# Patient Record
Sex: MALE | Race: BLACK OR AFRICAN AMERICAN | NOT HISPANIC OR LATINO | Employment: OTHER | URBAN - METROPOLITAN AREA
[De-identification: names, ages, dates, MRNs, and addresses within clinical notes are randomized per-mention and may not be internally consistent; named-entity substitution may affect disease eponyms.]

---

## 2017-03-07 ENCOUNTER — OFFICE VISIT (OUTPATIENT)
Dept: FAMILY MEDICINE | Facility: CLINIC | Age: 62
End: 2017-03-07
Payer: COMMERCIAL

## 2017-03-07 VITALS
BODY MASS INDEX: 32.23 KG/M2 | HEART RATE: 100 BPM | TEMPERATURE: 99 F | WEIGHT: 243.19 LBS | DIASTOLIC BLOOD PRESSURE: 80 MMHG | OXYGEN SATURATION: 96 % | SYSTOLIC BLOOD PRESSURE: 150 MMHG | HEIGHT: 73 IN

## 2017-03-07 DIAGNOSIS — J06.9 VIRAL URI WITH COUGH: ICD-10-CM

## 2017-03-07 DIAGNOSIS — B96.89 BACTERIAL CONJUNCTIVITIS OF BOTH EYES: Primary | ICD-10-CM

## 2017-03-07 DIAGNOSIS — H10.9 BACTERIAL CONJUNCTIVITIS OF BOTH EYES: Primary | ICD-10-CM

## 2017-03-07 PROCEDURE — 3077F SYST BP >= 140 MM HG: CPT | Mod: S$GLB,,, | Performed by: NURSE PRACTITIONER

## 2017-03-07 PROCEDURE — 3079F DIAST BP 80-89 MM HG: CPT | Mod: S$GLB,,, | Performed by: NURSE PRACTITIONER

## 2017-03-07 PROCEDURE — 99214 OFFICE O/P EST MOD 30 MIN: CPT | Mod: S$GLB,,, | Performed by: NURSE PRACTITIONER

## 2017-03-07 PROCEDURE — 99999 PR PBB SHADOW E&M-EST. PATIENT-LVL IV: CPT | Mod: PBBFAC,,, | Performed by: NURSE PRACTITIONER

## 2017-03-07 PROCEDURE — 1160F RVW MEDS BY RX/DR IN RCRD: CPT | Mod: S$GLB,,, | Performed by: NURSE PRACTITIONER

## 2017-03-07 RX ORDER — LEVOCETIRIZINE DIHYDROCHLORIDE 5 MG/1
5 TABLET, FILM COATED ORAL NIGHTLY
Qty: 30 TABLET | Refills: 0 | Status: SHIPPED | OUTPATIENT
Start: 2017-03-07 | End: 2019-08-06

## 2017-03-07 RX ORDER — FLUTICASONE PROPIONATE 50 MCG
1 SPRAY, SUSPENSION (ML) NASAL DAILY
Qty: 16 G | Refills: 0 | Status: SHIPPED | OUTPATIENT
Start: 2017-03-07 | End: 2017-09-21 | Stop reason: ALTCHOICE

## 2017-03-07 RX ORDER — PROMETHAZINE HYDROCHLORIDE AND DEXTROMETHORPHAN HYDROBROMIDE 6.25; 15 MG/5ML; MG/5ML
5 SYRUP ORAL
Qty: 120 ML | Refills: 0 | Status: SHIPPED | OUTPATIENT
Start: 2017-03-07 | End: 2017-03-17

## 2017-03-07 RX ORDER — OFLOXACIN 3 MG/ML
1 SOLUTION/ DROPS OPHTHALMIC 4 TIMES DAILY
Qty: 10 ML | Refills: 0 | Status: SHIPPED | OUTPATIENT
Start: 2017-03-07 | End: 2017-03-17

## 2017-03-07 NOTE — MR AVS SNAPSHOT
[facility]  [address]  [place] [address]  Phone: [phone]  Fax: [phone]                  Elbert Still   3/7/2017 2:20 PM   Office Visit    Description:  Male : 1955   Provider:  DOMO Dalton   Department:  [facility]           Reason for Visit     Cough     URI           Diagnoses this Visit        Comments    Bacterial conjunctivitis of both eyes    -  Primary     Acute non-recurrent maxillary sinusitis                To Do List           Goals (5 Years of Data)     fluids       Follow-Up and Disposition     Return if symptoms worsen or fail to improve.       These Medications        Disp Refills Start End    ofloxacin (OCUFLOX) 0.3 % ophthalmic solution 10 mL 0 3/7/2017 3/17/2017    Place 1 drop into both eyes 4 (four) times daily. - Both Eyes    Pharmacy: [facility] [address] [place] [address] [place] [facility] [phone]       fluticasone (FLONASE) 50 mcg/actuation nasal spray 16 g 0 3/7/2017     1 spray by Each Nare route once daily. - Each Nare    Pharmacy: [facility] [address] [place] [address] [address] [facility]: [phone]       levocetirizine (XYZAL) 5 MG tablet 30 tablet 0 3/7/2017 3/7/2018    Take 1 tablet (5 mg total) by mouth every evening. - Oral    Pharmacy: [facility] [address] [place] [address] [address] [facility] #: [phone]       promethazine-dextromethorphan (PROMETHAZINE-DM) 6.25-15 mg/5 mL Syrp 120 mL 0 3/7/2017 3/17/2017    Take 5 mLs by mouth every 4 to 6 hours as needed. - Oral    Pharmacy: [facility] [address] [place] [address] [address] [facility] #: [phone]         [facility]     [facility] -  Assistance  Registered nurses in the [facility] provide clinical advisement, health education, appointment booking, and other advisory  services.  Call for this free service at [phone].             Medications           Message regarding Medications     Verify the changes and/or additions to your medication regime listed below are the same as discussed with your clinician today.  If any of these changes or additions are incorrect, please notify your healthcare provider.        START taking these NEW medications        Refills    ofloxacin (OCUFLOX) 0.3 % ophthalmic solution 0    Sig: Place 1 drop into both eyes 4 (four) times daily.    Class: Normal    Route: Both Eyes    fluticasone (FLONASE) 50 mcg/actuation nasal spray 0    Si spray by Each Nare route once daily.    Class: Normal    Route: Each Nare    levocetirizine (XYZAL) 5 MG tablet 0    Sig: Take 1 tablet (5 mg total) by mouth every evening.    Class: Normal    Route: Oral    promethazine-dextromethorphan (PROMETHAZINE-DM) 6.25-15 mg/5 mL Syrp 0    Sig: Take 5 mLs by mouth every 4 to 6 hours as needed.    Class: Normal    Route: Oral           Verify that the below list of medications is an accurate representation of the medications you are currently taking.  If none reported, the list may be blank. If incorrect, please contact your healthcare provider. Carry this list with you in case of emergency.           Current Medications     aspirin (ECOTRIN) 81 MG EC tablet Take 81 mg by mouth once daily.      benazepril (LOTENSIN) 20 MG tablet Take 1 tablet (20 mg total) by mouth once daily.    BLOOD-GLUCOSE CONTROL, NORMAL (ONE TOUCH ULTRA CONTROL MISC) by Misc.(Non-Drug; Combo Route) route.      clotrimazole (LOTRIMIN) 1 % Soln Apply topically once daily. Apply to filed toenails daily    colchicine 0.6 mg tablet Take 1 tablet (0.6 mg total) by mouth once daily.    cyclobenzaprine (FLEXERIL) 10 MG tablet Take 1 tablet (10 mg total) by mouth 3 (three) times daily as needed.    fluconazole (DIFLUCAN) 150 MG Tab TAKE 1 TABLET BY MOUTH ONCE    fluticasone (FLONASE) 50 mcg/actuation nasal  "spray 2 sprays by Nasal route once daily.      fluticasone (FLONASE) 50 mcg/actuation nasal spray 1 spray by Each Nare route once daily.    glipiZIDE (GLUCOTROL) 5 MG TR24 Take 1 tablet (5 mg total) by mouth daily with breakfast.    glipiZIDE (GLUCOTROL) 5 MG TR24 TAKE 1 TABLET BY MOUTH TWICE DAILY    LANCETS MISC by Misc.(Non-Drug; Combo Route) route.      levocetirizine (XYZAL) 5 MG tablet Take 1 tablet (5 mg total) by mouth every evening.    metformin (GLUCOPHAGE) 500 MG tablet TAKE 1 TABLET(500 MG) BY MOUTH TWICE DAILY WITH MEALS    multivitamin (MULTIVITAMIN) per tablet Take 1 tablet by mouth once daily.      naproxen (NAPROSYN) 500 MG tablet Take 1 tablet (500 mg total) by mouth 2 (two) times daily with meals.    ofloxacin (OCUFLOX) 0.3 % ophthalmic solution Place 1 drop into both eyes 4 (four) times daily.    promethazine-dextromethorphan (PROMETHAZINE-DM) 6.25-15 mg/5 mL Syrp Take 5 mLs by mouth every 4 to 6 hours as needed.    urea 20 % Crea Apply 1 application topically.      vardenafil (LEVITRA) 20 MG tablet Take 1 tablet (20 mg total) by mouth daily as needed for Erectile Dysfunction.           Clinical Reference Information           Your Vitals Were     BP Pulse Temp Height Weight SpO2    150/80 100 99 °F (37.2 °C) (Oral) 6' 1" (1.854 m) 110.3 kg (243 lb 2.7 oz) 96%    BMI                32.08 kg/m2          Blood Pressure          Most Recent Value    BP  (!)  150/80      Allergies as of 3/7/2017     Iodine And Iodide Containing Products      Immunizations Administered on Date of Encounter - 3/7/2017     None      [facility] Sign-Up     Activating your [facility] account is as easy as 1-2-3!     1) Visit [email or web address], select Sign Up Now, enter this activation code and your date of birth, then select Next.  [email or web address]  Expires: 4/21/2017  2:48 PM      2) Create a username and password to use when you visit [facility] in the future and select a security question in case you lose your password and " select Next.    3) Enter your e-mail address and click Sign Up!    Additional Information  If you have questions, please e-mail [email or web address] or call [phone] to talk to our [facility] staff. Remember, [facility] is NOT to be used for urgent needs. For medical emergencies, dial 911.         Instructions      Conjunctivitis, Bacterial    You have an infection in the membranes covering the white part of the eye. This part of the eye is called the conjunctiva. The infection is called conjunctivitis. The most common symptoms of conjunctivitis include a thick, pus-like discharge from the eye, swollen eyelids, redness, eyelids sticking together upon awakening, and a gritty or scratchy feeling in the eye. Your infection was caused by bacteria. It may be treated with medicine. With treatment, the infection takes about 7 to 10 days to resolve.  Home care  · Use prescribed antibiotic eye drops or ointment as directed to treat the infection.  · Apply a warm compress (towel soaked in warm water) to the affected eye 3 to 4 times a day. Do this just before applying medicine to the eye.  · Use a warm, wet cloth to wipe away crusting of the eyelids in the morning. This is caused by mucus drainage during the night. You may also use saline irrigating solution or artificial tears to rinse away mucus in the eye. Do not put a patch over the eye.  · Wash your hands before and after touching the infected eye. This is to prevent spreading the infection to the other eye, and to other people. Do not share your towels or washcloths with others.  · You may use acetaminophen or ibuprofen to control pain, unless another medicine was prescribed. (Note: If you have chronic liver or kidney disease or have ever had a stomach ulcer or gastrointestinal bleeding, talk with your doctor before using these medicines.)  · Do not wear contact lenses until your eyes have healed and all symptoms are gone.  Follow-up care  Follow up with your  healthcare provider, or as advised.  When to seek medical advice  Call your healthcare provider right away if any of these occur:  · Worsening vision  · Increasing pain in the eye  · Increasing swelling or redness of the eyelid  · Redness spreading around the eye  Date Last Reviewed: 6/14/2015 © 2000-2016 [employer]. [address] [place] [address], [place] [address]. All rights reserved. This information is not intended as a substitute for professional medical care. Always follow your healthcare professional's instructions.        Sinusitis (No Antibiotics)    The sinuses are air-filled spaces within the bones of the face. They connect to the inside of the nose. Sinusitis is an inflammation of the tissue lining the sinus cavity. Sinus inflammation can occur during a cold. It can also be due to allergies to pollens and other particles in the air. It can cause symptoms such as sinus congestion, headache, sore throat, facial swelling and fullness. It may also cause a low-grade fever. No infection is present, and no antibiotic treatment is needed.  Home care  · Drink plenty of water, hot tea, and other liquids. This may help thin mucus. It also may promote sinus drainage.  · Heat may help soothe painful areas of the face. Use a towel soaked in hot water. Or,  the shower and direct the hot spray onto your face. Using a vaporizer along with a menthol rub at night may also help.   · An expectorant containing guaifenesin may help thin the mucus and promote drainage from the sinuses.  · Over-the-counter decongestants may be used unless a similar medicine was prescribed. Nasal sprays work the fastest. Use one that contains phenylephrine or oxymetazoline. First blow the nose gently. Then use the spray. Do not use these medicines more often than directed on the label or symptoms may get worse. You may also use tablets containing pseudoephedrine. Avoid products that combine ingredients, because side effects may  be increased. Read labels. You can also ask the pharmacist for help. (NOTE: Persons with high blood pressure should not use decongestants. They can raise blood pressure.)  · Over-the-counter antihistamines may help if allergies contributed to your sinusitis.    · Use acetaminophen or ibuprofen to control pain, unless another pain medicine was prescribed. (If you have chronic liver or kidney disease or ever had a stomach ulcer, talk with your doctor before using these medicines. Aspirin should never be used in anyone under 18 years of age who is ill with a fever. It may cause severe liver damage.)  · Use nasal rinses or irrigation as instructed by your health care provider.  · Don't smoke. This can worsen symptoms.  Follow-up care  Follow up with your healthcare provider or our staff if you are not improving within the next week.  When to seek medical advice  Call your healthcare provider if any of these occur:  · Green or yellow discharge from the nose or into the throat  · Facial pain or headache becoming more severe  · Stiff neck  · Unusual drowsiness or confusion  · Swelling of the forehead or eyelids  · Vision problems, including blurred or double vision  · Fever of 100.4ºF (38ºC) or higher, or as directed by your healthcare provider  · Seizure  · Breathing problems  · Symptoms not resolving within 10 days  Date Last Reviewed: 4/13/2015  © 7581-0772 [employer]. [address] [place] [address]. All rights reserved. This information is not intended as a substitute for professional medical care. Always follow your healthcare professional's instructions.             Language Assistance Services     ATTENTION: Language assistance services are available, free of charge. Please call [phone].      ATENCIÓN: Si habla español, tiene a avalos disposición servicios gratuitos de asistencia lingüística. Llame al [phone].     [facility] Ý: N?u b?n nói Ti?ng Vi?t, có các d?ch v? h? tr? ngôn ng? mi?n  phí dành cho b?n. G?i s? [phone].         [facility] complies with applicable Federal civil rights laws and does not discriminate on the basis of race, color, national origin, age, disability, or sex.

## 2017-03-07 NOTE — PATIENT INSTRUCTIONS
Conjunctivitis, Bacterial    You have an infection in the membranes covering the white part of the eye. This part of the eye is called the conjunctiva. The infection is called conjunctivitis. The most common symptoms of conjunctivitis include a thick, pus-like discharge from the eye, swollen eyelids, redness, eyelids sticking together upon awakening, and a gritty or scratchy feeling in the eye. Your infection was caused by bacteria. It may be treated with medicine. With treatment, the infection takes about 7 to 10 days to resolve.  Home care  · Use prescribed antibiotic eye drops or ointment as directed to treat the infection.  · Apply a warm compress (towel soaked in warm water) to the affected eye 3 to 4 times a day. Do this just before applying medicine to the eye.  · Use a warm, wet cloth to wipe away crusting of the eyelids in the morning. This is caused by mucus drainage during the night. You may also use saline irrigating solution or artificial tears to rinse away mucus in the eye. Do not put a patch over the eye.  · Wash your hands before and after touching the infected eye. This is to prevent spreading the infection to the other eye, and to other people. Do not share your towels or washcloths with others.  · You may use acetaminophen or ibuprofen to control pain, unless another medicine was prescribed. (Note: If you have chronic liver or kidney disease or have ever had a stomach ulcer or gastrointestinal bleeding, talk with your doctor before using these medicines.)  · Do not wear contact lenses until your eyes have healed and all symptoms are gone.  Follow-up care  Follow up with your healthcare provider, or as advised.  When to seek medical advice  Call your healthcare provider right away if any of these occur:  · Worsening vision  · Increasing pain in the eye  · Increasing swelling or redness of the eyelid  · Redness spreading around the eye  Date Last Reviewed: 6/14/2015  © 4998-1778 [employer]. [address] [place] [address], [place] [address]. All rights reserved. This information is not intended as a substitute for professional medical care. Always follow your healthcare professional's instructions.        Sinusitis (No Antibiotics)    The sinuses are air-filled spaces within the bones of the face. They connect to the inside of the nose. Sinusitis is an inflammation of the tissue lining the sinus cavity. Sinus inflammation can occur during a cold. It can also be due to allergies to pollens and other particles in the air. It can cause symptoms such as sinus congestion, headache, sore throat, facial swelling and fullness. It may also cause a low-grade fever. No infection is present, and no antibiotic treatment is needed.  Home care  · Drink plenty of water, hot tea, and other liquids. This may help thin mucus. It also may promote sinus drainage.  · Heat may help soothe painful areas of the face. Use a towel soaked in hot water. Or,  the shower and direct the hot spray onto your face. Using a vaporizer along with a menthol rub at night may also help.   · An expectorant containing guaifenesin may help thin the mucus and promote drainage from the sinuses.  · Over-the-counter decongestants may be used unless a similar medicine was prescribed. Nasal sprays work the fastest. Use one that contains phenylephrine or oxymetazoline. First blow the nose gently. Then use the spray. Do not use these medicines more often than directed on the label or symptoms may get worse. You may also use tablets containing pseudoephedrine. Avoid products that combine ingredients, because side effects may be increased. Read labels. You can also ask the pharmacist for help. (NOTE: Persons with high blood pressure should not use decongestants. They can raise blood pressure.)  · Over-the-counter antihistamines may help if allergies contributed to your sinusitis.    · Use acetaminophen or ibuprofen to control pain, unless  another pain medicine was prescribed. (If you have chronic liver or kidney disease or ever had a stomach ulcer, talk with your doctor before using these medicines. Aspirin should never be used in anyone under 18 years of age who is ill with a fever. It may cause severe liver damage.)  · Use nasal rinses or irrigation as instructed by your health care provider.  · Don't smoke. This can worsen symptoms.  Follow-up care  Follow up with your healthcare provider or our staff if you are not improving within the next week.  When to seek medical advice  Call your healthcare provider if any of these occur:  · Green or yellow discharge from the nose or into the throat  · Facial pain or headache becoming more severe  · Stiff neck  · Unusual drowsiness or confusion  · Swelling of the forehead or eyelids  · Vision problems, including blurred or double vision  · Fever of 100.4ºF (38ºC) or higher, or as directed by your healthcare provider  · Seizure  · Breathing problems  · Symptoms not resolving within 10 days  Date Last Reviewed: 4/13/2015  © 5887-5355 [employer]. [address] [place] [address], [place] [address]. All rights reserved. This information is not intended as a substitute for professional medical care. Always follow your healthcare professional's instructions.

## 2017-03-07 NOTE — PROGRESS NOTES
Subjective:       Patient ID: Elbert Still is a 61 y.o. male.    Chief Complaint: Cough and URI    Cough   This is a new problem. The current episode started in the past 7 days. The problem has been gradually worsening. The problem occurs constantly. The cough is productive of sputum. Associated symptoms include eye redness, headaches, nasal congestion, postnasal drip and a sore throat. Pertinent negatives include no chills, ear congestion, ear pain, fever, myalgias or rhinorrhea. Nothing aggravates the symptoms. He has tried OTC cough suppressant for the symptoms. The treatment provided no relief.   URI    Associated symptoms include coughing, headaches and a sore throat. Pertinent negatives include no ear pain, rhinorrhea or sneezing.       History reviewed. No pertinent past medical history.    Social History     Social History    Marital status:      Spouse name: N/A    Number of children: N/A    Years of education: N/A     Occupational History     [employer]     Social History Main Topics    Smoking status: Never Smoker    Smokeless tobacco: Not on file      Comment: .  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      Alcohol use No    Drug use: No    Sexual activity: Yes     Partners: Female     Other Topics Concern    Not on file     Social History Narrative       Past Surgical History:   Procedure Laterality Date    ADENOIDECTOMY      nasal septum repair      Tonsillectomy      TONSILLECTOMY         Review of Systems   Constitutional: Positive for fatigue. Negative for chills and fever.   HENT: Positive for postnasal drip and sore throat. Negative for ear pain, rhinorrhea, sinus pressure and sneezing.    Eyes: Positive for pain, discharge, redness and itching.   Respiratory: Positive for cough.    Musculoskeletal: Negative for myalgias.   Neurological: Positive for headaches.   All other systems reviewed and are negative.      Objective:   BP (!) 150/80  Pulse 100  Temp  "99 °F (37.2 °C) (Oral)   Ht 6' 1" (1.854 m)  Wt 110.3 kg (243 lb 2.7 oz)  SpO2 96%  BMI 32.08 kg/m2     Physical Exam   Constitutional: He is oriented to person, place, and time. He appears well-developed and well-nourished.   HENT:   Head: Normocephalic and atraumatic.   Right Ear: Hearing, external ear and ear canal normal. Tympanic membrane is erythematous.   Left Ear: Hearing, external ear and ear canal normal. Tympanic membrane is erythematous.   Nose: Rhinorrhea present.   Mouth/Throat: No oropharyngeal exudate, posterior oropharyngeal edema or posterior oropharyngeal erythema.   Eyes: Right eye exhibits no exudate. Left eye exhibits no exudate. Right conjunctiva is injected. Left conjunctiva is injected.   Cardiovascular: Normal rate, regular rhythm and normal heart sounds.    Pulmonary/Chest: Effort normal and breath sounds normal. No respiratory distress. He has no decreased breath sounds. He has no wheezes. He has no rhonchi. He has no rales.   Lymphadenopathy:     He has no cervical adenopathy.   Neurological: He is alert and oriented to person, place, and time.   Skin: Skin is warm, dry and intact.   Psychiatric: He has a normal mood and affect. His speech is normal and behavior is normal.       Assessment:       1. Bacterial conjunctivitis of both eyes    2. Viral URI with cough        Plan:       Elbert was seen today for cough and uri.    Diagnoses and all orders for this visit:    Bacterial conjunctivitis of both eyes        -     ofloxacin (OCUFLOX) 0.3 % ophthalmic solution; Place 1 drop into both eyes 4 (four) times daily.    Viral URI with cough  -     fluticasone (FLONASE) 50 mcg/actuation nasal spray; 1 spray by Each Nare route once daily.  -     levocetirizine (XYZAL) 5 MG tablet; Take 1 tablet (5 mg total) by mouth every evening.  -     promethazine-dextromethorphan (PROMETHAZINE-DM) 6.25-15 mg/5 mL Syrp; Take 5 mLs by mouth every 4 to 6 hours as needed.  -     Discussed diagnosis and " treatment of URI.   -     Increase your water intake to 64-80 oz daily to help thin mucus  -     Nasal Saline spray (Over the counter [place] spray or [place])  2 sprays each nostril 2-3 times a day for nasal congestion  -     Tylenol 500 mg 2 tablets or Ibuprofen 200 mg 2 tablets every 4-6 hours as needed for fever, headaches, sore throat, ear pain, bodyaches, and/or nasal/sinus inflammation  -     Warm salt water gargles with 1/2 cup water and 1 tablespoon salt every 4 hours  -     Warm tea with honey and lemon      Return if symptoms worsen or fail to improve.

## 2017-03-07 NOTE — LETTER
March 7, 2017      Elbert Still   [address] [place] [address]             [facility]  [address] [facility]  [place] [address]  Phone: [phone]  Fax: [phone] Elbert Still    Was treated here on 03/07/2017    May Return to work/school on 03/09/2016    No Restrictions        Nayla Forrester, SANDIP-C

## 2017-03-15 RX ORDER — GLIPIZIDE 5 MG/1
TABLET, FILM COATED, EXTENDED RELEASE ORAL
Qty: 60 TABLET | Refills: 5 | Status: SHIPPED | OUTPATIENT
Start: 2017-03-15 | End: 2017-09-21 | Stop reason: SDUPTHER

## 2017-03-25 DIAGNOSIS — E08.21 DIABETES MELLITUS DUE TO UNDERLYING CONDITION WITH DIABETIC NEPHROPATHY, WITHOUT LONG-TERM CURRENT USE OF INSULIN: ICD-10-CM

## 2017-03-26 RX ORDER — METFORMIN HYDROCHLORIDE 500 MG/1
TABLET ORAL
Qty: 60 TABLET | Refills: 3 | Status: SHIPPED | OUTPATIENT
Start: 2017-03-26 | End: 2017-09-21 | Stop reason: SDUPTHER

## 2017-05-10 DIAGNOSIS — I10 ESSENTIAL HYPERTENSION: ICD-10-CM

## 2017-05-11 RX ORDER — BENAZEPRIL HYDROCHLORIDE 20 MG/1
TABLET ORAL
Qty: 30 TABLET | Refills: 5 | Status: SHIPPED | OUTPATIENT
Start: 2017-05-11 | End: 2017-09-21 | Stop reason: ALTCHOICE

## 2017-07-25 DIAGNOSIS — E08.21 DIABETES MELLITUS DUE TO UNDERLYING CONDITION WITH DIABETIC NEPHROPATHY, WITHOUT LONG-TERM CURRENT USE OF INSULIN: ICD-10-CM

## 2017-07-26 RX ORDER — METFORMIN HYDROCHLORIDE 500 MG/1
TABLET ORAL
Qty: 60 TABLET | Refills: 3 | OUTPATIENT
Start: 2017-07-26

## 2017-08-22 DIAGNOSIS — E08.21 DIABETES MELLITUS DUE TO UNDERLYING CONDITION WITH DIABETIC NEPHROPATHY, WITHOUT LONG-TERM CURRENT USE OF INSULIN: ICD-10-CM

## 2017-08-22 RX ORDER — METFORMIN HYDROCHLORIDE 500 MG/1
TABLET ORAL
Qty: 60 TABLET | Refills: 3 | OUTPATIENT
Start: 2017-08-22

## 2017-09-21 ENCOUNTER — OFFICE VISIT (OUTPATIENT)
Dept: FAMILY MEDICINE | Facility: CLINIC | Age: 62
End: 2017-09-21
Payer: COMMERCIAL

## 2017-09-21 VITALS
DIASTOLIC BLOOD PRESSURE: 100 MMHG | WEIGHT: 245.56 LBS | OXYGEN SATURATION: 96 % | HEART RATE: 80 BPM | TEMPERATURE: 99 F | HEIGHT: 75 IN | SYSTOLIC BLOOD PRESSURE: 150 MMHG | BODY MASS INDEX: 30.53 KG/M2

## 2017-09-21 DIAGNOSIS — Z79.4 DIABETES MELLITUS DUE TO UNDERLYING CONDITION WITH DIABETIC NEPHROPATHY, WITH LONG-TERM CURRENT USE OF INSULIN: ICD-10-CM

## 2017-09-21 DIAGNOSIS — N18.30 CHRONIC KIDNEY DISEASE, STAGE III (MODERATE): ICD-10-CM

## 2017-09-21 DIAGNOSIS — I10 ESSENTIAL HYPERTENSION: Primary | ICD-10-CM

## 2017-09-21 DIAGNOSIS — E08.21 DIABETES MELLITUS DUE TO UNDERLYING CONDITION WITH DIABETIC NEPHROPATHY, WITH LONG-TERM CURRENT USE OF INSULIN: ICD-10-CM

## 2017-09-21 DIAGNOSIS — E08.21 DIABETES MELLITUS DUE TO UNDERLYING CONDITION WITH DIABETIC NEPHROPATHY, WITHOUT LONG-TERM CURRENT USE OF INSULIN: ICD-10-CM

## 2017-09-21 PROCEDURE — 99214 OFFICE O/P EST MOD 30 MIN: CPT | Mod: S$GLB,,, | Performed by: FAMILY MEDICINE

## 2017-09-21 PROCEDURE — 3008F BODY MASS INDEX DOCD: CPT | Mod: S$GLB,,, | Performed by: FAMILY MEDICINE

## 2017-09-21 PROCEDURE — 99999 PR PBB SHADOW E&M-EST. PATIENT-LVL III: CPT | Mod: PBBFAC,,, | Performed by: FAMILY MEDICINE

## 2017-09-21 RX ORDER — METFORMIN HYDROCHLORIDE 500 MG/1
TABLET ORAL
Qty: 90 TABLET | Refills: 1 | Status: SHIPPED | OUTPATIENT
Start: 2017-09-21 | End: 2017-10-12 | Stop reason: SDUPTHER

## 2017-09-21 RX ORDER — BENAZEPRIL HYDROCHLORIDE 20 MG/1
TABLET ORAL
Qty: 30 TABLET | Refills: 5 | Status: CANCELLED | OUTPATIENT
Start: 2017-09-21

## 2017-09-21 RX ORDER — BENAZEPRIL/HYDROCHLOROTHIAZIDE 20 MG-25MG
1 TABLET ORAL DAILY
Qty: 30 TABLET | Refills: 0 | Status: SHIPPED | OUTPATIENT
Start: 2017-09-21 | End: 2017-10-12 | Stop reason: SDUPTHER

## 2017-09-21 RX ORDER — FLUTICASONE PROPIONATE 50 MCG
2 SPRAY, SUSPENSION (ML) NASAL DAILY
Qty: 1 BOTTLE | Refills: 3 | Status: SHIPPED | OUTPATIENT
Start: 2017-09-21 | End: 2019-08-06

## 2017-09-21 RX ORDER — GLIPIZIDE 5 MG/1
TABLET, FILM COATED, EXTENDED RELEASE ORAL
Qty: 60 TABLET | Refills: 5 | Status: CANCELLED | OUTPATIENT
Start: 2017-09-21

## 2017-09-21 RX ORDER — GLIPIZIDE 5 MG/1
5 TABLET, FILM COATED, EXTENDED RELEASE ORAL
Qty: 90 TABLET | Refills: 1 | Status: SHIPPED | OUTPATIENT
Start: 2017-09-21 | End: 2018-02-05

## 2017-09-21 NOTE — PROGRESS NOTES
Office Visit    Patient Name: Elbert Still    : 1955  MRN: [id number]      Assessment/Plan:  Elbert Still is a 61 y.o. male who presents today for :    Essential hypertension  -BP un controlled - continue ACE, add HCTZ  -d/w patient about the benefits of DASH diet, regular exercise, and weight loss.   -advised to keep regular BP logs and bring it back with each f/u visit.  -advised medication compliance, and avoid regular use of NSAIDs as it can increase BP.    -f/u with logs in 2 weeks      Diabetes mellitus due to underlying condition with diabetic nephropathy, without long-term current use of insulin  -last A1c reviewed: 6% from one year ago  -cont current meds  -d/w patient about the need for regular eye care, skin care, daily foot exam, proper nutrition, daily BG checks at home (fasting and 2 hrs pp) and medication compliance in a diabetic.  -Last eye exam: 1 year ago, pt is due for another one, will refer today.  -caution on hypoglycemia and eating small cracker or small cup of juice if pt feels faint/dizzy/weak, and call doctor or go to ER.  -recheck A1c.  -     metformin (GLUCOPHAGE) 500 MG tablet; TAKE 1 TABLET(500 MG) BY MOUTH TWICE DAILY WITH MEALS  Dispense: 60 tablet; Refill: 3  -     glipiZIDE (GLUCOTROL) 5 MG TR24; Take 1 tablet (5 mg total) by mouth daily with breakfast.  Dispense: 60 tablet; Refill: 5    Chronic kidney disease, stage 3  -recheck labs, cont ACE    Other orders  -     glipiZIDE (GLUCOTROL) 5 MG TR24; TAKE 1 TABLET BY MOUTH TWICE DAILY  Dispense: 60 tablet; Refill: 5        Return in about 2 weeks (around 10/5/2017) for HTN med adjustment.     This note was created by combination of typed  and Dragon dictation.  Transcription errors may be present.  If there are any questions, please contact me.      ----------------------------------------------------------------------------------------------------------------------      HPI:  Elbert is a 61 y.o. male who presents  today for:    Diabetes (establish care and med refill); Chronic Kidney Disease (establish care and med refill); and Hypertension (establish care and med refill)      This patient has multiple medical diagnoses as noted below.    This patient is new to me but known to this clinic.  Last OV was over 1 year ago with former Dr. Dawson.    HTN    refills needed today - has been out of meds for the past 3 weeks  pt is compliant with daily HTN medications without any side effects  current medication: Benazepril 20mg  BP log at home? No checks at home  Pt denies vision changes/urinary changes/leg swelling  Pt denies restricting salt-intake, doing regular exercises.  Prior Lipid lab showed elevated Tg, but normal LDL and cholesterol    DM with CKD3    Stable on current medication regimen, no side effects.  current medication: Metformin and glipizide  Daily FBG checks at home: no  Last A1c 9/14/16 at 6.0%  Denies polyuria/polydipsia/foot numbness/tingling/vision changes/hypoglycemia  Denies dietary compliance/wt loss/exercise  -pt takes ACE daily, his last Cr was 2.2 from one year ago      ROS  No F/C/wt changes/fatigue  No vision changes  No dysphagia/sore throat/rhinorrhea  No CP/SOB/palpitations/swelling  No cough/wheezing/SOB  No nausea/vomiting/abd pain  No muscle aches/joint pain  No rashes  No weakness/HA/tingling/numbness  No anxiety/depression    Patient Active Problem List   Diagnosis    Diabetes mellitus due to underlying condition with diabetic nephropathy    Proteinuria    Chronic kidney disease, stage 3    Essential (primary) hypertension       Past Surgical History:   Procedure Laterality Date    ADENOIDECTOMY      nasal septum repair      Tonsillectomy      TONSILLECTOMY         Family History   Problem Relation Age of Onset    No Known Problems Mother     No Known Problems Father     No Known Problems Sister     No Known Problems Brother     No Known Problems Maternal Aunt     No Known Problems  Maternal Uncle     No Known Problems Paternal Aunt     No Known Problems Paternal Uncle     No Known Problems Maternal Grandmother     No Known Problems Maternal Grandfather     No Known Problems Paternal Grandmother     No Known Problems Paternal Grandfather     Cancer Neg Hx      Negative for prostate or colon cancer    Kidney disease Neg Hx     Amblyopia Neg Hx     Blindness Neg Hx     Cataracts Neg Hx     Diabetes Neg Hx     Glaucoma Neg Hx     Hypertension Neg Hx     Macular degeneration Neg Hx     Retinal detachment Neg Hx     Strabismus Neg Hx     Stroke Neg Hx     Thyroid disease Neg Hx        Social History     Social History    Marital status:      Spouse name: N/A    Number of children: N/A    Years of education: N/A     Occupational History     [employer]     Social History Main Topics    Smoking status: Never Smoker    Smokeless tobacco: Never Used      Comment: .  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      Alcohol use No    Drug use: No    Sexual activity: Yes     Partners: Female     Other Topics Concern    Not on file     Social History Narrative    No narrative on file       Current Medications  Medications reviewed and updated.     Allergies   Review of patient's allergies indicates:   Allergen Reactions    Iodine and iodide containing products Hives     Hypotension, Flushing       Labs  Lab Results   Component Value Date    HGBA1C text 09/24/2010     Lab Results   Component Value Date     09/14/2016    K 4.8 09/14/2016     09/14/2016    CO2 23 09/14/2016    BUN 21 (H) 09/14/2016    CREATININE 2.2 (H) 09/14/2016    CALCIUM 9.3 09/14/2016    ANIONGAP 9 09/14/2016    ESTGFRAFRICA 36.8 (A) 09/14/2016    EGFRNONAA 31.8 (A) 09/14/2016     Lab Results   Component Value Date    CHOL 172 09/14/2016    CHOL 159 07/15/2015    CHOL 154 04/19/2013     Lab Results   Component Value Date    HDL 34 (L) 09/14/2016    HDL 34 (L) 07/15/2015     "HDL 35 (L) 04/19/2013     Lab Results   Component Value Date    LDLCALC 87.4 09/14/2016    LDLCALC 88.8 07/15/2015    LDLCALC 74.0 04/19/2013     Lab Results   Component Value Date    TRIG 253 (H) 09/14/2016    TRIG 181 (H) 07/15/2015    TRIG 224 (H) 04/19/2013     Lab Results   Component Value Date    CHOLHDL 19.8 (L) 09/14/2016    CHOLHDL 21.4 07/15/2015    CHOLHDL 22.7 04/19/2013     Last set of blood work has been reviewed as noted above.      Review of Systems  See HPI      Physical Exam  BP (!) 150/100   Pulse 80   Temp 98.9 °F (37.2 °C) (Oral)   Ht 6' 3" (1.905 m)   Wt 111.4 kg (245 lb 9.5 oz)   SpO2 96%   BMI 30.70 kg/m²     GEN: NAD, well developed, pleasant, well nourished  HEENT: NCAT, PERRLA, EOMI, sclera clear, anicteric, O/P clear, MMM with no lesions  NECK: normal, supple with midline trachea, no LAD, no thyromegaly  LUNGS: CTAB, no w/r/r, no increased work of breathing   HEART: RRR, normal S1 and S2, no m/r/g, no edema  ABD: s/nt/nd, NABS  MSK: warm/well perfused, normal ROM in all 4 extremities, no c/c/e.  SKIN: normal turgor, no rashes  PSYCH: AOx3, appropriate mood and affect  Protective Sensation (w/ 10 gram monofilament):  Right: Intact  Left: Intact    Visual Inspection:  Normal -  Bilateral    Pedal Pulses:   Right: Present  Left: Present    Posterior tibialis:   Right:Present  Left: Present        "

## 2017-09-21 NOTE — PROGRESS NOTES
HTN    refills ***not needed today  pt is *** compliant with daily HTN medications without any side effects  current medication: ***  BP log at home? ***  Pt denies vision changes/urinary changes/leg swelling  Pt denies restricting salt-intake, doing regular exercises.    HLD    tolerating statin well with no side effects.  Current dose: ***  No mm aches or pain    DM    ***Stable on current medication regimen, no side effects.  current medication: ***  Daily FBG checks at home: ***  Last A1c 6.0% on 9/14/16  Denies polyuria/polydipsia/foot numbness/tingling/vision changes/hypoglycemia  Denies dietary compliance/wt loss/exercise

## 2017-09-29 ENCOUNTER — TELEPHONE (OUTPATIENT)
Dept: FAMILY MEDICINE | Facility: CLINIC | Age: 62
End: 2017-09-29

## 2017-10-11 ENCOUNTER — LAB VISIT (OUTPATIENT)
Dept: LAB | Facility: HOSPITAL | Age: 62
End: 2017-10-11
Attending: FAMILY MEDICINE
Payer: COMMERCIAL

## 2017-10-11 DIAGNOSIS — E08.21 DIABETES MELLITUS DUE TO UNDERLYING CONDITION WITH DIABETIC NEPHROPATHY, WITHOUT LONG-TERM CURRENT USE OF INSULIN: ICD-10-CM

## 2017-10-11 LAB
CREAT UR-MCNC: 130 MG/DL
MICROALBUMIN UR DL<=1MG/L-MCNC: 3075 UG/ML
MICROALBUMIN/CREATININE RATIO: 2365.4 UG/MG

## 2017-10-11 PROCEDURE — 82570 ASSAY OF URINE CREATININE: CPT

## 2017-10-12 ENCOUNTER — OFFICE VISIT (OUTPATIENT)
Dept: FAMILY MEDICINE | Facility: CLINIC | Age: 62
End: 2017-10-12
Payer: COMMERCIAL

## 2017-10-12 VITALS
WEIGHT: 241.19 LBS | SYSTOLIC BLOOD PRESSURE: 139 MMHG | OXYGEN SATURATION: 97 % | TEMPERATURE: 99 F | BODY MASS INDEX: 29.99 KG/M2 | HEIGHT: 75 IN | DIASTOLIC BLOOD PRESSURE: 86 MMHG | HEART RATE: 83 BPM

## 2017-10-12 DIAGNOSIS — E08.21 DIABETES MELLITUS DUE TO UNDERLYING CONDITION WITH DIABETIC NEPHROPATHY, WITHOUT LONG-TERM CURRENT USE OF INSULIN: ICD-10-CM

## 2017-10-12 DIAGNOSIS — I10 ESSENTIAL (PRIMARY) HYPERTENSION: Primary | ICD-10-CM

## 2017-10-12 DIAGNOSIS — N18.4 CHRONIC KIDNEY DISEASE (CKD), STAGE IV (SEVERE): ICD-10-CM

## 2017-10-12 DIAGNOSIS — E78.5 HYPERLIPIDEMIA, UNSPECIFIED HYPERLIPIDEMIA TYPE: ICD-10-CM

## 2017-10-12 PROCEDURE — 99214 OFFICE O/P EST MOD 30 MIN: CPT | Mod: S$GLB,,, | Performed by: FAMILY MEDICINE

## 2017-10-12 PROCEDURE — 99999 PR PBB SHADOW E&M-EST. PATIENT-LVL III: CPT | Mod: PBBFAC,,, | Performed by: FAMILY MEDICINE

## 2017-10-12 RX ORDER — BENAZEPRIL/HYDROCHLOROTHIAZIDE 20 MG-25MG
1 TABLET ORAL DAILY
Qty: 90 TABLET | Refills: 1 | Status: SHIPPED | OUTPATIENT
Start: 2017-10-12 | End: 2018-04-19

## 2017-10-12 RX ORDER — METFORMIN HYDROCHLORIDE 500 MG/1
TABLET ORAL
Qty: 90 TABLET | Refills: 1 | Status: SHIPPED | OUTPATIENT
Start: 2017-10-12 | End: 2019-02-08

## 2017-10-12 NOTE — PROGRESS NOTES
Office Visit    Patient Name: Elbert Still    : 1955  MRN: [id number]      Assessment/Plan:  Elbert Still is a 61 y.o. male who presents today for :    Essential (primary) hypertension  -     benazepril-hydrochlorthiazide (LOTENSIN HCT) 20-25 mg Tab; Take 1 tablet by mouth once daily.  Dispense: 90 tablet; Refill: 1  -     Ambulatory consult to Nephrology  -previous labs reviewed with patient  -BP controlled - continue current med regimen  -DASH diet, regular exercise, and keep regular BP logs    Diabetes mellitus due to underlying condition with diabetic nephropathy, without long-term current use of insulin  Chronic kidney disease (CKD), stage IV (severe)  -     Ambulatory consult to Nephrology  -     metformin (GLUCOPHAGE) 500 MG tablet; TAKE 1 TABLET(500 MG) BY MOUTH TWICE DAILY WITH MEALS  Dispense: 90 tablet; Refill: 1  -A1c looks good, but CKD worsening. Will continue Glipizide at this time, but stop metformin due to Cr level and have pt re-establish care with Nephrology for further evaluation  -eye exam scheduled for 10/17/17    Hyperlipidemia, unspecified hyperlipidemia type  -cont statin      Return in about 3 months (around 2018).     This note was created by combination of typed  and Dragon dictation.  Transcription errors may be present.  If there are any questions, please contact me.      ----------------------------------------------------------------------------------------------------------------------      HPI:  Elbert is a 61 y.o. male who presents today for:    Follow-up (Hypertension)        This patient has multiple medical diagnoses as noted below.    This patient is known to me and to this clinic. The patient's last visit with me was on 2017.    Patient is here today for HTN f/u and review of lab results.  He was recently started HCTZ in addition to her ACE, combined in Lotensin.  Doing well on medications - but he has not kept daily BP logs at home.  Denies  CP/SOB/leg swelling/urinary changes.    I've reviewed his most recent labs with him.  His A1c has improved from 6.0% to 5.5%  His TG has improved as well      His GFR has worsened to 26.2 down from 36.8 from 2016.  Patient had seen a Nephrologist more than 4 years ago and had his BP medication adjusted at that time. Pt recalled that patient was taking both losartan and Lotensin from that kidney specialist, whom patient has forgotten the name of.    Labs reviewed with patient.      Additional ROS  No F/C/wt changes/fatigue  No CP/SOB/palpitations/swelling  No cough/wheezing/SOB  No nausea/vomiting/abd pain/blood in stool, no diarrhea, no constipation  No muscle aches/joint pain  No weakness/HA/tingling/numbness  No polyuria/polydipsia/fatigue/wt changes/cold or hot intolerance      Patient Active Problem List   Diagnosis    Diabetes mellitus due to underlying condition with diabetic nephropathy    Proteinuria    Chronic kidney disease, stage 3    Essential (primary) hypertension       Past Surgical History:   Procedure Laterality Date    ADENOIDECTOMY      nasal septum repair      Tonsillectomy      TONSILLECTOMY         Family History   Problem Relation Age of Onset    No Known Problems Mother     No Known Problems Father     No Known Problems Sister     No Known Problems Brother     No Known Problems Maternal Aunt     No Known Problems Maternal Uncle     No Known Problems Paternal Aunt     No Known Problems Paternal Uncle     No Known Problems Maternal Grandmother     No Known Problems Maternal Grandfather     No Known Problems Paternal Grandmother     No Known Problems Paternal Grandfather     Cancer Neg Hx      Negative for prostate or colon cancer    Kidney disease Neg Hx     Amblyopia Neg Hx     Blindness Neg Hx     Cataracts Neg Hx     Diabetes Neg Hx     Glaucoma Neg Hx     Hypertension Neg Hx     Macular degeneration Neg Hx     Retinal detachment Neg Hx     Strabismus Neg Hx   "   Stroke Neg Hx     Thyroid disease Neg Hx        Social History     Social History    Marital status:      Spouse name: N/A    Number of children: N/A    Years of education: N/A     Occupational History     [employer]     Social History Main Topics    Smoking status: Never Smoker    Smokeless tobacco: Never Used      Comment: .  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      Alcohol use No    Drug use: No    Sexual activity: Yes     Partners: Female     Other Topics Concern    Not on file     Social History Narrative    No narrative on file       Current Medications  Medications reviewed and updated.     Allergies   Review of patient's allergies indicates:   Allergen Reactions    Iodine and iodide containing products Hives     Hypotension, Flushing             Review of Systems  See HPI      Physical Exam  /86 (BP Location: Right arm, Patient Position: Sitting, BP Method: Large (Manual))   Pulse 83   Temp 98.7 °F (37.1 °C) (Oral)   Ht 6' 3" (1.905 m)   Wt 109.4 kg (241 lb 2.9 oz)   SpO2 97%   BMI 30.15 kg/m²     GEN: NAD, well developed, pleasant, well nourished  HEENT: NCAT, PERRLA, EOMI, sclera clear, anicteric, O/P clear, MMM with no lesions  NECK: normal, supple with midline trachea, no LAD, no thyromegaly  LUNGS: CTAB, no w/r/r, no increased work of breathing   HEART: RRR, normal S1 and S2, no m/r/g, no edema  ABD: s/nt/nd, NABS  MSK: warm/well perfused, normal ROM in all 4 extremities, no c/c/e.  SKIN: normal turgor, no rashes  PSYCH: AOx3, appropriate mood and affect      Labs  Lab Results   Component Value Date    HGBA1C 5.5 10/11/2017     Lab Results   Component Value Date     10/11/2017    K 5.0 10/11/2017     10/11/2017    CO2 23 10/11/2017    BUN 40 (H) 10/11/2017    CREATININE 2.9 (H) 10/11/2017    CALCIUM 9.3 10/11/2017    ANIONGAP 8 10/11/2017    ESTGFRAFRICA 26.2 (A) 10/11/2017    EGFRNONAA 22.6 (A) 10/11/2017     Lab Results   Component " Value Date    CHOL 142 10/11/2017    CHOL 172 09/14/2016    CHOL 159 07/15/2015     Lab Results   Component Value Date    HDL 34 (L) 10/11/2017    HDL 34 (L) 09/14/2016    HDL 34 (L) 07/15/2015     Lab Results   Component Value Date    LDLCALC 67.2 10/11/2017    LDLCALC 87.4 09/14/2016    LDLCALC 88.8 07/15/2015     Lab Results   Component Value Date    TRIG 204 (H) 10/11/2017    TRIG 253 (H) 09/14/2016    TRIG 181 (H) 07/15/2015     Lab Results   Component Value Date    CHOLHDL 23.9 10/11/2017    CHOLHDL 19.8 (L) 09/14/2016    CHOLHDL 21.4 07/15/2015     Last set of blood work has been reviewed as noted above.

## 2017-10-17 ENCOUNTER — OFFICE VISIT (OUTPATIENT)
Dept: OPTOMETRY | Facility: CLINIC | Age: 62
End: 2017-10-17
Payer: COMMERCIAL

## 2017-10-17 DIAGNOSIS — E11.9 TYPE 2 DIABETES MELLITUS WITHOUT RETINOPATHY: Primary | ICD-10-CM

## 2017-10-17 DIAGNOSIS — H25.13 NUCLEAR SCLEROSIS OF BOTH EYES: ICD-10-CM

## 2017-10-17 DIAGNOSIS — H52.7 REFRACTIVE ERROR: ICD-10-CM

## 2017-10-17 PROCEDURE — 92014 COMPRE OPH EXAM EST PT 1/>: CPT | Mod: S$GLB,,, | Performed by: OPTOMETRIST

## 2017-10-17 PROCEDURE — 99999 PR PBB SHADOW E&M-EST. PATIENT-LVL II: CPT | Mod: PBBFAC,,, | Performed by: OPTOMETRIST

## 2017-10-17 NOTE — PROGRESS NOTES
"Subjective:       Patient ID: Elbert Still is a 61 y.o. male      Chief Complaint   Patient presents with    Diabetic Eye Exam     History of Present Illness  Dls: 10/6/16 Dr. Krishna    Pt here for diabetic eye exam.   Pt states no changes in vision . Pt wears otc readers. Pt states no   tearing no itching no burning no pain no ha's floaters ou off/on for yrs.     Eye meds:  otc gtts ou prn     Hemoglobin A1C       Date                     Value               Ref Range           Status                10/11/2017               5.5                 4.0 - 5.6 %         Final              ----------     Assessment/Plan:     1. Type 2 diabetes mellitus without retinopathy  No diabetic retinopathy. Educated patient on importance of good blood sugar control, compliance with meds, and follow up care with PCP. Return in 1 year for dilated eye exam, sooner PRN.    2. Nuclear sclerosis of both eyes  Educated patient on the presence of cataracts and effects on vision, including clouded, blurred or dim vision, increasing difficulty with vision at night, sensitivity to light and glare, need for brighter light for reading and other activities, and seeing "halos" around lights. No surgery at this time. Recheck in one year.    3. Refractive error  Patient declined refraction today.Continue with OTC readers PRN.    Return in about 1 year (around 10/17/2018) for Diabetic Eye Exam.       "

## 2017-10-17 NOTE — LETTER
October 17, 2017      Angel Luis Boo MD  [address]  [place] [address]           [facility]  [address]  [place] [address]  Phone: [phone]  Fax: [phone]          Patient: Elbert Still   MR Number: [id number]   YOB: 1955   Date of Visit: 10/17/2017       Dear Dr. Angel Luis Boo:    Thank you for referring Elbert Still to me for evaluation. Attached you will find relevant portions of my assessment and plan of care.    If you have questions, please do not hesitate to call me. I look forward to following Elbert Still along with you.    Sincerely,    Yulissa Krishna, OD    Enclosure  CC:  No Recipients    If you would like to receive this communication electronically, please contact [email or web address] or [phone] to request more information on [employer] Link access.    For providers and/or their staff who would like to refer a patient to [facility], please contact us through our one-stop-shop provider referral line, [facility], at [phone].    If you feel you have received this communication in error or would no longer like to receive these types of communications, please e-mail [email or web address]

## 2018-02-05 ENCOUNTER — OFFICE VISIT (OUTPATIENT)
Dept: FAMILY MEDICINE | Facility: CLINIC | Age: 63
End: 2018-02-05
Payer: COMMERCIAL

## 2018-02-05 VITALS
BODY MASS INDEX: 29.29 KG/M2 | SYSTOLIC BLOOD PRESSURE: 112 MMHG | OXYGEN SATURATION: 97 % | WEIGHT: 235.56 LBS | DIASTOLIC BLOOD PRESSURE: 86 MMHG | TEMPERATURE: 99 F | HEIGHT: 75 IN | HEART RATE: 86 BPM

## 2018-02-05 DIAGNOSIS — E08.21 DIABETES MELLITUS DUE TO UNDERLYING CONDITION WITH DIABETIC NEPHROPATHY, WITHOUT LONG-TERM CURRENT USE OF INSULIN: Primary | ICD-10-CM

## 2018-02-05 DIAGNOSIS — I10 ESSENTIAL (PRIMARY) HYPERTENSION: ICD-10-CM

## 2018-02-05 DIAGNOSIS — N18.4 CKD STAGE 4 DUE TO TYPE 2 DIABETES MELLITUS: ICD-10-CM

## 2018-02-05 DIAGNOSIS — E11.22 CKD STAGE 4 DUE TO TYPE 2 DIABETES MELLITUS: ICD-10-CM

## 2018-02-05 PROCEDURE — 99999 PR PBB SHADOW E&M-EST. PATIENT-LVL III: CPT | Mod: PBBFAC,,, | Performed by: FAMILY MEDICINE

## 2018-02-05 PROCEDURE — 3008F BODY MASS INDEX DOCD: CPT | Mod: S$GLB,,, | Performed by: FAMILY MEDICINE

## 2018-02-05 PROCEDURE — 99214 OFFICE O/P EST MOD 30 MIN: CPT | Mod: S$GLB,,, | Performed by: FAMILY MEDICINE

## 2018-02-05 RX ORDER — GLIPIZIDE 10 MG/1
10 TABLET ORAL
Qty: 180 TABLET | Refills: 3 | Status: SHIPPED | OUTPATIENT
Start: 2018-02-05 | End: 2019-02-08 | Stop reason: SDUPTHER

## 2018-02-05 NOTE — PROGRESS NOTES
Office Visit    Patient Name: Elbert Still Jr.    : 1955  MRN: [id number]      Assessment/Plan:  Elbert Still Jr. is a 62 y.o. male who presents today for :    Diabetes mellitus due to underlying condition with diabetic nephropathy, without long-term current use of insulin  CKD stage 4 due to type 2 diabetes mellitus  -     glipiZIDE (GLUCOTROL) 10 MG tablet; Take 1 tablet (10 mg total) by mouth 2 (two) times daily before meals.  Dispense: 180 tablet; Refill: 3  -     Ambulatory referral to Endocrinology  -     Ambulatory referral to Nephrology  -     Hemoglobin A1c; Future; Expected date: 2018  -previous A1c reviewed:   Lab Results   Component Value Date    HGBA1C 5.5 10/11/2017     -poorly controlled FBG -  Increase dosage of Glipizide (was taken off Metformin due to elevated Cr), will also have pt see Endocrinology for closer monitoring of BG.  -resent referral to nephrology, pt Dr. Walter Fernández.   -d/w patient about the need for regular eye care, skin care, daily foot exam, proper nutrition, daily BG checks at home (fasting and 2 hrs pp) and medication compliance in a diabetic.    Essential (primary) hypertension    -BP controlled - continue current med regimen  -advised DASH diet, regular exercise      Essential (primary) hypertension  -     benazepril-hydrochlorthiazide (LOTENSIN HCT) 20-25 mg Tab; Take 1 tablet by mouth once daily.  Dispense: 90 tablet; Refill: 1  -     Ambulatory consult to Nephrology  -previous labs reviewed with patient  -BP controlled - continue current med regimen  -DASH diet, regular exercise, and keep regular BP logs       Follow-up in about 3 months (around 2018) for DM Check.     This note was created by combination of typed  and Dragon dictation.  Transcription errors may be present.  If there are any questions, please contact  me.        ----------------------------------------------------------------------------------------------------------------------      HPI:  Elbert is a 62 y.o. male who presents today for:    Diabetes        This patient has multiple medical diagnoses as noted below.    This patient is known to me and to this clinic. The patient's last visit with me was on 10/12/2017.    Patient is here today for f/u of    DM  Doing well on current medication regimen, no side effects.  current medication: Glipizide 5mg BID, he was taken off of metformin due to his elevated Cr (CKD4 status) - he has not yet seen Nephrology - he used to go to Dr. Walter Díaz.  400s per daily FBG checks at home - he states diet has been good and he's trying to avoid sweet foods.  Hemoglobin A1C   Date Value Ref Range Status   10/11/2017 5.5 4.0 - 5.6 % Final     Comment:     According to ADA guidelines, hemoglobin A1c <7.0% represents  optimal control in non-pregnant diabetic patients. Different  metrics may apply to specific patient populations.   Standards of Medical Care in Diabetes-2016.  For the purpose of screening for the presence of diabetes:  <5.7%     Consistent with the absence of diabetes  5.7-6.4%  Consistent with increasing risk for diabetes   (prediabetes)  >or=6.5%  Consistent with diabetes  Currently, no consensus exists for use of hemoglobin A1c  for diagnosis of diabetes for children.  This Hemoglobin A1c assay has significant interference with fetal   hemoglobin   (HbF). The results are invalid for patients with abnormal amounts of   HbF,   including those with known Hereditary Persistence   of Fetal Hemoglobin. Heterozygous hemoglobin variants (HbAS, HbAC,   HbAD, HbAE, HbA2) do not significantly interfere with this assay;   however, presence of multiple variants in a sample may impact the %   interference.     09/24/2010 text % Final     Comment:     Hemoglobin A1C:  Possible contamination of HgbA1C by a Hemoglobin variant.  This  specimen was [facility] to [facility] for analysis by an  alternate method.   01/22/2010 text % Final     Comment:     Hemoglobin A1C:   Possible contamination of HgbA1C by a Hemoglobin variant. This   specimen was [facility] to [facility] for analysis by an   alternate method.        Denies polyuria/polydipsia/foot numbness/tingling/vision changes/hypoglycemia  Denies dietary compliance/wt loss/exercise    HTN  refills not needed today. He is compliant with daily HTN medications without any side effects. He doesn't keep any BP log at home  Pt denies vision changes/urinary changes/leg swelling        Additional ROS    No F/C/wt changes/fatigue  No dysphagia/sore throat  No CP/SOB/palpitations/swelling  No cough/wheezing  No nausea/vomiting/abd pain/no diarrhea, no constipation, blood in stool  No muscle aches/joint pain  No rashes  No weakness/HA/tingling/numbness  No anxiety/depression  No dysuria/hematuria      Patient Care Team:  Angel Luis Boo MD as PCP - General (Family Medicine)  Walter Díaz MD as Consulting Physician (Nephrology)        Patient Active Problem List   Diagnosis    Diabetes mellitus due to underlying condition with diabetic nephropathy    Proteinuria    Chronic kidney disease, stage 3    Essential (primary) hypertension       Current Medications  Medications reviewed/updated.     Current Outpatient Prescriptions on File Prior to Visit   Medication Sig Dispense Refill    aspirin (ECOTRIN) 81 MG EC tablet Take 81 mg by mouth once daily.        benazepril-hydrochlorthiazide (LOTENSIN HCT) 20-25 mg Tab Take 1 tablet by mouth once daily. 90 tablet 1    BLOOD-GLUCOSE CONTROL, NORMAL (ONE TOUCH ULTRA CONTROL MISC) by Misc.(Non-Drug; Combo Route) route.        fluconazole (DIFLUCAN) 150 MG Tab TAKE 1 TABLET BY MOUTH ONCE 1 tablet 0    fluticasone (FLONASE) 50 mcg/actuation nasal spray 2 sprays by Each Nare route once daily. 1 Bottle 3    LANCETS MISC by  Misc.(Non-Drug; Combo Route) route.        levocetirizine (XYZAL) 5 MG tablet Take 1 tablet (5 mg total) by mouth every evening. 30 tablet 0    metformin (GLUCOPHAGE) 500 MG tablet TAKE 1 TABLET(500 MG) BY MOUTH TWICE DAILY WITH MEALS 90 tablet 1    multivitamin (MULTIVITAMIN) per tablet Take 1 tablet by mouth once daily.        urea 20 % Crea Apply 1 application topically.        [DISCONTINUED] glipiZIDE (GLUCOTROL) 5 MG TR24 Take 1 tablet (5 mg total) by mouth daily with breakfast. 90 tablet 1    colchicine 0.6 mg tablet Take 1 tablet (0.6 mg total) by mouth once daily. 30 tablet 3    vardenafil (LEVITRA) 20 MG tablet Take 1 tablet (20 mg total) by mouth daily as needed for Erectile Dysfunction. 6 tablet 11    [DISCONTINUED] clotrimazole (LOTRIMIN) 1 % Soln Apply topically once daily. Apply to filed toenails daily 30 mL 3     No current facility-administered medications on file prior to visit.            Past Surgical History:   Procedure Laterality Date    ADENOIDECTOMY      nasal septum repair      Tonsillectomy      TONSILLECTOMY         Family History   Problem Relation Age of Onset    No Known Problems Mother     No Known Problems Father     No Known Problems Sister     No Known Problems Brother     No Known Problems Maternal Aunt     No Known Problems Maternal Uncle     No Known Problems Paternal Aunt     No Known Problems Paternal Uncle     No Known Problems Maternal Grandmother     No Known Problems Maternal Grandfather     No Known Problems Paternal Grandmother     No Known Problems Paternal Grandfather     Cancer Neg Hx      Negative for prostate or colon cancer    Kidney disease Neg Hx     Amblyopia Neg Hx     Blindness Neg Hx     Cataracts Neg Hx     Diabetes Neg Hx     Glaucoma Neg Hx     Hypertension Neg Hx     Macular degeneration Neg Hx     Retinal detachment Neg Hx     Strabismus Neg Hx     Stroke Neg Hx     Thyroid disease Neg Hx        Social History  "    Social History    Marital status:      Spouse name: N/A    Number of children: N/A    Years of education: N/A     Occupational History     [employer]     Social History Main Topics    Smoking status: Never Smoker    Smokeless tobacco: Never Used      Comment: .  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      Alcohol use No    Drug use: No    Sexual activity: Yes     Partners: Female     Other Topics Concern    Not on file     Social History Narrative    No narrative on file             Allergies   Review of patient's allergies indicates:   Allergen Reactions    Iodine and iodide containing products Hives     Hypotension, Flushing             Review of Systems  See HPI      Physical Exam  /86   Pulse 86   Temp 98.6 °F (37 °C) (Oral)   Ht 6' 3" (1.905 m)   Wt 106.8 kg (235 lb 9 oz)   SpO2 97%   BMI 29.44 kg/m²     GEN: NAD, well developed, pleasant, well nourished  HEENT: NCAT, PERRLA, EOMI, sclera clear, anicteric, O/P clear, MMM with no lesions  NECK: normal, supple with midline trachea, no LAD, no thyromegaly  LUNGS: CTAB, no w/r/r, no increased work of breathing   HEART: RRR, normal S1 and S2, no m/r/g, no edema  ABD: s/nt/nd, NABS  SKIN: normal turgor, no rashes  PSYCH: AOx3, appropriate mood and affect  MSK: warm/well perfused, normal ROM in all 4 extremities, no c/c/e.      Labs  Lab Results   Component Value Date    HGBA1C 5.5 10/11/2017     Lab Results   Component Value Date     10/11/2017    K 5.0 10/11/2017     10/11/2017    CO2 23 10/11/2017    BUN 40 (H) 10/11/2017    CREATININE 2.9 (H) 10/11/2017    CALCIUM 9.3 10/11/2017    ANIONGAP 8 10/11/2017    ESTGFRAFRICA 26.2 (A) 10/11/2017    EGFRNONAA 22.6 (A) 10/11/2017     Lab Results   Component Value Date    CHOL 142 10/11/2017    CHOL 172 09/14/2016    CHOL 159 07/15/2015     Lab Results   Component Value Date    HDL 34 (L) 10/11/2017    HDL 34 (L) 09/14/2016    HDL 34 (L) 07/15/2015     Lab " Results   Component Value Date    LDLCALC 67.2 10/11/2017    LDLCALC 87.4 09/14/2016    LDLCALC 88.8 07/15/2015     Lab Results   Component Value Date    TRIG 204 (H) 10/11/2017    TRIG 253 (H) 09/14/2016    TRIG 181 (H) 07/15/2015     Lab Results   Component Value Date    CHOLHDL 23.9 10/11/2017    CHOLHDL 19.8 (L) 09/14/2016    CHOLHDL 21.4 07/15/2015     Last set of blood work has been reviewed as noted above.      Health Maintenance  Health Maintenance       Date Due Completion Date    TETANUS VACCINE 11/15/1975 ---    Zoster Vaccine 11/15/2017 ---    Colonoscopy 12/07/2017 12/7/2007 (Done)    Override on 12/7/2007: Done    Hemoglobin A1c 04/11/2018 10/11/2017    Foot Exam 09/21/2018 9/21/2017    Override on 7/8/2015: Done    Lipid Panel 10/11/2018 10/11/2017    Eye Exam 10/17/2018 10/17/2017    Override on 10/17/2017: Done    Override on 10/6/2016: Done    Override on 10/6/2016: Done

## 2018-02-05 NOTE — LETTER
February 5, 2018      [facility]  [address] [facility]  [place] [address]  Phone: [phone]  Fax: [phone]       Patient: Elbert Still   YOB: 1955  Date of Visit: 02/05/2018    To Whom It May Concern:    Elizabeth Still  was at [facility] on 02/05/2018. He may return to work/school on 02/06/2018 with no restrictions. If you have any questions or concerns, or if I can be of further assistance, please do not hesitate to contact me.    Sincerely,    Octavia Martinez MA

## 2018-02-15 ENCOUNTER — TELEPHONE (OUTPATIENT)
Dept: FAMILY MEDICINE | Facility: CLINIC | Age: 63
End: 2018-02-15

## 2018-02-15 NOTE — LETTER
February 15, 2018    Elbert THAKKAR Tessy Mitchell.  [address] [address]  [place] [address]             [facility]  [address] [place] [address]  Phone: [phone]  Fax: [phone] Dear Mr. Still:    Sorry we were unable to contact you to schedule your Endocrinology and Nephrology appointment. Please give the referral department a call at [phone].      If you have any questions or concerns, please don't hesitate to call.    Sincerely,        Wilberto Au MA

## 2018-02-15 NOTE — TELEPHONE ENCOUNTER
Left message for pt to call back regarding his Endocrinology and Nephrology referral.Letter mailed out

## 2018-04-05 ENCOUNTER — OFFICE VISIT (OUTPATIENT)
Dept: FAMILY MEDICINE | Facility: CLINIC | Age: 63
End: 2018-04-05
Payer: COMMERCIAL

## 2018-04-05 VITALS
HEIGHT: 75 IN | WEIGHT: 237.13 LBS | HEART RATE: 79 BPM | OXYGEN SATURATION: 96 % | TEMPERATURE: 99 F | SYSTOLIC BLOOD PRESSURE: 132 MMHG | DIASTOLIC BLOOD PRESSURE: 90 MMHG | BODY MASS INDEX: 29.48 KG/M2

## 2018-04-05 DIAGNOSIS — M79.672 LEFT FOOT PAIN: Primary | ICD-10-CM

## 2018-04-05 DIAGNOSIS — Z12.11 COLON CANCER SCREENING: ICD-10-CM

## 2018-04-05 DIAGNOSIS — E78.2 MIXED HYPERLIPIDEMIA: ICD-10-CM

## 2018-04-05 DIAGNOSIS — N18.4 CKD (CHRONIC KIDNEY DISEASE) STAGE 4, GFR 15-29 ML/MIN: ICD-10-CM

## 2018-04-05 DIAGNOSIS — I10 ESSENTIAL (PRIMARY) HYPERTENSION: ICD-10-CM

## 2018-04-05 PROCEDURE — 99999 PR PBB SHADOW E&M-EST. PATIENT-LVL III: CPT | Mod: PBBFAC,,, | Performed by: FAMILY MEDICINE

## 2018-04-05 PROCEDURE — 99214 OFFICE O/P EST MOD 30 MIN: CPT | Mod: S$GLB,,, | Performed by: FAMILY MEDICINE

## 2018-04-05 PROCEDURE — 3080F DIAST BP >= 90 MM HG: CPT | Mod: CPTII,S$GLB,, | Performed by: FAMILY MEDICINE

## 2018-04-05 PROCEDURE — 3075F SYST BP GE 130 - 139MM HG: CPT | Mod: CPTII,S$GLB,, | Performed by: FAMILY MEDICINE

## 2018-04-05 RX ORDER — ATORVASTATIN CALCIUM 20 MG/1
20 TABLET, FILM COATED ORAL DAILY
Qty: 90 TABLET | Refills: 3 | Status: SHIPPED | OUTPATIENT
Start: 2018-04-05 | End: 2019-02-08 | Stop reason: SDUPTHER

## 2018-04-05 NOTE — PROGRESS NOTES
Office Visit    Patient Name: Elbert Still Jr.    : 1955  MRN: [id number]      Assessment/Plan:  Elbert Still Jr. is a 62 y.o. male who presents today for :    Left foot pain  -activity modification d/w patient: avoid prolonged standing  -ROM stretching and strengthening exercises demonstrated in clinic   -RTC in 4-6 weeks if not better, or sooner if pain worsens.  -given CKD4 - avoid all NSAIDs and advised to use OTC Tylenol (325mg tablets) once every 4-6 hours as needed for pain    Essential (primary) hypertension  CKD (chronic kidney disease) stage 4, GFR 15-29 ml/min  Mixed hyperlipidemia  -     atorvastatin (LIPITOR) 20 MG tablet; Take 1 tablet (20 mg total) by mouth once daily.  Dispense: 90 tablet; Refill: 3  - continue current medications   - ?advised DASH diet, portion control, regular cardiovascular exercises  - ?counseled on weight loss  - ?counseled on smoking cessation    -f/u with BP logs in 2 weeks     Colon cancer screening  -     Case request GI: COLONOSCOPY      Follow-up in about 4 weeks (around 5/3/2018) for routine care with PCP.     This note was created by combination of typed  and Dragon dictation.  Transcription errors may be present.  If there are any questions, please contact me.      ----------------------------------------------------------------------------------------------------------------------      HPI:  Elbert is a 62 y.o. male who presents today for:    Foot Pain        This patient has multiple medical diagnoses as noted below.      This patient is known to me and to this clinic. The patient's last visit with me was on 2018.    Patient is here today for   Left foot pain near the big toe for the past week, no injuries. Pain is 3/10 and is tolerable, worse with walking, better with rest. Has been taking Tylenol once daily with some relief. His Bp is also noted to be borderline today - no Bp logs at home. Pt admits to not taking his medications daily. He  has been trying to eat healthier.     Additional ROS    No F/C/wt changes/fatigue  No dysphagia/sore throat  No CP/SOB/palpitations/swelling  No cough/wheezing  No nausea/vomiting/abd pain  No rashes  No weakness/HA/tingling/numbness      Patient Care Team:  Angel Luis Boo MD as PCP - General (Family Medicine)  Walter Díaz MD as Consulting Physician (Nephrology)        Patient Active Problem List   Diagnosis    Diabetes mellitus due to underlying condition with diabetic nephropathy    Proteinuria    Chronic kidney disease, stage 3    Essential (primary) hypertension    Mixed hyperlipidemia       Current Medications  Medications reviewed/updated.     Current Outpatient Prescriptions on File Prior to Visit   Medication Sig Dispense Refill    aspirin (ECOTRIN) 81 MG EC tablet Take 81 mg by mouth once daily.        benazepril-hydrochlorthiazide (LOTENSIN HCT) 20-25 mg Tab Take 1 tablet by mouth once daily. 90 tablet 1    BLOOD-GLUCOSE CONTROL, NORMAL (ONE TOUCH ULTRA CONTROL MISC) by Misc.(Non-Drug; Combo Route) route.        fluconazole (DIFLUCAN) 150 MG Tab TAKE 1 TABLET BY MOUTH ONCE 1 tablet 0    fluticasone (FLONASE) 50 mcg/actuation nasal spray 2 sprays by Each Nare route once daily. 1 Bottle 3    glipiZIDE (GLUCOTROL) 10 MG tablet Take 1 tablet (10 mg total) by mouth 2 (two) times daily before meals. 180 tablet 3    LANCETS MISC by Misc.(Non-Drug; Combo Route) route.        metformin (GLUCOPHAGE) 500 MG tablet TAKE 1 TABLET(500 MG) BY MOUTH TWICE DAILY WITH MEALS 90 tablet 1    multivitamin (MULTIVITAMIN) per tablet Take 1 tablet by mouth once daily.        urea 20 % Crea Apply 1 application topically.        colchicine 0.6 mg tablet Take 1 tablet (0.6 mg total) by mouth once daily. 30 tablet 3    levocetirizine (XYZAL) 5 MG tablet Take 1 tablet (5 mg total) by mouth every evening. 30 tablet 0    vardenafil (LEVITRA) 20 MG tablet Take 1 tablet (20 mg total) by mouth daily as needed for  "Erectile Dysfunction. 6 tablet 11     No current facility-administered medications on file prior to visit.            Past Surgical History:   Procedure Laterality Date    ADENOIDECTOMY      nasal septum repair      Tonsillectomy      TONSILLECTOMY         Family History   Problem Relation Age of Onset    No Known Problems Mother     No Known Problems Father     No Known Problems Sister     No Known Problems Brother     No Known Problems Maternal Aunt     No Known Problems Maternal Uncle     No Known Problems Paternal Aunt     No Known Problems Paternal Uncle     No Known Problems Maternal Grandmother     No Known Problems Maternal Grandfather     No Known Problems Paternal Grandmother     No Known Problems Paternal Grandfather     Cancer Neg Hx      Negative for prostate or colon cancer    Kidney disease Neg Hx     Amblyopia Neg Hx     Blindness Neg Hx     Cataracts Neg Hx     Diabetes Neg Hx     Glaucoma Neg Hx     Hypertension Neg Hx     Macular degeneration Neg Hx     Retinal detachment Neg Hx     Strabismus Neg Hx     Stroke Neg Hx     Thyroid disease Neg Hx        Social History     Social History    Marital status:      Spouse name: N/A    Number of children: N/A    Years of education: N/A     Occupational History     [employer]     Social History Main Topics    Smoking status: Never Smoker    Smokeless tobacco: Never Used      Comment: .  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      Alcohol use No    Drug use: No    Sexual activity: Yes     Partners: Female     Other Topics Concern    Not on file     Social History Narrative    No narrative on file             Allergies   Review of patient's allergies indicates:   Allergen Reactions    Iodine and iodide containing products Hives     Hypotension, Flushing             Review of Systems  See HPI      Physical Exam  BP (!) 132/90   Pulse 79   Temp 98.6 °F (37 °C) (Oral)   Ht 6' 3" (1.905 m)   " Wt 107.6 kg (237 lb 1.7 oz)   SpO2 96%   BMI 29.64 kg/m²     GEN: NAD, well developed, pleasant, well nourished  HEENT: NCAT, PERRLA, EOMI, sclera clear, anicteric, O/P clear, MMM with no lesions  NECK: normal, supple with midline trachea, no LAD, no thyromegaly  LUNGS: CTAB, no w/r/r, no increased work of breathing   HEART: RRR, normal S1 and S2, no m/r/g, no edema  ABD: s/nt/nd, NABS  SKIN: normal turgor, no rashes  PSYCH: AOx3, appropriate mood and affect  MSK: warm/well perfused, normal ROM in all 4 extremities, no c/c/e.  L foot normal on gross exam, mild TTP over the big toe MTP joint - no redness/swelling. No limping - able to bear weight

## 2018-04-17 ENCOUNTER — LAB VISIT (OUTPATIENT)
Dept: LAB | Facility: HOSPITAL | Age: 63
End: 2018-04-17
Attending: FAMILY MEDICINE
Payer: COMMERCIAL

## 2018-04-17 ENCOUNTER — OFFICE VISIT (OUTPATIENT)
Dept: FAMILY MEDICINE | Facility: CLINIC | Age: 63
End: 2018-04-17
Payer: COMMERCIAL

## 2018-04-17 VITALS
DIASTOLIC BLOOD PRESSURE: 76 MMHG | WEIGHT: 240.63 LBS | BODY MASS INDEX: 29.92 KG/M2 | OXYGEN SATURATION: 99 % | HEIGHT: 75 IN | SYSTOLIC BLOOD PRESSURE: 138 MMHG | TEMPERATURE: 98 F | HEART RATE: 90 BPM

## 2018-04-17 DIAGNOSIS — N18.4 CKD (CHRONIC KIDNEY DISEASE) STAGE 4, GFR 15-29 ML/MIN: ICD-10-CM

## 2018-04-17 DIAGNOSIS — E08.21 DIABETES MELLITUS DUE TO UNDERLYING CONDITION WITH DIABETIC NEPHROPATHY, WITHOUT LONG-TERM CURRENT USE OF INSULIN: ICD-10-CM

## 2018-04-17 DIAGNOSIS — M79.671 RIGHT FOOT PAIN: ICD-10-CM

## 2018-04-17 DIAGNOSIS — M79.672 LEFT FOOT PAIN: Primary | ICD-10-CM

## 2018-04-17 LAB
ESTIMATED AVG GLUCOSE: 154 MG/DL
HBA1C MFR BLD HPLC: 7 %

## 2018-04-17 PROCEDURE — 99999 PR PBB SHADOW E&M-EST. PATIENT-LVL III: CPT | Mod: PBBFAC,,, | Performed by: FAMILY MEDICINE

## 2018-04-17 PROCEDURE — 3078F DIAST BP <80 MM HG: CPT | Mod: CPTII,S$GLB,, | Performed by: FAMILY MEDICINE

## 2018-04-17 PROCEDURE — 36415 COLL VENOUS BLD VENIPUNCTURE: CPT | Mod: PO

## 2018-04-17 PROCEDURE — 83036 HEMOGLOBIN GLYCOSYLATED A1C: CPT

## 2018-04-17 PROCEDURE — 99214 OFFICE O/P EST MOD 30 MIN: CPT | Mod: S$GLB,,, | Performed by: FAMILY MEDICINE

## 2018-04-17 PROCEDURE — 3075F SYST BP GE 130 - 139MM HG: CPT | Mod: CPTII,S$GLB,, | Performed by: FAMILY MEDICINE

## 2018-04-17 RX ORDER — DICLOFENAC SODIUM 10 MG/G
GEL TOPICAL
Qty: 100 G | Refills: 1 | Status: SHIPPED | OUTPATIENT
Start: 2018-04-17 | End: 2019-02-08

## 2018-04-17 RX ORDER — COLCHICINE 0.6 MG/1
0.6 TABLET ORAL DAILY
Qty: 90 TABLET | Refills: 3 | Status: SHIPPED | OUTPATIENT
Start: 2018-04-17 | End: 2019-02-08

## 2018-04-17 NOTE — PROGRESS NOTES
Office Visit    Patient Name: Elbert Still Jr.    : 1955  MRN: [id number]      Assessment/Plan:  Elbert Still Jr. is a 62 y.o. male who presents today for :    Left foot pain  -     Ambulatory referral to Podiatry  -     diclofenac sodium (VOLTAREN) 1 % Gel; Lower extremities: Apply the gel (4 g) to the affected area 4 times daily. Do not apply more than 16 g daily to any one affected joint of the lower extremities.  Dispense: 100 g; Refill: 1  -     colchicine 0.6 mg tablet; Take 1 tablet (0.6 mg total) by mouth once daily.  Dispense: 90 tablet; Refill: 3  -add back Colchicine, ?questionable history of gout?  -given CKD4, I again advised pt to stay away from NSAIDs and use Tylenol instead. Will also add Voltaren gel for further pain management. Continue with foot stretches and f/u with Podiatry.      Diabetes mellitus due to underlying condition with diabetic nephropathy, without long-term current use of insulin  CKD (chronic kidney disease) stage 4, GFR 15-29 ml/min  -advised pt to get A1c labs today while in clinic  -continue Glipizide and Metformin  -I requested referral coordinator to help patient with setting appt with Endocrine and Nephrology while he is in clinic today, as pt has not received a call for all of his referrals since the prior visit.        Follow-up for worsening Sx or as needed.     This note was created by combination of typed  and Dragon dictation.  Transcription errors may be present.  If there are any questions, please contact me.        ----------------------------------------------------------------------------------------------------------------------      HPI:  Elbert is a 62 y.o. male who presents today for:    Foot Pain        This patient has multiple medical diagnoses as noted below.    This patient is known to me and to this clinic. The patient's last visit with me was on 2018.    Patient is here today for f/u of Left Foot Pain  - he was seen two weeks  ago for the same issue, the area involved is the top of the Left foot and near the L great toe.  The pain was initially better after the most recent OV, but states that the pain has returned the past week and feels that it is worse. He endorses that sometimes the top part of his foot gets swollen, but it has come down today. He was taking Colchicine 0.6mg daily about 3 or 4 weeks ago - which he states has helped with foot pain before - he has a questionable history of gout and was started on that medication by a Podiatrist many years ago.  He has a h/o CKD4 and therefore was advised to stay away from NSAIDs and states that Tylenol every 4-6 hours helped initially.      Additional ROS    No F/C/wt changes/fatigue  No CP/SOB/palpitations/swelling  No cough/wheezing  No nausea/vomiting/abd pain  No rashes  No weakness/HA/tingling/numbness          Patient Care Team:  Angel Luis Boo MD as PCP - General (Family Medicine)  Walter Díaz MD as Consulting Physician (Nephrology)        Patient Active Problem List   Diagnosis    Diabetes mellitus due to underlying condition with diabetic nephropathy    Proteinuria    Chronic kidney disease, stage 3    Essential (primary) hypertension    Mixed hyperlipidemia       Current Medications  Medications reviewed and updated.       Current Outpatient Prescriptions:     aspirin (ECOTRIN) 81 MG EC tablet, Take 81 mg by mouth once daily.  , Disp: , Rfl:     atorvastatin (LIPITOR) 20 MG tablet, Take 1 tablet (20 mg total) by mouth once daily., Disp: 90 tablet, Rfl: 3    benazepril-hydrochlorthiazide (LOTENSIN HCT) 20-25 mg Tab, Take 1 tablet by mouth once daily., Disp: 90 tablet, Rfl: 1    BLOOD-GLUCOSE CONTROL, NORMAL (ONE TOUCH ULTRA CONTROL MISC), by Misc.(Non-Drug; Combo Route) route.  , Disp: , Rfl:     fluconazole (DIFLUCAN) 150 MG Tab, TAKE 1 TABLET BY MOUTH ONCE, Disp: 1 tablet, Rfl: 0    fluticasone (FLONASE) 50 mcg/actuation nasal spray, 2 sprays by Each Nare  route once daily., Disp: 1 Bottle, Rfl: 3    glipiZIDE (GLUCOTROL) 10 MG tablet, Take 1 tablet (10 mg total) by mouth 2 (two) times daily before meals., Disp: 180 tablet, Rfl: 3    LANCETS MISC, by Misc.(Non-Drug; Combo Route) route.  , Disp: , Rfl:     metformin (GLUCOPHAGE) 500 MG tablet, TAKE 1 TABLET(500 MG) BY MOUTH TWICE DAILY WITH MEALS, Disp: 90 tablet, Rfl: 1    multivitamin (MULTIVITAMIN) per tablet, Take 1 tablet by mouth once daily.  , Disp: , Rfl:     urea 20 % Crea, Apply 1 application topically.  , Disp: , Rfl:     colchicine 0.6 mg tablet, Take 1 tablet (0.6 mg total) by mouth once daily., Disp: 90 tablet, Rfl: 3    diclofenac sodium (VOLTAREN) 1 % Gel, Lower extremities: Apply the gel (4 g) to the affected area 4 times daily. Do not apply more than 16 g daily to any one affected joint of the lower extremities., Disp: 100 g, Rfl: 1    levocetirizine (XYZAL) 5 MG tablet, Take 1 tablet (5 mg total) by mouth every evening., Disp: 30 tablet, Rfl: 0    vardenafil (LEVITRA) 20 MG tablet, Take 1 tablet (20 mg total) by mouth daily as needed for Erectile Dysfunction., Disp: 6 tablet, Rfl: 11    Past Surgical History:   Procedure Laterality Date    ADENOIDECTOMY      nasal septum repair      Tonsillectomy      TONSILLECTOMY         Family History   Problem Relation Age of Onset    No Known Problems Mother     No Known Problems Father     No Known Problems Sister     No Known Problems Brother     No Known Problems Maternal Aunt     No Known Problems Maternal Uncle     No Known Problems Paternal Aunt     No Known Problems Paternal Uncle     No Known Problems Maternal Grandmother     No Known Problems Maternal Grandfather     No Known Problems Paternal Grandmother     No Known Problems Paternal Grandfather     Cancer Neg Hx      Negative for prostate or colon cancer    Kidney disease Neg Hx     Amblyopia Neg Hx     Blindness Neg Hx     Cataracts Neg Hx     Diabetes Neg Hx      "Glaucoma Neg Hx     Hypertension Neg Hx     Macular degeneration Neg Hx     Retinal detachment Neg Hx     Strabismus Neg Hx     Stroke Neg Hx     Thyroid disease Neg Hx        Social History     Social History    Marital status:      Spouse name: N/A    Number of children: N/A    Years of education: N/A     Occupational History     [employer]     Social History Main Topics    Smoking status: Never Smoker    Smokeless tobacco: Never Used      Comment: .  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      Alcohol use No    Drug use: No    Sexual activity: Yes     Partners: Female     Other Topics Concern    Not on file     Social History Narrative    No narrative on file           Allergies   Review of patient's allergies indicates:   Allergen Reactions    Iodine and iodide containing products Hives     Hypotension, Flushing             Review of Systems  See HPI      Physical Exam  /76   Pulse 90   Temp 97.8 °F (36.6 °C) (Oral)   Ht 6' 3" (1.905 m)   Wt 109.1 kg (240 lb 10.1 oz)   SpO2 99%   BMI 30.08 kg/m²     GEN: NAD  HEENT: NCAT, PERRLA, EOMI, sclera clear, anicteric  LUNGS: CTAB, no w/r/r, no increased work of breathing   HEART: RRR, normal S1 and S2, no m/r/g, no edema  ABD: s/nt/nd, NABS   SKIN: normal turgor, no rashes  PSYCH: AOx3, appropriate mood and affect  MSK: warm/well perfused, normal ROM in all 4 extremities, no c/c/e.  L foot grossly normal on exam, mild to moderate TTP over the dorsal aspect of L foot - L big toe appears normal without any redness nor swelling but is mildly TTP No limping - able to bear weight in full during exam.    "

## 2018-04-17 NOTE — LETTER
April 17, 2018      [facility]  [address] [facility]  [place] [address]  Phone: [phone]  Fax: [phone]       Patient: Elbert Still   YOB: 1955  Date of Visit: 04/17/2018    To Whom It May Concern:    Elizabeth Still  was at [facility] on 04/17/2018. He may return to work/school on 04/18/2018 with no restrictions. If you have any questions or concerns, or if I can be of further assistance, please do not hesitate to contact me.    Sincerely,    Octavia Martinez MA

## 2018-04-18 DIAGNOSIS — I10 ESSENTIAL (PRIMARY) HYPERTENSION: ICD-10-CM

## 2018-04-19 DIAGNOSIS — I10 ESSENTIAL (PRIMARY) HYPERTENSION: Primary | ICD-10-CM

## 2018-04-19 RX ORDER — BENAZEPRIL HYDROCHLORIDE 20 MG/1
20 TABLET ORAL DAILY
Qty: 90 TABLET | Refills: 3 | Status: SHIPPED | OUTPATIENT
Start: 2018-04-19 | End: 2019-02-08 | Stop reason: SDUPTHER

## 2018-04-19 RX ORDER — BENAZEPRIL/HYDROCHLOROTHIAZIDE 20 MG-25MG
TABLET ORAL
Qty: 90 TABLET | Refills: 0 | OUTPATIENT
Start: 2018-04-19

## 2018-04-19 NOTE — PROGRESS NOTES
Benazepril-HCTZ 20-25mg contraindicated in CKD4 - will switch patient to Benazepril 20mg daily    Essential (primary) hypertension  -     benazepril (LOTENSIN) 20 MG tablet; Take 1 tablet (20 mg total) by mouth once daily.  Dispense: 90 tablet; Refill: 3

## 2018-04-20 DIAGNOSIS — I10 ESSENTIAL (PRIMARY) HYPERTENSION: ICD-10-CM

## 2018-04-23 RX ORDER — BENAZEPRIL/HYDROCHLOROTHIAZIDE 20 MG-25MG
TABLET ORAL
Qty: 90 TABLET | Refills: 0 | OUTPATIENT
Start: 2018-04-23

## 2018-05-29 ENCOUNTER — TELEPHONE (OUTPATIENT)
Dept: ENDOCRINOLOGY | Facility: CLINIC | Age: 63
End: 2018-05-29

## 2018-10-19 DIAGNOSIS — E11.9 TYPE 2 DIABETES MELLITUS WITHOUT COMPLICATION: ICD-10-CM

## 2018-11-02 DIAGNOSIS — E11.9 TYPE 2 DIABETES MELLITUS WITHOUT COMPLICATION: ICD-10-CM

## 2019-02-08 ENCOUNTER — OFFICE VISIT (OUTPATIENT)
Dept: FAMILY MEDICINE | Facility: CLINIC | Age: 64
End: 2019-02-08
Payer: COMMERCIAL

## 2019-02-08 VITALS
TEMPERATURE: 99 F | HEART RATE: 85 BPM | SYSTOLIC BLOOD PRESSURE: 155 MMHG | HEIGHT: 75 IN | BODY MASS INDEX: 29.79 KG/M2 | WEIGHT: 239.63 LBS | DIASTOLIC BLOOD PRESSURE: 100 MMHG | OXYGEN SATURATION: 97 %

## 2019-02-08 DIAGNOSIS — E08.21 DIABETES MELLITUS DUE TO UNDERLYING CONDITION WITH DIABETIC NEPHROPATHY, WITHOUT LONG-TERM CURRENT USE OF INSULIN: ICD-10-CM

## 2019-02-08 DIAGNOSIS — N18.4 CKD (CHRONIC KIDNEY DISEASE) STAGE 4, GFR 15-29 ML/MIN: ICD-10-CM

## 2019-02-08 DIAGNOSIS — I10 ESSENTIAL (PRIMARY) HYPERTENSION: ICD-10-CM

## 2019-02-08 DIAGNOSIS — N18.4 CKD STAGE 4 DUE TO TYPE 2 DIABETES MELLITUS: ICD-10-CM

## 2019-02-08 DIAGNOSIS — Z12.11 COLON CANCER SCREENING: ICD-10-CM

## 2019-02-08 DIAGNOSIS — E78.2 MIXED HYPERLIPIDEMIA: ICD-10-CM

## 2019-02-08 DIAGNOSIS — E11.22 CKD STAGE 4 DUE TO TYPE 2 DIABETES MELLITUS: ICD-10-CM

## 2019-02-08 DIAGNOSIS — E11.9 TYPE 2 DIABETES MELLITUS WITHOUT COMPLICATION, UNSPECIFIED WHETHER LONG TERM INSULIN USE: ICD-10-CM

## 2019-02-08 DIAGNOSIS — E66.3 OVERWEIGHT (BMI 25.0-29.9): ICD-10-CM

## 2019-02-08 DIAGNOSIS — Z00.00 ANNUAL PHYSICAL EXAM: Primary | ICD-10-CM

## 2019-02-08 PROCEDURE — 3077F PR MOST RECENT SYSTOLIC BLOOD PRESSURE >= 140 MM HG: ICD-10-PCS | Mod: CPTII,S$GLB,, | Performed by: FAMILY MEDICINE

## 2019-02-08 PROCEDURE — 3080F DIAST BP >= 90 MM HG: CPT | Mod: CPTII,S$GLB,, | Performed by: FAMILY MEDICINE

## 2019-02-08 PROCEDURE — 99999 PR PBB SHADOW E&M-EST. PATIENT-LVL III: ICD-10-PCS | Mod: PBBFAC,,, | Performed by: FAMILY MEDICINE

## 2019-02-08 PROCEDURE — 3045F PR MOST RECENT HEMOGLOBIN A1C LEVEL 7.0-9.0%: ICD-10-PCS | Mod: CPTII,S$GLB,, | Performed by: FAMILY MEDICINE

## 2019-02-08 PROCEDURE — 3077F SYST BP >= 140 MM HG: CPT | Mod: CPTII,S$GLB,, | Performed by: FAMILY MEDICINE

## 2019-02-08 PROCEDURE — 99999 PR PBB SHADOW E&M-EST. PATIENT-LVL III: CPT | Mod: PBBFAC,,, | Performed by: FAMILY MEDICINE

## 2019-02-08 PROCEDURE — 99396 PR PREVENTIVE VISIT,EST,40-64: ICD-10-PCS | Mod: S$GLB,,, | Performed by: FAMILY MEDICINE

## 2019-02-08 PROCEDURE — 3080F PR MOST RECENT DIASTOLIC BLOOD PRESSURE >= 90 MM HG: ICD-10-PCS | Mod: CPTII,S$GLB,, | Performed by: FAMILY MEDICINE

## 2019-02-08 PROCEDURE — 99396 PREV VISIT EST AGE 40-64: CPT | Mod: S$GLB,,, | Performed by: FAMILY MEDICINE

## 2019-02-08 PROCEDURE — 3045F PR MOST RECENT HEMOGLOBIN A1C LEVEL 7.0-9.0%: CPT | Mod: CPTII,S$GLB,, | Performed by: FAMILY MEDICINE

## 2019-02-08 RX ORDER — ATORVASTATIN CALCIUM 20 MG/1
20 TABLET, FILM COATED ORAL DAILY
Qty: 90 TABLET | Refills: 3 | Status: SHIPPED | OUTPATIENT
Start: 2019-02-08 | End: 2020-04-07

## 2019-02-08 RX ORDER — BENAZEPRIL HYDROCHLORIDE 40 MG/1
40 TABLET ORAL DAILY
Qty: 90 TABLET | Refills: 3 | Status: SHIPPED | OUTPATIENT
Start: 2019-02-08 | End: 2019-06-24

## 2019-02-08 RX ORDER — BENAZEPRIL HYDROCHLORIDE 20 MG/1
20 TABLET ORAL DAILY
Qty: 90 TABLET | Refills: 3 | Status: SHIPPED | OUTPATIENT
Start: 2019-02-08 | End: 2019-02-08

## 2019-02-08 RX ORDER — GLIPIZIDE 10 MG/1
10 TABLET ORAL
Qty: 180 TABLET | Refills: 3 | Status: SHIPPED | OUTPATIENT
Start: 2019-02-08 | End: 2020-03-11 | Stop reason: SDUPTHER

## 2019-02-08 NOTE — PROGRESS NOTES
Office Visit    Patient Name: Elbert Still Jr.    : 1955  MRN: [id number]      Assessment/Plan:  Elbert Still Jr. is a 63 y.o. male who presents today for :    Annual physical exam  Overweight (BMI 25.0-29.9)  -anticipatory guidance provided with age appropriate preventative services discussed, healthy diet and regular physical exercise also discussed with patient  -d/w pt about the importance of portion control  -Diet and exercise planning discussed.  -Recommend 15-30 minutes of moderate intensity exercise 5 days/week.  -patient declined flu vaccine today  -patient declined Pneumonia vaccine today  -patient declined Tetanus booster today      Diabetes mellitus due to underlying condition with diabetic nephropathy, without long-term current use of insulin  Mixed hyperlipidemia  CKD (chronic kidney disease) stage 4, GFR 15-29 ml/min  CKD stage 4 due to type 2 diabetes mellitus  -previous A1c reviewed:   Lab Results   Component Value Date    HGBA1C 7.0 (H) 2018     -continue current medication regimen  -d/w patient about the need for regular eye care, skin care, daily foot exam, proper nutrition, daily BG checks at home (fasting and 2 hrs pp) and medication compliance in a diabetic.  Target morning BS  and after meal -180  -discussed weight loss and regular physical excercise  -caution on hypoglycemia and eating small cracker or small cup of juice if pt feels faint/dizzy/weak, and call doctor or go to ER.    Essential (primary) hypertension  -     benazepril (LOTENSIN) 40 MG tablet; Take 1 tablet (40 mg total) by mouth once daily.  Dispense: 90 tablet; Refill: 3  - increase dose of Lotensin to 40mg daily for better Bp control. Stressed DASH diet, portion control, regular cardiovascular exercises  - ?counseled on weight loss  -f/u with BP logs in 2 weeks if BP is not consistently <150/90    Colon cancer screening  -     Case request GI: COLONOSCOPY             Follow-up in about 3 months  (around 5/8/2019).     This note was created by combination of typed  and Dragon dictation.  Transcription errors may be present.  If there are any questions, please contact me.        ----------------------------------------------------------------------------------------------------------------------      HPI:  Patient Care Team:  Angel Luis Boo MD as PCP - General (Family Medicine)  Walter Díaz MD as Consulting Physician (Nephrology)    Elbert is a 63 y.o. male with      Patient Active Problem List   Diagnosis    Diabetes mellitus due to underlying condition with diabetic nephropathy    Proteinuria    Essential (primary) hypertension    Mixed hyperlipidemia    CKD (chronic kidney disease) stage 4, GFR 15-29 ml/min     This patient is known to me and to this clinic. The patient's last visit with me was on 4/17/2018.  Patient presents today for:  Annual Exam and Medication Refill        Patient is doing well and has no major complaints.  Patient reports that he does not keep a log of BP/FBG - but he has been compliant with his medications daily without any adverse side effects. He feels that he could eat better, with fruits/vegetable daily and less junk food, sodas, sweets.  Pt does not engage in physical exercises regularly. He is due for colonoscopy, which he previously canceled due to scheduling conflict.        Additional ROS  No F/C/wt changes/fatigue  No dysphagia/sore throat/rhinorrhea  No CP/ALLEN/palpitations/swelling  No cough/wheezing/SOB  No nausea/vomiting/abd pain/no diarrhea, no constipation, no blood in stool  No muscle aches/joint pain   No rashes  No weakness/HA/tingling/numbness  No anxiety/depression  No dysuria/hematuria  No polyuria/polydipsia/fatigue/cold or hot intolerance          Current Medications  Medications reviewed and updated.       Current Outpatient Medications:     atorvastatin (LIPITOR) 20 MG tablet, Take 1 tablet (20 mg total) by mouth once daily.,  Disp: 90 tablet, Rfl: 3    BLOOD-GLUCOSE CONTROL, NORMAL (ONE TOUCH ULTRA CONTROL MISC), by Misc.(Non-Drug; Combo Route) route.  , Disp: , Rfl:     fluticasone (FLONASE) 50 mcg/actuation nasal spray, 2 sprays by Each Nare route once daily., Disp: 1 Bottle, Rfl: 3    LANCETS MISC, by Misc.(Non-Drug; Combo Route) route.  , Disp: , Rfl:     multivitamin (MULTIVITAMIN) per tablet, Take 1 tablet by mouth once daily.  , Disp: , Rfl:     urea 20 % Crea, Apply 1 application topically.  , Disp: , Rfl:     benazepril (LOTENSIN) 40 MG tablet, Take 1 tablet (40 mg total) by mouth once daily., Disp: 90 tablet, Rfl: 3    glipiZIDE (GLUCOTROL) 10 MG tablet, Take 1 tablet (10 mg total) by mouth 2 (two) times daily before meals., Disp: 180 tablet, Rfl: 3    levocetirizine (XYZAL) 5 MG tablet, Take 1 tablet (5 mg total) by mouth every evening., Disp: 30 tablet, Rfl: 0    vardenafil (LEVITRA) 20 MG tablet, Take 1 tablet (20 mg total) by mouth daily as needed for Erectile Dysfunction., Disp: 6 tablet, Rfl: 11    Past Surgical History:   Procedure Laterality Date    ADENOIDECTOMY      nasal septum repair      Tonsillectomy      TONSILLECTOMY         Family History   Problem Relation Age of Onset    No Known Problems Mother     No Known Problems Father     No Known Problems Sister     No Known Problems Brother     No Known Problems Maternal Aunt     No Known Problems Maternal Uncle     No Known Problems Paternal Aunt     No Known Problems Paternal Uncle     No Known Problems Maternal Grandmother     No Known Problems Maternal Grandfather     No Known Problems Paternal Grandmother     No Known Problems Paternal Grandfather     Cancer Neg Hx         Negative for prostate or colon cancer    Kidney disease Neg Hx     Amblyopia Neg Hx     Blindness Neg Hx     Cataracts Neg Hx     Diabetes Neg Hx     Glaucoma Neg Hx     Hypertension Neg Hx     Macular degeneration Neg Hx     Retinal detachment Neg Hx      "Strabismus Neg Hx     Stroke Neg Hx     Thyroid disease Neg Hx        Social History     Socioeconomic History    Marital status:      Spouse name: Not on file    Number of children: Not on file    Years of education: Not on file    Highest education level: Not on file   Social Needs    Financial resource strain: Not on file    Food insecurity - worry: Not on file    Food insecurity - inability: Not on file    Transportation needs - medical: Not on file    Transportation needs - non-medical: Not on file   Occupational History     Employer: [employer]   Tobacco Use    Smoking status: Never Smoker    Smokeless tobacco: Never Used    Tobacco comment: .  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     Substance and Sexual Activity    Alcohol use: No    Drug use: No    Sexual activity: Yes     Partners: Female   Other Topics Concern    Not on file   Social History Narrative    Not on file           Allergies   Review of patient's allergies indicates:   Allergen Reactions    Iodine and iodide containing products Hives     Hypotension, Flushing             Review of Systems  See HPI      Physical Exam  BP (!) 155/100   Pulse 85   Temp 98.5 °F (36.9 °C) (Oral)   Ht 6' 3" (1.905 m)   Wt 108.7 kg (239 lb 10.2 oz)   SpO2 97%   BMI 29.95 kg/m²     GEN: NAD, well developed, pleasant, well nourished  HEENT: NCAT, PERRLA, EOMI, sclera clear, anicteric, bilateral ear exam wnl, O/P clear, MMM with no lesions  NECK: normal, supple with midline trachea, no LAD, no thyromegaly  LUNGS: CTAB, no w/r/r, no increased work of breathing   HEART: RRR, normal S1 and S2, no m/r/g, no edema  ABD: s/nt/nd, NABS  SKIN: normal turgor, no rashes  PSYCH: AOx3, appropriate mood and affect  MSK: warm/well perfused, normal ROM in all extremities, no c/c/e.      Labs  Lab Results   Component Value Date    HGBA1C 7.0 (H) 04/17/2018     Lab Results   Component Value Date     10/11/2017    K 5.0 " 10/11/2017     10/11/2017    CO2 23 10/11/2017    BUN 40 (H) 10/11/2017    CREATININE 2.9 (H) 10/11/2017    CALCIUM 9.3 10/11/2017    ANIONGAP 8 10/11/2017    ESTGFRAFRICA 26.2 (A) 10/11/2017    EGFRNONAA 22.6 (A) 10/11/2017     Lab Results   Component Value Date    CHOL 142 10/11/2017    CHOL 172 09/14/2016    CHOL 159 07/15/2015     Lab Results   Component Value Date    HDL 34 (L) 10/11/2017    HDL 34 (L) 09/14/2016    HDL 34 (L) 07/15/2015     Lab Results   Component Value Date    LDLCALC 67.2 10/11/2017    LDLCALC 87.4 09/14/2016    LDLCALC 88.8 07/15/2015     Lab Results   Component Value Date    TRIG 204 (H) 10/11/2017    TRIG 253 (H) 09/14/2016    TRIG 181 (H) 07/15/2015     Lab Results   Component Value Date    CHOLHDL 23.9 10/11/2017    CHOLHDL 19.8 (L) 09/14/2016    CHOLHDL 21.4 07/15/2015     Last set of blood work has been reviewed as noted above.

## 2019-02-08 NOTE — LETTER
February 8, 2019      [facility]  [address] [facility]  [place] [address]  Phone: [phone]  Fax: [phone]       Patient: Elbert Still   YOB: 1955  Date of Visit: 02/08/2019    To Whom It May Concern:    Elizabeth Still  was at [facility] on 02/08/2019. He may return to work/school on 02/10/2019 with no restrictions. If you have any questions or concerns, or if I can be of further assistance, please do not hesitate to contact me.    Sincerely,    Octavia Martinez MA

## 2019-02-13 ENCOUNTER — TELEPHONE (OUTPATIENT)
Dept: OPTOMETRY | Facility: CLINIC | Age: 64
End: 2019-02-13

## 2019-03-03 DIAGNOSIS — E08.21 DIABETES MELLITUS DUE TO UNDERLYING CONDITION WITH DIABETIC NEPHROPATHY, WITHOUT LONG-TERM CURRENT USE OF INSULIN: ICD-10-CM

## 2019-03-04 RX ORDER — GLIPIZIDE 10 MG/1
TABLET ORAL
Qty: 180 TABLET | Refills: 3 | Status: SHIPPED | OUTPATIENT
Start: 2019-03-04 | End: 2019-08-06

## 2019-04-18 ENCOUNTER — TELEPHONE (OUTPATIENT)
Dept: FAMILY MEDICINE | Facility: CLINIC | Age: 64
End: 2019-04-18

## 2019-04-18 NOTE — TELEPHONE ENCOUNTER
----- Message from Gabriela Echeverria LPN sent at 4/11/2019  5:19 PM CDT -----  ?        04/11/2019  Medical Record # [id number]     Dear Angel Luis Boo MD,    An order for the following procedure, colonoscopy   ,was placed for Elbert Still Jr.. Three attempts to schedule your patient at [phone] ([place])  have been made unsuccessfully. Please follow up with your client regarding scheduling their procedure.        Sincerely,  Gabriela Echeverria LPN [phone]

## 2019-05-19 DIAGNOSIS — M79.671 RIGHT FOOT PAIN: ICD-10-CM

## 2019-05-20 RX ORDER — COLCHICINE 0.6 MG/1
TABLET ORAL
Qty: 90 TABLET | Refills: 3 | Status: SHIPPED | OUTPATIENT
Start: 2019-05-20 | End: 2019-06-24

## 2019-06-21 ENCOUNTER — OFFICE VISIT (OUTPATIENT)
Dept: FAMILY MEDICINE | Facility: CLINIC | Age: 64
End: 2019-06-21
Payer: COMMERCIAL

## 2019-06-21 VITALS
HEART RATE: 81 BPM | WEIGHT: 240.19 LBS | SYSTOLIC BLOOD PRESSURE: 178 MMHG | OXYGEN SATURATION: 97 % | HEIGHT: 75 IN | BODY MASS INDEX: 29.86 KG/M2 | DIASTOLIC BLOOD PRESSURE: 110 MMHG

## 2019-06-21 DIAGNOSIS — N18.4 CKD (CHRONIC KIDNEY DISEASE) STAGE 4, GFR 15-29 ML/MIN: ICD-10-CM

## 2019-06-21 DIAGNOSIS — E08.21 DIABETES MELLITUS DUE TO UNDERLYING CONDITION WITH DIABETIC NEPHROPATHY, WITHOUT LONG-TERM CURRENT USE OF INSULIN: ICD-10-CM

## 2019-06-21 DIAGNOSIS — I10 ESSENTIAL (PRIMARY) HYPERTENSION: Primary | ICD-10-CM

## 2019-06-21 DIAGNOSIS — Z12.5 SCREENING FOR PROSTATE CANCER: ICD-10-CM

## 2019-06-21 DIAGNOSIS — M10.9 GOUT, UNSPECIFIED CAUSE, UNSPECIFIED CHRONICITY, UNSPECIFIED SITE: ICD-10-CM

## 2019-06-21 DIAGNOSIS — G47.30 SLEEP-RELATED BREATHING DISORDER: ICD-10-CM

## 2019-06-21 PROCEDURE — 99213 OFFICE O/P EST LOW 20 MIN: CPT | Mod: S$GLB,,, | Performed by: NURSE PRACTITIONER

## 2019-06-21 PROCEDURE — 99213 PR OFFICE/OUTPT VISIT, EST, LEVL III, 20-29 MIN: ICD-10-PCS | Mod: S$GLB,,, | Performed by: NURSE PRACTITIONER

## 2019-06-21 PROCEDURE — 3080F PR MOST RECENT DIASTOLIC BLOOD PRESSURE >= 90 MM HG: ICD-10-PCS | Mod: CPTII,S$GLB,, | Performed by: NURSE PRACTITIONER

## 2019-06-21 PROCEDURE — 3077F PR MOST RECENT SYSTOLIC BLOOD PRESSURE >= 140 MM HG: ICD-10-PCS | Mod: CPTII,S$GLB,, | Performed by: NURSE PRACTITIONER

## 2019-06-21 PROCEDURE — 99999 PR PBB SHADOW E&M-EST. PATIENT-LVL IV: ICD-10-PCS | Mod: PBBFAC,,, | Performed by: NURSE PRACTITIONER

## 2019-06-21 PROCEDURE — 3008F PR BODY MASS INDEX (BMI) DOCUMENTED: ICD-10-PCS | Mod: CPTII,S$GLB,, | Performed by: NURSE PRACTITIONER

## 2019-06-21 PROCEDURE — 3008F BODY MASS INDEX DOCD: CPT | Mod: CPTII,S$GLB,, | Performed by: NURSE PRACTITIONER

## 2019-06-21 PROCEDURE — 3080F DIAST BP >= 90 MM HG: CPT | Mod: CPTII,S$GLB,, | Performed by: NURSE PRACTITIONER

## 2019-06-21 PROCEDURE — 3077F SYST BP >= 140 MM HG: CPT | Mod: CPTII,S$GLB,, | Performed by: NURSE PRACTITIONER

## 2019-06-21 PROCEDURE — 99999 PR PBB SHADOW E&M-EST. PATIENT-LVL IV: CPT | Mod: PBBFAC,,, | Performed by: NURSE PRACTITIONER

## 2019-06-21 RX ORDER — AMLODIPINE BESYLATE 10 MG/1
10 TABLET ORAL DAILY
Qty: 90 TABLET | Refills: 1 | Status: SHIPPED | OUTPATIENT
Start: 2019-06-21 | End: 2019-06-24

## 2019-06-21 NOTE — LETTER
June 21, 2019      [facility]  [address] [facility]  [place] [address]  Phone: [phone]  Fax: [phone]       Patient: Elbert Still   YOB: 1955  Date of Visit: 06/21/2019    To Whom It May Concern:    Elizabeth Still  was at [facility] on 06/21/2019. He may return to work/school on 06/25/2019 with no restrictions. If you have any questions or concerns, or if I can be of further assistance, please do not hesitate to contact me.    Sincerely,    Kymberly Garcia LPN

## 2019-06-21 NOTE — PATIENT INSTRUCTIONS
Controlling High Blood Pressure  High blood pressure (hypertension) is often called the silent killer. This is because many people who have it dont know it. High blood pressure is defined as 140/90 mm Hg or higher. Know your blood pressure and remember to check it regularly. Doing so can save your life. Here are some things you can do to help control your blood pressure.    Choose heart-healthy foods  · Select low-salt, low-fat foods. Limit sodium intake to 2,400 mg per day or the amount suggested by your healthcare provider.  · Limit canned, dried, cured, packaged, and fast foods. These can contain a lot of salt.  · Eat 8 to 10 servings of fruits and vegetables every day.  · Choose lean meats, fish, or chicken.  · Eat whole-grain pasta, brown rice, and beans.  · Eat 2 to 3 servings of low-fat or fat-free dairy products.  · Ask your doctor about the DASH eating plan. This plan helps reduce blood pressure.  · When you go to a restaurant, ask that your meal be prepared with no added salt.  Maintain a healthy weight  · Ask your healthcare provider how many calories to eat a day. Then stick to that number.  · Ask your healthcare provider what weight range is healthiest for you. If you are overweight, a weight loss of only 3% to 5% of your body weight can help lower blood pressure. Generally, a good weight loss goal is to lose 10% of your body weight in a year.  · Limit snacks and sweets.  · Get regular exercise.  Get up and get active  · Choose activities you enjoy. Find ones you can do with friends or family. This includes bicycling, dancing, walking, and jogging.  · Park farther away from building entrances.  · Use stairs instead of the elevator.  · When you can, walk or bike instead of driving.  · Amberg leaves, garden, or do household repairs.  · Be active at a moderate to vigorous level of physical activity for at least 40 minutes for a minimum of 3 to 4 days a week.   Manage stress  · Make time to relax and enjoy  life. Find time to laugh.  · Communicate your concerns with your loved ones and your healthcare provider.  · Visit with family and friends, and keep up with hobbies.  Limit alcohol and quit smoking  · Men should have no more than 2 drinks per day.  · Women should have no more than 1 drink per day.  · Talk with your healthcare provider about quitting smoking. Smoking significantly increases your risk for heart disease and stroke. Ask your healthcare provider about community smoking cessation programs and other options.  Medicines  If lifestyle changes arent enough, your healthcare provider may prescribe high blood pressure medicine. Take all medicines as prescribed. If you have any questions about your medicines, ask your healthcare provider before stopping or changing them.   Date Last Reviewed: 4/27/2016  © 0599-3859 [employer]. [address] [place] [address], [place] [address]. All rights reserved. This information is not intended as a substitute for professional medical care. Always follow your healthcare professional's instructions.

## 2019-06-22 ENCOUNTER — LAB VISIT (OUTPATIENT)
Dept: LAB | Facility: HOSPITAL | Age: 64
End: 2019-06-22
Attending: FAMILY MEDICINE
Payer: COMMERCIAL

## 2019-06-22 DIAGNOSIS — M10.9 GOUT, UNSPECIFIED CAUSE, UNSPECIFIED CHRONICITY, UNSPECIFIED SITE: ICD-10-CM

## 2019-06-22 DIAGNOSIS — E78.2 MIXED HYPERLIPIDEMIA: ICD-10-CM

## 2019-06-22 DIAGNOSIS — Z00.00 ANNUAL PHYSICAL EXAM: ICD-10-CM

## 2019-06-22 DIAGNOSIS — Z12.5 SCREENING FOR PROSTATE CANCER: ICD-10-CM

## 2019-06-22 DIAGNOSIS — I10 ESSENTIAL (PRIMARY) HYPERTENSION: ICD-10-CM

## 2019-06-22 DIAGNOSIS — E08.21 DIABETES MELLITUS DUE TO UNDERLYING CONDITION WITH DIABETIC NEPHROPATHY, WITHOUT LONG-TERM CURRENT USE OF INSULIN: ICD-10-CM

## 2019-06-22 LAB
ALBUMIN SERPL BCP-MCNC: 3.3 G/DL (ref 3.5–5.2)
ALP SERPL-CCNC: 124 U/L (ref 55–135)
ALT SERPL W/O P-5'-P-CCNC: 28 U/L (ref 10–44)
ANION GAP SERPL CALC-SCNC: 11 MMOL/L (ref 8–16)
AST SERPL-CCNC: 29 U/L (ref 10–40)
BILIRUB SERPL-MCNC: 0.3 MG/DL (ref 0.1–1)
BUN SERPL-MCNC: 38 MG/DL (ref 8–23)
CALCIUM SERPL-MCNC: 9.3 MG/DL (ref 8.7–10.5)
CHLORIDE SERPL-SCNC: 107 MMOL/L (ref 95–110)
CHOLEST SERPL-MCNC: 105 MG/DL (ref 120–199)
CHOLEST/HDLC SERPL: 2.6 {RATIO} (ref 2–5)
CO2 SERPL-SCNC: 19 MMOL/L (ref 23–29)
COMPLEXED PSA SERPL-MCNC: 3.1 NG/ML (ref 0–4)
CREAT SERPL-MCNC: 3.7 MG/DL (ref 0.5–1.4)
ERYTHROCYTE [DISTWIDTH] IN BLOOD BY AUTOMATED COUNT: 13.1 % (ref 11.5–14.5)
EST. GFR  (AFRICAN AMERICAN): 19.2 ML/MIN/1.73 M^2
EST. GFR  (NON AFRICAN AMERICAN): 16.6 ML/MIN/1.73 M^2
ESTIMATED AVG GLUCOSE: 117 MG/DL (ref 68–131)
GLUCOSE SERPL-MCNC: 131 MG/DL (ref 70–110)
HBA1C MFR BLD HPLC: 5.7 % (ref 4–5.6)
HCT VFR BLD AUTO: 44.8 % (ref 40–54)
HDLC SERPL-MCNC: 41 MG/DL (ref 40–75)
HDLC SERPL: 39 % (ref 20–50)
HGB BLD-MCNC: 14.3 G/DL (ref 14–18)
LDLC SERPL CALC-MCNC: 34.4 MG/DL (ref 63–159)
MCH RBC QN AUTO: 28 PG (ref 27–31)
MCHC RBC AUTO-ENTMCNC: 31.9 G/DL (ref 32–36)
MCV RBC AUTO: 88 FL (ref 82–98)
NONHDLC SERPL-MCNC: 64 MG/DL
PLATELET # BLD AUTO: 256 K/UL (ref 150–350)
PMV BLD AUTO: 11.8 FL (ref 9.2–12.9)
POTASSIUM SERPL-SCNC: 4.8 MMOL/L (ref 3.5–5.1)
PROT SERPL-MCNC: 7.5 G/DL (ref 6–8.4)
RBC # BLD AUTO: 5.11 M/UL (ref 4.6–6.2)
SODIUM SERPL-SCNC: 137 MMOL/L (ref 136–145)
T4 FREE SERPL-MCNC: 0.76 NG/DL (ref 0.71–1.51)
TRIGL SERPL-MCNC: 148 MG/DL (ref 30–150)
TSH SERPL DL<=0.005 MIU/L-ACNC: 1.26 UIU/ML (ref 0.4–4)
URATE SERPL-MCNC: 8.3 MG/DL (ref 3.4–7)
WBC # BLD AUTO: 3.64 K/UL (ref 3.9–12.7)

## 2019-06-22 PROCEDURE — 80053 COMPREHEN METABOLIC PANEL: CPT

## 2019-06-22 PROCEDURE — 85027 COMPLETE CBC AUTOMATED: CPT

## 2019-06-22 PROCEDURE — 36415 COLL VENOUS BLD VENIPUNCTURE: CPT | Mod: PO

## 2019-06-22 PROCEDURE — 84153 ASSAY OF PSA TOTAL: CPT

## 2019-06-22 PROCEDURE — 83036 HEMOGLOBIN GLYCOSYLATED A1C: CPT

## 2019-06-22 PROCEDURE — 80061 LIPID PANEL: CPT

## 2019-06-22 PROCEDURE — 84550 ASSAY OF BLOOD/URIC ACID: CPT

## 2019-06-22 PROCEDURE — 84439 ASSAY OF FREE THYROXINE: CPT

## 2019-06-22 PROCEDURE — 84443 ASSAY THYROID STIM HORMONE: CPT

## 2019-06-24 ENCOUNTER — OFFICE VISIT (OUTPATIENT)
Dept: FAMILY MEDICINE | Facility: CLINIC | Age: 64
End: 2019-06-24
Payer: COMMERCIAL

## 2019-06-24 VITALS
DIASTOLIC BLOOD PRESSURE: 90 MMHG | WEIGHT: 236.25 LBS | HEART RATE: 80 BPM | TEMPERATURE: 99 F | HEIGHT: 75 IN | OXYGEN SATURATION: 99 % | BODY MASS INDEX: 29.37 KG/M2 | SYSTOLIC BLOOD PRESSURE: 132 MMHG

## 2019-06-24 DIAGNOSIS — M10.9 GOUT, UNSPECIFIED CAUSE, UNSPECIFIED CHRONICITY, UNSPECIFIED SITE: ICD-10-CM

## 2019-06-24 DIAGNOSIS — N18.4 CKD (CHRONIC KIDNEY DISEASE) STAGE 4, GFR 15-29 ML/MIN: ICD-10-CM

## 2019-06-24 DIAGNOSIS — I10 ESSENTIAL (PRIMARY) HYPERTENSION: Primary | ICD-10-CM

## 2019-06-24 DIAGNOSIS — Z12.11 COLON CANCER SCREENING: ICD-10-CM

## 2019-06-24 DIAGNOSIS — E08.21 DIABETES MELLITUS DUE TO UNDERLYING CONDITION WITH DIABETIC NEPHROPATHY, WITHOUT LONG-TERM CURRENT USE OF INSULIN: ICD-10-CM

## 2019-06-24 DIAGNOSIS — E78.2 MIXED HYPERLIPIDEMIA: ICD-10-CM

## 2019-06-24 DIAGNOSIS — E66.3 OVERWEIGHT (BMI 25.0-29.9): ICD-10-CM

## 2019-06-24 PROCEDURE — 99999 PR PBB SHADOW E&M-EST. PATIENT-LVL III: CPT | Mod: PBBFAC,,, | Performed by: FAMILY MEDICINE

## 2019-06-24 PROCEDURE — 3075F PR MOST RECENT SYSTOLIC BLOOD PRESS GE 130-139MM HG: ICD-10-PCS | Mod: CPTII,S$GLB,, | Performed by: FAMILY MEDICINE

## 2019-06-24 PROCEDURE — 3008F BODY MASS INDEX DOCD: CPT | Mod: CPTII,S$GLB,, | Performed by: FAMILY MEDICINE

## 2019-06-24 PROCEDURE — 3075F SYST BP GE 130 - 139MM HG: CPT | Mod: CPTII,S$GLB,, | Performed by: FAMILY MEDICINE

## 2019-06-24 PROCEDURE — 3008F PR BODY MASS INDEX (BMI) DOCUMENTED: ICD-10-PCS | Mod: CPTII,S$GLB,, | Performed by: FAMILY MEDICINE

## 2019-06-24 PROCEDURE — 3080F DIAST BP >= 90 MM HG: CPT | Mod: CPTII,S$GLB,, | Performed by: FAMILY MEDICINE

## 2019-06-24 PROCEDURE — 99214 OFFICE O/P EST MOD 30 MIN: CPT | Mod: S$GLB,,, | Performed by: FAMILY MEDICINE

## 2019-06-24 PROCEDURE — 3080F PR MOST RECENT DIASTOLIC BLOOD PRESSURE >= 90 MM HG: ICD-10-PCS | Mod: CPTII,S$GLB,, | Performed by: FAMILY MEDICINE

## 2019-06-24 PROCEDURE — 99214 PR OFFICE/OUTPT VISIT, EST, LEVL IV, 30-39 MIN: ICD-10-PCS | Mod: S$GLB,,, | Performed by: FAMILY MEDICINE

## 2019-06-24 PROCEDURE — 99999 PR PBB SHADOW E&M-EST. PATIENT-LVL III: ICD-10-PCS | Mod: PBBFAC,,, | Performed by: FAMILY MEDICINE

## 2019-06-24 RX ORDER — AMLODIPINE AND OLMESARTAN MEDOXOMIL 10; 40 MG/1; MG/1
1 TABLET ORAL DAILY
Qty: 90 TABLET | Refills: 0 | Status: SHIPPED | OUTPATIENT
Start: 2019-06-24 | End: 2019-09-26 | Stop reason: SDUPTHER

## 2019-06-24 RX ORDER — COLCHICINE 0.6 MG/1
TABLET ORAL
Qty: 90 TABLET | Refills: 0
Start: 2019-06-24 | End: 2020-05-06

## 2019-06-24 NOTE — PROGRESS NOTES
Office Visit    Patient Name: Elbert Still Jr.    : 1955  MRN: [id number]      Assessment/Plan:  Elbert Still Jr. is a 63 y.o. male who presents today for :    Essential (primary) hypertension  CKD (chronic kidney disease) stage 4, GFR 15-29 ml/min  Overweight (BMI 25.0-29.9)  Other orders  -     amlodipine-olmesartan (YOLETTE) 10-40 mg per tablet; Take 1 tablet by mouth once daily.  Dispense: 90 tablet; Refill: 0  -stop ACE and start ABR combined with Amlodipine for additional control  -advised medication compliance, and avoid regular use of NSAIDs as it can increase BP.  -advised DASH diet, regular exercise, and weight loss  - as well as portion control.   -advised to keep regular BP logs and bring it back with each f/u visit.  -f/u with BP logs in 2 weeks if BP is not consistently <150/90      Colon cancer screening  -     Case request GI: COLONOSCOPY  -     Fecal Immunochemical Test (iFOBT); Future; Expected date: 2019    Diabetes mellitus due to underlying condition with diabetic nephropathy, without long-term current use of insulin  Mixed hyperlipidemia  -continue current medications   - ?advised DASH diet, portion control, regular cardiovascular exercises  - ?counseled on weight loss    Gout, unspecified cause, unspecified chronicity, unspecified site  -     colchicine (COLCRYS) 0.6 mg tablet; TAKE 1/2 TABLET(0.3 MG) BY MOUTH EVERY DAY  Dispense: 90 tablet; Refill: 0  -continue current medication  -d/w pt about avoiding triggers, especially with dietary restrictions to avoid flare-ups      Follow up in about 3 months (around 2019).     This note was created by combination of typed  and Dragon dictation.  Transcription errors may be present.  If there are any questions, please contact me.      ----------------------------------------------------------------------------------------------------------------------      HPI:  Patient Care Team:  Angel Luis Boo MD as PCP - General  (Family Medicine)  Walter Díaz MD as Consulting Physician (Nephrology)    Elbert is a 63 y.o. male with      Patient Active Problem List   Diagnosis    Diabetes mellitus due to underlying condition with diabetic nephropathy    Proteinuria    Essential (primary) hypertension    Mixed hyperlipidemia    CKD (chronic kidney disease) stage 4, GFR 15-29 ml/min    Gout         Patient presents today for f/u :  Hypertension    HTN - had been compliant with Lotensin, but BP still elevated, three days ago went to our clinic for BP eleveted in the 170s/110s - was started on Amlodipine, and told to take 1/2 tab (5mg daily).  Over the weekend, his BP still is in the 140s-150s range. He denies any Sx today.   I discussed with patient about the recent study from the [employer] citing an increased risk of lung cancer from the use of ACEIs, patient voices understanding and desires to switch to a different medication at this time.  He denies vision changes/urinary changes/leg swelling.  HLD - tolerating statin well without any issues  DM - patient is compliant with Glipizide , no side effects   daily FBG: below 130.  A1c at goal.  No polyuria/polydipsia. No foot numbness/tingling/vision changes/hypoglycemia      Hemoglobin A1C   Date Value Ref Range Status   06/22/2019 5.7 (H) 4.0 - 5.6 % Final     Comment:     ADA Screening Guidelines:  5.7-6.4%  Consistent with prediabetes  >or=6.5%  Consistent with diabetes  High levels of fetal hemoglobin interfere with the HbA1C  assay. Heterozygous hemoglobin variants (HbS, HgC, etc)do  not significantly interfere with this assay.   However, presence of multiple variants may affect accuracy.     04/17/2018 7.0 (H) 4.0 - 5.6 % Final     Comment:     According to ADA guidelines, hemoglobin A1c <7.0% represents  optimal control in non-pregnant diabetic patients. Different  metrics may apply to specific patient populations.   Standards of Medical Care in Diabetes-2016.  For the  purpose of screening for the presence of diabetes:  <5.7%     Consistent with the absence of diabetes  5.7-6.4%  Consistent with increasing risk for diabetes   (prediabetes)  >or=6.5%  Consistent with diabetes  Currently, no consensus exists for use of hemoglobin A1c  for diagnosis of diabetes for children.  This Hemoglobin A1c assay has significant interference with fetal   hemoglobin   (HbF). The results are invalid for patients with abnormal amounts of   HbF,   including those with known Hereditary Persistence   of Fetal Hemoglobin. Heterozygous hemoglobin variants (HbAS, HbAC,   HbAD, HbAE, HbA2) do not significantly interfere with this assay;   however, presence of multiple variants in a sample may impact the %   interference.     10/11/2017 5.5 4.0 - 5.6 % Final     Comment:     According to ADA guidelines, hemoglobin A1c <7.0% represents  optimal control in non-pregnant diabetic patients. Different  metrics may apply to specific patient populations.   Standards of Medical Care in Diabetes-2016.  For the purpose of screening for the presence of diabetes:  <5.7%     Consistent with the absence of diabetes  5.7-6.4%  Consistent with increasing risk for diabetes   (prediabetes)  >or=6.5%  Consistent with diabetes  Currently, no consensus exists for use of hemoglobin A1c  for diagnosis of diabetes for children.  This Hemoglobin A1c assay has significant interference with fetal   hemoglobin   (HbF). The results are invalid for patients with abnormal amounts of   HbF,   including those with known Hereditary Persistence   of Fetal Hemoglobin. Heterozygous hemoglobin variants (HbAS, HbAC,   HbAD, HbAE, HbA2) do not significantly interfere with this assay;   however, presence of multiple variants in a sample may impact the %   interference.                   Additional ROS    No F/C/wt changes/fatigue  No dysphagia/sore throat  No CP/ALLEN/palpitations/swelling  No cough/wheezing/SOB  No nausea/vomiting/abd pain  No MSK  weakness/HA/tingling/numbness  No rashes  No anxiety/depression  No dysuria/hematuria              Patient Active Problem List   Diagnosis    Diabetes mellitus due to underlying condition with diabetic nephropathy    Proteinuria    Essential (primary) hypertension    Mixed hyperlipidemia    CKD (chronic kidney disease) stage 4, GFR 15-29 ml/min    Gout       Current Medications  Medications reviewed/updated.     Current Outpatient Medications on File Prior to Visit   Medication Sig Dispense Refill    atorvastatin (LIPITOR) 20 MG tablet Take 1 tablet (20 mg total) by mouth once daily. 90 tablet 3    BLOOD-GLUCOSE CONTROL, NORMAL (ONE TOUCH ULTRA CONTROL MISC) by Misc.(Non-Drug; Combo Route) route.        fluticasone (FLONASE) 50 mcg/actuation nasal spray 2 sprays by Each Nare route once daily. 1 Bottle 3    glipiZIDE (GLUCOTROL) 10 MG tablet Take 1 tablet (10 mg total) by mouth 2 (two) times daily before meals. 180 tablet 3    glipiZIDE (GLUCOTROL) 10 MG tablet TAKE 1 TABLET(10 MG) BY MOUTH TWICE DAILY BEFORE MEALS 180 tablet 3    LANCETS MISC by Misc.(Non-Drug; Combo Route) route.        levocetirizine (XYZAL) 5 MG tablet Take 1 tablet (5 mg total) by mouth every evening. 30 tablet 0    multivitamin (MULTIVITAMIN) per tablet Take 1 tablet by mouth once daily.        urea 20 % Crea Apply 1 application topically.        vardenafil (LEVITRA) 20 MG tablet Take 1 tablet (20 mg total) by mouth daily as needed for Erectile Dysfunction. 6 tablet 11    [DISCONTINUED] amLODIPine (NORVASC) 10 MG tablet Take 1 tablet (10 mg total) by mouth once daily. 90 tablet 1    [DISCONTINUED] benazepril (LOTENSIN) 40 MG tablet Take 1 tablet (40 mg total) by mouth once daily. 90 tablet 3    [DISCONTINUED] colchicine (COLCRYS) 0.6 mg tablet TAKE 1 TABLET(0.6 MG) BY MOUTH EVERY DAY 90 tablet 3     No current facility-administered medications on file prior to visit.            Past Surgical History:   Procedure Laterality  Date    ADENOIDECTOMY      nasal septum repair      Tonsillectomy      TONSILLECTOMY         Family History   Problem Relation Age of Onset    No Known Problems Mother     No Known Problems Father     No Known Problems Sister     No Known Problems Brother     No Known Problems Maternal Aunt     No Known Problems Maternal Uncle     No Known Problems Paternal Aunt     No Known Problems Paternal Uncle     No Known Problems Maternal Grandmother     No Known Problems Maternal Grandfather     No Known Problems Paternal Grandmother     No Known Problems Paternal Grandfather     Cancer Neg Hx         Negative for prostate or colon cancer    Kidney disease Neg Hx     Amblyopia Neg Hx     Blindness Neg Hx     Cataracts Neg Hx     Diabetes Neg Hx     Glaucoma Neg Hx     Hypertension Neg Hx     Macular degeneration Neg Hx     Retinal detachment Neg Hx     Strabismus Neg Hx     Stroke Neg Hx     Thyroid disease Neg Hx        Social History     Socioeconomic History    Marital status:      Spouse name: Not on file    Number of children: Not on file    Years of education: Not on file    Highest education level: Not on file   Occupational History     Employer: [employer]   Social Needs    Financial resource strain: Not on file    Food insecurity:     Worry: Not on file     Inability: Not on file    Transportation needs:     Medical: Not on file     Non-medical: Not on file   Tobacco Use    Smoking status: Never Smoker    Smokeless tobacco: Never Used    Tobacco comment: .  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     Substance and Sexual Activity    Alcohol use: No    Drug use: No    Sexual activity: Yes     Partners: Female   Lifestyle    Physical activity:     Days per week: Not on file     Minutes per session: Not on file    Stress: Not on file   Relationships    Social connections:     Talks on phone: Not on file     Gets together: Not on file     Attends  "Confucianist service: Not on file     Active member of club or organization: Not on file     Attends meetings of clubs or organizations: Not on file     Relationship status: Not on file   Other Topics Concern    Not on file   Social History Narrative    Not on file             Allergies   Review of patient's allergies indicates:   Allergen Reactions    Iodine and iodide containing products Hives     Hypotension, Flushing             Review of Systems  See HPI      Physical Exam  BP (!) 132/90   Pulse 80   Temp 98.6 °F (37 °C) (Oral)   Ht 6' 3" (1.905 m)   Wt 107.2 kg (236 lb 3.6 oz)   SpO2 99%   BMI 29.53 kg/m²     GEN: NAD, well developed, pleasant, well nourished  HEENT: NCAT, PERRLA, EOMI, sclera clear, anicteric, O/P clear, MMM with no lesions  NECK: normal, supple with midline trachea, no LAD, no thyromegaly  LUNGS: CTAB, no w/r/r, no increased work of breathing   HEART: RRR, normal S1 and S2, no m/r/g, no edema  ABD: s/nt/nd, NABS  SKIN: normal turgor, no rashes  PSYCH: AOx3, appropriate mood and affect  MSK: warm/well perfused, normal ROM in all extremities, no c/c/e.  FOOT:  Protective Sensation (w/ 10 gram monofilament):  Right: Intact  Left: Intact    Visual Inspection:  Normal -  Bilateral    Pedal Pulses:   Right: Present  Left: Present    Posterior tibialis:   Right:Present  Left: Present              "

## 2019-06-24 NOTE — LETTER
June 24, 2019      [facility]  [address] [facility]  [place] [address]  Phone: [phone]  Fax: [phone]       Patient: Elbert Still   YOB: 1955  Date of Visit: 06/24/2019    To Whom It May Concern:    Elizabeth Still  was at [facility] on 06/24/2019. He may return to work/school on 6/26/19 with no restrictions. If you have any questions or concerns, or if I can be of further assistance, please do not hesitate to contact me.    Sincerely,    Angel Luis Boo MD

## 2019-07-02 NOTE — PROGRESS NOTES
Patient Name: Elbert Still Jr.    : 1955  MRN: [id number]    Subjective:  Elbert is a 63 y.o. male who presents today for     1. Uncontrolled high blood pressure at work and here to control BP in order to get clearance to return back to work. He denies any symptoms. He does snore but does not desire a sleep study at this time    Past Medical History  Past Medical History:   Diagnosis Date    Essential (primary) hypertension 2017       Past Surgical History  Past Surgical History:   Procedure Laterality Date    ADENOIDECTOMY      nasal septum repair      Tonsillectomy      TONSILLECTOMY         Family History  Family History   Problem Relation Age of Onset    No Known Problems Mother     No Known Problems Father     No Known Problems Sister     No Known Problems Brother     No Known Problems Maternal Aunt     No Known Problems Maternal Uncle     No Known Problems Paternal Aunt     No Known Problems Paternal Uncle     No Known Problems Maternal Grandmother     No Known Problems Maternal Grandfather     No Known Problems Paternal Grandmother     No Known Problems Paternal Grandfather     Cancer Neg Hx         Negative for prostate or colon cancer    Kidney disease Neg Hx     Amblyopia Neg Hx     Blindness Neg Hx     Cataracts Neg Hx     Diabetes Neg Hx     Glaucoma Neg Hx     Hypertension Neg Hx     Macular degeneration Neg Hx     Retinal detachment Neg Hx     Strabismus Neg Hx     Stroke Neg Hx     Thyroid disease Neg Hx        Social History  Social History     Socioeconomic History    Marital status:      Spouse name: Not on file    Number of children: Not on file    Years of education: Not on file    Highest education level: Not on file   Occupational History     Employer: [employer]   Social Needs    Financial resource strain: Not on file    Food insecurity:     Worry: Not on file     Inability: Not on file    Transportation needs:      Medical: Not on file     Non-medical: Not on file   Tobacco Use    Smoking status: Never Smoker    Smokeless tobacco: Never Used    Tobacco comment: .  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     Substance and Sexual Activity    Alcohol use: No    Drug use: No    Sexual activity: Yes     Partners: Female   Lifestyle    Physical activity:     Days per week: Not on file     Minutes per session: Not on file    Stress: Not on file   Relationships    Social connections:     Talks on phone: Not on file     Gets together: Not on file     Attends Hoahaoism service: Not on file     Active member of club or organization: Not on file     Attends meetings of clubs or organizations: Not on file     Relationship status: Not on file   Other Topics Concern    Not on file   Social History Narrative    Not on file       Allergies  Review of patient's allergies indicates:   Allergen Reactions    Iodine and iodide containing products Hives     Hypotension, Flushing    -reviewed and updated      Medications  Reviewed and updated.   Current Outpatient Medications   Medication Sig Dispense Refill    atorvastatin (LIPITOR) 20 MG tablet Take 1 tablet (20 mg total) by mouth once daily. 90 tablet 3    BLOOD-GLUCOSE CONTROL, NORMAL (ONE TOUCH ULTRA CONTROL MISC) by Misc.(Non-Drug; Combo Route) route.        fluticasone (FLONASE) 50 mcg/actuation nasal spray 2 sprays by Each Nare route once daily. 1 Bottle 3    glipiZIDE (GLUCOTROL) 10 MG tablet Take 1 tablet (10 mg total) by mouth 2 (two) times daily before meals. 180 tablet 3    LANCETS MISC by Misc.(Non-Drug; Combo Route) route.        multivitamin (MULTIVITAMIN) per tablet Take 1 tablet by mouth once daily.        amlodipine-olmesartan (YOLETTE) 10-40 mg per tablet Take 1 tablet by mouth once daily. 90 tablet 0    colchicine (COLCRYS) 0.6 mg tablet TAKE 1/2 TABLET(0.3 MG) BY MOUTH EVERY DAY 90 tablet 0    glipiZIDE (GLUCOTROL) 10 MG tablet TAKE 1 TABLET(10 MG) BY MOUTH  "TWICE DAILY BEFORE MEALS 180 tablet 3    levocetirizine (XYZAL) 5 MG tablet Take 1 tablet (5 mg total) by mouth every evening. 30 tablet 0    urea 20 % Crea Apply 1 application topically.        vardenafil (LEVITRA) 20 MG tablet Take 1 tablet (20 mg total) by mouth daily as needed for Erectile Dysfunction. 6 tablet 11     No current facility-administered medications for this visit.          Review of Systems   Respiratory: Negative for shortness of breath.    Cardiovascular: Negative for chest pain.   Neurological: Negative for headaches.         Physical Exam  Blood Pressure (Abnormal) 178/110 (BP Location: Right arm, Patient Position: Sitting, BP Method: Large (Automatic))   Pulse 81   Height 6' 3" (1.905 m)   Weight 109 kg (240 lb 3.1 oz)   Oxygen Saturation 97%   Body Mass Index 30.02 kg/m²   Physical Exam   Constitutional: He is oriented to person, place, and time. He appears well-developed. No distress.   HENT:   Head: Normocephalic.   Cardiovascular: Normal rate and regular rhythm.   Pulmonary/Chest: Effort normal and breath sounds normal.   Neurological: He is alert and oriented to person, place, and time.   ambulatory   Skin: He is not diaphoretic.         Assessment/Plan:  Elbert Still Jr. is a 63 y.o. male who presents today for :    Essential (primary) hypertension  Pt has f/u with pcp in 3 days, addition amlodipine to regimen  -     amLODIPine (NORVASC) 10 MG tablet; Take 1 tablet (10 mg total) by mouth once daily.  Dispense: 90 tablet; Refill: 1  -     TSH; Future; Expected date: 06/21/2019  -     T4, free; Future; Expected date: 06/21/2019    CKD (chronic kidney disease) stage 4, GFR 15-29 ml/min  Noted,  on hydration, avoidance nsaid, controlling BP, DM, regular follow up with pcp    Diabetes mellitus due to underlying condition with diabetic nephropathy, without long-term current use of insulin  Pt t o f/u with pcp  Sleep-related breathing disorder  Pt with snoring with findings of " obesity, HTn, recommend sleep study    Gout, unspecified cause, unspecified chronicity, unspecified site  Recheck labs  -     Uric acid; Future; Expected date: 06/21/2019    Screening for prostate cancer  -     PSA, Screening; Future; Expected date: 06/21/2019        Follow up follow up with pcp next week .

## 2019-08-06 ENCOUNTER — OFFICE VISIT (OUTPATIENT)
Dept: FAMILY MEDICINE | Facility: CLINIC | Age: 64
End: 2019-08-06
Payer: COMMERCIAL

## 2019-08-06 VITALS
SYSTOLIC BLOOD PRESSURE: 102 MMHG | OXYGEN SATURATION: 96 % | DIASTOLIC BLOOD PRESSURE: 78 MMHG | RESPIRATION RATE: 16 BRPM | TEMPERATURE: 98 F | WEIGHT: 234.88 LBS | HEIGHT: 75 IN | HEART RATE: 101 BPM | BODY MASS INDEX: 29.2 KG/M2

## 2019-08-06 DIAGNOSIS — I10 ESSENTIAL (PRIMARY) HYPERTENSION: ICD-10-CM

## 2019-08-06 DIAGNOSIS — N18.4 CKD (CHRONIC KIDNEY DISEASE) STAGE 4, GFR 15-29 ML/MIN: ICD-10-CM

## 2019-08-06 DIAGNOSIS — Z23 NEED FOR TETANUS BOOSTER: ICD-10-CM

## 2019-08-06 DIAGNOSIS — Z23 NEED FOR 23-POLYVALENT PNEUMOCOCCAL POLYSACCHARIDE VACCINE: ICD-10-CM

## 2019-08-06 DIAGNOSIS — S39.012A STRAIN OF LUMBAR REGION, INITIAL ENCOUNTER: Primary | ICD-10-CM

## 2019-08-06 PROCEDURE — 3078F PR MOST RECENT DIASTOLIC BLOOD PRESSURE < 80 MM HG: ICD-10-PCS | Mod: CPTII,S$GLB,, | Performed by: NURSE PRACTITIONER

## 2019-08-06 PROCEDURE — 90714 TD VACCINE GREATER THAN OR EQUAL TO 7YO PRESERVATIVE FREE IM: ICD-10-PCS | Mod: S$GLB,,, | Performed by: NURSE PRACTITIONER

## 2019-08-06 PROCEDURE — 99214 OFFICE O/P EST MOD 30 MIN: CPT | Mod: 25,S$GLB,, | Performed by: NURSE PRACTITIONER

## 2019-08-06 PROCEDURE — 3008F PR BODY MASS INDEX (BMI) DOCUMENTED: ICD-10-PCS | Mod: CPTII,S$GLB,, | Performed by: NURSE PRACTITIONER

## 2019-08-06 PROCEDURE — 90472 IMMUNIZATION ADMIN EACH ADD: CPT | Mod: S$GLB,,, | Performed by: NURSE PRACTITIONER

## 2019-08-06 PROCEDURE — 3074F SYST BP LT 130 MM HG: CPT | Mod: CPTII,S$GLB,, | Performed by: NURSE PRACTITIONER

## 2019-08-06 PROCEDURE — 90471 TD VACCINE GREATER THAN OR EQUAL TO 7YO PRESERVATIVE FREE IM: ICD-10-PCS | Mod: S$GLB,,, | Performed by: NURSE PRACTITIONER

## 2019-08-06 PROCEDURE — 90472 PNEUMOCOCCAL POLYSACCHARIDE VACCINE 23-VALENT =>2YO SQ IM: ICD-10-PCS | Mod: S$GLB,,, | Performed by: NURSE PRACTITIONER

## 2019-08-06 PROCEDURE — 90471 IMMUNIZATION ADMIN: CPT | Mod: S$GLB,,, | Performed by: NURSE PRACTITIONER

## 2019-08-06 PROCEDURE — 90732 PPSV23 VACC 2 YRS+ SUBQ/IM: CPT | Mod: S$GLB,,, | Performed by: NURSE PRACTITIONER

## 2019-08-06 PROCEDURE — 99999 PR PBB SHADOW E&M-EST. PATIENT-LVL IV: ICD-10-PCS | Mod: PBBFAC,,, | Performed by: NURSE PRACTITIONER

## 2019-08-06 PROCEDURE — 99214 PR OFFICE/OUTPT VISIT, EST, LEVL IV, 30-39 MIN: ICD-10-PCS | Mod: 25,S$GLB,, | Performed by: NURSE PRACTITIONER

## 2019-08-06 PROCEDURE — 3074F PR MOST RECENT SYSTOLIC BLOOD PRESSURE < 130 MM HG: ICD-10-PCS | Mod: CPTII,S$GLB,, | Performed by: NURSE PRACTITIONER

## 2019-08-06 PROCEDURE — 3078F DIAST BP <80 MM HG: CPT | Mod: CPTII,S$GLB,, | Performed by: NURSE PRACTITIONER

## 2019-08-06 PROCEDURE — 3008F BODY MASS INDEX DOCD: CPT | Mod: CPTII,S$GLB,, | Performed by: NURSE PRACTITIONER

## 2019-08-06 PROCEDURE — 90732 PNEUMOCOCCAL POLYSACCHARIDE VACCINE 23-VALENT =>2YO SQ IM: ICD-10-PCS | Mod: S$GLB,,, | Performed by: NURSE PRACTITIONER

## 2019-08-06 PROCEDURE — 90714 TD VACC NO PRESV 7 YRS+ IM: CPT | Mod: S$GLB,,, | Performed by: NURSE PRACTITIONER

## 2019-08-06 PROCEDURE — 99999 PR PBB SHADOW E&M-EST. PATIENT-LVL IV: CPT | Mod: PBBFAC,,, | Performed by: NURSE PRACTITIONER

## 2019-08-06 RX ORDER — TIZANIDINE 2 MG/1
2 TABLET ORAL EVERY 8 HOURS PRN
Qty: 15 TABLET | Refills: 0 | Status: SHIPPED | OUTPATIENT
Start: 2019-08-06 | End: 2019-08-16

## 2019-08-06 NOTE — LETTER
August 6, 2019      [facility]  [address]  [place] [address]  Phone: [phone]  Fax: [phone]       Patient: Elbert Still   YOB: 1955  Date of Visit: 08/06/2019    To Whom It May Concern:    Elizabeth Still  was at [facility] on 08/06/2019. Please excuse from 8/3/2019-8/8/2019. He may return to work/school on 8/11/2019 with no restrictions. If you have any questions or concerns, or if I can be of further assistance, please do not hesitate to contact me.    Sincerely,    Christie Hough, DNP

## 2019-08-06 NOTE — PROGRESS NOTES
"Subjective:       Patient ID: Elbert Still Jr. is a 63 y.o. male.    Chief Complaint: Back Pain (Lower Back. Started Friday )    Chief Complaint   Patient presents with    Back Pain     Lower Back. Started Friday        HPI  Elbert Still Jr. is a 63 y.o. male with multiple medical diagnoses as listed in the medical history and problem list that presents for right side low back pain for 4 days.  This patient is new to me.  He was getting out of his office chair at work when he felt a "twinge" he has been resting, icing, heat and using topical pain relief gel as well as tylenol since. The pain has improved since symptom onset but has not completely resolved. He reports at symptom onset, he was unable to stand straight. Today, he can walk cautiously with some pain but it has improved significantly.  He denies that the pain radiates down his legs. He does not report numbness, tingling to his extremities or bowel/bladder abnormalities. He does endorse a fairly physical job at the [employer].       PAST MEDICAL HISTORY:  Past Medical History:   Diagnosis Date    Essential (primary) hypertension 9/21/2017       PAST SURGICAL HISTORY:  Past Surgical History:   Procedure Laterality Date    ADENOIDECTOMY      nasal septum repair      Tonsillectomy      TONSILLECTOMY         SOCIAL HISTORY:  Social History     Socioeconomic History    Marital status:      Spouse name: Not on file    Number of children: Not on file    Years of education: Not on file    Highest education level: Not on file   Occupational History     Employer: [employer]   Social Needs    Financial resource strain: Not on file    Food insecurity:     Worry: Not on file     Inability: Not on file    Transportation needs:     Medical: Not on file     Non-medical: Not on file   Tobacco Use    Smoking status: Never Smoker    Smokeless tobacco: Never Used    Tobacco comment: .  Four kids.  Occup:  " [employer].     Substance and Sexual Activity    Alcohol use: No    Drug use: No    Sexual activity: Yes     Partners: Female   Lifestyle    Physical activity:     Days per week: Not on file     Minutes per session: Not on file    Stress: Not on file   Relationships    Social connections:     Talks on phone: Not on file     Gets together: Not on file     Attends [employer] service: Not on file     Active member of club or organization: Not on file     Attends meetings of clubs or organizations: Not on file     Relationship status: Not on file   Other Topics Concern    Not on file   Social History Narrative    Not on file       FAMILY HISTORY:  Family History   Problem Relation Age of Onset    No Known Problems Mother     No Known Problems Father     No Known Problems Sister     No Known Problems Brother     No Known Problems Maternal Aunt     No Known Problems Maternal Uncle     No Known Problems Paternal Aunt     No Known Problems Paternal Uncle     No Known Problems Maternal Grandmother     No Known Problems Maternal Grandfather     No Known Problems Paternal Grandmother     No Known Problems Paternal Grandfather     Cancer Neg Hx         Negative for prostate or colon cancer    Kidney disease Neg Hx     Amblyopia Neg Hx     Blindness Neg Hx     Cataracts Neg Hx     Diabetes Neg Hx     Glaucoma Neg Hx     Hypertension Neg Hx     Macular degeneration Neg Hx     Retinal detachment Neg Hx     Strabismus Neg Hx     Stroke Neg Hx     Thyroid disease Neg Hx        ALLERGIES AND MEDICATIONS: updated and reviewed.  Review of patient's allergies indicates:   Allergen Reactions    Iodine and iodide containing products Hives     Hypotension, Flushing     Current Outpatient Medications   Medication Sig Dispense Refill    amlodipine-olmesartan (YOLETTE) 10-40 mg per tablet Take 1 tablet by mouth once daily. 90 tablet 0    atorvastatin (LIPITOR) 20 MG tablet Take 1 tablet (20 mg total) by mouth  "once daily. 90 tablet 3    BLOOD-GLUCOSE CONTROL, NORMAL (ONE TOUCH ULTRA CONTROL MISC) by Misc.(Non-Drug; Combo Route) route.        colchicine (COLCRYS) 0.6 mg tablet TAKE 1/2 TABLET(0.3 MG) BY MOUTH EVERY DAY 90 tablet 0    glipiZIDE (GLUCOTROL) 10 MG tablet Take 1 tablet (10 mg total) by mouth 2 (two) times daily before meals. 180 tablet 3    LANCETS MISC by Misc.(Non-Drug; Combo Route) route.        multivitamin (MULTIVITAMIN) per tablet Take 1 tablet by mouth once daily.        tiZANidine (ZANAFLEX) 2 MG tablet Take 1 tablet (2 mg total) by mouth every 8 (eight) hours as needed. 15 tablet 0     No current facility-administered medications for this visit.          ROS  Review of Systems   Constitutional: Negative for chills, diaphoresis and fever.   Cardiovascular: Negative for chest pain.   Gastrointestinal: Negative for constipation, diarrhea, nausea and vomiting.   Genitourinary: Negative for difficulty urinating, dysuria and enuresis.   Musculoskeletal: Positive for back pain (low back, right side). Negative for arthralgias and joint swelling.   Neurological: Negative for dizziness and headaches.   Psychiatric/Behavioral: Negative for dysphoric mood. The patient is not nervous/anxious.            Objective:     Physical Exam  Vitals:    08/06/19 1604   BP: 102/78   BP Location: Right arm   Patient Position: Sitting   BP Method: Large (Manual)   Pulse: 101   Resp: 16   Temp: 98.1 °F (36.7 °C)   TempSrc: Oral   SpO2: 96%   Weight: 106.5 kg (234 lb 14.4 oz)   Height: 6' 3" (1.905 m)    Body mass index is 29.36 kg/m².  Weight: 106.5 kg (234 lb 14.4 oz)   Height: 6' 3" (190.5 cm)   Physical Exam   Constitutional: He appears well-developed and well-nourished. No distress.   HENT:   Head: Normocephalic and atraumatic.   Eyes: Pupils are equal, round, and reactive to light. Conjunctivae and EOM are normal.   Cardiovascular: Normal rate.   Musculoskeletal: He exhibits tenderness.        Lumbar back: He " exhibits decreased range of motion, tenderness, pain and spasm. He exhibits no bony tenderness, no swelling and no deformity.        Back:    Neurological: He is alert. He displays normal reflexes. No cranial nerve deficit or sensory deficit. He exhibits normal muscle tone.   Skin: Skin is warm and dry. No rash noted.   Psychiatric: He has a normal mood and affect. His behavior is normal.         Assessment:     1. Strain of lumbar region, initial encounter    2. CKD (chronic kidney disease) stage 4, GFR 15-29 ml/min    3. Need for 23-polyvalent pneumococcal polysaccharide vaccine    4. Need for tetanus booster    5. Essential (primary) hypertension      Plan:     Elbert was seen today for back pain.    Diagnoses and all orders for this visit:    Strain of lumbar region, initial encounter  CKD (chronic kidney disease) stage 4, GFR 15-29 ml/min  -     tiZANidine (ZANAFLEX) 2 MG tablet; Take 1 tablet (2 mg total) by mouth every 8 (eight) hours as needed.  -     Low dose and amount provided with respect to limited kidney function. Counseled to take medication cautiously.  -     Encouraged gentle stretching, ice, topical pain relief gel  -     Provided handout with stretches to be completed 1-2x per day    Need for 23-polyvalent pneumococcal polysaccharide vaccine  -     (In Office Administered) Pneumococcal Polysaccharide Vaccine (23 Valent) (SQ/IM)  -     Risks and benefits of pneumococcal vaccination discussed - administered.    Need for tetanus booster  -     (In Office Administered) Td Vaccine - Preservative Free  -     Counseled regarding Td vaccination - administered    Essential (primary) hypertension        -    BP controlled presently - reviewed anti-hypertensive regimen - continue current therapy      Health Maintenance       Date Due Completion Date    TETANUS VACCINE 11/15/1975 ---    Pneumococcal Vaccine (Medium Risk) (1 of 1 - PPSV23) 11/15/1976 ---    Shingles Vaccine (1 of 2) 11/15/2007 ---     Colonoscopy 12/07/2017 12/7/2007 (Done)    Override on 12/7/2007: Done    Eye Exam 10/17/2018 10/17/2017    Override on 10/17/2017: Done    Override on 10/6/2016: Done    Override on 10/6/2016: Done    Influenza Vaccine (1) 08/01/2019 2/5/2018 (Declined)    Override on 2/5/2018: Declined    Override on 12/7/2012: Declined    Hemoglobin A1c 12/22/2019 6/22/2019    Low Dose Statin 06/21/2020 6/21/2019    Lipid Panel 06/22/2020 6/22/2019    Foot Exam 06/24/2020 6/24/2019    Override on 7/8/2015: Done            Health Maintenance reviewed, as per orders. Patient to complete fitkit this week.     Follow-up: Follow up in about 1 week (around 8/13/2019), or if symptoms worsen or fail to improve.    The patient expressed understanding and no barriers to adherence were identified.     1. The patient indicates understanding of these issues and agrees with the plan. Brief care plan is updated and reviewed with the patient as applicable.     2. The patient is given an After Visit Summary that lists all medications with directions, allergies, orders placed during this encounter and follow-up instructions.     3. I have reviewed the patient's medical information including past medical, family, and social history sections including the medications and allergies.     4. We discussed the patient's current medications. I reconciled the patient's medication list and prepared and supplied needed refills.     Christie Hough, DNP, APRN, FNP-c  Family Medicine    My collaborating physicians are Dr. Deniz Moreno and Dr. Akhil Guillory

## 2019-09-05 ENCOUNTER — TELEPHONE (OUTPATIENT)
Dept: FAMILY MEDICINE | Facility: CLINIC | Age: 64
End: 2019-09-05

## 2019-09-05 DIAGNOSIS — Z86.59 HISTORY OF CLAUSTROPHOBIA: Primary | ICD-10-CM

## 2019-09-05 RX ORDER — DIAZEPAM 2 MG/1
2 TABLET ORAL ONCE
Qty: 1 TABLET | Refills: 0 | Status: SHIPPED | OUTPATIENT
Start: 2019-09-05 | End: 2019-11-27

## 2019-09-05 NOTE — TELEPHONE ENCOUNTER
History of claustrophobia  -     diazePAM (VALIUM) 2 MG tablet; Take 1 tablet (2 mg total) by mouth once. Take 15minutes before MRI for 1 dose  Dispense: 1 tablet; Refill: 0      Sent

## 2019-09-05 NOTE — TELEPHONE ENCOUNTER
----- Message from Evanmilena Julian sent at 9/5/2019  9:08 AM CDT -----  Contact: self  Type: Patient Call Back    Who called:Self    What is the request in detail: Patient is requesting something to help him with his MRI for tomorrow. He is claustrophobic and needs something to relax him.    Can the clinic reply by [facility]? no    Would the patient rather a call back or a response via [facility]? call    Best call back number: [phone]

## 2019-09-26 RX ORDER — AMLODIPINE AND OLMESARTAN MEDOXOMIL 10; 40 MG/1; MG/1
1 TABLET ORAL DAILY
Qty: 60 TABLET | Refills: 1 | Status: SHIPPED | OUTPATIENT
Start: 2019-09-26 | End: 2020-01-27

## 2019-10-14 ENCOUNTER — TELEPHONE (OUTPATIENT)
Dept: FAMILY MEDICINE | Facility: CLINIC | Age: 64
End: 2019-10-14

## 2019-10-14 NOTE — TELEPHONE ENCOUNTER
Spoke with pt informed him that He needs to contact Endoscopy  about his colonoscopy and that they   been trying to contact him and that he needs to call them [facility]   [phone]  He stated he has a surgery coming up and that we ill handle it after surgery

## 2019-10-14 NOTE — TELEPHONE ENCOUNTER
----- Message from Gabriela Echeverria LPN sent at 10/14/2019  3:23 PM CDT -----  Dear Dr Boo ,    We have been trying to reach your patient to schedule their colonoscopy, you have placed for them.  They have also been sent a letter to schedule as well as several calls. Please reach out to your patient regarding scheduling their procedure.        Sincerely,  PILY Echeverria LPN [facility]   [phone]

## 2019-11-27 ENCOUNTER — OFFICE VISIT (OUTPATIENT)
Dept: FAMILY MEDICINE | Facility: CLINIC | Age: 64
End: 2019-11-27
Payer: COMMERCIAL

## 2019-11-27 VITALS
OXYGEN SATURATION: 99 % | SYSTOLIC BLOOD PRESSURE: 139 MMHG | BODY MASS INDEX: 28.33 KG/M2 | WEIGHT: 227.88 LBS | HEART RATE: 88 BPM | DIASTOLIC BLOOD PRESSURE: 82 MMHG | HEIGHT: 75 IN | TEMPERATURE: 99 F

## 2019-11-27 DIAGNOSIS — N18.4 CKD (CHRONIC KIDNEY DISEASE) STAGE 4, GFR 15-29 ML/MIN: ICD-10-CM

## 2019-11-27 DIAGNOSIS — E08.21 DIABETES MELLITUS DUE TO UNDERLYING CONDITION WITH DIABETIC NEPHROPATHY, WITHOUT LONG-TERM CURRENT USE OF INSULIN: Primary | ICD-10-CM

## 2019-11-27 DIAGNOSIS — E78.2 MIXED HYPERLIPIDEMIA: ICD-10-CM

## 2019-11-27 DIAGNOSIS — M10.9 GOUT, UNSPECIFIED CAUSE, UNSPECIFIED CHRONICITY, UNSPECIFIED SITE: ICD-10-CM

## 2019-11-27 DIAGNOSIS — I10 ESSENTIAL (PRIMARY) HYPERTENSION: ICD-10-CM

## 2019-11-27 PROCEDURE — 3075F SYST BP GE 130 - 139MM HG: CPT | Mod: CPTII,S$GLB,, | Performed by: FAMILY MEDICINE

## 2019-11-27 PROCEDURE — 99214 PR OFFICE/OUTPT VISIT, EST, LEVL IV, 30-39 MIN: ICD-10-PCS | Mod: S$GLB,,, | Performed by: FAMILY MEDICINE

## 2019-11-27 PROCEDURE — 3075F PR MOST RECENT SYSTOLIC BLOOD PRESS GE 130-139MM HG: ICD-10-PCS | Mod: CPTII,S$GLB,, | Performed by: FAMILY MEDICINE

## 2019-11-27 PROCEDURE — 99999 PR PBB SHADOW E&M-EST. PATIENT-LVL III: CPT | Mod: PBBFAC,,, | Performed by: FAMILY MEDICINE

## 2019-11-27 PROCEDURE — 99214 OFFICE O/P EST MOD 30 MIN: CPT | Mod: S$GLB,,, | Performed by: FAMILY MEDICINE

## 2019-11-27 PROCEDURE — 3079F DIAST BP 80-89 MM HG: CPT | Mod: CPTII,S$GLB,, | Performed by: FAMILY MEDICINE

## 2019-11-27 PROCEDURE — 3008F PR BODY MASS INDEX (BMI) DOCUMENTED: ICD-10-PCS | Mod: CPTII,S$GLB,, | Performed by: FAMILY MEDICINE

## 2019-11-27 PROCEDURE — 99999 PR PBB SHADOW E&M-EST. PATIENT-LVL III: ICD-10-PCS | Mod: PBBFAC,,, | Performed by: FAMILY MEDICINE

## 2019-11-27 PROCEDURE — 3008F BODY MASS INDEX DOCD: CPT | Mod: CPTII,S$GLB,, | Performed by: FAMILY MEDICINE

## 2019-11-27 PROCEDURE — 3079F PR MOST RECENT DIASTOLIC BLOOD PRESSURE 80-89 MM HG: ICD-10-PCS | Mod: CPTII,S$GLB,, | Performed by: FAMILY MEDICINE

## 2019-11-27 RX ORDER — CELECOXIB 200 MG/1
CAPSULE ORAL
Refills: 0 | COMMUNITY
Start: 2019-10-22 | End: 2019-11-27

## 2019-11-27 RX ORDER — CYCLOBENZAPRINE HCL 10 MG
TABLET ORAL
Refills: 0 | COMMUNITY
Start: 2019-10-29 | End: 2022-03-10

## 2019-11-27 RX ORDER — HYDROCODONE BITARTRATE AND ACETAMINOPHEN 7.5; 325 MG/1; MG/1
TABLET ORAL
Refills: 0 | COMMUNITY
Start: 2019-10-22 | End: 2019-11-27

## 2019-11-27 RX ORDER — DIAZEPAM 2 MG/1
TABLET ORAL
Refills: 0 | COMMUNITY
Start: 2019-09-05 | End: 2019-11-27

## 2019-11-27 RX ORDER — TRAMADOL HYDROCHLORIDE 50 MG/1
TABLET ORAL
Refills: 0 | COMMUNITY
Start: 2019-10-29 | End: 2019-11-27

## 2019-11-27 NOTE — PROGRESS NOTES
Office Visit    Patient Name: Elbert Still Jr.    : 1955  MRN: [id number]      Assessment/Plan:  Elbert Still Jr. is a 64 y.o. male who presents today for :    Diabetes mellitus due to underlying condition with diabetic nephropathy, without long-term current use of insulin  -     Hemoglobin A1c; Future; Expected date: 2019  -     CBC Without Differential; Future; Expected date: 2019  -     Comprehensive metabolic panel; Future; Expected date: 2019  -     Lipid panel; Future; Expected date: 2019  -     Microalbumin/creatinine urine ratio; Future; Expected date: 2019  -previous A1c reviewed:   Lab Results   Component Value Date    HGBA1C 5.7 (H) 2019     -continue current medication regimen  -d/w patient about the need for regular eye care, skin care, daily foot exam, proper nutrition, regular BG monitoring at home   (fasting and 2 hrs pp) and medication compliance in a diabetic.  -f/u in 3 months.    Essential (primary) hypertension  Mixed hyperlipidemia  -     Lipid panel; Future; Expected date: 2019  CKD (chronic kidney disease) stage 4, GFR 15-29 ml/min  -stable, continue current regimen   -f/u as needed    Gout, unspecified cause, unspecified chronicity, unspecified site  -     Uric acid; Future; Expected date: 2019  -stable, asymptomatic, continue to monitor      Follow up in about 6 months (around 2020).     This note was created by combination of typed  and Dragon dictation.  Transcription errors may be present.  If there are any questions, please contact me.      ----------------------------------------------------------------------------------------------------------------------      HPI:  Patient Care Team:  Angel Luis Boo MD as PCP - General (Family Medicine)  Walter Díaz MD as Consulting Physician (Nephrology)  Jennifer Pollard MA as Care Coordinator    Elbert is a 64 y.o. male with      Patient Active Problem List    Diagnosis    Diabetes mellitus due to underlying condition with diabetic nephropathy    Proteinuria    Essential (primary) hypertension    Mixed hyperlipidemia    CKD (chronic kidney disease) stage 4, GFR 15-29 ml/min    Gout       Patient presents today for   Follow-up      DM - patient is compliant with meds daily, no side effects   daily FBG: below 130. No polyuria/polydipsia. No foot numbness/tingling/vision changes/hypoglycemia      Hemoglobin A1C   Date Value Ref Range Status   06/22/2019 5.7 (H) 4.0 - 5.6 % Final     Comment:     ADA Screening Guidelines:  5.7-6.4%  Consistent with prediabetes  >or=6.5%  Consistent with diabetes  High levels of fetal hemoglobin interfere with the HbA1C  assay. Heterozygous hemoglobin variants (HbS, HgC, etc)do  not significantly interfere with this assay.   However, presence of multiple variants may affect accuracy.     04/17/2018 7.0 (H) 4.0 - 5.6 % Final     Comment:     According to ADA guidelines, hemoglobin A1c <7.0% represents  optimal control in non-pregnant diabetic patients. Different  metrics may apply to specific patient populations.   Standards of Medical Care in Diabetes-2016.  For the purpose of screening for the presence of diabetes:  <5.7%     Consistent with the absence of diabetes  5.7-6.4%  Consistent with increasing risk for diabetes   (prediabetes)  >or=6.5%  Consistent with diabetes  Currently, no consensus exists for use of hemoglobin A1c  for diagnosis of diabetes for children.  This Hemoglobin A1c assay has significant interference with fetal   hemoglobin   (HbF). The results are invalid for patients with abnormal amounts of   HbF,   including those with known Hereditary Persistence   of Fetal Hemoglobin. Heterozygous hemoglobin variants (HbAS, HbAC,   HbAD, HbAE, HbA2) do not significantly interfere with this assay;   however, presence of multiple variants in a sample may impact the %   interference.     10/11/2017 5.5 4.0 - 5.6 % Final      Comment:     According to ADA guidelines, hemoglobin A1c <7.0% represents  optimal control in non-pregnant diabetic patients. Different  metrics may apply to specific patient populations.   Standards of Medical Care in Diabetes-2016.  For the purpose of screening for the presence of diabetes:  <5.7%     Consistent with the absence of diabetes  5.7-6.4%  Consistent with increasing risk for diabetes   (prediabetes)  >or=6.5%  Consistent with diabetes  Currently, no consensus exists for use of hemoglobin A1c  for diagnosis of diabetes for children.  This Hemoglobin A1c assay has significant interference with fetal   hemoglobin   (HbF). The results are invalid for patients with abnormal amounts of   HbF,   including those with known Hereditary Persistence   of Fetal Hemoglobin. Heterozygous hemoglobin variants (HbAS, HbAC,   HbAD, HbAE, HbA2) do not significantly interfere with this assay;   however, presence of multiple variants in a sample may impact the %   interference.       HTN -  compliant with Rory (previous on Lotensin but didn't tolerate it well), which he denies any side effects.  120-130s/80s per BP log at home.  No vision changes/urinary changes. He had mild ankle swelling this past week due to being on his foot for most of the day, improved with leg elevation. Gout is controlled with diet, no recent flare ups.        Additional ROS    No F/C/wt changes/fatigue  No dysphagia/sore throat  No CP/ALLEN/palpitations  No cough/wheezing/SOB  No nausea/vomiting/abd pain/no diarrhea, no constipation, no blood in stool  No MSK weakness/HA/tingling/numbness  No rashes  No anxiety/depression  No dysuria/hematuria              Patient Active Problem List   Diagnosis    Diabetes mellitus due to underlying condition with diabetic nephropathy    Proteinuria    Essential (primary) hypertension    Mixed hyperlipidemia    CKD (chronic kidney disease) stage 4, GFR 15-29 ml/min    Gout       Current  Medications  Medications reviewed/updated.     Current Outpatient Medications on File Prior to Visit   Medication Sig Dispense Refill    amlodipine-olmesartan (YOLETTE) 10-40 mg per tablet Take 1 tablet by mouth once daily. Please follow up with your doctor Feb 2020 60 tablet 1    atorvastatin (LIPITOR) 20 MG tablet Take 1 tablet (20 mg total) by mouth once daily. 90 tablet 3    BLOOD-GLUCOSE CONTROL, NORMAL (ONE TOUCH ULTRA CONTROL MISC) by Misc.(Non-Drug; Combo Route) route.        colchicine (COLCRYS) 0.6 mg tablet TAKE 1/2 TABLET(0.3 MG) BY MOUTH EVERY DAY 90 tablet 0    cyclobenzaprine (FLEXERIL) 10 MG tablet   0    glipiZIDE (GLUCOTROL) 10 MG tablet Take 1 tablet (10 mg total) by mouth 2 (two) times daily before meals. 180 tablet 3    LANCETS MISC by Misc.(Non-Drug; Combo Route) route.        multivitamin (MULTIVITAMIN) per tablet Take 1 tablet by mouth once daily.        [DISCONTINUED] celecoxib (CELEBREX) 200 MG capsule TK ONE C PO  BID  0    [DISCONTINUED] diazePAM (VALIUM) 2 MG tablet TK 1 T PO ONCE 15 MIN B MRI FOR 1 DOSE  0    [DISCONTINUED] HYDROcodone-acetaminophen (NORCO) 7.5-325 mg per tablet TK 1 T PO Q 4 H PRN  0    [DISCONTINUED] traMADol (ULTRAM) 50 mg tablet TK 1 T PO  Q 6 H PRN P  0    [DISCONTINUED] diazePAM (VALIUM) 2 MG tablet Take 1 tablet (2 mg total) by mouth once. Take 15minutes before MRI for 1 dose 1 tablet 0     No current facility-administered medications on file prior to visit.            Past Surgical History:   Procedure Laterality Date    ADENOIDECTOMY      nasal septum repair      Tonsillectomy      TONSILLECTOMY         Family History   Problem Relation Age of Onset    No Known Problems Mother     No Known Problems Father     No Known Problems Sister     No Known Problems Brother     No Known Problems Maternal Aunt     No Known Problems Maternal Uncle     No Known Problems Paternal Aunt     No Known Problems Paternal Uncle     No Known Problems Maternal  Grandmother     No Known Problems Maternal Grandfather     No Known Problems Paternal Grandmother     No Known Problems Paternal Grandfather     Cancer Neg Hx         Negative for prostate or colon cancer    Kidney disease Neg Hx     Amblyopia Neg Hx     Blindness Neg Hx     Cataracts Neg Hx     Diabetes Neg Hx     Glaucoma Neg Hx     Hypertension Neg Hx     Macular degeneration Neg Hx     Retinal detachment Neg Hx     Strabismus Neg Hx     Stroke Neg Hx     Thyroid disease Neg Hx        Social History     Socioeconomic History    Marital status:      Spouse name: Not on file    Number of children: Not on file    Years of education: Not on file    Highest education level: Not on file   Occupational History     Employer: [employer]   Social Needs    Financial resource strain: Not on file    Food insecurity:     Worry: Not on file     Inability: Not on file    Transportation needs:     Medical: Not on file     Non-medical: Not on file   Tobacco Use    Smoking status: Never Smoker    Smokeless tobacco: Never Used    Tobacco comment: .  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     Substance and Sexual Activity    Alcohol use: No    Drug use: No    Sexual activity: Yes     Partners: Female   Lifestyle    Physical activity:     Days per week: Not on file     Minutes per session: Not on file    Stress: Not on file   Relationships    Social connections:     Talks on phone: Not on file     Gets together: Not on file     Attends [employer] service: Not on file     Active member of club or organization: Not on file     Attends meetings of clubs or organizations: Not on file     Relationship status: Not on file   Other Topics Concern    Not on file   Social History Narrative    Not on file             Allergies   Review of patient's allergies indicates:   Allergen Reactions    Iodine and iodide containing products Hives     Hypotension, Flushing             Review of  "Systems  See HPI      Physical Exam  /82   Pulse 88   Temp 99 °F (37.2 °C)   Ht 6' 3" (1.905 m)   Wt 103.4 kg (227 lb 13.5 oz)   SpO2 99%   BMI 28.48 kg/m²     GEN: NAD, well developed, pleasant, well nourished  HEENT: NCAT, PERRLA, EOMI, sclera clear, anicteric, O/P clear, MMM with no lesions  NECK: normal, supple with midline trachea, no LAD, no thyromegaly  LUNGS: CTAB, no w/r/r, no increased work of breathing   HEART: RRR, normal S1 and S2, no m/r/g, mild b/l ankle edema  ABD: s/nt/nd, NABS  SKIN: normal turgor, no rashes  PSYCH: AOx3, appropriate mood and affect  MSK: warm/well perfused, normal ROM in all extremities, no c/c.      "

## 2020-01-27 RX ORDER — AMLODIPINE AND OLMESARTAN MEDOXOMIL 10; 40 MG/1; MG/1
TABLET ORAL
Qty: 60 TABLET | Refills: 0 | Status: SHIPPED | OUTPATIENT
Start: 2020-01-27 | End: 2020-04-06 | Stop reason: SDUPTHER

## 2020-02-21 DIAGNOSIS — M79.672 LEFT FOOT PAIN: ICD-10-CM

## 2020-02-21 RX ORDER — DICLOFENAC SODIUM 10 MG/G
GEL TOPICAL
Qty: 100 G | Refills: 12 | Status: SHIPPED | OUTPATIENT
Start: 2020-02-21 | End: 2022-04-30

## 2020-02-21 NOTE — TELEPHONE ENCOUNTER
----- Message from Corrina Henyr sent at 2/21/2020  8:51 AM CST -----  Contact: Danuta with [facility]   Type: RX Refill Request    Who Called:  Pt     Have you contacted your pharmacy: Yes    Refill or New Rx: Refill     RX Name and Strength: Voltaren Gel     How is the patient currently taking it? (ex. 1XDay):     Is this a 30 day or 90 day RX:    Preferred Pharmacy with phone number: ..  [id number] - [place] - [address] AT [facility]  [address]  [place] [address]  Phone: [phone] Fax: [phone]    Local or Mail Order: local     Ordering Provider: Rep states Dr. Angel Luis Boo     Would the patient rather a call back or a response via [facility]? Call back     Best Call Back Number: [phone]    Additional Information:

## 2020-03-09 DIAGNOSIS — E78.2 MIXED HYPERLIPIDEMIA: ICD-10-CM

## 2020-03-09 RX ORDER — AMLODIPINE AND OLMESARTAN MEDOXOMIL 10; 40 MG/1; MG/1
TABLET ORAL
Qty: 60 TABLET | Refills: 0 | OUTPATIENT
Start: 2020-03-09

## 2020-03-09 RX ORDER — ATORVASTATIN CALCIUM 20 MG/1
TABLET, FILM COATED ORAL
Qty: 90 TABLET | Refills: 3 | OUTPATIENT
Start: 2020-03-09

## 2020-03-11 DIAGNOSIS — E08.21 DIABETES MELLITUS DUE TO UNDERLYING CONDITION WITH DIABETIC NEPHROPATHY, WITHOUT LONG-TERM CURRENT USE OF INSULIN: ICD-10-CM

## 2020-03-11 RX ORDER — GLIPIZIDE 10 MG/1
10 TABLET ORAL
Qty: 180 TABLET | Refills: 0 | Status: SHIPPED | OUTPATIENT
Start: 2020-03-11 | End: 2020-05-05 | Stop reason: SDUPTHER

## 2020-03-11 NOTE — TELEPHONE ENCOUNTER
----- Message from Genie Meyer sent at 3/11/2020  9:43 AM CDT -----  Contact: SELF  Type: Patient Call Back    Who called:self    What is the request in detail:Pt is wondering why he has not got a ok to get his Glipizide filled?.     Can the clinic reply by [facility]?no    Would the patient rather a call back or a response via [facility]? call    Best call back number:    Spoke with the patient and he is completely out of the Glipizide.

## 2020-03-21 ENCOUNTER — HOSPITAL ENCOUNTER (EMERGENCY)
Facility: HOSPITAL | Age: 65
Discharge: HOME | End: 2020-03-21
Attending: EMERGENCY MEDICINE
Payer: COMMERCIAL

## 2020-03-21 VITALS
BODY MASS INDEX: 28.75 KG/M2 | HEART RATE: 81 BPM | SYSTOLIC BLOOD PRESSURE: 93 MMHG | DIASTOLIC BLOOD PRESSURE: 59 MMHG | TEMPERATURE: 100 F | RESPIRATION RATE: 18 BRPM | OXYGEN SATURATION: 100 % | WEIGHT: 230 LBS

## 2020-03-21 DIAGNOSIS — J06.9 UPPER RESPIRATORY TRACT INFECTION, UNSPECIFIED TYPE: Primary | ICD-10-CM

## 2020-03-21 DIAGNOSIS — J06.9 URI (UPPER RESPIRATORY INFECTION): ICD-10-CM

## 2020-03-21 LAB
CTP QC/QA: YES
POC MOLECULAR INFLUENZA A AGN: NEGATIVE
POC MOLECULAR INFLUENZA B AGN: NEGATIVE
POCT GLUCOSE: 196 MG/DL (ref 70–110)

## 2020-03-21 PROCEDURE — 82962 GLUCOSE BLOOD TEST: CPT | Mod: ER

## 2020-03-21 PROCEDURE — 99284 EMERGENCY DEPT VISIT MOD MDM: CPT | Mod: 25,ER

## 2020-03-21 PROCEDURE — 87502 INFLUENZA DNA AMP PROBE: CPT | Mod: ER

## 2020-03-21 RX ORDER — ACETAMINOPHEN 500 MG
1000 TABLET ORAL EVERY 6 HOURS PRN
Qty: 30 TABLET | Refills: 0 | Status: SHIPPED | OUTPATIENT
Start: 2020-03-21

## 2020-03-21 RX ORDER — BENZONATATE 200 MG/1
200 CAPSULE ORAL 3 TIMES DAILY PRN
Qty: 30 CAPSULE | Refills: 0 | Status: SHIPPED | OUTPATIENT
Start: 2020-03-21 | End: 2020-03-31

## 2020-03-21 NOTE — ED PROVIDER NOTES
"Encounter Date: 3/21/2020    SCRIBE #1 NOTE: I, Bethany Middleton, am scribing for, and in the presence of,  CARRIE Flowers. I have scribed the following portions of the note - Other sections scribed: HPI, ROS, PE.       History     Chief Complaint   Patient presents with    URI     Pt states," Dry cough with fever and body aches."     Elbert Still Jr. is a nontoxic 64 y.o. male who presents to the ED complaining of nonproductive cough for a few days. Reports fever and body aches. Pt reports temperature at home. Took tylenol 1000 mg PTA with mild relief. Denies positive COVID-19 exposure.     The history is provided by the patient. No  was used.   Cough   This is a new problem. The current episode started several days ago. The problem occurs every few minutes. The problem has been gradually worsening. The cough is non-productive. The fever has been present for less than 1 day. Associated symptoms include myalgias. Pertinent negatives include no chest pain and no shortness of breath. Treatments tried: tylenol. The treatment provided mild relief. He is not a smoker.     Review of patient's allergies indicates:   Allergen Reactions    Iodine and iodide containing products Hives     Hypotension, Flushing     Past Medical History:   Diagnosis Date    Essential (primary) hypertension 9/21/2017     Past Surgical History:   Procedure Laterality Date    ADENOIDECTOMY      nasal septum repair      Tonsillectomy      TONSILLECTOMY       Family History   Problem Relation Age of Onset    No Known Problems Mother     No Known Problems Father     No Known Problems Sister     No Known Problems Brother     No Known Problems Maternal Aunt     No Known Problems Maternal Uncle     No Known Problems Paternal Aunt     No Known Problems Paternal Uncle     No Known Problems Maternal Grandmother     No Known Problems Maternal Grandfather     No Known Problems Paternal Grandmother     No Known " Problems Paternal Grandfather     Cancer Neg Hx         Negative for prostate or colon cancer    Kidney disease Neg Hx     Amblyopia Neg Hx     Blindness Neg Hx     Cataracts Neg Hx     Diabetes Neg Hx     Glaucoma Neg Hx     Hypertension Neg Hx     Macular degeneration Neg Hx     Retinal detachment Neg Hx     Strabismus Neg Hx     Stroke Neg Hx     Thyroid disease Neg Hx      Social History     Tobacco Use    Smoking status: Never Smoker    Smokeless tobacco: Never Used    Tobacco comment: .  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     Substance Use Topics    Alcohol use: No    Drug use: No     Review of Systems   Constitutional: Positive for fever. Negative for activity change.   HENT: Negative.  Negative for congestion.    Eyes: Negative.  Negative for discharge.   Respiratory: Positive for cough. Negative for shortness of breath.    Cardiovascular: Negative.  Negative for chest pain.   Gastrointestinal: Negative.  Negative for abdominal pain, nausea and vomiting.   Genitourinary: Negative.  Negative for dysuria.   Musculoskeletal: Positive for myalgias.   Skin: Negative.    Neurological: Negative.  Negative for weakness.   Hematological: Does not bruise/bleed easily.   Psychiatric/Behavioral: Negative.    All other systems reviewed and are negative.      Physical Exam     Initial Vitals [03/21/20 1134]   BP Pulse Resp Temp SpO2   (!) 94/55 79 16 99.4 °F (37.4 °C) 95 %      MAP       --         Physical Exam    Nursing note and vitals reviewed.  Constitutional: He appears well-developed.   HENT:   Right Ear: External ear normal.   Left Ear: External ear normal.   Nose: Nose normal.   Mouth/Throat: Oropharynx is clear and moist.   Eyes: Conjunctivae are normal.   Neck: Normal range of motion. Neck supple.   Cardiovascular: Normal rate, regular rhythm, normal heart sounds and intact distal pulses. Exam reveals no friction rub.    No murmur heard.  Pulmonary/Chest: Effort normal and breath sounds  normal. He has no rales.   Abdominal: Soft. There is no tenderness.   Musculoskeletal: Normal range of motion. He exhibits no edema or tenderness.   Neurological: He is alert and oriented to person, place, and time.   Skin: Skin is warm and dry. Capillary refill takes less than 2 seconds.   Psychiatric: He has a normal mood and affect. His behavior is normal.         ED Course   Procedures  Labs Reviewed   POCT GLUCOSE - Abnormal; Notable for the following components:       Result Value    POCT Glucose 196 (*)     All other components within normal limits   POCT INFLUENZA A/B MOLECULAR          Imaging Results          X-Ray Chest AP Portable (Final result)  Result time 03/21/20 11:57:09    Final result by Drew Fields DO (03/21/20 11:57:09)                 Impression:      No acute cardiopulmonary process.      Electronically signed by: Drew Fields DO  Date:    03/21/2020  Time:    11:57             Narrative:    EXAMINATION:  XR CHEST AP PORTABLE    CLINICAL HISTORY:  Acute upper respiratory infection, unspecified    TECHNIQUE:  Single frontal view of the chest was performed.    COMPARISON:  06/30/2009    FINDINGS:  No infiltrates or pleural effusions are identified.  The heart does not appear to be enlarged for a portable exam.                                 Medical Decision Making:   History:   Old Medical Records: I decided to obtain old medical records.  Initial Assessment:   Nontoxic 64 y.o male c/o nonproductive cough, fever, and body aches for several days.   Differential Diagnosis:   Influenza, acute sinusitis, pneumonia, bronchitis  Clinical Tests:   Lab Tests: Ordered and Reviewed  Radiological Study: Ordered and Reviewed  ED Management:  Discussed with Dr. Mclain. He recommended that patient monitor his B/P and hold B/P if remains low.   Pt instructed to monitor B/P.   Increase fluid intake. Hold B/P medications.   Discharged with COVID-19 instructions.            Scribe Attestation:   Scribe  #1: I performed the above scribed service and the documentation accurately describes the services I performed. I attest to the accuracy of the note.    This document was produced by a scribe under my direction and in my presence. I agree with the content of the note and have made any necessary edits.     CARRIE Flowers    03/21/2020 1:11 PM                      Clinical Impression:     1. Upper respiratory tract infection, unspecified type    2. URI (upper respiratory infection)                                   CARRIE Griggs  03/21/20 1444       CARRIE Griggs  03/21/20 1446

## 2020-03-21 NOTE — DISCHARGE INSTRUCTIONS
Follow-up with PCP in 2 days.  Monitor your blood pressure. Hold B/P is 110/60 or lower. Increase fluid intake.   Self-quarantine at home for 10 days.   Return to Emergency room for worsening of symptoms.

## 2020-03-23 ENCOUNTER — TELEPHONE (OUTPATIENT)
Dept: FAMILY MEDICINE | Facility: CLINIC | Age: 65
End: 2020-03-23

## 2020-03-23 NOTE — TELEPHONE ENCOUNTER
----- Message from Lizz Lutz sent at 3/21/2020  2:37 PM CDT -----  Contact: Gr[place]/wife/ [phone]  Would like to get medical advice.  Symptoms (please be specific):  Cough, body aches and low grade temp(99.8) wife said before leaving for the hospital patient temp was 101  How long has patient had these symptoms:    Pharmacy name and phone #:    Any drug allergies (copy from chart):      Would the patient rather a call back or a response via [facility]?:    Comments:  Patient was seen 3/21 , [facility]  and was tested for the flu. Patient would like to be tested for the coronavirus, please call and advise.

## 2020-04-06 DIAGNOSIS — E78.2 MIXED HYPERLIPIDEMIA: ICD-10-CM

## 2020-04-06 RX ORDER — AMLODIPINE AND OLMESARTAN MEDOXOMIL 10; 40 MG/1; MG/1
1 TABLET ORAL DAILY
Qty: 21 TABLET | Refills: 0 | Status: SHIPPED | OUTPATIENT
Start: 2020-04-06 | End: 2020-05-06

## 2020-04-07 RX ORDER — ATORVASTATIN CALCIUM 20 MG/1
20 TABLET, FILM COATED ORAL NIGHTLY
Qty: 90 TABLET | Refills: 3 | Status: SHIPPED | OUTPATIENT
Start: 2020-04-07 | End: 2021-02-24 | Stop reason: SDUPTHER

## 2020-04-14 ENCOUNTER — CLINICAL SUPPORT (OUTPATIENT)
Dept: FAMILY MEDICINE | Facility: CLINIC | Age: 65
End: 2020-04-14
Payer: COMMERCIAL

## 2020-04-14 DIAGNOSIS — Z20.822 SUSPECTED COVID-19 VIRUS INFECTION: ICD-10-CM

## 2020-04-14 DIAGNOSIS — Z20.822 SUSPECTED COVID-19 VIRUS INFECTION: Primary | ICD-10-CM

## 2020-04-14 PROCEDURE — U0002 COVID-19 LAB TEST NON-CDC: HCPCS

## 2020-04-15 ENCOUNTER — TELEPHONE (OUTPATIENT)
Dept: FAMILY MEDICINE | Facility: CLINIC | Age: 65
End: 2020-04-15

## 2020-04-15 LAB — SARS-COV-2 RNA RESP QL NAA+PROBE: NOT DETECTED

## 2020-04-15 NOTE — TELEPHONE ENCOUNTER
----- Message from Tesha Alcantar PA-C sent at 4/15/2020  8:02 AM CDT -----  Your test was NEGATIVE for COVID-19 (coronavirus).  If you have symptoms, treat with rest, fluids, and over-the-counter medications.  Continue to stay home and practice proper handwashing.     If your symptoms worsen or if you have any other concerns, [facility] at [phone] or proceed to the Emergency department.

## 2020-04-16 NOTE — TELEPHONE ENCOUNTER
----- Message from Errol Jordan sent at 4/15/2020  4:45 PM CDT -----  Contact: Self  Type: Patient Call Back    Who called: Self    What is the request in detail: Pt was notified of negative COVID19 results and would like a letter for his record.    Can the clinic reply by [facility]?  no    Would the patient rather a call back or a response via [facility]? call    Best call back number:[phone]

## 2020-04-21 RX ORDER — AMLODIPINE AND OLMESARTAN MEDOXOMIL 10; 40 MG/1; MG/1
TABLET ORAL
Qty: 21 TABLET | Refills: 0 | OUTPATIENT
Start: 2020-04-21

## 2020-04-30 ENCOUNTER — TELEPHONE (OUTPATIENT)
Dept: FAMILY MEDICINE | Facility: CLINIC | Age: 65
End: 2020-04-30

## 2020-04-30 NOTE — TELEPHONE ENCOUNTER
----- Message from Kailey Carroll sent at 4/30/2020 12:00 PM CDT -----  Contact: Patient [phone]  Type: RX Refill Request    Who Called: Patient    Have you contacted your pharmacy: Yes. Pharmacy is waiting on doctor.    Refill or New Rx: Refill    RX Name and Strength: amlodipine-olmesartan (YOLETTE) 10-40 mg per tablet    Is this a 30 day or 90 day RX: 90 day    Preferred Pharmacy with phone number: .  [id number] - [place] - [address] [facility]  [address] [place] [address]  Phone: [phone] Fax: [phone]    Local or Mail Order: Local    Would the patient rather a call back or a response via [facility]? Call back    Best Call Back Number: [phone]

## 2020-04-30 NOTE — TELEPHONE ENCOUNTER
Patient needs labs, then an appt. prior to refill.  Called Patient.  No answer.  Left voice message requesting a call back.

## 2020-05-01 ENCOUNTER — LAB VISIT (OUTPATIENT)
Dept: LAB | Facility: HOSPITAL | Age: 65
End: 2020-05-01
Attending: FAMILY MEDICINE
Payer: COMMERCIAL

## 2020-05-01 DIAGNOSIS — E08.21 DIABETES MELLITUS DUE TO UNDERLYING CONDITION WITH DIABETIC NEPHROPATHY, WITHOUT LONG-TERM CURRENT USE OF INSULIN: ICD-10-CM

## 2020-05-01 DIAGNOSIS — E78.2 MIXED HYPERLIPIDEMIA: ICD-10-CM

## 2020-05-01 DIAGNOSIS — M10.9 GOUT, UNSPECIFIED CAUSE, UNSPECIFIED CHRONICITY, UNSPECIFIED SITE: ICD-10-CM

## 2020-05-01 LAB
ALBUMIN SERPL BCP-MCNC: 3.8 G/DL (ref 3.5–5.2)
ALP SERPL-CCNC: 102 U/L (ref 55–135)
ALT SERPL W/O P-5'-P-CCNC: 18 U/L (ref 10–44)
ANION GAP SERPL CALC-SCNC: 6 MMOL/L (ref 8–16)
AST SERPL-CCNC: 20 U/L (ref 10–40)
BILIRUB SERPL-MCNC: 0.5 MG/DL (ref 0.1–1)
BUN SERPL-MCNC: 48 MG/DL (ref 8–23)
CALCIUM SERPL-MCNC: 9.3 MG/DL (ref 8.7–10.5)
CHLORIDE SERPL-SCNC: 111 MMOL/L (ref 95–110)
CHOLEST SERPL-MCNC: 91 MG/DL (ref 120–199)
CHOLEST/HDLC SERPL: 2.8 {RATIO} (ref 2–5)
CO2 SERPL-SCNC: 19 MMOL/L (ref 23–29)
CREAT SERPL-MCNC: 3.6 MG/DL (ref 0.5–1.4)
ERYTHROCYTE [DISTWIDTH] IN BLOOD BY AUTOMATED COUNT: 15.1 % (ref 11.5–14.5)
EST. GFR  (AFRICAN AMERICAN): 19.7 ML/MIN/1.73 M^2
EST. GFR  (NON AFRICAN AMERICAN): 17.1 ML/MIN/1.73 M^2
ESTIMATED AVG GLUCOSE: 123 MG/DL (ref 68–131)
GLUCOSE SERPL-MCNC: 140 MG/DL (ref 70–110)
HBA1C MFR BLD HPLC: 5.9 % (ref 4–5.6)
HCT VFR BLD AUTO: 37.3 % (ref 40–54)
HDLC SERPL-MCNC: 33 MG/DL (ref 40–75)
HDLC SERPL: 36.3 % (ref 20–50)
HGB BLD-MCNC: 11.3 G/DL (ref 14–18)
LDLC SERPL CALC-MCNC: 29.2 MG/DL (ref 63–159)
MCH RBC QN AUTO: 27.1 PG (ref 27–31)
MCHC RBC AUTO-ENTMCNC: 30.3 G/DL (ref 32–36)
MCV RBC AUTO: 89 FL (ref 82–98)
NONHDLC SERPL-MCNC: 58 MG/DL
PLATELET # BLD AUTO: 274 K/UL (ref 150–350)
PMV BLD AUTO: 11.3 FL (ref 9.2–12.9)
POTASSIUM SERPL-SCNC: 5.8 MMOL/L (ref 3.5–5.1)
PROT SERPL-MCNC: 7.9 G/DL (ref 6–8.4)
RBC # BLD AUTO: 4.17 M/UL (ref 4.6–6.2)
SODIUM SERPL-SCNC: 136 MMOL/L (ref 136–145)
TRIGL SERPL-MCNC: 144 MG/DL (ref 30–150)
URATE SERPL-MCNC: 8.9 MG/DL (ref 3.4–7)
WBC # BLD AUTO: 3.69 K/UL (ref 3.9–12.7)

## 2020-05-01 PROCEDURE — 83036 HEMOGLOBIN GLYCOSYLATED A1C: CPT

## 2020-05-01 PROCEDURE — 80061 LIPID PANEL: CPT

## 2020-05-01 PROCEDURE — 85027 COMPLETE CBC AUTOMATED: CPT

## 2020-05-01 PROCEDURE — 80053 COMPREHEN METABOLIC PANEL: CPT

## 2020-05-01 PROCEDURE — 84550 ASSAY OF BLOOD/URIC ACID: CPT

## 2020-05-01 PROCEDURE — 36415 COLL VENOUS BLD VENIPUNCTURE: CPT | Mod: PO

## 2020-05-05 ENCOUNTER — OFFICE VISIT (OUTPATIENT)
Dept: FAMILY MEDICINE | Facility: CLINIC | Age: 65
End: 2020-05-05
Payer: COMMERCIAL

## 2020-05-05 ENCOUNTER — LAB VISIT (OUTPATIENT)
Dept: LAB | Facility: HOSPITAL | Age: 65
End: 2020-05-05
Attending: FAMILY MEDICINE
Payer: COMMERCIAL

## 2020-05-05 DIAGNOSIS — E78.2 MIXED HYPERLIPIDEMIA: ICD-10-CM

## 2020-05-05 DIAGNOSIS — D63.8 ANEMIA OF CHRONIC DISEASE: ICD-10-CM

## 2020-05-05 DIAGNOSIS — E87.5 HYPERKALEMIA: ICD-10-CM

## 2020-05-05 DIAGNOSIS — E08.21 DIABETES MELLITUS DUE TO UNDERLYING CONDITION WITH DIABETIC NEPHROPATHY, WITHOUT LONG-TERM CURRENT USE OF INSULIN: Primary | ICD-10-CM

## 2020-05-05 DIAGNOSIS — M10.9 GOUT, UNSPECIFIED CAUSE, UNSPECIFIED CHRONICITY, UNSPECIFIED SITE: ICD-10-CM

## 2020-05-05 DIAGNOSIS — I10 ESSENTIAL (PRIMARY) HYPERTENSION: ICD-10-CM

## 2020-05-05 DIAGNOSIS — Z12.11 COLON CANCER SCREENING: ICD-10-CM

## 2020-05-05 DIAGNOSIS — N18.4 CKD (CHRONIC KIDNEY DISEASE) STAGE 4, GFR 15-29 ML/MIN: ICD-10-CM

## 2020-05-05 LAB — POTASSIUM SERPL-SCNC: 5.4 MMOL/L (ref 3.5–5.1)

## 2020-05-05 PROCEDURE — 36415 COLL VENOUS BLD VENIPUNCTURE: CPT | Mod: PO

## 2020-05-05 PROCEDURE — 99214 PR OFFICE/OUTPT VISIT, EST, LEVL IV, 30-39 MIN: ICD-10-PCS | Mod: 95,,, | Performed by: FAMILY MEDICINE

## 2020-05-05 PROCEDURE — 99214 OFFICE O/P EST MOD 30 MIN: CPT | Mod: 95,,, | Performed by: FAMILY MEDICINE

## 2020-05-05 PROCEDURE — 93010 EKG 12-LEAD: ICD-10-PCS | Mod: 95,,, | Performed by: INTERNAL MEDICINE

## 2020-05-05 PROCEDURE — 93005 EKG 12-LEAD: ICD-10-PCS | Mod: S$GLB,,, | Performed by: FAMILY MEDICINE

## 2020-05-05 PROCEDURE — 93005 ELECTROCARDIOGRAM TRACING: CPT | Mod: S$GLB,,, | Performed by: FAMILY MEDICINE

## 2020-05-05 PROCEDURE — 84132 ASSAY OF SERUM POTASSIUM: CPT

## 2020-05-05 PROCEDURE — 93010 ELECTROCARDIOGRAM REPORT: CPT | Mod: 95,,, | Performed by: INTERNAL MEDICINE

## 2020-05-05 RX ORDER — GLIPIZIDE 10 MG/1
10 TABLET ORAL
Qty: 180 TABLET | Refills: 0 | Status: SHIPPED | OUTPATIENT
Start: 2020-05-05 | End: 2020-08-26

## 2020-05-05 NOTE — PROGRESS NOTES
Established Patient - Audio Only Telehealth Visit    The patient location is: home  The chief complaint leading to consultation is: HTN f/u    Visit type: Virtual visit with audio only (telephone) - patient verbally consented to an audio visit today prior to this telephone encounter.  The reason for the audio only service rather than synchronous audio and video virtual visit was related to technical difficulties or patient preference/necessity.  Total time spent with patient: 22 minutes  Each patient to whom he or she provides medical services by telemedicine is:  (1) informed of the relationship between the physician and patient and the respective role of any other health care provider with respect to management of the patient; and (2) notified that he or she may decline to receive medical services by telemedicine and may withdraw from such care at any time.     Office Visit    Patient Name: Elbert Still Jr.    : 1955  MRN: [id number]      Assessment/Plan:  Elbert Still Jr. is a 64 y.o. male who presents today for :    Diabetes mellitus due to underlying condition with diabetic nephropathy, without long-term current use of insulin  -     glipiZIDE (GLUCOTROL) 10 MG tablet; Take 1 tablet (10 mg total) by mouth 2 (two) times daily before meals. Followup Dr.T Boo 2020 for refills, get labs 2-3 days before (ordered)  Dispense: 180 tablet; Refill: 0  -stable, continue current regimen   -f/u as needed    Essential (primary) hypertension  -     amLODIPine (NORVASC) 10 MG tablet; Take 1 tablet (10 mg total) by mouth once daily. Followup Dr.T Boo IN OFFICE AUG-2020 for refills, get labs 2-3 days before (ordered)  Dispense: 90 tablet; Refill: 0  -     metoprolol succinate (TOPROL-XL) 25 MG 24 hr tablet; Take 1 tablet (25 mg total) by mouth once daily. Followup Dr.T Boo IN OFFICE AUG-2020 for refills, get labs 2-3 days before (ordered)  Dispense: 90 tablet; Refill: 0  Mixed hyperlipidemia  CKD  (chronic kidney disease) stage 4, GFR 15-29 ml/min  -continue amlodipine, dc ARB, add Toprol  -DASH diet, regular cardiovascular exercises  -counseled on weight loss    Gout, unspecified cause, unspecified chronicity, unspecified site  -     colchicine (COLCRYS) 0.6 mg tablet; TAKE 1/2 TABLET(0.3 MG) BY MOUTH EVERY DAY  Dispense: 90 tablet; Refill: 3  -stable, continue current regimen   -f/u as needed    Anemia of chronic disease  -     CBC auto differential; Future  -     Ferritin; Future  -     Iron and TIBC; Future    Hyperkalemia  -     Potassium; Future; Expected date: 05/05/2020  -     EKG 12-lead  -     Basic metabolic panel; Future; Expected date: 05/06/2020  -EKG in office today wnl, no T waves nor ST changes  -serum potassium repeat today decreased to 5.4, pt has not taken Olmesartan nor eat a banana in 3 days. Advised to decrease banana intake for now, as well as dc ARB. Recheck potassium level again next week.  -f/u prn    Colon cancer screening  -     Case request GI: COLONOSCOPY  -     Cologuard Screening (Multitarget Stool DNA); Future; Expected date: 05/05/2020          Follow up 3month    This note was created by combination of typed  and MModal dictation.  Transcription errors may be present.  If there are any questions, please contact me.      ----------------------------------------------------------------------------------------------------------------------      HPI:  Patient Care Team:  Angel Luis Boo MD as PCP - General (Family Medicine)  Angel Luis Boo MD as PCP - [employer] Attributed  Walter Díaz MD as Consulting Physician (Nephrology)  Jennifer Pollard MA as Care Coordinator    Elbert is a 64 y.o. male with      Patient Active Problem List   Diagnosis    Diabetes mellitus due to underlying condition with diabetic nephropathy    Proteinuria    Essential (primary) hypertension    Mixed hyperlipidemia    CKD (chronic kidney disease) stage 4, GFR 15-29 ml/min    Gout     Anemia of chronic disease       Patient presents today for  HTN f/u    HTN - he is compliant with Rory daily - has been out of medication the past few days, BP controlled - denies any side effects but potassium noted to be high, which he admits to eating bananas regularly. We discussed that the Omesartan he is on for BP control may also contribute to elevating his potassium level. He denies any CP/SOB/ALLEN/weakness/tinlging/numbness.     DM - controlled on Glipizide without any side effects   daily FBG: below 130  No polyuria/polydipsia. No foot numbness/tingling/vision changes/hypoglycemia      Hemoglobin A1C   Date Value Ref Range Status   05/01/2020 5.9 (H) 4.0 - 5.6 % Final     Comment:     ADA Screening Guidelines:  5.7-6.4%  Consistent with prediabetes  >or=6.5%  Consistent with diabetes  High levels of fetal hemoglobin interfere with the HbA1C  assay. Heterozygous hemoglobin variants (HbS, HgC, etc)do  not significantly interfere with this assay.   However, presence of multiple variants may affect accuracy.     06/22/2019 5.7 (H) 4.0 - 5.6 % Final     Comment:     ADA Screening Guidelines:  5.7-6.4%  Consistent with prediabetes  >or=6.5%  Consistent with diabetes  High levels of fetal hemoglobin interfere with the HbA1C  assay. Heterozygous hemoglobin variants (HbS, HgC, etc)do  not significantly interfere with this assay.   However, presence of multiple variants may affect accuracy.     04/17/2018 7.0 (H) 4.0 - 5.6 % Final     Comment:     According to ADA guidelines, hemoglobin A1c <7.0% represents  optimal control in non-pregnant diabetic patients. Different  metrics may apply to specific patient populations.   Standards of Medical Care in Diabetes-2016.  For the purpose of screening for the presence of diabetes:  <5.7%     Consistent with the absence of diabetes  5.7-6.4%  Consistent with increasing risk for diabetes   (prediabetes)  >or=6.5%  Consistent with diabetes  Currently, no consensus exists  for use of hemoglobin A1c  for diagnosis of diabetes for children.  This Hemoglobin A1c assay has significant interference with fetal   hemoglobin   (HbF). The results are invalid for patients with abnormal amounts of   HbF,   including those with known Hereditary Persistence   of Fetal Hemoglobin. Heterozygous hemoglobin variants (HbAS, HbAC,   HbAD, HbAE, HbA2) do not significantly interfere with this assay;   however, presence of multiple variants in a sample may impact the %   interference.         Gout - no recent flare up, has been taking Colcrys 0.3mg every few days, uric acid noted to be high, which I discussed he can take the medication daily        Additional ROS    No F/C/wt changes/fatigue  No dysphagia/sore throat  No CP/ALLEN/palpitations/swelling  No cough/wheezing/SOB  No nausea/vomiting/abd pain/no diarrhea, no constipation, no blood in stool  No MSK weakness/HA/tingling/numbness  No rashes  No anxiety/depression  No dysuria/hematuria              Patient Active Problem List   Diagnosis    Diabetes mellitus due to underlying condition with diabetic nephropathy    Proteinuria    Essential (primary) hypertension    Mixed hyperlipidemia    CKD (chronic kidney disease) stage 4, GFR 15-29 ml/min    Gout    Anemia of chronic disease       Current Medications  Medications reviewed/updated.     Current Outpatient Medications on File Prior to Visit   Medication Sig Dispense Refill    acetaminophen (TYLENOL) 500 MG tablet Take 2 tablets (1,000 mg total) by mouth every 6 (six) hours as needed for Pain. 30 tablet 0    atorvastatin (LIPITOR) 20 MG tablet Take 1 tablet (20 mg total) by mouth every evening. Followup Dr.T Boo JUNE 2020, get labs 2-3 days before (ordered)  (may call to schedule a Video or Telephone Virtual visit) 90 tablet 3    BLOOD-GLUCOSE CONTROL, NORMAL (ONE TOUCH ULTRA CONTROL MISC) by Misc.(Non-Drug; Combo Route) route.        cyclobenzaprine (FLEXERIL) 10 MG tablet   0     diclofenac sodium (VOLTAREN) 1 % Gel Lower extremities: Apply the gel (4 g) to the affected area 4 times daily. Do not apply more than 16 g daily to any one affected joint of the lower extremities. 100 g 12    LANCETS MISC by Misc.(Non-Drug; Combo Route) route.        multivitamin (MULTIVITAMIN) per tablet Take 1 tablet by mouth once daily.        [DISCONTINUED] amlodipine-olmesartan (YOLETTE) 10-40 mg per tablet Take 1 tablet by mouth once daily. Short-term refill - please call to schedule a Video or Telephone Virtual visit with doctor ASAP for more refills. Get LABs 2-3 days before (ordered) 21 tablet 0    [DISCONTINUED] colchicine (COLCRYS) 0.6 mg tablet TAKE 1/2 TABLET(0.3 MG) BY MOUTH EVERY DAY 90 tablet 0     No current facility-administered medications on file prior to visit.            Past Surgical History:   Procedure Laterality Date    ADENOIDECTOMY      nasal septum repair      Tonsillectomy      TONSILLECTOMY         Family History   Problem Relation Age of Onset    No Known Problems Mother     No Known Problems Father     No Known Problems Sister     No Known Problems Brother     No Known Problems Maternal Aunt     No Known Problems Maternal Uncle     No Known Problems Paternal Aunt     No Known Problems Paternal Uncle     No Known Problems Maternal Grandmother     No Known Problems Maternal Grandfather     No Known Problems Paternal Grandmother     No Known Problems Paternal Grandfather     Cancer Neg Hx         Negative for prostate or colon cancer    Kidney disease Neg Hx     Amblyopia Neg Hx     Blindness Neg Hx     Cataracts Neg Hx     Diabetes Neg Hx     Glaucoma Neg Hx     Hypertension Neg Hx     Macular degeneration Neg Hx     Retinal detachment Neg Hx     Strabismus Neg Hx     Stroke Neg Hx     Thyroid disease Neg Hx        Social History     Socioeconomic History    Marital status:      Spouse name: Not on file    Number of children: Not on file     Years of education: Not on file    Highest education level: Not on file   Occupational History     Employer: [employer]   Social Needs    Financial resource strain: Not on file    Food insecurity:     Worry: Not on file     Inability: Not on file    Transportation needs:     Medical: Not on file     Non-medical: Not on file   Tobacco Use    Smoking status: Never Smoker    Smokeless tobacco: Never Used    Tobacco comment: .  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     Substance and Sexual Activity    Alcohol use: No    Drug use: No    Sexual activity: Yes     Partners: Female   Lifestyle    Physical activity:     Days per week: Not on file     Minutes per session: Not on file    Stress: Not on file   Relationships    Social connections:     Talks on phone: Not on file     Gets together: Not on file     Attends Jew service: Not on file     Active member of club or organization: Not on file     Attends meetings of clubs or organizations: Not on file     Relationship status: Not on file   Other Topics Concern    Not on file   Social History Narrative    Not on file             Allergies   Review of patient's allergies indicates:   Allergen Reactions    Iodine and iodide containing products Hives     Hypotension, Flushing             Review of Systems  See HPI      Physical Exam  There were no vitals taken for this visit.

## 2020-05-06 PROBLEM — D63.8 ANEMIA OF CHRONIC DISEASE: Status: ACTIVE | Noted: 2020-05-06

## 2020-05-06 RX ORDER — COLCHICINE 0.6 MG/1
TABLET ORAL
Qty: 90 TABLET | Refills: 3 | Status: ON HOLD | OUTPATIENT
Start: 2020-05-06 | End: 2022-05-03 | Stop reason: SDUPTHER

## 2020-05-06 RX ORDER — AMLODIPINE BESYLATE 10 MG/1
10 TABLET ORAL DAILY
Qty: 90 TABLET | Refills: 0 | Status: SHIPPED | OUTPATIENT
Start: 2020-05-06 | End: 2020-07-30

## 2020-05-06 RX ORDER — METOPROLOL SUCCINATE 25 MG/1
25 TABLET, EXTENDED RELEASE ORAL DAILY
Qty: 90 TABLET | Refills: 0 | Status: SHIPPED | OUTPATIENT
Start: 2020-05-06 | End: 2020-07-30

## 2020-08-18 ENCOUNTER — PATIENT OUTREACH (OUTPATIENT)
Dept: ADMINISTRATIVE | Facility: HOSPITAL | Age: 65
End: 2020-08-18

## 2020-09-03 ENCOUNTER — PATIENT OUTREACH (OUTPATIENT)
Dept: ADMINISTRATIVE | Facility: HOSPITAL | Age: 65
End: 2020-09-03

## 2020-10-07 ENCOUNTER — TELEPHONE (OUTPATIENT)
Dept: FAMILY MEDICINE | Facility: CLINIC | Age: 65
End: 2020-10-07

## 2020-10-16 ENCOUNTER — TELEPHONE (OUTPATIENT)
Dept: ADMINISTRATIVE | Facility: HOSPITAL | Age: 65
End: 2020-10-16

## 2020-10-19 ENCOUNTER — TELEPHONE (OUTPATIENT)
Dept: ADMINISTRATIVE | Facility: HOSPITAL | Age: 65
End: 2020-10-19

## 2020-10-25 DIAGNOSIS — I10 ESSENTIAL (PRIMARY) HYPERTENSION: ICD-10-CM

## 2020-10-25 DIAGNOSIS — D63.8 ANEMIA OF CHRONIC DISEASE: ICD-10-CM

## 2020-10-25 DIAGNOSIS — Z00.00 ANNUAL PHYSICAL EXAM: Primary | ICD-10-CM

## 2020-10-25 RX ORDER — AMLODIPINE BESYLATE 10 MG/1
10 TABLET ORAL DAILY
Qty: 60 TABLET | Refills: 0 | Status: SHIPPED | OUTPATIENT
Start: 2020-10-25 | End: 2020-12-21

## 2020-10-25 RX ORDER — METOPROLOL SUCCINATE 25 MG/1
25 TABLET, EXTENDED RELEASE ORAL DAILY
Qty: 60 TABLET | Refills: 0 | Status: SHIPPED | OUTPATIENT
Start: 2020-10-25 | End: 2020-12-21

## 2020-10-25 NOTE — TELEPHONE ENCOUNTER
Essential (primary) hypertension  -     amLODIPine (NORVASC) 10 MG tablet; Take 1 tablet (10 mg total) by mouth once daily. Short-term refill - please call to schedule follow up appointment for more refills. Get LABs 1 week before (ordered)  Dispense: 60 tablet; Refill: 0  -     metoprolol succinate (TOPROL-XL) 25 MG 24 hr tablet; Take 1 tablet (25 mg total) by mouth once daily. Short-term refill - please call to schedule follow up appointment for more refills. Get LABs 1 week before (ordered)  Dispense: 60 tablet; Refill: 0

## 2021-01-18 DIAGNOSIS — I10 ESSENTIAL (PRIMARY) HYPERTENSION: ICD-10-CM

## 2021-01-20 RX ORDER — AMLODIPINE BESYLATE 10 MG/1
10 TABLET ORAL DAILY
Qty: 14 TABLET | Refills: 0 | Status: SHIPPED | OUTPATIENT
Start: 2021-01-20 | End: 2021-02-24 | Stop reason: SDUPTHER

## 2021-01-20 RX ORDER — METOPROLOL SUCCINATE 25 MG/1
25 TABLET, EXTENDED RELEASE ORAL DAILY
Qty: 14 TABLET | Refills: 0 | Status: SHIPPED | OUTPATIENT
Start: 2021-01-20 | End: 2021-02-24 | Stop reason: SDUPTHER

## 2021-02-05 ENCOUNTER — PATIENT MESSAGE (OUTPATIENT)
Dept: ADMINISTRATIVE | Facility: HOSPITAL | Age: 66
End: 2021-02-05

## 2021-02-05 ENCOUNTER — PATIENT OUTREACH (OUTPATIENT)
Dept: ADMINISTRATIVE | Facility: HOSPITAL | Age: 66
End: 2021-02-05

## 2021-02-24 ENCOUNTER — OFFICE VISIT (OUTPATIENT)
Dept: FAMILY MEDICINE | Facility: CLINIC | Age: 66
End: 2021-02-24
Payer: COMMERCIAL

## 2021-02-24 VITALS
OXYGEN SATURATION: 99 % | WEIGHT: 237.88 LBS | HEART RATE: 91 BPM | TEMPERATURE: 98 F | DIASTOLIC BLOOD PRESSURE: 84 MMHG | SYSTOLIC BLOOD PRESSURE: 135 MMHG | BODY MASS INDEX: 29.58 KG/M2 | HEIGHT: 75 IN | RESPIRATION RATE: 18 BRPM

## 2021-02-24 DIAGNOSIS — Z00.00 ANNUAL PHYSICAL EXAM: Primary | ICD-10-CM

## 2021-02-24 DIAGNOSIS — I10 ESSENTIAL (PRIMARY) HYPERTENSION: ICD-10-CM

## 2021-02-24 DIAGNOSIS — D63.8 ANEMIA OF CHRONIC DISEASE: ICD-10-CM

## 2021-02-24 DIAGNOSIS — N18.4 CKD (CHRONIC KIDNEY DISEASE) STAGE 4, GFR 15-29 ML/MIN: ICD-10-CM

## 2021-02-24 DIAGNOSIS — E78.2 MIXED HYPERLIPIDEMIA: ICD-10-CM

## 2021-02-24 DIAGNOSIS — E08.21 DIABETES MELLITUS DUE TO UNDERLYING CONDITION WITH DIABETIC NEPHROPATHY, WITHOUT LONG-TERM CURRENT USE OF INSULIN: ICD-10-CM

## 2021-02-24 DIAGNOSIS — Z12.11 COLON CANCER SCREENING: ICD-10-CM

## 2021-02-24 DIAGNOSIS — M10.9 GOUT, UNSPECIFIED CAUSE, UNSPECIFIED CHRONICITY, UNSPECIFIED SITE: ICD-10-CM

## 2021-02-24 DIAGNOSIS — E66.3 OVERWEIGHT (BMI 25.0-29.9): ICD-10-CM

## 2021-02-24 PROCEDURE — 3008F BODY MASS INDEX DOCD: CPT | Mod: CPTII,S$GLB,, | Performed by: FAMILY MEDICINE

## 2021-02-24 PROCEDURE — 1126F AMNT PAIN NOTED NONE PRSNT: CPT | Mod: S$GLB,,, | Performed by: FAMILY MEDICINE

## 2021-02-24 PROCEDURE — 1126F PR PAIN SEVERITY QUANTIFIED, NO PAIN PRESENT: ICD-10-PCS | Mod: S$GLB,,, | Performed by: FAMILY MEDICINE

## 2021-02-24 PROCEDURE — 99396 PR PREVENTIVE VISIT,EST,40-64: ICD-10-PCS | Mod: S$GLB,,, | Performed by: FAMILY MEDICINE

## 2021-02-24 PROCEDURE — 99999 PR PBB SHADOW E&M-EST. PATIENT-LVL III: ICD-10-PCS | Mod: PBBFAC,,, | Performed by: FAMILY MEDICINE

## 2021-02-24 PROCEDURE — 99999 PR PBB SHADOW E&M-EST. PATIENT-LVL III: CPT | Mod: PBBFAC,,, | Performed by: FAMILY MEDICINE

## 2021-02-24 PROCEDURE — 3080F PR MOST RECENT DIASTOLIC BLOOD PRESSURE >= 90 MM HG: ICD-10-PCS | Mod: CPTII,S$GLB,, | Performed by: FAMILY MEDICINE

## 2021-02-24 PROCEDURE — 3077F PR MOST RECENT SYSTOLIC BLOOD PRESSURE >= 140 MM HG: ICD-10-PCS | Mod: CPTII,S$GLB,, | Performed by: FAMILY MEDICINE

## 2021-02-24 PROCEDURE — 3080F DIAST BP >= 90 MM HG: CPT | Mod: CPTII,S$GLB,, | Performed by: FAMILY MEDICINE

## 2021-02-24 PROCEDURE — 3077F SYST BP >= 140 MM HG: CPT | Mod: CPTII,S$GLB,, | Performed by: FAMILY MEDICINE

## 2021-02-24 PROCEDURE — 99396 PREV VISIT EST AGE 40-64: CPT | Mod: S$GLB,,, | Performed by: FAMILY MEDICINE

## 2021-02-24 PROCEDURE — 3008F PR BODY MASS INDEX (BMI) DOCUMENTED: ICD-10-PCS | Mod: CPTII,S$GLB,, | Performed by: FAMILY MEDICINE

## 2021-02-24 RX ORDER — METOPROLOL SUCCINATE 25 MG/1
25 TABLET, EXTENDED RELEASE ORAL DAILY
Qty: 90 TABLET | Refills: 1 | Status: SHIPPED | OUTPATIENT
Start: 2021-02-24 | End: 2021-08-16

## 2021-02-24 RX ORDER — FLUTICASONE PROPIONATE 50 MCG
2 SPRAY, SUSPENSION (ML) NASAL DAILY
Qty: 16 G | Refills: 11 | Status: SHIPPED | OUTPATIENT
Start: 2021-02-24 | End: 2022-12-07 | Stop reason: SDUPTHER

## 2021-02-24 RX ORDER — ATORVASTATIN CALCIUM 20 MG/1
20 TABLET, FILM COATED ORAL NIGHTLY
Qty: 90 TABLET | Refills: 3 | Status: SHIPPED | OUTPATIENT
Start: 2021-02-24 | End: 2021-08-16

## 2021-02-24 RX ORDER — GLIPIZIDE 10 MG/1
10 TABLET ORAL 2 TIMES DAILY WITH MEALS
Qty: 180 TABLET | Refills: 1 | Status: SHIPPED | OUTPATIENT
Start: 2021-02-24 | End: 2021-09-08 | Stop reason: SDUPTHER

## 2021-02-24 RX ORDER — AMLODIPINE BESYLATE 10 MG/1
10 TABLET ORAL DAILY
Qty: 90 TABLET | Refills: 1 | Status: SHIPPED | OUTPATIENT
Start: 2021-02-24 | End: 2021-08-16

## 2021-09-08 ENCOUNTER — OFFICE VISIT (OUTPATIENT)
Dept: FAMILY MEDICINE | Facility: CLINIC | Age: 66
End: 2021-09-08
Payer: COMMERCIAL

## 2021-09-08 ENCOUNTER — LAB VISIT (OUTPATIENT)
Dept: LAB | Facility: HOSPITAL | Age: 66
End: 2021-09-08
Attending: FAMILY MEDICINE
Payer: COMMERCIAL

## 2021-09-08 VITALS
WEIGHT: 238.75 LBS | DIASTOLIC BLOOD PRESSURE: 86 MMHG | HEART RATE: 80 BPM | SYSTOLIC BLOOD PRESSURE: 132 MMHG | BODY MASS INDEX: 29.69 KG/M2 | HEIGHT: 75 IN | OXYGEN SATURATION: 95 % | TEMPERATURE: 98 F

## 2021-09-08 DIAGNOSIS — Z00.00 ANNUAL PHYSICAL EXAM: ICD-10-CM

## 2021-09-08 DIAGNOSIS — E78.5 HYPERLIPIDEMIA ASSOCIATED WITH TYPE 2 DIABETES MELLITUS: ICD-10-CM

## 2021-09-08 DIAGNOSIS — E11.69 HYPERLIPIDEMIA ASSOCIATED WITH TYPE 2 DIABETES MELLITUS: ICD-10-CM

## 2021-09-08 DIAGNOSIS — N18.4 CKD (CHRONIC KIDNEY DISEASE) STAGE 4, GFR 15-29 ML/MIN: ICD-10-CM

## 2021-09-08 DIAGNOSIS — E08.21 DIABETES MELLITUS DUE TO UNDERLYING CONDITION WITH DIABETIC NEPHROPATHY, WITHOUT LONG-TERM CURRENT USE OF INSULIN: ICD-10-CM

## 2021-09-08 DIAGNOSIS — Z12.11 COLON CANCER SCREENING: ICD-10-CM

## 2021-09-08 DIAGNOSIS — E66.3 OVERWEIGHT (BMI 25.0-29.9): ICD-10-CM

## 2021-09-08 DIAGNOSIS — D63.8 ANEMIA OF CHRONIC DISEASE: ICD-10-CM

## 2021-09-08 DIAGNOSIS — I10 ESSENTIAL (PRIMARY) HYPERTENSION: ICD-10-CM

## 2021-09-08 DIAGNOSIS — E78.2 MIXED HYPERLIPIDEMIA: ICD-10-CM

## 2021-09-08 DIAGNOSIS — R97.20 ELEVATED PSA: ICD-10-CM

## 2021-09-08 DIAGNOSIS — E08.21 DIABETES MELLITUS DUE TO UNDERLYING CONDITION WITH DIABETIC NEPHROPATHY, WITHOUT LONG-TERM CURRENT USE OF INSULIN: Primary | ICD-10-CM

## 2021-09-08 DIAGNOSIS — M10.9 GOUT, UNSPECIFIED CAUSE, UNSPECIFIED CHRONICITY, UNSPECIFIED SITE: ICD-10-CM

## 2021-09-08 LAB
ALBUMIN SERPL BCP-MCNC: 3.6 G/DL (ref 3.5–5.2)
ALP SERPL-CCNC: 151 U/L (ref 55–135)
ALT SERPL W/O P-5'-P-CCNC: 20 U/L (ref 10–44)
ANION GAP SERPL CALC-SCNC: 10 MMOL/L (ref 8–16)
AST SERPL-CCNC: 18 U/L (ref 10–40)
BILIRUB SERPL-MCNC: 0.3 MG/DL (ref 0.1–1)
BUN SERPL-MCNC: 56 MG/DL (ref 8–23)
CALCIUM SERPL-MCNC: 8.6 MG/DL (ref 8.7–10.5)
CHLORIDE SERPL-SCNC: 113 MMOL/L (ref 95–110)
CHOLEST SERPL-MCNC: 85 MG/DL (ref 120–199)
CHOLEST/HDLC SERPL: 2.5 {RATIO} (ref 2–5)
CO2 SERPL-SCNC: 17 MMOL/L (ref 23–29)
COMPLEXED PSA SERPL-MCNC: 4.9 NG/ML (ref 0–4)
CREAT SERPL-MCNC: 6.4 MG/DL (ref 0.5–1.4)
ERYTHROCYTE [DISTWIDTH] IN BLOOD BY AUTOMATED COUNT: 14.4 % (ref 11.5–14.5)
EST. GFR  (AFRICAN AMERICAN): 9.8 ML/MIN/1.73 M^2
EST. GFR  (NON AFRICAN AMERICAN): 8.5 ML/MIN/1.73 M^2
ESTIMATED AVG GLUCOSE: 100 MG/DL (ref 68–131)
GLUCOSE SERPL-MCNC: 109 MG/DL (ref 70–110)
HBA1C MFR BLD: 5.1 % (ref 4–5.6)
HCT VFR BLD AUTO: 34.6 % (ref 40–54)
HDLC SERPL-MCNC: 34 MG/DL (ref 40–75)
HDLC SERPL: 40 % (ref 20–50)
HGB BLD-MCNC: 11.1 G/DL (ref 14–18)
LDLC SERPL CALC-MCNC: 25 MG/DL (ref 63–159)
MCH RBC QN AUTO: 27.5 PG (ref 27–31)
MCHC RBC AUTO-ENTMCNC: 32.1 G/DL (ref 32–36)
MCV RBC AUTO: 86 FL (ref 82–98)
NONHDLC SERPL-MCNC: 51 MG/DL
PLATELET # BLD AUTO: 260 K/UL (ref 150–450)
PMV BLD AUTO: 11.4 FL (ref 9.2–12.9)
POTASSIUM SERPL-SCNC: 5.1 MMOL/L (ref 3.5–5.1)
PROT SERPL-MCNC: 7.4 G/DL (ref 6–8.4)
RBC # BLD AUTO: 4.03 M/UL (ref 4.6–6.2)
SODIUM SERPL-SCNC: 140 MMOL/L (ref 136–145)
TRIGL SERPL-MCNC: 130 MG/DL (ref 30–150)
WBC # BLD AUTO: 4.47 K/UL (ref 3.9–12.7)

## 2021-09-08 PROCEDURE — 99214 PR OFFICE/OUTPT VISIT, EST, LEVL IV, 30-39 MIN: ICD-10-PCS | Mod: S$GLB,,, | Performed by: FAMILY MEDICINE

## 2021-09-08 PROCEDURE — 1160F PR REVIEW ALL MEDS BY PRESCRIBER/CLIN PHARMACIST DOCUMENTED: ICD-10-PCS | Mod: CPTII,S$GLB,, | Performed by: FAMILY MEDICINE

## 2021-09-08 PROCEDURE — 3075F PR MOST RECENT SYSTOLIC BLOOD PRESS GE 130-139MM HG: ICD-10-PCS | Mod: CPTII,S$GLB,, | Performed by: FAMILY MEDICINE

## 2021-09-08 PROCEDURE — 36415 COLL VENOUS BLD VENIPUNCTURE: CPT | Mod: PO | Performed by: FAMILY MEDICINE

## 2021-09-08 PROCEDURE — 3008F PR BODY MASS INDEX (BMI) DOCUMENTED: ICD-10-PCS | Mod: CPTII,S$GLB,, | Performed by: FAMILY MEDICINE

## 2021-09-08 PROCEDURE — 80053 COMPREHEN METABOLIC PANEL: CPT | Performed by: FAMILY MEDICINE

## 2021-09-08 PROCEDURE — 1160F RVW MEDS BY RX/DR IN RCRD: CPT | Mod: CPTII,S$GLB,, | Performed by: FAMILY MEDICINE

## 2021-09-08 PROCEDURE — 3079F DIAST BP 80-89 MM HG: CPT | Mod: CPTII,S$GLB,, | Performed by: FAMILY MEDICINE

## 2021-09-08 PROCEDURE — 1159F PR MEDICATION LIST DOCUMENTED IN MEDICAL RECORD: ICD-10-PCS | Mod: CPTII,S$GLB,, | Performed by: FAMILY MEDICINE

## 2021-09-08 PROCEDURE — 84153 ASSAY OF PSA TOTAL: CPT | Performed by: FAMILY MEDICINE

## 2021-09-08 PROCEDURE — 99999 PR PBB SHADOW E&M-EST. PATIENT-LVL III: ICD-10-PCS | Mod: PBBFAC,,, | Performed by: FAMILY MEDICINE

## 2021-09-08 PROCEDURE — 80061 LIPID PANEL: CPT | Performed by: FAMILY MEDICINE

## 2021-09-08 PROCEDURE — 85027 COMPLETE CBC AUTOMATED: CPT | Performed by: FAMILY MEDICINE

## 2021-09-08 PROCEDURE — 3079F PR MOST RECENT DIASTOLIC BLOOD PRESSURE 80-89 MM HG: ICD-10-PCS | Mod: CPTII,S$GLB,, | Performed by: FAMILY MEDICINE

## 2021-09-08 PROCEDURE — 99999 PR PBB SHADOW E&M-EST. PATIENT-LVL III: CPT | Mod: PBBFAC,,, | Performed by: FAMILY MEDICINE

## 2021-09-08 PROCEDURE — 1159F MED LIST DOCD IN RCRD: CPT | Mod: CPTII,S$GLB,, | Performed by: FAMILY MEDICINE

## 2021-09-08 PROCEDURE — 99214 OFFICE O/P EST MOD 30 MIN: CPT | Mod: S$GLB,,, | Performed by: FAMILY MEDICINE

## 2021-09-08 PROCEDURE — 83036 HEMOGLOBIN GLYCOSYLATED A1C: CPT | Performed by: FAMILY MEDICINE

## 2021-09-08 PROCEDURE — 3075F SYST BP GE 130 - 139MM HG: CPT | Mod: CPTII,S$GLB,, | Performed by: FAMILY MEDICINE

## 2021-09-08 PROCEDURE — 3008F BODY MASS INDEX DOCD: CPT | Mod: CPTII,S$GLB,, | Performed by: FAMILY MEDICINE

## 2021-09-08 RX ORDER — AMLODIPINE BESYLATE 10 MG/1
TABLET ORAL
Qty: 90 TABLET | Refills: 3 | Status: SHIPPED | OUTPATIENT
Start: 2021-09-08 | End: 2021-09-09 | Stop reason: SDUPTHER

## 2021-09-08 RX ORDER — GLIPIZIDE 10 MG/1
10 TABLET ORAL 2 TIMES DAILY WITH MEALS
Qty: 180 TABLET | Refills: 1 | Status: SHIPPED | OUTPATIENT
Start: 2021-09-08 | End: 2021-09-09 | Stop reason: SDUPTHER

## 2021-09-08 RX ORDER — METOPROLOL SUCCINATE 25 MG/1
25 TABLET, EXTENDED RELEASE ORAL DAILY
Qty: 90 TABLET | Refills: 3 | Status: SHIPPED | OUTPATIENT
Start: 2021-09-08 | End: 2021-09-09 | Stop reason: SDUPTHER

## 2021-09-09 ENCOUNTER — TELEPHONE (OUTPATIENT)
Dept: FAMILY MEDICINE | Facility: CLINIC | Age: 66
End: 2021-09-09

## 2021-09-09 RX ORDER — GLIPIZIDE 10 MG/1
10 TABLET ORAL 2 TIMES DAILY WITH MEALS
Qty: 180 TABLET | Refills: 1 | Status: SHIPPED | OUTPATIENT
Start: 2021-09-09 | End: 2022-03-03

## 2021-09-09 RX ORDER — AMLODIPINE BESYLATE 10 MG/1
TABLET ORAL
Qty: 90 TABLET | Refills: 3 | Status: SHIPPED | OUTPATIENT
Start: 2021-09-09 | End: 2022-05-10

## 2021-09-09 RX ORDER — METOPROLOL SUCCINATE 25 MG/1
25 TABLET, EXTENDED RELEASE ORAL DAILY
Qty: 90 TABLET | Refills: 3 | Status: SHIPPED | OUTPATIENT
Start: 2021-09-09 | End: 2022-05-10

## 2021-09-15 ENCOUNTER — TELEPHONE (OUTPATIENT)
Dept: FAMILY MEDICINE | Facility: CLINIC | Age: 66
End: 2021-09-15

## 2021-09-21 ENCOUNTER — PATIENT MESSAGE (OUTPATIENT)
Dept: ADMINISTRATIVE | Facility: HOSPITAL | Age: 66
End: 2021-09-21

## 2021-09-21 ENCOUNTER — PATIENT OUTREACH (OUTPATIENT)
Dept: ADMINISTRATIVE | Facility: HOSPITAL | Age: 66
End: 2021-09-21

## 2022-01-14 ENCOUNTER — OFFICE VISIT (OUTPATIENT)
Dept: URGENT CARE | Facility: CLINIC | Age: 67
End: 2022-01-14
Payer: COMMERCIAL

## 2022-01-14 VITALS
TEMPERATURE: 98 F | SYSTOLIC BLOOD PRESSURE: 120 MMHG | OXYGEN SATURATION: 96 % | DIASTOLIC BLOOD PRESSURE: 80 MMHG | BODY MASS INDEX: 29.59 KG/M2 | WEIGHT: 238 LBS | HEIGHT: 75 IN | RESPIRATION RATE: 16 BRPM | HEART RATE: 102 BPM

## 2022-01-14 DIAGNOSIS — U07.1 COVID-19 VIRUS INFECTION: Primary | ICD-10-CM

## 2022-01-14 DIAGNOSIS — R05.8 COUGH WITH SPUTUM: ICD-10-CM

## 2022-01-14 DIAGNOSIS — Z71.89 EDUCATED ABOUT COVID-19 VIRUS INFECTION: ICD-10-CM

## 2022-01-14 DIAGNOSIS — R09.81 CONGESTION OF PARANASAL SINUS: ICD-10-CM

## 2022-01-14 LAB
CTP QC/QA: YES
SARS-COV-2 RDRP RESP QL NAA+PROBE: POSITIVE

## 2022-01-14 PROCEDURE — U0002 COVID-19 LAB TEST NON-CDC: HCPCS | Mod: QW,S$GLB,, | Performed by: PHYSICIAN ASSISTANT

## 2022-01-14 PROCEDURE — U0002: ICD-10-PCS | Mod: QW,S$GLB,, | Performed by: PHYSICIAN ASSISTANT

## 2022-01-14 PROCEDURE — 99203 PR OFFICE/OUTPT VISIT, NEW, LEVL III, 30-44 MIN: ICD-10-PCS | Mod: S$GLB,,, | Performed by: PHYSICIAN ASSISTANT

## 2022-01-14 PROCEDURE — 3008F PR BODY MASS INDEX (BMI) DOCUMENTED: ICD-10-PCS | Mod: CPTII,S$GLB,, | Performed by: PHYSICIAN ASSISTANT

## 2022-01-14 PROCEDURE — 99203 OFFICE O/P NEW LOW 30 MIN: CPT | Mod: S$GLB,,, | Performed by: PHYSICIAN ASSISTANT

## 2022-01-14 PROCEDURE — 1159F PR MEDICATION LIST DOCUMENTED IN MEDICAL RECORD: ICD-10-PCS | Mod: CPTII,S$GLB,, | Performed by: PHYSICIAN ASSISTANT

## 2022-01-14 PROCEDURE — 1159F MED LIST DOCD IN RCRD: CPT | Mod: CPTII,S$GLB,, | Performed by: PHYSICIAN ASSISTANT

## 2022-01-14 PROCEDURE — 3079F DIAST BP 80-89 MM HG: CPT | Mod: CPTII,S$GLB,, | Performed by: PHYSICIAN ASSISTANT

## 2022-01-14 PROCEDURE — 3079F PR MOST RECENT DIASTOLIC BLOOD PRESSURE 80-89 MM HG: ICD-10-PCS | Mod: CPTII,S$GLB,, | Performed by: PHYSICIAN ASSISTANT

## 2022-01-14 PROCEDURE — 3008F BODY MASS INDEX DOCD: CPT | Mod: CPTII,S$GLB,, | Performed by: PHYSICIAN ASSISTANT

## 2022-01-14 PROCEDURE — 3074F SYST BP LT 130 MM HG: CPT | Mod: CPTII,S$GLB,, | Performed by: PHYSICIAN ASSISTANT

## 2022-01-14 PROCEDURE — 3074F PR MOST RECENT SYSTOLIC BLOOD PRESSURE < 130 MM HG: ICD-10-PCS | Mod: CPTII,S$GLB,, | Performed by: PHYSICIAN ASSISTANT

## 2022-01-14 RX ORDER — PROMETHAZINE HYDROCHLORIDE AND DEXTROMETHORPHAN HYDROBROMIDE 6.25; 15 MG/5ML; MG/5ML
5 SYRUP ORAL NIGHTLY PRN
Qty: 118 ML | Refills: 0 | Status: SHIPPED | OUTPATIENT
Start: 2022-01-14 | End: 2022-01-24

## 2022-01-14 RX ORDER — BENZONATATE 200 MG/1
200 CAPSULE ORAL 3 TIMES DAILY PRN
Qty: 30 CAPSULE | Refills: 0 | Status: SHIPPED | OUTPATIENT
Start: 2022-01-14 | End: 2022-01-24

## 2022-01-14 NOTE — LETTER
[address], [address] ? [place] [address] ? Phone [phone] ? Fax [phone]           Return to Work/School    Patient: Elbert Still Jr.  YOB: 1955   Date: 01/14/2022      To Whom It May Concern:     Elbert Still Jr. was in contact with/seen in my office on 01/14/2022. COVID-19 is present in our communities across the [facility]. Not all patients are eligible or appropriate to be tested. In this situation, your employee meets the following criteria:     Elbert Still Jr. has met the criteria for COVID-19 testing and has a POSITIVE result.     They can return to work/school once they are asymptomatic for 24 hours without the use of fever reducing medications AND at least 5 days from the  first positive result.  MAY COME OUT OF QUARANTINE ON DAY#6 DATE** 01/20/2022 BUT CONTINUE STRICT MASKS FOR ANOTHER 5 DAYS. QUARANTINE START DATE** 01/14/2022     After 5 days, if your symptoms have improved and you have not had fever on day 5, you can return to the community on day 6- NO TESTING REQUIRED!      In fact, we do not retest if you were positive in the last 90 days.        If you have any questions or concerns, or if I can be of further assistance, please do not hesitate to contact me.     Sincerely,          [facility]

## 2022-01-14 NOTE — PATIENT INSTRUCTIONS
PLEASE READ YOUR DISCHARGE INSTRUCTIONS ENTIRELY AS IT CONTAINS IMPORTANT INFORMATION.    Patient had covid testing done today.  Discussed corona virus precautions and reviewed CDC FAC; printed a copy for patient.  I discussed to continue to monitor their symptoms. Discussed that if their symptoms persist or worsen to seek re-evaluation. Clinic vs. ER precautions were given.  Patient verbalized understanding and agreed with the entire plan of care.        If you tested positive and have symptoms, you must isolate for 5 days starting today OR day of the positive test. After 5 days (ON DAY #6), if your symptoms have improved and you have not had fever on day 5, you can return to the community on day #6- NO TESTING REQUIRED to return to community! If no fever without fever reducing meds for 24 hours, before coming out of quarantine. After your 5 days of isolation are completed, the CDC recommends strict mask use for the first 5 days (day #6-10) that you come out of isolation.  MAY COME OUT OF QUARANTINE ON DAY#6 DATE** 01/20/2022 BUT CONTINUE STRICT MASKS FOR ANOTHER 5 DAYS. QUARANTINE START DATE** 01/14/2022    This is the most important part, both the CDC and the [facility] emphasize that you do not test out of isolation. In fact, we do not retest if you were positive in the last 90 days.           - Reviewed radiographs and all diagnostic testing with patient/family.    - Rest.  Drink plenty of fluids.    - Tylenol OR anti-inflammatory (NSAIDs, ibuprofen, aleve, motrin) as directed as needed for fever/pain.  For Tylenol, do not exceed 3000 mg/ day. If no contraindication or allergies.  - do not operate machinery when taking cough syrup or pain meds as they may cause drowsiness.  - take Tessalon as needed for cough suppression. Take cough syrup as needed for cough during at night.       -Below are suggestions for symptomatic relief:              -Salt water gargles to soothe throat pain.              -Chloroseptic spray  also helps to numb throat pain.              -Nasal saline spray reduces inflammation and dryness.              -Warm face compresses to help with facial sinus pain/pressure.              -Vicks vapor rub at night.               -Flonase OTC or Nasacort OTC  once a day for nasal/sinus congestion. DON'T USE IF YOU HAVE GLAUCOMA. CHECK WITH YOUR PHARMACIST/PHYSICIAN.              -Simple foods like chicken noodle soup.              -Mucinex DM (ANY COUGH EXPECTORANT) for cough or chest congestion with mucus during the day time. Delsym or robitussin (ANY COUGH SUPPRESSANT) helps with coughing at night. Mucinex-D if you have chest congestion or sputum (caution if history of high blood pressure or palpitations).              -Zyrtec/Claritin/xyzal during the day time  & Benadryl at night (only if severe runny nose) may help with allergies and runny nose. Add decongestant if you have nasal/sinus congestion/sinus pressure/ear fullness sensation. (see below)              -may take OTC meclizine as needed for dizziness or nausea.     Caution with use of Decongestant meds:  -If you DO NOT have Hypertension or any history of palpitations, it is ok to take over the counter Sudafed or Mucinex D or Allegra-D or Claritin-D or Zyrtec-D.  -If you do take one of the above, it is ok to combine that with plain over the counter Mucinex or Allegra or Claritin or Zyrtec. If, for example, you are taking Zyrtec -D, you can combine that with Mucinex, but not Mucinex-D.  If you are taking Mucinex-D, you can combine that with plain Allegra or Claritin or Zyrtec.     -Do not combine pseudophed or phenylephrine with any other brand allergy-D for DECONGESTANT.   -Or vice versa, you can you take plain allergy medications (allegra/claritin/zyrtec with NO Decongestant) and ADD OTC pseudophed or phenelyphrine 2-3 times a day. Avoid taking decongestant late at night or with caffeine as it can keep you up or cause jittery feeling.    -If you DO have  Hypertension , anxiety, or palpitations, it is safe to take Coricidin HBP for relief of sinus symptoms.      For your GI symptoms:  -Use gatorade/pedialyte or rehydration packets to help stay hydrated. Vitamin water and plain water do not contain rehydrating electrolytes.  -Increase clear liquids (water, gatorade, pedialyte, broths, jello, etc) Hold off on solids for 12-18 hours. Then advance to BRAT diet (banana, rice, applesauce, tea, toast/crackers), then advance further as tolerated. Avoid spicy or fatty foods.   -May take Emitrol OTC as needed for nausea.   -Use Peptobismol or Immodium to help alleviate your diarrhea symptoms.   -Take mylanta or simethicone for bloating or gas pain.   -Take pepcid or omeprazole if you have heartburn or reflux sensation.  -Avoid imodium unless you have more than 6 loose stools in 24 hours. Take 1 dose and monitor to see if you can repeat AS IT WILL CAUSE CONSTIPATION.  -Wash hands frequently while sick. Avoid ibuprofen or other NSAIDS until you are well.   -Please go to the ER if you experience worsening abdominal pain, blood in your vomit or stool, high fever, dizziness, fainting, swelling of your abdomen, inability to pass gas or stool, or inability to urinate.         -You must understand that you've received an Urgent Care treatment only and that you may be released before all your medical problems are known or treated. You, the patient, will arrange for follow up care as instructed. Please arrange follow up with your primary medical clinic within 2-5 days if your signs and symptoms have not resolved or worsen.     - Follow up with your PCP or specialty clinic as directed.  You can call [phone] or [phone] to schedule an appointment with the appropriate provider.  Schedule [facility] is open Mon-Friday 8-5pm (excluded holidays).    - If your condition worsens or fails to improve we recommend that you receive another evaluation at the emergency room immediately or  contact your primary medical clinic to discuss your concerns.            Prevention steps for patients with confirmed or suspected COVID-19   Stay home and stay away from family members and friends. The CDC says, you can leave home after these three things have happened: 1) You have had no fever for at least 24 hours (that is one full day of no fever without the use of medicine that reduces fevers) 2) AND other symptoms have improved (for example, when your cough or shortness of breath have improved) 3) AND at least 10 days have passed since your symptoms first appeared OR after 7-10 days passed from first positive test.   Separate yourself from other people and animals in your home.   Call ahead before visiting your doctor.   Wear a facemask.   Cover your coughs and sneezes.   Wash your hands often with soap and water; hand  can be used, too.   Avoid sharing personal household items.   Wipe down surfaces used daily.   Monitor your symptoms. Seek prompt medical attention if your illness is worsening (e.g., difficulty breathing).    Before seeking care, call your healthcare provider.   If you have a medical emergency and need to call 911, notify the dispatch personnel that you have, or are being evaluated for COVID-19. If possible, put on a facemask before emergency medical services arrive.        Recommended precautions for household members, intimate partners, and caregivers in a home setting of a patient with symptomatic laboratory-confirmed COVID-19 or a patient under investigation.  Household members, intimate partners, and caregivers in the home setting awaiting tests results have close contact with a person with symptomatic, laboratory-confirmed COVID-19 or a person under investigation. Close contacts should monitor their health; they should call their provider right away if they develop symptoms suggestive of COVID-19 (e.g., fever, cough, shortness of breath).    Close contacts should also  follow these recommendations:   Make sure that you understand and can help the patient follow their provider's instructions for medication(s) and care. You should help the patient with basic needs in the home and provide support for getting groceries, prescriptions, and other personal needs.   Monitor the patient's symptoms. If the patient is getting sicker, call his or her healthcare provider and tell them that the patient has laboratory-confirmed COVID-19. If the patient has a medical emergency and you need to call 911, notify the dispatch personnel that the patient has, or is being evaluated for COVID-19.   Household members should stay in another room or be  from the patient. Household members should use a separate bedroom and bathroom, if available.   Prohibit visitors.   Household members should care for any pets in the home.   Make sure that shared spaces in the home have good air flow, such as by an air conditioner or an opened window, weather permitting.   Perform hand hygiene frequently. Wash your hands often with soap and water for at least 20 seconds or use an alcohol-based hand  (that contains > 60% alcohol) covering all surfaces of your hands and rubbing them together until they feel dry. Soap and water should be used preferentially.   Avoid touching your eyes, nose, and mouth.   The patient should wear a facemask. If the patient is not able to wear a facemask (for example, because it causes trouble breathing), caregivers should wear a mask when they are in the same room as the patient.   Wear a disposable facemask and gloves when you touch or have contact with the patient's blood, stool, or body fluids, such as saliva, sputum, nasal mucus, vomit, urine.  o Throw out disposable facemasks and gloves after using them. Do not reuse.  o When removing personal protective equipment, first remove and dispose of gloves. Then, immediately clean your hands with soap and water or  alcohol-based hand . Next, remove and dispose of facemask, and immediately clean your hands again with soap and water or alcohol-based hand .   You should not share dishes, drinking glasses, cups, eating utensils, towels, bedding, or other items with the patient. After the patient uses these items, you should wash them thoroughly (see below Wash laundry thoroughly).   Clean all high-touch surfaces, such as counters, tabletops, doorknobs, bathroom fixtures, toilets, phones, keyboards, tablets, and bedside tables, every day. Also, clean any surfaces that may have blood, stool, or body fluids on them.   Use a household cleaning spray or wipe, according to the label instructions. Labels contain instructions for safe and effective use of the cleaning product including precautions you should take when applying the product, such as wearing gloves and making sure you have good ventilation during use of the product.   Wash laundry thoroughly.  o Immediately remove and wash clothes or bedding that have blood, stool, or body fluids on them.  o Wear disposable gloves while handling soiled items and keep soiled items away from your body. Clean your hands (with soap and water or an alcohol-based hand ) immediately after removing your gloves.  o Read and follow directions on labels of laundry or clothing items and detergent. In general, using a normal laundry detergent according to washing machine instructions and dry thoroughly using the warmest temperatures recommended on the clothing label.   Place all used disposable gloves, facemasks, and other contaminated items in a lined container before disposing of them with other household waste. Clean your hands (with soap and water or an alcohol-based hand ) immediately after handling these items. Soap and water should be used preferentially if hands are visibly dirty.   Discuss any additional questions with your state or local health  department or healthcare provider. Check available hours when contacting your local health department.    For more information see CDC link below.      https://www.cdc.gov/coronavirus/2019-ncov/hcp/guidance-prevent-spread.html#precautions        Sources:  [facility], [place] [facility]          Instructions for Home Care of Patients and Caretakers with Coronavirus Disease 2019   Limit visitors to the home.  Older persons and those that have chronic medical conditions such as diabetes, lung and heart disease are at increased risk for illness.    If possible, patients should use a separate bedroom while recovering. Caregivers and household members should avoid prolonged contact with the patient which means to stay 6 feet away and avoid contact with cough droplets.  When close contact is necessary, wash your hands before and immediately after contact.    Perform hand hygiene frequently. Wash your hands often with soap and water for at least 20 seconds or use an alcohol-based hand , covering all surfaces of your hands and rubbing them together until they feel dry.    Avoid touching your eyes, nose, and mouth with unwashed hands.   Avoid sharing household items with the patient. You should not share dishes, drinking glasses, cups, eating utensils, towels, bedding, or other items. After the patient uses these items, you should wash them thoroughly.   Wash laundry thoroughly.   o Immediately remove and wash clothes or bedding that have blood, stool, or body fluids on them.   Clean all high-touch surfaces, such as counters, tabletops, doorknobs, bathroom fixtures, toilets, phones, keyboards, tablets, and bedside tables, every day.   o Use a household cleaning spray or wipe, according to the label instructions. Labels contain instructions for safe and effective use of the cleaning product including precautions you should take when applying the product, such as wearing gloves and making  sure you have good ventilation during use of the product.    For more information see CDC link below.      https://www.cdc.gov/coronavirus/2019-ncov/hcp/guidance-prevent-spread.html#precautions               If your symptoms worsen or if you have any other concerns, please contact [facility] at [phone].

## 2022-01-14 NOTE — PROGRESS NOTES
"Subjective:       Patient ID: Elbert Still Jr. is a 66 y.o. male.    Vitals:  height is 6' 3" (1.905 m) and weight is 108 kg (238 lb). His temperature is 97.8 °F (36.6 °C). His blood pressure is 120/80 and his pulse is 102. His respiration is 16 and oxygen saturation is 96%.     Chief Complaint: Cough     66-year-old male with history of previous COVID-19 infection 2021, hypertension and CKD disease who presents urgent care clinic for evaluation.  Pt states that he is having a white sputum with cough , headache , runny nose and congestion that started on 1/10/22. Pt is taking vicks cold meds and tylenol with good relief.  Initially symptoms were significantly worse but has gradually improved since starting medication.  No other associated symptoms.  Not vaccinated for COVID-19.    Cough  This is a new problem. The current episode started in the past 7 days. The problem has been unchanged. The problem occurs constantly. The cough is productive of sputum. Associated symptoms include headaches, nasal congestion and rhinorrhea. Pertinent negatives include no chest pain, chills, ear pain, eye redness, fever, heartburn, myalgias, postnasal drip, rash, sore throat, shortness of breath or wheezing. He has tried OTC cough suppressant for the symptoms. The treatment provided no relief.       Constitution: Negative for activity change, chills, sweating, fatigue, fever and generalized weakness.   HENT: Positive for congestion. Negative for ear pain, hearing loss, facial swelling, postnasal drip, sinus pain, sinus pressure, sore throat, trouble swallowing and voice change.    Neck: Negative for neck pain, neck stiffness and painful lymph nodes.   Cardiovascular: Negative for chest pain, leg swelling, palpitations, sob on exertion and passing out.   Eyes: Negative for eye discharge, eye pain, eye redness, photophobia, vision loss, double vision, blurred vision and eyelid swelling.   Respiratory: Positive for cough and sputum " production. Negative for chest tightness, COPD, shortness of breath, wheezing and asthma.    Gastrointestinal: Negative for abdominal pain, nausea, vomiting, diarrhea, bright red blood in stool, dark colored stools, rectal bleeding, heartburn and bowel incontinence.   Genitourinary: Negative for dysuria, frequency, urgency, urine decreased, flank pain, bladder incontinence, hematuria and history of kidney stones.   Musculoskeletal: Negative for trauma, joint pain, joint swelling, abnormal ROM of joint, muscle cramps and muscle ache.   Skin: Negative for color change, pale, rash and wound.   Allergic/Immunologic: Negative for seasonal allergies, asthma and immunocompromised state.   Neurological: Positive for headaches. Negative for dizziness, history of vertigo, light-headedness, passing out, facial drooping, speech difficulty, coordination disturbances, loss of balance, disorientation, altered mental status, loss of consciousness, numbness, tingling and seizures.   Hematologic/Lymphatic: Negative for swollen lymph nodes, easy bruising/bleeding and trouble clotting. Does not bruise/bleed easily.   Psychiatric/Behavioral: Negative for altered mental status and disorientation.       Past Medical History:   Diagnosis Date    Essential (primary) hypertension 9/21/2017       Objective:      Physical Exam   Constitutional: He is oriented to person, place, and time. He appears well-developed and well-nourished. He is cooperative.  Non-toxic appearance. He does not appear ill. No distress.   HENT:   Head: Normocephalic and atraumatic.   Ears:   Right Ear: Hearing, external ear and ear canal normal. No drainage, swelling or tenderness.   Left Ear: Hearing, external ear and ear canal normal. No drainage, swelling or tenderness.   Nose: Nose normal. No rhinorrhea or purulent discharge. Right sinus exhibits no maxillary sinus tenderness and no frontal sinus tenderness. Left sinus exhibits no maxillary sinus tenderness and no  frontal sinus tenderness.   Mouth/Throat: Uvula is midline, oropharynx is clear and moist and mucous membranes are normal. No oral lesions. No trismus in the jaw. No uvula swelling. No oropharyngeal exudate, posterior oropharyngeal edema or posterior oropharyngeal erythema. No tonsillar exudate.   Eyes: Conjunctivae, EOM and lids are normal. Pupils are equal, round, and reactive to light. No visual field deficit is present. Right eye exhibits no discharge. Left eye exhibits no discharge. Right conjunctiva is not injected. Right conjunctiva has no hemorrhage. Left conjunctiva is not injected. Left conjunctiva has no hemorrhage.      extraocular movement intact vision grossly intact gaze aligned appropriately   Neck: Neck supple. No neck rigidity present.   Cardiovascular: Normal rate, regular rhythm, normal heart sounds, intact distal pulses and normal pulses.   No murmur heard.  Pulmonary/Chest: Effort normal and breath sounds normal. No accessory muscle usage or stridor. No respiratory distress. He has no wheezes. He exhibits no tenderness.    Comments: Wet cough on exam    Abdominal: Normal appearance. He exhibits no distension and no mass. Soft. There is no abdominal tenderness. There is no rebound and no guarding.   Musculoskeletal: Normal range of motion.         General: Normal range of motion.      Right lower leg: No edema.      Left lower leg: No edema.      Comments: Moves all extremities with normal tone, strength, and ROM.  Gait normal.   Lymphadenopathy:     He has no cervical adenopathy.   Neurological: no focal deficit. He is alert, oriented to person, place, and time and at baseline. He has normal motor skills, normal sensation and intact cranial nerves. He displays no weakness, facial symmetry, normal reflexes and no dysarthria. No cranial nerve deficit or sensory deficit. He exhibits normal muscle tone. He has a normal Finger-Nose-Finger Test. Coordination: Heel to shin test normal. He shows no  pronator drift. He displays no seizure activity. Gait and coordination normal. Coordination normal. GCS eye subscore is 4. GCS verbal subscore is 5. GCS motor subscore is 6.   Skin: Skin is warm, dry, not diaphoretic and no rash. Capillary refill takes less than 2 seconds.   Psychiatric: He has a normal mood and affect. His speech is normal and behavior is normal. Mood and thought content normal.   Nursing note and vitals reviewed.          Results for orders placed or performed in visit on 01/14/22   POCT COVID-19 Rapid Screening   Result Value Ref Range    POC Rapid COVID Positive (A) Negative     Acceptable Yes        Assessment:       1. COVID-19 virus infection    2. Cough with sputum    3. Congestion of paranasal sinus    4. Educated about COVID-19 virus infection        Nontoxic appearing. Vitals are stable. Patient presents for COVID nasal swab testing. Patient is concerned for possible exposure. Rapid covid positive.   All diagnostic testing personally reviewed and interpreted.   Patient has symptoms at this time which is consistent with above diagnosis.        Patient was recommended OTC treatments for their symptoms. Patient was also prescribed medications for their symptoms.   Patient was also prescribed  COVID monitoring program.  Patient has 3 complication risk (CALCULATED PER EPIC)  so was not referred for EUA antibody infusion.      Patient will need to quarantine for total 5 days and on day 6 May come out quarantine with full masks for an additional 5 days ( for total 10 days from symptoms/covid positive test).      Patient understands that they received an Urgent Care treatment only and that they may be released before all your medical problems are known or treated. Strict ED versus clinic precautions given.  Patient verbalized understanding and agreed with plan of care.    Note dictated with voice recognition software, please excuse any grammatical errors.    Plan:         COVID-19  virus infection  -     COVID-19 Home Symptom Monitoring  - Duration (days): 14    Cough with sputum  -     POCT COVID-19 Rapid Screening  -     benzonatate (TESSALON) 200 MG capsule; Take 1 capsule (200 mg total) by mouth 3 (three) times daily as needed for Cough.  Dispense: 30 capsule; Refill: 0  -     promethazine-dextromethorphan (PROMETHAZINE-DM) 6.25-15 mg/5 mL Syrp; Take 5 mLs by mouth nightly as needed (cough at night). Do not take maximum of 30mLs in 24hrs  Dispense: 118 mL; Refill: 0    Congestion of paranasal sinus    Educated about COVID-19 virus infection              Additional MDM:     Heart Failure Score:   COPD = No    Patient Instructions     PLEASE READ YOUR DISCHARGE INSTRUCTIONS ENTIRELY AS IT CONTAINS IMPORTANT INFORMATION.    Patient had covid testing done today.  Discussed corona virus precautions and reviewed CDC FAC; printed a copy for patient.  I discussed to continue to monitor their symptoms. Discussed that if their symptoms persist or worsen to seek re-evaluation. Clinic vs. ER precautions were given.  Patient verbalized understanding and agreed with the entire plan of care.        If you tested positive and have symptoms, you must isolate for 5 days starting today OR day of the positive test. After 5 days (ON DAY #6), if your symptoms have improved and you have not had fever on day 5, you can return to the community on day #6- NO TESTING REQUIRED to return to community! If no fever without fever reducing meds for 24 hours, before coming out of quarantine. After your 5 days of isolation are completed, the CDC recommends strict mask use for the first 5 days (day #6-10) that you come out of isolation.  MAY COME OUT OF QUARANTINE ON DAY#6 DATE** 01/20/2022 BUT CONTINUE STRICT MASKS FOR ANOTHER 5 DAYS. QUARANTINE START DATE** 01/14/2022    This is the most important part, both the CDC and the [facility] emphasize that you do not test out of isolation. In fact, we do not retest if you were positive  in the last 90 days.           - Reviewed radiographs and all diagnostic testing with patient/family.    - Rest.  Drink plenty of fluids.    - Tylenol OR anti-inflammatory (NSAIDs, ibuprofen, aleve, motrin) as directed as needed for fever/pain.  For Tylenol, do not exceed 3000 mg/ day. If no contraindication or allergies.  - do not operate machinery when taking cough syrup or pain meds as they may cause drowsiness.  - take Tessalon as needed for cough suppression. Take cough syrup as needed for cough during at night.       -Below are suggestions for symptomatic relief:              -Salt water gargles to soothe throat pain.              -Chloroseptic spray also helps to numb throat pain.              -Nasal saline spray reduces inflammation and dryness.              -Warm face compresses to help with facial sinus pain/pressure.              -Vicks vapor rub at night.               -Flonase OTC or Nasacort OTC  once a day for nasal/sinus congestion. DON'T USE IF YOU HAVE GLAUCOMA. CHECK WITH YOUR PHARMACIST/PHYSICIAN.              -Simple foods like chicken noodle soup.              -Mucinex DM (ANY COUGH EXPECTORANT) for cough or chest congestion with mucus during the day time. Delsym or robitussin (ANY COUGH SUPPRESSANT) helps with coughing at night. Mucinex-D if you have chest congestion or sputum (caution if history of high blood pressure or palpitations).              -Zyrtec/Claritin/xyzal during the day time  & Benadryl at night (only if severe runny nose) may help with allergies and runny nose. Add decongestant if you have nasal/sinus congestion/sinus pressure/ear fullness sensation. (see below)              -may take OTC meclizine as needed for dizziness or nausea.     Caution with use of Decongestant meds:  -If you DO NOT have Hypertension or any history of palpitations, it is ok to take over the counter Sudafed or Mucinex D or Allegra-D or Claritin-D or Zyrtec-D.  -If you do take one of the above, it is ok  to combine that with plain over the counter Mucinex or Allegra or Claritin or Zyrtec. If, for example, you are taking Zyrtec -D, you can combine that with Mucinex, but not Mucinex-D.  If you are taking Mucinex-D, you can combine that with plain Allegra or Claritin or Zyrtec.     -Do not combine pseudophed or phenylephrine with any other brand allergy-D for DECONGESTANT.   -Or vice versa, you can you take plain allergy medications (allegra/claritin/zyrtec with NO Decongestant) and ADD OTC pseudophed or phenelyphrine 2-3 times a day. Avoid taking decongestant late at night or with caffeine as it can keep you up or cause jittery feeling.    -If you DO have Hypertension , anxiety, or palpitations, it is safe to take Coricidin HBP for relief of sinus symptoms.      For your GI symptoms:  -Use gatorade/pedialyte or rehydration packets to help stay hydrated. Vitamin water and plain water do not contain rehydrating electrolytes.  -Increase clear liquids (water, gatorade, pedialyte, broths, jello, etc) Hold off on solids for 12-18 hours. Then advance to BRAT diet (banana, rice, applesauce, tea, toast/crackers), then advance further as tolerated. Avoid spicy or fatty foods.   -May take Emitrol OTC as needed for nausea.   -Use Peptobismol or Immodium to help alleviate your diarrhea symptoms.   -Take mylanta or simethicone for bloating or gas pain.   -Take pepcid or omeprazole if you have heartburn or reflux sensation.  -Avoid imodium unless you have more than 6 loose stools in 24 hours. Take 1 dose and monitor to see if you can repeat AS IT WILL CAUSE CONSTIPATION.  -Wash hands frequently while sick. Avoid ibuprofen or other NSAIDS until you are well.   -Please go to the ER if you experience worsening abdominal pain, blood in your vomit or stool, high fever, dizziness, fainting, swelling of your abdomen, inability to pass gas or stool, or inability to urinate.         -You must understand that you've received an Urgent Care  treatment only and that you may be released before all your medical problems are known or treated. You, the patient, will arrange for follow up care as instructed. Please arrange follow up with your primary medical clinic within 2-5 days if your signs and symptoms have not resolved or worsen.     - Follow up with your PCP or specialty clinic as directed.  You can call [phone] or [phone] to schedule an appointment with the appropriate provider.  Schedule [facility] is open Mon-Friday 8-5pm (excluded holidays).    - If your condition worsens or fails to improve we recommend that you receive another evaluation at the emergency room immediately or contact your primary medical clinic to discuss your concerns.            Prevention steps for patients with confirmed or suspected COVID-19   Stay home and stay away from family members and friends. The CDC says, you can leave home after these three things have happened: 1) You have had no fever for at least 24 hours (that is one full day of no fever without the use of medicine that reduces fevers) 2) AND other symptoms have improved (for example, when your cough or shortness of breath have improved) 3) AND at least 10 days have passed since your symptoms first appeared OR after 7-10 days passed from first positive test.   Separate yourself from other people and animals in your home.   Call ahead before visiting your doctor.   Wear a facemask.   Cover your coughs and sneezes.   Wash your hands often with soap and water; hand  can be used, too.   Avoid sharing personal household items.   Wipe down surfaces used daily.   Monitor your symptoms. Seek prompt medical attention if your illness is worsening (e.g., difficulty breathing).    Before seeking care, call your healthcare provider.   If you have a medical emergency and need to call 911, notify the dispatch personnel that you have, or are being evaluated for COVID-19. If possible, put on a facemask  before emergency medical services arrive.        Recommended precautions for household members, intimate partners, and caregivers in a home setting of a patient with symptomatic laboratory-confirmed COVID-19 or a patient under investigation.  Household members, intimate partners, and caregivers in the home setting awaiting tests results have close contact with a person with symptomatic, laboratory-confirmed COVID-19 or a person under investigation. Close contacts should monitor their health; they should call their provider right away if they develop symptoms suggestive of COVID-19 (e.g., fever, cough, shortness of breath).    Close contacts should also follow these recommendations:   Make sure that you understand and can help the patient follow their provider's instructions for medication(s) and care. You should help the patient with basic needs in the home and provide support for getting groceries, prescriptions, and other personal needs.   Monitor the patient's symptoms. If the patient is getting sicker, call his or her healthcare provider and tell them that the patient has laboratory-confirmed COVID-19. If the patient has a medical emergency and you need to call 911, notify the dispatch personnel that the patient has, or is being evaluated for COVID-19.   Household members should stay in another room or be  from the patient. Household members should use a separate bedroom and bathroom, if available.   Prohibit visitors.   Household members should care for any pets in the home.   Make sure that shared spaces in the home have good air flow, such as by an air conditioner or an opened window, weather permitting.   Perform hand hygiene frequently. Wash your hands often with soap and water for at least 20 seconds or use an alcohol-based hand  (that contains > 60% alcohol) covering all surfaces of your hands and rubbing them together until they feel dry. Soap and water should be used  preferentially.   Avoid touching your eyes, nose, and mouth.   The patient should wear a facemask. If the patient is not able to wear a facemask (for example, because it causes trouble breathing), caregivers should wear a mask when they are in the same room as the patient.   Wear a disposable facemask and gloves when you touch or have contact with the patient's blood, stool, or body fluids, such as saliva, sputum, nasal mucus, vomit, urine.  o Throw out disposable facemasks and gloves after using them. Do not reuse.  o When removing personal protective equipment, first remove and dispose of gloves. Then, immediately clean your hands with soap and water or alcohol-based hand . Next, remove and dispose of facemask, and immediately clean your hands again with soap and water or alcohol-based hand .   You should not share dishes, drinking glasses, cups, eating utensils, towels, bedding, or other items with the patient. After the patient uses these items, you should wash them thoroughly (see below Wash laundry thoroughly).   Clean all high-touch surfaces, such as counters, tabletops, doorknobs, bathroom fixtures, toilets, phones, keyboards, tablets, and bedside tables, every day. Also, clean any surfaces that may have blood, stool, or body fluids on them.   Use a household cleaning spray or wipe, according to the label instructions. Labels contain instructions for safe and effective use of the cleaning product including precautions you should take when applying the product, such as wearing gloves and making sure you have good ventilation during use of the product.   Wash laundry thoroughly.  o Immediately remove and wash clothes or bedding that have blood, stool, or body fluids on them.  o Wear disposable gloves while handling soiled items and keep soiled items away from your body. Clean your hands (with soap and water or an alcohol-based hand ) immediately after removing your  gloves.  o Read and follow directions on labels of laundry or clothing items and detergent. In general, using a normal laundry detergent according to washing machine instructions and dry thoroughly using the warmest temperatures recommended on the clothing label.   Place all used disposable gloves, facemasks, and other contaminated items in a lined container before disposing of them with other household waste. Clean your hands (with soap and water or an alcohol-based hand ) immediately after handling these items. Soap and water should be used preferentially if hands are visibly dirty.   Discuss any additional questions with your state or local health department or healthcare provider. Check available hours when contacting your local health department.    For more information see CDC link below.      https://www.cdc.gov/coronavirus/2019-ncov/hcp/guidance-prevent-spread.html#precautions        Sources:  [facility], [place] [facility]          Instructions for Home Care of Patients and Caretakers with Coronavirus Disease 2019   Limit visitors to the home.  Older persons and those that have chronic medical conditions such as diabetes, lung and heart disease are at increased risk for illness.    If possible, patients should use a separate bedroom while recovering. Caregivers and household members should avoid prolonged contact with the patient which means to stay 6 feet away and avoid contact with cough droplets.  When close contact is necessary, wash your hands before and immediately after contact.    Perform hand hygiene frequently. Wash your hands often with soap and water for at least 20 seconds or use an alcohol-based hand , covering all surfaces of your hands and rubbing them together until they feel dry.    Avoid touching your eyes, nose, and mouth with unwashed hands.   Avoid sharing household items with the patient. You should not share dishes, drinking glasses, cups,  eating utensils, towels, bedding, or other items. After the patient uses these items, you should wash them thoroughly.   Wash laundry thoroughly.   o Immediately remove and wash clothes or bedding that have blood, stool, or body fluids on them.   Clean all high-touch surfaces, such as counters, tabletops, doorknobs, bathroom fixtures, toilets, phones, keyboards, tablets, and bedside tables, every day.   o Use a household cleaning spray or wipe, according to the label instructions. Labels contain instructions for safe and effective use of the cleaning product including precautions you should take when applying the product, such as wearing gloves and making sure you have good ventilation during use of the product.    For more information see CDC link below.      https://www.cdc.gov/coronavirus/2019-ncov/hcp/guidance-prevent-spread.html#precautions               If your symptoms worsen or if you have any other concerns, please contact [facility] at [phone].

## 2022-01-21 ENCOUNTER — NURSE TRIAGE (OUTPATIENT)
Dept: ADMINISTRATIVE | Facility: CLINIC | Age: 67
End: 2022-01-21
Payer: COMMERCIAL

## 2022-01-21 ENCOUNTER — TELEPHONE (OUTPATIENT)
Dept: FAMILY MEDICINE | Facility: CLINIC | Age: 67
End: 2022-01-21
Payer: COMMERCIAL

## 2022-01-21 NOTE — TELEPHONE ENCOUNTER
----- Message from Lianne Ag sent at 1/21/2022  2:40 PM CST -----  Regarding: self [phone]  .Type:  Patient Returning Call    Who Called:self     Who Left Message for Patient:  Nurse    Does the patient know what this is regarding? Call back     Would the patient rather a call back or a response via [facility]? Call back     Best Call Back Number: [phone]

## 2022-01-21 NOTE — TELEPHONE ENCOUNTER
Pt told that he cannot go back to work until he's non-symptomatic. At present pt is still having SOB.

## 2022-01-21 NOTE — TELEPHONE ENCOUNTER
[facility] pt wanted to know if it was ok to go back to work. Pt stated he is SOB.      Pt was advised per to go to ED now per triage protocol.  Pt verbalized understanding.    Reason for Disposition   MODERATE difficulty breathing (e.g., speaks in phrases, SOB even at rest, pulse 100-120) of new-onset or worse than normal    Additional Information   Negative: SEVERE difficulty breathing (e.g., struggling for each breath, speaks in single words, pulse > 120)   Negative: Breathing stopped and hasn't returned   Negative: Choking on something   Negative: Bluish (or gray) lips or face   Negative: Difficult to awaken or acting confused (e.g., disoriented, slurred speech)   Negative: Passed out (i.e., fainted, collapsed and was not responding)   Negative: Wheezing started suddenly after medicine, an allergic food, or bee sting   Negative: Stridor   Negative: Slow, shallow and weak breathing   Negative: Sounds like a life-threatening emergency to the triager    Protocols used: BREATHING DIFFICULTY-A-OH

## 2022-01-27 ENCOUNTER — TELEPHONE (OUTPATIENT)
Dept: FAMILY MEDICINE | Facility: CLINIC | Age: 67
End: 2022-01-27
Payer: COMMERCIAL

## 2022-01-28 ENCOUNTER — TELEPHONE (OUTPATIENT)
Dept: FAMILY MEDICINE | Facility: CLINIC | Age: 67
End: 2022-01-28
Payer: COMMERCIAL

## 2022-01-31 ENCOUNTER — OFFICE VISIT (OUTPATIENT)
Dept: FAMILY MEDICINE | Facility: CLINIC | Age: 67
End: 2022-01-31
Payer: COMMERCIAL

## 2022-01-31 VITALS
WEIGHT: 240.31 LBS | SYSTOLIC BLOOD PRESSURE: 134 MMHG | DIASTOLIC BLOOD PRESSURE: 82 MMHG | HEART RATE: 93 BPM | HEIGHT: 75 IN | OXYGEN SATURATION: 99 % | BODY MASS INDEX: 29.88 KG/M2 | TEMPERATURE: 99 F

## 2022-01-31 DIAGNOSIS — I10 ESSENTIAL (PRIMARY) HYPERTENSION: ICD-10-CM

## 2022-01-31 DIAGNOSIS — E11.69 HYPERLIPIDEMIA ASSOCIATED WITH TYPE 2 DIABETES MELLITUS: ICD-10-CM

## 2022-01-31 DIAGNOSIS — R06.02 SOB (SHORTNESS OF BREATH): ICD-10-CM

## 2022-01-31 DIAGNOSIS — U07.1 COVID-19 VIRUS INFECTION: Primary | ICD-10-CM

## 2022-01-31 DIAGNOSIS — E78.5 HYPERLIPIDEMIA ASSOCIATED WITH TYPE 2 DIABETES MELLITUS: ICD-10-CM

## 2022-01-31 DIAGNOSIS — Z12.11 COLON CANCER SCREENING: ICD-10-CM

## 2022-01-31 DIAGNOSIS — R05.9 COUGH: ICD-10-CM

## 2022-01-31 DIAGNOSIS — E08.21 DIABETES MELLITUS DUE TO UNDERLYING CONDITION WITH DIABETIC NEPHROPATHY, WITHOUT LONG-TERM CURRENT USE OF INSULIN: ICD-10-CM

## 2022-01-31 DIAGNOSIS — N18.4 CKD (CHRONIC KIDNEY DISEASE) STAGE 4, GFR 15-29 ML/MIN: ICD-10-CM

## 2022-01-31 DIAGNOSIS — E66.9 OBESITY (BMI 30-39.9): ICD-10-CM

## 2022-01-31 PROCEDURE — 99214 PR OFFICE/OUTPT VISIT, EST, LEVL IV, 30-39 MIN: ICD-10-PCS | Mod: S$GLB,,, | Performed by: FAMILY MEDICINE

## 2022-01-31 PROCEDURE — 1160F RVW MEDS BY RX/DR IN RCRD: CPT | Mod: CPTII,S$GLB,, | Performed by: FAMILY MEDICINE

## 2022-01-31 PROCEDURE — 1160F PR REVIEW ALL MEDS BY PRESCRIBER/CLIN PHARMACIST DOCUMENTED: ICD-10-PCS | Mod: CPTII,S$GLB,, | Performed by: FAMILY MEDICINE

## 2022-01-31 PROCEDURE — 3079F PR MOST RECENT DIASTOLIC BLOOD PRESSURE 80-89 MM HG: ICD-10-PCS | Mod: CPTII,S$GLB,, | Performed by: FAMILY MEDICINE

## 2022-01-31 PROCEDURE — 3075F SYST BP GE 130 - 139MM HG: CPT | Mod: CPTII,S$GLB,, | Performed by: FAMILY MEDICINE

## 2022-01-31 PROCEDURE — 3008F BODY MASS INDEX DOCD: CPT | Mod: CPTII,S$GLB,, | Performed by: FAMILY MEDICINE

## 2022-01-31 PROCEDURE — 1159F PR MEDICATION LIST DOCUMENTED IN MEDICAL RECORD: ICD-10-PCS | Mod: CPTII,S$GLB,, | Performed by: FAMILY MEDICINE

## 2022-01-31 PROCEDURE — 1159F MED LIST DOCD IN RCRD: CPT | Mod: CPTII,S$GLB,, | Performed by: FAMILY MEDICINE

## 2022-01-31 PROCEDURE — 3079F DIAST BP 80-89 MM HG: CPT | Mod: CPTII,S$GLB,, | Performed by: FAMILY MEDICINE

## 2022-01-31 PROCEDURE — 99999 PR PBB SHADOW E&M-EST. PATIENT-LVL IV: ICD-10-PCS | Mod: PBBFAC,,, | Performed by: FAMILY MEDICINE

## 2022-01-31 PROCEDURE — 99999 PR PBB SHADOW E&M-EST. PATIENT-LVL IV: CPT | Mod: PBBFAC,,, | Performed by: FAMILY MEDICINE

## 2022-01-31 PROCEDURE — 3075F PR MOST RECENT SYSTOLIC BLOOD PRESS GE 130-139MM HG: ICD-10-PCS | Mod: CPTII,S$GLB,, | Performed by: FAMILY MEDICINE

## 2022-01-31 PROCEDURE — 3008F PR BODY MASS INDEX (BMI) DOCUMENTED: ICD-10-PCS | Mod: CPTII,S$GLB,, | Performed by: FAMILY MEDICINE

## 2022-01-31 PROCEDURE — 99214 OFFICE O/P EST MOD 30 MIN: CPT | Mod: S$GLB,,, | Performed by: FAMILY MEDICINE

## 2022-01-31 NOTE — PROGRESS NOTES
"  Physical Exam  /82 (BP Location: Right arm, Patient Position: Sitting, BP Method: Medium (Manual))   Pulse 93   Temp 98.9 °F (37.2 °C) (Oral)   Ht 6' 3" (1.905 m)   Wt 109 kg (240 lb 4.8 oz)   SpO2 99%   BMI 30.04 kg/m²      Office Visit    Patient Name: Elbert Still Jr.    : 1955  MRN: [id number]      Assessment/Plan:  Elbert Still Jr. is a 66 y.o. male who presents today for :    COVID-19 virus infection  - resolved  Cough  SOB (shortness of breath)  -AFVSS, exam unremarkable, cough and SOB improving overall with good O2sat on RA. Continue cough medication as needed  -call clinic back with any concerns    Diabetes mellitus due to underlying condition with diabetic nephropathy, without long-term current use of insulin  Obesity (BMI 30-39.9)  Essential (primary) hypertension  CKD (chronic kidney disease) stage 4, GFR 15-29 ml/min  Hyperlipidemia associated with type 2 diabetes mellitus  -BP/BG controlled, continue current medication regimen  -DASH diet, regular cardiovascular exercises  -weight loss    Colon cancer screening  -     Cancel: Cologuard Screening (Multitarget Stool DNA); Future; Expected date: 2022  -     Case Request Endoscopy: COLONOSCOPY        Follow up 3-4month    This note was created by combination of typed  and MModal dictation.  Transcription errors may be present.  If there are any questions, please contact me.      ----------------------------------------------------------------------------------------------------------------------      HPI:  Patient Care Team:  Angel Luis Boo MD as PCP - General (Family Medicine)  Walter Díaz MD as Consulting Physician (Nephrology)    Elbert is a 66 y.o. male with      Patient Active Problem List   Diagnosis    Diabetes mellitus due to underlying condition with diabetic nephropathy    Proteinuria    Essential (primary) hypertension    Mixed hyperlipidemia    CKD (chronic kidney disease) stage 4, GFR " 15-29 ml/min    Gout    Anemia of chronic disease    Hyperlipidemia associated with type 2 diabetes mellitus       Patient with PMHx as above presents today for :  covid follow up and Shortness of Breath    He was seen in Urgent Care two weeks ago for SOB and coughing, for which he was tested Postive for COVID and prescribed cough medication. He initially also had HA/nasal congestion and runny nose that have since resolved. He states cough has resolved, but still occasionally have SOB that is mild. His O2sat today is 99% on RA. His diabetes and HTN are well controlled on current regimen.         Hemoglobin A1C   Date Value Ref Range Status   09/08/2021 5.1 4.0 - 5.6 % Final     Comment:     ADA Screening Guidelines:  5.7-6.4%  Consistent with prediabetes  >or=6.5%  Consistent with diabetes    High levels of fetal hemoglobin interfere with the HbA1C  assay. Heterozygous hemoglobin variants (HbS, HgC, etc)do  not significantly interfere with this assay.   However, presence of multiple variants may affect accuracy.     05/01/2020 5.9 (H) 4.0 - 5.6 % Final     Comment:     ADA Screening Guidelines:  5.7-6.4%  Consistent with prediabetes  >or=6.5%  Consistent with diabetes  High levels of fetal hemoglobin interfere with the HbA1C  assay. Heterozygous hemoglobin variants (HbS, HgC, etc)do  not significantly interfere with this assay.   However, presence of multiple variants may affect accuracy.     06/22/2019 5.7 (H) 4.0 - 5.6 % Final     Comment:     ADA Screening Guidelines:  5.7-6.4%  Consistent with prediabetes  >or=6.5%  Consistent with diabetes  High levels of fetal hemoglobin interfere with the HbA1C  assay. Heterozygous hemoglobin variants (HbS, HgC, etc)do  not significantly interfere with this assay.   However, presence of multiple variants may affect accuracy.         Additional ROS      CONST: no fever, chills, +activity change  EYES: no vision change.   ENT: no sore throat. No dysphagia.   CV: no CP with  exertion  RESP: see HPI  GI: no N/V/diarrhea/constipation  : no urinary concerns  MSK: no new myalgias or arthralgias.   SKIN: no new rashes  NEURO: no focal deficits.   PSYCH: no new issues.                 Patient Active Problem List   Diagnosis    Diabetes mellitus due to underlying condition with diabetic nephropathy    Proteinuria    Essential (primary) hypertension    Mixed hyperlipidemia    CKD (chronic kidney disease) stage 4, GFR 15-29 ml/min    Gout    Anemia of chronic disease    Hyperlipidemia associated with type 2 diabetes mellitus       Current Medications  Medications reviewed/updated.     Current Outpatient Medications on File Prior to Visit   Medication Sig Dispense Refill    acetaminophen (TYLENOL) 500 MG tablet Take 2 tablets (1,000 mg total) by mouth every 6 (six) hours as needed for Pain. 30 tablet 0    amLODIPine (NORVASC) 10 MG tablet TAKE 1 TABLET BY MOUTH ONCE DAILY 90 tablet 3    atorvastatin (LIPITOR) 20 MG tablet TAKE 1 TABLET BY MOUTH EVERY EVENING 90 tablet 3    colchicine (COLCRYS) 0.6 mg tablet TAKE 1/2 TABLET(0.3 MG) BY MOUTH EVERY DAY 90 tablet 3    diclofenac sodium (VOLTAREN) 1 % Gel Lower extremities: Apply the gel (4 g) to the affected area 4 times daily. Do not apply more than 16 g daily to any one affected joint of the lower extremities. 100 g 12    fluticasone propionate (FLONASE) 50 mcg/actuation nasal spray 2 sprays (100 mcg total) by Each Nostril route once daily. 16 g 11    metoprolol succinate (TOPROL-XL) 25 MG 24 hr tablet Take 1 tablet (25 mg total) by mouth once daily. 90 tablet 3    multivitamin (THERAGRAN) per tablet Take 1 tablet by mouth once daily.      BLOOD-GLUCOSE CONTROL, NORMAL (ONE TOUCH ULTRA CONTROL MISC) by Misc.(Non-Drug; Combo Route) route.      cyclobenzaprine (FLEXERIL) 10 MG tablet   0    LANCETS MISC by Misc.(Non-Drug; Combo Route) route.       No current facility-administered medications on file prior to visit.           Past  "Surgical History:   Procedure Laterality Date    ADENOIDECTOMY      nasal septum repair      Tonsillectomy      TONSILLECTOMY         Family History   Problem Relation Age of Onset    No Known Problems Mother     No Known Problems Father     No Known Problems Sister     No Known Problems Brother     No Known Problems Maternal Aunt     No Known Problems Maternal Uncle     No Known Problems Paternal Aunt     No Known Problems Paternal Uncle     No Known Problems Maternal Grandmother     No Known Problems Maternal Grandfather     No Known Problems Paternal Grandmother     No Known Problems Paternal Grandfather     Cancer Neg Hx         Negative for prostate or colon cancer    Kidney disease Neg Hx     Amblyopia Neg Hx     Blindness Neg Hx     Cataracts Neg Hx     Diabetes Neg Hx     Glaucoma Neg Hx     Hypertension Neg Hx     Macular degeneration Neg Hx     Retinal detachment Neg Hx     Strabismus Neg Hx     Stroke Neg Hx     Thyroid disease Neg Hx        Social History     Socioeconomic History    Marital status:    Occupational History     Employer: [employer]   Tobacco Use    Smoking status: Never Smoker    Smokeless tobacco: Never Used    Tobacco comment: .  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     Substance and Sexual Activity    Alcohol use: No    Drug use: No    Sexual activity: Yes     Partners: Female             Allergies   Review of patient's allergies indicates:   Allergen Reactions    Iodine and iodide containing products Hives     Hypotension, Flushing             Review of Systems  See HPI      [unfilled]  /82 (BP Location: Right arm, Patient Position: Sitting, BP Method: Medium (Manual))   Pulse 93   Temp 98.9 °F (37.2 °C) (Oral)   Ht 6' 3" (1.905 m)   Wt 109 kg (240 lb 4.8 oz)   SpO2 99%   BMI 30.04 kg/m²       GEN: NAD, pleasant  HEENT: NCAT, PERRLA, EOMI, sclera clear, anicteric, O/P clear, MMM with no lesions  NECK: " normal, supple with midline trachea, no LAD, no thyromegaly  LUNGS: CTAB, no w/r/r, normal respiratory effort  HEART: RRR, normal S1 and S2, no m/r/g, no palpitations, no edema  ABD: s/nt/nd, NABS, no organomegaly  SKIN: warm and dry with normal turgor, no rashes, no other lesions.   PSYCH: AOx3, appropriate mood and affect.   MSK: extremities warm/well perfused, normal ROM in all 4 extremities, no c/c/e.   NEURO: normal without focal findings, CN II-XII are intact.  Sensation grossly normal, gait and station normal.

## 2022-01-31 NOTE — LETTER
January 31, 2022      [facility]  [address]  [place] [address]  Phone: [phone]  Fax: [phone]       Patient: Elbert Still   YOB: 1955  Date of Visit: 01/31/2022    To Whom It May Concern:    Elizabeth Still  was at [facility] on 01/31/2022. The patient may return to work/school on 1/01/2022 with no restrictions. If you have any questions or concerns, or if I can be of further assistance, please do not hesitate to contact me.    Sincerely,    Anuja Cool MA

## 2022-01-31 NOTE — LETTER
January 31, 2022      [facility]  [address]  [place] [address]  Phone: [phone]  Fax: [phone]       Patient: Elbert Still   YOB: 1955  Date of Visit: 01/31/2022    To Whom It May Concern:    Elizabeth Still  was at [facility] on 01/31/2022. The patient may return to work/school on 02/01/22 with no restrictions. If you have any questions or concerns, or if I can be of further assistance, please do not hesitate to contact me.    Sincerely,    Angel Luis Boo MD

## 2022-03-03 DIAGNOSIS — E08.21 DIABETES MELLITUS DUE TO UNDERLYING CONDITION WITH DIABETIC NEPHROPATHY, WITHOUT LONG-TERM CURRENT USE OF INSULIN: ICD-10-CM

## 2022-03-03 RX ORDER — GLIPIZIDE 10 MG/1
TABLET ORAL
Qty: 180 TABLET | Refills: 1 | Status: SHIPPED | OUTPATIENT
Start: 2022-03-03 | End: 2022-05-10

## 2022-03-03 NOTE — TELEPHONE ENCOUNTER
Care Due:                  Date            Visit Type   Department     Provider  --------------------------------------------------------------------------------                                [facility] FAM[facility]                              PRIMARY      MED/ INTERNAL  Last Visit: 01-      CARE ([facility])   MED/ PEDS      Angel Luis Boo  Next Visit: None Scheduled  None         None Found                                                            Last  Test          Frequency    Reason                     Performed    Due Date  --------------------------------------------------------------------------------    HBA1C.......  6 months...  glipiZIDE................  09- 03-    Uric Acid...  12 months..  colchicine...............  05- 04-    Powered by [employer] by [facility]. Reference number: [id number].   3/03/2022 11:33:12 AM CST

## 2022-03-10 ENCOUNTER — TELEPHONE (OUTPATIENT)
Dept: FAMILY MEDICINE | Facility: CLINIC | Age: 67
End: 2022-03-10
Payer: COMMERCIAL

## 2022-03-10 NOTE — TELEPHONE ENCOUNTER
Pt was called about setting up labs and an office visit with his PCP. Receuved Pt's voicemail. Left message.

## 2022-03-23 DIAGNOSIS — E11.9 TYPE 2 DIABETES MELLITUS WITHOUT COMPLICATION, UNSPECIFIED WHETHER LONG TERM INSULIN USE: ICD-10-CM

## 2022-04-25 ENCOUNTER — PATIENT OUTREACH (OUTPATIENT)
Dept: ADMINISTRATIVE | Facility: OTHER | Age: 67
End: 2022-04-25
Payer: COMMERCIAL

## 2022-04-30 ENCOUNTER — HOSPITAL ENCOUNTER (INPATIENT)
Facility: HOSPITAL | Age: 67
LOS: 10 days | Discharge: HOME | DRG: 674 | End: 2022-05-10
Attending: EMERGENCY MEDICINE | Admitting: STUDENT IN AN ORGANIZED HEALTH CARE EDUCATION/TRAINING PROGRAM
Payer: COMMERCIAL

## 2022-04-30 DIAGNOSIS — N18.9 ACUTE KIDNEY INJURY SUPERIMPOSED ON CHRONIC KIDNEY DISEASE: Primary | ICD-10-CM

## 2022-04-30 DIAGNOSIS — E83.42 HYPOMAGNESEMIA: ICD-10-CM

## 2022-04-30 DIAGNOSIS — N17.9 AKI (ACUTE KIDNEY INJURY): ICD-10-CM

## 2022-04-30 DIAGNOSIS — R07.9 CHEST PAIN: ICD-10-CM

## 2022-04-30 DIAGNOSIS — E08.21 DIABETES MELLITUS DUE TO UNDERLYING CONDITION WITH DIABETIC NEPHROPATHY, WITHOUT LONG-TERM CURRENT USE OF INSULIN: Chronic | ICD-10-CM

## 2022-04-30 DIAGNOSIS — E87.5 HYPERKALEMIA: ICD-10-CM

## 2022-04-30 DIAGNOSIS — N18.4 CKD (CHRONIC KIDNEY DISEASE) STAGE 4, GFR 15-29 ML/MIN: Chronic | ICD-10-CM

## 2022-04-30 DIAGNOSIS — N17.9 ACUTE KIDNEY INJURY SUPERIMPOSED ON CHRONIC KIDNEY DISEASE: Primary | ICD-10-CM

## 2022-04-30 DIAGNOSIS — M10.9 GOUT, UNSPECIFIED CAUSE, UNSPECIFIED CHRONICITY, UNSPECIFIED SITE: ICD-10-CM

## 2022-04-30 DIAGNOSIS — E16.2 HYPOGLYCEMIA: ICD-10-CM

## 2022-04-30 PROBLEM — I10 ESSENTIAL (PRIMARY) HYPERTENSION: Chronic | Status: ACTIVE | Noted: 2017-09-21

## 2022-04-30 PROBLEM — E78.2 MIXED HYPERLIPIDEMIA: Chronic | Status: ACTIVE | Noted: 2018-04-05

## 2022-04-30 PROBLEM — D63.8 ANEMIA OF CHRONIC DISEASE: Chronic | Status: ACTIVE | Noted: 2020-05-06

## 2022-04-30 LAB
ALBUMIN SERPL BCP-MCNC: 3.1 G/DL (ref 3.5–5.2)
ALBUMIN SERPL BCP-MCNC: 3.2 G/DL (ref 3.5–5.2)
ALP SERPL-CCNC: 162 U/L (ref 55–135)
ALT SERPL W/O P-5'-P-CCNC: 20 U/L (ref 10–44)
ANION GAP SERPL CALC-SCNC: 13 MMOL/L (ref 8–16)
ANION GAP SERPL CALC-SCNC: 13 MMOL/L (ref 8–16)
ANION GAP SERPL CALC-SCNC: 14 MMOL/L (ref 8–16)
AST SERPL-CCNC: 24 U/L (ref 10–40)
BACTERIA #/AREA URNS AUTO: NORMAL /HPF
BASOPHILS # BLD AUTO: 0.01 K/UL (ref 0–0.2)
BASOPHILS NFR BLD: 0.2 % (ref 0–1.9)
BILIRUB SERPL-MCNC: 0.3 MG/DL (ref 0.1–1)
BILIRUB UR QL STRIP: NEGATIVE
BNP SERPL-MCNC: 270 PG/ML (ref 0–99)
BUN SERPL-MCNC: 73 MG/DL (ref 8–23)
BUN SERPL-MCNC: 80 MG/DL (ref 8–23)
BUN SERPL-MCNC: 80 MG/DL (ref 8–23)
C PEPTIDE SERPL-MCNC: 24.08 NG/ML (ref 0.78–5.19)
CALCIUM SERPL-MCNC: 7.2 MG/DL (ref 8.7–10.5)
CALCIUM SERPL-MCNC: 7.3 MG/DL (ref 8.7–10.5)
CALCIUM SERPL-MCNC: 7.5 MG/DL (ref 8.7–10.5)
CHLORIDE SERPL-SCNC: 112 MMOL/L (ref 95–110)
CHLORIDE SERPL-SCNC: 113 MMOL/L (ref 95–110)
CHLORIDE SERPL-SCNC: 114 MMOL/L (ref 95–110)
CLARITY UR REFRACT.AUTO: CLEAR
CO2 SERPL-SCNC: 12 MMOL/L (ref 23–29)
CO2 SERPL-SCNC: 13 MMOL/L (ref 23–29)
CO2 SERPL-SCNC: 15 MMOL/L (ref 23–29)
COLOR UR AUTO: ABNORMAL
CREAT SERPL-MCNC: 9 MG/DL (ref 0.5–1.4)
CREAT SERPL-MCNC: 9 MG/DL (ref 0.5–1.4)
CREAT SERPL-MCNC: 9.1 MG/DL (ref 0.5–1.4)
CREAT UR-MCNC: 64 MG/DL (ref 23–375)
CREAT UR-MCNC: 64 MG/DL (ref 23–375)
CTP QC/QA: YES
CTP QC/QA: YES
DIFFERENTIAL METHOD: ABNORMAL
EOSINOPHIL # BLD AUTO: 0.1 K/UL (ref 0–0.5)
EOSINOPHIL NFR BLD: 1.2 % (ref 0–8)
ERYTHROCYTE [DISTWIDTH] IN BLOOD BY AUTOMATED COUNT: 14.7 % (ref 11.5–14.5)
EST. GFR  (AFRICAN AMERICAN): 6.3 ML/MIN/1.73 M^2
EST. GFR  (AFRICAN AMERICAN): 6.4 ML/MIN/1.73 M^2
EST. GFR  (AFRICAN AMERICAN): 6.4 ML/MIN/1.73 M^2
EST. GFR  (NON AFRICAN AMERICAN): 5.5 ML/MIN/1.73 M^2
EST. GFR  (NON AFRICAN AMERICAN): 5.6 ML/MIN/1.73 M^2
EST. GFR  (NON AFRICAN AMERICAN): 5.6 ML/MIN/1.73 M^2
ESTIMATED AVG GLUCOSE: 80 MG/DL (ref 68–131)
FERRITIN SERPL-MCNC: 217 NG/ML (ref 20–300)
GLUCOSE SERPL-MCNC: 166 MG/DL (ref 70–110)
GLUCOSE SERPL-MCNC: 203 MG/DL (ref 70–110)
GLUCOSE SERPL-MCNC: 250 MG/DL (ref 70–110)
GLUCOSE SERPL-MCNC: 89 MG/DL (ref 70–110)
GLUCOSE SERPL-MCNC: 95 MG/DL (ref 70–110)
GLUCOSE UR QL STRIP: ABNORMAL
HBA1C MFR BLD: 4.4 % (ref 4–5.6)
HCT VFR BLD AUTO: 24.8 % (ref 40–54)
HGB BLD-MCNC: 8 G/DL (ref 14–18)
HGB UR QL STRIP: ABNORMAL
HYALINE CASTS UR QL AUTO: 1 /LPF
IMM GRANULOCYTES # BLD AUTO: 0.01 K/UL (ref 0–0.04)
IMM GRANULOCYTES NFR BLD AUTO: 0.2 % (ref 0–0.5)
INSULIN COLLECTION INTERVAL: ABNORMAL
INSULIN SERPL-ACNC: 28.5 UU/ML
IRON SERPL-MCNC: 61 UG/DL (ref 45–160)
KETONES UR QL STRIP: NEGATIVE
LEUKOCYTE ESTERASE UR QL STRIP: NEGATIVE
LIPASE SERPL-CCNC: 34 U/L (ref 4–60)
LYMPHOCYTES # BLD AUTO: 0.5 K/UL (ref 1–4.8)
LYMPHOCYTES NFR BLD: 9 % (ref 18–48)
MAGNESIUM SERPL-MCNC: 1.4 MG/DL (ref 1.6–2.6)
MCH RBC QN AUTO: 27.9 PG (ref 27–31)
MCHC RBC AUTO-ENTMCNC: 32.3 G/DL (ref 32–36)
MCV RBC AUTO: 86 FL (ref 82–98)
MICROSCOPIC COMMENT: NORMAL
MONOCYTES # BLD AUTO: 0.7 K/UL (ref 0.3–1)
MONOCYTES NFR BLD: 11.6 % (ref 4–15)
NEUTROPHILS # BLD AUTO: 4.5 K/UL (ref 1.8–7.7)
NEUTROPHILS NFR BLD: 77.8 % (ref 38–73)
NITRITE UR QL STRIP: NEGATIVE
NRBC BLD-RTO: 0 /100 WBC
PH UR STRIP: 6 [PH] (ref 5–8)
PHOSPHATE SERPL-MCNC: 6.6 MG/DL (ref 2.7–4.5)
PHOSPHATE SERPL-MCNC: 7.6 MG/DL (ref 2.7–4.5)
PLATELET # BLD AUTO: 203 K/UL (ref 150–450)
PMV BLD AUTO: 10.6 FL (ref 9.2–12.9)
POC MOLECULAR INFLUENZA A AGN: NEGATIVE
POC MOLECULAR INFLUENZA B AGN: NEGATIVE
POCT GLUCOSE: 119 MG/DL (ref 70–110)
POCT GLUCOSE: 280 MG/DL (ref 70–110)
POCT GLUCOSE: 73 MG/DL (ref 70–110)
POCT GLUCOSE: 93 MG/DL (ref 70–110)
POCT GLUCOSE: 97 MG/DL (ref 70–110)
POCT GLUCOSE: 97 MG/DL (ref 70–110)
POTASSIUM SERPL-SCNC: 5.3 MMOL/L (ref 3.5–5.1)
POTASSIUM SERPL-SCNC: 5.7 MMOL/L (ref 3.5–5.1)
POTASSIUM SERPL-SCNC: 5.7 MMOL/L (ref 3.5–5.1)
PROT SERPL-MCNC: 7.1 G/DL (ref 6–8.4)
PROT UR QL STRIP: ABNORMAL
PROT UR-MCNC: 474 MG/DL (ref 0–15)
PROT/CREAT UR: 7.41 MG/G{CREAT} (ref 0–0.2)
RBC # BLD AUTO: 2.87 M/UL (ref 4.6–6.2)
RBC #/AREA URNS AUTO: 1 /HPF (ref 0–4)
RETICS/RBC NFR AUTO: 1.9 % (ref 0.4–2)
SARS-COV-2 RDRP RESP QL NAA+PROBE: NEGATIVE
SATURATED IRON: 24 % (ref 20–50)
SODIUM SERPL-SCNC: 138 MMOL/L (ref 136–145)
SODIUM SERPL-SCNC: 139 MMOL/L (ref 136–145)
SODIUM SERPL-SCNC: 142 MMOL/L (ref 136–145)
SODIUM UR-SCNC: 60 MMOL/L (ref 20–250)
SP GR UR STRIP: 1.01 (ref 1–1.03)
SQUAMOUS #/AREA URNS AUTO: 0 /HPF
TOTAL IRON BINDING CAPACITY: 253 UG/DL (ref 250–450)
TRANSFERRIN SERPL-MCNC: 171 MG/DL (ref 200–375)
TSH SERPL DL<=0.005 MIU/L-ACNC: 0.88 UIU/ML (ref 0.4–4)
URN SPEC COLLECT METH UR: ABNORMAL
UUN UR-MCNC: 180 MG/DL (ref 140–1050)
WBC # BLD AUTO: 5.76 K/UL (ref 3.9–12.7)
WBC #/AREA URNS AUTO: 1 /HPF (ref 0–5)
YEAST UR QL AUTO: NORMAL

## 2022-04-30 PROCEDURE — 84443 ASSAY THYROID STIM HORMONE: CPT | Performed by: STUDENT IN AN ORGANIZED HEALTH CARE EDUCATION/TRAINING PROGRAM

## 2022-04-30 PROCEDURE — 25000003 PHARM REV CODE 250: Performed by: HOSPITALIST

## 2022-04-30 PROCEDURE — 85025 COMPLETE CBC W/AUTO DIFF WBC: CPT | Performed by: STUDENT IN AN ORGANIZED HEALTH CARE EDUCATION/TRAINING PROGRAM

## 2022-04-30 PROCEDURE — 99291 PR CRITICAL CARE, E/M 30-74 MINUTES: ICD-10-PCS | Mod: CS,,, | Performed by: EMERGENCY MEDICINE

## 2022-04-30 PROCEDURE — 87389 HIV-1 AG W/HIV-1&-2 AB AG IA: CPT | Performed by: EMERGENCY MEDICINE

## 2022-04-30 PROCEDURE — 63600175 PHARM REV CODE 636 W HCPCS

## 2022-04-30 PROCEDURE — 94761 N-INVAS EAR/PLS OXIMETRY MLT: CPT

## 2022-04-30 PROCEDURE — 99291 CRITICAL CARE FIRST HOUR: CPT | Mod: 25

## 2022-04-30 PROCEDURE — 87502 INFLUENZA DNA AMP PROBE: CPT

## 2022-04-30 PROCEDURE — 63600175 PHARM REV CODE 636 W HCPCS: Performed by: HOSPITALIST

## 2022-04-30 PROCEDURE — 25000003 PHARM REV CODE 250: Performed by: STUDENT IN AN ORGANIZED HEALTH CARE EDUCATION/TRAINING PROGRAM

## 2022-04-30 PROCEDURE — 80307 DRUG TEST PRSMV CHEM ANLYZR: CPT

## 2022-04-30 PROCEDURE — 93010 EKG 12-LEAD: ICD-10-PCS | Mod: ,,, | Performed by: INTERNAL MEDICINE

## 2022-04-30 PROCEDURE — 83880 ASSAY OF NATRIURETIC PEPTIDE: CPT | Performed by: STUDENT IN AN ORGANIZED HEALTH CARE EDUCATION/TRAINING PROGRAM

## 2022-04-30 PROCEDURE — U0002 COVID-19 LAB TEST NON-CDC: HCPCS | Performed by: STUDENT IN AN ORGANIZED HEALTH CARE EDUCATION/TRAINING PROGRAM

## 2022-04-30 PROCEDURE — 84540 ASSAY OF URINE/UREA-N: CPT

## 2022-04-30 PROCEDURE — 80069 RENAL FUNCTION PANEL: CPT

## 2022-04-30 PROCEDURE — 83690 ASSAY OF LIPASE: CPT | Performed by: STUDENT IN AN ORGANIZED HEALTH CARE EDUCATION/TRAINING PROGRAM

## 2022-04-30 PROCEDURE — 25000003 PHARM REV CODE 250

## 2022-04-30 PROCEDURE — 99291 CRITICAL CARE FIRST HOUR: CPT | Mod: CS,,, | Performed by: EMERGENCY MEDICINE

## 2022-04-30 PROCEDURE — 84300 ASSAY OF URINE SODIUM: CPT

## 2022-04-30 PROCEDURE — 93005 ELECTROCARDIOGRAM TRACING: CPT

## 2022-04-30 PROCEDURE — 84100 ASSAY OF PHOSPHORUS: CPT | Performed by: STUDENT IN AN ORGANIZED HEALTH CARE EDUCATION/TRAINING PROGRAM

## 2022-04-30 PROCEDURE — 36415 COLL VENOUS BLD VENIPUNCTURE: CPT

## 2022-04-30 PROCEDURE — 25000242 PHARM REV CODE 250 ALT 637 W/ HCPCS: Performed by: STUDENT IN AN ORGANIZED HEALTH CARE EDUCATION/TRAINING PROGRAM

## 2022-04-30 PROCEDURE — 86803 HEPATITIS C AB TEST: CPT | Performed by: EMERGENCY MEDICINE

## 2022-04-30 PROCEDURE — 85045 AUTOMATED RETICULOCYTE COUNT: CPT | Performed by: STUDENT IN AN ORGANIZED HEALTH CARE EDUCATION/TRAINING PROGRAM

## 2022-04-30 PROCEDURE — 83735 ASSAY OF MAGNESIUM: CPT | Performed by: STUDENT IN AN ORGANIZED HEALTH CARE EDUCATION/TRAINING PROGRAM

## 2022-04-30 PROCEDURE — 96360 HYDRATION IV INFUSION INIT: CPT

## 2022-04-30 PROCEDURE — 84466 ASSAY OF TRANSFERRIN: CPT | Performed by: STUDENT IN AN ORGANIZED HEALTH CARE EDUCATION/TRAINING PROGRAM

## 2022-04-30 PROCEDURE — 84206 ASSAY OF PROINSULIN: CPT

## 2022-04-30 PROCEDURE — 80048 BASIC METABOLIC PNL TOTAL CA: CPT | Mod: XB

## 2022-04-30 PROCEDURE — 99223 PR INITIAL HOSPITAL CARE,LEVL III: ICD-10-PCS | Mod: ,,, | Performed by: HOSPITALIST

## 2022-04-30 PROCEDURE — 83525 ASSAY OF INSULIN: CPT

## 2022-04-30 PROCEDURE — 84681 ASSAY OF C-PEPTIDE: CPT

## 2022-04-30 PROCEDURE — 82962 GLUCOSE BLOOD TEST: CPT

## 2022-04-30 PROCEDURE — 20600001 HC STEP DOWN PRIVATE ROOM

## 2022-04-30 PROCEDURE — 99223 1ST HOSP IP/OBS HIGH 75: CPT | Mod: ,,, | Performed by: HOSPITALIST

## 2022-04-30 PROCEDURE — 94640 AIRWAY INHALATION TREATMENT: CPT

## 2022-04-30 PROCEDURE — 81001 URINALYSIS AUTO W/SCOPE: CPT | Performed by: STUDENT IN AN ORGANIZED HEALTH CARE EDUCATION/TRAINING PROGRAM

## 2022-04-30 PROCEDURE — 82570 ASSAY OF URINE CREATININE: CPT

## 2022-04-30 PROCEDURE — 83036 HEMOGLOBIN GLYCOSYLATED A1C: CPT | Performed by: STUDENT IN AN ORGANIZED HEALTH CARE EDUCATION/TRAINING PROGRAM

## 2022-04-30 PROCEDURE — 63600175 PHARM REV CODE 636 W HCPCS: Performed by: EMERGENCY MEDICINE

## 2022-04-30 PROCEDURE — 82728 ASSAY OF FERRITIN: CPT | Performed by: STUDENT IN AN ORGANIZED HEALTH CARE EDUCATION/TRAINING PROGRAM

## 2022-04-30 PROCEDURE — 80053 COMPREHEN METABOLIC PANEL: CPT | Performed by: STUDENT IN AN ORGANIZED HEALTH CARE EDUCATION/TRAINING PROGRAM

## 2022-04-30 PROCEDURE — 93010 ELECTROCARDIOGRAM REPORT: CPT | Mod: ,,, | Performed by: INTERNAL MEDICINE

## 2022-04-30 RX ORDER — DICLOFENAC SODIUM 10 MG/G
2 GEL TOPICAL DAILY
Status: DISCONTINUED | OUTPATIENT
Start: 2022-04-30 | End: 2022-04-30

## 2022-04-30 RX ORDER — MAGNESIUM SULFATE HEPTAHYDRATE 40 MG/ML
2 INJECTION, SOLUTION INTRAVENOUS ONCE
Status: COMPLETED | OUTPATIENT
Start: 2022-04-30 | End: 2022-04-30

## 2022-04-30 RX ORDER — IPRATROPIUM BROMIDE AND ALBUTEROL SULFATE 2.5; .5 MG/3ML; MG/3ML
9 SOLUTION RESPIRATORY (INHALATION)
Status: COMPLETED | OUTPATIENT
Start: 2022-04-30 | End: 2022-04-30

## 2022-04-30 RX ORDER — IPRATROPIUM BROMIDE AND ALBUTEROL SULFATE 2.5; .5 MG/3ML; MG/3ML
3 SOLUTION RESPIRATORY (INHALATION)
Status: CANCELLED | OUTPATIENT
Start: 2022-04-30 | End: 2022-04-30

## 2022-04-30 RX ORDER — IBUPROFEN 200 MG
24 TABLET ORAL
Status: DISCONTINUED | OUTPATIENT
Start: 2022-04-30 | End: 2022-04-30

## 2022-04-30 RX ORDER — GLUCAGON 1 MG
1 KIT INJECTION
Status: DISCONTINUED | OUTPATIENT
Start: 2022-04-30 | End: 2022-04-30

## 2022-04-30 RX ORDER — HEPARIN SODIUM 5000 [USP'U]/ML
5000 INJECTION, SOLUTION INTRAVENOUS; SUBCUTANEOUS EVERY 8 HOURS
Status: DISCONTINUED | OUTPATIENT
Start: 2022-04-30 | End: 2022-05-05

## 2022-04-30 RX ORDER — FUROSEMIDE 10 MG/ML
80 INJECTION INTRAMUSCULAR; INTRAVENOUS ONCE
Status: COMPLETED | OUTPATIENT
Start: 2022-04-30 | End: 2022-04-30

## 2022-04-30 RX ORDER — INSULIN ASPART 100 [IU]/ML
0-5 INJECTION, SOLUTION INTRAVENOUS; SUBCUTANEOUS
Status: DISCONTINUED | OUTPATIENT
Start: 2022-04-30 | End: 2022-05-09

## 2022-04-30 RX ORDER — NALOXONE HCL 0.4 MG/ML
0.02 VIAL (ML) INJECTION
Status: DISCONTINUED | OUTPATIENT
Start: 2022-04-30 | End: 2022-05-10 | Stop reason: HOSPADM

## 2022-04-30 RX ORDER — SODIUM BICARBONATE 650 MG/1
1300 TABLET ORAL 2 TIMES DAILY
Status: DISCONTINUED | OUTPATIENT
Start: 2022-04-30 | End: 2022-05-05

## 2022-04-30 RX ORDER — AMOXICILLIN 250 MG
1 CAPSULE ORAL 2 TIMES DAILY
Status: DISCONTINUED | OUTPATIENT
Start: 2022-04-30 | End: 2022-05-10 | Stop reason: HOSPADM

## 2022-04-30 RX ORDER — ATORVASTATIN CALCIUM 20 MG/1
20 TABLET, FILM COATED ORAL NIGHTLY
Status: DISCONTINUED | OUTPATIENT
Start: 2022-04-30 | End: 2022-05-10 | Stop reason: HOSPADM

## 2022-04-30 RX ORDER — SODIUM CHLORIDE 0.9 % (FLUSH) 0.9 %
10 SYRINGE (ML) INJECTION EVERY 12 HOURS PRN
Status: DISCONTINUED | OUTPATIENT
Start: 2022-04-30 | End: 2022-05-10 | Stop reason: HOSPADM

## 2022-04-30 RX ORDER — FLUTICASONE PROPIONATE 50 MCG
2 SPRAY, SUSPENSION (ML) NASAL DAILY
Status: DISCONTINUED | OUTPATIENT
Start: 2022-04-30 | End: 2022-05-10 | Stop reason: HOSPADM

## 2022-04-30 RX ORDER — GLUCAGON 1 MG
1 KIT INJECTION
Status: DISCONTINUED | OUTPATIENT
Start: 2022-04-30 | End: 2022-05-10 | Stop reason: HOSPADM

## 2022-04-30 RX ORDER — IBUPROFEN 200 MG
16 TABLET ORAL
Status: DISCONTINUED | OUTPATIENT
Start: 2022-04-30 | End: 2022-05-10 | Stop reason: HOSPADM

## 2022-04-30 RX ORDER — AMLODIPINE BESYLATE 10 MG/1
10 TABLET ORAL DAILY
Status: DISCONTINUED | OUTPATIENT
Start: 2022-04-30 | End: 2022-05-02

## 2022-04-30 RX ORDER — CARVEDILOL 6.25 MG/1
6.25 TABLET ORAL 2 TIMES DAILY
Status: DISCONTINUED | OUTPATIENT
Start: 2022-05-01 | End: 2022-05-02

## 2022-04-30 RX ORDER — IBUPROFEN 200 MG
24 TABLET ORAL
Status: DISCONTINUED | OUTPATIENT
Start: 2022-04-30 | End: 2022-05-10 | Stop reason: HOSPADM

## 2022-04-30 RX ORDER — HYDRALAZINE HYDROCHLORIDE 25 MG/1
25 TABLET, FILM COATED ORAL EVERY 8 HOURS PRN
Status: DISCONTINUED | OUTPATIENT
Start: 2022-04-30 | End: 2022-05-04

## 2022-04-30 RX ORDER — ACETAMINOPHEN 325 MG/1
650 TABLET ORAL EVERY 4 HOURS PRN
Status: DISCONTINUED | OUTPATIENT
Start: 2022-04-30 | End: 2022-05-10 | Stop reason: HOSPADM

## 2022-04-30 RX ORDER — TALC
6 POWDER (GRAM) TOPICAL NIGHTLY PRN
Status: DISCONTINUED | OUTPATIENT
Start: 2022-04-30 | End: 2022-05-10 | Stop reason: HOSPADM

## 2022-04-30 RX ORDER — METOPROLOL SUCCINATE 25 MG/1
25 TABLET, EXTENDED RELEASE ORAL DAILY
Status: DISCONTINUED | OUTPATIENT
Start: 2022-04-30 | End: 2022-04-30

## 2022-04-30 RX ORDER — IBUPROFEN 200 MG
16 TABLET ORAL
Status: DISCONTINUED | OUTPATIENT
Start: 2022-04-30 | End: 2022-04-30

## 2022-04-30 RX ADMIN — SODIUM ZIRCONIUM CYCLOSILICATE 10 G: 10 POWDER, FOR SUSPENSION ORAL at 08:04

## 2022-04-30 RX ADMIN — HEPARIN SODIUM 5000 UNITS: 5000 INJECTION INTRAVENOUS; SUBCUTANEOUS at 09:04

## 2022-04-30 RX ADMIN — METOPROLOL SUCCINATE 25 MG: 25 TABLET, EXTENDED RELEASE ORAL at 08:04

## 2022-04-30 RX ADMIN — IPRATROPIUM BROMIDE AND ALBUTEROL SULFATE 9 ML: 2.5; .5 SOLUTION RESPIRATORY (INHALATION) at 03:04

## 2022-04-30 RX ADMIN — SENNOSIDES AND DOCUSATE SODIUM 1 TABLET: 50; 8.6 TABLET ORAL at 08:04

## 2022-04-30 RX ADMIN — MAGNESIUM SULFATE 2 G: 2 INJECTION INTRAVENOUS at 05:04

## 2022-04-30 RX ADMIN — SENNOSIDES AND DOCUSATE SODIUM 1 TABLET: 50; 8.6 TABLET ORAL at 09:04

## 2022-04-30 RX ADMIN — INSULIN ASPART 1 UNITS: 100 INJECTION, SOLUTION INTRAVENOUS; SUBCUTANEOUS at 10:04

## 2022-04-30 RX ADMIN — SODIUM CHLORIDE, SODIUM LACTATE, POTASSIUM CHLORIDE, AND CALCIUM CHLORIDE 1000 ML: .6; .31; .03; .02 INJECTION, SOLUTION INTRAVENOUS at 10:04

## 2022-04-30 RX ADMIN — FUROSEMIDE 80 MG: 10 INJECTION, SOLUTION INTRAMUSCULAR; INTRAVENOUS at 08:04

## 2022-04-30 RX ADMIN — SODIUM BICARBONATE 650 MG TABLET 1300 MG: at 08:04

## 2022-04-30 RX ADMIN — SODIUM BICARBONATE 650 MG TABLET 1300 MG: at 09:04

## 2022-04-30 RX ADMIN — Medication 6 MG: at 09:04

## 2022-04-30 RX ADMIN — HEPARIN SODIUM 5000 UNITS: 5000 INJECTION INTRAVENOUS; SUBCUTANEOUS at 05:04

## 2022-04-30 RX ADMIN — AMLODIPINE BESYLATE 10 MG: 10 TABLET ORAL at 08:04

## 2022-04-30 RX ADMIN — SODIUM CHLORIDE, SODIUM LACTATE, POTASSIUM CHLORIDE, AND CALCIUM CHLORIDE 1000 ML: .6; .31; .03; .02 INJECTION, SOLUTION INTRAVENOUS at 02:04

## 2022-04-30 RX ADMIN — Medication 16 G: at 08:04

## 2022-04-30 RX ADMIN — ATORVASTATIN CALCIUM 20 MG: 20 TABLET, FILM COATED ORAL at 09:04

## 2022-04-30 RX ADMIN — HYDRALAZINE HYDROCHLORIDE 25 MG: 25 TABLET, FILM COATED ORAL at 05:04

## 2022-04-30 NOTE — H&P
"[facility] ([address] Medicine  History & Physical    Patient Name: Elbert Still Jr.  MRN: [id number]  Patient Class: IP- Inpatient  Admission Date: 4/30/2022  Attending Physician: Ashlee Bob MD   Primary Care Provider: Angel Luis Boo MD         Patient information was obtained from patient, spouse/SO, past medical records and ER records.     Subjective:     Principal Problem:Hypoglycemia    Chief Complaint:   Chief Complaint   Patient presents with    Hypoglycemia     Pt called out for low blood sugar. PTs cbg was in the 20s upon arrival. Pt received an amp of d50 and only came up to about 100 on blood glucose. EMS currently running D10%.Pts cbg in traige 201. Pt is not insulin dependent and takes glipuride which he didn't have tonight        HPI: Mr. Still is a 67 yo M with PMHx of HTN, T2DM, HLD, gout, CKD4, anemia who presented with hypoglycemia after EMS found him to have glucose in the 20s at home. Per his wife, she walked in on the patient in the living room around 10am of day of admission. He was sluggish, had slurred speech, no LOC or diaphoresis. She called EMS. They gave him an amp of D50 which improved BG to 100s, then he was started on D10. Upon arrival to [facility], his glucose had improved to 200s. This has never happened before in his 40 years of having diabetes. He typically eats less on his days off, and he was off on the day of admission. Patient works as a . He denies fever, chills, nausea, vomiting, abdominal pain, SOB. He denies drastically reduced urine production, typically urinating 3x/day with a few episodes of nocturia. He notes some SOB with exertion and LE "heaviness." Had one episdoe of diarrhea last week for which he took Imodium, and two episodes of nonbloody diarrhea earlier on day of admission. Patient takes glipizide 10 mg BID as his only diabetes medication, denies taking insulin. He took the glipizide on the evening of 4/28 and " morning of 4/29, but did not take the evening dose on 4/29. His most recent HbA1c was 5.1 in Sep 2021. Hasn't been measuring his home BG.     In the ED, his labs were significant for hgb of 8, K 5.7, bicarb 12, Cr 9, BUN 80, mag 1.4, alkaline phos 162, alumin 3.2, corrected Ca 7.8, BNP 27. UA positive for glucose and protein. LFTs and lipase wnl. POCT glucose readings had been 200 -> 93 -> 73. He received nebulizer treatment and lasix 80 for hyperkalemia management. EKG showed NSR, no peaked T waves.       Past Medical History:   Diagnosis Date    Essential (primary) hypertension 9/21/2017       Past Surgical History:   Procedure Laterality Date    ADENOIDECTOMY      nasal septum repair      Tonsillectomy      TONSILLECTOMY         Review of patient's allergies indicates:   Allergen Reactions    Iodine and iodide containing products Hives     Hypotension, Flushing       No current facility-administered medications on file prior to encounter.     Current Outpatient Medications on File Prior to Encounter   Medication Sig    acetaminophen (TYLENOL) 500 MG tablet Take 2 tablets (1,000 mg total) by mouth every 6 (six) hours as needed for Pain.    amLODIPine (NORVASC) 10 MG tablet TAKE 1 TABLET BY MOUTH ONCE DAILY    atorvastatin (LIPITOR) 20 MG tablet TAKE 1 TABLET BY MOUTH EVERY EVENING    colchicine (COLCRYS) 0.6 mg tablet TAKE 1/2 TABLET(0.3 MG) BY MOUTH EVERY DAY (Patient taking differently: Take 1/2 tablet by mouth every day as needed for gout)    fluticasone propionate (FLONASE) 50 mcg/actuation nasal spray 2 sprays (100 mcg total) by Each Nostril route once daily.    glipiZIDE (GLUCOTROL) 10 MG tablet TAKE 1 TABLET BY MOUTH TWICE DAILY WITH MEALS    metoprolol succinate (TOPROL-XL) 25 MG 24 hr tablet Take 1 tablet (25 mg total) by mouth once daily.    multivitamin (THERAGRAN) per tablet Take 1 tablet by mouth once daily.    TUMERIC-GING-OLIVE-OREG-CAPRYL ORAL Take 1 capsule by mouth once daily.     BLOOD-GLUCOSE CONTROL, NORMAL (ONE TOUCH ULTRA CONTROL MISC) by Misc.(Non-Drug; Combo Route) route.    LANCETS MISC by Misc.(Non-Drug; Combo Route) route.    [DISCONTINUED] diclofenac sodium (VOLTAREN) 1 % Gel Lower extremities: Apply the gel (4 g) to the affected area 4 times daily. Do not apply more than 16 g daily to any one affected joint of the lower extremities.     Family History       Problem Relation (Age of Onset)    No Known Problems Mother, Father, Sister, Brother, Maternal Aunt, Maternal Uncle, Paternal Aunt, Paternal Uncle, Maternal Grandmother, Maternal Grandfather, Paternal Grandmother, Paternal Grandfather          Tobacco Use    Smoking status: Never Smoker    Smokeless tobacco: Never Used    Tobacco comment: .  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     Substance and Sexual Activity    Alcohol use: No    Drug use: No    Sexual activity: Yes     Partners: Female     Review of Systems   Constitutional:  Negative for chills, diaphoresis and fever.   HENT: Negative.     Eyes: Negative.  Negative for visual disturbance.   Respiratory:  Positive for shortness of breath. Negative for cough.    Cardiovascular:  Positive for leg swelling. Negative for chest pain and palpitations.   Gastrointestinal:  Positive for diarrhea. Negative for abdominal distention, abdominal pain, blood in stool, constipation, nausea and vomiting.   Endocrine: Negative.    Genitourinary:  Negative for dysuria and hematuria.   Musculoskeletal: Negative.  Negative for arthralgias and back pain.   Skin: Negative.    Neurological:  Positive for speech difficulty and light-headedness. Negative for dizziness, syncope, weakness and headaches.   Hematological: Negative.    Psychiatric/Behavioral: Negative.  Negative for behavioral problems and confusion.    Objective:     Vital Signs (Most Recent):  Temp: 98.5 °F (36.9 °C) (04/30/22 0853)  Pulse: 97 (04/30/22 0853)  Resp: (!) 22 (04/30/22 0853)  BP: (!) 178/96 (04/30/22  0853)  SpO2: 100 % (04/30/22 0853)   Vital Signs (24h Range):  Temp:  [98.2 °F (36.8 °C)-98.5 °F (36.9 °C)] 98.5 °F (36.9 °C)  Pulse:  [86-99] 97  Resp:  [14-25] 22  SpO2:  [98 %-100 %] 100 %  BP: (157-185)/(88-96) 178/96        There is no height or weight on file to calculate BMI.    Physical Exam  Constitutional:       General: He is not in acute distress.     Appearance: Normal appearance. He is obese. He is not ill-appearing.   HENT:      Head: Normocephalic and atraumatic.      Nose: Nose normal.      Mouth/Throat:      Mouth: Mucous membranes are moist.   Eyes:      General: No scleral icterus.     Conjunctiva/sclera: Conjunctivae normal.   Cardiovascular:      Rate and Rhythm: Normal rate and regular rhythm.      Pulses: Normal pulses.      Heart sounds: Normal heart sounds. No murmur heard.  Pulmonary:      Effort: No respiratory distress.      Breath sounds: Normal breath sounds. No wheezing or rales.      Comments: tachypnic  Abdominal:      General: Bowel sounds are normal. There is no distension.      Palpations: Abdomen is soft. There is no mass.      Tenderness: There is no abdominal tenderness. There is no guarding.   Musculoskeletal:         General: Swelling present. Normal range of motion.      Cervical back: Normal range of motion. No rigidity.   Skin:     General: Skin is warm and dry.      Capillary Refill: Capillary refill takes less than 2 seconds.      Coloration: Skin is not jaundiced.      Findings: No bruising.   Neurological:      General: No focal deficit present.      Mental Status: He is alert and oriented to person, place, and time.      Motor: No weakness.   Psychiatric:         Mood and Affect: Mood normal.         Behavior: Behavior normal.         Thought Content: Thought content normal.           Significant Labs: All pertinent labs within the past 24 hours have been reviewed.  BMP:   Recent Labs   Lab 04/30/22  0103   *      K 5.7*   *   CO2 12*   BUN 80*    CREATININE 9.0*   CALCIUM 7.2*   MG 1.4*     CBC:   Recent Labs   Lab 04/30/22  0103   WBC 5.76   HGB 8.0*   HCT 24.8*        CMP:   Recent Labs   Lab 04/30/22  0103      K 5.7*   *   CO2 12*   *   BUN 80*   CREATININE 9.0*   CALCIUM 7.2*   PROT 7.1   ALBUMIN 3.2*   BILITOT 0.3   ALKPHOS 162*   AST 24   ALT 20   ANIONGAP 14   EGFRNONAA 5.6*     Cardiac Markers:   Recent Labs   Lab 04/30/22  0103   *       Lipase:   Recent Labs   Lab 04/30/22  0103   LIPASE 34     Magnesium:   Recent Labs   Lab 04/30/22  0103   MG 1.4*     POCT Glucose:   Recent Labs   Lab 04/30/22  0405 04/30/22  0601 04/30/22  0718   POCTGLUCOSE 97 93 73     TSH:   Recent Labs   Lab 04/30/22  0103   TSH 0.879       Significant Imaging: I have reviewed all pertinent imaging results/findings within the past 24 hours.    Assessment/Plan:     * Hypoglycemia  Blood sugar 203 mg/dl on admission, given dextrose by EMS with improvement   No history of labile blood sugars during previous admission   Most recent blood sugar 73 mg/dl   Mentation: AAO x3 during my interview   Taking glipizide 10 BID    Ddx: decreased po intake and decreased glipizide clearance (KAYODE on CKD), Likely not very hyperglycemic at baseline given low HbA1c. Less likely exogenous administration of insulin, insulinoma     Plan:   -POCT glucose q2hrs with low glc protocol in place   -Consider endocrine consult if further hypoglycemic episodes occur for possible insulinoma workup.   -Telemetry   -Fall precautions   -Neuro checks q4hrs   -C peptide, random insulin, proinsulin ordered to r/o exogenous insulin use. Elevated C peptide and insulin may represent decreased endogenous insulin clearance in setting of KAYODE. Random insulin also drawn after patient was eating.         Hypomagnesemia  Replace as needed      Hyperkalemia  K 5.7 on admission   ECG: NSR, no peaked t waves  Chest pain/discomfort: none  Home medications: no ARBs, or ACEi    Plan:   -Page  cardiology if K >5.5 with ECG changes or nephrology if >5.5 (persistently) and anuric  -Trend BMP  -Telemetry   -Shift with lasix, christopher reyesma PRN. Be cautious about using insulin with dextrose in setting of hypoglycemia        KAYODE (acute kidney injury)  Patient with acute kidney injury likely d/t Pre-renal azotemia and Acute tubular necrosis Which is currently worsening. Labs reviewed- Renal function/electrolytes with CrCl cannot be calculated (Unknown ideal weight.). according to latest data. Monitor urine output and serial BMP and adjust therapy as needed. Avoid nephrotoxins and renally dose meds for GFR listed above.     Baseline Cr 3-6, Cr 9 on admission  Hx of CKD: yes due to HTN/DM  Last saw nephro 2013  Exam with edema, no decreased O2 sats or rales  FeNA: not reliable, had already gotten lasix  FeUrea: pending  UA / UCx: proteinuria, glucuria  DDx: hypotension, prerenal, progression of CKD  No indications for HD at this time    Plan:   - Urine studies: Sammi, Uurea, UCr, UPCR  - consider renal US if not improving  - nephro consulted, appreciate recs  - ambulatory referral to nephro at discharge  - Trend Cr  - Strict I&Os  - Avoid nephrotoxic agents (NSAIDs, gadolinium, IV radiocontrast)  - Avoid hypotension  - Renal diet  - Renally adjust medications        Essential (primary) hypertension  Continue home meds amlodipine and Toprol XL      Diabetes mellitus due to underlying condition with diabetic nephropathy  Diabetes type 2  Last HbA1c:   Lab Results   Component Value Date    HGBA1C 5.1 09/08/2021     Insulin regimen: none  Oral regimen ( held inpatient): glipizide   CAD Prophylaxis: statin  HTN or Renal disease: KAYODE, hypertensive    PLAN:   - Hold oral anti-hyperglycemic agents  - LD SSI  - Diabetic diet  - Hypoglycemia orderset with POCT BG AC/QS  - Goal -180 while inpatient           Anemia of chronic disease  Baseline Hb 11, Hb 8 on admission  Antiplatelet/anticoagulation: none  No overt  source of bleeding on exam, denies bloody BM, hematuria.       Plan:  - iron studies, reticulocyte, ferritin  - CBCs daily  - Transfuse with goal Hb > 7        Gout  Takes colchicine as needed at home      CKD (chronic kidney disease) stage 4, GFR 15-29 ml/min  Hx of CKD due to HTN, DM. Baseline around 3-6    - nephro referral       Mixed hyperlipidemia  Continue home lipitor 20      Proteinuria  Hx of proteinuria, UPCr >7, nephrotic range    - consider ACEi or SGLT-2 inhibitor at discharge      VTE Risk Mitigation (From admission, onward)         Ordered     heparin (porcine) injection 5,000 Units  Every 8 hours         04/30/22 0429     IP VTE HIGH RISK PATIENT  Once         04/30/22 0429     Place sequential compression device  Until discontinued         04/30/22 0429                   Meño Thorne MD  Department of Hospital Medicine   [facility] - Intensive Care ([place]-[facility])

## 2022-04-30 NOTE — ED PROVIDER NOTES
Encounter Date: 4/30/2022       History     Chief Complaint   Patient presents with    Hypoglycemia     Pt called out for low blood sugar. PTs cbg was in the 20s upon arrival. Pt received an amp of d50 and only came up to about 100 on blood glucose. EMS currently running D10%.Pts cbg in traige 201. Pt is not insulin dependent and takes glipuride which he didn't have tonight     66-year-old male with history of hypertension, non-insulin-dependent diabetes presenting to the ED with hypoglycemia.  EMS was called for slurred speech, bilateral upper extremity tingling.  When they arrived, patient's blood glucose was in the 20s.  He received an amp of D50 which improved to the 100s.  He was started on D10.  Patient reports that he ate dinner 2 hours prior to symptoms starting.  He endorse some mild lightheadedness prior to the symptoms starting, though denies any fevers, chills, nausea, vomiting, abdominal pain, chest pain, shortness of breath, dysuria.  Had two episodes of nonbloody diarrhea earlier today.  He did not take his glipizide this night.  Not on any insulin.  Per her family member at bedside, patient is back to his normal baseline        Review of patient's allergies indicates:   Allergen Reactions    Iodine and iodide containing products Hives     Hypotension, Flushing     Past Medical History:   Diagnosis Date    Essential (primary) hypertension 9/21/2017     Past Surgical History:   Procedure Laterality Date    ADENOIDECTOMY      nasal septum repair      Tonsillectomy      TONSILLECTOMY       Family History   Problem Relation Age of Onset    No Known Problems Mother     No Known Problems Father     No Known Problems Sister     No Known Problems Brother     No Known Problems Maternal Aunt     No Known Problems Maternal Uncle     No Known Problems Paternal Aunt     No Known Problems Paternal Uncle     No Known Problems Maternal Grandmother     No Known Problems Maternal Grandfather     No  Known Problems Paternal Grandmother     No Known Problems Paternal Grandfather     Cancer Neg Hx         Negative for prostate or colon cancer    Kidney disease Neg Hx     Amblyopia Neg Hx     Blindness Neg Hx     Cataracts Neg Hx     Diabetes Neg Hx     Glaucoma Neg Hx     Hypertension Neg Hx     Macular degeneration Neg Hx     Retinal detachment Neg Hx     Strabismus Neg Hx     Stroke Neg Hx     Thyroid disease Neg Hx      Social History     Tobacco Use    Smoking status: Never Smoker    Smokeless tobacco: Never Used    Tobacco comment: .  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     Substance Use Topics    Alcohol use: No    Drug use: No     Review of Systems   Constitutional: Negative for fever.   HENT: Negative for congestion.    Respiratory: Negative for cough and shortness of breath.    Cardiovascular: Negative for chest pain.   Gastrointestinal: Positive for diarrhea. Negative for abdominal pain, constipation, nausea and vomiting.   Genitourinary: Negative for dysuria.   Musculoskeletal: Negative for gait problem.   Skin: Negative for color change.   Neurological: Positive for speech difficulty, light-headedness and numbness. Negative for dizziness.   Hematological: Does not bruise/bleed easily.       Physical Exam     Initial Vitals   BP Pulse Resp Temp SpO2   04/30/22 0027 04/30/22 0027 04/30/22 0027 04/30/22 0033 04/30/22 0027   (!) 168/88 99 20 98.3 °F (36.8 °C) 98 %      MAP       --                Physical Exam    Nursing note and vitals reviewed.  Constitutional: Vital signs are normal. He appears well-developed and well-nourished. He is not diaphoretic. He does not appear ill. No distress.   HENT:   Head: Normocephalic and atraumatic.   Eyes: Conjunctivae and EOM are normal. Right eye exhibits no discharge. Left eye exhibits no discharge. Right conjunctiva is not injected. Left conjunctiva is not injected. No scleral icterus.   Neck:   Normal range of motion.  Cardiovascular: Normal  rate, regular rhythm, S1 normal and S2 normal. Exam reveals no gallop and no friction rub.    No murmur heard.  Pulmonary/Chest: No respiratory distress. He has no wheezes. He has no rhonchi. He has no rales. He exhibits no tenderness.   Abdominal: Abdomen is soft. He exhibits no distension and no mass. There is no abdominal tenderness. There is no rebound and no guarding.   Musculoskeletal:         General: No edema.      Cervical back: Normal range of motion.     Neurological: He is alert and oriented to person, place, and time. He has normal strength. No cranial nerve deficit or sensory deficit.   Skin: Skin is warm and dry. No rash noted. No erythema. No pallor.         ED Course   Procedures  Labs Reviewed   CBC W/ AUTO DIFFERENTIAL - Abnormal; Notable for the following components:       Result Value    RBC 2.87 (*)     Hemoglobin 8.0 (*)     Hematocrit 24.8 (*)     RDW 14.7 (*)     Lymph # 0.5 (*)     Gran % 77.8 (*)     Lymph % 9.0 (*)     All other components within normal limits    Narrative:     Release to patient->Immediate   COMPREHENSIVE METABOLIC PANEL - Abnormal; Notable for the following components:    Potassium 5.7 (*)     Chloride 112 (*)     CO2 12 (*)     Glucose 166 (*)     BUN 80 (*)     Creatinine 9.0 (*)     Calcium 7.2 (*)     Albumin 3.2 (*)     Alkaline Phosphatase 162 (*)     eGFR if  6.4 (*)     eGFR if non  5.6 (*)     All other components within normal limits    Narrative:     Release to patient->Immediate   B-TYPE NATRIURETIC PEPTIDE - Abnormal; Notable for the following components:     (*)     All other components within normal limits    Narrative:     Release to patient->Immediate   URINALYSIS, REFLEX TO URINE CULTURE - Abnormal; Notable for the following components:    Protein, UA 3+ (*)     Glucose, UA 3+ (*)     Occult Blood UA 1+ (*)     All other components within normal limits    Narrative:     Specimen Source->Urine   MAGNESIUM -  Abnormal; Notable for the following components:    Magnesium 1.4 (*)     All other components within normal limits    Narrative:     Release to patient->Immediate   IRON AND TIBC - Abnormal; Notable for the following components:    Transferrin 171 (*)     All other components within normal limits    Narrative:     ADD ON A1C PER DR LEROY CONTE/ORDER# [id number] @ 5:06AM 4/30/2022      add on ferritin and iron and tibc per dr leroy conte/order#[id number] @04:51 04/30/2022        Release to patient->Immediate   TSH    Narrative:     Release to patient->Immediate   LIPASE    Narrative:     Release to patient->Immediate   URINALYSIS MICROSCOPIC    Narrative:     Specimen Source->Urine   HEMOGLOBIN A1C   IRON AND TIBC   FERRITIN   RETICULOCYTES   FERRITIN    Narrative:     ADD ON A1C PER DR LEROY CONTE/ORDER# [id number] @ 5:06AM 4/30/2022      add on ferritin and iron and tibc per dr leroy conte/order#[id number] @04:51 04/30/2022        Release to patient->Immediate   PHOSPHORUS   HIV 1 / 2 ANTIBODY   HEPATITIS C ANTIBODY   SODIUM, URINE, RANDOM   CREATININE, URINE, RANDOM   HYPOGLYCEMIC AGENT SCREEN   C-PEPTIDE   PROTEIN / CREATININE RATIO, URINE   INSULIN, RANDOM   PROINSULIN   HEMOGLOBIN A1C   RETICULOCYTES   SARS-COV-2 RDRP GENE    Narrative:     This test utilizes isothermal nucleic acid amplification   technology to detect the SARS-CoV-2 RdRp nucleic acid segment.   The analytical sensitivity (limit of detection) is 125 genome   equivalents/mL.   A POSITIVE result implies infection with the SARS-CoV-2 virus;   the patient is presumed to be contagious.     A NEGATIVE result means that SARS-CoV-2 nucleic acids are not   present above the limit of detection. A NEGATIVE result should be   treated as presumptive. It does not rule out the possibility of   COVID-19 and should not be the sole basis for treatment decisions.   If COVID-19 is strongly suspected based on clinical and exposure   history,  re-testing using an alternate molecular assay should be   considered.   This test is only for use under the Food and Drug   Administration s Emergency Use Authorization (EUA).   Commercial kits are provided by [employer].   Performance characteristics of the EUA have been independently   verified by [facility] Department of   Pathology and Laboratory Medicine.   _________________________________________________________________   The authorized Fact Sheet for Healthcare Providers and the authorized Fact   Sheet for Patients of the ID NOW COVID-19 are available on the FDA   website:     https://www.fda.gov/media/196240/download  https://www.fda.gov/media/042907/download   POCT INFLUENZA A/B MOLECULAR   POCT GLUCOSE   POCT GLUCOSE   POCT GLUCOSE MONITORING CONTINUOUS   POCT GLUCOSE MONITORING CONTINUOUS     EKG Readings: (Independently Interpreted)   Normal sinus rhythm, rate 91. No ST elevations or depressions concerning for ischemia.  No STEMI per my independent interpretation.     ECG Results          EKG 12-lead (In process)  Result time 04/30/22 06:55:21    In process by Interface, Lab In [facility] (04/30/22 06:55:21)                 Narrative:    Test Reason :     Vent. Rate : 091 BPM     Atrial Rate : 091 BPM     P-R Int : 142 ms          QRS Dur : 072 ms      QT Int : 354 ms       P-R-T Axes : 047 039 054 degrees     QTc Int : 435 ms    Normal sinus rhythm  Nonspecific T wave abnormality  Abnormal ECG  When compared with ECG of 05-MAY-2020 11:54,  No significant change was found    Referred By: [email or web address]   SELF           Confirmed By:                             Imaging Results          X-Ray Chest AP Portable (Final result)  Result time 04/30/22 01:06:47    Final result by Guille Beyer MD (04/30/22 01:06:47)                 Impression:      No acute cardiopulmonary finding identified on this single view.    Electronically signed by resident: Alise  Eulalioi  Date:    04/30/2022  Time:    00:53    Electronically signed by: Guille Beyer MD  Date:    04/30/2022  Time:    01:06             Narrative:    EXAMINATION:  XR CHEST AP PORTABLE    CLINICAL HISTORY:  Hypoglycemia;.    TECHNIQUE:  Single frontal portable view of the chest was performed.    COMPARISON:  Chest radiograph 01/20/2020, 06/30/2009    FINDINGS:  Support devices: None    Increased interstitial markings bilaterally, likely secondary to low lung volumes.  Normal appearance of pulmonary vasculature and no pleural effusion or pneumothorax.    The cardiomediastinal silhouette is stable.  The hilar and mediastinal contours are unremarkable.    Osseous structures appear intact.                              X-Rays:   Independently Interpreted Readings:   Other Readings:  No pneumonia, pneumothorax, or pleural effusion noted on CXR      Medications   sodium chloride 0.9% flush 10 mL (has no administration in time range)   naloxone 0.4 mg/mL injection 0.02 mg (has no administration in time range)   heparin (porcine) injection 5,000 Units (5,000 Units Subcutaneous Given 4/30/22 0555)   acetaminophen tablet 650 mg (has no administration in time range)   melatonin tablet 6 mg (has no administration in time range)   senna-docusate 8.6-50 mg per tablet 1 tablet (has no administration in time range)   glucose chewable tablet 16 g (has no administration in time range)   glucose chewable tablet 24 g (has no administration in time range)   glucagon (human recombinant) injection 1 mg (has no administration in time range)   dextrose 10% bolus 125 mL (has no administration in time range)   dextrose 10% bolus 250 mL (has no administration in time range)   insulin aspart U-100 pen 0-5 Units (has no administration in time range)   amLODIPine tablet 10 mg (has no administration in time range)   atorvastatin tablet 20 mg (has no administration in time range)   fluticasone propionate 50 mcg/actuation nasal spray 100 mcg  (has no administration in time range)   metoprolol succinate (TOPROL-XL) 24 hr tablet 25 mg (has no administration in time range)   sodium bicarbonate tablet 1,300 mg (has no administration in time range)   sodium zirconium cyclosilicate packet 10 g (has no administration in time range)   furosemide injection 80 mg (has no administration in time range)   lactated ringers bolus 1,000 mL (0 mLs Intravenous Stopped 4/30/22 0342)   albuterol-ipratropium 2.5 mg-0.5 mg/3 mL nebulizer solution 9 mL (9 mLs Nebulization Given 4/30/22 0349)   magnesium sulfate 2g in water 50mL IVPB (premix) (0 g Intravenous Stopped 4/30/22 0630)     Medical Decision Making:   History:   Old Medical Records: I decided to obtain old medical records.  Initial Assessment:   66-year-old male presenting to the ED with hypoglycemia.  Had symptoms earlier of slurred speech, bilateral upper extremity paresthesias.  Glucose in the 20s.  Is on sulfonurea.  Did not take his sulfonurea this night, and endorsed eating.  Has no signs or symptoms of infection other than two episodes of nonbloody diarrhea.  Has no abdominal pain.  Has nonfocal physical exam    Differential includes but is not limited to electrolyte abnormalities, sulfonurea overdose, iatrogenic, pneumonia, UTI, bacteremia.    Patient's point of care glucose on arrival was in the 200s.  CBC without leukocytosis, showed drop in hemoglobin of approximately 3.7 points from several months ago.  CMP showed hyperkalemia of 5.7, no EKG changes.  In addition, KAYODE noted with creatinine of 9.0 from previous of 6.5.  Will give patient 1 L LR.  Also shift with albuterol.  Did not want to shift with insulin due to recent hypoglycemic event.  CXR showed no concern for pneumonia.  UA showed no concern for UTI.  TSH within normal limits.  Repeat glucose was 70, patient given sandwich.  Discussed with Hospital Medicine for admission for hyperkalemia, KAYODE, hypoglycemia on sulfonurea  Independently Interpreted  Test(s):   I have ordered and independently interpreted X-rays - see prior notes.  I have ordered and independently interpreted EKG Reading(s) - see prior notes  Clinical Tests:   Lab Tests: Ordered and Reviewed  Radiological Study: Ordered and Reviewed  Medical Tests: Ordered and Reviewed  Other:   I have discussed this case with another health care provider.       <> Summary of the Discussion: Discussed with hospital medicine                      Clinical Impression:   Final diagnoses:  [E16.2] Hypoglycemia (Primary)  [E87.5] Hyperkalemia  [N17.9] KAYODE (acute kidney injury)  [E83.42] Hypomagnesemia          ED Disposition Condition    Admit               Lio Koch MD  Resident  04/30/22 0713

## 2022-04-30 NOTE — ASSESSMENT & PLAN NOTE
Baseline Hb 11, Hb 8 on admission  Antiplatelet/anticoagulation: none  No overt source of bleeding on exam, denies bloody BM, hematuria.       Plan:  - iron studies, reticulocyte, ferritin  - CBCs daily  - Transfuse with goal Hb > 7

## 2022-04-30 NOTE — PHARMACY MED REC
"Admission Medication History     The home medication history was taken by Brenda Taylor.    You may go to "Admission" then "Reconcile Home Medications" tabs to review and/or act upon these items.      The home medication list has been updated by the Pharmacy department.    Please read ALL comments highlighted in yellow.    Please address this information as you see fit.     Feel free to contact us if you have any questions or require assistance.      The medications listed below were removed from the home medication list. Please reorder if appropriate:  Patient reports no longer taking the following medication(s):   DICLOFENAC SODIUM 1% GEL    Medications listed below were obtained from: Patient/family    Medication Sig    acetaminophen (TYLENOL) 500 MG tablet   Take 2 tablets (1,000 mg total) by mouth every 6 (six) hours as needed for Pain.    amLODIPine (NORVASC) 10 MG tablet   TAKE 1 TABLET BY MOUTH ONCE DAILY    atorvastatin (LIPITOR) 20 MG tablet   TAKE 1 TABLET BY MOUTH EVERY EVENING    colchicine (COLCRYS) 0.6 mg tablet   Take 1/2 tablet by mouth every day as needed for gout    fluticasone propionate (FLONASE) 50 mcg/actuation nasal spray   2 sprays (100 mcg total) by Each Nostril route once daily.    glipiZIDE (GLUCOTROL) 10 MG tablet   TAKE 1 TABLET BY MOUTH TWICE DAILY WITH MEALS    metoprolol succinate (TOPROL-XL) 25 MG 24 hr tablet   Take 1 tablet (25 mg total) by mouth once daily.    multivitamin (THERAGRAN) per tablet   Take 1 tablet by mouth once daily.    TUMERIC-GING-OLIVE-OREG-CAPRYL ORAL   Take 1 capsule by mouth once daily.    BLOOD-GLUCOSE CONTROL, NORMAL (ONE TOUCH ULTRA CONTROL MISC)   by Misc.(Non-Drug; Combo Route) route.    LANCETS MISC by Misc.(Non-Drug; Combo Route) route.           Brenda Taylor  EXT [id number]                    .          "

## 2022-04-30 NOTE — ED NOTES
Pt transported to room 81526  On tele box via stretcher with escort Pt condition stable on transport, pt belongings are with pt and pt 's wife was notified of room number

## 2022-04-30 NOTE — ED NOTES
Telemetry Verification   Patient placed on Telemetry Box  Verified on ED monitor  Box [id number]   Monitor Tech Richon   Rate 98   Rhythm Sinus Rhythm

## 2022-04-30 NOTE — HPI
"Mr. Still is a 65 yo M with PMHx of HTN, T2DM, HLD, gout, CKD4, anemia who presented with hypoglycemia after EMS found him to have glucose in the 20s at home. Per his wife, she walked in on the patient in the living room around 10am of day of admission. He was sluggish, had slurred speech, no LOC or diaphoresis. She called EMS. They gave him an amp of D50 which improved BG to 100s, then he was started on D10. Upon arrival to [facility], his glucose had improved to 200s. This has never happened before in his 40 years of having diabetes. He typically eats less on his days off, and he was off on the day of admission. Patient works as a . He denies fever, chills, nausea, vomiting, abdominal pain, SOB. He denies drastically reduced urine production, typically urinating 3x/day with a few episodes of nocturia. He notes some SOB with exertion and LE "heaviness." Had one episdoe of diarrhea last week for which he took Imodium, and two episodes of nonbloody diarrhea earlier on day of admission. Patient takes glipizide 10 mg BID as his only diabetes medication, denies taking insulin. He took the glipizide on the evening of 4/28 and morning of 4/29, but did not take the evening dose on 4/29. His most recent HbA1c was 5.1 in Sep 2021. Hasn't been measuring his home BG.     In the ED, his labs were significant for hgb of 8, K 5.7, bicarb 12, Cr 9, BUN 80, mag 1.4, alkaline phos 162, alumin 3.2, corrected Ca 7.8, BNP 27. UA positive for glucose and protein. LFTs and lipase wnl. POCT glucose readings had been 200 -> 93 -> 73. He received nebulizer treatment and lasix 80 for hyperkalemia management. EKG showed NSR, no peaked T waves.   "

## 2022-04-30 NOTE — ASSESSMENT & PLAN NOTE
Diabetes type 2  Last HbA1c:   Lab Results   Component Value Date    HGBA1C 5.1 09/08/2021     Insulin regimen: none  Oral regimen ( held inpatient): glipizide   CAD Prophylaxis: statin  HTN or Renal disease: KAYODE, hypertensive    PLAN:   - Hold oral anti-hyperglycemic agents  - LD SSI  - Diabetic diet  - Hypoglycemia orderset with POCT BG AC/QS  - Goal -180 while inpatient

## 2022-04-30 NOTE — ASSESSMENT & PLAN NOTE
Blood sugar 203 mg/dl on admission, given dextrose by EMS with improvement   No history of labile blood sugars during previous admission   Most recent blood sugar 73 mg/dl   Mentation: AAO x3 during my interview   Taking glipizide 10 BID    Ddx: decreased po intake and decreased glipizide clearance (KAYODE on CKD), Likely not very hyperglycemic at baseline given low HbA1c. Less likely exogenous administration of insulin, insulinoma     Plan:   -POCT glucose q2hrs with low glc protocol in place   -Consider endocrine consult if further hypoglycemic episodes occur for possible insulinoma workup.   -Telemetry   -Fall precautions   -Neuro checks q4hrs   -C peptide, random insulin, proinsulin ordered to r/o exogenous insulin use. Elevated C peptide and insulin may represent decreased endogenous insulin clearance in setting of KAYODE. Random insulin also drawn after patient was eating.

## 2022-04-30 NOTE — SUBJECTIVE & OBJECTIVE
Past Medical History:   Diagnosis Date    Essential (primary) hypertension 9/21/2017       Past Surgical History:   Procedure Laterality Date    ADENOIDECTOMY      nasal septum repair      Tonsillectomy      TONSILLECTOMY         Review of patient's allergies indicates:   Allergen Reactions    Iodine and iodide containing products Hives     Hypotension, Flushing       No current facility-administered medications on file prior to encounter.     Current Outpatient Medications on File Prior to Encounter   Medication Sig    acetaminophen (TYLENOL) 500 MG tablet Take 2 tablets (1,000 mg total) by mouth every 6 (six) hours as needed for Pain.    amLODIPine (NORVASC) 10 MG tablet TAKE 1 TABLET BY MOUTH ONCE DAILY    atorvastatin (LIPITOR) 20 MG tablet TAKE 1 TABLET BY MOUTH EVERY EVENING    colchicine (COLCRYS) 0.6 mg tablet TAKE 1/2 TABLET(0.3 MG) BY MOUTH EVERY DAY (Patient taking differently: Take 1/2 tablet by mouth every day as needed for gout)    fluticasone propionate (FLONASE) 50 mcg/actuation nasal spray 2 sprays (100 mcg total) by Each Nostril route once daily.    glipiZIDE (GLUCOTROL) 10 MG tablet TAKE 1 TABLET BY MOUTH TWICE DAILY WITH MEALS    metoprolol succinate (TOPROL-XL) 25 MG 24 hr tablet Take 1 tablet (25 mg total) by mouth once daily.    multivitamin (THERAGRAN) per tablet Take 1 tablet by mouth once daily.    TUMERIC-GING-OLIVE-OREG-CAPRYL ORAL Take 1 capsule by mouth once daily.    BLOOD-GLUCOSE CONTROL, NORMAL (ONE TOUCH ULTRA CONTROL MISC) by Misc.(Non-Drug; Combo Route) route.    LANCETS MISC by Misc.(Non-Drug; Combo Route) route.    [DISCONTINUED] diclofenac sodium (VOLTAREN) 1 % Gel Lower extremities: Apply the gel (4 g) to the affected area 4 times daily. Do not apply more than 16 g daily to any one affected joint of the lower extremities.     Family History       Problem Relation (Age of Onset)    No Known Problems Mother, Father, Sister, Brother, Maternal Aunt, Maternal Uncle, Paternal Aunt,  Paternal Uncle, Maternal Grandmother, Maternal Grandfather, Paternal Grandmother, Paternal Grandfather          Tobacco Use    Smoking status: Never Smoker    Smokeless tobacco: Never Used    Tobacco comment: .  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     Substance and Sexual Activity    Alcohol use: No    Drug use: No    Sexual activity: Yes     Partners: Female     Review of Systems   Constitutional:  Negative for chills, diaphoresis and fever.   HENT: Negative.     Eyes: Negative.  Negative for visual disturbance.   Respiratory:  Positive for shortness of breath. Negative for cough.    Cardiovascular:  Positive for leg swelling. Negative for chest pain and palpitations.   Gastrointestinal:  Positive for diarrhea. Negative for abdominal distention, abdominal pain, blood in stool, constipation, nausea and vomiting.   Endocrine: Negative.    Genitourinary:  Negative for dysuria and hematuria.   Musculoskeletal: Negative.  Negative for arthralgias and back pain.   Skin: Negative.    Neurological:  Positive for speech difficulty and light-headedness. Negative for dizziness, syncope, weakness and headaches.   Hematological: Negative.    Psychiatric/Behavioral: Negative.  Negative for behavioral problems and confusion.    Objective:     Vital Signs (Most Recent):  Temp: 98.5 °F (36.9 °C) (04/30/22 0853)  Pulse: 97 (04/30/22 0853)  Resp: (!) 22 (04/30/22 0853)  BP: (!) 178/96 (04/30/22 0853)  SpO2: 100 % (04/30/22 0853)   Vital Signs (24h Range):  Temp:  [98.2 °F (36.8 °C)-98.5 °F (36.9 °C)] 98.5 °F (36.9 °C)  Pulse:  [86-99] 97  Resp:  [14-25] 22  SpO2:  [98 %-100 %] 100 %  BP: (157-185)/(88-96) 178/96        There is no height or weight on file to calculate BMI.    Physical Exam  Constitutional:       General: He is not in acute distress.     Appearance: Normal appearance. He is obese. He is not ill-appearing.   HENT:      Head: Normocephalic and atraumatic.      Nose: Nose normal.      Mouth/Throat:      Mouth: Mucous  membranes are moist.   Eyes:      General: No scleral icterus.     Conjunctiva/sclera: Conjunctivae normal.   Cardiovascular:      Rate and Rhythm: Normal rate and regular rhythm.      Pulses: Normal pulses.      Heart sounds: Normal heart sounds. No murmur heard.  Pulmonary:      Effort: No respiratory distress.      Breath sounds: Normal breath sounds. No wheezing or rales.      Comments: tachypnic  Abdominal:      General: Bowel sounds are normal. There is no distension.      Palpations: Abdomen is soft. There is no mass.      Tenderness: There is no abdominal tenderness. There is no guarding.   Musculoskeletal:         General: Swelling present. Normal range of motion.      Cervical back: Normal range of motion. No rigidity.   Skin:     General: Skin is warm and dry.      Capillary Refill: Capillary refill takes less than 2 seconds.      Coloration: Skin is not jaundiced.      Findings: No bruising.   Neurological:      General: No focal deficit present.      Mental Status: He is alert and oriented to person, place, and time.      Motor: No weakness.   Psychiatric:         Mood and Affect: Mood normal.         Behavior: Behavior normal.         Thought Content: Thought content normal.           Significant Labs: All pertinent labs within the past 24 hours have been reviewed.  BMP:   Recent Labs   Lab 04/30/22 0103   *      K 5.7*   *   CO2 12*   BUN 80*   CREATININE 9.0*   CALCIUM 7.2*   MG 1.4*     CBC:   Recent Labs   Lab 04/30/22 0103   WBC 5.76   HGB 8.0*   HCT 24.8*        CMP:   Recent Labs   Lab 04/30/22 0103      K 5.7*   *   CO2 12*   *   BUN 80*   CREATININE 9.0*   CALCIUM 7.2*   PROT 7.1   ALBUMIN 3.2*   BILITOT 0.3   ALKPHOS 162*   AST 24   ALT 20   ANIONGAP 14   EGFRNONAA 5.6*     Cardiac Markers:   Recent Labs   Lab 04/30/22 0103   *       Lipase:   Recent Labs   Lab 04/30/22 0103   LIPASE 34     Magnesium:   Recent Labs   Lab 04/30/22 0103    MG 1.4*     POCT Glucose:   Recent Labs   Lab 04/30/22  0405 04/30/22  0601 04/30/22  0718   POCTGLUCOSE 97 93 73     TSH:   Recent Labs   Lab 04/30/22  0103   TSH 0.879       Significant Imaging: I have reviewed all pertinent imaging results/findings within the past 24 hours.

## 2022-04-30 NOTE — ASSESSMENT & PLAN NOTE
Patient with acute kidney injury likely d/t Pre-renal azotemia and Acute tubular necrosis Which is currently worsening. Labs reviewed- Renal function/electrolytes with CrCl cannot be calculated (Unknown ideal weight.). according to latest data. Monitor urine output and serial BMP and adjust therapy as needed. Avoid nephrotoxins and renally dose meds for GFR listed above.     Baseline Cr 3-6, Cr 9 on admission  Hx of CKD: yes due to HTN/DM  Last saw nephro 2013  Exam with edema, no decreased O2 sats or rales  FeNA: not reliable, had already gotten lasix  FeUrea: pending  UA / UCx: proteinuria, glucuria  DDx: hypotension, prerenal, progression of CKD  No indications for HD at this time    Plan:   - Urine studies: Sammi, Uurea, UCr, UPCR  - consider renal US if not improving  - nephro consulted, appreciate recs  - ambulatory referral to nephro at discharge  - Trend Cr  - Strict I&Os  - Avoid nephrotoxic agents (NSAIDs, gadolinium, IV radiocontrast)  - Avoid hypotension  - Renal diet  - Renally adjust medications

## 2022-05-01 PROBLEM — N18.9 ACUTE KIDNEY INJURY SUPERIMPOSED ON CHRONIC KIDNEY DISEASE: Status: ACTIVE | Noted: 2022-05-01

## 2022-05-01 PROBLEM — N17.9 ACUTE KIDNEY INJURY SUPERIMPOSED ON CHRONIC KIDNEY DISEASE: Status: ACTIVE | Noted: 2022-05-01

## 2022-05-01 PROBLEM — E83.42 HYPOMAGNESEMIA: Status: RESOLVED | Noted: 2022-04-30 | Resolved: 2022-05-01

## 2022-05-01 LAB
ALBUMIN SERPL BCP-MCNC: 3 G/DL (ref 3.5–5.2)
ALP SERPL-CCNC: 150 U/L (ref 55–135)
ALT SERPL W/O P-5'-P-CCNC: 17 U/L (ref 10–44)
ANION GAP SERPL CALC-SCNC: 13 MMOL/L (ref 8–16)
AST SERPL-CCNC: 18 U/L (ref 10–40)
BASOPHILS # BLD AUTO: 0.02 K/UL (ref 0–0.2)
BASOPHILS NFR BLD: 0.3 % (ref 0–1.9)
BILIRUB SERPL-MCNC: 0.3 MG/DL (ref 0.1–1)
BUN SERPL-MCNC: 82 MG/DL (ref 8–23)
CALCIUM SERPL-MCNC: 7.6 MG/DL (ref 8.7–10.5)
CHLORIDE SERPL-SCNC: 113 MMOL/L (ref 95–110)
CO2 SERPL-SCNC: 14 MMOL/L (ref 23–29)
CREAT SERPL-MCNC: 9.1 MG/DL (ref 0.5–1.4)
DIFFERENTIAL METHOD: ABNORMAL
EOSINOPHIL # BLD AUTO: 0.3 K/UL (ref 0–0.5)
EOSINOPHIL NFR BLD: 5.2 % (ref 0–8)
ERYTHROCYTE [DISTWIDTH] IN BLOOD BY AUTOMATED COUNT: 14.9 % (ref 11.5–14.5)
EST. GFR  (AFRICAN AMERICAN): 6.3 ML/MIN/1.73 M^2
EST. GFR  (NON AFRICAN AMERICAN): 5.5 ML/MIN/1.73 M^2
GLUCOSE SERPL-MCNC: 128 MG/DL (ref 70–110)
HCT VFR BLD AUTO: 23.9 % (ref 40–54)
HGB BLD-MCNC: 7.5 G/DL (ref 14–18)
IMM GRANULOCYTES # BLD AUTO: 0.02 K/UL (ref 0–0.04)
IMM GRANULOCYTES NFR BLD AUTO: 0.3 % (ref 0–0.5)
LYMPHOCYTES # BLD AUTO: 0.9 K/UL (ref 1–4.8)
LYMPHOCYTES NFR BLD: 14.8 % (ref 18–48)
MAGNESIUM SERPL-MCNC: 1.5 MG/DL (ref 1.6–2.6)
MCH RBC QN AUTO: 27.3 PG (ref 27–31)
MCHC RBC AUTO-ENTMCNC: 31.4 G/DL (ref 32–36)
MCV RBC AUTO: 87 FL (ref 82–98)
MONOCYTES # BLD AUTO: 0.7 K/UL (ref 0.3–1)
MONOCYTES NFR BLD: 12 % (ref 4–15)
NEUTROPHILS # BLD AUTO: 4.1 K/UL (ref 1.8–7.7)
NEUTROPHILS NFR BLD: 67.4 % (ref 38–73)
NRBC BLD-RTO: 0 /100 WBC
PHOSPHATE SERPL-MCNC: 7.3 MG/DL (ref 2.7–4.5)
PLATELET # BLD AUTO: 199 K/UL (ref 150–450)
PMV BLD AUTO: 11.5 FL (ref 9.2–12.9)
POCT GLUCOSE: 159 MG/DL (ref 70–110)
POCT GLUCOSE: 166 MG/DL (ref 70–110)
POCT GLUCOSE: 200 MG/DL (ref 70–110)
POCT GLUCOSE: 200 MG/DL (ref 70–110)
POCT GLUCOSE: 222 MG/DL (ref 70–110)
POTASSIUM SERPL-SCNC: 5.1 MMOL/L (ref 3.5–5.1)
PROT SERPL-MCNC: 6.5 G/DL (ref 6–8.4)
RBC # BLD AUTO: 2.75 M/UL (ref 4.6–6.2)
SODIUM SERPL-SCNC: 140 MMOL/L (ref 136–145)
WBC # BLD AUTO: 6.1 K/UL (ref 3.9–12.7)

## 2022-05-01 PROCEDURE — 63600175 PHARM REV CODE 636 W HCPCS

## 2022-05-01 PROCEDURE — 20600001 HC STEP DOWN PRIVATE ROOM

## 2022-05-01 PROCEDURE — 93010 RHYTHM STRIP: ICD-10-PCS | Mod: ,,, | Performed by: INTERNAL MEDICINE

## 2022-05-01 PROCEDURE — 80053 COMPREHEN METABOLIC PANEL: CPT

## 2022-05-01 PROCEDURE — 25000003 PHARM REV CODE 250

## 2022-05-01 PROCEDURE — 85025 COMPLETE CBC W/AUTO DIFF WBC: CPT

## 2022-05-01 PROCEDURE — 25000003 PHARM REV CODE 250: Performed by: HOSPITALIST

## 2022-05-01 PROCEDURE — 99233 PR SUBSEQUENT HOSPITAL CARE,LEVL III: ICD-10-PCS | Mod: ,,, | Performed by: STUDENT IN AN ORGANIZED HEALTH CARE EDUCATION/TRAINING PROGRAM

## 2022-05-01 PROCEDURE — 99223 PR INITIAL HOSPITAL CARE,LEVL III: ICD-10-PCS | Mod: ,,, | Performed by: INTERNAL MEDICINE

## 2022-05-01 PROCEDURE — 94640 AIRWAY INHALATION TREATMENT: CPT

## 2022-05-01 PROCEDURE — 83735 ASSAY OF MAGNESIUM: CPT

## 2022-05-01 PROCEDURE — 93005 ELECTROCARDIOGRAM TRACING: CPT

## 2022-05-01 PROCEDURE — 99900035 HC TECH TIME PER 15 MIN (STAT)

## 2022-05-01 PROCEDURE — 94761 N-INVAS EAR/PLS OXIMETRY MLT: CPT

## 2022-05-01 PROCEDURE — 25000003 PHARM REV CODE 250: Performed by: STUDENT IN AN ORGANIZED HEALTH CARE EDUCATION/TRAINING PROGRAM

## 2022-05-01 PROCEDURE — 25000242 PHARM REV CODE 250 ALT 637 W/ HCPCS

## 2022-05-01 PROCEDURE — 93010 ELECTROCARDIOGRAM REPORT: CPT | Mod: ,,, | Performed by: INTERNAL MEDICINE

## 2022-05-01 PROCEDURE — 99233 SBSQ HOSP IP/OBS HIGH 50: CPT | Mod: ,,, | Performed by: STUDENT IN AN ORGANIZED HEALTH CARE EDUCATION/TRAINING PROGRAM

## 2022-05-01 PROCEDURE — 84100 ASSAY OF PHOSPHORUS: CPT

## 2022-05-01 PROCEDURE — 36415 COLL VENOUS BLD VENIPUNCTURE: CPT

## 2022-05-01 PROCEDURE — 99223 1ST HOSP IP/OBS HIGH 75: CPT | Mod: ,,, | Performed by: INTERNAL MEDICINE

## 2022-05-01 RX ORDER — ALBUTEROL SULFATE 2.5 MG/.5ML
10 SOLUTION RESPIRATORY (INHALATION) ONCE
Status: DISCONTINUED | OUTPATIENT
Start: 2022-05-01 | End: 2022-05-01

## 2022-05-01 RX ORDER — FUROSEMIDE 10 MG/ML
60 INJECTION INTRAMUSCULAR; INTRAVENOUS ONCE
Status: COMPLETED | OUTPATIENT
Start: 2022-05-01 | End: 2022-05-01

## 2022-05-01 RX ORDER — FUROSEMIDE 10 MG/ML
60 INJECTION INTRAMUSCULAR; INTRAVENOUS ONCE
Status: DISCONTINUED | OUTPATIENT
Start: 2022-05-01 | End: 2022-05-01

## 2022-05-01 RX ORDER — ALBUTEROL SULFATE 2.5 MG/.5ML
10 SOLUTION RESPIRATORY (INHALATION) ONCE
Status: COMPLETED | OUTPATIENT
Start: 2022-05-01 | End: 2022-05-01

## 2022-05-01 RX ADMIN — HEPARIN SODIUM 5000 UNITS: 5000 INJECTION INTRAVENOUS; SUBCUTANEOUS at 05:05

## 2022-05-01 RX ADMIN — SODIUM BICARBONATE 650 MG TABLET 1300 MG: at 08:05

## 2022-05-01 RX ADMIN — FUROSEMIDE 60 MG: 10 INJECTION, SOLUTION INTRAMUSCULAR; INTRAVENOUS at 03:05

## 2022-05-01 RX ADMIN — SODIUM BICARBONATE 650 MG TABLET 1300 MG: at 09:05

## 2022-05-01 RX ADMIN — SENNOSIDES AND DOCUSATE SODIUM 1 TABLET: 50; 8.6 TABLET ORAL at 08:05

## 2022-05-01 RX ADMIN — ALBUTEROL SULFATE 10 MG: 5 SOLUTION RESPIRATORY (INHALATION) at 03:05

## 2022-05-01 RX ADMIN — ATORVASTATIN CALCIUM 20 MG: 20 TABLET, FILM COATED ORAL at 08:05

## 2022-05-01 RX ADMIN — INSULIN ASPART 2 UNITS: 100 INJECTION, SOLUTION INTRAVENOUS; SUBCUTANEOUS at 09:05

## 2022-05-01 RX ADMIN — SODIUM ZIRCONIUM CYCLOSILICATE 10 G: 10 POWDER, FOR SUSPENSION ORAL at 03:05

## 2022-05-01 RX ADMIN — CARVEDILOL 6.25 MG: 6.25 TABLET, FILM COATED ORAL at 09:05

## 2022-05-01 RX ADMIN — AMLODIPINE BESYLATE 10 MG: 10 TABLET ORAL at 09:05

## 2022-05-01 RX ADMIN — SENNOSIDES AND DOCUSATE SODIUM 1 TABLET: 50; 8.6 TABLET ORAL at 09:05

## 2022-05-01 RX ADMIN — HEPARIN SODIUM 5000 UNITS: 5000 INJECTION INTRAVENOUS; SUBCUTANEOUS at 10:05

## 2022-05-01 RX ADMIN — CARVEDILOL 6.25 MG: 6.25 TABLET, FILM COATED ORAL at 08:05

## 2022-05-01 RX ADMIN — HEPARIN SODIUM 5000 UNITS: 5000 INJECTION INTRAVENOUS; SUBCUTANEOUS at 08:05

## 2022-05-01 NOTE — ASSESSMENT & PLAN NOTE
K 5.7 on admission   ECG: NSR, no peaked t waves  Chest pain/discomfort: none  Home medications: no ARBs, or ACEi    Plan:   -[facility] cardiology if K >5.5 with ECG changes or nephrology if >5.5 (persistently) and anuric  -Trend BMP, repeat potassium 5.4 today  -Telemetry   -Shift with lasix, duonebs, lokelma PRN. Be cautious about using insulin with dextrose in setting of hypoglycemia

## 2022-05-01 NOTE — ASSESSMENT & PLAN NOTE
Consider changing medication from glipizide as this is sometimes problematic in patients with decreased renal function

## 2022-05-01 NOTE — HOSPITAL COURSE
66 year old man with hx of HTN, T2DM, ESRD admitted for hypoglycemia 2/2 polypharmacy. Holding home diabeties meds, A1c 4.4. Hypertensive to 200 - titrating BP meds. Nephrology consulted for management of worsening Cr and thought to be due to longstanding HTN and DM. No acute indications for dialsysis at this time but patient would prefer not to given history with mother recently being admitted to hospice for ESRD. Patient with persistent HTN and so nifed and coreg increased. K elevated to 5.5 and lokelma and lasix started. Patient had tunnel cath placed by IR for dialysis on 5/5/22. Case management made aware of patients dialysis needs. Nephrology planning to start diaylsis on 5/5/22. Treated gout flare with PO steroids which resolved. HD chair assigned and patient discharged home.

## 2022-05-01 NOTE — ASSESSMENT & PLAN NOTE
Patient with acute kidney injury likely d/t Pre-renal azotemia and Acute tubular necrosis Which is currently worsening. Labs reviewed- Renal function/electrolytes with Estimated Creatinine Clearance: 10.9 mL/min (A) (based on SCr of 9.1 mg/dL (H)). according to latest data. Monitor urine output and serial BMP and adjust therapy as needed. Avoid nephrotoxins and renally dose meds for GFR listed above.     Baseline Cr 3-6, Cr 9 on admission  Hx of CKD: yes due to HTN/DM  Last saw nephro 2013  Exam with edema, no decreased O2 sats or rales  FeNA: not reliable, had already gotten lasix  FeUrea: pending  UA / UCx: proteinuria, glucuria  DDx: hypotension, prerenal, progression of CKD  No indications for HD at this time    Plan:   - Urine studies: Sammi, Uurea, UCr, UPCR  - consider renal US if not improving  - nephro consulted, appreciate recs  - ambulatory referral to nephro at discharge  - Trend Cr  - Strict I&Os  - Avoid nephrotoxic agents (NSAIDs, gadolinium, IV radiocontrast)  - Avoid hypotension  - Renal diet  - Renally adjust medications

## 2022-05-01 NOTE — SUBJECTIVE & OBJECTIVE
Interval History: no acute events overnight    Review of Systems   Constitutional:  Negative for chills, diaphoresis and fever.   HENT: Negative.     Eyes: Negative.  Negative for visual disturbance.   Respiratory:  Negative for cough and shortness of breath.    Cardiovascular:  Positive for leg swelling. Negative for chest pain and palpitations.   Gastrointestinal:  Negative for abdominal distention, abdominal pain, blood in stool, constipation, diarrhea, nausea and vomiting.   Endocrine: Negative.    Genitourinary:  Negative for dysuria and hematuria.   Musculoskeletal: Negative.  Negative for arthralgias and back pain.   Skin: Negative.    Neurological:  Negative for dizziness, syncope, speech difficulty, weakness, light-headedness and headaches.   Hematological: Negative.    Psychiatric/Behavioral: Negative.  Negative for behavioral problems and confusion.    Objective:     Vital Signs (Most Recent):  Temp: 98.1 °F (36.7 °C) (05/01/22 1000)  Pulse: 88 (05/01/22 0918)  Resp: 20 (05/01/22 0421)  BP: (!) 168/78 (05/01/22 0918)  SpO2: 100 % (05/01/22 0421)   Vital Signs (24h Range):  Temp:  [98.1 °F (36.7 °C)-98.7 °F (37.1 °C)] 98.1 °F (36.7 °C)  Pulse:  [] 88  Resp:  [18-22] 20  SpO2:  [99 %-100 %] 100 %  BP: (153-207)/(76-97) 168/78     Weight: 109 kg (240 lb 4.8 oz)  Body mass index is 30.04 kg/m².    Intake/Output Summary (Last 24 hours) at 5/1/2022 1112  Last data filed at 5/1/2022 0400  Gross per 24 hour   Intake 350 ml   Output 200 ml   Net 150 ml      Physical Exam  Constitutional:       General: He is not in acute distress.     Appearance: He is well-developed. He is obese. He is not ill-appearing or toxic-appearing.   HENT:      Head: Normocephalic and atraumatic.      Nose: No congestion or rhinorrhea.      Mouth/Throat:      Pharynx: No oropharyngeal exudate or posterior oropharyngeal erythema.   Eyes:      General: No scleral icterus.        Right eye: No discharge.         Left eye: No discharge.       Pupils: Pupils are equal, round, and reactive to light.   Cardiovascular:      Rate and Rhythm: Normal rate and regular rhythm.      Heart sounds: Normal heart sounds. No murmur heard.    No gallop.   Pulmonary:      Effort: Pulmonary effort is normal.      Breath sounds: Rales present.   Abdominal:      General: Bowel sounds are normal.      Palpations: Abdomen is soft.      Tenderness: There is no abdominal tenderness.   Musculoskeletal:         General: Normal range of motion.      Cervical back: Normal range of motion and neck supple.      Right lower leg: Edema present.      Left lower leg: Edema present.   Skin:     Capillary Refill: Capillary refill takes less than 2 seconds.      Coloration: Skin is not jaundiced.      Findings: No lesion or rash.   Neurological:      Mental Status: He is alert and oriented to person, place, and time.       Significant Labs: All pertinent labs within the past 24 hours have been reviewed.    Significant Imaging: I have reviewed all pertinent imaging results/findings within the past 24 hours.

## 2022-05-01 NOTE — ASSESSMENT & PLAN NOTE
Blood sugar 203 mg/dl on admission, given dextrose by EMS with improvement   No history of labile blood sugars during previous admission   Most recent blood sugar 73 mg/dl   Mentation: AAO x3 during my interview   Taking glipizide 10 BID    Ddx: decreased po intake and decreased glipizide clearance (KAYODE on CKD), Likely not very hyperglycemic at baseline given low HbA1c. Less likely exogenous administration of insulin, insulinoma     Plan:   -Telemetry   -Fall precautions   -Neuro checks q4hrs   -Continue to monitor sugars.

## 2022-05-01 NOTE — HPI
Mr. Still is a 67 yo M with PMHx of HTN, T2DM, HLD, gout, CKD4 (baseline unclear, but was 6.4 in September 2021 - previously was 3.6 in 2020), anemia who presented with hypoglycemia after EMS found him to have glucose in the 20s at home. Per his wife, patient was sluggish, had slurred speech the day of admission, so she called EMS.  Upon arrival of EMS blood glucose was in the 20s. Patient takes glipizide 10 mg BID as his only diabetes medication, denies taking insulin. His most recent HbA1c was 5.1 in Sep 2021. Hasn't been measuring his home BG.  He denies drastically reduced urine production, typically urinates every hour ~200 ccs. Believes he might be retaining urine. Urine sometimes appears slightly foamy. ROS also positive for diaphoresis associated with eating food, SOB (present since covid infection 2 months ago), LE swelling X 2 weeks. Nephrology consulted for KAYODE on CKD (Cr = 9.1) and proteinuria.

## 2022-05-01 NOTE — ASSESSMENT & PLAN NOTE
Patient with acute kidney injury likely d/t Pre-renal azotemia and Acute tubular necrosis Which is currently worsening. Labs reviewed- Renal function/electrolytes with Estimated Creatinine Clearance: 10.9 mL/min (A) (based on SCr of 9.1 mg/dL (H)). according to latest data. Monitor urine output and serial BMP and adjust therapy as needed. Avoid nephrotoxins and renally dose meds for GFR listed above.     Baseline Cr 3-6, Cr 9 on admission  Hx of CKD: yes due to HTN/DM  Last saw nephro 2013  Exam with edema, no decreased O2 sats or rales  FeNA: not reliable, had already gotten lasix  FeUrea: pending  UA / UCx: proteinuria, glucuria  DDx: hypotension, prerenal, progression of CKD  No indications for HD at this time    Plan:   - nephro consulted, appreciate recs   -possible biopsy indicated given proteinuria  - Trend Cr  - Strict I&Os  - Avoid nephrotoxic agents (NSAIDs, gadolinium, IV radiocontrast)  - Avoid hypotension  - Renal diet  - Renally adjust medications

## 2022-05-01 NOTE — ASSESSMENT & PLAN NOTE
Continue home meds amlodipine and Toprol XL    Plan:  --started on Norvasc 30mg daily, will continue to titrate

## 2022-05-01 NOTE — PLAN OF CARE
PATIENT SITTING UP TO SIDE OF BED.  PATIENT ABLE TO AMBULATED TO BATHROOM WITHOUT DIFFICULTY.  WIFE AT THE BEDSIDE.  PATIENT PREPARING TO GO DOWN FOR U/S.  Problem: Adult Inpatient Plan of Care  Goal: Plan of Care Review  Outcome: Ongoing, Progressing  Goal: Patient-Specific Goal (Individualized)  Outcome: Ongoing, Progressing  Goal: Absence of Hospital-Acquired Illness or Injury  Outcome: Ongoing, Progressing  Goal: Optimal Comfort and Wellbeing  Outcome: Ongoing, Progressing  Goal: Readiness for Transition of Care  Outcome: Ongoing, Progressing

## 2022-05-01 NOTE — CONSULTS
[facility] ([place] [id number])  Nephrology  Consult Note    Patient Name: Elbert Still Jr.  MRN: [id number]  Admission Date: 4/30/2022  Hospital Length of Stay: 1 days  Attending Provider: Juwan Muhammad MD   Primary Care Physician: Angel Luis Boo MD  Principal Problem:Acute kidney injury superimposed on chronic kidney disease    Inpatient consult to Nephrology  Consult performed by: Chery Tucker MD  Consult ordered by: Sam Mcleod MD        Subjective:     HPI: Mr. Still is a 65 yo M with PMHx of HTN, T2DM, HLD, gout, CKD4 (baseline unclear, but was 6.4 in September 2021 - previously was 3.6 in 2020), anemia who presented with hypoglycemia after EMS found him to have glucose in the 20s at home. Per his wife, patient was sluggish, had slurred speech the day of admission, so she called EMS.  Upon arrival of EMS blood glucose was in the 20s. Patient takes glipizide 10 mg BID as his only diabetes medication, denies taking insulin. His most recent HbA1c was 5.1 in Sep 2021. Hasn't been measuring his home BG.  He denies drastically reduced urine production, typically urinates every hour ~200 ccs. Believes he might be retaining urine. Urine sometimes appears slightly foamy. ROS also positive for diaphoresis associated with eating food, SOB (present since covid infection 2 months ago), LE swelling X 2 weeks. Nephrology consulted for KAYODE on CKD (Cr = 9.1) and proteinuria.          Interval History: see hpi. Blood glucose stable since admission. Patient has been hypertensive since arrival, but reports blood pressure is usually ~ 140s/90s at home. Denies any complaints. Reports normal urine output at this time, but reports increased frequency    Review of patient's allergies indicates:   Allergen Reactions    Iodine and iodide containing products Hives     Hypotension, Flushing     Current Facility-Administered Medications   Medication Frequency    acetaminophen tablet 650 mg Q4H PRN    amLODIPine tablet  10 mg Daily    atorvastatin tablet 20 mg QHS    carvediloL tablet 6.25 mg BID    dextrose 10% bolus 125 mL PRN    dextrose 10% bolus 250 mL PRN    fluticasone propionate 50 mcg/actuation nasal spray 100 mcg Daily    glucagon (human recombinant) injection 1 mg PRN    glucose chewable tablet 16 g PRN    glucose chewable tablet 24 g PRN    heparin (porcine) injection 5,000 Units Q8H    hydrALAZINE tablet 25 mg Q8H PRN    insulin aspart U-100 pen 0-5 Units QID (AC + HS) PRN    melatonin tablet 6 mg Nightly PRN    naloxone 0.4 mg/mL injection 0.02 mg PRN    senna-docusate 8.6-50 mg per tablet 1 tablet BID    sodium bicarbonate tablet 1,300 mg BID    sodium chloride 0.9% flush 10 mL Q12H PRN     Review of Systems   Constitutional:  Positive for diaphoresis. Negative for chills and fever.        Patient has diaphoresis every time he eats food    HENT:  Negative for hearing loss and sore throat.    Respiratory:  Positive for shortness of breath. Negative for cough.    Cardiovascular:  Negative for chest pain.        Patient reports SOB since covid infection in 2020   Gastrointestinal:  Negative for abdominal pain, constipation, diarrhea, nausea and vomiting.        Diarrhea resolved, believes diarrhea is related to alcohol intake.    Genitourinary:  Positive for frequency.        Patient reports normal urine output - believes he is going to restroom every hour and seems to be retaining urine. Urine sometimes appears foamy   Musculoskeletal:  Negative for back pain.        LE swelling X 2 weeks    Neurological:  Negative for dizziness, loss of consciousness and weakness.     Objective:     Vital Signs (Most Recent):  Temp: 98.2 °F (36.8 °C) (05/01/22 1417)  Pulse: 92 (05/01/22 1417)  Resp: 16 (05/01/22 1417)  BP: (!) 168/78 (05/01/22 1417)  SpO2: 98 % (05/01/22 1417)  O2 Device (Oxygen Therapy): room air (05/01/22 0421)   Vital Signs (24h Range):  Temp:  [98.1 °F (36.7 °C)-98.7 °F (37.1 °C)] 98.2 °F (36.8  °C)  Pulse:  [] 92  Resp:  [16-22] 16  SpO2:  [85 %-100 %] 98 %  BP: (153-207)/(76-97) 168/78     Weight: 109 kg (240 lb 4.8 oz) (04/30/22 1920)  Body mass index is 30.04 kg/m².  Body surface area is 2.4 meters squared.    I/O last 3 completed shifts:  In: 350 [P.O.:350]  Out: 1000 [Urine:1000]    Physical Exam  Constitutional:       Appearance: Normal appearance. He is obese.   HENT:      Head: Normocephalic and atraumatic.      Mouth/Throat:      Mouth: Mucous membranes are moist.   Eyes:      General: No scleral icterus.     Extraocular Movements: Extraocular movements intact.      Pupils: Pupils are equal, round, and reactive to light.   Cardiovascular:      Rate and Rhythm: Normal rate and regular rhythm.      Pulses: Normal pulses.   Pulmonary:      Effort: Pulmonary effort is normal.      Breath sounds: Normal breath sounds.   Abdominal:      General: Bowel sounds are normal. There is no distension.      Palpations: Abdomen is soft.      Tenderness: There is no abdominal tenderness. There is no guarding or rebound.      Comments: Abdomen is enlarged, patient states this is chronic    Musculoskeletal:      Right lower leg: Edema present.      Left lower leg: Edema present.   Skin:     General: Skin is warm and dry.      Coloration: Skin is not jaundiced.   Neurological:      Mental Status: He is alert and oriented to person, place, and time.   Psychiatric:         Mood and Affect: Mood normal.         Behavior: Behavior normal.         Thought Content: Thought content normal.       Significant Labs:  All labs within the past 24 hours have been reviewed.     Significant Imaging:  Reviewed    Assessment/Plan:     * Acute kidney injury superimposed on chronic kidney disease  Mr. Still is a 67 yo M with PMHx of HTN, T2DM, HLD, gout, CKD4 (baseline unclear, but was 6.4 in September 2021 - previously was 3.6 in 2020), anemia admitted to hospital medicine service with hypoglycemia (blood glucose in 20s -  takes  glipizide 10 mg BID, denies taking insulin).     He denies reduced urine production, typically urinates every hour about ~200 ccs. Believes he might be retaining urine. Urine sometimes appears slightly foamy.. Nephrology consulted for KAYODE on CKD (Cr = 9.1) and proteinuria.    Likely chronic issue and progression of CKD due to HTN and DM.      Recommendations:  -Can order PLA2R, SPEP, UPEP, BRIAN, PR3, MPO  -10 mg lokelma daily for hyperkalemia  -1300 sodium bicarb BID for low bicarb  -Lasix 80 mg IV daily for volume overload  -Sevelamer 800 mg with meals   -Post void bladder scan  -Avoid nephrotoxins  -Daily RFP  -Strict ins and outs   -Diabetic, renal diet   ** do not recommend renal biopsy at this time as this seems to be a chronic issue with late stage changes seen on imaging     Hyperkalemia  Patient will need to be discharged on lokelma - can start with 10 mg lokelma daily while inpatient     Proteinuria  See KAYODE on CKD     Diabetes mellitus due to underlying condition with diabetic nephropathy  Consider changing medication from glipizide as this is sometimes problematic in patients with decreased renal function         Thank you for your consult. I will follow-up with patient. Please contact us if you have any additional questions.    Chery Tucker MD  Nephrology  [facility] ([place])

## 2022-05-01 NOTE — ASSESSMENT & PLAN NOTE
Patient will need to be discharged on lokelma - can start with 10 mg lokelma daily while inpatient

## 2022-05-01 NOTE — ASSESSMENT & PLAN NOTE
K 5.7 on admission   ECG: NSR, no peaked t waves  Chest pain/discomfort: none  Home medications: no ARBs, or ACEi    Plan:   -Page cardiology if K >5.5 with ECG changes or nephrology if >5.5 (persistently) and anuric  -Trend BMP  -Telemetry   -Shift with lasix, duonebs, lokelma PRN. Be cautious about using insulin with dextrose in setting of hypoglycemia

## 2022-05-01 NOTE — ASSESSMENT & PLAN NOTE
Diabetes type 2  Last HbA1c:   Lab Results   Component Value Date    HGBA1C 4.4 04/30/2022     Insulin regimen: none  Oral regimen ( held inpatient): glipizide   CAD Prophylaxis: statin  HTN or Renal disease: KAYODE, hypertensive    PLAN:   - Hold oral anti-hyperglycemic agents  - LD SSI  - Diabetic diet  - Hypoglycemia orderset with POCT BG AC/QS  - Goal -180 while inpatient

## 2022-05-01 NOTE — PROGRESS NOTES
"[facility] ([address] Medicine  Progress Note    Patient Name: Elbert Still Jr.  MRN: [id number]  Patient Class: IP- Inpatient   Admission Date: 4/30/2022  Length of Stay: 1 days  Attending Physician: Juwan Muhammad MD  Primary Care Provider: Angel Luis Boo MD        Subjective:     Principal Problem:Hypoglycemia        HPI:  Mr. Still is a 65 yo M with PMHx of HTN, T2DM, HLD, gout, CKD4, anemia who presented with hypoglycemia after EMS found him to have glucose in the 20s at home. Per his wife, she walked in on the patient in the living room around 10am of day of admission. He was sluggish, had slurred speech, no LOC or diaphoresis. She called EMS. They gave him an amp of D50 which improved BG to 100s, then he was started on D10. [place] [facility] [place] improved to 200s. This has never happened before in his 40 years of having diabetes. He typically eats less on his days off, and he was off on the day of admission. Patient works as a . He denies fever, chills, nausea, vomiting, abdominal pain, SOB. He denies drastically reduced urine production, typically urinating 3x/day with a few episodes of nocturia. He notes some SOB with exertion and LE "heaviness." Had one episdoe of diarrhea last week for which he took Imodium, and two episodes of nonbloody diarrhea earlier on day of admission. Patient takes glipizide 10 mg BID as his only diabetes medication, denies taking insulin. He took the glipizide on the evening of 4/28 and morning of 4/29, but did not take the evening dose on 4/29. His most recent HbA1c was 5.1 in Sep 2021. Hasn't been measuring his home BG.     In the ED, his labs were significant for hgb of 8, K 5.7, bicarb 12, Cr 9, BUN 80, mag 1.4, alkaline phos 162, alumin 3.2, corrected Ca 7.8, BNP 27. UA positive for glucose and protein. LFTs and lipase wnl. POCT glucose readings had been 200 -> 93 -> 73. He received nebulizer treatment " and lasix 80 for hyperkalemia management. EKG showed NSR, no peaked T waves.       Overview/Hospital Course:  66 year old man with hx of HTN, T2DM, ESRD admitted for hypoglycemia 2/2 polypharmacy. Holding home diabeties meds, A1c 4.4. Hypertensive to 200 - titrating BP meds      Interval History: no acute events overnight    Review of Systems   Constitutional:  Negative for chills, diaphoresis and fever.   HENT: Negative.     Eyes: Negative.  Negative for visual disturbance.   Respiratory:  Negative for cough and shortness of breath.    Cardiovascular:  Positive for leg swelling. Negative for chest pain and palpitations.   Gastrointestinal:  Negative for abdominal distention, abdominal pain, blood in stool, constipation, diarrhea, nausea and vomiting.   Endocrine: Negative.    Genitourinary:  Negative for dysuria and hematuria.   Musculoskeletal: Negative.  Negative for arthralgias and back pain.   Skin: Negative.    Neurological:  Negative for dizziness, syncope, speech difficulty, weakness, light-headedness and headaches.   Hematological: Negative.    Psychiatric/Behavioral: Negative.  Negative for behavioral problems and confusion.    Objective:     Vital Signs (Most Recent):  Temp: 98.1 °F (36.7 °C) (05/01/22 1000)  Pulse: 88 (05/01/22 0918)  Resp: 20 (05/01/22 0421)  BP: (!) 168/78 (05/01/22 0918)  SpO2: 100 % (05/01/22 0421)   Vital Signs (24h Range):  Temp:  [98.1 °F (36.7 °C)-98.7 °F (37.1 °C)] 98.1 °F (36.7 °C)  Pulse:  [] 88  Resp:  [18-22] 20  SpO2:  [99 %-100 %] 100 %  BP: (153-207)/(76-97) 168/78     Weight: 109 kg (240 lb 4.8 oz)  Body mass index is 30.04 kg/m².    Intake/Output Summary (Last 24 hours) at 5/1/2022 1112  Last data filed at 5/1/2022 0400  Gross per 24 hour   Intake 350 ml   Output 200 ml   Net 150 ml      Physical Exam  Constitutional:       General: He is not in acute distress.     Appearance: He is well-developed. He is obese. He is not ill-appearing or toxic-appearing.   HENT:       Head: Normocephalic and atraumatic.      Nose: No congestion or rhinorrhea.      Mouth/Throat:      Pharynx: No oropharyngeal exudate or posterior oropharyngeal erythema.   Eyes:      General: No scleral icterus.        Right eye: No discharge.         Left eye: No discharge.      Pupils: Pupils are equal, round, and reactive to light.   Cardiovascular:      Rate and Rhythm: Normal rate and regular rhythm.      Heart sounds: Normal heart sounds. No murmur heard.    No gallop.   Pulmonary:      Effort: Pulmonary effort is normal.      Breath sounds: Rales present.   Abdominal:      General: Bowel sounds are normal.      Palpations: Abdomen is soft.      Tenderness: There is no abdominal tenderness.   Musculoskeletal:         General: Normal range of motion.      Cervical back: Normal range of motion and neck supple.      Right lower leg: Edema present.      Left lower leg: Edema present.   Skin:     Capillary Refill: Capillary refill takes less than 2 seconds.      Coloration: Skin is not jaundiced.      Findings: No lesion or rash.   Neurological:      Mental Status: He is alert and oriented to person, place, and time.       Significant Labs: All pertinent labs within the past 24 hours have been reviewed.    Significant Imaging: I have reviewed all pertinent imaging results/findings within the past 24 hours.      Assessment/Plan:      * Hypoglycemia  Blood sugar 203 mg/dl on admission, given dextrose by EMS with improvement   No history of labile blood sugars during previous admission   Most recent blood sugar 73 mg/dl   Mentation: AAO x3 during my interview   Taking glipizide 10 BID    Ddx: decreased po intake and decreased glipizide clearance (KAYODE on CKD), Likely not very hyperglycemic at baseline given low HbA1c. Less likely exogenous administration of insulin, insulinoma     Plan:   -Telemetry   -Fall precautions   -Neuro checks q4hrs   -Continue to monitor sugars.         Hypomagnesemia  Replace as  needed      Hyperkalemia  K 5.7 on admission   ECG: NSR, no peaked t waves  Chest pain/discomfort: none  Home medications: no ARBs, or ACEi    Plan:   -Page cardiology if K >5.5 with ECG changes or nephrology if >5.5 (persistently) and anuric  -Trend BMP  -Telemetry   -Shift with lasix, duonebs, lokelma PRN. Be cautious about using insulin with dextrose in setting of hypoglycemia        KAYODE (acute kidney injury)  Patient with acute kidney injury likely d/t Pre-renal azotemia and Acute tubular necrosis Which is currently worsening. Labs reviewed- Renal function/electrolytes with Estimated Creatinine Clearance: 10.9 mL/min (A) (based on SCr of 9.1 mg/dL (H)). according to latest data. Monitor urine output and serial BMP and adjust therapy as needed. Avoid nephrotoxins and renally dose meds for GFR listed above.     Baseline Cr 3-6, Cr 9 on admission  Hx of CKD: yes due to HTN/DM  Last saw nephro 2013  Exam with edema, no decreased O2 sats or rales  FeNA: not reliable, had already gotten lasix  FeUrea: pending  UA / UCx: proteinuria, glucuria  DDx: hypotension, prerenal, progression of CKD  No indications for HD at this time    Plan:   - nephro consulted, appreciate recs   -possible biopsy indicated given proteinuria  - Trend Cr  - Strict I&Os  - Avoid nephrotoxic agents (NSAIDs, gadolinium, IV radiocontrast)  - Avoid hypotension  - Renal diet  - Renally adjust medications        Anemia of chronic disease  Baseline Hb 11, Hb 8 on admission  Antiplatelet/anticoagulation: none  No overt source of bleeding on exam, denies bloody BM, hematuria.       Plan:  - iron studies, reticulocyte, ferritin  - CBCs daily  - Transfuse with goal Hb > 7        Gout  Takes colchicine as needed at home      CKD (chronic kidney disease) stage 4, GFR 15-29 ml/min  Hx of CKD due to HTN, DM. Baseline around 3-6    - nephro referral       Mixed hyperlipidemia  Continue home lipitor 20      Essential (primary) hypertension  Continue home meds  amlodipine and Toprol XL      Proteinuria  Hx of proteinuria, UPCr >7, nephrotic range    - consider ACEi or SGLT-2 inhibitor at discharge      Diabetes mellitus due to underlying condition with diabetic nephropathy  Diabetes type 2  Last HbA1c:   Lab Results   Component Value Date    HGBA1C 4.4 04/30/2022     Insulin regimen: none  Oral regimen ( held inpatient): glipizide   CAD Prophylaxis: statin  HTN or Renal disease: KAYODE, hypertensive    PLAN:   - Hold oral anti-hyperglycemic agents  - LD SSI  - Diabetic diet  - Hypoglycemia orderset with POCT BG AC/QS  - Goal -180 while inpatient             VTE Risk Mitigation (From admission, onward)         Ordered     heparin (porcine) injection 5,000 Units  Every 8 hours         04/30/22 0429     IP VTE HIGH RISK PATIENT  Once         04/30/22 0429     Place sequential compression device  Until discontinued         04/30/22 0429                Discharge Planning   HORACIO: 5/2/2022     Code Status: Full Code   Is the patient medically ready for discharge?: No    Reason for patient still in hospital (select all that apply): Patient trending condition                     Sam Mcleod MD  Department of Hospital Medicine   [facility] - Intensive Care ([facility]-)

## 2022-05-01 NOTE — SUBJECTIVE & OBJECTIVE
Interval History: see hpi. Blood glucose stable since admission. Patient has been hypertensive since arrival, but reports blood pressure is usually ~ 140s/90s at home. Denies any complaints. Reports normal urine output at this time, but reports increased frequency    Review of patient's allergies indicates:   Allergen Reactions    Iodine and iodide containing products Hives     Hypotension, Flushing     Current Facility-Administered Medications   Medication Frequency    acetaminophen tablet 650 mg Q4H PRN    amLODIPine tablet 10 mg Daily    atorvastatin tablet 20 mg QHS    carvediloL tablet 6.25 mg BID    dextrose 10% bolus 125 mL PRN    dextrose 10% bolus 250 mL PRN    fluticasone propionate 50 mcg/actuation nasal spray 100 mcg Daily    glucagon (human recombinant) injection 1 mg PRN    glucose chewable tablet 16 g PRN    glucose chewable tablet 24 g PRN    heparin (porcine) injection 5,000 Units Q8H    hydrALAZINE tablet 25 mg Q8H PRN    insulin aspart U-100 pen 0-5 Units QID (AC + HS) PRN    melatonin tablet 6 mg Nightly PRN    naloxone 0.4 mg/mL injection 0.02 mg PRN    senna-docusate 8.6-50 mg per tablet 1 tablet BID    sodium bicarbonate tablet 1,300 mg BID    sodium chloride 0.9% flush 10 mL Q12H PRN     Review of Systems   Constitutional:  Positive for diaphoresis. Negative for chills and fever.        Patient has diaphoresis every time he eats food    HENT:  Negative for hearing loss and sore throat.    Respiratory:  Positive for shortness of breath. Negative for cough.    Cardiovascular:  Negative for chest pain.        Patient reports SOB since covid infection in 2020   Gastrointestinal:  Negative for abdominal pain, constipation, diarrhea, nausea and vomiting.        Diarrhea resolved, believes diarrhea is related to alcohol intake.    Genitourinary:  Positive for frequency.        Patient reports normal urine output - believes he is going to restroom every hour and seems to be retaining urine. Urine  sometimes appears foamy   Musculoskeletal:  Negative for back pain.        LE swelling X 2 weeks    Neurological:  Negative for dizziness, loss of consciousness and weakness.     Objective:     Vital Signs (Most Recent):  Temp: 98.2 °F (36.8 °C) (05/01/22 1417)  Pulse: 92 (05/01/22 1417)  Resp: 16 (05/01/22 1417)  BP: (!) 168/78 (05/01/22 1417)  SpO2: 98 % (05/01/22 1417)  O2 Device (Oxygen Therapy): room air (05/01/22 0421)   Vital Signs (24h Range):  Temp:  [98.1 °F (36.7 °C)-98.7 °F (37.1 °C)] 98.2 °F (36.8 °C)  Pulse:  [] 92  Resp:  [16-22] 16  SpO2:  [85 %-100 %] 98 %  BP: (153-207)/(76-97) 168/78     Weight: 109 kg (240 lb 4.8 oz) (04/30/22 1920)  Body mass index is 30.04 kg/m².  Body surface area is 2.4 meters squared.    I/O last 3 completed shifts:  In: 350 [P.O.:350]  Out: 1000 [Urine:1000]    Physical Exam  Constitutional:       Appearance: Normal appearance. He is obese.   HENT:      Head: Normocephalic and atraumatic.      Mouth/Throat:      Mouth: Mucous membranes are moist.   Eyes:      General: No scleral icterus.     Extraocular Movements: Extraocular movements intact.      Pupils: Pupils are equal, round, and reactive to light.   Cardiovascular:      Rate and Rhythm: Normal rate and regular rhythm.      Pulses: Normal pulses.   Pulmonary:      Effort: Pulmonary effort is normal.      Breath sounds: Normal breath sounds.   Abdominal:      General: Bowel sounds are normal. There is no distension.      Palpations: Abdomen is soft.      Tenderness: There is no abdominal tenderness. There is no guarding or rebound.      Comments: Abdomen is enlarged, patient states this is chronic    Musculoskeletal:      Right lower leg: Edema present.      Left lower leg: Edema present.   Skin:     General: Skin is warm and dry.      Coloration: Skin is not jaundiced.   Neurological:      Mental Status: He is alert and oriented to person, place, and time.   Psychiatric:         Mood and Affect: Mood normal.          Behavior: Behavior normal.         Thought Content: Thought content normal.       Significant Labs:  All labs within the past 24 hours have been reviewed.     Significant Imaging:  Reviewed

## 2022-05-01 NOTE — ASSESSMENT & PLAN NOTE
Mr. Still is a 67 yo M with PMHx of HTN, T2DM, HLD, gout, CKD4 (baseline unclear, but was 6.4 in September 2021 - previously was 3.6 in 2020), anemia admitted to hospital medicine service with hypoglycemia (blood glucose in 20s -  takes glipizide 10 mg BID, denies taking insulin).     He denies reduced urine production, typically urinates every hour about ~200 ccs. Believes he might be retaining urine. Urine sometimes appears slightly foamy.. Nephrology consulted for KAYODE on CKD (Cr = 9.1) and proteinuria.    Likely chronic issue and progression of CKD due to HTN and DM.      Recommendations:  -Can order PLA2R, SPEP, UPEP, BRIAN, PR3, MPO  -10 mg lokelma daily for hyperkalemia  -1300 sodium bicarb BID for low bicarb  -Lasix 80 mg IV daily for volume overload  -Sevelamer 800 mg with meals   -Post void bladder scan  -Avoid nephrotoxins  -Daily RFP  -Strict ins and outs   -Diabetic, renal diet   ** do not recommend renal biopsy at this time as this seems to be a chronic issue with late stage changes seen on imaging

## 2022-05-02 LAB
ALBUMIN SERPL BCP-MCNC: 3 G/DL (ref 3.5–5.2)
ALP SERPL-CCNC: 153 U/L (ref 55–135)
ALT SERPL W/O P-5'-P-CCNC: 16 U/L (ref 10–44)
ANION GAP SERPL CALC-SCNC: 11 MMOL/L (ref 8–16)
ANION GAP SERPL CALC-SCNC: 12 MMOL/L (ref 8–16)
AST SERPL-CCNC: 17 U/L (ref 10–40)
BASOPHILS # BLD AUTO: 0.02 K/UL (ref 0–0.2)
BASOPHILS NFR BLD: 0.4 % (ref 0–1.9)
BILIRUB SERPL-MCNC: 0.2 MG/DL (ref 0.1–1)
BUN SERPL-MCNC: 85 MG/DL (ref 8–23)
BUN SERPL-MCNC: 88 MG/DL (ref 8–23)
CALCIUM SERPL-MCNC: 7.2 MG/DL (ref 8.7–10.5)
CALCIUM SERPL-MCNC: 7.3 MG/DL (ref 8.7–10.5)
CHLORIDE SERPL-SCNC: 111 MMOL/L (ref 95–110)
CHLORIDE SERPL-SCNC: 113 MMOL/L (ref 95–110)
CO2 SERPL-SCNC: 13 MMOL/L (ref 23–29)
CO2 SERPL-SCNC: 14 MMOL/L (ref 23–29)
CREAT SERPL-MCNC: 10.2 MG/DL (ref 0.5–1.4)
CREAT SERPL-MCNC: 8.9 MG/DL (ref 0.5–1.4)
DIFFERENTIAL METHOD: ABNORMAL
EOSINOPHIL # BLD AUTO: 0.4 K/UL (ref 0–0.5)
EOSINOPHIL NFR BLD: 7.1 % (ref 0–8)
ERYTHROCYTE [DISTWIDTH] IN BLOOD BY AUTOMATED COUNT: 14.5 % (ref 11.5–14.5)
EST. GFR  (AFRICAN AMERICAN): 5.5 ML/MIN/1.73 M^2
EST. GFR  (AFRICAN AMERICAN): 6.5 ML/MIN/1.73 M^2
EST. GFR  (NON AFRICAN AMERICAN): 4.8 ML/MIN/1.73 M^2
EST. GFR  (NON AFRICAN AMERICAN): 5.6 ML/MIN/1.73 M^2
GLUCOSE SERPL-MCNC: 124 MG/DL (ref 70–110)
GLUCOSE SERPL-MCNC: 129 MG/DL (ref 70–110)
HCT VFR BLD AUTO: 23.6 % (ref 40–54)
HGB BLD-MCNC: 7.7 G/DL (ref 14–18)
IMM GRANULOCYTES # BLD AUTO: 0.01 K/UL (ref 0–0.04)
IMM GRANULOCYTES NFR BLD AUTO: 0.2 % (ref 0–0.5)
LYMPHOCYTES # BLD AUTO: 1 K/UL (ref 1–4.8)
LYMPHOCYTES NFR BLD: 19.4 % (ref 18–48)
MAGNESIUM SERPL-MCNC: 1.5 MG/DL (ref 1.6–2.6)
MCH RBC QN AUTO: 28.3 PG (ref 27–31)
MCHC RBC AUTO-ENTMCNC: 32.6 G/DL (ref 32–36)
MCV RBC AUTO: 87 FL (ref 82–98)
MONOCYTES # BLD AUTO: 0.6 K/UL (ref 0.3–1)
MONOCYTES NFR BLD: 12 % (ref 4–15)
NEUTROPHILS # BLD AUTO: 3 K/UL (ref 1.8–7.7)
NEUTROPHILS NFR BLD: 60.9 % (ref 38–73)
NRBC BLD-RTO: 0 /100 WBC
PHOSPHATE SERPL-MCNC: 8 MG/DL (ref 2.7–4.5)
PLATELET # BLD AUTO: 214 K/UL (ref 150–450)
PMV BLD AUTO: 11.7 FL (ref 9.2–12.9)
POCT GLUCOSE: 135 MG/DL (ref 70–110)
POCT GLUCOSE: 139 MG/DL (ref 70–110)
POCT GLUCOSE: 149 MG/DL (ref 70–110)
POCT GLUCOSE: 164 MG/DL (ref 70–110)
POTASSIUM SERPL-SCNC: 5.4 MMOL/L (ref 3.5–5.1)
POTASSIUM SERPL-SCNC: 5.5 MMOL/L (ref 3.5–5.1)
PROT SERPL-MCNC: 6.4 G/DL (ref 6–8.4)
RBC # BLD AUTO: 2.72 M/UL (ref 4.6–6.2)
SODIUM SERPL-SCNC: 137 MMOL/L (ref 136–145)
SODIUM SERPL-SCNC: 137 MMOL/L (ref 136–145)
WBC # BLD AUTO: 4.9 K/UL (ref 3.9–12.7)

## 2022-05-02 PROCEDURE — 99232 SBSQ HOSP IP/OBS MODERATE 35: CPT | Mod: ,,, | Performed by: INTERNAL MEDICINE

## 2022-05-02 PROCEDURE — 25000003 PHARM REV CODE 250

## 2022-05-02 PROCEDURE — 86038 ANTINUCLEAR ANTIBODIES: CPT | Performed by: STUDENT IN AN ORGANIZED HEALTH CARE EDUCATION/TRAINING PROGRAM

## 2022-05-02 PROCEDURE — 84165 PATHOLOGIST INTERPRETATION SPE: ICD-10-PCS | Mod: 26,,, | Performed by: PATHOLOGY

## 2022-05-02 PROCEDURE — 99232 PR SUBSEQUENT HOSPITAL CARE,LEVL II: ICD-10-PCS | Mod: ,,, | Performed by: STUDENT IN AN ORGANIZED HEALTH CARE EDUCATION/TRAINING PROGRAM

## 2022-05-02 PROCEDURE — 84100 ASSAY OF PHOSPHORUS: CPT

## 2022-05-02 PROCEDURE — 86334 PATHOLOGIST INTERPRETATION IFE: ICD-10-PCS | Mod: 26,,, | Performed by: PATHOLOGY

## 2022-05-02 PROCEDURE — 63600175 PHARM REV CODE 636 W HCPCS: Performed by: STUDENT IN AN ORGANIZED HEALTH CARE EDUCATION/TRAINING PROGRAM

## 2022-05-02 PROCEDURE — 63600175 PHARM REV CODE 636 W HCPCS

## 2022-05-02 PROCEDURE — 86255 FLUORESCENT ANTIBODY SCREEN: CPT | Performed by: STUDENT IN AN ORGANIZED HEALTH CARE EDUCATION/TRAINING PROGRAM

## 2022-05-02 PROCEDURE — 83735 ASSAY OF MAGNESIUM: CPT

## 2022-05-02 PROCEDURE — 25000003 PHARM REV CODE 250: Performed by: HOSPITALIST

## 2022-05-02 PROCEDURE — 25000003 PHARM REV CODE 250: Performed by: STUDENT IN AN ORGANIZED HEALTH CARE EDUCATION/TRAINING PROGRAM

## 2022-05-02 PROCEDURE — 85025 COMPLETE CBC W/AUTO DIFF WBC: CPT

## 2022-05-02 PROCEDURE — 86334 IMMUNOFIX E-PHORESIS SERUM: CPT | Mod: 26,,, | Performed by: PATHOLOGY

## 2022-05-02 PROCEDURE — 80048 BASIC METABOLIC PNL TOTAL CA: CPT

## 2022-05-02 PROCEDURE — 80053 COMPREHEN METABOLIC PANEL: CPT

## 2022-05-02 PROCEDURE — 20600001 HC STEP DOWN PRIVATE ROOM

## 2022-05-02 PROCEDURE — 99232 PR SUBSEQUENT HOSPITAL CARE,LEVL II: ICD-10-PCS | Mod: ,,, | Performed by: INTERNAL MEDICINE

## 2022-05-02 PROCEDURE — 83520 IMMUNOASSAY QUANT NOS NONAB: CPT | Performed by: STUDENT IN AN ORGANIZED HEALTH CARE EDUCATION/TRAINING PROGRAM

## 2022-05-02 PROCEDURE — 86334 IMMUNOFIX E-PHORESIS SERUM: CPT | Performed by: STUDENT IN AN ORGANIZED HEALTH CARE EDUCATION/TRAINING PROGRAM

## 2022-05-02 PROCEDURE — 36415 COLL VENOUS BLD VENIPUNCTURE: CPT

## 2022-05-02 PROCEDURE — 99232 SBSQ HOSP IP/OBS MODERATE 35: CPT | Mod: ,,, | Performed by: STUDENT IN AN ORGANIZED HEALTH CARE EDUCATION/TRAINING PROGRAM

## 2022-05-02 PROCEDURE — 84165 PROTEIN E-PHORESIS SERUM: CPT | Performed by: STUDENT IN AN ORGANIZED HEALTH CARE EDUCATION/TRAINING PROGRAM

## 2022-05-02 PROCEDURE — 84165 PROTEIN E-PHORESIS SERUM: CPT | Mod: 26,,, | Performed by: PATHOLOGY

## 2022-05-02 PROCEDURE — 83516 IMMUNOASSAY NONANTIBODY: CPT | Mod: 59 | Performed by: STUDENT IN AN ORGANIZED HEALTH CARE EDUCATION/TRAINING PROGRAM

## 2022-05-02 PROCEDURE — 83516 IMMUNOASSAY NONANTIBODY: CPT | Performed by: STUDENT IN AN ORGANIZED HEALTH CARE EDUCATION/TRAINING PROGRAM

## 2022-05-02 RX ORDER — TAMSULOSIN HYDROCHLORIDE 0.4 MG/1
0.4 CAPSULE ORAL DAILY
Status: DISCONTINUED | OUTPATIENT
Start: 2022-05-03 | End: 2022-05-10 | Stop reason: HOSPADM

## 2022-05-02 RX ORDER — CARVEDILOL 12.5 MG/1
12.5 TABLET ORAL 2 TIMES DAILY
Status: DISCONTINUED | OUTPATIENT
Start: 2022-05-02 | End: 2022-05-10 | Stop reason: HOSPADM

## 2022-05-02 RX ORDER — NIFEDIPINE 30 MG/1
30 TABLET, EXTENDED RELEASE ORAL DAILY
Status: DISCONTINUED | OUTPATIENT
Start: 2022-05-02 | End: 2022-05-06

## 2022-05-02 RX ORDER — FUROSEMIDE 10 MG/ML
80 INJECTION INTRAMUSCULAR; INTRAVENOUS DAILY
Status: DISCONTINUED | OUTPATIENT
Start: 2022-05-03 | End: 2022-05-03

## 2022-05-02 RX ORDER — MAGNESIUM SULFATE HEPTAHYDRATE 40 MG/ML
2 INJECTION, SOLUTION INTRAVENOUS
Status: DISCONTINUED | OUTPATIENT
Start: 2022-05-02 | End: 2022-05-02

## 2022-05-02 RX ORDER — NIFEDIPINE 30 MG/1
30 TABLET, EXTENDED RELEASE ORAL DAILY
Status: DISCONTINUED | OUTPATIENT
Start: 2022-05-02 | End: 2022-05-02

## 2022-05-02 RX ORDER — LANOLIN ALCOHOL/MO/W.PET/CERES
400 CREAM (GRAM) TOPICAL 2 TIMES DAILY
Status: COMPLETED | OUTPATIENT
Start: 2022-05-02 | End: 2022-05-02

## 2022-05-02 RX ORDER — SEVELAMER CARBONATE 800 MG/1
800 TABLET, FILM COATED ORAL
Status: DISCONTINUED | OUTPATIENT
Start: 2022-05-02 | End: 2022-05-10

## 2022-05-02 RX ORDER — FUROSEMIDE 10 MG/ML
80 INJECTION INTRAMUSCULAR; INTRAVENOUS ONCE
Status: COMPLETED | OUTPATIENT
Start: 2022-05-02 | End: 2022-05-02

## 2022-05-02 RX ADMIN — FUROSEMIDE 80 MG: 10 INJECTION, SOLUTION INTRAMUSCULAR; INTRAVENOUS at 08:05

## 2022-05-02 RX ADMIN — ATORVASTATIN CALCIUM 20 MG: 20 TABLET, FILM COATED ORAL at 09:05

## 2022-05-02 RX ADMIN — SENNOSIDES AND DOCUSATE SODIUM 1 TABLET: 50; 8.6 TABLET ORAL at 09:05

## 2022-05-02 RX ADMIN — CARVEDILOL 12.5 MG: 12.5 TABLET, FILM COATED ORAL at 09:05

## 2022-05-02 RX ADMIN — SODIUM ZIRCONIUM CYCLOSILICATE 10 G: 10 POWDER, FOR SUSPENSION ORAL at 08:05

## 2022-05-02 RX ADMIN — CARVEDILOL 12.5 MG: 12.5 TABLET, FILM COATED ORAL at 08:05

## 2022-05-02 RX ADMIN — SEVELAMER CARBONATE 800 MG: 800 TABLET, FILM COATED ORAL at 12:05

## 2022-05-02 RX ADMIN — HEPARIN SODIUM 5000 UNITS: 5000 INJECTION INTRAVENOUS; SUBCUTANEOUS at 09:05

## 2022-05-02 RX ADMIN — Medication 400 MG: at 09:05

## 2022-05-02 RX ADMIN — SODIUM BICARBONATE 650 MG TABLET 1300 MG: at 09:05

## 2022-05-02 RX ADMIN — HYDRALAZINE HYDROCHLORIDE 25 MG: 25 TABLET, FILM COATED ORAL at 05:05

## 2022-05-02 RX ADMIN — SODIUM BICARBONATE 650 MG TABLET 1300 MG: at 08:05

## 2022-05-02 RX ADMIN — Medication 400 MG: at 08:05

## 2022-05-02 RX ADMIN — HEPARIN SODIUM 5000 UNITS: 5000 INJECTION INTRAVENOUS; SUBCUTANEOUS at 05:05

## 2022-05-02 RX ADMIN — SEVELAMER CARBONATE 800 MG: 800 TABLET, FILM COATED ORAL at 05:05

## 2022-05-02 RX ADMIN — NIFEDIPINE 30 MG: 30 TABLET, FILM COATED, EXTENDED RELEASE ORAL at 02:05

## 2022-05-02 RX ADMIN — SODIUM ZIRCONIUM CYCLOSILICATE 10 G: 10 POWDER, FOR SUSPENSION ORAL at 09:05

## 2022-05-02 RX ADMIN — SENNOSIDES AND DOCUSATE SODIUM 1 TABLET: 50; 8.6 TABLET ORAL at 08:05

## 2022-05-02 NOTE — ASSESSMENT & PLAN NOTE
Mr. Still is a 67 yo M with PMHx of HTN, T2DM, HLD, gout, CKD4 (baseline unclear, but was 6.4 in September 2021 - previously was 3.6 in 2020), anemia admitted to hospital medicine service with hypoglycemia (blood glucose in 20s -  takes glipizide 10 mg BID, denies taking insulin).     He denies reduced urine production, typically urinates every hour about ~200 ccs. Believes he might be retaining urine. Urine sometimes appears slightly foamy. No hydronephrosis on KUS. Nephrology consulted for KAYODE on CKD (Cr = 9.1) and proteinuria.    Likely chronic issue and progression of CKD due to HTN and DM.      Recommendations:  -Do not recommend renal biopsy at this time as this seems to be a chronic issue with late stage changes seen on imaging.   -Pt advised on the likely progression of his renal disease and the need for dialysis; he refers he will think about it and will discuss it with his wife  -FU serologies   -Increase lokelma to 10g PO TID for now   -Sodium bicarb 1300mg PO TID for metabolic acidosis   -Lasix 80 mg IV daily for volume overload  -Sevelamer 800 mg with meals   -Post void bladder scan  -Avoid nephrotoxins  -Daily RFP  -Strict ins and outs   -Diabetic, renal diet

## 2022-05-02 NOTE — PROGRESS NOTES
"[facility] ([address] Medicine  Progress Note    Patient Name: Elbert Still Jr.  MRN: [id number]  Patient Class: IP- Inpatient   Admission Date: 4/30/2022  Length of Stay: 2 days  Attending Physician: Juwan Muhammad MD  Primary Care Provider: Angel Luis Boo MD        Subjective:     Principal Problem:Acute kidney injury superimposed on chronic kidney disease        HPI:  Mr. Still is a 67 yo M with PMHx of HTN, T2DM, HLD, gout, CKD4, anemia who presented with hypoglycemia after EMS found him to have glucose in the 20s at home. Per his wife, she walked in on the patient in the living room around 10am of day of admission. He was sluggish, had slurred speech, no LOC or diaphoresis. She called EMS. They gave him an amp of D50 which improved BG to 100s, then he was started on D10. [place] [facility] had improved to 200s. This has never happened before in his 40 years of having diabetes. He typically eats less on his days off, and he was off on the day of admission. Patient works as a . He denies fever, chills, nausea, vomiting, abdominal pain, SOB. He denies drastically reduced urine production, typically urinating 3x/day with a few episodes of nocturia. He notes some SOB with exertion and LE "heaviness." Had one episdoe of diarrhea last week for which he took Imodium, and two episodes of nonbloody diarrhea earlier on day of admission. Patient takes glipizide 10 mg BID as his only diabetes medication, denies taking insulin. He took the glipizide on the evening of 4/28 and morning of 4/29, but did not take the evening dose on 4/29. His most recent HbA1c was 5.1 in Sep 2021. Hasn't been measuring his home BG.     In the ED, his labs were significant for hgb of 8, K 5.7, bicarb 12, Cr 9, BUN 80, mag 1.4, alkaline phos 162, alumin 3.2, corrected Ca 7.8, BNP 27. UA positive for glucose and protein. LFTs and lipase wnl. POCT glucose readings had been 200 " -> 93 -> 73. He received nebulizer treatment and lasix 80 for hyperkalemia management. EKG showed NSR, no peaked T waves.       Overview/Hospital Course:  66 year old man with hx of HTN, T2DM, ESRD admitted for hypoglycemia 2/2 polypharmacy. Holding home diabeties meds, A1c 4.4. Hypertensive to 200 - titrating BP meds. Nephrology consulted for management of worsening Cr and thought to be due to longstanding HTN and DM. No acute indications for dialsysis at this time but patient would prefer not to given history with mother recently being admitted to hospice for ESRD. Patient with persistent HTN and so nifed and coreg increased. K elevated to 5.5 and lokelma and lasix started.       Interval History: NAEON, patient without hypoglycemia. Monitoring blood pressure and potassium    Review of Systems   Constitutional:  Negative for chills, diaphoresis and fever.   HENT: Negative.     Eyes: Negative.  Negative for visual disturbance.   Respiratory:  Negative for cough and shortness of breath.    Cardiovascular:  Positive for leg swelling. Negative for chest pain and palpitations.   Gastrointestinal:  Negative for abdominal distention, abdominal pain, blood in stool, constipation, diarrhea, nausea and vomiting.   Endocrine: Negative.    Genitourinary:  Negative for dysuria and hematuria.   Musculoskeletal: Negative.  Negative for arthralgias and back pain.   Skin: Negative.    Neurological:  Negative for dizziness, syncope, speech difficulty, weakness, light-headedness and headaches.   Hematological: Negative.    Psychiatric/Behavioral: Negative.  Negative for behavioral problems and confusion.    Objective:     Vital Signs (Most Recent):  Temp: 98.1 °F (36.7 °C) (05/02/22 1603)  Pulse: 87 (05/02/22 1603)  Resp: 16 (05/02/22 1406)  BP: (!) 188/91 (05/02/22 1603)  SpO2: 97 % (05/02/22 1603)   Vital Signs (24h Range):  Temp:  [97.3 °F (36.3 °C)-98.3 °F (36.8 °C)] 98.1 °F (36.7 °C)  Pulse:  [83-99] 87  Resp:  [16-20]  16  SpO2:  [96 %-100 %] 97 %  BP: (169-215)/(84-98) 188/91     Weight: 109 kg (240 lb 4.8 oz)  Body mass index is 30.04 kg/m².    Intake/Output Summary (Last 24 hours) at 5/2/2022 1849  Last data filed at 5/2/2022 0030  Gross per 24 hour   Intake --   Output 1 ml   Net -1 ml      Physical Exam  Constitutional:       General: He is not in acute distress.     Appearance: He is well-developed. He is obese. He is not ill-appearing or toxic-appearing.   HENT:      Head: Normocephalic and atraumatic.      Nose: No congestion or rhinorrhea.      Mouth/Throat:      Pharynx: No oropharyngeal exudate or posterior oropharyngeal erythema.   Eyes:      General: No scleral icterus.        Right eye: No discharge.         Left eye: No discharge.      Pupils: Pupils are equal, round, and reactive to light.   Cardiovascular:      Rate and Rhythm: Normal rate and regular rhythm.      Heart sounds: Normal heart sounds. No murmur heard.    No gallop.   Pulmonary:      Effort: Pulmonary effort is normal.      Breath sounds: Rales present.   Abdominal:      General: Bowel sounds are normal.      Palpations: Abdomen is soft.      Tenderness: There is no abdominal tenderness.   Musculoskeletal:         General: Normal range of motion.      Cervical back: Normal range of motion and neck supple.      Right lower leg: Edema present.      Left lower leg: Edema present.   Skin:     Capillary Refill: Capillary refill takes less than 2 seconds.      Coloration: Skin is not jaundiced.      Findings: No lesion or rash.   Neurological:      Mental Status: He is alert and oriented to person, place, and time.       Significant Labs: All pertinent labs within the past 24 hours have been reviewed.  A1C:   Recent Labs   Lab 04/30/22  0103   HGBA1C 4.4     ABGs: No results for input(s): PH, PCO2, HCO3, POCSATURATED, BE, TOTALHB, COHB, METHB, O2HB, POCFIO2, PO2 in the last 48 hours.  Bilirubin:   Recent Labs   Lab 04/30/22  0103 05/01/22  0457  05/02/22  0547   BILITOT 0.3 0.3 0.2     Blood Culture: No results for input(s): LABBLOO in the last 48 hours.  CBC:   Recent Labs   Lab 05/01/22  0459 05/02/22  0547   WBC 6.10 4.90   HGB 7.5* 7.7*   HCT 23.9* 23.6*    214     CMP:   Recent Labs   Lab 05/01/22  0459 05/02/22  0547 05/02/22  1404    137 137   K 5.1 5.5* 5.4*   * 113* 111*   CO2 14* 13* 14*   * 124* 129*   BUN 82* 88* 85*   CREATININE 9.1* 10.2* 8.9*   CALCIUM 7.6* 7.3* 7.2*   PROT 6.5 6.4  --    ALBUMIN 3.0* 3.0*  --    BILITOT 0.3 0.2  --    ALKPHOS 150* 153*  --    AST 18 17  --    ALT 17 16  --    ANIONGAP 13 11 12   EGFRNONAA 5.5* 4.8* 5.6*     Cardiac Markers: No results for input(s): CKMB, MYOGLOBIN, BNP, TROPISTAT in the last 48 hours.  Coagulation: No results for input(s): PT, INR, APTT in the last 48 hours.  Lactic Acid: No results for input(s): LACTATE in the last 48 hours.  Lipase: No results for input(s): LIPASE in the last 48 hours.  Lipid Panel: No results for input(s): CHOL, HDL, LDLCALC, TRIG, CHOLHDL in the last 48 hours.  Magnesium:   Recent Labs   Lab 05/01/22  0459 05/02/22  0547   MG 1.5* 1.5*     POCT Glucose:   Recent Labs   Lab 05/02/22  0825 05/02/22  1141 05/02/22  1603   POCTGLUCOSE 139* 149* 135*     Prealbumin: No results for input(s): PREALBUMIN in the last 48 hours.  Respiratory Culture: No results for input(s): GSRESP, RESPIRATORYC in the last 48 hours.  Troponin: No results for input(s): TROPONINI in the last 48 hours.  TSH:   Recent Labs   Lab 04/30/22  0103   TSH 0.879     Urine Culture: No results for input(s): LABURIN in the last 48 hours.  Urine Studies: No results for input(s): COLORU, APPEARANCEUA, PHUR, SPECGRAV, PROTEINUA, GLUCUA, KETONESU, BILIRUBINUA, OCCULTUA, NITRITE, UROBILINOGEN, LEUKOCYTESUR, RBCUA, WBCUA, BACTERIA, SQUAMEPITHEL, HYALINECASTS in the last 48 hours.    Invalid input(s): JAYDEN  None    Significant Imaging: I have reviewed all pertinent imaging  results/findings within the past 24 hours.      Assessment/Plan:      * Acute kidney injury superimposed on chronic kidney disease        Hyperkalemia  K 5.7 on admission   ECG: NSR, no peaked t waves  Chest pain/discomfort: none  Home medications: no ARBs, or ACEi    Plan:   -Page cardiology if K >5.5 with ECG changes or nephrology if >5.5 (persistently) and anuric  -Trend BMP, repeat potassium 5.4 today  -Telemetry   -Shift with lasix, duonebs, lokelma PRN. Be cautious about using insulin with dextrose in setting of hypoglycemia        KAYODE (acute kidney injury)  Patient with acute kidney injury likely d/t Pre-renal azotemia and Acute tubular necrosis Which is currently worsening. Labs reviewed- Renal function/electrolytes with Estimated Creatinine Clearance: 10.9 mL/min (A) (based on SCr of 9.1 mg/dL (H)). according to latest data. Monitor urine output and serial BMP and adjust therapy as needed. Avoid nephrotoxins and renally dose meds for GFR listed above.     Baseline Cr 3-6, Cr 9 on admission  Hx of CKD: yes due to HTN/DM  Last saw nephro 2013  Exam with edema, no decreased O2 sats or rales  FeNA: not reliable, had already gotten lasix  FeUrea: pending  UA / UCx: proteinuria, glucuria  DDx: hypotension, prerenal, progression of CKD  No indications for HD at this time    Plan:   - nephro consulted, appreciate recs   -possible biopsy indicated given proteinuria  - Trend Cr  - Strict I&Os  - Avoid nephrotoxic agents (NSAIDs, gadolinium, IV radiocontrast)  - Avoid hypotension  - Renal diet  - Renally adjust medications        Hypoglycemia  Blood sugar 203 mg/dl on admission, given dextrose by EMS with improvement   No history of labile blood sugars during previous admission   Most recent blood sugar 73 mg/dl   Mentation: AAO x3 during my interview   Taking glipizide 10 BID    Ddx: decreased po intake and decreased glipizide clearance (KAYODE on CKD), Likely not very hyperglycemic at baseline given low HbA1c. Less  likely exogenous administration of insulin, insulinoma     Plan:   -Telemetry   -Fall precautions   -Neuro checks q4hrs   -Continue to monitor sugars.         Anemia of chronic disease  Baseline Hb 11, Hb 8 on admission  Antiplatelet/anticoagulation: none  No overt source of bleeding on exam, denies bloody BM, hematuria.       Plan:  - iron studies, reticulocyte, ferritin  - CBCs daily  - Transfuse with goal Hb > 7        Gout  Takes colchicine as needed at home      CKD (chronic kidney disease) stage 4, GFR 15-29 ml/min  Hx of CKD due to HTN, DM. Baseline around 3-6    - nephro referral       Mixed hyperlipidemia  Continue home lipitor 20      Essential (primary) hypertension  Continue home meds amlodipine and Toprol XL    Plan:  --started on Norvasc 30mg daily, will continue to titrate      Proteinuria  Hx of proteinuria, UPCr >7, nephrotic range    - consider ACEi or SGLT-2 inhibitor at discharge      Diabetes mellitus due to underlying condition with diabetic nephropathy  Diabetes type 2  Last HbA1c:   Lab Results   Component Value Date    HGBA1C 4.4 04/30/2022     Insulin regimen: none  Oral regimen ( held inpatient): glipizide   CAD Prophylaxis: statin  HTN or Renal disease: KAYODE, hypertensive    PLAN:   - Hold oral anti-hyperglycemic agents  - LD SSI  - Diabetic diet  - Hypoglycemia orderset with POCT BG AC/QS  - Goal -180 while inpatient               VTE Risk Mitigation (From admission, onward)         Ordered     heparin (porcine) injection 5,000 Units  Every 8 hours         04/30/22 0429     IP VTE HIGH RISK PATIENT  Once         04/30/22 0429     Place sequential compression device  Until discontinued         04/30/22 0429                Discharge Planning   HORACIO: 5/6/2022     Code Status: Full Code   Is the patient medically ready for discharge?: No    Reason for patient still in hospital (select all that apply): Patient trending condition and Treatment  Discharge Plan A: Home with family                   Ishan Montes MD  Department of Hospital Medicine   [facility] - Intensive Care ([place]-[facility])

## 2022-05-02 NOTE — PLAN OF CARE
[facility] ([place] [address])  Initial Discharge Assessment       Primary Care Provider: Angel Luis Boo MD    Admission Diagnosis: Hyperkalemia [E87.5]  Hypomagnesemia [E83.42]  Hypoglycemia [E16.2]  CKD (chronic kidney disease) stage 4, GFR 15-29 ml/min [N18.4]  KAYODE (acute kidney injury) [N17.9]  Chest pain [R07.9]    Admission Date: 4/30/2022  Expected Discharge Date: 5/2/2022    Discharge Barriers Identified: None    Payor: BLUE CROSS BLUE SHIELD / Plan: BLUE CONNECT ASO 1 / Product Type: Commercial /     Extended Emergency Contact Information  Primary Emergency Contact: Kristine Still   [facility]  Mobile Phone: [phone]  Relation: Spouse    Discharge Plan A: Home with family  Discharge Plan B: [id number] - [place], [place] - [address] AT [facility] [address]  [address]  [place] [address]  Phone: [phone] Fax: [phone]        Transferred from:  Home    Past Medical History:   Diagnosis Date    Essential (primary) hypertension 9/21/2017         CM met with patient in room for Discharge Planning Assessment.  Patient is able to answer questions.  Per patient, he lives with Kristine Still (wife) [phone] in a single story house with 3 step(s) to enter.   Per patient, he was independent with ADLS and used no dme for ambulation.  Patient will have assistance from his wife upon discharge.   Discharge Planning Booklet given to patient/family and discussed.  All questions addressed.  CM will follow for needs.      Initial Assessment (most recent)     Adult Discharge Assessment - 05/02/22 1450        Discharge Assessment    Assessment Type Discharge Planning Assessment     Confirmed/corrected address, phone number and insurance Yes     Confirmed Demographics Correct on Facesheet     Source of Information patient     Communicated HORACIO with patient/caregiver Date not available/Unable to determine     Reason For Admission Acute kidney  injury superimposed on chronic kidney disease     Lives With spouse     Facility Arrived From: home     Do you have help at home or someone to help you manage your care at home? Yes     Who are your caregiver(s) and their phone number(s)? Kristine Still (wife) [phone]     Prior to hospitilization cognitive status: Alert/Oriented     Current cognitive status: Alert/Oriented     Walking or Climbing Stairs Difficulty none     Dressing/Bathing Difficulty none     Home Accessibility stairs to enter home     Number of Stairs, Main Entrance three     Stair Railings, Main Entrance none     Home Layout Able to live on 1st floor     Equipment Currently Used at Home none     Readmission within 30 days? No     Patient currently being followed by outpatient case management? No     Do you currently have service(s) that help you manage your care at home? No     Do you take prescription medications? Yes     Do you have prescription coverage? Yes     Coverage BCBS     Do you have any problems affording any of your prescribed medications? No     Is the patient taking medications as prescribed? yes     Who is going to help you get home at discharge? Kristine Still (wife) [phone]     How do you get to doctors appointments? family or friend will provide     Are you on dialysis? No     Do you take coumadin? No     Discharge Plan A Home with family     Discharge Plan B Home Health     DME Needed Upon Discharge  none     Discharge Plan discussed with: Patient     Discharge Barriers Identified None        Relationship/Environment    Name(s) of Who Lives With Patient Kristine Still (wife) [phone]                 Brooke Levine RN, CCRN-K, [facility]  Neuro-Critical Care   X [phone]

## 2022-05-02 NOTE — SUBJECTIVE & OBJECTIVE
Interval History:   Hyperkalemic to 5.5 this AM.   Remains acidotic.   Refers feeling well with no complaints.     Review of patient's allergies indicates:   Allergen Reactions    Iodine and iodide containing products Hives     Hypotension, Flushing     Current Facility-Administered Medications   Medication Frequency    acetaminophen tablet 650 mg Q4H PRN    atorvastatin tablet 20 mg QHS    carvediloL tablet 12.5 mg BID    dextrose 10% bolus 125 mL PRN    dextrose 10% bolus 250 mL PRN    fluticasone propionate 50 mcg/actuation nasal spray 100 mcg Daily    glucagon (human recombinant) injection 1 mg PRN    glucose chewable tablet 16 g PRN    glucose chewable tablet 24 g PRN    heparin (porcine) injection 5,000 Units Q8H    hydrALAZINE tablet 25 mg Q8H PRN    insulin aspart U-100 pen 0-5 Units QID (AC + HS) PRN    magnesium oxide tablet 400 mg BID    melatonin tablet 6 mg Nightly PRN    naloxone 0.4 mg/mL injection 0.02 mg PRN    NIFEdipine 24 hr tablet 30 mg Daily    senna-docusate 8.6-50 mg per tablet 1 tablet BID    sevelamer carbonate tablet 800 mg TID WM    sodium bicarbonate tablet 1,300 mg BID    sodium chloride 0.9% flush 10 mL Q12H PRN    sodium zirconium cyclosilicate packet 10 g Daily       Objective:     Vital Signs (Most Recent):  Temp: 97.3 °F (36.3 °C) (05/02/22 1406)  Pulse: 87 (05/02/22 1406)  Resp: 16 (05/02/22 1406)  BP: (!) 183/84 (05/02/22 1406)  SpO2: 98 % (05/02/22 1406)  O2 Device (Oxygen Therapy): room air (05/02/22 0520)   Vital Signs (24h Range):  Temp:  [97.3 °F (36.3 °C)-98.3 °F (36.8 °C)] 97.3 °F (36.3 °C)  Pulse:  [86-99] 87  Resp:  [16-20] 16  SpO2:  [96 %-100 %] 98 %  BP: (172-215)/(84-98) 183/84     Weight: 109 kg (240 lb 4.8 oz) (04/30/22 1920)  Body mass index is 30.04 kg/m².  Body surface area is 2.4 meters squared.    I/O last 3 completed shifts:  In: 350 [P.O.:350]  Out: 203 [Urine:203]    Physical Exam  Constitutional:       Appearance: Normal appearance. He is obese.   HENT:       Head: Normocephalic and atraumatic.      Mouth/Throat:      Mouth: Mucous membranes are moist.   Eyes:      General: No scleral icterus.     Extraocular Movements: Extraocular movements intact.      Pupils: Pupils are equal, round, and reactive to light.   Cardiovascular:      Rate and Rhythm: Normal rate and regular rhythm.      Pulses: Normal pulses.   Pulmonary:      Effort: Pulmonary effort is normal.      Breath sounds: Normal breath sounds.   Abdominal:      General: Bowel sounds are normal. There is no distension.      Palpations: Abdomen is soft.      Tenderness: There is no abdominal tenderness. There is no guarding or rebound.      Comments: Abdomen is enlarged, patient states this is chronic    Musculoskeletal:      Right lower leg: Edema present.      Left lower leg: Edema present.   Skin:     General: Skin is warm and dry.      Coloration: Skin is not jaundiced.   Neurological:      Mental Status: He is alert and oriented to person, place, and time.   Psychiatric:         Mood and Affect: Mood normal.         Behavior: Behavior normal.         Thought Content: Thought content normal.       Significant Labs:  CBC:   Recent Labs   Lab 05/02/22  0547   WBC 4.90   RBC 2.72*   HGB 7.7*   HCT 23.6*      MCV 87   MCH 28.3   MCHC 32.6     CMP:   Recent Labs   Lab 05/02/22  0547   *   CALCIUM 7.3*   ALBUMIN 3.0*   PROT 6.4      K 5.5*   CO2 13*   *   BUN 88*   CREATININE 10.2*   ALKPHOS 153*   ALT 16   AST 17   BILITOT 0.2

## 2022-05-02 NOTE — PROGRESS NOTES
[facility] ([place] [address])  Nephrology  Progress Note    Patient Name: Elbert Still Jr.  MRN: [id number]  Admission Date: 4/30/2022  Hospital Length of Stay: 2 days  Attending Provider: Juwan Muhammad MD   Primary Care Physician: Angel Luis Boo MD  Principal Problem:Acute kidney injury superimposed on chronic kidney disease    Subjective:     HPI: Mr. Still is a 67 yo M with PMHx of HTN, T2DM, HLD, gout, CKD4 (baseline unclear, but was 6.4 in September 2021 - previously was 3.6 in 2020), anemia who presented with hypoglycemia after EMS found him to have glucose in the 20s at home. Per his wife, patient was sluggish, had slurred speech the day of admission, so she called EMS.  Upon arrival of EMS blood glucose was in the 20s. Patient takes glipizide 10 mg BID as his only diabetes medication, denies taking insulin. His most recent HbA1c was 5.1 in Sep 2021. Hasn't been measuring his home BG.  He denies drastically reduced urine production, typically urinates every hour ~200 ccs. Believes he might be retaining urine. Urine sometimes appears slightly foamy. ROS also positive for diaphoresis associated with eating food, SOB (present since covid infection 2 months ago), LE swelling X 2 weeks. Nephrology consulted for KAYODE on CKD (Cr = 9.1) and proteinuria.          Interval History:   Hyperkalemic to 5.5 this AM.   Remains acidotic.   Refers feeling well with no complaints.     Review of patient's allergies indicates:   Allergen Reactions    Iodine and iodide containing products Hives     Hypotension, Flushing     Current Facility-Administered Medications   Medication Frequency    acetaminophen tablet 650 mg Q4H PRN    atorvastatin tablet 20 mg QHS    carvediloL tablet 12.5 mg BID    dextrose 10% bolus 125 mL PRN    dextrose 10% bolus 250 mL PRN    fluticasone propionate 50 mcg/actuation nasal spray 100 mcg Daily    glucagon (human recombinant) injection 1 mg PRN    glucose chewable tablet 16 g  PRN    glucose chewable tablet 24 g PRN    heparin (porcine) injection 5,000 Units Q8H    hydrALAZINE tablet 25 mg Q8H PRN    insulin aspart U-100 pen 0-5 Units QID (AC + HS) PRN    magnesium oxide tablet 400 mg BID    melatonin tablet 6 mg Nightly PRN    naloxone 0.4 mg/mL injection 0.02 mg PRN    NIFEdipine 24 hr tablet 30 mg Daily    senna-docusate 8.6-50 mg per tablet 1 tablet BID    sevelamer carbonate tablet 800 mg TID WM    sodium bicarbonate tablet 1,300 mg BID    sodium chloride 0.9% flush 10 mL Q12H PRN    sodium zirconium cyclosilicate packet 10 g Daily       Objective:     Vital Signs (Most Recent):  Temp: 97.3 °F (36.3 °C) (05/02/22 1406)  Pulse: 87 (05/02/22 1406)  Resp: 16 (05/02/22 1406)  BP: (!) 183/84 (05/02/22 1406)  SpO2: 98 % (05/02/22 1406)  O2 Device (Oxygen Therapy): room air (05/02/22 0520)   Vital Signs (24h Range):  Temp:  [97.3 °F (36.3 °C)-98.3 °F (36.8 °C)] 97.3 °F (36.3 °C)  Pulse:  [86-99] 87  Resp:  [16-20] 16  SpO2:  [96 %-100 %] 98 %  BP: (172-215)/(84-98) 183/84     Weight: 109 kg (240 lb 4.8 oz) (04/30/22 1920)  Body mass index is 30.04 kg/m².  Body surface area is 2.4 meters squared.    I/O last 3 completed shifts:  In: 350 [P.O.:350]  Out: 203 [Urine:203]    Physical Exam  Constitutional:       Appearance: Normal appearance. He is obese.   HENT:      Head: Normocephalic and atraumatic.      Mouth/Throat:      Mouth: Mucous membranes are moist.   Eyes:      General: No scleral icterus.     Extraocular Movements: Extraocular movements intact.      Pupils: Pupils are equal, round, and reactive to light.   Cardiovascular:      Rate and Rhythm: Normal rate and regular rhythm.      Pulses: Normal pulses.   Pulmonary:      Effort: Pulmonary effort is normal.      Breath sounds: Normal breath sounds.   Abdominal:      General: Bowel sounds are normal. There is no distension.      Palpations: Abdomen is soft.      Tenderness: There is no abdominal tenderness. There is no  guarding or rebound.      Comments: Abdomen is enlarged, patient states this is chronic    Musculoskeletal:      Right lower leg: Edema present.      Left lower leg: Edema present.   Skin:     General: Skin is warm and dry.      Coloration: Skin is not jaundiced.   Neurological:      Mental Status: He is alert and oriented to person, place, and time.   Psychiatric:         Mood and Affect: Mood normal.         Behavior: Behavior normal.         Thought Content: Thought content normal.       Significant Labs:  CBC:   Recent Labs   Lab 05/02/22  0547   WBC 4.90   RBC 2.72*   HGB 7.7*   HCT 23.6*      MCV 87   MCH 28.3   MCHC 32.6     CMP:   Recent Labs   Lab 05/02/22  0547   *   CALCIUM 7.3*   ALBUMIN 3.0*   PROT 6.4      K 5.5*   CO2 13*   *   BUN 88*   CREATININE 10.2*   ALKPHOS 153*   ALT 16   AST 17   BILITOT 0.2          Assessment/Plan:     * Acute kidney injury superimposed on chronic kidney disease  Mr. Still is a 67 yo M with PMHx of HTN, T2DM, HLD, gout, CKD4 (baseline unclear, but was 6.4 in September 2021 - previously was 3.6 in 2020), anemia admitted to hospital medicine service with hypoglycemia (blood glucose in 20s -  takes glipizide 10 mg BID, denies taking insulin).     He denies reduced urine production, typically urinates every hour about ~200 ccs. Believes he might be retaining urine. Urine sometimes appears slightly foamy. No hydronephrosis on KUS. Nephrology consulted for KAYODE on CKD (Cr = 9.1) and proteinuria.    Likely chronic issue and progression of CKD due to HTN and DM.      Recommendations:  -Do not recommend renal biopsy at this time as this seems to be a chronic issue with late stage changes seen on imaging.   -Pt advised on the likely progression of his renal disease and the need for dialysis; he refers he will think about it and will discuss it with his wife  -FU serologies   -Increase lokelma to 10g PO TID for now   -Sodium bicarb 1300mg PO TID for metabolic  acidosis   -Lasix 80 mg IV daily for volume overload  -Sevelamer 800 mg with meals   -Post void bladder scan  -Avoid nephrotoxins  -Daily RFP  -Strict ins and outs   -Diabetic, renal diet       Hyperkalemia  Remains hyperkalemic   Increase lokelma to 10g PO TID for now      Proteinuria  See KAYODE on CKD             Tino Justice MD  Nephrology  [facility] ([place]

## 2022-05-02 NOTE — CARE UPDATE
RAPID RESPONSE NURSE ROUND       Rounding completed with charge RN, [place]. No concerns verbalized at this time. Instructed to call [phone] for further concerns or assistance.

## 2022-05-02 NOTE — SUBJECTIVE & OBJECTIVE
Interval History: NAEON, patient without hypoglycemia. Monitoring blood pressure and potassium    Review of Systems   Constitutional:  Negative for chills, diaphoresis and fever.   HENT: Negative.     Eyes: Negative.  Negative for visual disturbance.   Respiratory:  Negative for cough and shortness of breath.    Cardiovascular:  Positive for leg swelling. Negative for chest pain and palpitations.   Gastrointestinal:  Negative for abdominal distention, abdominal pain, blood in stool, constipation, diarrhea, nausea and vomiting.   Endocrine: Negative.    Genitourinary:  Negative for dysuria and hematuria.   Musculoskeletal: Negative.  Negative for arthralgias and back pain.   Skin: Negative.    Neurological:  Negative for dizziness, syncope, speech difficulty, weakness, light-headedness and headaches.   Hematological: Negative.    Psychiatric/Behavioral: Negative.  Negative for behavioral problems and confusion.    Objective:     Vital Signs (Most Recent):  Temp: 98.1 °F (36.7 °C) (05/02/22 1603)  Pulse: 87 (05/02/22 1603)  Resp: 16 (05/02/22 1406)  BP: (!) 188/91 (05/02/22 1603)  SpO2: 97 % (05/02/22 1603)   Vital Signs (24h Range):  Temp:  [97.3 °F (36.3 °C)-98.3 °F (36.8 °C)] 98.1 °F (36.7 °C)  Pulse:  [83-99] 87  Resp:  [16-20] 16  SpO2:  [96 %-100 %] 97 %  BP: (169-215)/(84-98) 188/91     Weight: 109 kg (240 lb 4.8 oz)  Body mass index is 30.04 kg/m².    Intake/Output Summary (Last 24 hours) at 5/2/2022 8468  Last data filed at 5/2/2022 0030  Gross per 24 hour   Intake --   Output 1 ml   Net -1 ml      Physical Exam  Constitutional:       General: He is not in acute distress.     Appearance: He is well-developed. He is obese. He is not ill-appearing or toxic-appearing.   HENT:      Head: Normocephalic and atraumatic.      Nose: No congestion or rhinorrhea.      Mouth/Throat:      Pharynx: No oropharyngeal exudate or posterior oropharyngeal erythema.   Eyes:      General: No scleral icterus.        Right eye: No  discharge.         Left eye: No discharge.      Pupils: Pupils are equal, round, and reactive to light.   Cardiovascular:      Rate and Rhythm: Normal rate and regular rhythm.      Heart sounds: Normal heart sounds. No murmur heard.    No gallop.   Pulmonary:      Effort: Pulmonary effort is normal.      Breath sounds: Rales present.   Abdominal:      General: Bowel sounds are normal.      Palpations: Abdomen is soft.      Tenderness: There is no abdominal tenderness.   Musculoskeletal:         General: Normal range of motion.      Cervical back: Normal range of motion and neck supple.      Right lower leg: Edema present.      Left lower leg: Edema present.   Skin:     Capillary Refill: Capillary refill takes less than 2 seconds.      Coloration: Skin is not jaundiced.      Findings: No lesion or rash.   Neurological:      Mental Status: He is alert and oriented to person, place, and time.       Significant Labs: All pertinent labs within the past 24 hours have been reviewed.  A1C:   Recent Labs   Lab 04/30/22  0103   HGBA1C 4.4     ABGs: No results for input(s): PH, PCO2, HCO3, POCSATURATED, BE, TOTALHB, COHB, METHB, O2HB, POCFIO2, PO2 in the last 48 hours.  Bilirubin:   Recent Labs   Lab 04/30/22  0103 05/01/22  0459 05/02/22  0547   BILITOT 0.3 0.3 0.2     Blood Culture: No results for input(s): LABBLOO in the last 48 hours.  CBC:   Recent Labs   Lab 05/01/22  0459 05/02/22  0547   WBC 6.10 4.90   HGB 7.5* 7.7*   HCT 23.9* 23.6*    214     CMP:   Recent Labs   Lab 05/01/22  0459 05/02/22  0547 05/02/22  1404    137 137   K 5.1 5.5* 5.4*   * 113* 111*   CO2 14* 13* 14*   * 124* 129*   BUN 82* 88* 85*   CREATININE 9.1* 10.2* 8.9*   CALCIUM 7.6* 7.3* 7.2*   PROT 6.5 6.4  --    ALBUMIN 3.0* 3.0*  --    BILITOT 0.3 0.2  --    ALKPHOS 150* 153*  --    AST 18 17  --    ALT 17 16  --    ANIONGAP 13 11 12   EGFRNONAA 5.5* 4.8* 5.6*     Cardiac Markers: No results for input(s): CKMB, MYOGLOBIN,  BNP, TROPISTAT in the last 48 hours.  Coagulation: No results for input(s): PT, INR, APTT in the last 48 hours.  Lactic Acid: No results for input(s): LACTATE in the last 48 hours.  Lipase: No results for input(s): LIPASE in the last 48 hours.  Lipid Panel: No results for input(s): CHOL, HDL, LDLCALC, TRIG, CHOLHDL in the last 48 hours.  Magnesium:   Recent Labs   Lab 05/01/22  0459 05/02/22  0547   MG 1.5* 1.5*     POCT Glucose:   Recent Labs   Lab 05/02/22  0825 05/02/22  1141 05/02/22  1603   POCTGLUCOSE 139* 149* 135*     Prealbumin: No results for input(s): PREALBUMIN in the last 48 hours.  Respiratory Culture: No results for input(s): GSRESP, RESPIRATORYC in the last 48 hours.  Troponin: No results for input(s): TROPONINI in the last 48 hours.  TSH:   Recent Labs   Lab 04/30/22  0103   TSH 0.879     Urine Culture: No results for input(s): LABURIN in the last 48 hours.  Urine Studies: No results for input(s): COLORU, APPEARANCEUA, PHUR, SPECGRAV, PROTEINUA, GLUCUA, KETONESU, BILIRUBINUA, OCCULTUA, NITRITE, UROBILINOGEN, LEUKOCYTESUR, RBCUA, WBCUA, BACTERIA, SQUAMEPITHEL, HYALINECASTS in the last 48 hours.    Invalid input(s): WRIGHTSUR  None    Significant Imaging: I have reviewed all pertinent imaging results/findings within the past 24 hours.

## 2022-05-03 LAB
ALBUMIN SERPL BCP-MCNC: 3 G/DL (ref 3.5–5.2)
ALBUMIN SERPL ELPH-MCNC: 3.24 G/DL (ref 3.35–5.55)
ALP SERPL-CCNC: 150 U/L (ref 55–135)
ALPHA1 GLOB SERPL ELPH-MCNC: 0.35 G/DL (ref 0.17–0.41)
ALPHA2 GLOB SERPL ELPH-MCNC: 0.89 G/DL (ref 0.43–0.99)
ALT SERPL W/O P-5'-P-CCNC: 15 U/L (ref 10–44)
ANA SER QL IF: NORMAL
ANION GAP SERPL CALC-SCNC: 15 MMOL/L (ref 8–16)
AST SERPL-CCNC: 19 U/L (ref 10–40)
B-GLOBULIN SERPL ELPH-MCNC: 0.66 G/DL (ref 0.5–1.1)
BASOPHILS # BLD AUTO: 0.01 K/UL (ref 0–0.2)
BASOPHILS NFR BLD: 0.2 % (ref 0–1.9)
BILIRUB SERPL-MCNC: 0.3 MG/DL (ref 0.1–1)
BUN SERPL-MCNC: 88 MG/DL (ref 8–23)
CALCIUM SERPL-MCNC: 7.5 MG/DL (ref 8.7–10.5)
CHLORIDE SERPL-SCNC: 111 MMOL/L (ref 95–110)
CHLORPROPAMIDE SERPL-MCNC: NEGATIVE UG/ML
CO2 SERPL-SCNC: 12 MMOL/L (ref 23–29)
CREAT SERPL-MCNC: 9 MG/DL (ref 0.5–1.4)
DIFFERENTIAL METHOD: ABNORMAL
EOSINOPHIL # BLD AUTO: 0.3 K/UL (ref 0–0.5)
EOSINOPHIL NFR BLD: 6.7 % (ref 0–8)
ERYTHROCYTE [DISTWIDTH] IN BLOOD BY AUTOMATED COUNT: 14.4 % (ref 11.5–14.5)
EST. GFR  (AFRICAN AMERICAN): 6.4 ML/MIN/1.73 M^2
EST. GFR  (NON AFRICAN AMERICAN): 5.6 ML/MIN/1.73 M^2
GAMMA GLOB SERPL ELPH-MCNC: 1.16 G/DL (ref 0.67–1.58)
GLIMEPIRIDE SERPL-MCNC: NEGATIVE NG/ML
GLIPIZIDE SERPL-MCNC: NEGATIVE UG/ML
GLUCOSE SERPL-MCNC: 94 MG/DL (ref 70–110)
GLYBURIDE SERPL-MCNC: NEGATIVE UG/ML
HCT VFR BLD AUTO: 22.3 % (ref 40–54)
HCV AB SERPL QL IA: NEGATIVE
HGB BLD-MCNC: 7.4 G/DL (ref 14–18)
HIV 1+2 AB+HIV1 P24 AG SERPL QL IA: NEGATIVE
IMM GRANULOCYTES # BLD AUTO: 0.01 K/UL (ref 0–0.04)
IMM GRANULOCYTES NFR BLD AUTO: 0.2 % (ref 0–0.5)
LYMPHOCYTES # BLD AUTO: 1 K/UL (ref 1–4.8)
LYMPHOCYTES NFR BLD: 20.1 % (ref 18–48)
MAGNESIUM SERPL-MCNC: 1.5 MG/DL (ref 1.6–2.6)
MCH RBC QN AUTO: 27.9 PG (ref 27–31)
MCHC RBC AUTO-ENTMCNC: 33.2 G/DL (ref 32–36)
MCV RBC AUTO: 84 FL (ref 82–98)
MONOCYTES # BLD AUTO: 0.7 K/UL (ref 0.3–1)
MONOCYTES NFR BLD: 14.9 % (ref 4–15)
NATEGLINIDE SERPL QL: NEGATIVE
NEUTROPHILS # BLD AUTO: 2.8 K/UL (ref 1.8–7.7)
NEUTROPHILS NFR BLD: 57.9 % (ref 38–73)
NRBC BLD-RTO: 0 /100 WBC
PATHOLOGIST INTERPRETATION SPE: NORMAL
PHOSPHATE SERPL-MCNC: 8.3 MG/DL (ref 2.7–4.5)
PIOGLITAZONE: NEGATIVE
PLATELET # BLD AUTO: 223 K/UL (ref 150–450)
PMV BLD AUTO: 11.6 FL (ref 9.2–12.9)
POCT GLUCOSE: 144 MG/DL (ref 70–110)
POCT GLUCOSE: 156 MG/DL (ref 70–110)
POCT GLUCOSE: 161 MG/DL (ref 70–110)
POCT GLUCOSE: 227 MG/DL (ref 70–110)
POTASSIUM SERPL-SCNC: 5.1 MMOL/L (ref 3.5–5.1)
PROINSULIN SERPL-SCNC: 230 PMOL/L (ref 3.6–22)
PROT SERPL-MCNC: 6.3 G/DL (ref 6–8.4)
PROT SERPL-MCNC: 6.7 G/DL (ref 6–8.4)
PROTEINASE3 IGG SER-ACNC: <0.2 U
RBC # BLD AUTO: 2.65 M/UL (ref 4.6–6.2)
REPAGLINIDE SERPL-MCNC: NEGATIVE NG/ML
ROSIGLITAZONE: NEGATIVE
SODIUM SERPL-SCNC: 138 MMOL/L (ref 136–145)
TOLAZAMIDE SERPL-MCNC: NEGATIVE UG/ML
TOLBUTAMIDE SERPL-MCNC: NEGATIVE UG/ML
WBC # BLD AUTO: 4.77 K/UL (ref 3.9–12.7)

## 2022-05-03 PROCEDURE — 25000003 PHARM REV CODE 250: Performed by: STUDENT IN AN ORGANIZED HEALTH CARE EDUCATION/TRAINING PROGRAM

## 2022-05-03 PROCEDURE — 99232 PR SUBSEQUENT HOSPITAL CARE,LEVL II: ICD-10-PCS | Mod: ,,, | Performed by: STUDENT IN AN ORGANIZED HEALTH CARE EDUCATION/TRAINING PROGRAM

## 2022-05-03 PROCEDURE — 99232 PR SUBSEQUENT HOSPITAL CARE,LEVL II: ICD-10-PCS | Mod: ,,, | Performed by: INTERNAL MEDICINE

## 2022-05-03 PROCEDURE — 85025 COMPLETE CBC W/AUTO DIFF WBC: CPT

## 2022-05-03 PROCEDURE — 86706 HEP B SURFACE ANTIBODY: CPT

## 2022-05-03 PROCEDURE — 84100 ASSAY OF PHOSPHORUS: CPT

## 2022-05-03 PROCEDURE — 99232 SBSQ HOSP IP/OBS MODERATE 35: CPT | Mod: ,,, | Performed by: STUDENT IN AN ORGANIZED HEALTH CARE EDUCATION/TRAINING PROGRAM

## 2022-05-03 PROCEDURE — 86704 HEP B CORE ANTIBODY TOTAL: CPT

## 2022-05-03 PROCEDURE — 25000003 PHARM REV CODE 250

## 2022-05-03 PROCEDURE — 63600175 PHARM REV CODE 636 W HCPCS: Performed by: STUDENT IN AN ORGANIZED HEALTH CARE EDUCATION/TRAINING PROGRAM

## 2022-05-03 PROCEDURE — 99232 SBSQ HOSP IP/OBS MODERATE 35: CPT | Mod: ,,, | Performed by: INTERNAL MEDICINE

## 2022-05-03 PROCEDURE — 36415 COLL VENOUS BLD VENIPUNCTURE: CPT

## 2022-05-03 PROCEDURE — 86580 TB INTRADERMAL TEST: CPT

## 2022-05-03 PROCEDURE — 80074 ACUTE HEPATITIS PANEL: CPT | Performed by: INTERNAL MEDICINE

## 2022-05-03 PROCEDURE — 30200315 PPD INTRADERMAL TEST REV CODE 302

## 2022-05-03 PROCEDURE — 20600001 HC STEP DOWN PRIVATE ROOM

## 2022-05-03 PROCEDURE — 25000003 PHARM REV CODE 250: Performed by: HOSPITALIST

## 2022-05-03 PROCEDURE — 80053 COMPREHEN METABOLIC PANEL: CPT

## 2022-05-03 PROCEDURE — 63600175 PHARM REV CODE 636 W HCPCS

## 2022-05-03 PROCEDURE — 83735 ASSAY OF MAGNESIUM: CPT

## 2022-05-03 RX ORDER — SODIUM BICARBONATE 650 MG/1
1300 TABLET ORAL 2 TIMES DAILY
Qty: 120 TABLET | Refills: 3 | Status: CANCELLED | OUTPATIENT
Start: 2022-05-03 | End: 2023-05-03

## 2022-05-03 RX ORDER — CARVEDILOL 12.5 MG/1
12.5 TABLET ORAL 2 TIMES DAILY
Qty: 60 TABLET | Refills: 3 | Status: CANCELLED | OUTPATIENT
Start: 2022-05-03 | End: 2023-05-03

## 2022-05-03 RX ORDER — SEVELAMER CARBONATE 800 MG/1
800 TABLET, FILM COATED ORAL
Qty: 90 TABLET | Refills: 3 | Status: CANCELLED | OUTPATIENT
Start: 2022-05-03 | End: 2023-05-03

## 2022-05-03 RX ORDER — FUROSEMIDE 40 MG/1
80 TABLET ORAL 2 TIMES DAILY
Status: DISCONTINUED | OUTPATIENT
Start: 2022-05-03 | End: 2022-05-10 | Stop reason: HOSPADM

## 2022-05-03 RX ORDER — NIFEDIPINE 30 MG/1
30 TABLET, EXTENDED RELEASE ORAL DAILY
Qty: 30 TABLET | Refills: 3 | Status: CANCELLED | OUTPATIENT
Start: 2022-05-04 | End: 2023-05-04

## 2022-05-03 RX ADMIN — SEVELAMER CARBONATE 800 MG: 800 TABLET, FILM COATED ORAL at 12:05

## 2022-05-03 RX ADMIN — SODIUM ZIRCONIUM CYCLOSILICATE 10 G: 10 POWDER, FOR SUSPENSION ORAL at 04:05

## 2022-05-03 RX ADMIN — SODIUM BICARBONATE 650 MG TABLET 1300 MG: at 08:05

## 2022-05-03 RX ADMIN — TAMSULOSIN HYDROCHLORIDE 0.4 MG: 0.4 CAPSULE ORAL at 08:05

## 2022-05-03 RX ADMIN — NIFEDIPINE 30 MG: 30 TABLET, FILM COATED, EXTENDED RELEASE ORAL at 08:05

## 2022-05-03 RX ADMIN — TUBERCULIN PURIFIED PROTEIN DERIVATIVE 5 UNITS: 5 INJECTION, SOLUTION INTRADERMAL at 03:05

## 2022-05-03 RX ADMIN — SENNOSIDES AND DOCUSATE SODIUM 1 TABLET: 50; 8.6 TABLET ORAL at 09:05

## 2022-05-03 RX ADMIN — CARVEDILOL 12.5 MG: 12.5 TABLET, FILM COATED ORAL at 09:05

## 2022-05-03 RX ADMIN — SEVELAMER CARBONATE 800 MG: 800 TABLET, FILM COATED ORAL at 08:05

## 2022-05-03 RX ADMIN — FUROSEMIDE 80 MG: 10 INJECTION, SOLUTION INTRAMUSCULAR; INTRAVENOUS at 08:05

## 2022-05-03 RX ADMIN — SODIUM ZIRCONIUM CYCLOSILICATE 10 G: 10 POWDER, FOR SUSPENSION ORAL at 08:05

## 2022-05-03 RX ADMIN — INSULIN ASPART 2 UNITS: 100 INJECTION, SOLUTION INTRAVENOUS; SUBCUTANEOUS at 12:05

## 2022-05-03 RX ADMIN — HEPARIN SODIUM 5000 UNITS: 5000 INJECTION INTRAVENOUS; SUBCUTANEOUS at 09:05

## 2022-05-03 RX ADMIN — HEPARIN SODIUM 5000 UNITS: 5000 INJECTION INTRAVENOUS; SUBCUTANEOUS at 05:05

## 2022-05-03 RX ADMIN — SODIUM ZIRCONIUM CYCLOSILICATE 10 G: 10 POWDER, FOR SUSPENSION ORAL at 09:05

## 2022-05-03 RX ADMIN — CARVEDILOL 12.5 MG: 12.5 TABLET, FILM COATED ORAL at 08:05

## 2022-05-03 RX ADMIN — SEVELAMER CARBONATE 800 MG: 800 TABLET, FILM COATED ORAL at 05:05

## 2022-05-03 RX ADMIN — FUROSEMIDE 80 MG: 40 TABLET ORAL at 06:05

## 2022-05-03 RX ADMIN — ATORVASTATIN CALCIUM 20 MG: 20 TABLET, FILM COATED ORAL at 09:05

## 2022-05-03 RX ADMIN — SENNOSIDES AND DOCUSATE SODIUM 1 TABLET: 50; 8.6 TABLET ORAL at 08:05

## 2022-05-03 RX ADMIN — SODIUM BICARBONATE 650 MG TABLET 1300 MG: at 09:05

## 2022-05-03 RX ADMIN — HEPARIN SODIUM 5000 UNITS: 5000 INJECTION INTRAVENOUS; SUBCUTANEOUS at 04:05

## 2022-05-03 NOTE — ASSESSMENT & PLAN NOTE
Hx of CKD due to HTN, DM. Baseline around 3-6    - nephro referral outpatient  - nephro having discussions with patient about possible dialysis, follow up nephro recs

## 2022-05-03 NOTE — NURSING
Pt has been resting most the night no complaints at this time, wife present at bedside. Safety measures in place.

## 2022-05-03 NOTE — ASSESSMENT & PLAN NOTE
home meds amlodipine and Toprol XL  Blood pressure better controlled with nifedpine.    Plan:  --started on Nifedipine 30mg daily, will continue to titrate

## 2022-05-03 NOTE — ASSESSMENT & PLAN NOTE
Mr. Still is a 65 yo M with PMHx of HTN, T2DM, HLD, gout, CKD4 (baseline unclear, but was 6.4 in September 2021 - previously was 3.6 in 2020), anemia admitted to hospital medicine service with hypoglycemia (blood glucose in 20s -  takes glipizide 10 mg BID, denies taking insulin).     He denies reduced urine production, typically urinates every hour about ~200 ccs. Believes he might be retaining urine. Urine sometimes appears slightly foamy. No hydronephrosis on KUS. Nephrology consulted for KAYODE on CKD (Cr = 9.1) and proteinuria.    Likely chronic issue and progression of CKD due to HTN and DM.      Recommendations:  -Do not recommend renal biopsy at this time as this seems to be a chronic issue with late stage changes seen on imaging.   -Pt advised on the likely progression of his renal disease and the need for dialysis; he declines dialysis for now   -Lokelma to 10g PO TID for now K 5.1  -on Sodium bicarb 1300mg PO TID for metabolic acidosis   -Lasix 80 mg IV daily for volume overload  -Sevelamer 800 mg with meals   -Post void bladder scan  -Avoid nephrotoxins  -Daily RFP  -Strict ins and outs   -Diabetic, renal diet

## 2022-05-03 NOTE — PROGRESS NOTES
"[facility] ([address] Medicine  Progress Note    Patient Name: Elbert Still Jr.  MRN: [id number]  Patient Class: IP- Inpatient   Admission Date: 4/30/2022  Length of Stay: 3 days  Attending Physician: Juwan Muhammad MD  Primary Care Provider: Angel Luis Boo MD        Subjective:     Principal Problem:Acute kidney injury superimposed on chronic kidney disease        HPI:  Mr. Still is a 65 yo M with PMHx of HTN, T2DM, HLD, gout, CKD4, anemia who presented with hypoglycemia after EMS found him to have glucose in the 20s at home. Per his wife, she walked in on the patient in the living room around 10am of day of admission. He was sluggish, had slurred speech, no LOC or diaphoresis. She called EMS. They gave him an amp of D50 which improved BG to 100s, then he was started on D10. Upon arrival to [facility], his glucose had improved to 200s. This has never happened before in his 40 years of having diabetes. He typically eats less on his days off, and he was off on the day of admission. Patient works as a . He denies fever, chills, nausea, vomiting, abdominal pain, SOB. He denies drastically reduced urine production, typically urinating 3x/day with a few episodes of nocturia. He notes some SOB with exertion and LE "heaviness." Had one episdoe of diarrhea last week for which he took Imodium, and two episodes of nonbloody diarrhea earlier on day of admission. Patient takes glipizide 10 mg BID as his only diabetes medication, denies taking insulin. He took the glipizide on the evening of 4/28 and morning of 4/29, but did not take the evening dose on 4/29. His most recent HbA1c was 5.1 in Sep 2021. Hasn't been measuring his home BG.     In the ED, his labs were significant for hgb of 8, K 5.7, bicarb 12, Cr 9, BUN 80, mag 1.4, alkaline phos 162, alumin 3.2, corrected Ca 7.8, BNP 27. UA positive for glucose and protein. LFTs and lipase wnl. POCT glucose readings had been 200 " -> 93 -> 73. He received nebulizer treatment and lasix 80 for hyperkalemia management. EKG showed NSR, no peaked T waves.       Overview/Hospital Course:  66 year old man with hx of HTN, T2DM, ESRD admitted for hypoglycemia 2/2 polypharmacy. Holding home diabeties meds, A1c 4.4. Hypertensive to 200 - titrating BP meds. Nephrology consulted for management of worsening Cr and thought to be due to longstanding HTN and DM. No acute indications for dialsysis at this time but patient would prefer not to given history with mother recently being admitted to hospice for ESRD. Patient with persistent HTN and so nifed and coreg increased. K elevated to 5.5 and lokelma and lasix started.       Interval History: Patient feeling well, slept well. Swelling has improved this AM in his legs. Nephrology following and discussing possible dialysis.    Review of Systems   Constitutional:  Negative for chills, diaphoresis and fever.   HENT: Negative.     Eyes: Negative.  Negative for visual disturbance.   Respiratory:  Negative for cough and shortness of breath.    Cardiovascular:  Positive for leg swelling. Negative for chest pain and palpitations.   Gastrointestinal:  Negative for abdominal distention, abdominal pain, blood in stool, constipation, diarrhea, nausea and vomiting.   Endocrine: Negative.    Genitourinary:  Negative for dysuria and hematuria.   Musculoskeletal: Negative.  Negative for arthralgias and back pain.   Skin: Negative.    Neurological:  Negative for dizziness, syncope, speech difficulty, weakness, light-headedness and headaches.   Hematological: Negative.    Psychiatric/Behavioral: Negative.  Negative for behavioral problems and confusion.    Objective:     Vital Signs (Most Recent):  Temp: 97.8 °F (36.6 °C) (05/03/22 0751)  Pulse: 82 (05/03/22 1127)  Resp: 20 (05/03/22 0015)  BP: (!) 146/75 (05/03/22 0751)  SpO2: 100 % (05/03/22 0751)   Vital Signs (24h Range):  Temp:  [97.3 °F (36.3 °C)-98.4 °F (36.9 °C)] 97.8  °F (36.6 °C)  Pulse:  [82-91] 82  Resp:  [16-20] 20  SpO2:  [95 %-100 %] 100 %  BP: (139-188)/(73-91) 146/75     Weight: 109 kg (240 lb 4.8 oz)  Body mass index is 30.04 kg/m².  No intake or output data in the 24 hours ending 05/03/22 1237   Physical Exam  Constitutional:       General: He is not in acute distress.     Appearance: He is well-developed. He is obese. He is not ill-appearing or toxic-appearing.   HENT:      Head: Normocephalic and atraumatic.      Nose: No congestion or rhinorrhea.      Mouth/Throat:      Pharynx: No oropharyngeal exudate or posterior oropharyngeal erythema.   Eyes:      General: No scleral icterus.        Right eye: No discharge.         Left eye: No discharge.      Pupils: Pupils are equal, round, and reactive to light.   Cardiovascular:      Rate and Rhythm: Normal rate and regular rhythm.      Heart sounds: Normal heart sounds. No murmur heard.    No gallop.   Pulmonary:      Effort: Pulmonary effort is normal.      Breath sounds: Rales present.   Abdominal:      General: Bowel sounds are normal.      Palpations: Abdomen is soft.      Tenderness: There is no abdominal tenderness.   Musculoskeletal:         General: Normal range of motion.      Cervical back: Normal range of motion and neck supple.      Right lower leg: Edema present.      Left lower leg: Edema present.   Skin:     Capillary Refill: Capillary refill takes less than 2 seconds.      Coloration: Skin is not jaundiced.      Findings: No lesion or rash.   Neurological:      Mental Status: He is alert and oriented to person, place, and time.       Significant Labs: All pertinent labs within the past 24 hours have been reviewed.    Significant Imaging: I have reviewed all pertinent imaging results/findings within the past 24 hours.      Assessment/Plan:      * Acute kidney injury superimposed on chronic kidney disease        Hyperkalemia  K 5.7 on admission   ECG: NSR, no peaked t waves  Chest pain/discomfort: none  Home  medications: no ARBs, or ACEi    Plan:   -Page cardiology if K >5.5 with ECG changes or nephrology if >5.5 (persistently) and anuric  -Trend BMP, repeat potassium 5.4 today  -Telemetry   -Shift with lasix, duonebs, lokelma PRN. Be cautious about using insulin with dextrose in setting of hypoglycemia        KAYODE (acute kidney injury)  Patient with acute kidney injury likely d/t Pre-renal azotemia and Acute tubular necrosis Which is currently worsening. Labs reviewed- Renal function/electrolytes with Estimated Creatinine Clearance: 11.1 mL/min (A) (based on SCr of 9 mg/dL (H)). according to latest data. Monitor urine output and serial BMP and adjust therapy as needed. Avoid nephrotoxins and renally dose meds for GFR listed above.     Baseline Cr 3-6, Cr 9 on admission  Hx of CKD: yes due to HTN/DM  Last saw nephro 2013  Exam with edema, no decreased O2 sats or rales  FeNA: not reliable, had already gotten lasix  FeUrea: pending  UA / UCx: proteinuria, glucuria  DDx: hypotension, prerenal, progression of CKD  No indications for HD at this time    Plan:   - nephro consulted, appreciate recs   -possible biopsy indicated given proteinuria  - Trend Cr  - Strict I&Os  - Avoid nephrotoxic agents (NSAIDs, gadolinium, IV radiocontrast)  - Avoid hypotension  - Renal diet  - Renally adjust medications  - Transitioned from IV lasix to PO lasix 80mg BID.        Hypoglycemia  Blood sugar 203 mg/dl on admission, given dextrose by EMS with improvement   No history of labile blood sugars during previous admission   Most recent blood sugar 73 mg/dl   Mentation: AAO x3 during my interview   Taking glipizide 10 BID    Ddx: decreased po intake and decreased glipizide clearance (KAYODE on CKD), Likely not very hyperglycemic at baseline given low HbA1c. Less likely exogenous administration of insulin, insulinoma     Plan:   -Telemetry   -Fall precautions   -Neuro checks q4hrs   -Continue to monitor sugars.         Anemia of chronic  disease  Baseline Hb 11, Hb 8 on admission  Antiplatelet/anticoagulation: none  No overt source of bleeding on exam, denies bloody BM, hematuria.       Plan:  - iron studies, reticulocyte, ferritin  - CBCs daily  - Transfuse with goal Hb > 7        Gout  Takes colchicine as needed at home      CKD (chronic kidney disease) stage 4, GFR 15-29 ml/min  Hx of CKD due to HTN, DM. Baseline around 3-6    - nephro referral outpatient  - nephro having discussions with patient about possible dialysis, follow up nephro recs      Mixed hyperlipidemia  Continue home lipitor 20      Essential (primary) hypertension   home meds amlodipine and Toprol XL  Blood pressure better controlled with nifedpine.    Plan:  --started on Nifedipine 30mg daily, will continue to titrate      Proteinuria  Hx of proteinuria, UPCr >7, nephrotic range    - consider ACEi or SGLT-2 inhibitor at discharge      Diabetes mellitus due to underlying condition with diabetic nephropathy  Diabetes type 2  Last HbA1c:   Lab Results   Component Value Date    HGBA1C 4.4 04/30/2022     Insulin regimen: none  Oral regimen ( held inpatient): glipizide   CAD Prophylaxis: statin  HTN or Renal disease: KAYODE, hypertensive    PLAN:   - Hold oral anti-hyperglycemic agents  - LD SSI  - Diabetic diet  - Hypoglycemia orderset with POCT BG AC/QS  - Goal -180 while inpatient               VTE Risk Mitigation (From admission, onward)         Ordered     heparin (porcine) injection 5,000 Units  Every 8 hours         04/30/22 0429     IP VTE HIGH RISK PATIENT  Once         04/30/22 0429     Place sequential compression device  Until discontinued         04/30/22 0429                Discharge Planning   HORACIO: 5/3/2022     Code Status: Full Code   Is the patient medically ready for discharge?: Yes    Reason for patient still in hospital (select all that apply): Consult recommendations  Discharge Plan A: Home with family                  Ishan Montes MD  Department of  [facility]   [facility] ([place])

## 2022-05-03 NOTE — SUBJECTIVE & OBJECTIVE
Interval History: unclear urine opt. No complaints     Review of patient's allergies indicates:   Allergen Reactions    Iodine and iodide containing products Hives     Hypotension, Flushing     Current Facility-Administered Medications   Medication Frequency    acetaminophen tablet 650 mg Q4H PRN    atorvastatin tablet 20 mg QHS    carvediloL tablet 12.5 mg BID    dextrose 10% bolus 125 mL PRN    dextrose 10% bolus 250 mL PRN    fluticasone propionate 50 mcg/actuation nasal spray 100 mcg Daily    furosemide injection 80 mg Daily    glucagon (human recombinant) injection 1 mg PRN    glucose chewable tablet 16 g PRN    glucose chewable tablet 24 g PRN    heparin (porcine) injection 5,000 Units Q8H    hydrALAZINE tablet 25 mg Q8H PRN    insulin aspart U-100 pen 0-5 Units QID (AC + HS) PRN    melatonin tablet 6 mg Nightly PRN    naloxone 0.4 mg/mL injection 0.02 mg PRN    NIFEdipine 24 hr tablet 30 mg Daily    senna-docusate 8.6-50 mg per tablet 1 tablet BID    sevelamer carbonate tablet 800 mg TID WM    sodium bicarbonate tablet 1,300 mg BID    sodium chloride 0.9% flush 10 mL Q12H PRN    sodium zirconium cyclosilicate packet 10 g TID    tamsulosin 24 hr capsule 0.4 mg Daily       Objective:     Vital Signs (Most Recent):  Temp: 98 °F (36.7 °C) (05/03/22 0015)  Pulse: 83 (05/03/22 0354)  Resp: 20 (05/03/22 0015)  BP: 139/73 (05/03/22 0015)  SpO2: 95 % (05/03/22 0350)  O2 Device (Oxygen Therapy): room air (05/03/22 0015) Vital Signs (24h Range):  Temp:  [97.3 °F (36.3 °C)-98.4 °F (36.9 °C)] 98 °F (36.7 °C)  Pulse:  [83-99] 83  Resp:  [16-20] 20  SpO2:  [95 %-98 %] 95 %  BP: (139-215)/(73-98) 139/73     Weight: 109 kg (240 lb 4.8 oz) (04/30/22 1920)  Body mass index is 30.04 kg/m².  Body surface area is 2.4 meters squared.    I/O last 3 completed shifts:  In: -   Out: 1 [Urine:1]    Physical Exam  Vitals reviewed.   Constitutional:       Appearance: Normal appearance.   HENT:      Head: Normocephalic and atraumatic.       Mouth/Throat:      Mouth: Mucous membranes are moist.   Eyes:      Conjunctiva/sclera: Conjunctivae normal.      Pupils: Pupils are equal, round, and reactive to light.   Cardiovascular:      Rate and Rhythm: Normal rate and regular rhythm.      Pulses: Normal pulses.      Heart sounds: Normal heart sounds.   Pulmonary:      Effort: Pulmonary effort is normal.      Breath sounds: Normal breath sounds.   Abdominal:      General: Bowel sounds are normal.      Palpations: Abdomen is soft.   Musculoskeletal:         General: Normal range of motion.      Right lower leg: Edema present.      Left lower leg: Edema present.   Skin:     General: Skin is warm.      Capillary Refill: Capillary refill takes less than 2 seconds.   Neurological:      General: No focal deficit present.      Mental Status: He is alert and oriented to person, place, and time.   Psychiatric:         Mood and Affect: Mood normal.       Significant Labs:  All labs within the past 24 hours have been reviewed.     Significant Imaging:  Reviewed

## 2022-05-03 NOTE — PROGRESS NOTES
[facility] ([place] [address])  Nephrology  Progress Note    Patient Name: Elbert Still Jr.  MRN: [id number]  Admission Date: 4/30/2022  Hospital Length of Stay: 3 days  Attending Provider: Juwan Muhammad MD   Primary Care Physician: Angel Luis Boo MD  Principal Problem:Acute kidney injury superimposed on chronic kidney disease    Subjective:     HPI: Mr. Still is a 67 yo M with PMHx of HTN, T2DM, HLD, gout, CKD4 (baseline unclear, but was 6.4 in September 2021 - previously was 3.6 in 2020), anemia who presented with hypoglycemia after EMS found him to have glucose in the 20s at home. Per his wife, patient was sluggish, had slurred speech the day of admission, so she called EMS.  Upon arrival of EMS blood glucose was in the 20s. Patient takes glipizide 10 mg BID as his only diabetes medication, denies taking insulin. His most recent HbA1c was 5.1 in Sep 2021. Hasn't been measuring his home BG.  He denies drastically reduced urine production, typically urinates every hour ~200 ccs. Believes he might be retaining urine. Urine sometimes appears slightly foamy. ROS also positive for diaphoresis associated with eating food, SOB (present since covid infection 2 months ago), LE swelling X 2 weeks. Nephrology consulted for KAYODE on CKD (Cr = 9.1) and proteinuria.          Interval History: unclear urine opt. No complaints     Review of patient's allergies indicates:   Allergen Reactions    Iodine and iodide containing products Hives     Hypotension, Flushing     Current Facility-Administered Medications   Medication Frequency    acetaminophen tablet 650 mg Q4H PRN    atorvastatin tablet 20 mg QHS    carvediloL tablet 12.5 mg BID    dextrose 10% bolus 125 mL PRN    dextrose 10% bolus 250 mL PRN    fluticasone propionate 50 mcg/actuation nasal spray 100 mcg Daily    furosemide injection 80 mg Daily    glucagon (human recombinant) injection 1 mg PRN    glucose chewable tablet 16 g PRN    glucose  chewable tablet 24 g PRN    heparin (porcine) injection 5,000 Units Q8H    hydrALAZINE tablet 25 mg Q8H PRN    insulin aspart U-100 pen 0-5 Units QID (AC + HS) PRN    melatonin tablet 6 mg Nightly PRN    naloxone 0.4 mg/mL injection 0.02 mg PRN    NIFEdipine 24 hr tablet 30 mg Daily    senna-docusate 8.6-50 mg per tablet 1 tablet BID    sevelamer carbonate tablet 800 mg TID WM    sodium bicarbonate tablet 1,300 mg BID    sodium chloride 0.9% flush 10 mL Q12H PRN    sodium zirconium cyclosilicate packet 10 g TID    tamsulosin 24 hr capsule 0.4 mg Daily       Objective:     Vital Signs (Most Recent):  Temp: 98 °F (36.7 °C) (05/03/22 0015)  Pulse: 83 (05/03/22 0354)  Resp: 20 (05/03/22 0015)  BP: 139/73 (05/03/22 0015)  SpO2: 95 % (05/03/22 0350)  O2 Device (Oxygen Therapy): room air (05/03/22 0015) Vital Signs (24h Range):  Temp:  [97.3 °F (36.3 °C)-98.4 °F (36.9 °C)] 98 °F (36.7 °C)  Pulse:  [83-99] 83  Resp:  [16-20] 20  SpO2:  [95 %-98 %] 95 %  BP: (139-215)/(73-98) 139/73     Weight: 109 kg (240 lb 4.8 oz) (04/30/22 1920)  Body mass index is 30.04 kg/m².  Body surface area is 2.4 meters squared.    I/O last 3 completed shifts:  In: -   Out: 1 [Urine:1]    Physical Exam  Vitals reviewed.   Constitutional:       Appearance: Normal appearance.   HENT:      Head: Normocephalic and atraumatic.      Mouth/Throat:      Mouth: Mucous membranes are moist.   Eyes:      Conjunctiva/sclera: Conjunctivae normal.      Pupils: Pupils are equal, round, and reactive to light.   Cardiovascular:      Rate and Rhythm: Normal rate and regular rhythm.      Pulses: Normal pulses.      Heart sounds: Normal heart sounds.   Pulmonary:      Effort: Pulmonary effort is normal.      Breath sounds: Normal breath sounds.   Abdominal:      General: Bowel sounds are normal.      Palpations: Abdomen is soft.   Musculoskeletal:         General: Normal range of motion.      Right lower leg: Edema present.      Left lower leg: Edema  present.   Skin:     General: Skin is warm.      Capillary Refill: Capillary refill takes less than 2 seconds.   Neurological:      General: No focal deficit present.      Mental Status: He is alert and oriented to person, place, and time.   Psychiatric:         Mood and Affect: Mood normal.       Significant Labs:  All labs within the past 24 hours have been reviewed.     Significant Imaging:  Reviewed     Assessment/Plan:     * Acute kidney injury superimposed on chronic kidney disease  Mr. Still is a 67 yo M with PMHx of HTN, T2DM, HLD, gout, CKD4 (baseline unclear, but was 6.4 in September 2021 - previously was 3.6 in 2020), anemia admitted to hospital medicine service with hypoglycemia (blood glucose in 20s -  takes glipizide 10 mg BID, denies taking insulin).     He denies reduced urine production, typically urinates every hour about ~200 ccs. Believes he might be retaining urine. Urine sometimes appears slightly foamy. No hydronephrosis on KUS. Nephrology consulted for KAYODE on CKD (Cr = 9.1) and proteinuria.    Likely chronic issue and progression of CKD due to HTN and DM.      Recommendations:  -Do not recommend renal biopsy at this time as this seems to be a chronic issue with late stage changes seen on imaging.   -Pt advised on the likely progression of his renal disease and the need for dialysis; he agrees to start HD and will need IR consult for permacath, discussed with primary team   -Lokelma to 10g PO TID for now K 5.1  -on Sodium bicarb 1300mg PO TID for metabolic acidosis   -Lasix 80 mg IV daily for volume overload  -Sevelamer 800 mg with meals   -Post void bladder scan  -Avoid nephrotoxins  -Daily RFP  -Strict ins and outs   -Diabetic, renal diet       Hyperkalemia  5.1 today  C/w lokelma  10g PO TID for now      Proteinuria  See KAYODE on CKD             Brittany Penn MD  Nephrology  [facility] ([place])

## 2022-05-03 NOTE — ASSESSMENT & PLAN NOTE
Patient with acute kidney injury likely d/t Pre-renal azotemia and Acute tubular necrosis Which is currently worsening. Labs reviewed- Renal function/electrolytes with Estimated Creatinine Clearance: 11.1 mL/min (A) (based on SCr of 9 mg/dL (H)). according to latest data. Monitor urine output and serial BMP and adjust therapy as needed. Avoid nephrotoxins and renally dose meds for GFR listed above.     Baseline Cr 3-6, Cr 9 on admission  Hx of CKD: yes due to HTN/DM  Last saw nephro 2013  Exam with edema, no decreased O2 sats or rales  FeNA: not reliable, had already gotten lasix  FeUrea: pending  UA / UCx: proteinuria, glucuria  DDx: hypotension, prerenal, progression of CKD  No indications for HD at this time    Plan:   - nephro consulted, appreciate recs   -possible biopsy indicated given proteinuria  - Trend Cr  - Strict I&Os  - Avoid nephrotoxic agents (NSAIDs, gadolinium, IV radiocontrast)  - Avoid hypotension  - Renal diet  - Renally adjust medications  - Transitioned from IV lasix to PO lasix 80mg BID.

## 2022-05-03 NOTE — H&P
Vascular and Interventional Radiology History & Physical    Date:  5/3/2022      History of Present Illness:  Elbert Still Jr. is a 66 y.o. male admitted for hypoglycemia & worsening renal function. IR consulted for permacath placement to start HD.     Past Medical History:  Past Medical History:   Diagnosis Date    Essential (primary) hypertension 9/21/2017       Past Surgical History:  Past Surgical History:   Procedure Laterality Date    ADENOIDECTOMY      nasal septum repair      Tonsillectomy      TONSILLECTOMY          Sedation History:    Denies any adverse reactions.  Denies problems laying flat.    Social History:  Social History     Tobacco Use    Smoking status: Never Smoker    Smokeless tobacco: Never Used    Tobacco comment: .  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     Substance Use Topics    Alcohol use: No    Drug use: No        Home Medications:   Prior to Admission medications    Medication Sig Start Date End Date Taking? Authorizing Provider   acetaminophen (TYLENOL) 500 MG tablet Take 2 tablets (1,000 mg total) by mouth every 6 (six) hours as needed for Pain. 3/21/20  Yes CARRIE Griggs   amLODIPine (NORVASC) 10 MG tablet TAKE 1 TABLET BY MOUTH ONCE DAILY 9/9/21  Yes Angel Luis Boo MD   atorvastatin (LIPITOR) 20 MG tablet TAKE 1 TABLET BY MOUTH EVERY EVENING 8/16/21  Yes Angel Luis Boo MD   colchicine (COLCRYS) 0.6 mg tablet TAKE 1/2 TABLET(0.3 MG) BY MOUTH EVERY DAY  Patient taking differently: Take 1/2 tablet by mouth every day as needed for gout 5/6/20  Yes Angel Luis Boo MD   fluticasone propionate (FLONASE) 50 mcg/actuation nasal spray 2 sprays (100 mcg total) by Each Nostril route once daily. 2/24/21  Yes Angel Luis Boo MD   glipiZIDE (GLUCOTROL) 10 MG tablet TAKE 1 TABLET BY MOUTH TWICE DAILY WITH MEALS 3/3/22  Yes James Echols MD   metoprolol succinate (TOPROL-XL) 25 MG 24 hr tablet Take 1 tablet (25 mg total) by mouth once daily. 9/9/21  Yes Angel Luis Boo  MD   multivitamin (THERAGRAN) per tablet Take 1 tablet by mouth once daily. 1/22/05  Yes Historical Provider   TUMERIC-GING-OLIVE-OREG-CAPRYL ORAL Take 1 capsule by mouth once daily.   Yes Historical Provider   BLOOD-GLUCOSE CONTROL, NORMAL (ONE TOUCH ULTRA CONTROL MISC) by Misc.(Non-Drug; Combo Route) route.    Historical Provider   LANCETS MISC by Misc.(Non-Drug; Combo Route) route.    Historical Provider       Inpatient Medications:    Current Facility-Administered Medications:     acetaminophen tablet 650 mg, 650 mg, Oral, Q4H PRN, Meño Thorne MD    atorvastatin tablet 20 mg, 20 mg, Oral, QHS, Meño Thorne MD, 20 mg at 05/02/22 2101    carvediloL tablet 12.5 mg, 12.5 mg, Oral, BID, Juwan Muhammad MD, 12.5 mg at 05/03/22 0829    dextrose 10% bolus 125 mL, 12.5 g, Intravenous, PRN, Meño Thorne MD    dextrose 10% bolus 250 mL, 25 g, Intravenous, PRN, Meño Thorne MD    fluticasone propionate 50 mcg/actuation nasal spray 100 mcg, 2 spray, Each Nostril, Daily, Meño Thorne MD    furosemide tablet 80 mg, 80 mg, Oral, BID, Ishan Montes MD    glucagon (human recombinant) injection 1 mg, 1 mg, Intramuscular, PRN, Meño Thorne MD    glucose chewable tablet 16 g, 16 g, Oral, PRN, Meño Thorne MD, 16 g at 04/30/22 0839    glucose chewable tablet 24 g, 24 g, Oral, PRN, Meño Thorne MD    heparin (porcine) injection 5,000 Units, 5,000 Units, Subcutaneous, Q8H, Meño Thorne MD, 5,000 Units at 05/03/22 0521    hydrALAZINE tablet 25 mg, 25 mg, Oral, Q8H PRN, Juwan Muhammad MD, 25 mg at 05/02/22 0520    insulin aspart U-100 pen 0-5 Units, 0-5 Units, Subcutaneous, QID (AC + HS) PRN, Meño Thorne MD, 2 Units at 05/03/22 1250    melatonin tablet 6 mg, 6 mg, Oral, Nightly PRN, Meño Thorne MD, 6 mg at 04/30/22 2113    naloxone 0.4 mg/mL injection 0.02 mg, 0.02 mg, Intravenous, PRN, Meño Thorne MD    NIFEdipine 24 hr tablet 30 mg, 30 mg, Oral, Daily, Ishan Montes MD, 30 mg at 05/03/22  0829    senna-docusate 8.6-50 mg per tablet 1 tablet, 1 tablet, Oral, BID, Meño Thorne MD, 1 tablet at 05/03/22 0829    sevelamer carbonate tablet 800 mg, 800 mg, Oral, TID WM, Juwan Muhammad MD, 800 mg at 05/03/22 1200    sodium bicarbonate tablet 1,300 mg, 1,300 mg, Oral, BID, Jay Nazario DO, 1,300 mg at 05/03/22 0828    sodium chloride 0.9% flush 10 mL, 10 mL, Intravenous, Q12H PRN, Meño Thorne MD    sodium zirconium cyclosilicate packet 10 g, 10 g, Oral, TID, Ishan Montes MD, 10 g at 05/03/22 0828    tamsulosin 24 hr capsule 0.4 mg, 0.4 mg, Oral, Daily, Ishan Montes MD, 0.4 mg at 05/03/22 0837    tuberculin injection 5 Units, 5 Units, Intradermal, Once **AND** [START ON 5/5/2022] POCT TB Skin Test Read, , , Once, Ishan Montes MD     Anticoagulants/Antiplatelets:   Heparin    Allergies:   Review of patient's allergies indicates:   Allergen Reactions    Iodine and iodide containing products Hives     Hypotension, Flushing       Review of Systems:   As documented in primary provider H&P.    Vital Signs (Most Recent):  Temp: 97.8 °F (36.6 °C) (05/03/22 0751)  Pulse: 85 (05/03/22 1140)  Resp: 18 (05/03/22 1140)  BP: (!) 158/81 (05/03/22 1140)  SpO2: (!) 94 % (05/03/22 1140)    Physical Exam:  No acute distress, laying comfortably in bed, pleasant and cooperative  Regular rate and rhythm  Breathing unlabored  Abdomen benign  Extremities are edematous    Sedation Exam:  ASA: III - Patient appears to have severe systemic disease not posing a constant threat to life   Mallampati: II (hard and soft palate, upper portion of tonsils anduvula visible)     Laboratory:  No results found for: INR    Lab Results   Component Value Date    WBC 4.77 05/03/2022    HGB 7.4 (L) 05/03/2022    HCT 22.3 (L) 05/03/2022    MCV 84 05/03/2022     05/03/2022      Lab Results   Component Value Date    GLU 94 05/03/2022     05/03/2022    K 5.1 05/03/2022     (H) 05/03/2022    CO2 12  (L) 05/03/2022    BUN 88 (H) 05/03/2022    CREATININE 9.0 (H) 05/03/2022    CALCIUM 7.5 (L) 05/03/2022    MG 1.5 (L) 05/03/2022    ALT 15 05/03/2022    AST 19 05/03/2022    ALBUMIN 3.0 (L) 05/03/2022    BILITOT 0.3 05/03/2022    BILIDIR 0.2 02/14/2005       Imaging:  Reviewed.      ASSESSMENT/PLAN:   Pt is 66yoM w/ worsening renal failure and recently decided to start dialysis. IR consulted for permacath placement.   Procedure tentatively planned for tomorrow. NPO at midnight.                       Sedation Plan: moderate  Patient will undergo: permacath placement      Jayjay Aviles MD  PGY-3  RADIOLOGY

## 2022-05-03 NOTE — SUBJECTIVE & OBJECTIVE
Interval History: Patient feeling well, slept well. Swelling has improved this AM in his legs. Nephrology following and discussing possible dialysis.    Review of Systems   Constitutional:  Negative for chills, diaphoresis and fever.   HENT: Negative.     Eyes: Negative.  Negative for visual disturbance.   Respiratory:  Negative for cough and shortness of breath.    Cardiovascular:  Positive for leg swelling. Negative for chest pain and palpitations.   Gastrointestinal:  Negative for abdominal distention, abdominal pain, blood in stool, constipation, diarrhea, nausea and vomiting.   Endocrine: Negative.    Genitourinary:  Negative for dysuria and hematuria.   Musculoskeletal: Negative.  Negative for arthralgias and back pain.   Skin: Negative.    Neurological:  Negative for dizziness, syncope, speech difficulty, weakness, light-headedness and headaches.   Hematological: Negative.    Psychiatric/Behavioral: Negative.  Negative for behavioral problems and confusion.    Objective:     Vital Signs (Most Recent):  Temp: 97.8 °F (36.6 °C) (05/03/22 0751)  Pulse: 82 (05/03/22 1127)  Resp: 20 (05/03/22 0015)  BP: (!) 146/75 (05/03/22 0751)  SpO2: 100 % (05/03/22 0751)   Vital Signs (24h Range):  Temp:  [97.3 °F (36.3 °C)-98.4 °F (36.9 °C)] 97.8 °F (36.6 °C)  Pulse:  [82-91] 82  Resp:  [16-20] 20  SpO2:  [95 %-100 %] 100 %  BP: (139-188)/(73-91) 146/75     Weight: 109 kg (240 lb 4.8 oz)  Body mass index is 30.04 kg/m².  No intake or output data in the 24 hours ending 05/03/22 1237   Physical Exam  Constitutional:       General: He is not in acute distress.     Appearance: He is well-developed. He is obese. He is not ill-appearing or toxic-appearing.   HENT:      Head: Normocephalic and atraumatic.      Nose: No congestion or rhinorrhea.      Mouth/Throat:      Pharynx: No oropharyngeal exudate or posterior oropharyngeal erythema.   Eyes:      General: No scleral icterus.        Right eye: No discharge.         Left eye: No  discharge.      Pupils: Pupils are equal, round, and reactive to light.   Cardiovascular:      Rate and Rhythm: Normal rate and regular rhythm.      Heart sounds: Normal heart sounds. No murmur heard.    No gallop.   Pulmonary:      Effort: Pulmonary effort is normal.      Breath sounds: Rales present.   Abdominal:      General: Bowel sounds are normal.      Palpations: Abdomen is soft.      Tenderness: There is no abdominal tenderness.   Musculoskeletal:         General: Normal range of motion.      Cervical back: Normal range of motion and neck supple.      Right lower leg: Edema present.      Left lower leg: Edema present.   Skin:     Capillary Refill: Capillary refill takes less than 2 seconds.      Coloration: Skin is not jaundiced.      Findings: No lesion or rash.   Neurological:      Mental Status: He is alert and oriented to person, place, and time.       Significant Labs: All pertinent labs within the past 24 hours have been reviewed.    Significant Imaging: I have reviewed all pertinent imaging results/findings within the past 24 hours.

## 2022-05-03 NOTE — CARE UPDATE
Records sent to [facility] for review for HD placement. Hep b labs pending. Will continue to follow.     Pau Jones  Nephrology   Ext. [phone]

## 2022-05-04 ENCOUNTER — TELEPHONE (OUTPATIENT)
Dept: FAMILY MEDICINE | Facility: CLINIC | Age: 67
End: 2022-05-04
Payer: COMMERCIAL

## 2022-05-04 DIAGNOSIS — E11.9 TYPE 2 DIABETES MELLITUS WITHOUT COMPLICATION, UNSPECIFIED WHETHER LONG TERM INSULIN USE: ICD-10-CM

## 2022-05-04 LAB
ALBUMIN SERPL BCP-MCNC: 3.3 G/DL (ref 3.5–5.2)
ALP SERPL-CCNC: 165 U/L (ref 55–135)
ALT SERPL W/O P-5'-P-CCNC: 15 U/L (ref 10–44)
ANION GAP SERPL CALC-SCNC: 14 MMOL/L (ref 8–16)
AST SERPL-CCNC: 21 U/L (ref 10–40)
BASOPHILS # BLD AUTO: 0.02 K/UL (ref 0–0.2)
BASOPHILS NFR BLD: 0.4 % (ref 0–1.9)
BILIRUB SERPL-MCNC: 0.3 MG/DL (ref 0.1–1)
BUN SERPL-MCNC: 92 MG/DL (ref 8–23)
CALCIUM SERPL-MCNC: 7.1 MG/DL (ref 8.7–10.5)
CHLORIDE SERPL-SCNC: 111 MMOL/L (ref 95–110)
CO2 SERPL-SCNC: 15 MMOL/L (ref 23–29)
CREAT SERPL-MCNC: 9.6 MG/DL (ref 0.5–1.4)
DIFFERENTIAL METHOD: ABNORMAL
EOSINOPHIL # BLD AUTO: 0.3 K/UL (ref 0–0.5)
EOSINOPHIL NFR BLD: 4.5 % (ref 0–8)
ERYTHROCYTE [DISTWIDTH] IN BLOOD BY AUTOMATED COUNT: 14.6 % (ref 11.5–14.5)
EST. GFR  (AFRICAN AMERICAN): 5.9 ML/MIN/1.73 M^2
EST. GFR  (NON AFRICAN AMERICAN): 5.1 ML/MIN/1.73 M^2
GLUCOSE SERPL-MCNC: 136 MG/DL (ref 70–110)
HAV IGM SERPL QL IA: NEGATIVE
HAV IGM SERPL QL IA: NEGATIVE
HBV CORE AB SERPL QL IA: NEGATIVE
HBV CORE IGM SERPL QL IA: NEGATIVE
HBV SURFACE AB SER-ACNC: NEGATIVE M[IU]/ML
HBV SURFACE AG SERPL QL IA: NEGATIVE
HBV SURFACE AG SERPL QL IA: NEGATIVE
HCT VFR BLD AUTO: 24.2 % (ref 40–54)
HCV AB SERPL QL IA: NEGATIVE
HGB BLD-MCNC: 7.8 G/DL (ref 14–18)
IMM GRANULOCYTES # BLD AUTO: 0.01 K/UL (ref 0–0.04)
IMM GRANULOCYTES NFR BLD AUTO: 0.2 % (ref 0–0.5)
INR PPP: 1 (ref 0.8–1.2)
INTERPRETATION SERPL IFE-IMP: NORMAL
LYMPHOCYTES # BLD AUTO: 0.7 K/UL (ref 1–4.8)
LYMPHOCYTES NFR BLD: 12.6 % (ref 18–48)
MAGNESIUM SERPL-MCNC: 1.6 MG/DL (ref 1.6–2.6)
MCH RBC QN AUTO: 28.4 PG (ref 27–31)
MCHC RBC AUTO-ENTMCNC: 32.2 G/DL (ref 32–36)
MCV RBC AUTO: 88 FL (ref 82–98)
MONOCYTES # BLD AUTO: 0.6 K/UL (ref 0.3–1)
MONOCYTES NFR BLD: 10.1 % (ref 4–15)
MYELOPEROXIDASE AB SER-ACNC: 2 UNITS
NEUTROPHILS # BLD AUTO: 4 K/UL (ref 1.8–7.7)
NEUTROPHILS NFR BLD: 72.2 % (ref 38–73)
NRBC BLD-RTO: 0 /100 WBC
PATHOLOGIST INTERPRETATION IFE: NORMAL
PHOSPHATE SERPL-MCNC: 8.6 MG/DL (ref 2.7–4.5)
PLATELET # BLD AUTO: 227 K/UL (ref 150–450)
PMV BLD AUTO: 11.4 FL (ref 9.2–12.9)
POCT GLUCOSE: 122 MG/DL (ref 70–110)
POCT GLUCOSE: 136 MG/DL (ref 70–110)
POCT GLUCOSE: 142 MG/DL (ref 70–110)
POCT GLUCOSE: 142 MG/DL (ref 70–110)
POTASSIUM SERPL-SCNC: 5.1 MMOL/L (ref 3.5–5.1)
PROT SERPL-MCNC: 6.7 G/DL (ref 6–8.4)
PROTHROMBIN TIME: 10.7 SEC (ref 9–12.5)
RBC # BLD AUTO: 2.75 M/UL (ref 4.6–6.2)
SODIUM SERPL-SCNC: 140 MMOL/L (ref 136–145)
WBC # BLD AUTO: 5.55 K/UL (ref 3.9–12.7)

## 2022-05-04 PROCEDURE — 99233 PR SUBSEQUENT HOSPITAL CARE,LEVL III: ICD-10-PCS | Mod: ,,, | Performed by: STUDENT IN AN ORGANIZED HEALTH CARE EDUCATION/TRAINING PROGRAM

## 2022-05-04 PROCEDURE — 85025 COMPLETE CBC W/AUTO DIFF WBC: CPT

## 2022-05-04 PROCEDURE — 25000003 PHARM REV CODE 250: Performed by: HOSPITALIST

## 2022-05-04 PROCEDURE — 99233 SBSQ HOSP IP/OBS HIGH 50: CPT | Mod: ,,, | Performed by: STUDENT IN AN ORGANIZED HEALTH CARE EDUCATION/TRAINING PROGRAM

## 2022-05-04 PROCEDURE — 85610 PROTHROMBIN TIME: CPT | Performed by: STUDENT IN AN ORGANIZED HEALTH CARE EDUCATION/TRAINING PROGRAM

## 2022-05-04 PROCEDURE — 25000003 PHARM REV CODE 250

## 2022-05-04 PROCEDURE — 99232 SBSQ HOSP IP/OBS MODERATE 35: CPT | Mod: ,,, | Performed by: INTERNAL MEDICINE

## 2022-05-04 PROCEDURE — 25000003 PHARM REV CODE 250: Performed by: STUDENT IN AN ORGANIZED HEALTH CARE EDUCATION/TRAINING PROGRAM

## 2022-05-04 PROCEDURE — 80053 COMPREHEN METABOLIC PANEL: CPT

## 2022-05-04 PROCEDURE — 36415 COLL VENOUS BLD VENIPUNCTURE: CPT

## 2022-05-04 PROCEDURE — 83735 ASSAY OF MAGNESIUM: CPT

## 2022-05-04 PROCEDURE — 84100 ASSAY OF PHOSPHORUS: CPT

## 2022-05-04 PROCEDURE — 63600175 PHARM REV CODE 636 W HCPCS

## 2022-05-04 PROCEDURE — 20600001 HC STEP DOWN PRIVATE ROOM

## 2022-05-04 PROCEDURE — 99232 PR SUBSEQUENT HOSPITAL CARE,LEVL II: ICD-10-PCS | Mod: ,,, | Performed by: INTERNAL MEDICINE

## 2022-05-04 RX ADMIN — TAMSULOSIN HYDROCHLORIDE 0.4 MG: 0.4 CAPSULE ORAL at 08:05

## 2022-05-04 RX ADMIN — SODIUM BICARBONATE 650 MG TABLET 1300 MG: at 08:05

## 2022-05-04 RX ADMIN — FUROSEMIDE 80 MG: 40 TABLET ORAL at 05:05

## 2022-05-04 RX ADMIN — ATORVASTATIN CALCIUM 20 MG: 20 TABLET, FILM COATED ORAL at 09:05

## 2022-05-04 RX ADMIN — HEPARIN SODIUM 5000 UNITS: 5000 INJECTION INTRAVENOUS; SUBCUTANEOUS at 09:05

## 2022-05-04 RX ADMIN — NIFEDIPINE 30 MG: 30 TABLET, FILM COATED, EXTENDED RELEASE ORAL at 08:05

## 2022-05-04 RX ADMIN — ACETAMINOPHEN 650 MG: 325 TABLET ORAL at 02:05

## 2022-05-04 RX ADMIN — SEVELAMER CARBONATE 800 MG: 800 TABLET, FILM COATED ORAL at 05:05

## 2022-05-04 RX ADMIN — SODIUM BICARBONATE 650 MG TABLET 1300 MG: at 09:05

## 2022-05-04 RX ADMIN — HEPARIN SODIUM 5000 UNITS: 5000 INJECTION INTRAVENOUS; SUBCUTANEOUS at 05:05

## 2022-05-04 RX ADMIN — CARVEDILOL 12.5 MG: 12.5 TABLET, FILM COATED ORAL at 09:05

## 2022-05-04 RX ADMIN — SENNOSIDES AND DOCUSATE SODIUM 1 TABLET: 50; 8.6 TABLET ORAL at 09:05

## 2022-05-04 RX ADMIN — SODIUM ZIRCONIUM CYCLOSILICATE 10 G: 10 POWDER, FOR SUSPENSION ORAL at 09:05

## 2022-05-04 RX ADMIN — Medication 6 MG: at 02:05

## 2022-05-04 RX ADMIN — CARVEDILOL 12.5 MG: 12.5 TABLET, FILM COATED ORAL at 08:05

## 2022-05-04 RX ADMIN — FUROSEMIDE 80 MG: 40 TABLET ORAL at 08:05

## 2022-05-04 NOTE — TELEPHONE ENCOUNTER
----- Message from Sadaf Vera sent at 5/4/2022  9:54 AM CDT -----  Regarding: self [phone]  Type:  Patient Returning Call    Who Called: self    Who Left Message for Patient:  Pau    Does the patient know what this is regarding?: returned call    Would the patient rather a call back or a response via [facility]?  Call back    Best Call Back Number:[phone]

## 2022-05-04 NOTE — TELEPHONE ENCOUNTER
Patient says over the weekend he was ill and went to the ER but is now in the hospital, patient says they are trying to placed him on dialysis. He states that he would like for you to call him regarding your opinion on it.

## 2022-05-04 NOTE — ASSESSMENT & PLAN NOTE
Baseline Hb 11, Hb 8 on admission  Antiplatelet/anticoagulation: none  No overt source of bleeding on exam, denies bloody BM, hematuria.       Plan:  - CBCs daily  - Transfuse with goal Hb > 7

## 2022-05-04 NOTE — TELEPHONE ENCOUNTER
----- Message from Kailey Carroll sent at 5/4/2022  9:43 AM CDT -----  Contact: Patient [phone]  Type: Patient Call Back    Who called: Patient    What is the request in detail: States he's in the hospital about to have a procedure, to be put on Dialysis. Would like to speak to Dr Boo before having the procedure. Please call pt ASAP!    Would the patient rather a call back or a response via [facility]? Call back    Best call back number: [phone]

## 2022-05-04 NOTE — ASSESSMENT & PLAN NOTE
Takes colchicine as needed at home, patient with poor kidney function. Will try and manage pain with heat packs and tylenol. Can trial steroids if too much pain or intraarticular steroid shot to avoid systemic steroids.

## 2022-05-04 NOTE — PROGRESS NOTES
[facility] ([place] [address])  Nephrology  Progress Note    Patient Name: Elbert Still Jr.  MRN: [id number]  Admission Date: 4/30/2022  Hospital Length of Stay: 4 days  Attending Provider: Juwan Muhammad MD   Primary Care Physician: Angel Luis Boo MD  Principal Problem:Acute kidney injury superimposed on chronic kidney disease    Subjective:     HPI: Mr. Still is a 67 yo M with PMHx of HTN, T2DM, HLD, gout, CKD4 (baseline unclear, but was 6.4 in September 2021 - previously was 3.6 in 2020), anemia who presented with hypoglycemia after EMS found him to have glucose in the 20s at home. Per his wife, patient was sluggish, had slurred speech the day of admission, so she called EMS.  Upon arrival of EMS blood glucose was in the 20s. Patient takes glipizide 10 mg BID as his only diabetes medication, denies taking insulin. His most recent HbA1c was 5.1 in Sep 2021. Hasn't been measuring his home BG.  He denies drastically reduced urine production, typically urinates every hour ~200 ccs. Believes he might be retaining urine. Urine sometimes appears slightly foamy. ROS also positive for diaphoresis associated with eating food, SOB (present since covid infection 2 months ago), LE swelling X 2 weeks. Nephrology consulted for KAYODE on CKD (Cr = 9.1) and proteinuria.          Interval History: pending permacath placement. Sob with any exertion     Review of patient's allergies indicates:   Allergen Reactions    Iodine and iodide containing products Hives     Hypotension, Flushing     Current Facility-Administered Medications   Medication Frequency    acetaminophen tablet 650 mg Q4H PRN    atorvastatin tablet 20 mg QHS    carvediloL tablet 12.5 mg BID    dextrose 10% bolus 125 mL PRN    dextrose 10% bolus 250 mL PRN    fluticasone propionate 50 mcg/actuation nasal spray 100 mcg Daily    furosemide tablet 80 mg BID    glucagon (human recombinant) injection 1 mg PRN    glucose chewable tablet 16 g PRN     glucose chewable tablet 24 g PRN    heparin (porcine) injection 5,000 Units Q8H    hydrALAZINE tablet 25 mg Q8H PRN    insulin aspart U-100 pen 0-5 Units QID (AC + HS) PRN    melatonin tablet 6 mg Nightly PRN    naloxone 0.4 mg/mL injection 0.02 mg PRN    NIFEdipine 24 hr tablet 30 mg Daily    senna-docusate 8.6-50 mg per tablet 1 tablet BID    sevelamer carbonate tablet 800 mg TID WM    sodium bicarbonate tablet 1,300 mg BID    sodium chloride 0.9% flush 10 mL Q12H PRN    sodium zirconium cyclosilicate packet 10 g TID    tamsulosin 24 hr capsule 0.4 mg Daily       Objective:     Vital Signs (Most Recent):  Temp: 98.3 °F (36.8 °C) (05/04/22 0420)  Pulse: 92 (05/04/22 0420)  Resp: 18 (05/03/22 1140)  BP: (!) 168/80 (05/04/22 0420)  SpO2: 97 % (05/04/22 0420)  O2 Device (Oxygen Therapy): room air (05/03/22 0015) Vital Signs (24h Range):  Temp:  [97.7 °F (36.5 °C)-98.3 °F (36.8 °C)] 98.3 °F (36.8 °C)  Pulse:  [82-94] 92  Resp:  [18] 18  SpO2:  [94 %-100 %] 97 %  BP: (146-191)/(75-90) 168/80     Weight: 109 kg (240 lb 4.8 oz) (04/30/22 1920)  Body mass index is 30.04 kg/m².  Body surface area is 2.4 meters squared.    No intake/output data recorded.    Physical Exam  Vitals reviewed.   Constitutional:       Appearance: Normal appearance.   HENT:      Head: Normocephalic and atraumatic.      Mouth/Throat:      Mouth: Mucous membranes are moist.   Eyes:      Conjunctiva/sclera: Conjunctivae normal.      Pupils: Pupils are equal, round, and reactive to light.   Cardiovascular:      Rate and Rhythm: Normal rate and regular rhythm.      Pulses: Normal pulses.      Heart sounds: Normal heart sounds.   Pulmonary:      Effort: Pulmonary effort is normal.      Breath sounds: Normal breath sounds.   Abdominal:      General: Bowel sounds are normal.      Palpations: Abdomen is soft.   Musculoskeletal:         General: Normal range of motion.      Right lower leg: Edema present.      Left lower leg: Edema present.    Skin:     General: Skin is warm.      Capillary Refill: Capillary refill takes less than 2 seconds.   Neurological:      General: No focal deficit present.      Mental Status: He is alert and oriented to person, place, and time.   Psychiatric:         Mood and Affect: Mood normal.       Significant Labs:  All labs within the past 24 hours have been reviewed.     Significant Imaging:  Reviewed     Assessment/Plan:     * Acute kidney injury superimposed on chronic kidney disease  Mr. Still is a 65 yo M with PMHx of HTN, T2DM, HLD, gout, CKD4 (baseline unclear, but was 6.4 in September 2021 - previously was 3.6 in 2020), anemia admitted to hospital medicine service with hypoglycemia (blood glucose in 20s -  takes glipizide 10 mg BID, denies taking insulin).     He denies reduced urine production, typically urinates every hour about ~200 ccs. Believes he might be retaining urine. Urine sometimes appears slightly foamy. No hydronephrosis on KUS. Nephrology consulted for KAYODE on CKD (Cr = 9.1) and proteinuria.    Likely chronic issue and progression of CKD due to HTN and DM.      Recommendations:  -pt agreed on HD. Pending permacath placement    -on Sodium bicarb 1300mg PO TID for metabolic acidosis   -on Lasix 80 po bid   -Sevelamer 800 mg with meals   -Avoid nephrotoxins  -Daily RFP  -Strict ins and outs   -Diabetic, renal diet       Hyperkalemia  5.1 today  C/w lokelma  10g PO TID for now                Brittany Penn MD  Nephrology  [facility] ([address]

## 2022-05-04 NOTE — PROGRESS NOTES
"[facility] ([address] Medicine  Progress Note    Patient Name: Elbert Still Jr.  MRN: [id number]  Patient Class: IP- Inpatient   Admission Date: 4/30/2022  Length of Stay: 4 days  Attending Physician: Juwan Muhammad MD  Primary Care Provider: Angel Luis Boo MD        Subjective:     Principal Problem:Acute kidney injury superimposed on chronic kidney disease        HPI:  Mr. Still is a 65 yo M with PMHx of HTN, T2DM, HLD, gout, CKD4, anemia who presented with hypoglycemia after EMS found him to have glucose in the 20s at home. Per his wife, she walked in on the patient in the living room around 10am of day of admission. He was sluggish, had slurred speech, no LOC or diaphoresis. She called EMS. They gave him an amp of D50 which improved BG to 100s, then he was started on D10. Upon arrival to [facility], his glucose had improved to 200s. This has never happened before in his 40 years of having diabetes. He typically eats less on his days off, and he was off on the day of admission. Patient works as a . He denies fever, chills, nausea, vomiting, abdominal pain, SOB. He denies drastically reduced urine production, typically urinating 3x/day with a few episodes of nocturia. He notes some SOB with exertion and LE "heaviness." Had one episdoe of diarrhea last week for which he took Imodium, and two episodes of nonbloody diarrhea earlier on day of admission. Patient takes glipizide 10 mg BID as his only diabetes medication, denies taking insulin. He took the glipizide on the evening of 4/28 and morning of 4/29, but did not take the evening dose on 4/29. His most recent HbA1c was 5.1 in Sep 2021. Hasn't been measuring his home BG.     In the ED, his labs were significant for hgb of 8, K 5.7, bicarb 12, Cr 9, BUN 80, mag 1.4, alkaline phos 162, alumin 3.2, corrected Ca 7.8, BNP 27. UA positive for glucose and protein. LFTs and lipase wnl. POCT glucose readings had been 200 " -> 93 -> 73. He received nebulizer treatment and lasix 80 for hyperkalemia management. EKG showed NSR, no peaked T waves.       Overview/Hospital Course:  66 year old man with hx of HTN, T2DM, ESRD admitted for hypoglycemia 2/2 polypharmacy. Holding home diabeties meds, A1c 4.4. Hypertensive to 200 - titrating BP meds. Nephrology consulted for management of worsening Cr and thought to be due to longstanding HTN and DM. No acute indications for dialsysis at this time but patient would prefer not to given history with mother recently being admitted to hospice for ESRD. Patient with persistent HTN and so nifed and coreg increased. K elevated to 5.5 and lokelma and lasix started. Patient to have tunnel cath placed by IR for dialysis. Case management made aware of patients dialysis needs.      Interval History: Patient doing well. Has gout in his right toe that improved with tylenol.    Review of Systems   Constitutional:  Negative for chills, diaphoresis and fever.   HENT: Negative.     Eyes: Negative.  Negative for visual disturbance.   Respiratory:  Negative for cough and shortness of breath.    Cardiovascular:  Positive for leg swelling. Negative for chest pain and palpitations.   Gastrointestinal:  Negative for abdominal distention, abdominal pain, blood in stool, constipation, diarrhea, nausea and vomiting.   Endocrine: Negative.    Genitourinary:  Negative for dysuria and hematuria.   Musculoskeletal: Negative.  Negative for arthralgias and back pain.   Skin:         Pain in right toe, patient says gout   Neurological:  Negative for dizziness, syncope, speech difficulty, weakness, light-headedness and headaches.   Hematological: Negative.    Psychiatric/Behavioral: Negative.  Negative for behavioral problems and confusion.    Objective:     Vital Signs (Most Recent):  Temp: 97.7 °F (36.5 °C) (05/04/22 0826)  Pulse: 85 (05/04/22 0826)  Resp: 18 (05/04/22 0826)  BP: (!) 175/90 (05/04/22 0826)  SpO2: 99 % (05/04/22  0826)   Vital Signs (24h Range):  Temp:  [97.7 °F (36.5 °C)-98.3 °F (36.8 °C)] 97.7 °F (36.5 °C)  Pulse:  [82-94] 85  Resp:  [18] 18  SpO2:  [94 %-100 %] 99 %  BP: (154-191)/(80-90) 175/90     Weight: 109 kg (240 lb 4.8 oz)  Body mass index is 30.04 kg/m².  No intake or output data in the 24 hours ending 05/04/22 0847   Physical Exam  Constitutional:       General: He is not in acute distress.     Appearance: He is well-developed. He is obese. He is not ill-appearing or toxic-appearing.   HENT:      Head: Normocephalic and atraumatic.      Nose: No congestion or rhinorrhea.      Mouth/Throat:      Pharynx: No oropharyngeal exudate or posterior oropharyngeal erythema.   Eyes:      General: No scleral icterus.        Right eye: No discharge.         Left eye: No discharge.      Pupils: Pupils are equal, round, and reactive to light.   Cardiovascular:      Rate and Rhythm: Normal rate and regular rhythm.      Heart sounds: Normal heart sounds. No murmur heard.    No gallop.   Pulmonary:      Effort: Pulmonary effort is normal.   Abdominal:      General: Bowel sounds are normal.      Palpations: Abdomen is soft.      Tenderness: There is no abdominal tenderness.   Musculoskeletal:         General: Normal range of motion.      Cervical back: Normal range of motion and neck supple.      Right lower leg: Edema present.      Left lower leg: Edema present.      Comments: Some tenderness to right toe   Skin:     Capillary Refill: Capillary refill takes less than 2 seconds.      Coloration: Skin is not jaundiced.      Findings: No lesion or rash.   Neurological:      Mental Status: He is alert and oriented to person, place, and time.   Psychiatric:         Mood and Affect: Mood normal.         Behavior: Behavior normal.         Thought Content: Thought content normal.       Significant Labs: All pertinent labs within the past 24 hours have been reviewed.    Significant Imaging: I have reviewed all pertinent imaging  results/findings within the past 24 hours.      Assessment/Plan:      * Acute kidney injury superimposed on chronic kidney disease        Hyperkalemia  K 5.7 on admission   ECG: NSR, no peaked t waves  Chest pain/discomfort: none  Home medications: no ARBs, or ACEi    Plan:   -Page cardiology if K >5.5 with ECG changes or nephrology if >5.5 (persistently) and anuric  -Trend BMP, repeat potassium 5.4 today  -Telemetry   -Shift with lasix, duonebs, lokelma PRN. Be cautious about using insulin with dextrose in setting of hypoglycemia        KAYODE (acute kidney injury)  Patient with acute kidney injury likely d/t Pre-renal azotemia and Acute tubular necrosis Which is currently worsening. Labs reviewed- Renal function/electrolytes with Estimated Creatinine Clearance: 10.4 mL/min (A) (based on SCr of 9.6 mg/dL (H)). according to latest data. Monitor urine output and serial BMP and adjust therapy as needed. Avoid nephrotoxins and renally dose meds for GFR listed above.     Baseline Cr 3-6, Cr 9 on admission  Hx of CKD: yes due to HTN/DM  Last saw nephro 2013  Exam with edema, no decreased O2 sats or rales  FeNA: not reliable, had already gotten lasix  FeUrea: pending  UA / UCx: proteinuria, glucuria  DDx: hypotension, prerenal, progression of CKD  No indications for HD at this time    Plan:   - nephro consulted, appreciate recs   -possible biopsy indicated given proteinuria  - Trend Cr  - Strict I&Os  - Avoid nephrotoxic agents (NSAIDs, gadolinium, IV radiocontrast)  - Avoid hypotension  - Renal diet  - Renally adjust medications  - Transitioned from IV lasix to PO lasix 80mg BID.        Hypoglycemia  Blood sugar 203 mg/dl on admission, given dextrose by EMS with improvement   No history of labile blood sugars during previous admission   Most recent blood sugar 73 mg/dl   Mentation: AAO x3 during my interview   Taking glipizide 10 BID    Ddx: decreased po intake and decreased glipizide clearance (KAYODE on CKD), Likely not very  hyperglycemic at baseline given low HbA1c. Less likely exogenous administration of insulin, insulinoma     Plan:     -Fall precautions   -Continue to monitor sugars.  -STOP the glipizide at discharge, A1c well controlled.        Anemia of chronic disease  Baseline Hb 11, Hb 8 on admission  Antiplatelet/anticoagulation: none  No overt source of bleeding on exam, denies bloody BM, hematuria.       Plan:  - CBCs daily  - Transfuse with goal Hb > 7        Gout  Takes colchicine as needed at home, patient with poor kidney function. Will try and manage pain with heat packs and tylenol. Can trial steroids if too much pain or intraarticular steroid shot to avoid systemic steroids.      CKD (chronic kidney disease) stage 4, GFR 15-29 ml/min  Hx of CKD due to HTN, DM. Baseline around 3-6    - nephro referral outpatient  - Patient will be a new dialysis patient and agreed to perma cath.  -follow up nephro recs      Mixed hyperlipidemia  Continue home lipitor 20      Essential (primary) hypertension   home meds amlodipine and Toprol XL  Blood pressure better controlled with nifedpine.    Plan:  --started on Nifedipine 30mg daily, will continue to titrate      Proteinuria  Hx of proteinuria, UPCr >7, nephrotic range    - consider ACEi or SGLT-2 inhibitor at discharge      Diabetes mellitus due to underlying condition with diabetic nephropathy  Diabetes type 2  Last HbA1c:   Lab Results   Component Value Date    HGBA1C 4.4 04/30/2022     Insulin regimen: none  Oral regimen ( held inpatient): glipizide   CAD Prophylaxis: statin  HTN or Renal disease: KAYODE, hypertensive    PLAN:   - Hold oral anti-hyperglycemic agents  - LD SSI  - Diabetic diet  - Hypoglycemia orderset with POCT BG AC/QS  - Goal -180 while inpatient               VTE Risk Mitigation (From admission, onward)         Ordered     heparin (porcine) injection 5,000 Units  Every 8 hours         04/30/22 0426     IP VTE HIGH RISK PATIENT  Once         04/30/22 042      Place sequential compression device  Until discontinued         04/30/22 0429                Discharge Planning   HORACIO: 5/9/2022     Code Status: Full Code   Is the patient medically ready for discharge?: No    Reason for patient still in hospital (select all that apply): Treatment and Consult recommendations  Discharge Plan A: Home with family                  Ishan Montes MD  Department of Hospital Medicine   [facility] - Int[facility] ([place])

## 2022-05-04 NOTE — ASSESSMENT & PLAN NOTE
Mr. Still is a 65 yo M with PMHx of HTN, T2DM, HLD, gout, CKD4 (baseline unclear, but was 6.4 in September 2021 - previously was 3.6 in 2020), anemia admitted to hospital medicine service with hypoglycemia (blood glucose in 20s -  takes glipizide 10 mg BID, denies taking insulin).     He denies reduced urine production, typically urinates every hour about ~200 ccs. Believes he might be retaining urine. Urine sometimes appears slightly foamy. No hydronephrosis on KUS. Nephrology consulted for KAYODE on CKD (Cr = 9.1) and proteinuria.    Likely chronic issue and progression of CKD due to HTN and DM.      Recommendations:  -pt agreed on HD. Pending permacath placement    -on Sodium bicarb 1300mg PO TID for metabolic acidosis   -on Lasix 80 po bid   -Sevelamer 800 mg with meals   -Avoid nephrotoxins  -Daily RFP  -Strict ins and outs   -Diabetic, renal diet

## 2022-05-04 NOTE — SUBJECTIVE & OBJECTIVE
Interval History: pending permacath placement. Sob with any exertion     Review of patient's allergies indicates:   Allergen Reactions    Iodine and iodide containing products Hives     Hypotension, Flushing     Current Facility-Administered Medications   Medication Frequency    acetaminophen tablet 650 mg Q4H PRN    atorvastatin tablet 20 mg QHS    carvediloL tablet 12.5 mg BID    dextrose 10% bolus 125 mL PRN    dextrose 10% bolus 250 mL PRN    fluticasone propionate 50 mcg/actuation nasal spray 100 mcg Daily    furosemide tablet 80 mg BID    glucagon (human recombinant) injection 1 mg PRN    glucose chewable tablet 16 g PRN    glucose chewable tablet 24 g PRN    heparin (porcine) injection 5,000 Units Q8H    hydrALAZINE tablet 25 mg Q8H PRN    insulin aspart U-100 pen 0-5 Units QID (AC + HS) PRN    melatonin tablet 6 mg Nightly PRN    naloxone 0.4 mg/mL injection 0.02 mg PRN    NIFEdipine 24 hr tablet 30 mg Daily    senna-docusate 8.6-50 mg per tablet 1 tablet BID    sevelamer carbonate tablet 800 mg TID WM    sodium bicarbonate tablet 1,300 mg BID    sodium chloride 0.9% flush 10 mL Q12H PRN    sodium zirconium cyclosilicate packet 10 g TID    tamsulosin 24 hr capsule 0.4 mg Daily       Objective:     Vital Signs (Most Recent):  Temp: 98.3 °F (36.8 °C) (05/04/22 0420)  Pulse: 92 (05/04/22 0420)  Resp: 18 (05/03/22 1140)  BP: (!) 168/80 (05/04/22 0420)  SpO2: 97 % (05/04/22 0420)  O2 Device (Oxygen Therapy): room air (05/03/22 0015) Vital Signs (24h Range):  Temp:  [97.7 °F (36.5 °C)-98.3 °F (36.8 °C)] 98.3 °F (36.8 °C)  Pulse:  [82-94] 92  Resp:  [18] 18  SpO2:  [94 %-100 %] 97 %  BP: (146-191)/(75-90) 168/80     Weight: 109 kg (240 lb 4.8 oz) (04/30/22 1920)  Body mass index is 30.04 kg/m².  Body surface area is 2.4 meters squared.    No intake/output data recorded.    Physical Exam  Vitals reviewed.   Constitutional:       Appearance: Normal appearance.   HENT:      Head: Normocephalic and atraumatic.       Mouth/Throat:      Mouth: Mucous membranes are moist.   Eyes:      Conjunctiva/sclera: Conjunctivae normal.      Pupils: Pupils are equal, round, and reactive to light.   Cardiovascular:      Rate and Rhythm: Normal rate and regular rhythm.      Pulses: Normal pulses.      Heart sounds: Normal heart sounds.   Pulmonary:      Effort: Pulmonary effort is normal.      Breath sounds: Normal breath sounds.   Abdominal:      General: Bowel sounds are normal.      Palpations: Abdomen is soft.   Musculoskeletal:         General: Normal range of motion.      Right lower leg: Edema present.      Left lower leg: Edema present.   Skin:     General: Skin is warm.      Capillary Refill: Capillary refill takes less than 2 seconds.   Neurological:      General: No focal deficit present.      Mental Status: He is alert and oriented to person, place, and time.   Psychiatric:         Mood and Affect: Mood normal.       Significant Labs:  All labs within the past 24 hours have been reviewed.     Significant Imaging:  Reviewed

## 2022-05-04 NOTE — SUBJECTIVE & OBJECTIVE
Interval History: Patient doing well. Has gout in his right toe that improved with tylenol.    Review of Systems   Constitutional:  Negative for chills, diaphoresis and fever.   HENT: Negative.     Eyes: Negative.  Negative for visual disturbance.   Respiratory:  Negative for cough and shortness of breath.    Cardiovascular:  Positive for leg swelling. Negative for chest pain and palpitations.   Gastrointestinal:  Negative for abdominal distention, abdominal pain, blood in stool, constipation, diarrhea, nausea and vomiting.   Endocrine: Negative.    Genitourinary:  Negative for dysuria and hematuria.   Musculoskeletal: Negative.  Negative for arthralgias and back pain.   Skin:         Pain in right toe, patient says gout   Neurological:  Negative for dizziness, syncope, speech difficulty, weakness, light-headedness and headaches.   Hematological: Negative.    Psychiatric/Behavioral: Negative.  Negative for behavioral problems and confusion.    Objective:     Vital Signs (Most Recent):  Temp: 97.7 °F (36.5 °C) (05/04/22 0826)  Pulse: 85 (05/04/22 0826)  Resp: 18 (05/04/22 0826)  BP: (!) 175/90 (05/04/22 0826)  SpO2: 99 % (05/04/22 0826)   Vital Signs (24h Range):  Temp:  [97.7 °F (36.5 °C)-98.3 °F (36.8 °C)] 97.7 °F (36.5 °C)  Pulse:  [82-94] 85  Resp:  [18] 18  SpO2:  [94 %-100 %] 99 %  BP: (154-191)/(80-90) 175/90     Weight: 109 kg (240 lb 4.8 oz)  Body mass index is 30.04 kg/m².  No intake or output data in the 24 hours ending 05/04/22 0847   Physical Exam  Constitutional:       General: He is not in acute distress.     Appearance: He is well-developed. He is obese. He is not ill-appearing or toxic-appearing.   HENT:      Head: Normocephalic and atraumatic.      Nose: No congestion or rhinorrhea.      Mouth/Throat:      Pharynx: No oropharyngeal exudate or posterior oropharyngeal erythema.   Eyes:      General: No scleral icterus.        Right eye: No discharge.         Left eye: No discharge.      Pupils: Pupils  are equal, round, and reactive to light.   Cardiovascular:      Rate and Rhythm: Normal rate and regular rhythm.      Heart sounds: Normal heart sounds. No murmur heard.    No gallop.   Pulmonary:      Effort: Pulmonary effort is normal.   Abdominal:      General: Bowel sounds are normal.      Palpations: Abdomen is soft.      Tenderness: There is no abdominal tenderness.   Musculoskeletal:         General: Normal range of motion.      Cervical back: Normal range of motion and neck supple.      Right lower leg: Edema present.      Left lower leg: Edema present.      Comments: Some tenderness to right toe   Skin:     Capillary Refill: Capillary refill takes less than 2 seconds.      Coloration: Skin is not jaundiced.      Findings: No lesion or rash.   Neurological:      Mental Status: He is alert and oriented to person, place, and time.   Psychiatric:         Mood and Affect: Mood normal.         Behavior: Behavior normal.         Thought Content: Thought content normal.       Significant Labs: All pertinent labs within the past 24 hours have been reviewed.    Significant Imaging: I have reviewed all pertinent imaging results/findings within the past 24 hours.

## 2022-05-04 NOTE — ASSESSMENT & PLAN NOTE
Hx of CKD due to HTN, DM. Baseline around 3-6    - nephro referral outpatient  - Patient will be a new dialysis patient and agreed to perma cath.  -follow up nephro recs

## 2022-05-04 NOTE — TELEPHONE ENCOUNTER
Let patient know that I am out of the office currently. But from the lab work that I have reviewed that was done during his stay, if his doctors in the hospital recommend for him to get dialysis, I suggest he proceeds with dialysis.

## 2022-05-04 NOTE — ASSESSMENT & PLAN NOTE
Blood sugar 203 mg/dl on admission, given dextrose by EMS with improvement   No history of labile blood sugars during previous admission   Most recent blood sugar 73 mg/dl   Mentation: AAO x3 during my interview   Taking glipizide 10 BID    Ddx: decreased po intake and decreased glipizide clearance (KAYODE on CKD), Likely not very hyperglycemic at baseline given low HbA1c. Less likely exogenous administration of insulin, insulinoma     Plan:     -Fall precautions   -Continue to monitor sugars.  -STOP the glipizide at discharge, A1c well controlled.

## 2022-05-05 LAB
ALBUMIN SERPL BCP-MCNC: 2.9 G/DL (ref 3.5–5.2)
ALP SERPL-CCNC: 146 U/L (ref 55–135)
ALT SERPL W/O P-5'-P-CCNC: 15 U/L (ref 10–44)
ANION GAP SERPL CALC-SCNC: 15 MMOL/L (ref 8–16)
AST SERPL-CCNC: 20 U/L (ref 10–40)
BASOPHILS # BLD AUTO: 0.02 K/UL (ref 0–0.2)
BASOPHILS NFR BLD: 0.5 % (ref 0–1.9)
BILIRUB SERPL-MCNC: 0.2 MG/DL (ref 0.1–1)
BUN SERPL-MCNC: 93 MG/DL (ref 8–23)
CALCIUM SERPL-MCNC: 6.9 MG/DL (ref 8.7–10.5)
CHLORIDE SERPL-SCNC: 111 MMOL/L (ref 95–110)
CO2 SERPL-SCNC: 13 MMOL/L (ref 23–29)
CREAT SERPL-MCNC: 9.7 MG/DL (ref 0.5–1.4)
DIFFERENTIAL METHOD: ABNORMAL
EOSINOPHIL # BLD AUTO: 0.2 K/UL (ref 0–0.5)
EOSINOPHIL NFR BLD: 5.1 % (ref 0–8)
ERYTHROCYTE [DISTWIDTH] IN BLOOD BY AUTOMATED COUNT: 14.3 % (ref 11.5–14.5)
EST. GFR  (AFRICAN AMERICAN): 5.9 ML/MIN/1.73 M^2
EST. GFR  (NON AFRICAN AMERICAN): 5.1 ML/MIN/1.73 M^2
GLUCOSE SERPL-MCNC: 142 MG/DL (ref 70–110)
HCT VFR BLD AUTO: 22.5 % (ref 40–54)
HGB BLD-MCNC: 7.3 G/DL (ref 14–18)
IMM GRANULOCYTES # BLD AUTO: 0.01 K/UL (ref 0–0.04)
IMM GRANULOCYTES NFR BLD AUTO: 0.2 % (ref 0–0.5)
LYMPHOCYTES # BLD AUTO: 0.8 K/UL (ref 1–4.8)
LYMPHOCYTES NFR BLD: 18.6 % (ref 18–48)
MAGNESIUM SERPL-MCNC: 1.5 MG/DL (ref 1.6–2.6)
MCH RBC QN AUTO: 28 PG (ref 27–31)
MCHC RBC AUTO-ENTMCNC: 32.4 G/DL (ref 32–36)
MCV RBC AUTO: 86 FL (ref 82–98)
MONOCYTES # BLD AUTO: 0.6 K/UL (ref 0.3–1)
MONOCYTES NFR BLD: 14.9 % (ref 4–15)
NEUTROPHILS # BLD AUTO: 2.6 K/UL (ref 1.8–7.7)
NEUTROPHILS NFR BLD: 60.7 % (ref 38–73)
NRBC BLD-RTO: 0 /100 WBC
PHOSPHATE SERPL-MCNC: 7.9 MG/DL (ref 2.7–4.5)
PLATELET # BLD AUTO: 207 K/UL (ref 150–450)
PMV BLD AUTO: 11.2 FL (ref 9.2–12.9)
POCT GLUCOSE: 113 MG/DL (ref 70–110)
POCT GLUCOSE: 154 MG/DL (ref 70–110)
POTASSIUM SERPL-SCNC: 4.6 MMOL/L (ref 3.5–5.1)
PROT SERPL-MCNC: 6.6 G/DL (ref 6–8.4)
RBC # BLD AUTO: 2.61 M/UL (ref 4.6–6.2)
SODIUM SERPL-SCNC: 139 MMOL/L (ref 136–145)
WBC # BLD AUTO: 4.3 K/UL (ref 3.9–12.7)

## 2022-05-05 PROCEDURE — 25000003 PHARM REV CODE 250: Performed by: RADIOLOGY

## 2022-05-05 PROCEDURE — 63600175 PHARM REV CODE 636 W HCPCS

## 2022-05-05 PROCEDURE — 25000003 PHARM REV CODE 250: Performed by: HOSPITALIST

## 2022-05-05 PROCEDURE — 84100 ASSAY OF PHOSPHORUS: CPT

## 2022-05-05 PROCEDURE — 63600175 PHARM REV CODE 636 W HCPCS: Performed by: INTERNAL MEDICINE

## 2022-05-05 PROCEDURE — 90935 HEMODIALYSIS ONE EVALUATION: CPT

## 2022-05-05 PROCEDURE — 36415 COLL VENOUS BLD VENIPUNCTURE: CPT

## 2022-05-05 PROCEDURE — 25000003 PHARM REV CODE 250: Performed by: STUDENT IN AN ORGANIZED HEALTH CARE EDUCATION/TRAINING PROGRAM

## 2022-05-05 PROCEDURE — 25000003 PHARM REV CODE 250

## 2022-05-05 PROCEDURE — 83735 ASSAY OF MAGNESIUM: CPT

## 2022-05-05 PROCEDURE — 25000003 PHARM REV CODE 250: Performed by: INTERNAL MEDICINE

## 2022-05-05 PROCEDURE — 20600001 HC STEP DOWN PRIVATE ROOM

## 2022-05-05 PROCEDURE — 99232 PR SUBSEQUENT HOSPITAL CARE,LEVL II: ICD-10-PCS | Mod: ,,, | Performed by: STUDENT IN AN ORGANIZED HEALTH CARE EDUCATION/TRAINING PROGRAM

## 2022-05-05 PROCEDURE — 63600175 PHARM REV CODE 636 W HCPCS: Performed by: SURGERY

## 2022-05-05 PROCEDURE — 99232 PR SUBSEQUENT HOSPITAL CARE,LEVL II: ICD-10-PCS | Mod: ,,, | Performed by: INTERNAL MEDICINE

## 2022-05-05 PROCEDURE — 99232 SBSQ HOSP IP/OBS MODERATE 35: CPT | Mod: ,,, | Performed by: INTERNAL MEDICINE

## 2022-05-05 PROCEDURE — 80053 COMPREHEN METABOLIC PANEL: CPT

## 2022-05-05 PROCEDURE — 99232 SBSQ HOSP IP/OBS MODERATE 35: CPT | Mod: ,,, | Performed by: STUDENT IN AN ORGANIZED HEALTH CARE EDUCATION/TRAINING PROGRAM

## 2022-05-05 PROCEDURE — 85025 COMPLETE CBC W/AUTO DIFF WBC: CPT

## 2022-05-05 RX ORDER — HEPARIN SODIUM 5000 [USP'U]/ML
5000 INJECTION, SOLUTION INTRAVENOUS; SUBCUTANEOUS EVERY 8 HOURS
Status: DISCONTINUED | OUTPATIENT
Start: 2022-05-05 | End: 2022-05-10 | Stop reason: HOSPADM

## 2022-05-05 RX ORDER — FENTANYL CITRATE 50 UG/ML
INJECTION, SOLUTION INTRAMUSCULAR; INTRAVENOUS CODE/TRAUMA/SEDATION MEDICATION
Status: COMPLETED | OUTPATIENT
Start: 2022-05-05 | End: 2022-05-05

## 2022-05-05 RX ORDER — HEPARIN SODIUM 1000 [USP'U]/ML
1000 INJECTION, SOLUTION INTRAVENOUS; SUBCUTANEOUS
Status: DISCONTINUED | OUTPATIENT
Start: 2022-05-05 | End: 2022-05-08

## 2022-05-05 RX ORDER — LIDOCAINE HYDROCHLORIDE 10 MG/ML
INJECTION INFILTRATION; PERINEURAL CODE/TRAUMA/SEDATION MEDICATION
Status: COMPLETED | OUTPATIENT
Start: 2022-05-05 | End: 2022-05-05

## 2022-05-05 RX ORDER — CEFAZOLIN SODIUM 1 G/50ML
SOLUTION INTRAVENOUS
Status: COMPLETED | OUTPATIENT
Start: 2022-05-05 | End: 2022-05-05

## 2022-05-05 RX ORDER — SODIUM BICARBONATE 650 MG/1
1300 TABLET ORAL 3 TIMES DAILY
Status: DISCONTINUED | OUTPATIENT
Start: 2022-05-05 | End: 2022-05-10

## 2022-05-05 RX ORDER — MAGNESIUM SULFATE HEPTAHYDRATE 40 MG/ML
2 INJECTION, SOLUTION INTRAVENOUS ONCE
Status: COMPLETED | OUTPATIENT
Start: 2022-05-05 | End: 2022-05-05

## 2022-05-05 RX ORDER — SODIUM CHLORIDE 9 MG/ML
INJECTION, SOLUTION INTRAVENOUS ONCE
Status: COMPLETED | OUTPATIENT
Start: 2022-05-05 | End: 2022-05-05

## 2022-05-05 RX ORDER — MIDAZOLAM HYDROCHLORIDE 1 MG/ML
INJECTION INTRAMUSCULAR; INTRAVENOUS CODE/TRAUMA/SEDATION MEDICATION
Status: COMPLETED | OUTPATIENT
Start: 2022-05-05 | End: 2022-05-05

## 2022-05-05 RX ORDER — CALCIUM CARBONATE 200(500)MG
500 TABLET,CHEWABLE ORAL DAILY
Status: DISCONTINUED | OUTPATIENT
Start: 2022-05-05 | End: 2022-05-10

## 2022-05-05 RX ADMIN — FENTANYL CITRATE 25 MCG: 50 INJECTION, SOLUTION INTRAMUSCULAR; INTRAVENOUS at 08:05

## 2022-05-05 RX ADMIN — ATORVASTATIN CALCIUM 20 MG: 20 TABLET, FILM COATED ORAL at 08:05

## 2022-05-05 RX ADMIN — MIDAZOLAM HYDROCHLORIDE 0.5 MG: 1 INJECTION INTRAMUSCULAR; INTRAVENOUS at 08:05

## 2022-05-05 RX ADMIN — FUROSEMIDE 80 MG: 40 TABLET ORAL at 11:05

## 2022-05-05 RX ADMIN — NIFEDIPINE 30 MG: 30 TABLET, FILM COATED, EXTENDED RELEASE ORAL at 11:05

## 2022-05-05 RX ADMIN — FUROSEMIDE 80 MG: 40 TABLET ORAL at 07:05

## 2022-05-05 RX ADMIN — SODIUM CHLORIDE: 0.9 INJECTION, SOLUTION INTRAVENOUS at 03:05

## 2022-05-05 RX ADMIN — HEPARIN SODIUM 1000 UNITS: 1000 INJECTION INTRAVENOUS; SUBCUTANEOUS at 05:05

## 2022-05-05 RX ADMIN — HEPARIN SODIUM 5000 UNITS: 5000 INJECTION INTRAVENOUS; SUBCUTANEOUS at 10:05

## 2022-05-05 RX ADMIN — CARVEDILOL 12.5 MG: 12.5 TABLET, FILM COATED ORAL at 11:05

## 2022-05-05 RX ADMIN — MAGNESIUM SULFATE 2 G: 2 INJECTION INTRAVENOUS at 11:05

## 2022-05-05 RX ADMIN — SODIUM BICARBONATE 650 MG TABLET 1300 MG: at 11:05

## 2022-05-05 RX ADMIN — SENNOSIDES AND DOCUSATE SODIUM 1 TABLET: 50; 8.6 TABLET ORAL at 08:05

## 2022-05-05 RX ADMIN — CEFAZOLIN SODIUM 1 G: 1 SOLUTION INTRAVENOUS at 08:05

## 2022-05-05 RX ADMIN — CALCIUM CARBONATE (ANTACID) CHEW TAB 500 MG 500 MG: 500 CHEW TAB at 11:05

## 2022-05-05 RX ADMIN — SODIUM BICARBONATE 650 MG TABLET 1300 MG: at 08:05

## 2022-05-05 RX ADMIN — LIDOCAINE HYDROCHLORIDE 10 ML: 10 INJECTION, SOLUTION INFILTRATION; PERINEURAL at 08:05

## 2022-05-05 RX ADMIN — TAMSULOSIN HYDROCHLORIDE 0.4 MG: 0.4 CAPSULE ORAL at 11:05

## 2022-05-05 RX ADMIN — CARVEDILOL 12.5 MG: 12.5 TABLET, FILM COATED ORAL at 08:05

## 2022-05-05 NOTE — CARE UPDATE
CATY LIMA reached out to [facility] for current dialysis treatment sheets. SW advised that patients permacath placement was completed this morning and treatment sheets will be sent to clinic when they become available. Will continue to follow.     Pau Jones  Nephrology [facility]  Ext. [phone]

## 2022-05-05 NOTE — ASSESSMENT & PLAN NOTE
Patient with acute kidney injury likely d/t Pre-renal azotemia and Acute tubular necrosis Which is currently worsening. Labs reviewed- Renal function/electrolytes with Estimated Creatinine Clearance: 10.3 mL/min (A) (based on SCr of 9.7 mg/dL (H)). according to latest data. Monitor urine output and serial BMP and adjust therapy as needed. Avoid nephrotoxins and renally dose meds for GFR listed above.     Baseline Cr 3-6, Cr 9 on admission  Hx of CKD: yes due to HTN/DM  Last saw nephro 2013  Exam with edema, no decreased O2 sats or rales  FeNA: not reliable, had already gotten lasix  FeUrea: pending  UA / UCx: proteinuria, glucuria  DDx: hypotension, prerenal, progression of CKD  No indications for HD at this time    Plan:   - nephro consulted, appreciate recs   -possible biopsy indicated given proteinuria  - Trend Cr  - Strict I&Os  - Avoid nephrotoxic agents (NSAIDs, gadolinium, IV radiocontrast)  - Avoid hypotension  - Renal diet  - Renally adjust medications  - Transitioned from IV lasix to PO lasix 80mg BID.

## 2022-05-05 NOTE — PROGRESS NOTES
Pt arrived via stretcher. Pt awake, alert oriented, VSS. HD started to right chest wall cath. NAD, plan of care and procedure reviewed with pt.

## 2022-05-05 NOTE — NURSING TRANSFER
Nursing Transfer Note      5/5/2022     Reason patient is being transferred: recovery completed    Transfer To: 140 78    Transfer via stretcher    Transported by [facility]    Chart send with patient: Yes    Recovery completed, pt tolerated well. No apparent distress noted. Dressing CDI.

## 2022-05-05 NOTE — PLAN OF CARE
Pt arrived to MPU 7, no acute distress noted, resting comfortably. Dressing CDI. Report called to ROULA Pena.

## 2022-05-05 NOTE — PLAN OF CARE
Pt arrived to IR room 189 for HD cath placement, no acute distress noted. Orders, consent and labs reviewed on chart.

## 2022-05-05 NOTE — PROGRESS NOTES
"[facility] ([address] Medicine  Progress Note    Patient Name: Elbert Sitll Jr.  MRN: [id number]  Patient Class: IP- Inpatient   Admission Date: 4/30/2022  Length of Stay: 5 days  Attending Physician: Juwan Muhammad MD  Primary Care Provider: Angel Luis Boo MD        Subjective:     Principal Problem:Acute kidney injury superimposed on chronic kidney disease        HPI:  Mr. Still is a 65 yo M with PMHx of HTN, T2DM, HLD, gout, CKD4, anemia who presented with hypoglycemia after EMS found him to have glucose in the 20s at home. Per his wife, she walked in on the patient in the living room around 10am of day of admission. He was sluggish, had slurred speech, no LOC or diaphoresis. She called EMS. They gave him an amp of D50 which improved BG to 100s, then he was started on D10. Upon arrival to [facility], his glucose had improved to 200s. This has never happened before in his 40 years of having diabetes. He typically eats less on his days off, and he was off on the day of admission. Patient works as a . He denies fever, chills, nausea, vomiting, abdominal pain, SOB. He denies drastically reduced urine production, typically urinating 3x/day with a few episodes of nocturia. He notes some SOB with exertion and LE "heaviness." Had one episdoe of diarrhea last week for which he took Imodium, and two episodes of nonbloody diarrhea earlier on day of admission. Patient takes glipizide 10 mg BID as his only diabetes medication, denies taking insulin. He took the glipizide on the evening of 4/28 and morning of 4/29, but did not take the evening dose on 4/29. His most recent HbA1c was 5.1 in Sep 2021. Hasn't been measuring his home BG.     In the ED, his labs were significant for hgb of 8, K 5.7, bicarb 12, Cr 9, BUN 80, mag 1.4, alkaline phos 162, alumin 3.2, corrected Ca 7.8, BNP 27. UA positive for glucose and protein. LFTs and lipase wnl. POCT glucose readings had been 200 " -> 93 -> 73. He received nebulizer treatment and lasix 80 for hyperkalemia management. EKG showed NSR, no peaked T waves.       Overview/Hospital Course:  66 year old man with hx of HTN, T2DM, ESRD admitted for hypoglycemia 2/2 polypharmacy. Holding home diabeties meds, A1c 4.4. Hypertensive to 200 - titrating BP meds. Nephrology consulted for management of worsening Cr and thought to be due to longstanding HTN and DM. No acute indications for dialsysis at this time but patient would prefer not to given history with mother recently being admitted to hospice for ESRD. Patient with persistent HTN and so nifed and coreg increased. K elevated to 5.5 and lokelma and lasix started. Patient had tunnel cath placed by IR for dialysis on 5/5/22. Case management made aware of patients dialysis needs. Nephrology planning to start diaylsis on 5/5/22.      Interval History: S/p perma cath placement. Patient with some pain at the site. Resting comfortably in bed. Plan for dialysis today.    Review of Systems   Constitutional:  Negative for chills, diaphoresis and fever.   HENT: Negative.     Eyes: Negative.  Negative for visual disturbance.   Respiratory:  Negative for cough and shortness of breath.    Cardiovascular:  Positive for leg swelling. Negative for chest pain and palpitations.        Permacath right side placed, look intact and clean. Notes some pain at site of catheter   Gastrointestinal:  Negative for abdominal distention, abdominal pain, blood in stool, constipation, diarrhea, nausea and vomiting.   Endocrine: Negative.    Genitourinary:  Negative for dysuria and hematuria.   Musculoskeletal: Negative.  Negative for arthralgias and back pain.   Skin:  Negative for color change.   Neurological:  Negative for dizziness, syncope, speech difficulty, weakness, light-headedness and headaches.   Hematological: Negative.    Psychiatric/Behavioral: Negative.  Negative for behavioral problems and confusion.    Objective:      Vital Signs (Most Recent):  Temp: 98.1 °F (36.7 °C) (05/05/22 1100)  Pulse: 83 (05/05/22 1100)  Resp: 16 (05/05/22 1100)  BP: (!) 174/89 (05/05/22 1100)  SpO2: 98 % (05/05/22 1100)   Vital Signs (24h Range):  Temp:  [97.4 °F (36.3 °C)-98.1 °F (36.7 °C)] 98.1 °F (36.7 °C)  Pulse:  [79-90] 83  Resp:  [16-20] 16  SpO2:  [96 %-100 %] 98 %  BP: (155-187)/(82-98) 174/89     Weight: 109 kg (240 lb 4.8 oz)  Body mass index is 30.04 kg/m².    Intake/Output Summary (Last 24 hours) at 5/5/2022 1440  Last data filed at 5/5/2022 0400  Gross per 24 hour   Intake 120 ml   Output 1150 ml   Net -1030 ml      Physical Exam  Constitutional:       General: He is not in acute distress.     Appearance: He is well-developed. He is obese. He is not ill-appearing or toxic-appearing.   HENT:      Head: Normocephalic and atraumatic.      Nose: No congestion or rhinorrhea.      Mouth/Throat:      Pharynx: No oropharyngeal exudate or posterior oropharyngeal erythema.   Eyes:      General: No scleral icterus.        Right eye: No discharge.         Left eye: No discharge.      Pupils: Pupils are equal, round, and reactive to light.   Cardiovascular:      Rate and Rhythm: Normal rate and regular rhythm.      Heart sounds: Normal heart sounds. No murmur heard.    No gallop.      Comments: Perma cath placed R side  Pulmonary:      Effort: Pulmonary effort is normal.   Abdominal:      General: Bowel sounds are normal.      Palpations: Abdomen is soft.      Tenderness: There is no abdominal tenderness.   Musculoskeletal:         General: Normal range of motion.      Cervical back: Normal range of motion and neck supple.      Right lower leg: Edema present.      Left lower leg: Edema present.      Comments: Some tenderness to right toe   Skin:     Capillary Refill: Capillary refill takes less than 2 seconds.      Coloration: Skin is not jaundiced.      Findings: No lesion or rash.   Neurological:      Mental Status: He is alert and oriented to  person, place, and time.   Psychiatric:         Mood and Affect: Mood normal.         Behavior: Behavior normal.         Thought Content: Thought content normal.       Significant Labs: All pertinent labs within the past 24 hours have been reviewed.  A1C:   Recent Labs   Lab 04/30/22  0103   HGBA1C 4.4       Bilirubin:   Recent Labs   Lab 05/01/22  0459 05/02/22  0547 05/03/22  0253 05/04/22  0532 05/05/22  0248   BILITOT 0.3 0.2 0.3 0.3 0.2     Blood Culture: No results for input(s): LABBLOO in the last 48 hours.    Recent Labs   Lab 05/04/22  0532 05/05/22  0248   WBC 5.55 4.30   HGB 7.8* 7.3*   HCT 24.2* 22.5*    207       Recent Labs   Lab 05/04/22  0532 05/05/22  0248    139   K 5.1 4.6   * 111*   CO2 15* 13*   * 142*   BUN 92* 93*   CREATININE 9.6* 9.7*   CALCIUM 7.1* 6.9*   PROT 6.7 6.6   ALBUMIN 3.3* 2.9*   BILITOT 0.3 0.2   ALKPHOS 165* 146*   AST 21 20   ALT 15 15   ANIONGAP 14 15   EGFRNONAA 5.1* 5.1*     Recent Labs   Lab 05/04/22  1316   INR 1.0       Recent Labs   Lab 05/04/22  0532 05/05/22  0248   MG 1.6 1.5*       Recent Labs   Lab 05/04/22  1638 05/04/22 2014 05/05/22  1132   POCTGLUCOSE 136* 142* 154*       Recent Labs   Lab 04/30/22  0103   TSH 0.879         Significant Imaging: I have reviewed all pertinent imaging results/findings within the past 24 hours.      Assessment/Plan:      * Acute kidney injury superimposed on chronic kidney disease    Patient with acute kidney injury likely d/t Pre-renal azotemia and Acute tubular necrosis Which is currently worsening. Labs reviewed- Renal function/electrolytes with Estimated Creatinine Clearance: 10.3 mL/min (A) (based on SCr of 9.7 mg/dL (H)). according to latest data. Monitor urine output and serial BMP and adjust therapy as needed. Avoid nephrotoxins and renally dose meds for GFR listed above.     Baseline Cr 3-6, Cr 9 on admission  Hx of CKD: yes due to HTN/DM  Last saw nephro 2013  Exam with edema, no decreased O2  sats or rales  FeNA: not reliable, had already gotten lasix  FeUrea: pending  UA / UCx: proteinuria, glucuria  DDx: hypotension, prerenal, progression of CKD  No indications for HD at this time    Plan:   - nephro consulted, appreciate recs   -possible biopsy indicated given proteinuria  - Trend Cr  - Strict I&Os  - Avoid nephrotoxic agents (NSAIDs, gadolinium, IV radiocontrast)  - Avoid hypotension  - Renal diet  - Renally adjust medications  - Transitioned from IV lasix to PO lasix 80mg BID.    Hyperkalemia  K 5.7 on admission   ECG: NSR, no peaked t waves  Chest pain/discomfort: none  Home medications: no ARBs, or ACEi    Plan:   -Page cardiology if K >5.5 with ECG changes or nephrology if >5.5 (persistently) and anuric  -Trend BMP, repeat potassium 5.4 today  -Telemetry   -Shift with lasix, christopher reyesma PRN. Be cautious about using insulin with dextrose in setting of hypoglycemia        KAYODE (acute kidney injury)          Hypoglycemia  Blood sugar 203 mg/dl on admission, given dextrose by EMS with improvement   No history of labile blood sugars during previous admission   Most recent blood sugar 73 mg/dl   Mentation: CATIEO x3 during my interview   Taking glipizide 10 BID    Ddx: decreased po intake and decreased glipizide clearance (KAYODE on CKD), Likely not very hyperglycemic at baseline given low HbA1c. Less likely exogenous administration of insulin, insulinoma     Plan:     -Fall precautions   -Continue to monitor sugars.  -STOP the glipizide at discharge, A1c well controlled.        Anemia of chronic disease  Baseline Hb 11, Hb 8 on admission  Antiplatelet/anticoagulation: none  No overt source of bleeding on exam, denies bloody BM, hematuria.       Plan:  - CBCs daily  - Transfuse with goal Hb > 7        Gout  Takes colchicine as needed at home, patient with poor kidney function. Will try and manage pain with heat packs and tylenol. Can trial steroids if too much pain or intraarticular steroid shot to avoid  systemic steroids.      CKD (chronic kidney disease) stage 4, GFR 15-29 ml/min  Hx of CKD due to HTN, DM. Baseline around 3-6    - nephro referral outpatient  - Patient will be a new dialysis patient and agreed to perma cath, now s/p permacath on 5/5 with plan for dialysis with nephro.  -follow up nephro recs      Mixed hyperlipidemia  Continue home lipitor 20      Essential (primary) hypertension   home meds amlodipine and Toprol XL  Blood pressure better controlled with nifedpine.    Plan:  --started on Nifedipine 30mg daily, will continue to titrate      Proteinuria  Hx of proteinuria, UPCr >7, nephrotic range    - consider ACEi or SGLT-2 inhibitor at discharge      Diabetes mellitus due to underlying condition with diabetic nephropathy  Diabetes type 2  Last HbA1c:   Lab Results   Component Value Date    HGBA1C 4.4 04/30/2022     Insulin regimen: none  Oral regimen ( held inpatient): glipizide   CAD Prophylaxis: statin  HTN or Renal disease: KAYODE, hypertensive    PLAN:   - Hold oral anti-hyperglycemic agents  - LD SSI  - Diabetic diet  - Hypoglycemia orderset with POCT BG AC/QS  - Goal -180 while inpatient               VTE Risk Mitigation (From admission, onward)         Ordered     heparin (porcine) injection 5,000 Units  Every 8 hours         05/05/22 1051     heparin (porcine) injection 1,000 Units  As needed (PRN)         05/05/22 0950     IP VTE HIGH RISK PATIENT  Once         04/30/22 0429     Place sequential compression device  Until discontinued         04/30/22 0429                Discharge Planning   HORACIO: 5/9/2022     Code Status: Full Code   Is the patient medically ready for discharge?: No    Reason for patient still in hospital (select all that apply): Treatment and Consult recommendations  Discharge Plan A: Home with family   Discharge Delays: None known at this time              Ishan Montes MD  Department of Hospital Medicine   [facility] - Intensive Care ([facility]

## 2022-05-05 NOTE — PLAN OF CARE
05/05/22 0906   Discharge Reassessment   Assessment Type Discharge Planning Reassessment   Did the patient's condition or plan change since previous assessment? No   Discharge Plan discussed with: Patient   Communicated HORACIO with patient/caregiver Date not available/Unable to determine   Discharge Plan A Home with family   Discharge Plan B Home with family   DME Needed Upon Discharge  other (see comments)  (TBD)   Discharge Barriers Identified None   Why the patient remains in the hospital Requires continued medical care   Post-Acute Status   Post-Acute Authorization Dialysis   Diaylsis Status Referrals Sent   Coverage BCBS   Discharge Delays None known at this time     Patient not medically stable for discharge. Waiting on dialysis chair set up.    Jyotsna Price [facility]  [phone]

## 2022-05-05 NOTE — PLAN OF CARE
Pt AOX3, stable, RA. Critical calcium received no new order.Denies pain or discomfort.  Remains NPO for procedure.Family at bedside call light within reach.          Problem: Adult Inpatient Plan of Care  Goal: Plan of Care Review  Outcome: Ongoing, Progressing  Goal: Patient-Specific Goal (Individualized)  Outcome: Ongoing, Progressing  Goal: Absence of Hospital-Acquired Illness or Injury  Outcome: Ongoing, Progressing  Goal: Optimal Comfort and Wellbeing  Outcome: Ongoing, Progressing  Goal: Readiness for Transition of Care  Outcome: Ongoing, Progressing

## 2022-05-05 NOTE — PROGRESS NOTES
[facility] ([place] [address])  Nephrology  Progress Note    Patient Name: Elbert Still Jr.  MRN: [id number]  Admission Date: 4/30/2022  Hospital Length of Stay: 5 days  Attending Provider: Juwan Muhammad MD   Primary Care Physician: Angel Luis Boo MD  Principal Problem:Acute kidney injury superimposed on chronic kidney disease    Subjective:     HPI: Mr. Still is a 67 yo M with PMHx of HTN, T2DM, HLD, gout, CKD4 (baseline unclear, but was 6.4 in September 2021 - previously was 3.6 in 2020), anemia who presented with hypoglycemia after EMS found him to have glucose in the 20s at home. Per his wife, patient was sluggish, had slurred speech the day of admission, so she called EMS.  Upon arrival of EMS blood glucose was in the 20s. Patient takes glipizide 10 mg BID as his only diabetes medication, denies taking insulin. His most recent HbA1c was 5.1 in Sep 2021. Hasn't been measuring his home BG.  He denies drastically reduced urine production, typically urinates every hour ~200 ccs. Believes he might be retaining urine. Urine sometimes appears slightly foamy. ROS also positive for diaphoresis associated with eating food, SOB (present since covid infection 2 months ago), LE swelling X 2 weeks. Nephrology consulted for KAYODE on CKD (Cr = 9.1) and proteinuria.          Interval History: pending permacath placement. Sob with any exertion     Review of patient's allergies indicates:   Allergen Reactions    Iodine and iodide containing products Hives     Hypotension, Flushing     Current Facility-Administered Medications   Medication Frequency    acetaminophen tablet 650 mg Q4H PRN    atorvastatin tablet 20 mg QHS    carvediloL tablet 12.5 mg BID    dextrose 10% bolus 125 mL PRN    dextrose 10% bolus 250 mL PRN    fluticasone propionate 50 mcg/actuation nasal spray 100 mcg Daily    furosemide tablet 80 mg BID    glucagon (human recombinant) injection 1 mg PRN    glucose chewable tablet 16 g PRN     glucose chewable tablet 24 g PRN    insulin aspart U-100 pen 0-5 Units QID (AC + HS) PRN    melatonin tablet 6 mg Nightly PRN    naloxone 0.4 mg/mL injection 0.02 mg PRN    NIFEdipine 24 hr tablet 30 mg Daily    senna-docusate 8.6-50 mg per tablet 1 tablet BID    sevelamer carbonate tablet 800 mg TID WM    sodium bicarbonate tablet 1,300 mg BID    sodium chloride 0.9% flush 10 mL Q12H PRN    [START ON 5/6/2022] sodium zirconium cyclosilicate packet 10 g Daily    sodium zirconium cyclosilicate packet 10 g TID    tamsulosin 24 hr capsule 0.4 mg Daily       Objective:     Vital Signs (Most Recent):  Temp: 97.9 °F (36.6 °C) (05/05/22 0715)  Pulse: 79 (05/05/22 0715)  Resp: 18 (05/05/22 0715)  BP: (!) 176/96 (05/05/22 0507)  SpO2: 98 % (05/05/22 0715)  O2 Device (Oxygen Therapy): room air (05/05/22 0715) Vital Signs (24h Range):  Temp:  [97.4 °F (36.3 °C)-98 °F (36.7 °C)] 97.9 °F (36.6 °C)  Pulse:  [79-90] 79  Resp:  [18-28] 18  SpO2:  [88 %-100 %] 98 %  BP: (132-187)/(82-96) 176/96     Weight: 109 kg (240 lb 4.8 oz) (04/30/22 1920)  Body mass index is 30.04 kg/m².  Body surface area is 2.4 meters squared.    I/O last 3 completed shifts:  In: 120 [P.O.:120]  Out: 2350 [Urine:2350]    Physical Exam  Vitals reviewed.   Constitutional:       Appearance: Normal appearance.   HENT:      Head: Normocephalic and atraumatic.      Mouth/Throat:      Mouth: Mucous membranes are moist.   Eyes:      Conjunctiva/sclera: Conjunctivae normal.      Pupils: Pupils are equal, round, and reactive to light.   Cardiovascular:      Rate and Rhythm: Normal rate and regular rhythm.      Pulses: Normal pulses.      Heart sounds: Normal heart sounds.   Pulmonary:      Effort: Pulmonary effort is normal.      Breath sounds: Normal breath sounds.   Abdominal:      General: Bowel sounds are normal.      Palpations: Abdomen is soft.   Musculoskeletal:         General: Normal range of motion.      Right lower leg: Edema present.      Left  lower leg: Edema present.   Skin:     General: Skin is warm.      Capillary Refill: Capillary refill takes less than 2 seconds.   Neurological:      General: No focal deficit present.      Mental Status: He is alert and oriented to person, place, and time.   Psychiatric:         Mood and Affect: Mood normal.       Significant Labs:  All labs within the past 24 hours have been reviewed.     Significant Imaging:  Reviewed     Assessment/Plan:     * Acute kidney injury superimposed on chronic kidney disease  Mr. Still is a 65 yo M with PMHx of HTN, T2DM, HLD, gout, CKD4 (baseline unclear, but was 6.4 in September 2021 - previously was 3.6 in 2020), anemia admitted to hospital medicine service with hypoglycemia (blood glucose in 20s -  takes glipizide 10 mg BID, denies taking insulin).     He denies reduced urine production, typically urinates every hour about ~200 ccs. Believes he might be retaining urine. Urine sometimes appears slightly foamy. No hydronephrosis on KUS. Nephrology consulted for KAYODE on CKD (Cr = 9.1) and proteinuria.    Likely chronic issue and progression of CKD due to HTN and DM.      Recommendations:  -Pending permacath placement  By IR for HD  -on Sodium bicarb 1300mg PO TID for metabolic acidosis   -on Lasix 80 po bid   -Sevelamer 800 mg with meals   -Avoid nephrotoxins  -Daily RFP  -Strict ins and outs   -Diabetic, renal diet       Hyperkalemia  4.6  today  Decrease lokelma to 10 BID           Brittany Penn MD  Nephrology  [facility] ([place]

## 2022-05-05 NOTE — PROGRESS NOTES
05/05/22 1735   Post-Hemodialysis Assessment   Rinseback Volume (mL) 300 mL   Blood Volume Processed (Liters) 23.9 L   Dialyzer Clearance Lightly streaked   Duration of Treatment 120 minutes   Additional Fluid Intake (mL) 200 mL   Total UF (mL) 1500 mL   Net Fluid Removal 1000   Patient Response to Treatment tolerated well   Post-Hemodialysis Comments see note     HD complete. Net removal 1000 ml. Pt tolerated well. Pt awake, alert oriented, NAD. Report given to primary nurse. Awaiting transport to escort pt back to room, VSS.

## 2022-05-05 NOTE — ASSESSMENT & PLAN NOTE
Mr. Still is a 65 yo M with PMHx of HTN, T2DM, HLD, gout, CKD4 (baseline unclear, but was 6.4 in September 2021 - previously was 3.6 in 2020), anemia admitted to hospital medicine service with hypoglycemia (blood glucose in 20s -  takes glipizide 10 mg BID, denies taking insulin).     He denies reduced urine production, typically urinates every hour about ~200 ccs. Believes he might be retaining urine. Urine sometimes appears slightly foamy. No hydronephrosis on KUS. Nephrology consulted for KAYODE on CKD (Cr = 9.1) and proteinuria.    Likely chronic issue and progression of CKD due to HTN and DM.      Recommendations:  -Pending permacath placement  By IR for HD  -on Sodium bicarb 1300mg PO TID for metabolic acidosis   -on Lasix 80 po bid   -Sevelamer 800 mg with meals   -Avoid nephrotoxins  -Daily RFP  -Strict ins and outs   -Diabetic, renal diet

## 2022-05-05 NOTE — NURSING
Patient resting in bed, snoring occasionally. Arouses to loud voice. Appears alert and oriented when awakened. VS stable. Awaiting hemodialysis. Continue to monitor.

## 2022-05-05 NOTE — PLAN OF CARE
Right HD Cath placement completed, pt tolerated well. No apparent distress noted. Heparin 1.8 ml in both lumens, Dressing applied CDI. Patient transferred to [facility].

## 2022-05-05 NOTE — NURSING
Patient to IR in no acute distress. Resp even and unlabored, afebrile. Wife at bedside. Patient states will keep his briefs on to IR.

## 2022-05-05 NOTE — SUBJECTIVE & OBJECTIVE
Interval History: S/p perma cath placement. Patient with some pain at the site. Resting comfortably in bed. Plan for dialysis today.    Review of Systems   Constitutional:  Negative for chills, diaphoresis and fever.   HENT: Negative.     Eyes: Negative.  Negative for visual disturbance.   Respiratory:  Negative for cough and shortness of breath.    Cardiovascular:  Positive for leg swelling. Negative for chest pain and palpitations.        Permacath right side placed, look intact and clean. Notes some pain at site of catheter   Gastrointestinal:  Negative for abdominal distention, abdominal pain, blood in stool, constipation, diarrhea, nausea and vomiting.   Endocrine: Negative.    Genitourinary:  Negative for dysuria and hematuria.   Musculoskeletal: Negative.  Negative for arthralgias and back pain.   Skin:  Negative for color change.   Neurological:  Negative for dizziness, syncope, speech difficulty, weakness, light-headedness and headaches.   Hematological: Negative.    Psychiatric/Behavioral: Negative.  Negative for behavioral problems and confusion.    Objective:     Vital Signs (Most Recent):  Temp: 98.1 °F (36.7 °C) (05/05/22 1100)  Pulse: 83 (05/05/22 1100)  Resp: 16 (05/05/22 1100)  BP: (!) 174/89 (05/05/22 1100)  SpO2: 98 % (05/05/22 1100)   Vital Signs (24h Range):  Temp:  [97.4 °F (36.3 °C)-98.1 °F (36.7 °C)] 98.1 °F (36.7 °C)  Pulse:  [79-90] 83  Resp:  [16-20] 16  SpO2:  [96 %-100 %] 98 %  BP: (155-187)/(82-98) 174/89     Weight: 109 kg (240 lb 4.8 oz)  Body mass index is 30.04 kg/m².    Intake/Output Summary (Last 24 hours) at 5/5/2022 1440  Last data filed at 5/5/2022 0400  Gross per 24 hour   Intake 120 ml   Output 1150 ml   Net -1030 ml      Physical Exam  Constitutional:       General: He is not in acute distress.     Appearance: He is well-developed. He is obese. He is not ill-appearing or toxic-appearing.   HENT:      Head: Normocephalic and atraumatic.      Nose: No congestion or rhinorrhea.       Mouth/Throat:      Pharynx: No oropharyngeal exudate or posterior oropharyngeal erythema.   Eyes:      General: No scleral icterus.        Right eye: No discharge.         Left eye: No discharge.      Pupils: Pupils are equal, round, and reactive to light.   Cardiovascular:      Rate and Rhythm: Normal rate and regular rhythm.      Heart sounds: Normal heart sounds. No murmur heard.    No gallop.      Comments: Perma cath placed R side  Pulmonary:      Effort: Pulmonary effort is normal.   Abdominal:      General: Bowel sounds are normal.      Palpations: Abdomen is soft.      Tenderness: There is no abdominal tenderness.   Musculoskeletal:         General: Normal range of motion.      Cervical back: Normal range of motion and neck supple.      Right lower leg: Edema present.      Left lower leg: Edema present.      Comments: Some tenderness to right toe   Skin:     Capillary Refill: Capillary refill takes less than 2 seconds.      Coloration: Skin is not jaundiced.      Findings: No lesion or rash.   Neurological:      Mental Status: He is alert and oriented to person, place, and time.   Psychiatric:         Mood and Affect: Mood normal.         Behavior: Behavior normal.         Thought Content: Thought content normal.       Significant Labs: All pertinent labs within the past 24 hours have been reviewed.  A1C:   Recent Labs   Lab 04/30/22  0103   HGBA1C 4.4       Bilirubin:   Recent Labs   Lab 05/01/22  0459 05/02/22  0547 05/03/22  0253 05/04/22  0532 05/05/22  0248   BILITOT 0.3 0.2 0.3 0.3 0.2     Blood Culture: No results for input(s): LABBLOO in the last 48 hours.    Recent Labs   Lab 05/04/22  0532 05/05/22  0248   WBC 5.55 4.30   HGB 7.8* 7.3*   HCT 24.2* 22.5*    207       Recent Labs   Lab 05/04/22  0532 05/05/22  0248    139   K 5.1 4.6   * 111*   CO2 15* 13*   * 142*   BUN 92* 93*   CREATININE 9.6* 9.7*   CALCIUM 7.1* 6.9*   PROT 6.7 6.6   ALBUMIN 3.3* 2.9*   BILITOT 0.3  0.2   ALKPHOS 165* 146*   AST 21 20   ALT 15 15   ANIONGAP 14 15   EGFRNONAA 5.1* 5.1*     Recent Labs   Lab 05/04/22  1316   INR 1.0       Recent Labs   Lab 05/04/22  0532 05/05/22  0248   MG 1.6 1.5*       Recent Labs   Lab 05/04/22  1638 05/04/22 2014 05/05/22  1132   POCTGLUCOSE 136* 142* 154*       Recent Labs   Lab 04/30/22  0103   TSH 0.879         Significant Imaging: I have reviewed all pertinent imaging results/findings within the past 24 hours.

## 2022-05-05 NOTE — ASSESSMENT & PLAN NOTE
Hx of CKD due to HTN, DM. Baseline around 3-6    - nephro referral outpatient  - Patient will be a new dialysis patient and agreed to perma cath, now s/p permacath on 5/5 with plan for dialysis with nephro.  -follow up nephro recs

## 2022-05-05 NOTE — SUBJECTIVE & OBJECTIVE
Interval History: pending permacath placement. Sob with any exertion     Review of patient's allergies indicates:   Allergen Reactions    Iodine and iodide containing products Hives     Hypotension, Flushing     Current Facility-Administered Medications   Medication Frequency    acetaminophen tablet 650 mg Q4H PRN    atorvastatin tablet 20 mg QHS    carvediloL tablet 12.5 mg BID    dextrose 10% bolus 125 mL PRN    dextrose 10% bolus 250 mL PRN    fluticasone propionate 50 mcg/actuation nasal spray 100 mcg Daily    furosemide tablet 80 mg BID    glucagon (human recombinant) injection 1 mg PRN    glucose chewable tablet 16 g PRN    glucose chewable tablet 24 g PRN    insulin aspart U-100 pen 0-5 Units QID (AC + HS) PRN    melatonin tablet 6 mg Nightly PRN    naloxone 0.4 mg/mL injection 0.02 mg PRN    NIFEdipine 24 hr tablet 30 mg Daily    senna-docusate 8.6-50 mg per tablet 1 tablet BID    sevelamer carbonate tablet 800 mg TID WM    sodium bicarbonate tablet 1,300 mg BID    sodium chloride 0.9% flush 10 mL Q12H PRN    [START ON 5/6/2022] sodium zirconium cyclosilicate packet 10 g Daily    sodium zirconium cyclosilicate packet 10 g TID    tamsulosin 24 hr capsule 0.4 mg Daily       Objective:     Vital Signs (Most Recent):  Temp: 97.9 °F (36.6 °C) (05/05/22 0715)  Pulse: 79 (05/05/22 0715)  Resp: 18 (05/05/22 0715)  BP: (!) 176/96 (05/05/22 0507)  SpO2: 98 % (05/05/22 0715)  O2 Device (Oxygen Therapy): room air (05/05/22 0715) Vital Signs (24h Range):  Temp:  [97.4 °F (36.3 °C)-98 °F (36.7 °C)] 97.9 °F (36.6 °C)  Pulse:  [79-90] 79  Resp:  [18-28] 18  SpO2:  [88 %-100 %] 98 %  BP: (132-187)/(82-96) 176/96     Weight: 109 kg (240 lb 4.8 oz) (04/30/22 1920)  Body mass index is 30.04 kg/m².  Body surface area is 2.4 meters squared.    I/O last 3 completed shifts:  In: 120 [P.O.:120]  Out: 2350 [Urine:2350]    Physical Exam  Vitals reviewed.   Constitutional:       Appearance: Normal appearance.   HENT:      Head:  Normocephalic and atraumatic.      Mouth/Throat:      Mouth: Mucous membranes are moist.   Eyes:      Conjunctiva/sclera: Conjunctivae normal.      Pupils: Pupils are equal, round, and reactive to light.   Cardiovascular:      Rate and Rhythm: Normal rate and regular rhythm.      Pulses: Normal pulses.      Heart sounds: Normal heart sounds.   Pulmonary:      Effort: Pulmonary effort is normal.      Breath sounds: Normal breath sounds.   Abdominal:      General: Bowel sounds are normal.      Palpations: Abdomen is soft.   Musculoskeletal:         General: Normal range of motion.      Right lower leg: Edema present.      Left lower leg: Edema present.   Skin:     General: Skin is warm.      Capillary Refill: Capillary refill takes less than 2 seconds.   Neurological:      General: No focal deficit present.      Mental Status: He is alert and oriented to person, place, and time.   Psychiatric:         Mood and Affect: Mood normal.       Significant Labs:  All labs within the past 24 hours have been reviewed.     Significant Imaging:  Reviewed

## 2022-05-05 NOTE — PLAN OF CARE
Problem: Adult Inpatient Plan of Care  Goal: Plan of Care Review  Outcome: Ongoing, Progressing  Goal: Patient-Specific Goal (Individualized)  Outcome: Ongoing, Progressing  Goal: Absence of Hospital-Acquired Illness or Injury  Outcome: Ongoing, Progressing  Goal: Optimal Comfort and Wellbeing  Outcome: Ongoing, Progressing  Goal: Readiness for Transition of Care  Outcome: Ongoing, Progressing     Problem: Fluid and Electrolyte Imbalance (Acute Kidney Injury/Impairment)  Goal: Fluid and Electrolyte Balance  Outcome: Ongoing, Progressing     Problem: Oral Intake Inadequate (Acute Kidney Injury/Impairment)  Goal: Optimal Nutrition Intake  Outcome: Ongoing, Progressing     Problem: Renal Function Impairment (Acute Kidney Injury/Impairment)  Goal: Effective Renal Function  Outcome: Ongoing, Progressing     Problem: Diabetes Comorbidity  Goal: Blood Glucose Level Within Targeted Range  Outcome: Ongoing, Progressing     Problem: Infection  Goal: Absence of Infection Signs and Symptoms  Outcome: Ongoing, Progressing     Problem: Device-Related Complication Risk (Hemodialysis)  Goal: Safe, Effective Therapy Delivery  Outcome: Ongoing, Progressing     Problem: Hemodynamic Instability (Hemodialysis)  Goal: Effective Tissue Perfusion  Outcome: Ongoing, Progressing     Problem: Infection (Hemodialysis)  Goal: Absence of Infection Signs and Symptoms  Outcome: Ongoing, Progressing   Plan of care reviewed, ongoing.

## 2022-05-06 ENCOUNTER — TELEPHONE (OUTPATIENT)
Dept: FAMILY MEDICINE | Facility: CLINIC | Age: 67
End: 2022-05-06
Payer: COMMERCIAL

## 2022-05-06 PROBLEM — N17.9 AKI (ACUTE KIDNEY INJURY): Status: RESOLVED | Noted: 2022-04-30 | Resolved: 2022-05-06

## 2022-05-06 LAB
ALBUMIN SERPL BCP-MCNC: 3 G/DL (ref 3.5–5.2)
ALP SERPL-CCNC: 149 U/L (ref 55–135)
ALT SERPL W/O P-5'-P-CCNC: 15 U/L (ref 10–44)
ANION GAP SERPL CALC-SCNC: 14 MMOL/L (ref 8–16)
AST SERPL-CCNC: 24 U/L (ref 10–40)
BASOPHILS # BLD AUTO: 0.01 K/UL (ref 0–0.2)
BASOPHILS NFR BLD: 0.2 % (ref 0–1.9)
BILIRUB SERPL-MCNC: 0.2 MG/DL (ref 0.1–1)
BUN SERPL-MCNC: 77 MG/DL (ref 8–23)
CALCIUM SERPL-MCNC: 7.4 MG/DL (ref 8.7–10.5)
CHLORIDE SERPL-SCNC: 108 MMOL/L (ref 95–110)
CO2 SERPL-SCNC: 18 MMOL/L (ref 23–29)
CREAT SERPL-MCNC: 7.7 MG/DL (ref 0.5–1.4)
DIFFERENTIAL METHOD: ABNORMAL
EOSINOPHIL # BLD AUTO: 0.2 K/UL (ref 0–0.5)
EOSINOPHIL NFR BLD: 3.5 % (ref 0–8)
ERYTHROCYTE [DISTWIDTH] IN BLOOD BY AUTOMATED COUNT: 14.4 % (ref 11.5–14.5)
EST. GFR  (AFRICAN AMERICAN): 7.8 ML/MIN/1.73 M^2
EST. GFR  (NON AFRICAN AMERICAN): 6.7 ML/MIN/1.73 M^2
GLUCOSE SERPL-MCNC: 111 MG/DL (ref 70–110)
HCT VFR BLD AUTO: 22.8 % (ref 40–54)
HGB BLD-MCNC: 7.5 G/DL (ref 14–18)
IMM GRANULOCYTES # BLD AUTO: 0.02 K/UL (ref 0–0.04)
IMM GRANULOCYTES NFR BLD AUTO: 0.4 % (ref 0–0.5)
LYMPHOCYTES # BLD AUTO: 0.8 K/UL (ref 1–4.8)
LYMPHOCYTES NFR BLD: 13.8 % (ref 18–48)
MAGNESIUM SERPL-MCNC: 1.7 MG/DL (ref 1.6–2.6)
MCH RBC QN AUTO: 27.9 PG (ref 27–31)
MCHC RBC AUTO-ENTMCNC: 32.9 G/DL (ref 32–36)
MCV RBC AUTO: 85 FL (ref 82–98)
MONOCYTES # BLD AUTO: 0.8 K/UL (ref 0.3–1)
MONOCYTES NFR BLD: 14 % (ref 4–15)
NEUTROPHILS # BLD AUTO: 3.9 K/UL (ref 1.8–7.7)
NEUTROPHILS NFR BLD: 68.1 % (ref 38–73)
NRBC BLD-RTO: 0 /100 WBC
PHOSPHATE SERPL-MCNC: 7.3 MG/DL (ref 2.7–4.5)
PHOSPHOLIPASE A2 RECEPTOR, ELISA: <2 RU/ML
PHOSPHOLIPASE A2 RECEPTOR, IFA: NEGATIVE
PLATELET # BLD AUTO: 215 K/UL (ref 150–450)
PMV BLD AUTO: 11.7 FL (ref 9.2–12.9)
POCT GLUCOSE: 130 MG/DL (ref 70–110)
POCT GLUCOSE: 146 MG/DL (ref 70–110)
POTASSIUM SERPL-SCNC: 4.3 MMOL/L (ref 3.5–5.1)
PROT SERPL-MCNC: 6 G/DL (ref 6–8.4)
RBC # BLD AUTO: 2.69 M/UL (ref 4.6–6.2)
SODIUM SERPL-SCNC: 140 MMOL/L (ref 136–145)
WBC # BLD AUTO: 5.65 K/UL (ref 3.9–12.7)

## 2022-05-06 PROCEDURE — 27000190 HC CPAP FULL FACE MASK W/VALVE

## 2022-05-06 PROCEDURE — 25000003 PHARM REV CODE 250

## 2022-05-06 PROCEDURE — 83735 ASSAY OF MAGNESIUM: CPT

## 2022-05-06 PROCEDURE — 84100 ASSAY OF PHOSPHORUS: CPT

## 2022-05-06 PROCEDURE — 25000003 PHARM REV CODE 250: Performed by: INTERNAL MEDICINE

## 2022-05-06 PROCEDURE — 85025 COMPLETE CBC W/AUTO DIFF WBC: CPT

## 2022-05-06 PROCEDURE — 25000242 PHARM REV CODE 250 ALT 637 W/ HCPCS

## 2022-05-06 PROCEDURE — 20600001 HC STEP DOWN PRIVATE ROOM

## 2022-05-06 PROCEDURE — 99232 PR SUBSEQUENT HOSPITAL CARE,LEVL II: ICD-10-PCS | Mod: ,,, | Performed by: INTERNAL MEDICINE

## 2022-05-06 PROCEDURE — 99232 PR SUBSEQUENT HOSPITAL CARE,LEVL II: ICD-10-PCS | Mod: ,,, | Performed by: STUDENT IN AN ORGANIZED HEALTH CARE EDUCATION/TRAINING PROGRAM

## 2022-05-06 PROCEDURE — 90935 HEMODIALYSIS ONE EVALUATION: CPT

## 2022-05-06 PROCEDURE — 63600175 PHARM REV CODE 636 W HCPCS

## 2022-05-06 PROCEDURE — 63600175 PHARM REV CODE 636 W HCPCS: Performed by: INTERNAL MEDICINE

## 2022-05-06 PROCEDURE — 80053 COMPREHEN METABOLIC PANEL: CPT

## 2022-05-06 PROCEDURE — 36415 COLL VENOUS BLD VENIPUNCTURE: CPT

## 2022-05-06 PROCEDURE — 99232 SBSQ HOSP IP/OBS MODERATE 35: CPT | Mod: ,,, | Performed by: STUDENT IN AN ORGANIZED HEALTH CARE EDUCATION/TRAINING PROGRAM

## 2022-05-06 PROCEDURE — 25000003 PHARM REV CODE 250: Performed by: STUDENT IN AN ORGANIZED HEALTH CARE EDUCATION/TRAINING PROGRAM

## 2022-05-06 PROCEDURE — 99232 SBSQ HOSP IP/OBS MODERATE 35: CPT | Mod: ,,, | Performed by: INTERNAL MEDICINE

## 2022-05-06 PROCEDURE — 94660 CPAP INITIATION&MGMT: CPT

## 2022-05-06 RX ORDER — SODIUM CHLORIDE 9 MG/ML
INJECTION, SOLUTION INTRAVENOUS ONCE
Status: COMPLETED | OUTPATIENT
Start: 2022-05-06 | End: 2022-05-06

## 2022-05-06 RX ORDER — SODIUM CHLORIDE 9 MG/ML
INJECTION, SOLUTION INTRAVENOUS ONCE
Status: DISCONTINUED | OUTPATIENT
Start: 2022-05-07 | End: 2022-05-10 | Stop reason: HOSPADM

## 2022-05-06 RX ORDER — MUPIROCIN 20 MG/G
OINTMENT TOPICAL 2 TIMES DAILY
Status: DISCONTINUED | OUTPATIENT
Start: 2022-05-06 | End: 2022-05-10 | Stop reason: HOSPADM

## 2022-05-06 RX ORDER — HEPARIN SODIUM 1000 [USP'U]/ML
1000 INJECTION, SOLUTION INTRAVENOUS; SUBCUTANEOUS
Status: DISCONTINUED | OUTPATIENT
Start: 2022-05-06 | End: 2022-05-08

## 2022-05-06 RX ORDER — HEPARIN SODIUM 1000 [USP'U]/ML
1000 INJECTION, SOLUTION INTRAVENOUS; SUBCUTANEOUS
Status: DISCONTINUED | OUTPATIENT
Start: 2022-05-07 | End: 2022-05-08

## 2022-05-06 RX ORDER — NIFEDIPINE 30 MG/1
60 TABLET, EXTENDED RELEASE ORAL DAILY
Status: DISCONTINUED | OUTPATIENT
Start: 2022-05-06 | End: 2022-05-07

## 2022-05-06 RX ADMIN — TAMSULOSIN HYDROCHLORIDE 0.4 MG: 0.4 CAPSULE ORAL at 08:05

## 2022-05-06 RX ADMIN — SENNOSIDES AND DOCUSATE SODIUM 1 TABLET: 50; 8.6 TABLET ORAL at 09:05

## 2022-05-06 RX ADMIN — MUPIROCIN: 20 OINTMENT TOPICAL at 09:05

## 2022-05-06 RX ADMIN — SODIUM BICARBONATE 650 MG TABLET 1300 MG: at 09:05

## 2022-05-06 RX ADMIN — HEPARIN SODIUM 5000 UNITS: 5000 INJECTION INTRAVENOUS; SUBCUTANEOUS at 09:05

## 2022-05-06 RX ADMIN — CALCIUM CARBONATE (ANTACID) CHEW TAB 500 MG 500 MG: 500 CHEW TAB at 08:05

## 2022-05-06 RX ADMIN — FLUTICASONE PROPIONATE 100 MCG: 50 SPRAY, METERED NASAL at 09:05

## 2022-05-06 RX ADMIN — SODIUM CHLORIDE: 0.9 INJECTION, SOLUTION INTRAVENOUS at 01:05

## 2022-05-06 RX ADMIN — SEVELAMER CARBONATE 800 MG: 800 TABLET, FILM COATED ORAL at 04:05

## 2022-05-06 RX ADMIN — SODIUM ZIRCONIUM CYCLOSILICATE 10 G: 10 POWDER, FOR SUSPENSION ORAL at 08:05

## 2022-05-06 RX ADMIN — CARVEDILOL 12.5 MG: 12.5 TABLET, FILM COATED ORAL at 08:05

## 2022-05-06 RX ADMIN — SODIUM BICARBONATE 650 MG TABLET 1300 MG: at 08:05

## 2022-05-06 RX ADMIN — NIFEDIPINE 60 MG: 30 TABLET, FILM COATED, EXTENDED RELEASE ORAL at 08:05

## 2022-05-06 RX ADMIN — FUROSEMIDE 80 MG: 40 TABLET ORAL at 06:05

## 2022-05-06 RX ADMIN — SODIUM BICARBONATE 650 MG TABLET 1300 MG: at 04:05

## 2022-05-06 RX ADMIN — HEPARIN SODIUM 1000 UNITS: 1000 INJECTION, SOLUTION INTRAVENOUS; SUBCUTANEOUS at 03:05

## 2022-05-06 RX ADMIN — SEVELAMER CARBONATE 800 MG: 800 TABLET, FILM COATED ORAL at 08:05

## 2022-05-06 RX ADMIN — ATORVASTATIN CALCIUM 20 MG: 20 TABLET, FILM COATED ORAL at 09:05

## 2022-05-06 RX ADMIN — CARVEDILOL 12.5 MG: 12.5 TABLET, FILM COATED ORAL at 09:05

## 2022-05-06 RX ADMIN — HEPARIN SODIUM 5000 UNITS: 5000 INJECTION INTRAVENOUS; SUBCUTANEOUS at 06:05

## 2022-05-06 RX ADMIN — FUROSEMIDE 80 MG: 40 TABLET ORAL at 08:05

## 2022-05-06 NOTE — PROGRESS NOTES
2.5 hour dialysis complete.  Blood returned.  [facility] flushed, heparin locked, capped and taped.      Net UF 2L.  Tolerated well.    Transported from [facility] to room [address]20 via w/c by transporter with cell phone.

## 2022-05-06 NOTE — PROGRESS NOTES
[facility] ([place] [id number])  Nephrology  Progress Note    Patient Name: Elbert Still Jr.  MRN: [id number]  Admission Date: 4/30/2022  Hospital Length of Stay: 6 days  Attending Provider: Juwan Muhammad MD   Primary Care Physician: Angel Luis Boo MD  Principal Problem:Acute kidney injury superimposed on chronic kidney disease    Subjective:     HPI: Mr. Still is a 67 yo M with PMHx of HTN, T2DM, HLD, gout, CKD4 (baseline unclear, but was 6.4 in September 2021 - previously was 3.6 in 2020), anemia who presented with hypoglycemia after EMS found him to have glucose in the 20s at home. Per his wife, patient was sluggish, had slurred speech the day of admission, so she called EMS.  Upon arrival of EMS blood glucose was in the 20s. Patient takes glipizide 10 mg BID as his only diabetes medication, denies taking insulin. His most recent HbA1c was 5.1 in Sep 2021. Hasn't been measuring his home BG.  He denies drastically reduced urine production, typically urinates every hour ~200 ccs. Believes he might be retaining urine. Urine sometimes appears slightly foamy. ROS also positive for diaphoresis associated with eating food, SOB (present since covid infection 2 months ago), LE swelling X 2 weeks. Nephrology consulted for KAYODE on CKD (Cr = 9.1) and proteinuria.          Interval History: s/p 1st session of HD on 5/5 and tolerated well with one liter UF     Review of patient's allergies indicates:   Allergen Reactions    Iodine and iodide containing products Hives     Hypotension, Flushing     Current Facility-Administered Medications   Medication Frequency    0.9%  NaCl infusion Once    acetaminophen tablet 650 mg Q4H PRN    atorvastatin tablet 20 mg QHS    calcium carbonate 200 mg calcium (500 mg) chewable tablet 500 mg Daily    carvediloL tablet 12.5 mg BID    dextrose 10% bolus 125 mL PRN    dextrose 10% bolus 250 mL PRN    fluticasone propionate 50 mcg/actuation nasal spray 100 mcg Daily     furosemide tablet 80 mg BID    glucagon (human recombinant) injection 1 mg PRN    glucose chewable tablet 16 g PRN    glucose chewable tablet 24 g PRN    heparin (porcine) injection 1,000 Units PRN    heparin (porcine) injection 1,000 Units PRN    heparin (porcine) injection 5,000 Units Q8H    insulin aspart U-100 pen 0-5 Units QID (AC + HS) PRN    melatonin tablet 6 mg Nightly PRN    naloxone 0.4 mg/mL injection 0.02 mg PRN    NIFEdipine 24 hr tablet 60 mg Daily    senna-docusate 8.6-50 mg per tablet 1 tablet BID    sevelamer carbonate tablet 800 mg TID WM    sodium bicarbonate tablet 1,300 mg TID    sodium chloride 0.9% bolus 250 mL PRN    sodium chloride 0.9% bolus 250 mL PRN    sodium chloride 0.9% flush 10 mL Q12H PRN    sodium zirconium cyclosilicate packet 10 g Daily    tamsulosin 24 hr capsule 0.4 mg Daily       Objective:     Vital Signs (Most Recent):  Temp: 98.3 °F (36.8 °C) (05/06/22 0330)  Pulse: 72 (05/06/22 0330)  Resp: 20 (05/06/22 0330)  BP: (!) 183/84 (05/06/22 0030)  SpO2: 96 % (05/06/22 0330)  O2 Device (Oxygen Therapy): room air (05/06/22 0330) Vital Signs (24h Range):  Temp:  [97 °F (36.1 °C)-98.3 °F (36.8 °C)] 98.3 °F (36.8 °C)  Pulse:  [72-89] 72  Resp:  [16-20] 20  SpO2:  [96 %-100 %] 96 %  BP: (155-183)/() 183/84     Weight: 109 kg (240 lb 4.8 oz) (04/30/22 1920)  Body mass index is 30.04 kg/m².  Body surface area is 2.4 meters squared.    I/O last 3 completed shifts:  In: 320 [P.O.:120; Other:200]  Out: 3100 [Urine:1600; Other:1500]    Physical Exam  Vitals reviewed.   Constitutional:       Appearance: Normal appearance.   HENT:      Head: Normocephalic and atraumatic.      Mouth/Throat:      Mouth: Mucous membranes are moist.   Eyes:      Conjunctiva/sclera: Conjunctivae normal.      Pupils: Pupils are equal, round, and reactive to light.   Cardiovascular:      Rate and Rhythm: Normal rate and regular rhythm.      Pulses: Normal pulses.      Heart sounds: Normal  heart sounds.   Pulmonary:      Effort: Pulmonary effort is normal.      Breath sounds: Normal breath sounds.   Abdominal:      General: Bowel sounds are normal.      Palpations: Abdomen is soft.   Musculoskeletal:         General: Normal range of motion.      Right lower leg: Edema present.      Left lower leg: Edema present.   Skin:     General: Skin is warm.      Capillary Refill: Capillary refill takes less than 2 seconds.   Neurological:      General: No focal deficit present.      Mental Status: He is alert and oriented to person, place, and time.   Psychiatric:         Mood and Affect: Mood normal.       Significant Labs:  All labs within the past 24 hours have been reviewed.     Significant Imaging:  Reviewed     Assessment/Plan:     * Acute kidney injury superimposed on chronic kidney disease  Mr. Still is a 67 yo M with PMHx of HTN, T2DM, HLD, gout, CKD4 (baseline unclear, but was 6.4 in September 2021 - previously was 3.6 in 2020), anemia admitted to hospital medicine service with hypoglycemia (blood glucose in 20s -  takes glipizide 10 mg BID, denies taking insulin).     He denies reduced urine production, typically urinates every hour about ~200 ccs. Believes he might be retaining urine. Urine sometimes appears slightly foamy. No hydronephrosis on KUS. Nephrology consulted for KAYODE on CKD (Cr = 9.1) and proteinuria.    Likely chronic issue and progression of CKD due to HTN and DM.      Recommendations:  -s/p permacath placement by IR on 5/5/22 and tolerated first session of HD on 5/5  -will do second session of HD today 2.5 hours with UF one liter   -c/w  Sodium bicarb 1300mg PO TID for metabolic acidosis   -c/w  Lasix 80 po bid   -Sevelamer 800 mg with meals   -Avoid nephrotoxins  -Daily RFP  -Strict ins and outs   -Diabetic, renal diet       Hyperkalemia  Resolved   D/c lokelma       Essential (primary) hypertension  Increase procardia to 90 mg qday         Brittany Penn MD  Nephrology  [facility] -  [facility] ([facility]

## 2022-05-06 NOTE — SUBJECTIVE & OBJECTIVE
Interval History: s/p 1st session of HD on 5/5 and tolerated well with one liter UF     Review of patient's allergies indicates:   Allergen Reactions    Iodine and iodide containing products Hives     Hypotension, Flushing     Current Facility-Administered Medications   Medication Frequency    0.9%  NaCl infusion Once    acetaminophen tablet 650 mg Q4H PRN    atorvastatin tablet 20 mg QHS    calcium carbonate 200 mg calcium (500 mg) chewable tablet 500 mg Daily    carvediloL tablet 12.5 mg BID    dextrose 10% bolus 125 mL PRN    dextrose 10% bolus 250 mL PRN    fluticasone propionate 50 mcg/actuation nasal spray 100 mcg Daily    furosemide tablet 80 mg BID    glucagon (human recombinant) injection 1 mg PRN    glucose chewable tablet 16 g PRN    glucose chewable tablet 24 g PRN    heparin (porcine) injection 1,000 Units PRN    heparin (porcine) injection 1,000 Units PRN    heparin (porcine) injection 5,000 Units Q8H    insulin aspart U-100 pen 0-5 Units QID (AC + HS) PRN    melatonin tablet 6 mg Nightly PRN    naloxone 0.4 mg/mL injection 0.02 mg PRN    NIFEdipine 24 hr tablet 60 mg Daily    senna-docusate 8.6-50 mg per tablet 1 tablet BID    sevelamer carbonate tablet 800 mg TID WM    sodium bicarbonate tablet 1,300 mg TID    sodium chloride 0.9% bolus 250 mL PRN    sodium chloride 0.9% bolus 250 mL PRN    sodium chloride 0.9% flush 10 mL Q12H PRN    sodium zirconium cyclosilicate packet 10 g Daily    tamsulosin 24 hr capsule 0.4 mg Daily       Objective:     Vital Signs (Most Recent):  Temp: 98.3 °F (36.8 °C) (05/06/22 0330)  Pulse: 72 (05/06/22 0330)  Resp: 20 (05/06/22 0330)  BP: (!) 183/84 (05/06/22 0030)  SpO2: 96 % (05/06/22 0330)  O2 Device (Oxygen Therapy): room air (05/06/22 0330) Vital Signs (24h Range):  Temp:  [97 °F (36.1 °C)-98.3 °F (36.8 °C)] 98.3 °F (36.8 °C)  Pulse:  [72-89] 72  Resp:  [16-20] 20  SpO2:  [96 %-100 %] 96 %  BP: (155-183)/() 183/84     Weight: 109 kg (240 lb 4.8 oz) (04/30/22  1920)  Body mass index is 30.04 kg/m².  Body surface area is 2.4 meters squared.    I/O last 3 completed shifts:  In: 320 [P.O.:120; Other:200]  Out: 3100 [Urine:1600; Other:1500]    Physical Exam  Vitals reviewed.   Constitutional:       Appearance: Normal appearance.   HENT:      Head: Normocephalic and atraumatic.      Mouth/Throat:      Mouth: Mucous membranes are moist.   Eyes:      Conjunctiva/sclera: Conjunctivae normal.      Pupils: Pupils are equal, round, and reactive to light.   Cardiovascular:      Rate and Rhythm: Normal rate and regular rhythm.      Pulses: Normal pulses.      Heart sounds: Normal heart sounds.   Pulmonary:      Effort: Pulmonary effort is normal.      Breath sounds: Normal breath sounds.   Abdominal:      General: Bowel sounds are normal.      Palpations: Abdomen is soft.   Musculoskeletal:         General: Normal range of motion.      Right lower leg: Edema present.      Left lower leg: Edema present.   Skin:     General: Skin is warm.      Capillary Refill: Capillary refill takes less than 2 seconds.   Neurological:      General: No focal deficit present.      Mental Status: He is alert and oriented to person, place, and time.   Psychiatric:         Mood and Affect: Mood normal.       Significant Labs:  All labs within the past 24 hours have been reviewed.     Significant Imaging:  Reviewed

## 2022-05-06 NOTE — CARE UPDATE
Dialysis treatment sheet sent to [facility]. Will continue to follow.     Pau Jones  Nephrology   Ext. [phone]

## 2022-05-06 NOTE — PROGRESS NOTES
"[facility] ([address] Medicine  Progress Note    Patient Name: Elbert Still Jr.  MRN: [id number]  Patient Class: IP- Inpatient   Admission Date: 4/30/2022  Length of Stay: 6 days  Attending Physician: Juwan Muhammad MD  Primary Care Provider: Angel Luis Boo MD        Subjective:     Principal Problem:Acute kidney injury superimposed on chronic kidney disease        HPI:  Mr. Still is a 67 yo M with PMHx of HTN, T2DM, HLD, gout, CKD4, anemia who presented with hypoglycemia after EMS found him to have glucose in the 20s at home. Per his wife, she walked in on the patient in the living room around 10am of day of admission. He was sluggish, had slurred speech, no LOC or diaphoresis. She called EMS. They gave him an amp of D50 which improved BG to 100s, then he was started on D10. Upon arrival to [facility], his glucose had improved to 200s. This has never happened before in his 40 years of having diabetes. He typically eats less on his days off, and he was off on the day of admission. Patient works as a . He denies fever, chills, nausea, vomiting, abdominal pain, SOB. He denies drastically reduced urine production, typically urinating 3x/day with a few episodes of nocturia. He notes some SOB with exertion and LE "heaviness." Had one episdoe of diarrhea last week for which he took Imodium, and two episodes of nonbloody diarrhea earlier on day of admission. Patient takes glipizide 10 mg BID as his only diabetes medication, denies taking insulin. He took the glipizide on the evening of 4/28 and morning of 4/29, but did not take the evening dose on 4/29. His most recent HbA1c was 5.1 in Sep 2021. Hasn't been measuring his home BG.     In the ED, his labs were significant for hgb of 8, K 5.7, bicarb 12, Cr 9, BUN 80, mag 1.4, alkaline phos 162, alumin 3.2, corrected Ca 7.8, BNP 27. UA positive for glucose and protein. LFTs and lipase wnl. POCT glucose readings had been 200 " -> 93 -> 73. He received nebulizer treatment and lasix 80 for hyperkalemia management. EKG showed NSR, no peaked T waves.       Overview/Hospital Course:  66 year old man with hx of HTN, T2DM, ESRD admitted for hypoglycemia 2/2 polypharmacy. Holding home diabeties meds, A1c 4.4. Hypertensive to 200 - titrating BP meds. Nephrology consulted for management of worsening Cr and thought to be due to longstanding HTN and DM. No acute indications for dialsysis at this time but patient would prefer not to given history with mother recently being admitted to hospice for ESRD. Patient with persistent HTN and so nifed and coreg increased. K elevated to 5.5 and lokelma and lasix started. Patient had tunnel cath placed by IR for dialysis on 5/5/22. Case management made aware of patients dialysis needs. Nephrology planning to start diaylsis on 5/5/22.       Interval History: 2nd HD session today. Patient still hypertensive and increased to nifedipine 60 mg daily. Will attempt CPAP at night.     Review of Systems   Constitutional:  Negative for chills, diaphoresis and fever.   HENT: Negative.     Eyes: Negative.  Negative for visual disturbance.   Respiratory:  Negative for cough and shortness of breath.    Cardiovascular:  Positive for leg swelling. Negative for chest pain and palpitations.        Permacath right side placed, look intact and clean. Notes some pain at site of catheter   Gastrointestinal:  Negative for abdominal distention, abdominal pain, blood in stool, constipation, diarrhea, nausea and vomiting.   Endocrine: Negative.    Genitourinary:  Negative for dysuria and hematuria.   Musculoskeletal: Negative.  Negative for arthralgias and back pain.   Skin:  Negative for color change.   Neurological:  Negative for dizziness, syncope, speech difficulty, weakness, light-headedness and headaches.   Hematological: Negative.    Psychiatric/Behavioral: Negative.  Negative for behavioral problems and confusion.    Objective:      Vital Signs (Most Recent):  Temp: 98.3 °F (36.8 °C) (05/06/22 1300)  Pulse: 85 (05/06/22 1515)  Resp: 18 (05/06/22 1300)  BP: (!) 162/101 (05/06/22 1515)  SpO2: 96 % (05/06/22 1245)   Vital Signs (24h Range):  Temp:  [97 °F (36.1 °C)-98.4 °F (36.9 °C)] 98.3 °F (36.8 °C)  Pulse:  [70-88] 85  Resp:  [18-20] 18  SpO2:  [96 %-98 %] 96 %  BP: (154-193)/() 162/101     Weight: 109 kg (240 lb 4.8 oz)  Body mass index is 30.04 kg/m².    Intake/Output Summary (Last 24 hours) at 5/6/2022 1523  Last data filed at 5/6/2022 0600  Gross per 24 hour   Intake 200 ml   Output 2300 ml   Net -2100 ml        Physical Exam  Constitutional:       General: He is not in acute distress.     Appearance: He is well-developed. He is obese. He is not ill-appearing or toxic-appearing.   HENT:      Head: Normocephalic and atraumatic.      Nose: No congestion or rhinorrhea.      Mouth/Throat:      Pharynx: No oropharyngeal exudate or posterior oropharyngeal erythema.   Eyes:      General: No scleral icterus.        Right eye: No discharge.         Left eye: No discharge.      Pupils: Pupils are equal, round, and reactive to light.   Cardiovascular:      Rate and Rhythm: Normal rate and regular rhythm.      Heart sounds: Normal heart sounds. No murmur heard.    No gallop.      Comments: Perma cath placed R side  Pulmonary:      Effort: Pulmonary effort is normal.   Abdominal:      General: Bowel sounds are normal.      Palpations: Abdomen is soft.      Tenderness: There is no abdominal tenderness.   Musculoskeletal:         General: Normal range of motion.      Cervical back: Normal range of motion and neck supple.      Right lower leg: Edema present.      Left lower leg: Edema present.      Comments: Some tenderness to right toe   Skin:     Capillary Refill: Capillary refill takes less than 2 seconds.      Coloration: Skin is not jaundiced.      Findings: No lesion or rash.   Neurological:      Mental Status: He is alert and oriented to  person, place, and time.   Psychiatric:         Mood and Affect: Mood normal.         Behavior: Behavior normal.         Thought Content: Thought content normal.       Significant Labs: All pertinent labs within the past 24 hours have been reviewed.  A1C:   Recent Labs   Lab 04/30/22  0103   HGBA1C 4.4         Bilirubin:   Recent Labs   Lab 05/02/22  0547 05/03/22  0253 05/04/22  0532 05/05/22  0248 05/06/22  0436   BILITOT 0.2 0.3 0.3 0.2 0.2       Blood Culture: No results for input(s): LABBLOO in the last 48 hours.    Recent Labs   Lab 05/05/22  0248 05/06/22  0436   WBC 4.30 5.65   HGB 7.3* 7.5*   HCT 22.5* 22.8*    215         Recent Labs   Lab 05/05/22  0248 05/06/22  0436    140   K 4.6 4.3   * 108   CO2 13* 18*   * 111*   BUN 93* 77*   CREATININE 9.7* 7.7*   CALCIUM 6.9* 7.4*   PROT 6.6 6.0   ALBUMIN 2.9* 3.0*   BILITOT 0.2 0.2   ALKPHOS 146* 149*   AST 20 24   ALT 15 15   ANIONGAP 15 14   EGFRNONAA 5.1* 6.7*       No results for input(s): PT, INR, APTT in the last 48 hours.      Recent Labs   Lab 05/05/22  0248 05/06/22  0436   MG 1.5* 1.7         Recent Labs   Lab 05/05/22  1132 05/05/22  1903 05/06/22  1250   POCTGLUCOSE 154* 113* 146*         Recent Labs   Lab 04/30/22  0103   TSH 0.879           Significant Imaging: I have reviewed all pertinent imaging results/findings within the past 24 hours.      Assessment/Plan:      * Acute kidney injury superimposed on chronic kidney disease    Patient with acute kidney injury likely d/t Pre-renal azotemia and Acute tubular necrosis Which is currently worsening. Labs reviewed- Renal function/electrolytes with Estimated Creatinine Clearance: 10.3 mL/min (A) (based on SCr of 9.7 mg/dL (H)). according to latest data. Monitor urine output and serial BMP and adjust therapy as needed. Avoid nephrotoxins and renally dose meds for GFR listed above.     Baseline Cr 3-6, Cr 9 on admission  Hx of CKD: yes due to HTN/DM  Last saw nephro 2013  Exam  with edema, no decreased O2 sats or rales  FeNA: not reliable, had already gotten lasix  FeUrea: pending  UA / UCx: proteinuria, glucuria  DDx: hypotension, prerenal, progression of CKD  No indications for HD at this time    Plan:   - nephro consulted, appreciate recs   -possible biopsy indicated given proteinuria  - Trend Cr  - Strict I&Os  - Avoid nephrotoxic agents (NSAIDs, gadolinium, IV radiocontrast)  - Avoid hypotension  - Renal diet  - Renally adjust medications  - Transitioned from IV lasix to PO lasix 80mg BID.    Hyperkalemia  K 5.7 on admission   ECG: NSR, no peaked t waves  Chest pain/discomfort: none  Home medications: no ARBs, or ACEi    Plan:   -Page cardiology if K >5.5 with ECG changes or nephrology if >5.5 (persistently) and anuric  -Trend BMP, repeat potassium resolved   -Telemetry   -Shift with lasix, duonebs, lokelma PRN. Be cautious about using insulin with dextrose in setting of hypoglycemia        Hypoglycemia  Blood sugar 203 mg/dl on admission, given dextrose by EMS with improvement   No history of labile blood sugars during previous admission   Most recent blood sugar 73 mg/dl   Mentation: AAO x3 during my interview   Taking glipizide 10 BID    Ddx: decreased po intake and decreased glipizide clearance (KAYODE on CKD), Likely not very hyperglycemic at baseline given low HbA1c. Less likely exogenous administration of insulin, insulinoma     Plan:     -Fall precautions   -Continue to monitor sugars.  -STOP the glipizide at discharge, A1c well controlled.        Anemia of chronic disease  Baseline Hb 11, Hb 8 on admission  Antiplatelet/anticoagulation: none  No overt source of bleeding on exam, denies bloody BM, hematuria.       Plan:  - CBCs daily  - Transfuse with goal Hb > 7        Gout  Takes colchicine as needed at home, patient with poor kidney function. Will try and manage pain with heat packs and tylenol. Can trial steroids if too much pain or intraarticular steroid shot to avoid  systemic steroids.      CKD (chronic kidney disease) stage 4, GFR 15-29 ml/min  Hx of CKD due to HTN, DM. Baseline around 3-6    - nephro referral outpatient  - Patient will be a new dialysis patient and agreed to perma cath, now s/p permacath on 5/5 with plan for dialysis with nephro.  -follow up nephro recs      Mixed hyperlipidemia  Continue home lipitor 20      Essential (primary) hypertension   home meds amlodipine and Toprol XL  Blood pressure better controlled with nifedpine.    Plan:  --increase nifedipine to 60 mg daily  - Consideration for restarting ARB given ESRD status and preservation of renal function      Proteinuria  Hx of proteinuria, UPCr >7, nephrotic range    - consider ACEi or SGLT-2 inhibitor at discharge      Diabetes mellitus due to underlying condition with diabetic nephropathy  Diabetes type 2  Last HbA1c:   Lab Results   Component Value Date    HGBA1C 4.4 04/30/2022     Insulin regimen: none  Oral regimen ( held inpatient): glipizide   CAD Prophylaxis: statin  HTN or Renal disease: KAYODE, hypertensive    PLAN:   - Hold oral anti-hyperglycemic agents  - LD SSI  - Diabetic diet  - Hypoglycemia orderset with POCT BG AC/QS  - Goal -180 while inpatient               VTE Risk Mitigation (From admission, onward)         Ordered     heparin (porcine) injection 1,000 Units  As needed (PRN)         05/06/22 1139     heparin (porcine) injection 1,000 Units  As needed (PRN)         05/06/22 0743     heparin (porcine) injection 5,000 Units  Every 8 hours         05/05/22 1051     heparin (porcine) injection 1,000 Units  As needed (PRN)         05/05/22 0950     IP VTE HIGH RISK PATIENT  Once         04/30/22 0429     Place sequential compression device  Until discontinued         04/30/22 0429                Discharge Planning   HORACIO: 5/9/2022     Code Status: Full Code   Is the patient medically ready for discharge?: No    Reason for patient still in hospital (select all that apply): Treatment and  Consult recommendations  Discharge Plan A: Home with family   Discharge Delays: None known at this time              Kieran Benson DO  Department of Hospital Medicine   [facility] ([place])

## 2022-05-06 NOTE — ASSESSMENT & PLAN NOTE
Mr. Still is a 65 yo M with PMHx of HTN, T2DM, HLD, gout, CKD4 (baseline unclear, but was 6.4 in September 2021 - previously was 3.6 in 2020), anemia admitted to hospital medicine service with hypoglycemia (blood glucose in 20s -  takes glipizide 10 mg BID, denies taking insulin).     He denies reduced urine production, typically urinates every hour about ~200 ccs. Believes he might be retaining urine. Urine sometimes appears slightly foamy. No hydronephrosis on KUS. Nephrology consulted for KAYODE on CKD (Cr = 9.1) and proteinuria.    Likely chronic issue and progression of CKD due to HTN and DM.      Recommendations:  -s/p permacath placement by IR on 5/5/22 and tolerated first session of HD on 5/5  -will do second session of HD today 2.5 hours with UF one liter   -c/w  Sodium bicarb 1300mg PO TID for metabolic acidosis   -c/w  Lasix 80 po bid   -Sevelamer 800 mg with meals   -Avoid nephrotoxins  -Daily RFP  -Strict ins and outs   -Diabetic, renal diet

## 2022-05-06 NOTE — PLAN OF CARE
Problem: Adult Inpatient Plan of Care  Goal: Plan of Care Review  Outcome: Ongoing, Progressing  Goal: Patient-Specific Goal (Individualized)  Outcome: Ongoing, Progressing  Goal: Absence of Hospital-Acquired Illness or Injury  Outcome: Ongoing, Progressing  Goal: Optimal Comfort and Wellbeing  Outcome: Ongoing, Progressing  Goal: Readiness for Transition of Care  Outcome: Ongoing, Progressing   P

## 2022-05-06 NOTE — SUBJECTIVE & OBJECTIVE
Interval History: 2nd HD session today. Patient still hypertensive and increased to nifedipine 60 mg daily. Will attempt CPAP at night.     Review of Systems   Constitutional:  Negative for chills, diaphoresis and fever.   HENT: Negative.     Eyes: Negative.  Negative for visual disturbance.   Respiratory:  Negative for cough and shortness of breath.    Cardiovascular:  Positive for leg swelling. Negative for chest pain and palpitations.        Permacath right side placed, look intact and clean. Notes some pain at site of catheter   Gastrointestinal:  Negative for abdominal distention, abdominal pain, blood in stool, constipation, diarrhea, nausea and vomiting.   Endocrine: Negative.    Genitourinary:  Negative for dysuria and hematuria.   Musculoskeletal: Negative.  Negative for arthralgias and back pain.   Skin:  Negative for color change.   Neurological:  Negative for dizziness, syncope, speech difficulty, weakness, light-headedness and headaches.   Hematological: Negative.    Psychiatric/Behavioral: Negative.  Negative for behavioral problems and confusion.    Objective:     Vital Signs (Most Recent):  Temp: 98.3 °F (36.8 °C) (05/06/22 1300)  Pulse: 85 (05/06/22 1515)  Resp: 18 (05/06/22 1300)  BP: (!) 162/101 (05/06/22 1515)  SpO2: 96 % (05/06/22 1245)   Vital Signs (24h Range):  Temp:  [97 °F (36.1 °C)-98.4 °F (36.9 °C)] 98.3 °F (36.8 °C)  Pulse:  [70-88] 85  Resp:  [18-20] 18  SpO2:  [96 %-98 %] 96 %  BP: (154-193)/() 162/101     Weight: 109 kg (240 lb 4.8 oz)  Body mass index is 30.04 kg/m².    Intake/Output Summary (Last 24 hours) at 5/6/2022 1523  Last data filed at 5/6/2022 0600  Gross per 24 hour   Intake 200 ml   Output 2300 ml   Net -2100 ml        Physical Exam  Constitutional:       General: He is not in acute distress.     Appearance: He is well-developed. He is obese. He is not ill-appearing or toxic-appearing.   HENT:      Head: Normocephalic and atraumatic.      Nose: No congestion or  rhinorrhea.      Mouth/Throat:      Pharynx: No oropharyngeal exudate or posterior oropharyngeal erythema.   Eyes:      General: No scleral icterus.        Right eye: No discharge.         Left eye: No discharge.      Pupils: Pupils are equal, round, and reactive to light.   Cardiovascular:      Rate and Rhythm: Normal rate and regular rhythm.      Heart sounds: Normal heart sounds. No murmur heard.    No gallop.      Comments: Perma cath placed R side  Pulmonary:      Effort: Pulmonary effort is normal.   Abdominal:      General: Bowel sounds are normal.      Palpations: Abdomen is soft.      Tenderness: There is no abdominal tenderness.   Musculoskeletal:         General: Normal range of motion.      Cervical back: Normal range of motion and neck supple.      Right lower leg: Edema present.      Left lower leg: Edema present.      Comments: Some tenderness to right toe   Skin:     Capillary Refill: Capillary refill takes less than 2 seconds.      Coloration: Skin is not jaundiced.      Findings: No lesion or rash.   Neurological:      Mental Status: He is alert and oriented to person, place, and time.   Psychiatric:         Mood and Affect: Mood normal.         Behavior: Behavior normal.         Thought Content: Thought content normal.       Significant Labs: All pertinent labs within the past 24 hours have been reviewed.  A1C:   Recent Labs   Lab 04/30/22  0103   HGBA1C 4.4         Bilirubin:   Recent Labs   Lab 05/02/22  0547 05/03/22  0253 05/04/22  0532 05/05/22  0248 05/06/22  0436   BILITOT 0.2 0.3 0.3 0.2 0.2       Blood Culture: No results for input(s): LABBLOO in the last 48 hours.    Recent Labs   Lab 05/05/22  0248 05/06/22  0436   WBC 4.30 5.65   HGB 7.3* 7.5*   HCT 22.5* 22.8*    215         Recent Labs   Lab 05/05/22  0248 05/06/22  0436    140   K 4.6 4.3   * 108   CO2 13* 18*   * 111*   BUN 93* 77*   CREATININE 9.7* 7.7*   CALCIUM 6.9* 7.4*   PROT 6.6 6.0   ALBUMIN 2.9*  3.0*   BILITOT 0.2 0.2   ALKPHOS 146* 149*   AST 20 24   ALT 15 15   ANIONGAP 15 14   EGFRNONAA 5.1* 6.7*       No results for input(s): PT, INR, APTT in the last 48 hours.      Recent Labs   Lab 05/05/22  0248 05/06/22  0436   MG 1.5* 1.7         Recent Labs   Lab 05/05/22  1132 05/05/22  1903 05/06/22  1250   POCTGLUCOSE 154* 113* 146*         Recent Labs   Lab 04/30/22  0103   TSH 0.879           Significant Imaging: I have reviewed all pertinent imaging results/findings within the past 24 hours.

## 2022-05-06 NOTE — ASSESSMENT & PLAN NOTE
K 5.7 on admission   ECG: NSR, no peaked t waves  Chest pain/discomfort: none  Home medications: no ARBs, or ACEi    Plan:   -Page cardiology if K >5.5 with ECG changes or nephrology if >5.5 (persistently) and anuric  -Trend BMP, repeat potassium resolved   -Telemetry   -Shift with lasix, duonebs, lokelma PRN. Be cautious about using insulin with dextrose in setting of hypoglycemia

## 2022-05-06 NOTE — ASSESSMENT & PLAN NOTE
home meds amlodipine and Toprol XL  Blood pressure better controlled with nifedpine.    Plan:  --increase nifedipine to 60 mg daily  - Consideration for restarting ARB given ESRD status and preservation of renal function

## 2022-05-07 LAB
ALBUMIN SERPL BCP-MCNC: 3.1 G/DL (ref 3.5–5.2)
ALP SERPL-CCNC: 151 U/L (ref 55–135)
ALT SERPL W/O P-5'-P-CCNC: 14 U/L (ref 10–44)
ANION GAP SERPL CALC-SCNC: 13 MMOL/L (ref 8–16)
AST SERPL-CCNC: 21 U/L (ref 10–40)
BASOPHILS # BLD AUTO: 0.01 K/UL (ref 0–0.2)
BASOPHILS NFR BLD: 0.2 % (ref 0–1.9)
BILIRUB SERPL-MCNC: 0.3 MG/DL (ref 0.1–1)
BUN SERPL-MCNC: 54 MG/DL (ref 8–23)
CALCIUM SERPL-MCNC: 7.8 MG/DL (ref 8.7–10.5)
CHLORIDE SERPL-SCNC: 105 MMOL/L (ref 95–110)
CO2 SERPL-SCNC: 22 MMOL/L (ref 23–29)
CREAT SERPL-MCNC: 6.4 MG/DL (ref 0.5–1.4)
DIFFERENTIAL METHOD: ABNORMAL
EOSINOPHIL # BLD AUTO: 0.2 K/UL (ref 0–0.5)
EOSINOPHIL NFR BLD: 3.2 % (ref 0–8)
ERYTHROCYTE [DISTWIDTH] IN BLOOD BY AUTOMATED COUNT: 13.9 % (ref 11.5–14.5)
EST. GFR  (AFRICAN AMERICAN): 9.7 ML/MIN/1.73 M^2
EST. GFR  (NON AFRICAN AMERICAN): 8.4 ML/MIN/1.73 M^2
GLUCOSE SERPL-MCNC: 126 MG/DL (ref 70–110)
HCT VFR BLD AUTO: 23.8 % (ref 40–54)
HGB BLD-MCNC: 7.8 G/DL (ref 14–18)
IMM GRANULOCYTES # BLD AUTO: 0.02 K/UL (ref 0–0.04)
IMM GRANULOCYTES NFR BLD AUTO: 0.4 % (ref 0–0.5)
LYMPHOCYTES # BLD AUTO: 0.9 K/UL (ref 1–4.8)
LYMPHOCYTES NFR BLD: 15.5 % (ref 18–48)
MAGNESIUM SERPL-MCNC: 1.6 MG/DL (ref 1.6–2.6)
MCH RBC QN AUTO: 27.8 PG (ref 27–31)
MCHC RBC AUTO-ENTMCNC: 32.8 G/DL (ref 32–36)
MCV RBC AUTO: 85 FL (ref 82–98)
MONOCYTES # BLD AUTO: 0.8 K/UL (ref 0.3–1)
MONOCYTES NFR BLD: 14.7 % (ref 4–15)
NEUTROPHILS # BLD AUTO: 3.7 K/UL (ref 1.8–7.7)
NEUTROPHILS NFR BLD: 66 % (ref 38–73)
NRBC BLD-RTO: 0 /100 WBC
PHOSPHATE SERPL-MCNC: 5.9 MG/DL (ref 2.7–4.5)
PLATELET # BLD AUTO: 205 K/UL (ref 150–450)
PMV BLD AUTO: 11.3 FL (ref 9.2–12.9)
POCT GLUCOSE: 129 MG/DL (ref 70–110)
POCT GLUCOSE: 236 MG/DL (ref 70–110)
POCT GLUCOSE: 237 MG/DL (ref 70–110)
POCT GLUCOSE: 312 MG/DL (ref 70–110)
POCT GLUCOSE: 64 MG/DL (ref 70–110)
POTASSIUM SERPL-SCNC: 4.1 MMOL/L (ref 3.5–5.1)
PROT SERPL-MCNC: 6.2 G/DL (ref 6–8.4)
RBC # BLD AUTO: 2.81 M/UL (ref 4.6–6.2)
SODIUM SERPL-SCNC: 140 MMOL/L (ref 136–145)
WBC # BLD AUTO: 5.63 K/UL (ref 3.9–12.7)

## 2022-05-07 PROCEDURE — 25000003 PHARM REV CODE 250

## 2022-05-07 PROCEDURE — 20600001 HC STEP DOWN PRIVATE ROOM

## 2022-05-07 PROCEDURE — 25000003 PHARM REV CODE 250: Performed by: INTERNAL MEDICINE

## 2022-05-07 PROCEDURE — 63600175 PHARM REV CODE 636 W HCPCS: Performed by: INTERNAL MEDICINE

## 2022-05-07 PROCEDURE — 63600175 PHARM REV CODE 636 W HCPCS

## 2022-05-07 PROCEDURE — 99232 PR SUBSEQUENT HOSPITAL CARE,LEVL II: ICD-10-PCS | Mod: ,,, | Performed by: INTERNAL MEDICINE

## 2022-05-07 PROCEDURE — 94761 N-INVAS EAR/PLS OXIMETRY MLT: CPT

## 2022-05-07 PROCEDURE — 94660 CPAP INITIATION&MGMT: CPT

## 2022-05-07 PROCEDURE — 27000190 HC CPAP FULL FACE MASK W/VALVE

## 2022-05-07 PROCEDURE — 83735 ASSAY OF MAGNESIUM: CPT

## 2022-05-07 PROCEDURE — 90935 HEMODIALYSIS ONE EVALUATION: CPT

## 2022-05-07 PROCEDURE — 99232 SBSQ HOSP IP/OBS MODERATE 35: CPT | Mod: ,,, | Performed by: STUDENT IN AN ORGANIZED HEALTH CARE EDUCATION/TRAINING PROGRAM

## 2022-05-07 PROCEDURE — 99232 SBSQ HOSP IP/OBS MODERATE 35: CPT | Mod: ,,, | Performed by: INTERNAL MEDICINE

## 2022-05-07 PROCEDURE — 85025 COMPLETE CBC W/AUTO DIFF WBC: CPT

## 2022-05-07 PROCEDURE — 99232 PR SUBSEQUENT HOSPITAL CARE,LEVL II: ICD-10-PCS | Mod: ,,, | Performed by: STUDENT IN AN ORGANIZED HEALTH CARE EDUCATION/TRAINING PROGRAM

## 2022-05-07 PROCEDURE — 36415 COLL VENOUS BLD VENIPUNCTURE: CPT

## 2022-05-07 PROCEDURE — 99900035 HC TECH TIME PER 15 MIN (STAT)

## 2022-05-07 PROCEDURE — 25000003 PHARM REV CODE 250: Performed by: STUDENT IN AN ORGANIZED HEALTH CARE EDUCATION/TRAINING PROGRAM

## 2022-05-07 PROCEDURE — 80053 COMPREHEN METABOLIC PANEL: CPT

## 2022-05-07 PROCEDURE — 84100 ASSAY OF PHOSPHORUS: CPT

## 2022-05-07 RX ORDER — NIFEDIPINE 30 MG/1
90 TABLET, EXTENDED RELEASE ORAL DAILY
Status: DISCONTINUED | OUTPATIENT
Start: 2022-05-07 | End: 2022-05-10 | Stop reason: HOSPADM

## 2022-05-07 RX ADMIN — NIFEDIPINE 90 MG: 30 TABLET, FILM COATED, EXTENDED RELEASE ORAL at 12:05

## 2022-05-07 RX ADMIN — HEPARIN SODIUM 5000 UNITS: 5000 INJECTION INTRAVENOUS; SUBCUTANEOUS at 09:05

## 2022-05-07 RX ADMIN — FUROSEMIDE 80 MG: 40 TABLET ORAL at 05:05

## 2022-05-07 RX ADMIN — CARVEDILOL 12.5 MG: 12.5 TABLET, FILM COATED ORAL at 09:05

## 2022-05-07 RX ADMIN — HEPARIN SODIUM 5000 UNITS: 5000 INJECTION INTRAVENOUS; SUBCUTANEOUS at 06:05

## 2022-05-07 RX ADMIN — SEVELAMER CARBONATE 800 MG: 800 TABLET, FILM COATED ORAL at 05:05

## 2022-05-07 RX ADMIN — CALCIUM CARBONATE (ANTACID) CHEW TAB 500 MG 500 MG: 500 CHEW TAB at 12:05

## 2022-05-07 RX ADMIN — SODIUM BICARBONATE 650 MG TABLET 1300 MG: at 12:05

## 2022-05-07 RX ADMIN — FUROSEMIDE 80 MG: 40 TABLET ORAL at 12:05

## 2022-05-07 RX ADMIN — HEPARIN SODIUM 1000 UNITS: 1000 INJECTION, SOLUTION INTRAVENOUS; SUBCUTANEOUS at 10:05

## 2022-05-07 RX ADMIN — SEVELAMER CARBONATE 800 MG: 800 TABLET, FILM COATED ORAL at 12:05

## 2022-05-07 RX ADMIN — TAMSULOSIN HYDROCHLORIDE 0.4 MG: 0.4 CAPSULE ORAL at 12:05

## 2022-05-07 RX ADMIN — PREDNISONE 30 MG: 5 TABLET ORAL at 03:05

## 2022-05-07 RX ADMIN — SENNOSIDES AND DOCUSATE SODIUM 1 TABLET: 50; 8.6 TABLET ORAL at 09:05

## 2022-05-07 RX ADMIN — SODIUM BICARBONATE 650 MG TABLET 1300 MG: at 09:05

## 2022-05-07 RX ADMIN — MUPIROCIN: 20 OINTMENT TOPICAL at 12:05

## 2022-05-07 RX ADMIN — SENNOSIDES AND DOCUSATE SODIUM 1 TABLET: 50; 8.6 TABLET ORAL at 12:05

## 2022-05-07 RX ADMIN — HEPARIN SODIUM 5000 UNITS: 5000 INJECTION INTRAVENOUS; SUBCUTANEOUS at 03:05

## 2022-05-07 RX ADMIN — SODIUM BICARBONATE 650 MG TABLET 1300 MG: at 03:05

## 2022-05-07 RX ADMIN — CARVEDILOL 12.5 MG: 12.5 TABLET, FILM COATED ORAL at 12:05

## 2022-05-07 RX ADMIN — ATORVASTATIN CALCIUM 20 MG: 20 TABLET, FILM COATED ORAL at 09:05

## 2022-05-07 NOTE — SUBJECTIVE & OBJECTIVE
Interval History: Patient complaining of gout like pain in his R big toe and ankle. Otherwise no complaints.    Review of Systems   Constitutional:  Negative for chills, diaphoresis and fever.   HENT: Negative.     Eyes: Negative.  Negative for visual disturbance.   Respiratory:  Negative for cough and shortness of breath.    Cardiovascular:  Positive for leg swelling. Negative for chest pain and palpitations.        Permacath right side placed, look intact and clean. Notes some pain at site of catheter   Gastrointestinal:  Negative for abdominal distention, abdominal pain, blood in stool, constipation, diarrhea, nausea and vomiting.   Endocrine: Negative.    Genitourinary:  Negative for dysuria and hematuria.   Musculoskeletal:  Positive for arthralgias (R big toe and ankle). Negative for back pain.   Skin:  Negative for color change.   Neurological:  Negative for dizziness, syncope, speech difficulty, weakness, light-headedness and headaches.   Hematological: Negative.    Psychiatric/Behavioral: Negative.  Negative for behavioral problems and confusion.    Objective:     Vital Signs (Most Recent):  Temp: 98.1 °F (36.7 °C) (05/07/22 0738)  Pulse: 85 (05/07/22 1045)  Resp: 20 (05/07/22 1045)  BP: (!) 149/98 (05/07/22 1045)  SpO2: 96 % (05/07/22 0738)   Vital Signs (24h Range):  Temp:  [98.1 °F (36.7 °C)-98.5 °F (36.9 °C)] 98.1 °F (36.7 °C)  Pulse:  [81-99] 85  Resp:  [18-20] 20  SpO2:  [96 %] 96 %  BP: (148-183)/() 149/98     Weight: 109 kg (240 lb 4.8 oz)  Body mass index is 30.04 kg/m².    Intake/Output Summary (Last 24 hours) at 5/7/2022 1421  Last data filed at 5/7/2022 1045  Gross per 24 hour   Intake 600 ml   Output 5500 ml   Net -4900 ml      Physical Exam  Constitutional:       General: He is not in acute distress.     Appearance: He is well-developed. He is obese. He is not ill-appearing or toxic-appearing.   HENT:      Head: Normocephalic and atraumatic.      Nose: No congestion or rhinorrhea.       Mouth/Throat:      Pharynx: No oropharyngeal exudate or posterior oropharyngeal erythema.   Eyes:      General: No scleral icterus.        Right eye: No discharge.         Left eye: No discharge.      Pupils: Pupils are equal, round, and reactive to light.   Cardiovascular:      Rate and Rhythm: Normal rate and regular rhythm.      Heart sounds: Normal heart sounds. No murmur heard.    No gallop.      Comments: Perma cath placed R side  Pulmonary:      Effort: Pulmonary effort is normal.   Abdominal:      General: Bowel sounds are normal.      Palpations: Abdomen is soft.      Tenderness: There is no abdominal tenderness.   Musculoskeletal:         General: Normal range of motion.      Cervical back: Normal range of motion and neck supple.      Right lower leg: Edema present.      Left lower leg: Edema present.      Comments: Some tenderness to right toe   Skin:     Capillary Refill: Capillary refill takes less than 2 seconds.      Coloration: Skin is not jaundiced.      Findings: No lesion or rash.   Neurological:      Mental Status: He is alert and oriented to person, place, and time.   Psychiatric:         Mood and Affect: Mood normal.         Behavior: Behavior normal.         Thought Content: Thought content normal.       Significant Labs: All pertinent labs within the past 24 hours have been reviewed.    A1C:   Recent Labs   Lab 04/30/22  0103   HGBA1C 4.4       Bilirubin:   Recent Labs   Lab 05/03/22  0253 05/04/22  0532 05/05/22  0248 05/06/22  0436 05/07/22  0519   BILITOT 0.3 0.3 0.2 0.2 0.3       CBC:   Recent Labs   Lab 05/06/22  0436 05/07/22  0519   WBC 5.65 5.63   HGB 7.5* 7.8*   HCT 22.8* 23.8*    205     CMP:   Recent Labs   Lab 05/06/22  0436 05/07/22  0519    140   K 4.3 4.1    105   CO2 18* 22*   * 126*   BUN 77* 54*   CREATININE 7.7* 6.4*   CALCIUM 7.4* 7.8*   PROT 6.0 6.2   ALBUMIN 3.0* 3.1*   BILITOT 0.2 0.3   ALKPHOS 149* 151*   AST 24 21   ALT 15 14   ANIONGAP 14 13    EGFRNONAA 6.7* 8.4*     Magnesium:   Recent Labs   Lab 05/06/22  0436 05/07/22  0519   MG 1.7 1.6     POCT Glucose:   Recent Labs   Lab 05/06/22  2126 05/07/22  1103 05/07/22  1106   POCTGLUCOSE 237* 64* 129*     TSH:   Recent Labs   Lab 04/30/22  0103   TSH 0.879         Significant Imaging: I have reviewed all pertinent imaging results/findings within the past 24 hours.

## 2022-05-07 NOTE — NURSING
Patient resting in bed quietly, opens eyes to verbal stimuli. VS stable. Appears in no acute distress. Right chest wall HD catheter, intact. Awaiting transport to hemodialysis unit. Wife at bedside.

## 2022-05-07 NOTE — ASSESSMENT & PLAN NOTE
K 5.7 on admission   ECG: NSR, no peaked t waves  Chest pain/discomfort: none  Home medications: no ARBs, or ACEi    Plan:   Resolved

## 2022-05-07 NOTE — PLAN OF CARE
Pt Aox3, stable, RA, slept with BIPAP. Denies pain or discomfort, family at bedside call light within reach. Safety measures in place          Problem: Adult Inpatient Plan of Care  Goal: Plan of Care Review  Outcome: Ongoing, Progressing  Goal: Patient-Specific Goal (Individualized)  Outcome: Ongoing, Progressing  Goal: Absence of Hospital-Acquired Illness or Injury  Outcome: Ongoing, Progressing  Goal: Optimal Comfort and Wellbeing  Outcome: Ongoing, Progressing  Goal: Readiness for Transition of Care  Outcome: Ongoing, Progressing

## 2022-05-07 NOTE — PROGRESS NOTES
[facility] ([place] [address])  Nephrology  Progress Note    Patient Name: Elbert Still Jr.  MRN: [id number]  Admission Date: 4/30/2022  Hospital Length of Stay: 7 days  Attending Provider: Mookie Acuña MD   Primary Care Physician: Angel Luis Boo MD  Principal Problem:Acute kidney injury superimposed on chronic kidney disease    Subjective:     HPI: Mr. Still is a 65 yo M with PMHx of HTN, T2DM, HLD, gout, CKD4 (baseline unclear, but was 6.4 in September 2021 - previously was 3.6 in 2020), anemia who presented with hypoglycemia after EMS found him to have glucose in the 20s at home. Per his wife, patient was sluggish, had slurred speech the day of admission, so she called EMS.  Upon arrival of EMS blood glucose was in the 20s. Patient takes glipizide 10 mg BID as his only diabetes medication, denies taking insulin. His most recent HbA1c was 5.1 in Sep 2021. Hasn't been measuring his home BG.  He denies drastically reduced urine production, typically urinates every hour ~200 ccs. Believes he might be retaining urine. Urine sometimes appears slightly foamy. ROS also positive for diaphoresis associated with eating food, SOB (present since covid infection 2 months ago), LE swelling X 2 weeks. Nephrology consulted for KAYODE on CKD (Cr = 9.1) and proteinuria.          Nephrology Progress Note    Mr. Still is a 65 yo M with PMHx of HTN, T2DM, HLD, gout, CKD4 (baseline unclear, but was 6.4 in September 2021 - previously was 3.6 in 2020), anemia who presented with hypoglycemia after EMS found him to have glucose in the 20s at home. Per his wife, patient was sluggish, had slurred speech the day of admission, so she called EMS.  Upon arrival of EMS blood glucose was in the 20s. Patient takes glipizide 10 mg BID as his only diabetes medication, denies taking insulin. His most recent HbA1c was 5.1 in Sep 2021. Hasn't been measuring his home BG.  He denies drastically reduced urine production, typically urinates  every hour ~200 ccs. Believes he might be retaining urine. Urine sometimes appears slightly foamy. ROS also positive for diaphoresis associated with eating food, SOB (present since covid infection 2 months ago), LE swelling X 2 weeks. Nephrology consulted for KAYODE on CKD (Cr = 9.1) and proteinuria.        Interval History: s/p 2st session of HD on 5/7 and tolerated well. Reports feeling better af the dialysis    Review of patient's allergies indicates:   Allergen Reactions    Iodine and iodide containing products Hives     Hypotension, Flushing     Current Facility-Administered Medications   Medication Frequency    0.9%  NaCl infusion Once    acetaminophen tablet 650 mg Q4H PRN    atorvastatin tablet 20 mg QHS    calcium carbonate 200 mg calcium (500 mg) chewable tablet 500 mg Daily    carvediloL tablet 12.5 mg BID    dextrose 10% bolus 125 mL PRN    dextrose 10% bolus 250 mL PRN    fluticasone propionate 50 mcg/actuation nasal spray 100 mcg Daily    furosemide tablet 80 mg BID    glucagon (human recombinant) injection 1 mg PRN    glucose chewable tablet 16 g PRN    glucose chewable tablet 24 g PRN    heparin (porcine) injection 1,000 Units PRN    heparin (porcine) injection 1,000 Units PRN    heparin (porcine) injection 1,000 Units PRN    heparin (porcine) injection 5,000 Units Q8H    insulin aspart U-100 pen 0-5 Units QID (AC + HS) PRN    melatonin tablet 6 mg Nightly PRN    mupirocin 2 % ointment BID    naloxone 0.4 mg/mL injection 0.02 mg PRN    NIFEdipine 24 hr tablet 90 mg Daily    predniSONE tablet 30 mg Daily    senna-docusate 8.6-50 mg per tablet 1 tablet BID    sevelamer carbonate tablet 800 mg TID WM    sodium bicarbonate tablet 1,300 mg TID    sodium chloride 0.9% bolus 250 mL PRN    sodium chloride 0.9% bolus 250 mL PRN    sodium chloride 0.9% bolus 250 mL PRN    sodium chloride 0.9% flush 10 mL Q12H PRN    tamsulosin 24 hr capsule 0.4 mg Daily       Objective:     Vital  Signs (Most Recent):  Temp: 97.8 °F (36.6 °C) (05/07/22 1625)  Pulse: 80 (05/07/22 1540)  Resp: 20 (05/07/22 1540)  BP: (!) 176/83 (05/07/22 1540)  SpO2: 95 % (05/07/22 1540)  O2 Device (Oxygen Therapy): room air (05/07/22 1540) Vital Signs (24h Range):  Temp:  [97.8 °F (36.6 °C)-98.5 °F (36.9 °C)] 97.8 °F (36.6 °C)  Pulse:  [80-99] 80  Resp:  [18-20] 20  SpO2:  [95 %-96 %] 95 %  BP: (148-183)/() 176/83     Weight: 109 kg (240 lb 4.8 oz) (04/30/22 1920)  Body mass index is 30.04 kg/m².  Body surface area is 2.4 meters squared.    I/O last 3 completed shifts:  In: 300 [Other:300]  Out: 3750 [Urine:1200; Other:2550]    Physical Exam  Vitals reviewed.   Constitutional:       Appearance: Normal appearance.   HENT:      Head: Normocephalic and atraumatic.      Mouth/Throat:      Mouth: Mucous membranes are moist.   Eyes:      Conjunctiva/sclera: Conjunctivae normal.      Pupils: Pupils are equal, round, and reactive to light.   Cardiovascular:      Rate and Rhythm: Normal rate and regular rhythm.      Pulses: Normal pulses.      Heart sounds: Normal heart sounds.   Pulmonary:      Effort: Pulmonary effort is normal.      Breath sounds: Normal breath sounds.   Abdominal:      General: Bowel sounds are normal.      Palpations: Abdomen is soft.   Musculoskeletal:         General: Normal range of motion.      Right lower leg: Edema present.      Left lower leg: Edema present.   Skin:     General: Skin is warm.      Capillary Refill: Capillary refill takes less than 2 seconds.   Neurological:      General: No focal deficit present.      Mental Status: He is alert and oriented to person, place, and time.   Psychiatric:         Mood and Affect: Mood normal.       Significant Labs:  All labs within the past 24 hours have been reviewed.     Significant Imaging:  Reviewed       1) CKD stage 5 starting dialysis    Reports doing well on dialysis today. Will continue dialysis while inpatient until a chair is  available.    Assessment/Plan:     No new Assessment & Plan notes have been filed under this hospital service since the last note was generated.  Service: Nephrology    1) CKD stage 5 starting dialysis    Reports doing well on dialysis today. Will continue dialysis while inpatient until a chair is available.      Mazin Jones MD  Nephrology  [facility] - Intensive Care ([place])

## 2022-05-07 NOTE — PROGRESS NOTES
05/07/22 1045   Post-Hemodialysis Assessment   Rinseback Volume (mL) 250 mL   Blood Volume Processed (Liters) 53.4 L   Dialyzer Clearance Lightly streaked   Duration of Treatment 180 minutes   Additional Fluid Intake (mL) 300 mL   Total UF (mL) 2550 mL   Net Fluid Removal 2050   Patient Response to Treatment tolerated well   Post-Treatment Weight 108 kg (238 lb 1.6 oz)   Treatment Weight Change -2   Post-Hemodialysis Comments see note     HD complete. Pt tolerated well. Pt awake, alert oriented, VSS, NAD. Net removal 2050 ml. Report given to primary nurse.

## 2022-05-07 NOTE — PROGRESS NOTES
"[facility] ([address] Medicine  Progress Note    Patient Name: Elbert Still Jr.  MRN: [id number]  Patient Class: IP- Inpatient   Admission Date: 4/30/2022  Length of Stay: 7 days  Attending Physician: Mookie Acuña MD  Primary Care Provider: Angel Luis Boo MD        Subjective:     Principal Problem:Acute kidney injury superimposed on chronic kidney disease        HPI:  Mr. Still is a 65 yo M with PMHx of HTN, T2DM, HLD, gout, CKD4, anemia who presented with hypoglycemia after EMS found him to have glucose in the 20s at home. Per his wife, she walked in on the patient in the living room around 10am of day of admission. He was sluggish, had slurred speech, no LOC or diaphoresis. She called EMS. They gave him an amp of D50 which improved BG to 100s, then he was started on D10. Upon arrival to [facility], his glucose had improved to 200s. This has never happened before in his 40 years of having diabetes. He typically eats less on his days off, and he was off on the day of admission. Patient works as a . He denies fever, chills, nausea, vomiting, abdominal pain, SOB. He denies drastically reduced urine production, typically urinating 3x/day with a few episodes of nocturia. He notes some SOB with exertion and LE "heaviness." Had one episdoe of diarrhea last week for which he took Imodium, and two episodes of nonbloody diarrhea earlier on day of admission. Patient takes glipizide 10 mg BID as his only diabetes medication, denies taking insulin. He took the glipizide on the evening of 4/28 and morning of 4/29, but did not take the evening dose on 4/29. His most recent HbA1c was 5.1 in Sep 2021. Hasn't been measuring his home BG.     In the ED, his labs were significant for hgb of 8, K 5.7, bicarb 12, Cr 9, BUN 80, mag 1.4, alkaline phos 162, alumin 3.2, corrected Ca 7.8, BNP 27. UA positive for glucose and protein. LFTs and lipase wnl. POCT glucose readings had been " 200 -> 93 -> 73. He received nebulizer treatment and lasix 80 for hyperkalemia management. EKG showed NSR, no peaked T waves.       Overview/Hospital Course:  66 year old man with hx of HTN, T2DM, ESRD admitted for hypoglycemia 2/2 polypharmacy. Holding home diabeties meds, A1c 4.4. Hypertensive to 200 - titrating BP meds. Nephrology consulted for management of worsening Cr and thought to be due to longstanding HTN and DM. No acute indications for dialsysis at this time but patient would prefer not to given history with mother recently being admitted to hospice for ESRD. Patient with persistent HTN and so nifed and coreg increased. K elevated to 5.5 and lokelma and lasix started. Patient had tunnel cath placed by IR for dialysis on 5/5/22. Case management made aware of patients dialysis needs. Nephrology planning to start diaylsis on 5/5/22.       Interval History: Patient complaining of gout like pain in his R big toe and ankle. Otherwise no complaints.    Review of Systems   Constitutional:  Negative for chills, diaphoresis and fever.   HENT: Negative.     Eyes: Negative.  Negative for visual disturbance.   Respiratory:  Negative for cough and shortness of breath.    Cardiovascular:  Positive for leg swelling. Negative for chest pain and palpitations.        Permacath right side placed, look intact and clean. Notes some pain at site of catheter   Gastrointestinal:  Negative for abdominal distention, abdominal pain, blood in stool, constipation, diarrhea, nausea and vomiting.   Endocrine: Negative.    Genitourinary:  Negative for dysuria and hematuria.   Musculoskeletal:  Positive for arthralgias (R big toe and ankle). Negative for back pain.   Skin:  Negative for color change.   Neurological:  Negative for dizziness, syncope, speech difficulty, weakness, light-headedness and headaches.   Hematological: Negative.    Psychiatric/Behavioral: Negative.  Negative for behavioral problems and confusion.    Objective:      Vital Signs (Most Recent):  Temp: 98.1 °F (36.7 °C) (05/07/22 0738)  Pulse: 85 (05/07/22 1045)  Resp: 20 (05/07/22 1045)  BP: (!) 149/98 (05/07/22 1045)  SpO2: 96 % (05/07/22 0738)   Vital Signs (24h Range):  Temp:  [98.1 °F (36.7 °C)-98.5 °F (36.9 °C)] 98.1 °F (36.7 °C)  Pulse:  [81-99] 85  Resp:  [18-20] 20  SpO2:  [96 %] 96 %  BP: (148-183)/() 149/98     Weight: 109 kg (240 lb 4.8 oz)  Body mass index is 30.04 kg/m².    Intake/Output Summary (Last 24 hours) at 5/7/2022 1421  Last data filed at 5/7/2022 1045  Gross per 24 hour   Intake 600 ml   Output 5500 ml   Net -4900 ml      Physical Exam  Constitutional:       General: He is not in acute distress.     Appearance: He is well-developed. He is obese. He is not ill-appearing or toxic-appearing.   HENT:      Head: Normocephalic and atraumatic.      Nose: No congestion or rhinorrhea.      Mouth/Throat:      Pharynx: No oropharyngeal exudate or posterior oropharyngeal erythema.   Eyes:      General: No scleral icterus.        Right eye: No discharge.         Left eye: No discharge.      Pupils: Pupils are equal, round, and reactive to light.   Cardiovascular:      Rate and Rhythm: Normal rate and regular rhythm.      Heart sounds: Normal heart sounds. No murmur heard.    No gallop.      Comments: Perma cath placed R side  Pulmonary:      Effort: Pulmonary effort is normal.   Abdominal:      General: Bowel sounds are normal.      Palpations: Abdomen is soft.      Tenderness: There is no abdominal tenderness.   Musculoskeletal:         General: Normal range of motion.      Cervical back: Normal range of motion and neck supple.      Right lower leg: Edema present.      Left lower leg: Edema present.      Comments: Some tenderness to right toe   Skin:     Capillary Refill: Capillary refill takes less than 2 seconds.      Coloration: Skin is not jaundiced.      Findings: No lesion or rash.   Neurological:      Mental Status: He is alert and oriented to person,  place, and time.   Psychiatric:         Mood and Affect: Mood normal.         Behavior: Behavior normal.         Thought Content: Thought content normal.       Significant Labs: All pertinent labs within the past 24 hours have been reviewed.    A1C:   Recent Labs   Lab 04/30/22  0103   HGBA1C 4.4       Bilirubin:   Recent Labs   Lab 05/03/22  0253 05/04/22  0532 05/05/22  0248 05/06/22  0436 05/07/22  0519   BILITOT 0.3 0.3 0.2 0.2 0.3       CBC:   Recent Labs   Lab 05/06/22  0436 05/07/22  0519   WBC 5.65 5.63   HGB 7.5* 7.8*   HCT 22.8* 23.8*    205     CMP:   Recent Labs   Lab 05/06/22  0436 05/07/22  0519    140   K 4.3 4.1    105   CO2 18* 22*   * 126*   BUN 77* 54*   CREATININE 7.7* 6.4*   CALCIUM 7.4* 7.8*   PROT 6.0 6.2   ALBUMIN 3.0* 3.1*   BILITOT 0.2 0.3   ALKPHOS 149* 151*   AST 24 21   ALT 15 14   ANIONGAP 14 13   EGFRNONAA 6.7* 8.4*     Magnesium:   Recent Labs   Lab 05/06/22  0436 05/07/22  0519   MG 1.7 1.6     POCT Glucose:   Recent Labs   Lab 05/06/22  2126 05/07/22  1103 05/07/22  1106   POCTGLUCOSE 237* 64* 129*     TSH:   Recent Labs   Lab 04/30/22  0103   TSH 0.879         Significant Imaging: I have reviewed all pertinent imaging results/findings within the past 24 hours.      Assessment/Plan:      * Acute kidney injury superimposed on chronic kidney disease    Patient with acute kidney injury likely d/t Pre-renal azotemia and Acute tubular necrosis Which is currently worsening. Labs reviewed- Renal function/electrolytes with Estimated Creatinine Clearance: 10.3 mL/min (A) (based on SCr of 9.7 mg/dL (H)). according to latest data. Monitor urine output and serial BMP and adjust therapy as needed. Avoid nephrotoxins and renally dose meds for GFR listed above.     Baseline Cr 3-6, Cr 9 on admission  Hx of CKD: yes due to HTN/DM  Last saw nephro 2013  Exam with edema, no decreased O2 sats or rales  FeNA: not reliable, had already gotten lasix  FeUrea: pending  UA / UCx:  proteinuria, glucuria  DDx: hypotension, prerenal, progression of CKD  No indications for HD at this time    Plan:   - nephro consulted, appreciate recs   -possible biopsy indicated given proteinuria  - Trend Cr  - Strict I&Os  - Avoid nephrotoxic agents (NSAIDs, gadolinium, IV radiocontrast)  - Avoid hypotension  - Renal diet  - Renally adjust medications  - Transitioned from IV lasix to PO lasix 80mg BID.    Hyperkalemia  K 5.7 on admission   ECG: NSR, no peaked t waves  Chest pain/discomfort: none  Home medications: no ARBs, or ACEi    Plan:   Resolved        Hypoglycemia  Blood sugar 203 mg/dl on admission, given dextrose by EMS with improvement   No history of labile blood sugars during previous admission   Most recent blood sugar 73 mg/dl   Mentation: AAO x3 during my interview   Taking glipizide 10 BID    Ddx: decreased po intake and decreased glipizide clearance (KAYODE on CKD), Likely not very hyperglycemic at baseline given low HbA1c. Less likely exogenous administration of insulin, insulinoma     Plan:     -Fall precautions   -Continue to monitor sugars.  -STOP the glipizide at discharge, A1c well controlled.        Anemia of chronic disease  Baseline Hb 11, Hb 8 on admission  Antiplatelet/anticoagulation: none  No overt source of bleeding on exam, denies bloody BM, hematuria.       Plan:  - CBCs daily  - Transfuse with goal Hb > 7        Gout  Takes colchicine as needed at home, patient with poor kidney function. Will try and manage pain with heat packs and tylenol. Can trial steroids if too much pain or intraarticular steroid shot to avoid systemic steroids.      Plan:  --treat for acute gout flare with oral prednisone 30mg daily for 3 days      CKD (chronic kidney disease) stage 4, GFR 15-29 ml/min  Hx of CKD due to HTN, DM. Baseline around 3-6    - nephro referral outpatient  - Patient will be a new dialysis patient and agreed to perma cath, now s/p permacath on 5/5 with plan for dialysis with  nephro.  -follow up nephro recs      Mixed hyperlipidemia  Continue home lipitor 20      Essential (primary) hypertension   home meds amlodipine and Toprol XL  Blood pressure better controlled with nifedpine.    Plan:  --increase nifedipine to 60 mg daily  - Consideration for restarting ARB given ESRD status and preservation of renal function      Proteinuria  Hx of proteinuria, UPCr >7, nephrotic range    - consider ACEi or SGLT-2 inhibitor at discharge      Diabetes mellitus due to underlying condition with diabetic nephropathy  Diabetes type 2  Last HbA1c:   Lab Results   Component Value Date    HGBA1C 4.4 04/30/2022     Insulin regimen: none  Oral regimen ( held inpatient): glipizide   CAD Prophylaxis: statin  HTN or Renal disease: KAYODE, hypertensive    PLAN:   - Hold oral anti-hyperglycemic agents  - LD SSI  - Diabetic diet  - Hypoglycemia orderset with POCT BG AC/QS  - Goal -180 while inpatient               VTE Risk Mitigation (From admission, onward)         Ordered     heparin (porcine) injection 1,000 Units  As needed (PRN)         05/06/22 1139     heparin (porcine) injection 1,000 Units  As needed (PRN)         05/06/22 0743     heparin (porcine) injection 5,000 Units  Every 8 hours         05/05/22 1051     heparin (porcine) injection 1,000 Units  As needed (PRN)         05/05/22 0950     IP VTE HIGH RISK PATIENT  Once         04/30/22 0429     Place sequential compression device  Until discontinued         04/30/22 0429                Discharge Planning   HORACIO: 5/9/2022     Code Status: Full Code   Is the patient medically ready for discharge?: No    Reason for patient still in hospital (select all that apply): Pending disposition  Discharge Plan A: Home with family   Discharge Delays: None known at this time              Ishan Montes MD  Department of Hospital Medicine   [facility] - Intensive Care ([place])

## 2022-05-07 NOTE — SUBJECTIVE & OBJECTIVE
Nephrology Progress Note    Mr. Still is a 65 yo M with PMHx of HTN, T2DM, HLD, gout, CKD4 (baseline unclear, but was 6.4 in September 2021 - previously was 3.6 in 2020), anemia who presented with hypoglycemia after EMS found him to have glucose in the 20s at home. Per his wife, patient was sluggish, had slurred speech the day of admission, so she called EMS.  Upon arrival of EMS blood glucose was in the 20s. Patient takes glipizide 10 mg BID as his only diabetes medication, denies taking insulin. His most recent HbA1c was 5.1 in Sep 2021. Hasn't been measuring his home BG.  He denies drastically reduced urine production, typically urinates every hour ~200 ccs. Believes he might be retaining urine. Urine sometimes appears slightly foamy. ROS also positive for diaphoresis associated with eating food, SOB (present since covid infection 2 months ago), LE swelling X 2 weeks. Nephrology consulted for KAYODE on CKD (Cr = 9.1) and proteinuria.        Interval History: s/p 2st session of HD on 5/7 and tolerated well. Reports feeling better af the dialysis    Review of patient's allergies indicates:   Allergen Reactions    Iodine and iodide containing products Hives     Hypotension, Flushing     Current Facility-Administered Medications   Medication Frequency    0.9%  NaCl infusion Once    acetaminophen tablet 650 mg Q4H PRN    atorvastatin tablet 20 mg QHS    calcium carbonate 200 mg calcium (500 mg) chewable tablet 500 mg Daily    carvediloL tablet 12.5 mg BID    dextrose 10% bolus 125 mL PRN    dextrose 10% bolus 250 mL PRN    fluticasone propionate 50 mcg/actuation nasal spray 100 mcg Daily    furosemide tablet 80 mg BID    glucagon (human recombinant) injection 1 mg PRN    glucose chewable tablet 16 g PRN    glucose chewable tablet 24 g PRN    heparin (porcine) injection 1,000 Units PRN    heparin (porcine) injection 1,000 Units PRN    heparin (porcine) injection 1,000 Units PRN    heparin (porcine) injection 5,000 Units  Q8H    insulin aspart U-100 pen 0-5 Units QID (AC + HS) PRN    melatonin tablet 6 mg Nightly PRN    mupirocin 2 % ointment BID    naloxone 0.4 mg/mL injection 0.02 mg PRN    NIFEdipine 24 hr tablet 90 mg Daily    predniSONE tablet 30 mg Daily    senna-docusate 8.6-50 mg per tablet 1 tablet BID    sevelamer carbonate tablet 800 mg TID WM    sodium bicarbonate tablet 1,300 mg TID    sodium chloride 0.9% bolus 250 mL PRN    sodium chloride 0.9% bolus 250 mL PRN    sodium chloride 0.9% bolus 250 mL PRN    sodium chloride 0.9% flush 10 mL Q12H PRN    tamsulosin 24 hr capsule 0.4 mg Daily       Objective:     Vital Signs (Most Recent):  Temp: 97.8 °F (36.6 °C) (05/07/22 1625)  Pulse: 80 (05/07/22 1540)  Resp: 20 (05/07/22 1540)  BP: (!) 176/83 (05/07/22 1540)  SpO2: 95 % (05/07/22 1540)  O2 Device (Oxygen Therapy): room air (05/07/22 1540) Vital Signs (24h Range):  Temp:  [97.8 °F (36.6 °C)-98.5 °F (36.9 °C)] 97.8 °F (36.6 °C)  Pulse:  [80-99] 80  Resp:  [18-20] 20  SpO2:  [95 %-96 %] 95 %  BP: (148-183)/() 176/83     Weight: 109 kg (240 lb 4.8 oz) (04/30/22 1920)  Body mass index is 30.04 kg/m².  Body surface area is 2.4 meters squared.    I/O last 3 completed shifts:  In: 300 [Other:300]  Out: 3750 [Urine:1200; Other:2550]    Physical Exam  Vitals reviewed.   Constitutional:       Appearance: Normal appearance.   HENT:      Head: Normocephalic and atraumatic.      Mouth/Throat:      Mouth: Mucous membranes are moist.   Eyes:      Conjunctiva/sclera: Conjunctivae normal.      Pupils: Pupils are equal, round, and reactive to light.   Cardiovascular:      Rate and Rhythm: Normal rate and regular rhythm.      Pulses: Normal pulses.      Heart sounds: Normal heart sounds.   Pulmonary:      Effort: Pulmonary effort is normal.      Breath sounds: Normal breath sounds.   Abdominal:      General: Bowel sounds are normal.      Palpations: Abdomen is soft.   Musculoskeletal:         General: Normal range of motion.       Right lower leg: Edema present.      Left lower leg: Edema present.   Skin:     General: Skin is warm.      Capillary Refill: Capillary refill takes less than 2 seconds.   Neurological:      General: No focal deficit present.      Mental Status: He is alert and oriented to person, place, and time.   Psychiatric:         Mood and Affect: Mood normal.       Significant Labs:  All labs within the past 24 hours have been reviewed.     Significant Imaging:  Reviewed       1) CKD stage 5 starting dialysis    Reports doing well on dialysis today. Will continue dialysis while inpatient until a chair is available.

## 2022-05-07 NOTE — ASSESSMENT & PLAN NOTE
Takes colchicine as needed at home, patient with poor kidney function. Will try and manage pain with heat packs and tylenol. Can trial steroids if too much pain or intraarticular steroid shot to avoid systemic steroids.      Plan:  --treat for acute gout flare with oral prednisone 30mg daily for 3 days

## 2022-05-07 NOTE — PROGRESS NOTES
Pt arrived via wheelchair. NAD, VSS. Pt awake, alert oriented. HD started to right chest wall cath.

## 2022-05-08 LAB
ALBUMIN SERPL BCP-MCNC: 2.8 G/DL (ref 3.5–5.2)
ALP SERPL-CCNC: 138 U/L (ref 55–135)
ALT SERPL W/O P-5'-P-CCNC: 12 U/L (ref 10–44)
ANION GAP SERPL CALC-SCNC: 10 MMOL/L (ref 8–16)
AST SERPL-CCNC: 22 U/L (ref 10–40)
BASOPHILS # BLD AUTO: 0.01 K/UL (ref 0–0.2)
BASOPHILS NFR BLD: 0.2 % (ref 0–1.9)
BILIRUB SERPL-MCNC: 0.2 MG/DL (ref 0.1–1)
BUN SERPL-MCNC: 47 MG/DL (ref 8–23)
CALCIUM SERPL-MCNC: 8.6 MG/DL (ref 8.7–10.5)
CHLORIDE SERPL-SCNC: 104 MMOL/L (ref 95–110)
CO2 SERPL-SCNC: 22 MMOL/L (ref 23–29)
CREAT SERPL-MCNC: 6 MG/DL (ref 0.5–1.4)
DIFFERENTIAL METHOD: ABNORMAL
EOSINOPHIL # BLD AUTO: 0 K/UL (ref 0–0.5)
EOSINOPHIL NFR BLD: 0 % (ref 0–8)
ERYTHROCYTE [DISTWIDTH] IN BLOOD BY AUTOMATED COUNT: 14 % (ref 11.5–14.5)
EST. GFR  (AFRICAN AMERICAN): 10.5 ML/MIN/1.73 M^2
EST. GFR  (NON AFRICAN AMERICAN): 9.1 ML/MIN/1.73 M^2
GLUCOSE SERPL-MCNC: 185 MG/DL (ref 70–110)
HCT VFR BLD AUTO: 23 % (ref 40–54)
HGB BLD-MCNC: 7.5 G/DL (ref 14–18)
IMM GRANULOCYTES # BLD AUTO: 0.02 K/UL (ref 0–0.04)
IMM GRANULOCYTES NFR BLD AUTO: 0.4 % (ref 0–0.5)
LYMPHOCYTES # BLD AUTO: 0.4 K/UL (ref 1–4.8)
LYMPHOCYTES NFR BLD: 9.2 % (ref 18–48)
MAGNESIUM SERPL-MCNC: 1.7 MG/DL (ref 1.6–2.6)
MCH RBC QN AUTO: 27.7 PG (ref 27–31)
MCHC RBC AUTO-ENTMCNC: 32.6 G/DL (ref 32–36)
MCV RBC AUTO: 85 FL (ref 82–98)
MONOCYTES # BLD AUTO: 0.3 K/UL (ref 0.3–1)
MONOCYTES NFR BLD: 6.3 % (ref 4–15)
NEUTROPHILS # BLD AUTO: 4 K/UL (ref 1.8–7.7)
NEUTROPHILS NFR BLD: 83.9 % (ref 38–73)
NRBC BLD-RTO: 0 /100 WBC
PHOSPHATE SERPL-MCNC: 4.3 MG/DL (ref 2.7–4.5)
PLATELET # BLD AUTO: 204 K/UL (ref 150–450)
PMV BLD AUTO: 11.6 FL (ref 9.2–12.9)
POCT GLUCOSE: 224 MG/DL (ref 70–110)
POCT GLUCOSE: 306 MG/DL (ref 70–110)
POCT GLUCOSE: 366 MG/DL (ref 70–110)
POTASSIUM SERPL-SCNC: 4.3 MMOL/L (ref 3.5–5.1)
PROT SERPL-MCNC: 6.6 G/DL (ref 6–8.4)
RBC # BLD AUTO: 2.71 M/UL (ref 4.6–6.2)
SODIUM SERPL-SCNC: 136 MMOL/L (ref 136–145)
WBC # BLD AUTO: 4.8 K/UL (ref 3.9–12.7)

## 2022-05-08 PROCEDURE — 85025 COMPLETE CBC W/AUTO DIFF WBC: CPT

## 2022-05-08 PROCEDURE — 25000003 PHARM REV CODE 250

## 2022-05-08 PROCEDURE — 63600175 PHARM REV CODE 636 W HCPCS

## 2022-05-08 PROCEDURE — 99900035 HC TECH TIME PER 15 MIN (STAT)

## 2022-05-08 PROCEDURE — 25000003 PHARM REV CODE 250: Performed by: STUDENT IN AN ORGANIZED HEALTH CARE EDUCATION/TRAINING PROGRAM

## 2022-05-08 PROCEDURE — 25000003 PHARM REV CODE 250: Performed by: INTERNAL MEDICINE

## 2022-05-08 PROCEDURE — C9399 UNCLASSIFIED DRUGS OR BIOLOG: HCPCS

## 2022-05-08 PROCEDURE — 99232 PR SUBSEQUENT HOSPITAL CARE,LEVL II: ICD-10-PCS | Mod: ,,, | Performed by: STUDENT IN AN ORGANIZED HEALTH CARE EDUCATION/TRAINING PROGRAM

## 2022-05-08 PROCEDURE — 84100 ASSAY OF PHOSPHORUS: CPT

## 2022-05-08 PROCEDURE — 94760 N-INVAS EAR/PLS OXIMETRY 1: CPT

## 2022-05-08 PROCEDURE — 20600001 HC STEP DOWN PRIVATE ROOM

## 2022-05-08 PROCEDURE — 80053 COMPREHEN METABOLIC PANEL: CPT

## 2022-05-08 PROCEDURE — 36415 COLL VENOUS BLD VENIPUNCTURE: CPT

## 2022-05-08 PROCEDURE — 83735 ASSAY OF MAGNESIUM: CPT

## 2022-05-08 PROCEDURE — 99232 SBSQ HOSP IP/OBS MODERATE 35: CPT | Mod: ,,, | Performed by: STUDENT IN AN ORGANIZED HEALTH CARE EDUCATION/TRAINING PROGRAM

## 2022-05-08 RX ORDER — HYDRALAZINE HYDROCHLORIDE 25 MG/1
25 TABLET, FILM COATED ORAL EVERY 8 HOURS
Status: DISCONTINUED | OUTPATIENT
Start: 2022-05-08 | End: 2022-05-10 | Stop reason: HOSPADM

## 2022-05-08 RX ORDER — ONDANSETRON 2 MG/ML
4 INJECTION INTRAMUSCULAR; INTRAVENOUS EVERY 8 HOURS PRN
Status: DISCONTINUED | OUTPATIENT
Start: 2022-05-08 | End: 2022-05-10 | Stop reason: HOSPADM

## 2022-05-08 RX ORDER — LANOLIN ALCOHOL/MO/W.PET/CERES
400 CREAM (GRAM) TOPICAL 2 TIMES DAILY
Status: COMPLETED | OUTPATIENT
Start: 2022-05-08 | End: 2022-05-08

## 2022-05-08 RX ADMIN — ATORVASTATIN CALCIUM 20 MG: 20 TABLET, FILM COATED ORAL at 09:05

## 2022-05-08 RX ADMIN — MUPIROCIN: 20 OINTMENT TOPICAL at 09:05

## 2022-05-08 RX ADMIN — CARVEDILOL 12.5 MG: 12.5 TABLET, FILM COATED ORAL at 09:05

## 2022-05-08 RX ADMIN — SENNOSIDES AND DOCUSATE SODIUM 1 TABLET: 50; 8.6 TABLET ORAL at 09:05

## 2022-05-08 RX ADMIN — CALCIUM CARBONATE (ANTACID) CHEW TAB 500 MG 500 MG: 500 CHEW TAB at 09:05

## 2022-05-08 RX ADMIN — SODIUM BICARBONATE 650 MG TABLET 1300 MG: at 09:05

## 2022-05-08 RX ADMIN — HEPARIN SODIUM 5000 UNITS: 5000 INJECTION INTRAVENOUS; SUBCUTANEOUS at 05:05

## 2022-05-08 RX ADMIN — INSULIN ASPART 2 UNITS: 100 INJECTION, SOLUTION INTRAVENOUS; SUBCUTANEOUS at 12:05

## 2022-05-08 RX ADMIN — SEVELAMER CARBONATE 800 MG: 800 TABLET, FILM COATED ORAL at 05:05

## 2022-05-08 RX ADMIN — HYDRALAZINE HYDROCHLORIDE 25 MG: 25 TABLET ORAL at 09:05

## 2022-05-08 RX ADMIN — INSULIN ASPART 2 UNITS: 100 INJECTION, SOLUTION INTRAVENOUS; SUBCUTANEOUS at 09:05

## 2022-05-08 RX ADMIN — FUROSEMIDE 80 MG: 40 TABLET ORAL at 05:05

## 2022-05-08 RX ADMIN — TAMSULOSIN HYDROCHLORIDE 0.4 MG: 0.4 CAPSULE ORAL at 09:05

## 2022-05-08 RX ADMIN — SODIUM BICARBONATE 650 MG TABLET 1300 MG: at 04:05

## 2022-05-08 RX ADMIN — SEVELAMER CARBONATE 800 MG: 800 TABLET, FILM COATED ORAL at 09:05

## 2022-05-08 RX ADMIN — Medication 400 MG: at 09:05

## 2022-05-08 RX ADMIN — HEPARIN SODIUM 5000 UNITS: 5000 INJECTION INTRAVENOUS; SUBCUTANEOUS at 09:05

## 2022-05-08 RX ADMIN — FUROSEMIDE 80 MG: 40 TABLET ORAL at 09:05

## 2022-05-08 RX ADMIN — INSULIN ASPART 5 UNITS: 100 INJECTION, SOLUTION INTRAVENOUS; SUBCUTANEOUS at 05:05

## 2022-05-08 RX ADMIN — INSULIN DETEMIR 5 UNITS: 100 INJECTION, SOLUTION SUBCUTANEOUS at 09:05

## 2022-05-08 RX ADMIN — PREDNISONE 30 MG: 5 TABLET ORAL at 09:05

## 2022-05-08 RX ADMIN — NIFEDIPINE 90 MG: 30 TABLET, FILM COATED, EXTENDED RELEASE ORAL at 09:05

## 2022-05-08 RX ADMIN — HEPARIN SODIUM 5000 UNITS: 5000 INJECTION INTRAVENOUS; SUBCUTANEOUS at 04:05

## 2022-05-08 NOTE — PLAN OF CARE
Problem: Adult Inpatient Plan of Care  Goal: Plan of Care Review  Outcome: Ongoing, Progressing  Goal: Patient-Specific Goal (Individualized)  Outcome: Ongoing, Progressing  Goal: Absence of Hospital-Acquired Illness or Injury  Outcome: Ongoing, Progressing  Goal: Optimal Comfort and Wellbeing  Outcome: Ongoing, Progressing  Goal: Readiness for Transition of Care  Outcome: Ongoing, Progressing     Problem: Fluid and Electrolyte Imbalance (Acute Kidney Injury/Impairment)  Goal: Fluid and Electrolyte Balance  Outcome: Ongoing, Progressing     Problem: Oral Intake Inadequate (Acute Kidney Injury/Impairment)  Goal: Optimal Nutrition Intake  Outcome: Ongoing, Progressing     Problem: Renal Function Impairment (Acute Kidney Injury/Impairment)  Goal: Effective Renal Function  Outcome: Ongoing, Progressing     Problem: Diabetes Comorbidity  Goal: Blood Glucose Level Within Targeted Range  Outcome: Ongoing, Progressing     Problem: Infection  Goal: Absence of Infection Signs and Symptoms  Outcome: Ongoing, Progressing     Problem: Device-Related Complication Risk (Hemodialysis)  Goal: Safe, Effective Therapy Delivery  Outcome: Ongoing, Progressing     Problem: Hemodynamic Instability (Hemodialysis)  Goal: Effective Tissue Perfusion  Outcome: Ongoing, Progressing     Problem: Infection (Hemodialysis)  Goal: Absence of Infection Signs and Symptoms  Outcome: Ongoing, Progressing

## 2022-05-08 NOTE — SUBJECTIVE & OBJECTIVE
Interval History: NAEON. Gout flare improved. Doing well today.    Review of Systems   Constitutional:  Negative for chills, diaphoresis and fever.   HENT: Negative.     Eyes: Negative.  Negative for visual disturbance.   Respiratory:  Negative for cough and shortness of breath.    Cardiovascular:  Positive for leg swelling. Negative for chest pain and palpitations.        Permacath right side placed, look intact and clean.   Gastrointestinal:  Negative for abdominal distention, abdominal pain, blood in stool, constipation, diarrhea, nausea and vomiting.   Endocrine: Negative.    Genitourinary:  Negative for dysuria and hematuria.   Musculoskeletal:  Negative for arthralgias and back pain.   Skin:  Negative for color change.   Neurological:  Negative for dizziness, syncope, speech difficulty, weakness, light-headedness and headaches.   Hematological: Negative.    Psychiatric/Behavioral: Negative.  Negative for behavioral problems and confusion.    Objective:     Vital Signs (Most Recent):  Temp: 98 °F (36.7 °C) (05/08/22 0830)  Pulse: 84 (05/08/22 0830)  Resp: 18 (05/08/22 0830)  BP: (!) 172/86 (05/08/22 0830)  SpO2: 97 % (05/08/22 0830)   Vital Signs (24h Range):  Temp:  [97.8 °F (36.6 °C)-98.6 °F (37 °C)] 98 °F (36.7 °C)  Pulse:  [80-87] 84  Resp:  [16-20] 18  SpO2:  [95 %-97 %] 97 %  BP: (140-176)/(69-86) 172/86     Weight: 109 kg (240 lb 4.8 oz)  Body mass index is 30.04 kg/m².  No intake or output data in the 24 hours ending 05/08/22 1253   Physical Exam  Constitutional:       General: He is not in acute distress.     Appearance: He is well-developed. He is obese. He is not ill-appearing or toxic-appearing.   HENT:      Head: Normocephalic and atraumatic.      Nose: No congestion or rhinorrhea.      Mouth/Throat:      Pharynx: No oropharyngeal exudate or posterior oropharyngeal erythema.   Eyes:      General: No scleral icterus.        Right eye: No discharge.         Left eye: No discharge.      Pupils: Pupils  are equal, round, and reactive to light.   Cardiovascular:      Rate and Rhythm: Normal rate and regular rhythm.      Heart sounds: Normal heart sounds. No murmur heard.    No gallop.      Comments: Perma cath placed R side  Pulmonary:      Effort: Pulmonary effort is normal.   Abdominal:      General: Bowel sounds are normal.      Palpations: Abdomen is soft.      Tenderness: There is no abdominal tenderness.   Musculoskeletal:         General: Normal range of motion.      Cervical back: Normal range of motion and neck supple.      Right lower leg: Edema present.      Left lower leg: Edema present.   Skin:     Capillary Refill: Capillary refill takes less than 2 seconds.      Coloration: Skin is not jaundiced.      Findings: No lesion or rash.   Neurological:      Mental Status: He is alert and oriented to person, place, and time.   Psychiatric:         Mood and Affect: Mood normal.         Behavior: Behavior normal.         Thought Content: Thought content normal.       Significant Labs: All pertinent labs within the past 24 hours have been reviewed.    Significant Imaging: I have reviewed all pertinent imaging results/findings within the past 24 hours.

## 2022-05-08 NOTE — ASSESSMENT & PLAN NOTE
Diabetes type 2  Last HbA1c:   Lab Results   Component Value Date    HGBA1C 4.4 04/30/2022     Insulin regimen: none  Oral regimen ( held inpatient): glipizide   CAD Prophylaxis: statin  HTN or Renal disease: KAYODE, hypertensive    PLAN:   - Hold oral anti-hyperglycemic agents  -Detemir 5 QHS  - LD SSI  - Diabetic diet  - Hypoglycemia orderset with POCT BG AC/QS  - Goal -180 while inpatient

## 2022-05-08 NOTE — ASSESSMENT & PLAN NOTE
home meds amlodipine and Toprol XL  Blood pressure better controlled with nifedpine.    Plan:  --Nifedipine 90 mg daily

## 2022-05-08 NOTE — PROGRESS NOTES
"[facility] ([address] Medicine  Progress Note    Patient Name: Elbert Still Jr.  MRN: [id number]  Patient Class: IP- Inpatient   Admission Date: 4/30/2022  Length of Stay: 8 days  Attending Physician: Mookie Acuña MD  Primary Care Provider: Angel Luis Boo MD        Subjective:     Principal Problem:Acute kidney injury superimposed on chronic kidney disease        HPI:  Mr. Still is a 67 yo M with PMHx of HTN, T2DM, HLD, gout, CKD4, anemia who presented with hypoglycemia after EMS found him to have glucose in the 20s at home. Per his wife, she walked in on the patient in the living room around 10am of day of admission. He was sluggish, had slurred speech, no LOC or diaphoresis. She called EMS. They gave him an amp of D50 which improved BG to 100s, then he was started on D10. Upon arrival to [facility], his glucose had improved to 200s. This has never happened before in his 40 years of having diabetes. He typically eats less on his days off, and he was off on the day of admission. Patient works as a . He denies fever, chills, nausea, vomiting, abdominal pain, SOB. He denies drastically reduced urine production, typically urinating 3x/day with a few episodes of nocturia. He notes some SOB with exertion and LE "heaviness." Had one episdoe of diarrhea last week for which he took Imodium, and two episodes of nonbloody diarrhea earlier on day of admission. Patient takes glipizide 10 mg BID as his only diabetes medication, denies taking insulin. He took the glipizide on the evening of 4/28 and morning of 4/29, but did not take the evening dose on 4/29. His most recent HbA1c was 5.1 in Sep 2021. Hasn't been measuring his home BG.     In the ED, his labs were significant for hgb of 8, K 5.7, bicarb 12, Cr 9, BUN 80, mag 1.4, alkaline phos 162, alumin 3.2, corrected Ca 7.8, BNP 27. UA positive for glucose and protein. LFTs and lipase wnl. POCT glucose readings had been " 200 -> 93 -> 73. He received nebulizer treatment and lasix 80 for hyperkalemia management. EKG showed NSR, no peaked T waves.       Overview/Hospital Course:  66 year old man with hx of HTN, T2DM, ESRD admitted for hypoglycemia 2/2 polypharmacy. Holding home diabeties meds, A1c 4.4. Hypertensive to 200 - titrating BP meds. Nephrology consulted for management of worsening Cr and thought to be due to longstanding HTN and DM. No acute indications for dialsysis at this time but patient would prefer not to given history with mother recently being admitted to hospice for ESRD. Patient with persistent HTN and so nifed and coreg increased. K elevated to 5.5 and lokelma and lasix started. Patient had tunnel cath placed by IR for dialysis on 5/5/22. Case management made aware of patients dialysis needs. Nephrology planning to start diaylsis on 5/5/22. Treating gout flare with PO steroids.      Interval History: NAEON. Gout flare improved. Doing well today.    Review of Systems   Constitutional:  Negative for chills, diaphoresis and fever.   HENT: Negative.     Eyes: Negative.  Negative for visual disturbance.   Respiratory:  Negative for cough and shortness of breath.    Cardiovascular:  Positive for leg swelling. Negative for chest pain and palpitations.        Permacath right side placed, look intact and clean.   Gastrointestinal:  Negative for abdominal distention, abdominal pain, blood in stool, constipation, diarrhea, nausea and vomiting.   Endocrine: Negative.    Genitourinary:  Negative for dysuria and hematuria.   Musculoskeletal:  Negative for arthralgias and back pain.   Skin:  Negative for color change.   Neurological:  Negative for dizziness, syncope, speech difficulty, weakness, light-headedness and headaches.   Hematological: Negative.    Psychiatric/Behavioral: Negative.  Negative for behavioral problems and confusion.    Objective:     Vital Signs (Most Recent):  Temp: 98 °F (36.7 °C) (05/08/22 0830)  Pulse:  84 (05/08/22 0830)  Resp: 18 (05/08/22 0830)  BP: (!) 172/86 (05/08/22 0830)  SpO2: 97 % (05/08/22 0830)   Vital Signs (24h Range):  Temp:  [97.8 °F (36.6 °C)-98.6 °F (37 °C)] 98 °F (36.7 °C)  Pulse:  [80-87] 84  Resp:  [16-20] 18  SpO2:  [95 %-97 %] 97 %  BP: (140-176)/(69-86) 172/86     Weight: 109 kg (240 lb 4.8 oz)  Body mass index is 30.04 kg/m².  No intake or output data in the 24 hours ending 05/08/22 1253   Physical Exam  Constitutional:       General: He is not in acute distress.     Appearance: He is well-developed. He is obese. He is not ill-appearing or toxic-appearing.   HENT:      Head: Normocephalic and atraumatic.      Nose: No congestion or rhinorrhea.      Mouth/Throat:      Pharynx: No oropharyngeal exudate or posterior oropharyngeal erythema.   Eyes:      General: No scleral icterus.        Right eye: No discharge.         Left eye: No discharge.      Pupils: Pupils are equal, round, and reactive to light.   Cardiovascular:      Rate and Rhythm: Normal rate and regular rhythm.      Heart sounds: Normal heart sounds. No murmur heard.    No gallop.      Comments: Perma cath placed R side  Pulmonary:      Effort: Pulmonary effort is normal.   Abdominal:      General: Bowel sounds are normal.      Palpations: Abdomen is soft.      Tenderness: There is no abdominal tenderness.   Musculoskeletal:         General: Normal range of motion.      Cervical back: Normal range of motion and neck supple.      Right lower leg: Edema present.      Left lower leg: Edema present.   Skin:     Capillary Refill: Capillary refill takes less than 2 seconds.      Coloration: Skin is not jaundiced.      Findings: No lesion or rash.   Neurological:      Mental Status: He is alert and oriented to person, place, and time.   Psychiatric:         Mood and Affect: Mood normal.         Behavior: Behavior normal.         Thought Content: Thought content normal.       Significant Labs: All pertinent labs within the past 24 hours  have been reviewed.    Significant Imaging: I have reviewed all pertinent imaging results/findings within the past 24 hours.      Assessment/Plan:      * Acute kidney injury superimposed on chronic kidney disease    Patient with acute kidney injury likely d/t Pre-renal azotemia and Acute tubular necrosis Which is currently worsening. Labs reviewed- Renal function/electrolytes with Estimated Creatinine Clearance: 10.3 mL/min (A) (based on SCr of 9.7 mg/dL (H)). according to latest data. Monitor urine output and serial BMP and adjust therapy as needed. Avoid nephrotoxins and renally dose meds for GFR listed above.     Baseline Cr 3-6, Cr 9 on admission  Hx of CKD: yes due to HTN/DM  Last saw nephro 2013  Exam with edema, no decreased O2 sats or rales  FeNA: not reliable, had already gotten lasix  FeUrea: pending  UA / UCx: proteinuria, glucuria  DDx: hypotension, prerenal, progression of CKD  No indications for HD at this time    Plan:   - nephro consulted, appreciate recs   -possible biopsy indicated given proteinuria  - Trend Cr  - Strict I&Os  - Avoid nephrotoxic agents (NSAIDs, gadolinium, IV radiocontrast)  - Avoid hypotension  - Renal diet  - Renally adjust medications  - Transitioned from IV lasix to PO lasix 80mg BID.    Hyperkalemia  K 5.7 on admission   ECG: NSR, no peaked t waves  Chest pain/discomfort: none  Home medications: no ARBs, or ACEi    Plan:   Resolved        Hypoglycemia  Blood sugar 203 mg/dl on admission, given dextrose by EMS with improvement   No history of labile blood sugars during previous admission   Most recent blood sugar 73 mg/dl   Mentation: AAO x3 during my interview   Taking glipizide 10 BID    Ddx: decreased po intake and decreased glipizide clearance (KAYODE on CKD), Likely not very hyperglycemic at baseline given low HbA1c. Less likely exogenous administration of insulin, insulinoma     Plan:     -Fall precautions   -Continue to monitor sugars.  -STOP the glipizide at discharge,  A1c well controlled.        Anemia of chronic disease  Baseline Hb 11, Hb 8 on admission  Antiplatelet/anticoagulation: none  No overt source of bleeding on exam, denies bloody BM, hematuria.       Plan:  - CBCs daily  - Transfuse with goal Hb > 7        Gout  Takes colchicine as needed at home, patient with poor kidney function. Will try and manage pain with heat packs and tylenol. Can trial steroids if too much pain or intraarticular steroid shot to avoid systemic steroids.      Plan:  --treat for acute gout flare with oral prednisone 30mg daily for 3 days (2/3)      CKD (chronic kidney disease) stage 4, GFR 15-29 ml/min  Hx of CKD due to HTN, DM. Baseline around 3-6    - nephro referral outpatient  - Patient will be a new dialysis patient and agreed to perma cath, now s/p permacath on 5/5 with plan for dialysis with nephro.  -follow up nephro recs      Mixed hyperlipidemia  Continue home lipitor 20      Essential (primary) hypertension   home meds amlodipine and Toprol XL  Blood pressure better controlled with nifedpine.    Plan:  --Nifedipine 90 mg daily        Proteinuria  Hx of proteinuria, UPCr >7, nephrotic range    - consider ACEi or SGLT-2 inhibitor at discharge      Diabetes mellitus due to underlying condition with diabetic nephropathy  Diabetes type 2  Last HbA1c:   Lab Results   Component Value Date    HGBA1C 4.4 04/30/2022     Insulin regimen: none  Oral regimen ( held inpatient): glipizide   CAD Prophylaxis: statin  HTN or Renal disease: KAYODE, hypertensive    PLAN:   - Hold oral anti-hyperglycemic agents  -Detemir 5 QHS  - LD SSI  - Diabetic diet  - Hypoglycemia orderset with POCT BG AC/QS  - Goal -180 while inpatient               VTE Risk Mitigation (From admission, onward)         Ordered     heparin (porcine) injection 5,000 Units  Every 8 hours         05/05/22 1051     IP VTE HIGH RISK PATIENT  Once         04/30/22 0429     Place sequential compression device  Until discontinued          04/30/22 0429                Discharge Planning   HORACIO: 5/9/2022     Code Status: Full Code   Is the patient medically ready for discharge?: Yes    Reason for patient still in hospital (select all that apply): Consult recommendations and Pending disposition  Discharge Plan A: Home with family   Discharge Delays: None known at this time              Ishan Montes MD  Department of Hospital Medicine   [facility] - Intensive Care ([place]-[facility])

## 2022-05-08 NOTE — ASSESSMENT & PLAN NOTE
Takes colchicine as needed at home, patient with poor kidney function. Will try and manage pain with heat packs and tylenol. Can trial steroids if too much pain or intraarticular steroid shot to avoid systemic steroids.      Plan:  --treat for acute gout flare with oral prednisone 30mg daily for 3 days (2/3)

## 2022-05-09 LAB
ALBUMIN SERPL BCP-MCNC: 2.9 G/DL (ref 3.5–5.2)
ANION GAP SERPL CALC-SCNC: 15 MMOL/L (ref 8–16)
BUN SERPL-MCNC: 63 MG/DL (ref 8–23)
CALCIUM SERPL-MCNC: 7.9 MG/DL (ref 8.7–10.5)
CHLORIDE SERPL-SCNC: 104 MMOL/L (ref 95–110)
CO2 SERPL-SCNC: 19 MMOL/L (ref 23–29)
CREAT SERPL-MCNC: 7 MG/DL (ref 0.5–1.4)
EST. GFR  (AFRICAN AMERICAN): 8.7 ML/MIN/1.73 M^2
EST. GFR  (NON AFRICAN AMERICAN): 7.5 ML/MIN/1.73 M^2
GLUCOSE SERPL-MCNC: 174 MG/DL (ref 70–110)
PHOSPHATE SERPL-MCNC: 5.4 MG/DL (ref 2.7–4.5)
POCT GLUCOSE: 157 MG/DL (ref 70–110)
POCT GLUCOSE: 188 MG/DL (ref 70–110)
POCT GLUCOSE: 206 MG/DL (ref 70–110)
POCT GLUCOSE: 299 MG/DL (ref 70–110)
POCT GLUCOSE: 58 MG/DL (ref 70–110)
POCT GLUCOSE: 75 MG/DL (ref 70–110)
POCT GLUCOSE: 89 MG/DL (ref 70–110)
POTASSIUM SERPL-SCNC: 4.1 MMOL/L (ref 3.5–5.1)
SODIUM SERPL-SCNC: 138 MMOL/L (ref 136–145)

## 2022-05-09 PROCEDURE — 99900035 HC TECH TIME PER 15 MIN (STAT)

## 2022-05-09 PROCEDURE — 36415 COLL VENOUS BLD VENIPUNCTURE: CPT | Performed by: STUDENT IN AN ORGANIZED HEALTH CARE EDUCATION/TRAINING PROGRAM

## 2022-05-09 PROCEDURE — 25000003 PHARM REV CODE 250: Performed by: INTERNAL MEDICINE

## 2022-05-09 PROCEDURE — 25000003 PHARM REV CODE 250: Performed by: STUDENT IN AN ORGANIZED HEALTH CARE EDUCATION/TRAINING PROGRAM

## 2022-05-09 PROCEDURE — 25000003 PHARM REV CODE 250

## 2022-05-09 PROCEDURE — 20600001 HC STEP DOWN PRIVATE ROOM

## 2022-05-09 PROCEDURE — 63600175 PHARM REV CODE 636 W HCPCS

## 2022-05-09 PROCEDURE — 99231 SBSQ HOSP IP/OBS SF/LOW 25: CPT | Mod: ,,, | Performed by: STUDENT IN AN ORGANIZED HEALTH CARE EDUCATION/TRAINING PROGRAM

## 2022-05-09 PROCEDURE — 94761 N-INVAS EAR/PLS OXIMETRY MLT: CPT

## 2022-05-09 PROCEDURE — 99231 PR SUBSEQUENT HOSPITAL CARE,LEVL I: ICD-10-PCS | Mod: ,,, | Performed by: STUDENT IN AN ORGANIZED HEALTH CARE EDUCATION/TRAINING PROGRAM

## 2022-05-09 PROCEDURE — 90935 HEMODIALYSIS ONE EVALUATION: CPT

## 2022-05-09 PROCEDURE — 63600175 PHARM REV CODE 636 W HCPCS: Performed by: STUDENT IN AN ORGANIZED HEALTH CARE EDUCATION/TRAINING PROGRAM

## 2022-05-09 PROCEDURE — 80069 RENAL FUNCTION PANEL: CPT | Performed by: STUDENT IN AN ORGANIZED HEALTH CARE EDUCATION/TRAINING PROGRAM

## 2022-05-09 PROCEDURE — 94660 CPAP INITIATION&MGMT: CPT

## 2022-05-09 RX ORDER — INSULIN ASPART 100 [IU]/ML
3 INJECTION, SOLUTION INTRAVENOUS; SUBCUTANEOUS
Status: DISCONTINUED | OUTPATIENT
Start: 2022-05-09 | End: 2022-05-10 | Stop reason: HOSPADM

## 2022-05-09 RX ORDER — INSULIN ASPART 100 [IU]/ML
0-10 INJECTION, SOLUTION INTRAVENOUS; SUBCUTANEOUS
Status: DISCONTINUED | OUTPATIENT
Start: 2022-05-09 | End: 2022-05-10 | Stop reason: HOSPADM

## 2022-05-09 RX ORDER — HEPARIN SODIUM 1000 [USP'U]/ML
1000 INJECTION, SOLUTION INTRAVENOUS; SUBCUTANEOUS
Status: DISCONTINUED | OUTPATIENT
Start: 2022-05-09 | End: 2022-05-10 | Stop reason: HOSPADM

## 2022-05-09 RX ORDER — SODIUM CHLORIDE 9 MG/ML
INJECTION, SOLUTION INTRAVENOUS ONCE
Status: COMPLETED | OUTPATIENT
Start: 2022-05-09 | End: 2022-05-09

## 2022-05-09 RX ADMIN — HEPARIN SODIUM 5000 UNITS: 5000 INJECTION INTRAVENOUS; SUBCUTANEOUS at 09:05

## 2022-05-09 RX ADMIN — PREDNISONE 30 MG: 5 TABLET ORAL at 08:05

## 2022-05-09 RX ADMIN — HYDRALAZINE HYDROCHLORIDE 25 MG: 25 TABLET ORAL at 09:05

## 2022-05-09 RX ADMIN — CARVEDILOL 12.5 MG: 12.5 TABLET, FILM COATED ORAL at 08:05

## 2022-05-09 RX ADMIN — INSULIN ASPART 3 UNITS: 100 INJECTION, SOLUTION INTRAVENOUS; SUBCUTANEOUS at 12:05

## 2022-05-09 RX ADMIN — SODIUM BICARBONATE 650 MG TABLET 1300 MG: at 09:05

## 2022-05-09 RX ADMIN — ATORVASTATIN CALCIUM 20 MG: 20 TABLET, FILM COATED ORAL at 09:05

## 2022-05-09 RX ADMIN — SODIUM CHLORIDE: 0.9 INJECTION, SOLUTION INTRAVENOUS at 12:05

## 2022-05-09 RX ADMIN — SEVELAMER CARBONATE 800 MG: 800 TABLET, FILM COATED ORAL at 06:05

## 2022-05-09 RX ADMIN — INSULIN ASPART 3 UNITS: 100 INJECTION, SOLUTION INTRAVENOUS; SUBCUTANEOUS at 08:05

## 2022-05-09 RX ADMIN — CARVEDILOL 12.5 MG: 12.5 TABLET, FILM COATED ORAL at 09:05

## 2022-05-09 RX ADMIN — NIFEDIPINE 90 MG: 30 TABLET, FILM COATED, EXTENDED RELEASE ORAL at 08:05

## 2022-05-09 RX ADMIN — SODIUM BICARBONATE 650 MG TABLET 1300 MG: at 08:05

## 2022-05-09 RX ADMIN — SEVELAMER CARBONATE 800 MG: 800 TABLET, FILM COATED ORAL at 08:05

## 2022-05-09 RX ADMIN — FUROSEMIDE 80 MG: 40 TABLET ORAL at 08:05

## 2022-05-09 RX ADMIN — FUROSEMIDE 80 MG: 40 TABLET ORAL at 06:05

## 2022-05-09 RX ADMIN — INSULIN ASPART 2 UNITS: 100 INJECTION, SOLUTION INTRAVENOUS; SUBCUTANEOUS at 12:05

## 2022-05-09 RX ADMIN — SENNOSIDES AND DOCUSATE SODIUM 1 TABLET: 50; 8.6 TABLET ORAL at 09:05

## 2022-05-09 RX ADMIN — SEVELAMER CARBONATE 800 MG: 800 TABLET, FILM COATED ORAL at 11:05

## 2022-05-09 RX ADMIN — MUPIROCIN: 20 OINTMENT TOPICAL at 09:05

## 2022-05-09 RX ADMIN — HEPARIN SODIUM 5000 UNITS: 5000 INJECTION INTRAVENOUS; SUBCUTANEOUS at 06:05

## 2022-05-09 RX ADMIN — INSULIN ASPART 3 UNITS: 100 INJECTION, SOLUTION INTRAVENOUS; SUBCUTANEOUS at 06:05

## 2022-05-09 RX ADMIN — HYDRALAZINE HYDROCHLORIDE 25 MG: 25 TABLET ORAL at 06:05

## 2022-05-09 RX ADMIN — INSULIN ASPART 1 UNITS: 100 INJECTION, SOLUTION INTRAVENOUS; SUBCUTANEOUS at 09:05

## 2022-05-09 RX ADMIN — CALCIUM CARBONATE (ANTACID) CHEW TAB 500 MG 500 MG: 500 CHEW TAB at 08:05

## 2022-05-09 RX ADMIN — SENNOSIDES AND DOCUSATE SODIUM 1 TABLET: 50; 8.6 TABLET ORAL at 08:05

## 2022-05-09 RX ADMIN — TAMSULOSIN HYDROCHLORIDE 0.4 MG: 0.4 CAPSULE ORAL at 08:05

## 2022-05-09 NOTE — SUBJECTIVE & OBJECTIVE
Interval History: NAEON, patient eating well. Some hyperglycemia, will adjust insulin.    Review of Systems   Constitutional:  Negative for chills, diaphoresis and fever.   HENT: Negative.     Eyes: Negative.  Negative for visual disturbance.   Respiratory:  Negative for cough and shortness of breath.    Cardiovascular:  Positive for leg swelling. Negative for chest pain and palpitations.        Permacath right side placed, look intact and clean.   Gastrointestinal:  Negative for abdominal distention, abdominal pain, blood in stool, constipation, diarrhea, nausea and vomiting.   Endocrine: Negative.    Genitourinary:  Negative for dysuria and hematuria.   Musculoskeletal:  Negative for arthralgias and back pain.   Skin:  Negative for color change.   Neurological:  Negative for dizziness, syncope, speech difficulty, weakness, light-headedness and headaches.   Hematological: Negative.    Psychiatric/Behavioral: Negative.  Negative for behavioral problems and confusion.    Objective:     Vital Signs (Most Recent):  Temp: 97.9 °F (36.6 °C) (05/09/22 0750)  Pulse: 87 (05/09/22 0750)  Resp: 20 (05/09/22 0445)  BP: (!) 151/82 (05/09/22 0750)  SpO2: 98 % (05/09/22 0750)   Vital Signs (24h Range):  Temp:  [97.9 °F (36.6 °C)-98.8 °F (37.1 °C)] 97.9 °F (36.6 °C)  Pulse:  [72-87] 87  Resp:  [20] 20  SpO2:  [96 %-98 %] 98 %  BP: (151-186)/(76-88) 151/82     Weight: 109 kg (240 lb 4.8 oz)  Body mass index is 30.04 kg/m².    Intake/Output Summary (Last 24 hours) at 5/9/2022 1312  Last data filed at 5/9/2022 1000  Gross per 24 hour   Intake 360 ml   Output --   Net 360 ml      Physical Exam  Constitutional:       General: He is not in acute distress.     Appearance: He is well-developed. He is obese. He is not ill-appearing or toxic-appearing.   HENT:      Head: Normocephalic and atraumatic.      Nose: No congestion or rhinorrhea.      Mouth/Throat:      Pharynx: No oropharyngeal exudate or posterior oropharyngeal erythema.   Eyes:       General: No scleral icterus.        Right eye: No discharge.         Left eye: No discharge.      Pupils: Pupils are equal, round, and reactive to light.   Cardiovascular:      Rate and Rhythm: Normal rate and regular rhythm.      Heart sounds: Normal heart sounds. No murmur heard.    No gallop.      Comments: Perma cath placed R side  Pulmonary:      Effort: Pulmonary effort is normal.   Abdominal:      General: Bowel sounds are normal.      Palpations: Abdomen is soft.      Tenderness: There is no abdominal tenderness.   Musculoskeletal:         General: Normal range of motion.      Cervical back: Normal range of motion and neck supple.      Right lower leg: Edema present.      Left lower leg: Edema present.   Skin:     Capillary Refill: Capillary refill takes less than 2 seconds.      Coloration: Skin is not jaundiced.      Findings: No lesion or rash.   Neurological:      Mental Status: He is alert and oriented to person, place, and time.   Psychiatric:         Mood and Affect: Mood normal.         Behavior: Behavior normal.         Thought Content: Thought content normal.       Significant Labs: All pertinent labs within the past 24 hours have been reviewed.  A1C:   Recent Labs   Lab 04/30/22  0103   HGBA1C 4.4       Bilirubin:   Recent Labs   Lab 05/04/22  0532 05/05/22  0248 05/06/22  0436 05/07/22  0519 05/08/22  0343   BILITOT 0.3 0.2 0.2 0.3 0.2       Recent Labs   Lab 05/08/22  0343 05/09/22  0335   * 174*    138   K 4.3 4.1    104   CO2 22* 19*   BUN 47* 63*   CREATININE 6.0* 7.0*   CALCIUM 8.6* 7.9*   MG 1.7  --        Recent Labs   Lab 05/08/22  0343   WBC 4.80   HGB 7.5*   HCT 23.0*          Recent Labs   Lab 05/08/22  0343 05/09/22  0335    138   K 4.3 4.1    104   CO2 22* 19*   * 174*   BUN 47* 63*   CREATININE 6.0* 7.0*   CALCIUM 8.6* 7.9*   PROT 6.6  --    ALBUMIN 2.8* 2.9*   BILITOT 0.2  --    ALKPHOS 138*  --    AST 22  --    ALT 12  --     ANIONGAP 10 15   EGFRNONAA 9.1* 7.5*       Magnesium:   Recent Labs   Lab 05/08/22  0343   MG 1.7        Recent Labs   Lab 05/08/22  2121 05/09/22  0757 05/09/22  1157   POCTGLUCOSE 306* 157* 206*       Recent Labs   Lab 04/30/22  0103   TSH 0.879         Significant Imaging: I have reviewed all pertinent imaging results/findings within the past 24 hours.

## 2022-05-09 NOTE — ASSESSMENT & PLAN NOTE
Blood sugar 203 mg/dl on admission, given dextrose by EMS with improvement   No history of labile blood sugars during previous admission   Most recent blood sugar 73 mg/dl   Mentation: AAO x3 during my interview   Taking glipizide 10 BID    Ddx: decreased po intake and decreased glipizide clearance (KAYODE on CKD), Likely not very hyperglycemic at baseline given low HbA1c. Less likely exogenous administration of insulin, insulinoma     Plan:   -RESOLVED  -Fall precautions   -Continue to monitor sugars.  -STOP the glipizide at discharge, A1c well controlled.

## 2022-05-09 NOTE — PLAN OF CARE
Problem: Fluid and Electrolyte Imbalance (Acute Kidney Injury/Impairment)  Goal: Fluid and Electrolyte Balance  Outcome: Ongoing, Progressing  Intervention: Monitor and Manage Fluid and Electrolyte Balance  Flowsheets (Taken 5/9/2022 2254)  Fluid/Electrolyte Management: (Electrolyte imbalance corrected via dialysate) electrolyte-binding therapy initiated     Problem: Oral Intake Inadequate (Acute Kidney Injury/Impairment)  Goal: Optimal Nutrition Intake  Outcome: Ongoing, Progressing  Intervention: Promote and Optimize Nutrition  Flowsheets (Taken 5/9/2022 1027)  Oral Nutrition Promotion: social interaction promoted     Problem: Renal Function Impairment (Acute Kidney Injury/Impairment)  Goal: Effective Renal Function  Outcome: Ongoing, Progressing  Intervention: Monitor and Support Renal Function  Flowsheets (Taken 5/9/2022 6935)  Medication Review/Management: provider consulted

## 2022-05-09 NOTE — PROGRESS NOTES
"[facility] - [facility] ([address] Medicine  Progress Note    Patient Name: Elbert Still Jr.  MRN: [id number]  Patient Class: IP- Inpatient   Admission Date: 4/30/2022  Length of Stay: 9 days  Attending Physician: Mookie Acuña MD  Primary Care Provider: Angel Luis Boo MD        Subjective:     Principal Problem:Acute kidney injury superimposed on chronic kidney disease        HPI:  Mr. Still is a 65 yo M with PMHx of HTN, T2DM, HLD, gout, CKD4, anemia who presented with hypoglycemia after EMS found him to have glucose in the 20s at home. Per his wife, she walked in on the patient in the living room around 10am of day of admission. He was sluggish, had slurred speech, no LOC or diaphoresis. She called EMS. They gave him an amp of D50 which improved BG to 100s, then he was started on D10. Upon arrival to [facility], his glucose had improved to 200s. This has never happened before in his 40 years of having diabetes. He typically eats less on his days off, and he was off on the day of admission. Patient works as a . He denies fever, chills, nausea, vomiting, abdominal pain, SOB. He denies drastically reduced urine production, typically urinating 3x/day with a few episodes of nocturia. He notes some SOB with exertion and LE "heaviness." Had one episdoe of diarrhea last week for which he took Imodium, and two episodes of nonbloody diarrhea earlier on day of admission. Patient takes glipizide 10 mg BID as his only diabetes medication, denies taking insulin. He took the glipizide on the evening of 4/28 and morning of 4/29, but did not take the evening dose on 4/29. His most recent HbA1c was 5.1 in Sep 2021. Hasn't been measuring his home BG.     In the ED, his labs were significant for hgb of 8, K 5.7, bicarb 12, Cr 9, BUN 80, mag 1.4, alkaline phos 162, alumin 3.2, corrected Ca 7.8, BNP 27. UA positive for glucose and protein. LFTs and lipase wnl. POCT glucose readings had been " 200 -> 93 -> 73. He received nebulizer treatment and lasix 80 for hyperkalemia management. EKG showed NSR, no peaked T waves.       Overview/Hospital Course:  66 year old man with hx of HTN, T2DM, ESRD admitted for hypoglycemia 2/2 polypharmacy. Holding home diabeties meds, A1c 4.4. Hypertensive to 200 - titrating BP meds. Nephrology consulted for management of worsening Cr and thought to be due to longstanding HTN and DM. No acute indications for dialsysis at this time but patient would prefer not to given history with mother recently being admitted to hospice for ESRD. Patient with persistent HTN and so nifed and coreg increased. K elevated to 5.5 and lokelma and lasix started. Patient had tunnel cath placed by IR for dialysis on 5/5/22. Case management made aware of patients dialysis needs. Nephrology planning to start diaylsis on 5/5/22. Treated gout flare with PO steroids which resolved. Waiting on HD chair placement.      Interval History: NAEON, patient eating well. Some hyperglycemia, will adjust insulin.    Review of Systems   Constitutional:  Negative for chills, diaphoresis and fever.   HENT: Negative.     Eyes: Negative.  Negative for visual disturbance.   Respiratory:  Negative for cough and shortness of breath.    Cardiovascular:  Positive for leg swelling. Negative for chest pain and palpitations.        Permacath right side placed, look intact and clean.   Gastrointestinal:  Negative for abdominal distention, abdominal pain, blood in stool, constipation, diarrhea, nausea and vomiting.   Endocrine: Negative.    Genitourinary:  Negative for dysuria and hematuria.   Musculoskeletal:  Negative for arthralgias and back pain.   Skin:  Negative for color change.   Neurological:  Negative for dizziness, syncope, speech difficulty, weakness, light-headedness and headaches.   Hematological: Negative.    Psychiatric/Behavioral: Negative.  Negative for behavioral problems and confusion.    Objective:     Vital  Signs (Most Recent):  Temp: 97.9 °F (36.6 °C) (05/09/22 0750)  Pulse: 87 (05/09/22 0750)  Resp: 20 (05/09/22 0445)  BP: (!) 151/82 (05/09/22 0750)  SpO2: 98 % (05/09/22 0750)   Vital Signs (24h Range):  Temp:  [97.9 °F (36.6 °C)-98.8 °F (37.1 °C)] 97.9 °F (36.6 °C)  Pulse:  [72-87] 87  Resp:  [20] 20  SpO2:  [96 %-98 %] 98 %  BP: (151-186)/(76-88) 151/82     Weight: 109 kg (240 lb 4.8 oz)  Body mass index is 30.04 kg/m².    Intake/Output Summary (Last 24 hours) at 5/9/2022 1312  Last data filed at 5/9/2022 1000  Gross per 24 hour   Intake 360 ml   Output --   Net 360 ml      Physical Exam  Constitutional:       General: He is not in acute distress.     Appearance: He is well-developed. He is obese. He is not ill-appearing or toxic-appearing.   HENT:      Head: Normocephalic and atraumatic.      Nose: No congestion or rhinorrhea.      Mouth/Throat:      Pharynx: No oropharyngeal exudate or posterior oropharyngeal erythema.   Eyes:      General: No scleral icterus.        Right eye: No discharge.         Left eye: No discharge.      Pupils: Pupils are equal, round, and reactive to light.   Cardiovascular:      Rate and Rhythm: Normal rate and regular rhythm.      Heart sounds: Normal heart sounds. No murmur heard.    No gallop.      Comments: Perma cath placed R side  Pulmonary:      Effort: Pulmonary effort is normal.   Abdominal:      General: Bowel sounds are normal.      Palpations: Abdomen is soft.      Tenderness: There is no abdominal tenderness.   Musculoskeletal:         General: Normal range of motion.      Cervical back: Normal range of motion and neck supple.      Right lower leg: Edema present.      Left lower leg: Edema present.   Skin:     Capillary Refill: Capillary refill takes less than 2 seconds.      Coloration: Skin is not jaundiced.      Findings: No lesion or rash.   Neurological:      Mental Status: He is alert and oriented to person, place, and time.   Psychiatric:         Mood and Affect:  Mood normal.         Behavior: Behavior normal.         Thought Content: Thought content normal.       Significant Labs: All pertinent labs within the past 24 hours have been reviewed.  A1C:   Recent Labs   Lab 04/30/22  0103   HGBA1C 4.4       Bilirubin:   Recent Labs   Lab 05/04/22  0532 05/05/22  0248 05/06/22  0436 05/07/22  0519 05/08/22  0343   BILITOT 0.3 0.2 0.2 0.3 0.2       Recent Labs   Lab 05/08/22  0343 05/09/22  0335   * 174*    138   K 4.3 4.1    104   CO2 22* 19*   BUN 47* 63*   CREATININE 6.0* 7.0*   CALCIUM 8.6* 7.9*   MG 1.7  --        Recent Labs   Lab 05/08/22  0343   WBC 4.80   HGB 7.5*   HCT 23.0*          Recent Labs   Lab 05/08/22  0343 05/09/22  0335    138   K 4.3 4.1    104   CO2 22* 19*   * 174*   BUN 47* 63*   CREATININE 6.0* 7.0*   CALCIUM 8.6* 7.9*   PROT 6.6  --    ALBUMIN 2.8* 2.9*   BILITOT 0.2  --    ALKPHOS 138*  --    AST 22  --    ALT 12  --    ANIONGAP 10 15   EGFRNONAA 9.1* 7.5*       Magnesium:   Recent Labs   Lab 05/08/22  0343   MG 1.7        Recent Labs   Lab 05/08/22  2121 05/09/22  0757 05/09/22  1157   POCTGLUCOSE 306* 157* 206*       Recent Labs   Lab 04/30/22  0103   TSH 0.879         Significant Imaging: I have reviewed all pertinent imaging results/findings within the past 24 hours.      Assessment/Plan:      * Acute kidney injury superimposed on chronic kidney disease    Patient with acute kidney injury likely d/t Pre-renal azotemia and Acute tubular necrosis Which is currently worsening. Labs reviewed- Renal function/electrolytes with Estimated Creatinine Clearance: 10.3 mL/min (A) (based on SCr of 9.7 mg/dL (H)). according to latest data. Monitor urine output and serial BMP and adjust therapy as needed. Avoid nephrotoxins and renally dose meds for GFR listed above.     Baseline Cr 3-6, Cr 9 on admission  Hx of CKD: yes due to HTN/DM  Last saw nephro 2013  Exam with edema, no decreased O2 sats or rales  FeNA: not  reliable, had already gotten lasix  FeUrea: pending  UA / UCx: proteinuria, glucuria  DDx: hypotension, prerenal, progression of CKD  No indications for HD at this time    Plan:   - nephro consulted, appreciate recs  - Trend Cr  - Strict I&Os  - Avoid nephrotoxic agents (NSAIDs, gadolinium, IV radiocontrast)  - Avoid hypotension  - Renal diet  - Renally adjust medications  - Transitioned from IV lasix to PO lasix 80mg BID.    Hyperkalemia  K 5.7 on admission   ECG: NSR, no peaked t waves  Chest pain/discomfort: none  Home medications: no ARBs, or ACEi    Plan:   Resolved        Hypoglycemia  Blood sugar 203 mg/dl on admission, given dextrose by EMS with improvement   No history of labile blood sugars during previous admission   Most recent blood sugar 73 mg/dl   Mentation: CATIEO x3 during my interview   Taking glipizide 10 BID    Ddx: decreased po intake and decreased glipizide clearance (KAYODE on CKD), Likely not very hyperglycemic at baseline given low HbA1c. Less likely exogenous administration of insulin, insulinoma     Plan:   -RESOLVED  -Fall precautions   -Continue to monitor sugars.  -STOP the glipizide at discharge, A1c well controlled.        Anemia of chronic disease  Baseline Hb 11, Hb 8 on admission  Antiplatelet/anticoagulation: none  No overt source of bleeding on exam, denies bloody BM, hematuria.       Plan:  - CBCs daily  - Transfuse with goal Hb > 7        Gout  Takes colchicine as needed at home, patient with poor kidney function. Will try and manage pain with heat packs and tylenol. Can trial steroids if too much pain or intraarticular steroid shot to avoid systemic steroids.      Plan:  --RESOLVED      CKD (chronic kidney disease) stage 4, GFR 15-29 ml/min  Hx of CKD due to HTN, DM. Baseline around 3-6    - nephro referral outpatient  - Patient will be a new dialysis patient and agreed to perma cath, now s/p permacath on 5/5 with plan for dialysis with nephro.  -Awaiting to hear about HD chair  placement      Mixed hyperlipidemia  Continue home lipitor 20      Essential (primary) hypertension   home meds amlodipine and Toprol XL  Blood pressure better controlled with nifedpine.    Plan:  --Nifedipine 90 mg daily  --Carvedilol 12.5mg BID  --Hydralazine 25mg q8h        Proteinuria  Hx of proteinuria, UPCr >7, nephrotic range    - consider ACEi or SGLT-2 inhibitor at discharge      Diabetes mellitus due to underlying condition with diabetic nephropathy  Diabetes type 2  Last HbA1c:   Lab Results   Component Value Date    HGBA1C 4.4 04/30/2022     Insulin regimen: none  Oral regimen ( held inpatient): glipizide   CAD Prophylaxis: statin  HTN or Renal disease: KAYODE, hypertensive    PLAN:   - Hold oral anti-hyperglycemic agents  - Detemir 10 QHS  - Aspart 3U TIDWM  - MDSSI  - Diabetic diet  - Hypoglycemia orderset with POCT BG AC/QS  - Goal -180 while inpatient               VTE Risk Mitigation (From admission, onward)         Ordered     heparin (porcine) injection 1,000 Units  As needed (PRN)         05/09/22 0820     heparin (porcine) injection 5,000 Units  Every 8 hours         05/05/22 1051     IP VTE HIGH RISK PATIENT  Once         04/30/22 0429     Place sequential compression device  Until discontinued         04/30/22 0429                Discharge Planning   HORACIO: 5/9/2022     Code Status: Full Code   Is the patient medically ready for discharge?: Yes    Reason for patient still in hospital (select all that apply): Pending disposition  Discharge Plan A: Home with family   Discharge Delays: (!) Dialysis Set-up              Ishan Montes MD  Department of Hospital Medicine   [facility] - Intensive Care ([place]-[facility])

## 2022-05-09 NOTE — CONSULTS
Food & Nutrition  Education    Diet Education: Renal, Diabetic   Time Spent: 10 minutes  Learners: Patient    Nutrition Education provided with handouts. All questions and concerns answered. Dietitian's contact information provided.     Pt reports good appetite PTA/curently, tolerating diet. UBW: 240#; chart review confirms. Appears nourished w/ no indicators of malnutrition.     Follow-Up: Yes    Please re-consult as needed.    Thanks!  MS Nay, RD, LDN

## 2022-05-09 NOTE — PLAN OF CARE
Patient received alert and oriented x4, sitting on the side of the bed no indication of any pain and or discomfort at this time. White board updated on the plan of care. All safety interventions are in place, call bell in use. Will continue to observe

## 2022-05-09 NOTE — PLAN OF CARE
05/09/22 0838   Discharge Reassessment   Assessment Type Discharge Planning Reassessment   Did the patient's condition or plan change since previous assessment? No   Discharge Plan discussed with: Patient   Communicated HORACIO with patient/caregiver Yes   Discharge Plan A Home with family   Discharge Plan B Home with family   DME Needed Upon Discharge  other (see comments)  (TBD)   Discharge Barriers Identified None   Why the patient remains in the hospital Requires continued medical care   Post-Acute Status   Post-Acute Authorization Dialysis   Diaylsis Status Referrals Sent   Coverage [facility]   Discharge Delays (!) Dialysis Set-up     Clinicals and referral forwarded to [facility]. Waiting on acceptance. SW will continue to follow.    Jyotsna Price, DENISE  [phone]

## 2022-05-09 NOTE — PROGRESS NOTES
PT arrived to [facility] on bed, in NAD, VSS, on RA, AAOX4. HD TX initiated via RPC with a 3L UF goal over 3hours as tolerated. [facility]    05/09/22 1430   Handoff Report   Received From Estee Tinoco   Treatment Type   Treatment Type Acute   Vital Signs   Pulse 84   Heart Rate Source Monitor;Continuous   Resp 16   O2 Device (Oxygen Therapy) room air   BP (!) 175/94   MAP (mmHg) 126   BP Location Right arm   BP Method Automatic   Patient Position Lying   Orthostatic VS No   Assessments (Pre/Post)   Consent Obtained yes   Date Hepatitis Profile Obtained 05/03/22   Blood Liters Processed (BLP) 0   Transport Modality bed   Level of Consciousness (AVPU) alert   Pain   Preferred Pain Scale number (Numeric Rating Pain Scale)   Pain Rating (0-10): Rest 0   Pain Rating (0-10): Activity 0   Pre-Hemodialysis Assessment   Patient Status Arrived   Treatment Status Started   Gross Bleach Negative Yes   Machine Number K45   Alarms Verified Yes   pH 7   Machine Temperature 96.8 °F (36 °C)   Dialyzer F-160   Machine Conductivity 14.3   Meter Conductivity 14.2   Dialysate Na (mEq/L) 140   Dialysate K (mEq/L) 3   Dialysate CA (mEq/L) 2.5   Dialysate HCO3 (mEq/L) 35   Prime Ordered (mL) 250 mL   Net UF Goal 3000   Total UF Goal 3800   Pre-Hemodialysis Comments HD TX initiated   UF Rate 1270   RO # / DI #  Main        Hemodialysis Catheter 05/05/22 0814 right internal jugular   Placement Date/Time: 05/05/22 0814   Present Prior to Hospital Arrival?: No  Hand Hygiene: Performed  Barrier Precautions: Performed  Skin Antisepsis: ChloraPrep  Hemodialysis Catheter Type: Tunneled catheter  Location: right internal jugular  Cathete...   Site Assessment No drainage;No redness;No swelling;No warmth   Line Securement Device Secured with sutureless device   Dressing Type Biopatch in place;Central line dressing   Dressing Status Clean;Dry;Intact   Dressing Intervention Integrity maintained   Date on Dressing 05/05/22   Dressing Due to be  Changed 05/12/22   Venous Patency/Care flushed w/o difficulty;blood return present   Arterial Patency/Care flushed w/o difficulty;blood return present   Waveform Not being transduced   Line Necessity Review CRRT/HD   During Hemodialysis Assessment   Blood Flow Rate (mL/min) 400 mL/min   Dialysate Flow Rate (mL/min) 800 ml/min   Ultrafiltration Rate (mL/Hr) 1270 mL/Hr   Arteriovenous Lines Secure Yes   Arterial Pressure (mmHg) -160 mmHg   Venous Pressure (mmHg) 140   Blood Volume Processed (Liters) 0 L   UF Removed (mL) 0 mL   TMP 50   Venous Line in Air Detector Yes   Intake (mL) 250 mL   Intra-Hemodialysis Comments NS@100/ML/HR initiated

## 2022-05-09 NOTE — NURSING
Pt returned from HD, 3 L off. VSS. PT up ad layla in room, no complaints. Awaiting chair in Dialysis clinic. Will cont to monitor.

## 2022-05-09 NOTE — ASSESSMENT & PLAN NOTE
home meds amlodipine and Toprol XL  Blood pressure better controlled with nifedpine.    Plan:  --Nifedipine 90 mg daily  --Carvedilol 12.5mg BID  --Hydralazine 25mg q8h

## 2022-05-09 NOTE — ASSESSMENT & PLAN NOTE
Takes colchicine as needed at home, patient with poor kidney function. Will try and manage pain with heat packs and tylenol. Can trial steroids if too much pain or intraarticular steroid shot to avoid systemic steroids.      Plan:  --RESOLVED

## 2022-05-09 NOTE — ASSESSMENT & PLAN NOTE
Hx of CKD due to HTN, DM. Baseline around 3-6    - nephro referral outpatient  - Patient will be a new dialysis patient and agreed to perma cath, now s/p permacath on 5/5 with plan for dialysis with nephro.  -Awaiting to hear about HD chair placement

## 2022-05-09 NOTE — ASSESSMENT & PLAN NOTE
Diabetes type 2  Last HbA1c:   Lab Results   Component Value Date    HGBA1C 4.4 04/30/2022     Insulin regimen: none  Oral regimen ( held inpatient): glipizide   CAD Prophylaxis: statin  HTN or Renal disease: KAYODE, hypertensive    PLAN:   - Hold oral anti-hyperglycemic agents  - Detemir 10 QHS  - Aspart 3U TIDWM  - MDSSI  - Diabetic diet  - Hypoglycemia orderset with POCT BG AC/QS  - Goal -180 while inpatient

## 2022-05-09 NOTE — PROGRESS NOTES
HD TX complete with 3L removed. Blood returned,lines flushed and capped. Report to primary RN.    05/09/22 1720   Handoff Report   Received From Alejandrina   Given To Estee   Treatment Type   Treatment Type Acute   Vital Signs   Pulse 81   Heart Rate Source Monitor;Continuous   Resp 18   O2 Device (Oxygen Therapy) room air   BP Location Right arm   BP Method Automatic   Patient Position Lying   Orthostatic VS No   Assessments (Pre/Post)   Consent Obtained yes   Date Hepatitis Profile Obtained 05/03/22   Blood Liters Processed (BLP) 63.9   Transport Modality bed   Level of Consciousness (AVPU) alert   Dialyzer Clearance moderately streaked   Pain   Preferred Pain Scale number (Numeric Rating Pain Scale)   Pain Rating (0-10): Rest 0   Pain Rating (0-10): Activity 0   Pre-Hemodialysis Assessment   Treatment Status Completed        Hemodialysis Catheter 05/05/22 0814 right internal jugular   Placement Date/Time: 05/05/22 0814   Present Prior to Hospital Arrival?: No  Hand Hygiene: Performed  Barrier Precautions: Performed  Skin Antisepsis: ChloraPrep  Hemodialysis Catheter Type: Tunneled catheter  Location: right internal jugular  Cathete...   Site Assessment No drainage;No redness;No swelling;No warmth   Line Securement Device Secured with sutureless device   Dressing Type Biopatch in place;Central line dressing   Dressing Status Clean;Dry;Intact   Dressing Intervention Integrity maintained   Date on Dressing 05/05/22   Dressing Due to be Changed 05/12/22   Venous Patency/Care normal saline locked   Arterial Patency/Care normal saline locked   Waveform Not being transduced   Line Necessity Review CRRT/HD   During Hemodialysis Assessment   Blood Flow Rate (mL/min) 400 mL/min   Dialysate Flow Rate (mL/min) 800 ml/min   Ultrafiltration Rate (mL/Hr) 1270 mL/Hr   Arteriovenous Lines Secure Yes   Arterial Pressure (mmHg) -150 mmHg   Venous Pressure (mmHg) 150   Blood Volume Processed (Liters) 63.9 L   UF Removed (mL) 3800 mL    TMP 50   Venous Line in Air Detector Yes   Intake (mL) 550 mL   Intra-Hemodialysis Comments Blood returned   Post-Hemodialysis Assessment   Rinseback Volume (mL) 250 mL   Blood Volume Processed (Liters) 63.9 L   Dialyzer Clearance Moderately streaked   Duration of Treatment 180 minutes   Additional Fluid Intake (mL) 800 mL   Total UF (mL) 3800 mL   Net Fluid Removal 3000   Patient Response to Treatment Tolerated well   Post-Hemodialysis Comments See notes

## 2022-05-10 VITALS
HEIGHT: 75 IN | OXYGEN SATURATION: 100 % | TEMPERATURE: 98 F | BODY MASS INDEX: 29.88 KG/M2 | HEART RATE: 77 BPM | SYSTOLIC BLOOD PRESSURE: 145 MMHG | RESPIRATION RATE: 18 BRPM | WEIGHT: 240.31 LBS | DIASTOLIC BLOOD PRESSURE: 80 MMHG

## 2022-05-10 LAB
ALBUMIN SERPL BCP-MCNC: 2.8 G/DL (ref 3.5–5.2)
ANION GAP SERPL CALC-SCNC: 10 MMOL/L (ref 8–16)
BUN SERPL-MCNC: 54 MG/DL (ref 8–23)
CALCIUM SERPL-MCNC: 7.8 MG/DL (ref 8.7–10.5)
CHLORIDE SERPL-SCNC: 107 MMOL/L (ref 95–110)
CO2 SERPL-SCNC: 22 MMOL/L (ref 23–29)
CREAT SERPL-MCNC: 5.8 MG/DL (ref 0.5–1.4)
EST. GFR  (AFRICAN AMERICAN): 10.9 ML/MIN/1.73 M^2
EST. GFR  (NON AFRICAN AMERICAN): 9.5 ML/MIN/1.73 M^2
GLUCOSE SERPL-MCNC: 145 MG/DL (ref 70–110)
PHOSPHATE SERPL-MCNC: 5.1 MG/DL (ref 2.7–4.5)
POCT GLUCOSE: 151 MG/DL (ref 70–110)
POCT GLUCOSE: 156 MG/DL (ref 70–110)
POCT GLUCOSE: 157 MG/DL (ref 70–110)
POTASSIUM SERPL-SCNC: 3.9 MMOL/L (ref 3.5–5.1)
SODIUM SERPL-SCNC: 139 MMOL/L (ref 136–145)

## 2022-05-10 PROCEDURE — 25000003 PHARM REV CODE 250: Performed by: STUDENT IN AN ORGANIZED HEALTH CARE EDUCATION/TRAINING PROGRAM

## 2022-05-10 PROCEDURE — 99239 PR HOSPITAL DISCHARGE DAY,>30 MIN: ICD-10-PCS | Mod: GT,,, | Performed by: INTERNAL MEDICINE

## 2022-05-10 PROCEDURE — 80069 RENAL FUNCTION PANEL: CPT | Performed by: STUDENT IN AN ORGANIZED HEALTH CARE EDUCATION/TRAINING PROGRAM

## 2022-05-10 PROCEDURE — 36415 COLL VENOUS BLD VENIPUNCTURE: CPT | Performed by: STUDENT IN AN ORGANIZED HEALTH CARE EDUCATION/TRAINING PROGRAM

## 2022-05-10 PROCEDURE — 99900035 HC TECH TIME PER 15 MIN (STAT)

## 2022-05-10 PROCEDURE — 99232 SBSQ HOSP IP/OBS MODERATE 35: CPT | Mod: ,,, | Performed by: INTERNAL MEDICINE

## 2022-05-10 PROCEDURE — 99232 PR SUBSEQUENT HOSPITAL CARE,LEVL II: ICD-10-PCS | Mod: ,,, | Performed by: INTERNAL MEDICINE

## 2022-05-10 PROCEDURE — 25000003 PHARM REV CODE 250

## 2022-05-10 PROCEDURE — 63600175 PHARM REV CODE 636 W HCPCS

## 2022-05-10 PROCEDURE — 94761 N-INVAS EAR/PLS OXIMETRY MLT: CPT

## 2022-05-10 PROCEDURE — 99239 HOSP IP/OBS DSCHRG MGMT >30: CPT | Mod: GT,,, | Performed by: INTERNAL MEDICINE

## 2022-05-10 PROCEDURE — 25000003 PHARM REV CODE 250: Performed by: INTERNAL MEDICINE

## 2022-05-10 RX ORDER — COLCHICINE 0.6 MG/1
0.6 TABLET ORAL
Qty: 90 TABLET | Refills: 3 | COMMUNITY
Start: 2022-05-10

## 2022-05-10 RX ORDER — FUROSEMIDE 80 MG/1
80 TABLET ORAL 2 TIMES DAILY
Qty: 60 TABLET | Refills: 2 | Status: SHIPPED | OUTPATIENT
Start: 2022-05-10 | End: 2022-10-07 | Stop reason: SDUPTHER

## 2022-05-10 RX ORDER — SEVELAMER HYDROCHLORIDE 400 MG/1
400 TABLET, FILM COATED ORAL
Qty: 90 TABLET | Refills: 11 | Status: SHIPPED | OUTPATIENT
Start: 2022-05-10 | End: 2022-10-20 | Stop reason: DRUGHIGH

## 2022-05-10 RX ORDER — TAMSULOSIN HYDROCHLORIDE 0.4 MG/1
0.4 CAPSULE ORAL DAILY
Qty: 30 CAPSULE | Refills: 3 | Status: SHIPPED | OUTPATIENT
Start: 2022-05-10 | End: 2022-08-31

## 2022-05-10 RX ORDER — DEXTROSE 4 G
TABLET,CHEWABLE ORAL
Qty: 1 EACH | Refills: 0 | Status: SHIPPED | OUTPATIENT
Start: 2022-05-10

## 2022-05-10 RX ORDER — BLOOD-GLUCOSE CONTROL, NORMAL
EACH MISCELLANEOUS
Qty: 100 EACH | Refills: 5 | Status: SHIPPED | OUTPATIENT
Start: 2022-05-10

## 2022-05-10 RX ORDER — CARVEDILOL 12.5 MG/1
12.5 TABLET ORAL 2 TIMES DAILY
Qty: 60 TABLET | Refills: 11 | Status: SHIPPED | OUTPATIENT
Start: 2022-05-10 | End: 2023-06-12 | Stop reason: SDUPTHER

## 2022-05-10 RX ORDER — NIFEDIPINE 60 MG/1
60 TABLET, EXTENDED RELEASE ORAL 2 TIMES DAILY
Qty: 60 TABLET | Refills: 11 | Status: SHIPPED | OUTPATIENT
Start: 2022-05-10 | End: 2023-06-12 | Stop reason: SDUPTHER

## 2022-05-10 RX ADMIN — SODIUM BICARBONATE 650 MG TABLET 1300 MG: at 09:05

## 2022-05-10 RX ADMIN — INSULIN ASPART 3 UNITS: 100 INJECTION, SOLUTION INTRAVENOUS; SUBCUTANEOUS at 09:05

## 2022-05-10 RX ADMIN — HYDRALAZINE HYDROCHLORIDE 25 MG: 25 TABLET ORAL at 06:05

## 2022-05-10 RX ADMIN — HEPARIN SODIUM 5000 UNITS: 5000 INJECTION INTRAVENOUS; SUBCUTANEOUS at 06:05

## 2022-05-10 RX ADMIN — INSULIN ASPART 3 UNITS: 100 INJECTION, SOLUTION INTRAVENOUS; SUBCUTANEOUS at 12:05

## 2022-05-10 RX ADMIN — NIFEDIPINE 90 MG: 30 TABLET, FILM COATED, EXTENDED RELEASE ORAL at 09:05

## 2022-05-10 RX ADMIN — FUROSEMIDE 80 MG: 40 TABLET ORAL at 09:05

## 2022-05-10 RX ADMIN — CARVEDILOL 12.5 MG: 12.5 TABLET, FILM COATED ORAL at 09:05

## 2022-05-10 RX ADMIN — CALCIUM CARBONATE (ANTACID) CHEW TAB 500 MG 500 MG: 500 CHEW TAB at 09:05

## 2022-05-10 RX ADMIN — SENNOSIDES AND DOCUSATE SODIUM 1 TABLET: 50; 8.6 TABLET ORAL at 09:05

## 2022-05-10 RX ADMIN — TAMSULOSIN HYDROCHLORIDE 0.4 MG: 0.4 CAPSULE ORAL at 09:05

## 2022-05-10 RX ADMIN — SEVELAMER CARBONATE 800 MG: 800 TABLET, FILM COATED ORAL at 09:05

## 2022-05-10 RX ADMIN — MUPIROCIN: 20 OINTMENT TOPICAL at 09:05

## 2022-05-10 NOTE — SUBJECTIVE & OBJECTIVE
Nephrology Progress Note    Mr. Still is a 65 yo M with PMHx of HTN, T2DM, HLD, gout, CKD4 (baseline unclear, but was 6.4 in September 2021 - previously was 3.6 in 2020), anemia who presented with hypoglycemia after EMS found him to have glucose in the 20s at home. Per his wife, patient was sluggish, had slurred speech the day of admission, so she called EMS.  Upon arrival of EMS blood glucose was in the 20s. Patient takes glipizide 10 mg BID as his only diabetes medication, denies taking insulin. His most recent HbA1c was 5.1 in Sep 2021. Hasn't been measuring his home BG.  He denies drastically reduced urine production, typically urinates every hour ~200 ccs. Believes he might be retaining urine. Urine sometimes appears slightly foamy. ROS also positive for diaphoresis associated with eating food, SOB (present since covid infection 2 months ago), LE swelling X 2 weeks. Nephrology consulted for KAYODE on CKD (Cr = 9.1) and proteinuria.        Interval History: s/p 4th session of HD yesterday     Review of patient's allergies indicates:   Allergen Reactions    Iodine and iodide containing products Hives     Hypotension, Flushing     Current Facility-Administered Medications   Medication Frequency    0.9%  NaCl infusion Once    acetaminophen tablet 650 mg Q4H PRN    atorvastatin tablet 20 mg QHS    calcium carbonate 200 mg calcium (500 mg) chewable tablet 500 mg Daily    carvediloL tablet 12.5 mg BID    dextrose 10% bolus 125 mL PRN    dextrose 10% bolus 250 mL PRN    fluticasone propionate 50 mcg/actuation nasal spray 100 mcg Daily    furosemide tablet 80 mg BID    glucagon (human recombinant) injection 1 mg PRN    glucose chewable tablet 16 g PRN    glucose chewable tablet 24 g PRN    heparin (porcine) injection 1,000 Units PRN    heparin (porcine) injection 5,000 Units Q8H    hydrALAZINE tablet 25 mg Q8H    insulin aspart U-100 pen 0-10 Units QID (AC + HS) PRN    insulin aspart U-100 pen 3 Units  TIDWM    insulin detemir U-100 pen 10 Units QHS    melatonin tablet 6 mg Nightly PRN    mupirocin 2 % ointment BID    naloxone 0.4 mg/mL injection 0.02 mg PRN    NIFEdipine 24 hr tablet 90 mg Daily    ondansetron injection 4 mg Q8H PRN    senna-docusate 8.6-50 mg per tablet 1 tablet BID    sevelamer carbonate tablet 800 mg TID WM    sodium bicarbonate tablet 1,300 mg TID    sodium chloride 0.9% bolus 250 mL PRN    sodium chloride 0.9% flush 10 mL Q12H PRN    tamsulosin 24 hr capsule 0.4 mg Daily       Objective:     Vital Signs (Most Recent):  Temp: 98.4 °F (36.9 °C) (05/10/22 0530)  Pulse: 89 (05/10/22 0530)  Resp: 18 (05/10/22 0530)  BP: (!) 154/77 (05/10/22 0530)  SpO2: 97 % (05/10/22 0530)  O2 Device (Oxygen Therapy): room air (05/10/22 0530) Vital Signs (24h Range):  Temp:  [97.8 °F (36.6 °C)-98.4 °F (36.9 °C)] 98.4 °F (36.9 °C)  Pulse:  [81-93] 89  Resp:  [16-18] 18  SpO2:  [96 %-100 %] 97 %  BP: (143-175)/(77-96) 154/77     Weight: 109 kg (240 lb 4.8 oz) (04/30/22 1920)  Body mass index is 30.04 kg/m².  Body surface area is 2.4 meters squared.    I/O last 3 completed shifts:  In: 1640 [P.O.:840; Other:800]  Out: 3800 [Other:3800]    Physical Exam  Vitals reviewed.   Constitutional:       Appearance: Normal appearance.   HENT:      Head: Normocephalic and atraumatic.      Mouth/Throat:      Mouth: Mucous membranes are moist.   Eyes:      Conjunctiva/sclera: Conjunctivae normal.      Pupils: Pupils are equal, round, and reactive to light.   Cardiovascular:      Rate and Rhythm: Normal rate and regular rhythm.      Pulses: Normal pulses.      Heart sounds: Normal heart sounds.   Pulmonary:      Effort: Pulmonary effort is normal.      Breath sounds: Normal breath sounds.   Abdominal:      General: Bowel sounds are normal.      Palpations: Abdomen is soft.   Musculoskeletal:         General: Normal range of motion.      Right lower leg: Edema present.      Left lower leg: Edema present.   Skin:      General: Skin is warm.      Capillary Refill: Capillary refill takes less than 2 seconds.   Neurological:      General: No focal deficit present.      Mental Status: He is alert and oriented to person, place, and time.   Psychiatric:         Mood and Affect: Mood normal.       Significant Labs:  All labs within the past 24 hours have been reviewed.     Significant Imaging:  Reviewed

## 2022-05-10 NOTE — PLAN OF CARE
Patient received alert and oriented x 4 with family at the bed side, no indication of any pain and or discomfort at this time. White board updated on the plan of care. All safety interventions are in place, call bell in use. Will continue to observe

## 2022-05-10 NOTE — PLAN OF CARE
DENISE placed Dialysis schedule in patient's AVS and also scheduled him a [facility] hospital follow up for 5/16/2022, before his dialysis appt. Patient has been accepted by [facility] in [place].    Jyotsna Price, [facility]  [phone]

## 2022-05-10 NOTE — PLAN OF CARE
05/10/2022      Elbert THAKKAR Tessy Pickens  [address] [place] [address]          [facility] Medicine Dept.  [facility] [address] [place] [id number] [phone] after hours  [phone] fax Elbert Still Jr. has been hospitalized at the [facility] since 4/30/2022.  Please excuse the patient from duties.  Patient may return on 5/17/2022.  No restrictions.     Please contact me if you have any questions.                  __________e-sig________________  Carole Catherine MD  05/10/2022

## 2022-05-10 NOTE — ASSESSMENT & PLAN NOTE
Mr. Still is a 67 yo M with PMHx of HTN, T2DM, HLD, gout, CKD4 (baseline unclear, but was 6.4 in September 2021 - previously was 3.6 in 2020), anemia admitted to hospital medicine service with hypoglycemia (blood glucose in 20s -  takes glipizide 10 mg BID, denies taking insulin).     He denies reduced urine production, typically urinates every hour about ~200 ccs. Believes he might be retaining urine. Urine sometimes appears slightly foamy. No hydronephrosis on KUS. Nephrology consulted for KAYODE on CKD (Cr = 9.1) and proteinuria.    Likely chronic issue and progression of CKD due to HTN and DM.      Recommendations:  -s/p 4th session of HD on 5/9/22   -no HD today   -c/w  Lasix 80 po bid   -Sevelamer 800 mg with meals   -Avoid nephrotoxins  -Daily RFP  -Strict ins and outs   -Diabetic, renal diet

## 2022-05-10 NOTE — PLAN OF CARE
[facility] ([facility]  Discharge Final Note    Primary Care Provider: Angel Luis Boo MD  Expected Discharge Date: 5/10/2022    Patient medically ready for discharge to home with family and dialysis at [facility].  Nurse can call report to n/a.  Transportation yes per spouse.   Is family/patient aware of discharge yes - patient.  Hospital follow up scheduled, yes, in AVS.  Final Discharge Note (most recent)       Final Note - 05/10/22 1518          Final Note    Assessment Type Final Discharge Note (P)      Anticipated Discharge Disposition Home or Self Care (P)      What phone number can be called within the next 1-3 days to see how you are doing after discharge? [phone] (P)      Hospital Resources/Appts/Education Provided Appointments scheduled and added to AVS (P)         Post-Acute Status    Post-Acute Authorization Dialysis (P)      Diaylsis Status Set-up Complete/Auth obtained (P)      Coverage [employer] (P)      Discharge Delays None known at this time (P)                      Important Message from Medicare         Referral Info (most recent)       Referral Info    No documentation.                 Contact Info       [facility] [id number]  [facility] [id number]       Next Steps: Go on 5/11/2022    Instructions: DIALYSIS SCHEDULE IS MON,WED,FRI, AT 1:00 PM. [facility] TO COMPLETE PAPERWORK          Future Appointments   Date Time Provider Department Center   5/11/2022  1:00 PM [facility]   5/13/2022  1:00 PM [facility]   5/16/2022  9:20 AM Angel Luis Boo MD [facility]   5/16/2022  1:00 PM CHAIR 14, [facility]   5/18/2022  1:00 PM CHAIR 14, [facility] [place]   5/20/2022  1:00 PM [facility]IR 14[facility]  YESIKA Price  Case Management  Ext: [phone]  05/10/2022

## 2022-05-10 NOTE — CARE UPDATE
Per CLARENCE Oseguera at [facility] [place], patient is accepted to start out-patient dialysis. Schedule is MWF at 1PM. Patient is to arrive at 12:30PM for initial treatment to sign consent forms. Info communicated with treatment team.       [facility]  [address] [place] [address]  [phone]      Pau Jones  Nephrology   Ext. [phone]

## 2022-05-10 NOTE — CONSULTS
Thank you for your consult to [facility]. We have reviewed the patient chart. This patient does meet criteria for [facility] service at this time. Will assume care on 05/10/22 at 7AM.    Carole Catherine MD

## 2022-05-10 NOTE — PROGRESS NOTES
[facility] ([place] [address])  Nephrology  Progress Note    Patient Name: Elbert Still Jr.  MRN: [id number]  Admission Date: 4/30/2022  Hospital Length of Stay: 10 days  Attending Provider: Carole Catherine MD   Primary Care Physician: Angel Luis Boo MD  Principal Problem:Acute kidney injury superimposed on chronic kidney disease    Subjective:     HPI: Mr. Still is a 65 yo M with PMHx of HTN, T2DM, HLD, gout, CKD4 (baseline unclear, but was 6.4 in September 2021 - previously was 3.6 in 2020), anemia who presented with hypoglycemia after EMS found him to have glucose in the 20s at home. Per his wife, patient was sluggish, had slurred speech the day of admission, so she called EMS.  Upon arrival of EMS blood glucose was in the 20s. Patient takes glipizide 10 mg BID as his only diabetes medication, denies taking insulin. His most recent HbA1c was 5.1 in Sep 2021. Hasn't been measuring his home BG.  He denies drastically reduced urine production, typically urinates every hour ~200 ccs. Believes he might be retaining urine. Urine sometimes appears slightly foamy. ROS also positive for diaphoresis associated with eating food, SOB (present since covid infection 2 months ago), LE swelling X 2 weeks. Nephrology consulted for KAYODE on CKD (Cr = 9.1) and proteinuria.          Nephrology Progress Note    Mr. Still is a 65 yo M with PMHx of HTN, T2DM, HLD, gout, CKD4 (baseline unclear, but was 6.4 in September 2021 - previously was 3.6 in 2020), anemia who presented with hypoglycemia after EMS found him to have glucose in the 20s at home. Per his wife, patient was sluggish, had slurred speech the day of admission, so she called EMS.  Upon arrival of EMS blood glucose was in the 20s. Patient takes glipizide 10 mg BID as his only diabetes medication, denies taking insulin. His most recent HbA1c was 5.1 in Sep 2021. Hasn't been measuring his home BG.  He denies drastically reduced urine production, typically urinates every  hour ~200 ccs. Believes he might be retaining urine. Urine sometimes appears slightly foamy. ROS also positive for diaphoresis associated with eating food, SOB (present since covid infection 2 months ago), LE swelling X 2 weeks. Nephrology consulted for KAYODE on CKD (Cr = 9.1) and proteinuria.        Interval History: s/p 4th session of HD yesterday     Review of patient's allergies indicates:   Allergen Reactions    Iodine and iodide containing products Hives     Hypotension, Flushing     Current Facility-Administered Medications   Medication Frequency    0.9%  NaCl infusion Once    acetaminophen tablet 650 mg Q4H PRN    atorvastatin tablet 20 mg QHS    calcium carbonate 200 mg calcium (500 mg) chewable tablet 500 mg Daily    carvediloL tablet 12.5 mg BID    dextrose 10% bolus 125 mL PRN    dextrose 10% bolus 250 mL PRN    fluticasone propionate 50 mcg/actuation nasal spray 100 mcg Daily    furosemide tablet 80 mg BID    glucagon (human recombinant) injection 1 mg PRN    glucose chewable tablet 16 g PRN    glucose chewable tablet 24 g PRN    heparin (porcine) injection 1,000 Units PRN    heparin (porcine) injection 5,000 Units Q8H    hydrALAZINE tablet 25 mg Q8H    insulin aspart U-100 pen 0-10 Units QID (AC + HS) PRN    insulin aspart U-100 pen 3 Units TIDWM    insulin detemir U-100 pen 10 Units QHS    melatonin tablet 6 mg Nightly PRN    mupirocin 2 % ointment BID    naloxone 0.4 mg/mL injection 0.02 mg PRN    NIFEdipine 24 hr tablet 90 mg Daily    ondansetron injection 4 mg Q8H PRN    senna-docusate 8.6-50 mg per tablet 1 tablet BID    sevelamer carbonate tablet 800 mg TID WM    sodium bicarbonate tablet 1,300 mg TID    sodium chloride 0.9% bolus 250 mL PRN    sodium chloride 0.9% flush 10 mL Q12H PRN    tamsulosin 24 hr capsule 0.4 mg Daily       Objective:     Vital Signs (Most Recent):  Temp: 98.4 °F (36.9 °C) (05/10/22 0530)  Pulse: 89 (05/10/22 0530)  Resp: 18 (05/10/22  0530)  BP: (!) 154/77 (05/10/22 0530)  SpO2: 97 % (05/10/22 0530)  O2 Device (Oxygen Therapy): room air (05/10/22 0530) Vital Signs (24h Range):  Temp:  [97.8 °F (36.6 °C)-98.4 °F (36.9 °C)] 98.4 °F (36.9 °C)  Pulse:  [81-93] 89  Resp:  [16-18] 18  SpO2:  [96 %-100 %] 97 %  BP: (143-175)/(77-96) 154/77     Weight: 109 kg (240 lb 4.8 oz) (04/30/22 1920)  Body mass index is 30.04 kg/m².  Body surface area is 2.4 meters squared.    I/O last 3 completed shifts:  In: 1640 [P.O.:840; Other:800]  Out: 3800 [Other:3800]    Physical Exam  Vitals reviewed.   Constitutional:       Appearance: Normal appearance.   HENT:      Head: Normocephalic and atraumatic.      Mouth/Throat:      Mouth: Mucous membranes are moist.   Eyes:      Conjunctiva/sclera: Conjunctivae normal.      Pupils: Pupils are equal, round, and reactive to light.   Cardiovascular:      Rate and Rhythm: Normal rate and regular rhythm.      Pulses: Normal pulses.      Heart sounds: Normal heart sounds.   Pulmonary:      Effort: Pulmonary effort is normal.      Breath sounds: Normal breath sounds.   Abdominal:      General: Bowel sounds are normal.      Palpations: Abdomen is soft.   Musculoskeletal:         General: Normal range of motion.      Right lower leg: Edema present.      Left lower leg: Edema present.   Skin:     General: Skin is warm.      Capillary Refill: Capillary refill takes less than 2 seconds.   Neurological:      General: No focal deficit present.      Mental Status: He is alert and oriented to person, place, and time.   Psychiatric:         Mood and Affect: Mood normal.       Significant Labs:  All labs within the past 24 hours have been reviewed.     Significant Imaging:  Reviewed         Assessment/Plan:     * Acute kidney injury superimposed on chronic kidney disease  Mr. Still is a 67 yo M with PMHx of HTN, T2DM, HLD, gout, CKD4 (baseline unclear, but was 6.4 in September 2021 - previously was 3.6 in 2020), anemia admitted to hospital  medicine service with hypoglycemia (blood glucose in 20s -  takes glipizide 10 mg BID, denies taking insulin).     He denies reduced urine production, typically urinates every hour about ~200 ccs. Believes he might be retaining urine. Urine sometimes appears slightly foamy. No hydronephrosis on KUS. Nephrology consulted for KAYODE on CKD (Cr = 9.1) and proteinuria.    Likely chronic issue and progression of CKD due to HTN and DM.      Recommendations:  -s/p 4th session of HD on 5/9/22   -no HD today   -c/w  Lasix 80 po bid   -Sevelamer 800 mg with meals   -Avoid nephrotoxins  -Daily RFP  -Strict ins and outs   -Diabetic, renal diet               Brittany Penn MD  Nephrology  [facility] ([place]

## 2022-05-11 ENCOUNTER — PATIENT MESSAGE (OUTPATIENT)
Dept: ADMINISTRATIVE | Facility: CLINIC | Age: 67
End: 2022-05-11
Payer: COMMERCIAL

## 2022-05-11 ENCOUNTER — PATIENT OUTREACH (OUTPATIENT)
Dept: ADMINISTRATIVE | Facility: CLINIC | Age: 67
End: 2022-05-11
Payer: COMMERCIAL

## 2022-05-11 DIAGNOSIS — E11.9 TYPE 2 DIABETES MELLITUS WITHOUT COMPLICATION, UNSPECIFIED WHETHER LONG TERM INSULIN USE: ICD-10-CM

## 2022-05-11 NOTE — PROGRESS NOTES
C3 nurse attempted to contact Elbert Still Jr.   for a TCC post hospital discharge follow up call. No answer. Left voicemail with callback information. The patient has a scheduled HOS appointment with Angel Luis Boo MD   on 5/16/22 @ 9:20am.

## 2022-05-12 PROBLEM — Z99.2 DIALYSIS PATIENT: Status: ACTIVE | Noted: 2022-05-12

## 2022-05-12 LAB
25(OH)D3 SERPL-MCNC: 22 NG/ML (ref 30–100)
ALBUMIN SERPL-MCNC: 3.7 G/DL (ref 3.5–5.2)
ALP SERPL-CCNC: 151 U/L (ref 40–129)
ALT SERPL W P-5'-P-CCNC: 16 U/L (ref 7–52)
BICARBONATE: 23 MEQ/L (ref 20–31)
BUN, POST: 2 MG/DL (ref 6–19)
BUN, PRE: 73 MG/DL (ref 6–19)
BUN/CREAT SERPL: 10.5 (ref 10–20)
CALCIUM PHOS PRODUCT, COR: 60 (ref 0–54)
CALCIUM PHOS PRODUCT: 59 (ref 0–54)
CALCIUM SERPL-MCNC: 8.2 MG/DL (ref 8.7–10.4)
CALCIUM, CORRECTED: 8.4 MG/DL (ref 8.7–10.4)
CHLORIDE: 104 MEQ/L (ref 96–108)
CHOLEST SERPL-MSCNC: 91 MG/DL (ref 0–199)
CREAT SERPL-MCNC: 6.92 MG/DL (ref 0.6–1.3)
FOLATE: 11.6 NG/ML
GLUCOSE SERPL-MCNC: 195 MG/DL (ref 70–100)
HBV SURFACE AB SER-ACNC: <10 MIU/ML
HBV SURFACE AG SERPL QL IA: NEGATIVE
HCV AB SERPL QL IA: NONREACTIVE
HCV S/CO RATIO(INDEX): 0.06 (ref 0–0.79)
HDL/CHOLESTEROL RATIO: 2.7 (ref 0–4.5)
HDLC SERPL-MCNC: 34 MG/DL
IRON: 50 MCG/DL (ref 45–160)
LDLC SERPL CALC-MCNC: 27 MG/DL (ref 0–99)
PHOSPHATE FLD-MCNC: 7.2 MG/DL (ref 2.6–4.5)
POTASSIUM SERPL-SCNC: 4.4 MEQ/L (ref 3.5–5.1)
SODIUM BLD-SCNC: 137 MEQ/L (ref 136–145)
SPKT/V, DAUGIRDAS II: 5.83
TOTAL IRON BINDING CAPACITY: 237 MCG/DL (ref 185–515)
TRANSFERRIN SATURATION: 21 % (ref 20–55)
TRIGL SERPL-MCNC: 151 MG/DL (ref 0–149)
UIBC SERPL-MCNC: 187 MCG/DL (ref 155–355)
UREA REDUCTION RATIO: 97 % (ref 65–80)
VLDL CHOLESTEROL: 30 MG/DL (ref 10–30)

## 2022-05-12 NOTE — PROGRESS NOTES
C3 nurse spoke with Elbert Still Jr.  for a TCC post hospital discharge follow up call. The patient has a scheduled [facility] appointment with Angel Luis Boo MD  on 5/16/22 @ 4717.

## 2022-05-13 ENCOUNTER — TELEPHONE (OUTPATIENT)
Dept: FAMILY MEDICINE | Facility: CLINIC | Age: 67
End: 2022-05-13
Payer: COMMERCIAL

## 2022-05-13 LAB — ALUMINUM SERPL-MCNC: <5 MCG/L (ref 0–10)

## 2022-05-13 NOTE — DISCHARGE SUMMARY
"[facility] - [facility] ([place] [address])  [facility]  Discharge Summary      Patient Name: Elbert Still Jr.  MRN: [id number]  Patient Class: IP- Inpatient  Admission Date: 4/30/2022  Hospital Length of Stay: 10 days  Discharge Date and Time: 5/10/2022  6:37 PM  Attending Physician: No att. providers found   Discharging Provider: Carole Catherine MD  Primary Care Provider: Angel Luis Boo MD      HPI:   Mr. Still is a 65 yo M with PMHx of HTN, T2DM, HLD, gout, CKD4, anemia who presented with hypoglycemia after EMS found him to have glucose in the 20s at home. Per his wife, she walked in on the patient in the living room around 10am of day of admission. He was sluggish, had slurred speech, no LOC or diaphoresis. She called EMS. They gave him an amp of D50 which improved BG to 100s, then he was started on D10. Upon arrival to [facility], his glucose had improved to 200s. This has never happened before in his 40 years of having diabetes. He typically eats less on his days off, and he was off on the day of admission. Patient works as a . He denies fever, chills, nausea, vomiting, abdominal pain, SOB. He denies drastically reduced urine production, typically urinating 3x/day with a few episodes of nocturia. He notes some SOB with exertion and LE "heaviness." Had one episdoe of diarrhea last week for which he took Imodium, and two episodes of nonbloody diarrhea earlier on day of admission. Patient takes glipizide 10 mg BID as his only diabetes medication, denies taking insulin. He took the glipizide on the evening of 4/28 and morning of 4/29, but did not take the evening dose on 4/29. His most recent HbA1c was 5.1 in Sep 2021. Hasn't been measuring his home BG.     In the ED, his labs were significant for hgb of 8, K 5.7, bicarb 12, Cr 9, BUN 80, mag 1.4, alkaline phos 162, alumin 3.2, corrected Ca 7.8, BNP 27. UA positive for glucose and protein. LFTs and lipase wnl. POCT glucose readings had been " 200 -> 93 -> 73. He received nebulizer treatment and lasix 80 for hyperkalemia management. EKG showed NSR, no peaked T waves.       * No surgery found *      Hospital Course:   66 year old man with hx of HTN, T2DM, ESRD admitted for hypoglycemia 2/2 polypharmacy. Holding home diabeties meds, A1c 4.4. Hypertensive to 200 - titrating BP meds. Nephrology consulted for management of worsening Cr and thought to be due to longstanding HTN and DM. No acute indications for dialsysis at this time but patient would prefer not to given history with mother recently being admitted to hospice for ESRD. Patient with persistent HTN and so nifed and coreg increased. K elevated to 5.5 and lokelma and lasix started. Patient had tunnel cath placed by IR for dialysis on 5/5/22. Case management made aware of patients dialysis needs. Nephrology planning to start diaylsis on 5/5/22. Treated gout flare with PO steroids which resolved. HD chair assigned and patient discharged home.       Goals of Care Treatment Preferences:  Code Status: Full Code      Consults:   Consults (From admission, onward)        Status Ordering Provider     Inpatient virtual consult to Hospital Medicine  Once        Provider:  (Not yet assigned)    Completed SAUNDRA RIOS     Inpatient consult to Registered Dietitian/Nutritionist  Once        Provider:  (Not yet assigned)    Completed PHILOMENA CERVANTES     Inpatient consult to Interventional Radiology  Once        Provider:  (Not yet assigned)    Completed GAURI BUCIO     Inpatient consult to Nephrology  Once        Provider:  (Not yet assigned)    Completed DASHA ABDULLAHI          * Acute kidney injury superimposed on chronic kidney disease    Patient with acute kidney injury likely d/t Pre-renal azotemia and Acute tubular necrosis Which is currently worsening. Labs reviewed- Renal function/electrolytes with Estimated Creatinine Clearance: 10.3 mL/min (A) (based on SCr of 9.7 mg/dL (H)). according to  latest data. Monitor urine output and serial BMP and adjust therapy as needed. Avoid nephrotoxins and renally dose meds for GFR listed above.     Baseline Cr 3-6, Cr 9 on admission  Hx of CKD: yes due to HTN/DM  Last saw nephro 2013  Exam with edema, no decreased O2 sats or rales  FeNA: not reliable, had already gotten lasix  FeUrea: pending  UA / UCx: proteinuria, glucuria  DDx: hypotension, prerenal, progression of CKD  No indications for HD at this time    Plan:   - nephro consulted, appreciate recs  - Trend Cr  - Strict I&Os  - Avoid nephrotoxic agents (NSAIDs, gadolinium, IV radiocontrast)  - Avoid hypotension  - Renal diet  - Renally adjust medications  - Transitioned from IV lasix to PO lasix 80mg BID.    Hyperkalemia  K 5.7 on admission   ECG: NSR, no peaked t waves  Chest pain/discomfort: none  Home medications: no ARBs, or ACEi    Plan:   Resolved        Hypoglycemia  Blood sugar 203 mg/dl on admission, given dextrose by EMS with improvement   No history of labile blood sugars during previous admission   Most recent blood sugar 73 mg/dl   Mentation: AAO x3 during my interview   Taking glipizide 10 BID    Ddx: decreased po intake and decreased glipizide clearance (KAYODE on CKD), Likely not very hyperglycemic at baseline given low HbA1c. Less likely exogenous administration of insulin, insulinoma     Plan:   -RESOLVED  -Fall precautions   -Continue to monitor sugars.  -STOP the glipizide at discharge, A1c well controlled.        Anemia of chronic disease  Baseline Hb 11, Hb 8 on admission  Antiplatelet/anticoagulation: none  No overt source of bleeding on exam, denies bloody BM, hematuria.       Plan:  - CBCs daily  - Transfuse with goal Hb > 7        Gout  Takes colchicine as needed at home, patient with poor kidney function. Will try and manage pain with heat packs and tylenol. Can trial steroids if too much pain or intraarticular steroid shot to avoid systemic steroids.      Plan:  --RESOLVED      CKD  (chronic kidney disease) stage 4, GFR 15-29 ml/min  Hx of CKD due to HTN, DM. Baseline around 3-6    - nephro referral outpatient  - Patient will be a new dialysis patient and agreed to perma cath, now s/p permacath on 5/5 with plan for dialysis with nephro.  -HD chair assigned and patient discharged home    Mixed hyperlipidemia  Continue home lipitor 20      Essential (primary) hypertension   home meds amlodipine and Toprol XL  Blood pressure better controlled with nifedpine.    Plan:  --Nifedipine 90 mg daily  --Carvedilol 12.5mg BID  --Hydralazine 25mg q8h        Proteinuria  Hx of proteinuria, UPCr >7, nephrotic range    - consider ACEi or SGLT-2 inhibitor at discharge      Diabetes mellitus due to underlying condition with diabetic nephropathy  Diabetes type 2  Last HbA1c:   Lab Results   Component Value Date    HGBA1C 4.4 04/30/2022     Insulin regimen: none  Oral regimen ( held inpatient): glipizide   CAD Prophylaxis: statin  HTN or Renal disease: KAYODE, hypertensive    PLAN:   - Hold oral anti-hyperglycemic agents  - Detemir 10 QHS  - Aspart 3U TIDWM  - MDSSI  - Diabetic diet  - Hypoglycemia orderset with POCT BG AC/QS  - Goal -180 while inpatient   -glucoses elevated during stay due to prednisone  -stop glipizide at discharge  -glucometer with supplies ordered and patient to keep blood glucose diary to present to his PCP              Final Active Diagnoses:    Diagnosis Date Noted POA    PRINCIPAL PROBLEM:  Acute kidney injury superimposed on chronic kidney disease [N17.9, N18.9] 05/01/2022 Yes    Hypoglycemia [E16.2] 04/30/2022 Yes    Hyperkalemia [E87.5] 04/30/2022 Yes    Anemia of chronic disease [D63.8] 05/06/2020 Yes     Chronic    Gout [M10.9] 06/24/2019 Yes     Chronic    CKD (chronic kidney disease) stage 4, GFR 15-29 ml/min [N18.4] 02/08/2019 Yes     Chronic    Mixed hyperlipidemia [E78.2] 04/05/2018 Yes     Chronic    Essential (primary) hypertension [I10] 09/21/2017 Yes     Chronic     Proteinuria [R80.9] 07/05/2013 Yes     Chronic    Diabetes mellitus due to underlying condition with diabetic nephropathy [E08.21] 04/18/2013 Yes     Chronic      Problems Resolved During this Admission:    Diagnosis Date Noted Date Resolved POA    KAYODE (acute kidney injury) [N17.9] 04/30/2022 05/06/2022 Yes    Hypomagnesemia [E83.42] 04/30/2022 05/01/2022 Yes       Discharged Condition: stable    Disposition: Home or Self Care    Follow Up:   Follow-up Information     [facility]. Go on 5/11/2022.    Why: DIALYSIS SCHEDULE IS MON,WED,FRI, AT 1:00 PM. PLEASE ARRIVE TO [facility] ON 5/11/2022, AT 12:30 PM TO COMPLETE PAPERWORK  Contact information:  [id number]  [facility] [id number]                     Patient Instructions:      Ambulatory referral/consult to Nephrology   Standing Status: Future   Referral Priority: Routine Referral Type: Consultation   Referral Reason: Specialty Services Required   Requested Specialty: Nephrology   Number of Visits Requested: 1     Diet diabetic     Diet renal     Notify your health care provider if you experience any of the following:  temperature >100.4     Notify your health care provider if you experience any of the following:  persistent nausea and vomiting or diarrhea     Notify your health care provider if you experience any of the following:  severe uncontrolled pain     Notify your health care provider if you experience any of the following:  difficulty breathing or increased cough     Notify your health care provider if you experience any of the following:  severe persistent headache     Notify your health care provider if you experience any of the following:  worsening rash     Notify your health care provider if you experience any of the following:  persistent dizziness, light-headedness, or visual disturbances     Notify your health care provider if you experience any of the following:  increased confusion or weakness      Notify your health care provider if you experience any of the following:  redness, tenderness, or signs of infection (pain, swelling, redness, odor or green/yellow discharge around incision site)     Activity as tolerated       Significant Diagnostic Studies: as above    Pending Diagnostic Studies:     None         Medications:  Reconciled Home Medications:      Medication List      START taking these medications    carvediloL 12.5 MG tablet  Commonly known as: COREG  Take 1 tablet (12.5 mg total) by mouth 2 (two) times daily.     furosemide 80 MG tablet  Commonly known as: LASIX  Take 1 tablet (80 mg total) by mouth 2 (two) times daily.     NIFEdipine 60 MG (OSM) 24 hr tablet  Commonly known as: PROCARDIA-XL  Take 1 tablet (60 mg total) by mouth 2 (two) times a day.     ONETOUCH VERIO FLEX METER Misc  Generic drug: blood-glucose meter  Use to check blood glucose twice a day.     ONETOUCH VERIO TEST STRIPS Strp  Generic drug: blood sugar diagnostic  Use to check blood glucose twice a day.     tamsulosin 0.4 mg Cap  Commonly known as: FLOMAX  Take 1 capsule (0.4 mg total) by mouth once daily.        CHANGE how you take these medications    colchicine 0.6 mg tablet  Commonly known as: COLCRYS  Take half tab (0.3 mg) by mouth daily as needed for gout flare.  What changed: additional instructions     ONETOUCH DELICA PLUS LANCET 30 gauge Misc  Generic drug: lancets  Use to check blood glucose twice a day.  What changed:   · medication strength  · how to take this  · additional instructions        CONTINUE taking these medications    acetaminophen 500 MG tablet  Commonly known as: TYLENOL  Take 2 tablets (1,000 mg total) by mouth every 6 (six) hours as needed for Pain.     atorvastatin 20 MG tablet  Commonly known as: LIPITOR  TAKE 1 TABLET BY MOUTH EVERY EVENING     fluticasone propionate 50 mcg/actuation nasal spray  Commonly known as: FLONASE  2 sprays (100 mcg total) by Each Nostril route once daily.        STOP  taking these medications    amLODIPine 10 MG tablet  Commonly known as: NORVASC     glipiZIDE 10 MG tablet  Commonly known as: GLUCOTROL     metoprolol succinate 25 MG 24 hr tablet  Commonly known as: TOPROL-XL     multivitamin per tablet  Commonly known as: THERAGRAN     ONE TOUCH ULTRA CONTROL MISC     TUMERIC-GING-OLIVE-OREG-CAPRYL ORAL        ASK your doctor about these medications    * sevelamer 400 MG Tab  Commonly known as: RENAGEL  Take 1 tablet (400 mg total) by mouth 3 (three) times daily with meals.  Ask about: Which instructions should I use?     * sevelamer  MG Tab  Commonly known as: RenageL  Take 1 tablet by mouth twice a day with meals.  Ask about: Which instructions should I use?         * This list has 2 medication(s) that are the same as other medications prescribed for you. Read the directions carefully, and ask your doctor or other care provider to review them with you.                Indwelling Lines/Drains at time of discharge:   Lines/Drains/Airways     Central Venous Catheter Line  Duration                Hemodialysis Catheter 05/05/22 0814 right internal jugular 7 days                Time spent on the discharge of patient: 41 minutes         The attending portion of this evaluation, treatment, and documentation was performed per Carole Catherine MD via Telemedicine AudioVisual using the secure Vid[employer] software platform with 2 way audio/video. The provider was located off-site and the patient is located in the hospital. The aforementioned video software was utilized to document the relevant history and physical exam     Carole Catherine MD  Department of Hospital Medicine  [facility] - Intensive Care ([place]-14)

## 2022-05-13 NOTE — ASSESSMENT & PLAN NOTE
Hx of CKD due to HTN, DM. Baseline around 3-6    - nephro referral outpatient  - Patient will be a new dialysis patient and agreed to perma cath, now s/p permacath on 5/5 with plan for dialysis with nephro.  -HD chair assigned and patient discharged home

## 2022-05-13 NOTE — ASSESSMENT & PLAN NOTE
Diabetes type 2  Last HbA1c:   Lab Results   Component Value Date    HGBA1C 4.4 04/30/2022     Insulin regimen: none  Oral regimen ( held inpatient): glipizide   CAD Prophylaxis: statin  HTN or Renal disease: KAYODE, hypertensive    PLAN:   - Hold oral anti-hyperglycemic agents  - Detemir 10 QHS  - Aspart 3U TIDWM  - MDSSI  - Diabetic diet  - Hypoglycemia orderset with POCT BG AC/QS  - Goal -180 while inpatient   -glucoses elevated during stay due to prednisone  -stop glipizide at discharge  -glucometer with supplies ordered and patient to keep blood glucose diary to present to his PCP

## 2022-05-13 NOTE — ASSESSMENT & PLAN NOTE
Patient with acute kidney injury likely d/t Pre-renal azotemia and Acute tubular necrosis Which is currently worsening. Labs reviewed- Renal function/electrolytes with Estimated Creatinine Clearance: 10.3 mL/min (A) (based on SCr of 9.7 mg/dL (H)). according to latest data. Monitor urine output and serial BMP and adjust therapy as needed. Avoid nephrotoxins and renally dose meds for GFR listed above.     Baseline Cr 3-6, Cr 9 on admission  Hx of CKD: yes due to HTN/DM  Last saw nephro 2013  Exam with edema, no decreased O2 sats or rales  FeNA: not reliable, had already gotten lasix  FeUrea: pending  UA / UCx: proteinuria, glucuria  DDx: hypotension, prerenal, progression of CKD  No indications for HD at this time    Plan:   - nephro consulted, appreciate recs  - Trend Cr  - Strict I&Os  - Avoid nephrotoxic agents (NSAIDs, gadolinium, IV radiocontrast)  - Avoid hypotension  - Renal diet  - Renally adjust medications  - Transitioned from IV lasix to PO lasix 80mg BID.

## 2022-05-16 ENCOUNTER — OFFICE VISIT (OUTPATIENT)
Dept: FAMILY MEDICINE | Facility: CLINIC | Age: 67
End: 2022-05-16
Payer: COMMERCIAL

## 2022-05-16 VITALS
SYSTOLIC BLOOD PRESSURE: 120 MMHG | HEART RATE: 75 BPM | TEMPERATURE: 98 F | WEIGHT: 243.19 LBS | BODY MASS INDEX: 30.24 KG/M2 | OXYGEN SATURATION: 98 % | DIASTOLIC BLOOD PRESSURE: 68 MMHG | HEIGHT: 75 IN

## 2022-05-16 DIAGNOSIS — E78.5 HYPERLIPIDEMIA ASSOCIATED WITH TYPE 2 DIABETES MELLITUS: ICD-10-CM

## 2022-05-16 DIAGNOSIS — R80.9 PROTEINURIA, UNSPECIFIED TYPE: ICD-10-CM

## 2022-05-16 DIAGNOSIS — Z12.11 COLON CANCER SCREENING: ICD-10-CM

## 2022-05-16 DIAGNOSIS — N17.9 ACUTE KIDNEY INJURY SUPERIMPOSED ON CHRONIC KIDNEY DISEASE: ICD-10-CM

## 2022-05-16 DIAGNOSIS — Z00.00 ANNUAL PHYSICAL EXAM: Primary | ICD-10-CM

## 2022-05-16 DIAGNOSIS — N18.9 ACUTE KIDNEY INJURY SUPERIMPOSED ON CHRONIC KIDNEY DISEASE: ICD-10-CM

## 2022-05-16 DIAGNOSIS — E11.69 HYPERLIPIDEMIA ASSOCIATED WITH TYPE 2 DIABETES MELLITUS: ICD-10-CM

## 2022-05-16 DIAGNOSIS — E66.9 OBESITY (BMI 30-39.9): ICD-10-CM

## 2022-05-16 DIAGNOSIS — Z99.2 DIALYSIS PATIENT: ICD-10-CM

## 2022-05-16 DIAGNOSIS — E83.42 HYPOMAGNESEMIA: ICD-10-CM

## 2022-05-16 DIAGNOSIS — I10 ESSENTIAL (PRIMARY) HYPERTENSION: ICD-10-CM

## 2022-05-16 DIAGNOSIS — Z09 HOSPITAL DISCHARGE FOLLOW-UP: ICD-10-CM

## 2022-05-16 DIAGNOSIS — E08.21 DIABETES MELLITUS DUE TO UNDERLYING CONDITION WITH DIABETIC NEPHROPATHY, WITHOUT LONG-TERM CURRENT USE OF INSULIN: ICD-10-CM

## 2022-05-16 DIAGNOSIS — M10.9 GOUT, UNSPECIFIED CAUSE, UNSPECIFIED CHRONICITY, UNSPECIFIED SITE: ICD-10-CM

## 2022-05-16 DIAGNOSIS — D63.8 ANEMIA OF CHRONIC DISEASE: ICD-10-CM

## 2022-05-16 DIAGNOSIS — E16.2 HYPOGLYCEMIA: ICD-10-CM

## 2022-05-16 DIAGNOSIS — N17.9 ACUTE KIDNEY INJURY: ICD-10-CM

## 2022-05-16 PROCEDURE — 99999 PR PBB SHADOW E&M-EST. PATIENT-LVL III: CPT | Mod: PBBFAC,,, | Performed by: FAMILY MEDICINE

## 2022-05-16 PROCEDURE — 99999 PR PBB SHADOW E&M-EST. PATIENT-LVL III: ICD-10-PCS | Mod: PBBFAC,,, | Performed by: FAMILY MEDICINE

## 2022-05-16 PROCEDURE — 99215 PR OFFICE/OUTPT VISIT, EST, LEVL V, 40-54 MIN: ICD-10-PCS | Mod: 25,S$GLB,, | Performed by: FAMILY MEDICINE

## 2022-05-16 PROCEDURE — 1111F DSCHRG MED/CURRENT MED MERGE: CPT | Mod: CPTII,S$GLB,, | Performed by: FAMILY MEDICINE

## 2022-05-16 PROCEDURE — 99396 PREV VISIT EST AGE 40-64: CPT | Mod: 25,S$GLB,, | Performed by: FAMILY MEDICINE

## 2022-05-16 PROCEDURE — 3074F SYST BP LT 130 MM HG: CPT | Mod: CPTII,S$GLB,, | Performed by: FAMILY MEDICINE

## 2022-05-16 PROCEDURE — 3078F DIAST BP <80 MM HG: CPT | Mod: CPTII,S$GLB,, | Performed by: FAMILY MEDICINE

## 2022-05-16 PROCEDURE — 3008F BODY MASS INDEX DOCD: CPT | Mod: CPTII,S$GLB,, | Performed by: FAMILY MEDICINE

## 2022-05-16 PROCEDURE — 1160F RVW MEDS BY RX/DR IN RCRD: CPT | Mod: CPTII,S$GLB,, | Performed by: FAMILY MEDICINE

## 2022-05-16 PROCEDURE — 99215 OFFICE O/P EST HI 40 MIN: CPT | Mod: 25,S$GLB,, | Performed by: FAMILY MEDICINE

## 2022-05-16 PROCEDURE — 1159F PR MEDICATION LIST DOCUMENTED IN MEDICAL RECORD: ICD-10-PCS | Mod: CPTII,S$GLB,, | Performed by: FAMILY MEDICINE

## 2022-05-16 PROCEDURE — 3044F PR MOST RECENT HEMOGLOBIN A1C LEVEL <7.0%: ICD-10-PCS | Mod: CPTII,S$GLB,, | Performed by: FAMILY MEDICINE

## 2022-05-16 PROCEDURE — 3066F PR DOCUMENTATION OF TREATMENT FOR NEPHROPATHY: ICD-10-PCS | Mod: CPTII,S$GLB,, | Performed by: FAMILY MEDICINE

## 2022-05-16 PROCEDURE — 1111F PR DISCHARGE MEDS RECONCILED W/ CURRENT OUTPATIENT MED LIST: ICD-10-PCS | Mod: CPTII,S$GLB,, | Performed by: FAMILY MEDICINE

## 2022-05-16 PROCEDURE — 99396 PR PREVENTIVE VISIT,EST,40-64: ICD-10-PCS | Mod: 25,S$GLB,, | Performed by: FAMILY MEDICINE

## 2022-05-16 PROCEDURE — 3066F NEPHROPATHY DOC TX: CPT | Mod: CPTII,S$GLB,, | Performed by: FAMILY MEDICINE

## 2022-05-16 PROCEDURE — 1160F PR REVIEW ALL MEDS BY PRESCRIBER/CLIN PHARMACIST DOCUMENTED: ICD-10-PCS | Mod: CPTII,S$GLB,, | Performed by: FAMILY MEDICINE

## 2022-05-16 PROCEDURE — 3074F PR MOST RECENT SYSTOLIC BLOOD PRESSURE < 130 MM HG: ICD-10-PCS | Mod: CPTII,S$GLB,, | Performed by: FAMILY MEDICINE

## 2022-05-16 PROCEDURE — 3044F HG A1C LEVEL LT 7.0%: CPT | Mod: CPTII,S$GLB,, | Performed by: FAMILY MEDICINE

## 2022-05-16 PROCEDURE — 3078F PR MOST RECENT DIASTOLIC BLOOD PRESSURE < 80 MM HG: ICD-10-PCS | Mod: CPTII,S$GLB,, | Performed by: FAMILY MEDICINE

## 2022-05-16 PROCEDURE — 3008F PR BODY MASS INDEX (BMI) DOCUMENTED: ICD-10-PCS | Mod: CPTII,S$GLB,, | Performed by: FAMILY MEDICINE

## 2022-05-16 PROCEDURE — 1159F MED LIST DOCD IN RCRD: CPT | Mod: CPTII,S$GLB,, | Performed by: FAMILY MEDICINE

## 2022-05-16 NOTE — LETTER
May 16, 2022      [facility]  [address]  [place] [address]  Phone: [phone]  Fax: [phone]       Patient: Elbert Still   YOB: 1955  Date of Visit: 05/16/2022    To Whom It May Concern:    Elizabeth Still  was at [facility] on 05/16/2022. The patient may return to work/school on 05/23/2022 with no restrictions. If you have any questions or concerns, or if I can be of further assistance, please do not hesitate to contact me.    Sincerely,    Jose Luis Fuller MA

## 2022-05-16 NOTE — PROGRESS NOTES
"  Physical Exam  /68   Pulse 75   Temp 98.4 °F (36.9 °C) (Oral)   Ht 6' 3" (1.905 m)   Wt 110.3 kg (243 lb 2.7 oz)   SpO2 98%   BMI 30.39 kg/m²      Office Visit    Patient Name: Elbert Still Jr.    : 1955  MRN: [id number]      Assessment/Plan:  Elbert Still Jr. is a 66 y.o. male who presents today for :    Annual physical exam  -     Hemoglobin A1C; Future; Expected date: 2022  -     CBC Without Differential; Future; Expected date: 2022  -     Comprehensive Metabolic Panel; Future; Expected date: 2022  -     Lipid Panel; Future; Expected date: 2022  -     Microalbumin/Creatinine Ratio, Urine; Future; Expected date: 2022  -     PSA, Screening; Future; Expected date: 2022  -     TSH; Future; Expected date: 2022  -Obesity (BMI 30-39.9)  Colon cancer screening  -     Case Request Endoscopy: COLONOSCOPY  -anticipatory guidance provided with age appropriate preventative services discussed, healthy diet and regular physical exercise also discussed with patient        Follow up 6mo          Additional Evaluation & Management issues:     In addition to today's Annual Physical, patient has other medical issues that need to be addressed, as well as their associated prescription management that is separate from today's Physical  - as documented separately below the Annual Physical portion of this encounter.        This note was created by combination of typed  and MModal dictation.  Transcription errors may be present.  If there are any questions, please contact me.          ----------------------------------------------------------------------------------------------------------------------      HPI:  Patient Care Team:  Angel Luis Boo MD as PCP - General (Family Medicine)  Walter Díaz MD as Consulting Physician (Nephrology)    Elbert is a 66 y.o. male with      Patient Active Problem List   Diagnosis    -Diabetes mellitus due to underlying " condition with diabetic nephropathy    Proteinuria    -Essential (primary) hypertension    Mixed hyperlipidemia    -CKD (chronic kidney disease) stage 4, GFR 15-29 ml/min    -Gout    Anemia of chronic disease    -HLD associated with type 2 DM    Hypoglycemia    Hyperkalemia    Acute kidney injury superimposed on chronic kidney disease    -Dialysis patient - MWF - started 5/2022         Elbert has PMHx as noted above and presents today for:  Hospital Follow Up, Diabetes, and Chronic Kidney Disease    In addition to addressing the reasons for this office visit as above, which is further discussed and addressed in the separate E&M section of this note, patient also due for routine Annual checkup. Health maintenance-wise, he is due for colon cancer screening.      Additional ROS      CONST: no fever, +activity change, weight stable.   EYES: no vision change.   ENT: no sore throat. No dysphagia.   CV: no CP with exertion, +leg swelling  RESP: no SOB  GI: no N/V/diarrhea/constipation  : no urinary concerns  MSK: no new myalgias or arthralgias.   SKIN: no new rashes  NEURO: no focal deficits.   PSYCH: no new issues.   ENDOCRINE: no polyuria.             Current Medications  Medications reviewed and updated.       Current Outpatient Medications:     acetaminophen (TYLENOL) 500 MG tablet, Take 2 tablets (1,000 mg total) by mouth every 6 (six) hours as needed for Pain., Disp: 30 tablet, Rfl: 0    atorvastatin (LIPITOR) 20 MG tablet, TAKE 1 TABLET BY MOUTH EVERY EVENING, Disp: 90 tablet, Rfl: 3    blood sugar diagnostic Strp, Use to check blood glucose twice a day., Disp: 50 each, Rfl: 0    blood-glucose meter Misc, Use to check blood glucose twice a day., Disp: 1 each, Rfl: 0    carvediloL (COREG) 12.5 MG tablet, Take 1 tablet (12.5 mg total) by mouth 2 (two) times daily., Disp: 60 tablet, Rfl: 11    fluticasone propionate (FLONASE) 50 mcg/actuation nasal spray, 2 sprays (100 mcg total) by Each Nostril  route once daily., Disp: 16 g, Rfl: 11    furosemide (LASIX) 80 MG tablet, Take 1 tablet (80 mg total) by mouth 2 (two) times daily., Disp: 60 tablet, Rfl: 2    lancets 30 gauge Misc, Use to check blood glucose twice a day., Disp: 100 each, Rfl: 5    NIFEdipine (PROCARDIA-XL) 60 MG (OSM) 24 hr tablet, Take 1 tablet (60 mg total) by mouth 2 (two) times a day., Disp: 60 tablet, Rfl: 11    sevelamer (RENAGEL) 400 MG Tab, Take 1 tablet (400 mg total) by mouth 3 (three) times daily with meals., Disp: 90 tablet, Rfl: 11    tamsulosin (FLOMAX) 0.4 mg Cap, Take 1 capsule (0.4 mg total) by mouth once daily., Disp: 30 capsule, Rfl: 3    colchicine (COLCRYS) 0.6 mg tablet, Take half tab (0.3 mg) by mouth daily as needed for gout flare., Disp: 90 tablet, Rfl: 3    sevelamer HCL (RENAGEL) 800 MG Tab, Take 1 tablet by mouth twice a day with meals. (Patient not taking: Reported on 5/16/2022), Disp: 60 tablet, Rfl: 11    Past Surgical History:   Procedure Laterality Date    ADENOIDECTOMY      nasal septum repair      Tonsillectomy      TONSILLECTOMY         Family History   Problem Relation Age of Onset    No Known Problems Mother     No Known Problems Father     No Known Problems Sister     No Known Problems Brother     No Known Problems Maternal Aunt     No Known Problems Maternal Uncle     No Known Problems Paternal Aunt     No Known Problems Paternal Uncle     No Known Problems Maternal Grandmother     No Known Problems Maternal Grandfather     No Known Problems Paternal Grandmother     No Known Problems Paternal Grandfather     Cancer Neg Hx         Negative for prostate or colon cancer    Kidney disease Neg Hx     Amblyopia Neg Hx     Blindness Neg Hx     Cataracts Neg Hx     Diabetes Neg Hx     Glaucoma Neg Hx     Hypertension Neg Hx     Macular degeneration Neg Hx     Retinal detachment Neg Hx     Strabismus Neg Hx     Stroke Neg Hx     Thyroid disease Neg Hx        Social History  "    Socioeconomic History    Marital status:    Occupational History     Employer: [employer]   Tobacco Use    Smoking status: Never Smoker    Smokeless tobacco: Never Used    Tobacco comment: .  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     Substance and Sexual Activity    Alcohol use: No    Drug use: No    Sexual activity: Yes     Partners: Female           Allergies   Review of patient's allergies indicates:   Allergen Reactions    Iodine and iodide containing products Hives     Hypotension, Flushing             Review of Systems  See HPI      [unfilled]  /68   Pulse 75   Temp 98.4 °F (36.9 °C) (Oral)   Ht 6' 3" (1.905 m)   Wt 110.3 kg (243 lb 2.7 oz)   SpO2 98%   BMI 30.39 kg/m²       GEN: NAD, well developed, pleasant, well nourished  HEENT: NCAT, PERRLA, EOMI, sclera clear, anicteric, bilateral ear exam wnl, O/P clear, MMM with no lesions  NECK: normal, supple with midline trachea, no LAD, no thyromegaly  LUNGS: CTAB, no w/r/r, no increased work of breathing   HEART: RRR, normal S1 and S2, no m/r/g, +mild ankle edema  ABD: s/nt/nd, NABS  SKIN: normal turgor, no rashes  PSYCH: AOx3, appropriate mood and affect  MSK: warm/well perfused, normal ROM in all extremities, no c/c.  NEURO: normal without focal findings, CN II-XII are grossly intact.  Sensation/strength grossly normal, gait and station normal.     __________________________________________________________________________________________________________________________________      Additional Evaluation & Management issues:     In addition to today's Annual Physical, patient has other medical issues that need to be addressed, as well as their associated prescription management that is separate from today's Physical  - as documented separately below      HPI:    Elbert has PMHx as noted above and presents today for:  Hospital Follow Up, Diabetes, and Chronic Kidney Disease    [facility] " "over 2 weeks ago after his wife found his BG to be 24 at home - he denies any fatigue but she was concerned that he may have stroke like Sx due to slurring of speech. He admits that he took his Glipizide medication that day and hadn't eaten any meals. He was given an amp of D50 by EMS and per his wife his slurred speech had resolved, BG went up to the 200s in the ED. No prior Hx of hypoglycemia. He was also found to have KAYODE/hyperkalemia on admission, for which Nephrology was consulted and patient as started on Lasix/Lokelma. His Glipizide was also discontinued. His BP was found to be elevated and his Coreg dose was increase. He was discharged home with and set up for outpatient HD on MWF, which he has been going to as scheduled. Patient has been doing well since discharge without any N/V/F/C/abd pain/SOB/ALLEN/CP.  His morning FBG has been in the 110s the past week.        Additional ROS    As per HPI          Physical Exam  /68   Pulse 75   Temp 98.4 °F (36.9 °C) (Oral)   Ht 6' 3" (1.905 m)   Wt 110.3 kg (243 lb 2.7 oz)   SpO2 98%   BMI 30.39 kg/m²       GEN: NAD, well developed, pleasant, well nourished  HEENT: NCAT, PERRLA, EOMI, sclera clear, anicteric  NECK: normal, supple with midline trachea, no LAD, no thyromegaly  LUNGS: CTAB, no w/r/r, no increased work of breathing   HEART: RRR, normal S1 and S2, no m/r/g, +mild ankle edema  ABD: s/nt/nd, NABS  SKIN: normal turgor, no rashes  PSYCH: AOx3, appropriate mood and affect  MSK: warm/well perfused, normal ROM in all extremities, no c/c.  FOOT:  Protective Sensation (w/ 10 gram monofilament):  Right: Intact  Left: Intact    Visual Inspection:  Normal -  Bilateral    Pedal Pulses:   Right: Present  Left: Present    Posterior tibialis:   Right:Present  Left: Present                          Assessment/Plan:  Elbert Still Jr. is a 66 y.o. male who presents today for :      Hospital discharge follow-up  Acute kidney injury superimposed on chronic kidney " disease  - resolved  -     Comprehensive Metabolic Panel; Future; Expected date: 05/16/2022  Proteinuria, unspecified type  -     Microalbumin/Creatinine Ratio, Urine; Future; Expected date: 05/16/2022  Acute kidney injury- resolved  Hypoglycemia - resolved  Hypomagnesemia - resolved  -Dialysis patient - [facility] - started 5/2022  -doing well post-discharge, continue current medications. follow up Nephrology as outpatient. Continue with renal diet, regular cardiovascular exercises  -weight loss  -call clinic back with any concerns      Diabetes mellitus due to underlying condition with diabetic nephropathy, without long-term current use of insulin  -     Hemoglobin A1C; Future; Expected date: 05/16/2022  -     CBC Without Differential; Future; Expected date: 05/16/2022  -     Comprehensive Metabolic Panel; Future; Expected date: 05/16/2022  -     Microalbumin/Creatinine Ratio, Urine; Future; Expected date: 05/16/2022  -previous A1c reviewed:   Lab Results   Component Value Date    HGBA1C 5.0 05/11/2022     -off Glipizide, advised to continue to monitor BG daily. Reviewed with patient about routine diabetic care. Target morning BS  and meal-time BS <180  -weight loss and regular physical excercise      Essential (primary) hypertension  Anemia of chronic disease  Hyperlipidemia associated with type 2 diabetes mellitus  -     Lipid Panel; Future; Expected date: 05/16/2022  -continue current medication regimen  -regular cardiovascular exercises for weight loss    Gout, unspecified cause, unspecified chronicity, unspecified site  -stable, continue current regimen             Follow up 4mo

## 2022-05-19 LAB
ALBUMIN SERPL-MCNC: 3.9 G/DL (ref 3.5–5.2)
ALBUMIN/GLOB SERPL ELPH: 1.4 {RATIO} (ref 1–2)
ALP SERPL-CCNC: 161 U/L (ref 40–129)
ALT SERPL W P-5'-P-CCNC: 13 U/L (ref 7–52)
AST SERPL-CCNC: 21 U/L (ref 13–39)
BASOPHILS NFR BLD: 0.9 % (ref 0–1.5)
BILIRUB SERPL-MCNC: 0.2 MG/DL (ref 0.15–1.2)
BUN, POST: 17 MG/DL (ref 6–19)
BUN, PRE: 43 MG/DL (ref 6–19)
BUN/CREAT SERPL: 5.3 (ref 10–20)
CALCIUM SERPL-MCNC: 8.3 MG/DL (ref 8.7–10.4)
CALCIUM, CORRECTED: 8.4 MG/DL (ref 8.7–10.4)
CHLORIDE: 106 MEQ/L (ref 96–108)
CREAT SERPL-MCNC: 8.11 MG/DL (ref 0.6–1.3)
EOSINOPHIL NFR BLD: 4.3 % (ref 0–7)
ERYTHROCYTE [DISTWIDTH] IN BLOOD BY AUTOMATED COUNT: 15.3 % (ref 11.5–14.5)
GLOBULIN: 2.7 G/DL (ref 2–4)
GLUCOSE SERPL-MCNC: 173 MG/DL (ref 70–100)
HCT VFR BLD AUTO: 27.5 % (ref 42–52)
HEMOGLOBIN X 3: 26.4 % (ref 42–54)
HGB BLD-MCNC: 8.8 G/DL (ref 14–18)
LUC: 4.6 % (ref 0–4)
LYMPH%: 16.1 % (ref 19–48)
MCH RBC QN AUTO: 29.1 PG (ref 27–31)
MCHC RBC AUTO-ENTMCNC: 32 G/DL (ref 30–36)
MCV RBC AUTO: 91 FL (ref 80–100)
MONO%: 11.8 % (ref 3–10)
NEUTROPHILS NFR BLD: 62.3 % (ref 40–75)
POTASSIUM SERPL-SCNC: 4.2 MEQ/L (ref 3.5–5.1)
PROT SERPL-MCNC: 6.6 G/DL (ref 6–8.5)
RBC # BLD AUTO: 3.02 MILL/MCL (ref 4.7–6.1)
SODIUM BLD-SCNC: 140 MEQ/L (ref 136–145)
SPKT/V, DAUGIRDAS II: 1
UREA REDUCTION RATIO: 60 % (ref 65–80)
WBC # BLD AUTO: 4.75 1000/MCL (ref 4.8–10.8)

## 2022-05-24 LAB
BSA (DUBOIS): 2.26 SQ. M.
CREAT SERPL-MCNC: 8.65 MG/DL (ref 0.6–1.3)

## 2022-06-02 LAB
BASOPHILS NFR BLD: 0.8 % (ref 0–1.5)
BSA (DUBOIS): 2.41 SQ. M.
BUN/CREAT SERPL: 5.9 (ref 10–20)
CREAT 24H UR-MRATE: 0.9 G/24 HR (ref 0.7–1.8)
CREAT CLEAR, URINE NORM: 5 ML/MIN (ref 94–122)
CREATININE, TIMED URINE: 88.7 MG/DL
EOSINOPHIL NFR BLD: 5.9 % (ref 0–7)
ERYTHROCYTE [DISTWIDTH] IN BLOOD BY AUTOMATED COUNT: 15.4 % (ref 11.5–14.5)
HCT VFR BLD AUTO: 33.6 % (ref 42–52)
HEMOGLOBIN X 3: 31.2 % (ref 42–54)
HGB BLD-MCNC: 10.4 G/DL (ref 14–18)
LUC: 2.9 % (ref 0–4)
LYMPH%: 23 % (ref 19–48)
MCH RBC QN AUTO: 27.9 PG (ref 27–31)
MCHC RBC AUTO-ENTMCNC: 30.8 G/DL (ref 30–36)
MCV RBC AUTO: 91 FL (ref 80–100)
MONO%: 6.4 % (ref 3–10)
NEUTROPHILS NFR BLD: 60.9 % (ref 40–75)
PLATELET # BLD AUTO: 267 1000/MCL (ref 130–400)
RBC # BLD AUTO: 3.71 MILL/MCL (ref 4.7–6.1)
URINE CREATININE CLEARANCE: 6.9 ML/MIN
WBC # BLD AUTO: 3.96 1000/MCL (ref 4.8–10.8)

## 2022-06-04 LAB — HBV SURFACE AG SERPL QL IA: NEGATIVE

## 2022-06-07 LAB
ALBUMIN SERPL-MCNC: 4.2 G/DL (ref 3.5–5.2)
ALP SERPL-CCNC: 177 U/L (ref 40–129)
ALT SERPL W P-5'-P-CCNC: 8 U/L (ref 7–52)
BICARBONATE: 22 MEQ/L (ref 20–31)
BUN, POST: 22 MG/DL (ref 6–19)
BUN, PRE: 55 MG/DL (ref 6–19)
BUN/CREAT SERPL: 5.1 (ref 10–20)
CALCIUM PHOS PRODUCT: 71 (ref 0–54)
CALCIUM SERPL-MCNC: 8.5 MG/DL (ref 8.7–10.4)
CHLORIDE: 100 MEQ/L (ref 96–108)
CREAT SERPL-MCNC: 10.72 MG/DL (ref 0.6–1.3)
IRON: 34 MCG/DL (ref 45–160)
PHOSPHATE FLD-MCNC: 8.4 MG/DL (ref 2.6–4.5)
POTASSIUM SERPL-SCNC: 4.5 MEQ/L (ref 3.5–5.1)
SODIUM BLD-SCNC: 137 MEQ/L (ref 136–145)
SPKT/V, DAUGIRDAS II: 1.02
TOTAL IRON BINDING CAPACITY: 252 MCG/DL (ref 185–515)
TRANSFERRIN SATURATION: 13 % (ref 20–55)
UIBC SERPL-MCNC: 218 MCG/DL (ref 155–355)
UREA REDUCTION RATIO: 60 % (ref 65–80)

## 2022-06-09 LAB
ALBUMIN SERPL-MCNC: 4.3 G/DL (ref 3.5–5.2)
ALBUMIN/GLOB SERPL ELPH: 1.5 {RATIO} (ref 1–2)
ALP SERPL-CCNC: 176 U/L (ref 40–129)
ALT SERPL W P-5'-P-CCNC: 9 U/L (ref 7–52)
AST SERPL-CCNC: 16 U/L (ref 13–39)
BASOPHILS NFR BLD: 0.4 % (ref 0–1.5)
BILIRUB SERPL-MCNC: 0.17 MG/DL (ref 0.15–1.2)
BUN, PRE: 52 MG/DL (ref 6–19)
BUN/CREAT SERPL: 5.3 (ref 10–20)
CALCIUM SERPL-MCNC: 9.1 MG/DL (ref 8.7–10.4)
CHLORIDE: 100 MEQ/L (ref 96–108)
CREAT SERPL-MCNC: 9.83 MG/DL (ref 0.6–1.3)
EOSINOPHIL NFR BLD: 5.1 % (ref 0–7)
ERYTHROCYTE [DISTWIDTH] IN BLOOD BY AUTOMATED COUNT: 15 % (ref 11.5–14.5)
GLOBULIN: 2.9 G/DL (ref 2–4)
GLUCOSE SERPL-MCNC: 171 MG/DL (ref 70–100)
HCT VFR BLD AUTO: 33 % (ref 42–52)
HEMOGLOBIN X 3: 31.5 % (ref 42–54)
HGB BLD-MCNC: 10.5 G/DL (ref 14–18)
LUC: 3.5 % (ref 0–4)
LYMPH%: 29.6 % (ref 19–48)
MCH RBC QN AUTO: 28.7 PG (ref 27–31)
MCHC RBC AUTO-ENTMCNC: 31.8 G/DL (ref 30–36)
MCV RBC AUTO: 90 FL (ref 80–100)
MONO%: 8.6 % (ref 3–10)
NEUTROPHILS NFR BLD: 52.8 % (ref 40–75)
POTASSIUM SERPL-SCNC: 4.2 MEQ/L (ref 3.5–5.1)
PROT SERPL-MCNC: 7.2 G/DL (ref 6–8.5)
RBC # BLD AUTO: 3.66 MILL/MCL (ref 4.7–6.1)
SODIUM BLD-SCNC: 136 MEQ/L (ref 136–145)
WBC # BLD AUTO: 4.85 1000/MCL (ref 4.8–10.8)

## 2022-06-11 LAB — CO2 SERPL-SCNC: 19 MMOL/L (ref 22–29)

## 2022-06-16 LAB
ALBUMIN SERPL-MCNC: 4.1 G/DL (ref 3.5–5.2)
ALBUMIN/GLOB SERPL ELPH: 1.5 {RATIO} (ref 1–2)
ALP SERPL-CCNC: 183 U/L (ref 40–129)
ALT SERPL W P-5'-P-CCNC: 10 U/L (ref 7–52)
AST SERPL-CCNC: 15 U/L (ref 13–39)
BASOPHILS NFR BLD: 0.7 % (ref 0–1.5)
BILIRUB SERPL-MCNC: 0.19 MG/DL (ref 0.15–1.2)
BUN, PRE: 52 MG/DL (ref 6–19)
BUN/CREAT SERPL: 5.2 (ref 10–20)
CALCIUM PHOS PRODUCT: 63 (ref 0–54)
CALCIUM SERPL-MCNC: 8.7 MG/DL (ref 8.7–10.4)
CHLORIDE: 101 MEQ/L (ref 96–108)
CREAT SERPL-MCNC: 9.94 MG/DL (ref 0.6–1.3)
EOSINOPHIL NFR BLD: 5 % (ref 0–7)
ERYTHROCYTE [DISTWIDTH] IN BLOOD BY AUTOMATED COUNT: 15.6 % (ref 11.5–14.5)
GLOBULIN: 2.8 G/DL (ref 2–4)
GLUCOSE SERPL-MCNC: 194 MG/DL (ref 70–100)
HCT VFR BLD AUTO: 34.1 % (ref 42–52)
HEMOGLOBIN X 3: 31.2 % (ref 42–54)
HGB BLD-MCNC: 10.4 G/DL (ref 14–18)
LUC: 4.6 % (ref 0–4)
LYMPH%: 28 % (ref 19–48)
MCH RBC QN AUTO: 27.9 PG (ref 27–31)
MCHC RBC AUTO-ENTMCNC: 30.5 G/DL (ref 30–36)
MCV RBC AUTO: 91 FL (ref 80–100)
MONO%: 9.7 % (ref 3–10)
NEUTROPHILS NFR BLD: 52 % (ref 40–75)
PHOSPHATE FLD-MCNC: 7.2 MG/DL (ref 2.6–4.5)
POTASSIUM SERPL-SCNC: 4 MEQ/L (ref 3.5–5.1)
PROT SERPL-MCNC: 6.9 G/DL (ref 6–8.5)
PTH, INTACT: 1665 PG/ML (ref 16–80)
RBC # BLD AUTO: 3.74 MILL/MCL (ref 4.7–6.1)
SODIUM BLD-SCNC: 137 MEQ/L (ref 136–145)
WBC # BLD AUTO: 5.1 1000/MCL (ref 4.8–10.8)

## 2022-06-17 LAB — CO2 SERPL-SCNC: 20 MMOL/L (ref 22–29)

## 2022-06-20 ENCOUNTER — TELEPHONE (OUTPATIENT)
Dept: FAMILY MEDICINE | Facility: CLINIC | Age: 67
End: 2022-06-20
Payer: COMMERCIAL

## 2022-06-20 DIAGNOSIS — E08.21 DIABETES MELLITUS DUE TO UNDERLYING CONDITION WITH DIABETIC NEPHROPATHY, WITHOUT LONG-TERM CURRENT USE OF INSULIN: Primary | ICD-10-CM

## 2022-06-20 NOTE — TELEPHONE ENCOUNTER
----- Message from Jose Luis Fuller MA sent at 6/20/2022  2:55 PM CDT -----  Regarding: FW: Refill request    ----- Message -----  From: Aline Luciano  Sent: 6/20/2022   2:50 PM CDT  To: Ema Hein Staff  Subject: Refill request                                   Type:  RX Refill Request    Who Called: MAISHA ASHLEY JR. [[id number]]    Refill or New Rx: Reifll     RX Name and Strength: blood sugar diagnostic Strp One touch     How is the patient currently taking it? (ex. 1XDay) 1xday     Is this a 30 day or 90 day RX: 90 days     [facility] with phone number[id number] - [place] - [address] AT [facility]    Local or Mail Order: local     Ordering Provider: Herman Tim Call Back Number: [phone]    Additional Information:

## 2022-06-20 NOTE — TELEPHONE ENCOUNTER
Diabetes mellitus due to underlying condition with diabetic nephropathy, without long-term current use of insulin  -     blood sugar diagnostic Strp; Use to check blood glucose once a day.  Dispense: 100 each; Refill: 3

## 2022-06-20 NOTE — TELEPHONE ENCOUNTER
----- Message from Aline Luciano sent at 6/20/2022  2:48 PM CDT -----  Regarding: Refill request  Type:  RX Refill Request    Who Called: MAISHA ASHLEY JR. [[id number]]    Refill or New Rx: Reifll     RX Name and Strength: blood sugar diagnostic Strp One touch     How is the patient currently taking it? (ex. 1XDay) 1xday     Is this a 30 day or 90 day RX: 90 days     Preferred Pharmacy with phone number: [facility] #[id number] - [place], [place] - [address] AT [facility]    Local or Mail Order: local     Ordering Provider: Herman Tim Call Back Number: [phone]    Additional Information:

## 2022-06-23 LAB
ALBUMIN SERPL-MCNC: 4.1 G/DL (ref 3.5–5.2)
ALBUMIN/GLOB SERPL ELPH: 1.3 {RATIO} (ref 1–2)
ALP SERPL-CCNC: 184 U/L (ref 40–129)
ALT SERPL W P-5'-P-CCNC: 11 U/L (ref 7–52)
AST SERPL-CCNC: 17 U/L (ref 13–39)
BASOPHILS NFR BLD: 0.7 % (ref 0–1.5)
BILIRUB SERPL-MCNC: 0.19 MG/DL (ref 0.15–1.2)
BUN, PRE: 45 MG/DL (ref 6–19)
BUN/CREAT SERPL: 5.4 (ref 10–20)
CALCIUM SERPL-MCNC: 8.9 MG/DL (ref 8.7–10.4)
CHLORIDE: 101 MEQ/L (ref 96–108)
CREAT SERPL-MCNC: 8.3 MG/DL (ref 0.6–1.3)
EOSINOPHIL NFR BLD: 5.4 % (ref 0–7)
ERYTHROCYTE [DISTWIDTH] IN BLOOD BY AUTOMATED COUNT: 15 % (ref 11.5–14.5)
GLOBULIN: 3.1 G/DL (ref 2–4)
GLUCOSE SERPL-MCNC: 171 MG/DL (ref 70–100)
HCT VFR BLD AUTO: 35 % (ref 42–52)
HEMOGLOBIN X 3: 32.4 % (ref 42–54)
HGB BLD-MCNC: 10.8 G/DL (ref 14–18)
LUC: 4 % (ref 0–4)
LYMPH%: 25.2 % (ref 19–48)
MCH RBC QN AUTO: 27.4 PG (ref 27–31)
MCHC RBC AUTO-ENTMCNC: 30.8 G/DL (ref 30–36)
MCV RBC AUTO: 89 FL (ref 80–100)
MONO%: 8 % (ref 3–10)
NEUTROPHILS NFR BLD: 56.7 % (ref 40–75)
POTASSIUM SERPL-SCNC: 4.3 MEQ/L (ref 3.5–5.1)
PROT SERPL-MCNC: 7.2 G/DL (ref 6–8.5)
PTH, INTACT: 1650 PG/ML (ref 16–80)
RBC # BLD AUTO: 3.92 MILL/MCL (ref 4.7–6.1)
SODIUM BLD-SCNC: 138 MEQ/L (ref 136–145)
WBC # BLD AUTO: 4.21 1000/MCL (ref 4.8–10.8)

## 2022-06-24 LAB — CO2 SERPL-SCNC: 20 MMOL/L (ref 22–29)

## 2022-07-07 LAB
25(OH)D3 SERPL-MCNC: 36.5 NG/ML (ref 30–100)
ALBUMIN SERPL-MCNC: 4.3 G/DL (ref 3.5–5.2)
ALP SERPL-CCNC: 227 U/L (ref 40–129)
ALT SERPL W P-5'-P-CCNC: 11 U/L (ref 7–52)
BICARBONATE: 23 MEQ/L (ref 20–31)
BUN, PRE: 48 MG/DL (ref 6–19)
BUN/CREAT SERPL: 6.1 (ref 10–20)
CALCIUM PHOS PRODUCT: 61 (ref 0–54)
CALCIUM SERPL-MCNC: 8.9 MG/DL (ref 8.7–10.4)
CHLORIDE: 102 MEQ/L (ref 96–108)
CREAT SERPL-MCNC: 7.84 MG/DL (ref 0.6–1.3)
FERRITIN SERPL-MCNC: 158 NG/ML (ref 22–322)
GLUCOSE SERPL-MCNC: 166 MG/DL (ref 70–100)
IRON: 37 MCG/DL (ref 45–160)
PHOSPHATE FLD-MCNC: 6.8 MG/DL (ref 2.6–4.5)
POTASSIUM SERPL-SCNC: 4.7 MEQ/L (ref 3.5–5.1)
SODIUM BLD-SCNC: 138 MEQ/L (ref 136–145)
TOTAL IRON BINDING CAPACITY: 240 MCG/DL (ref 185–515)
TRANSFERRIN SATURATION: 15 % (ref 20–55)
UIBC SERPL-MCNC: 203 MCG/DL (ref 155–355)

## 2022-07-08 ENCOUNTER — TELEPHONE (OUTPATIENT)
Dept: FAMILY MEDICINE | Facility: CLINIC | Age: 67
End: 2022-07-08
Payer: COMMERCIAL

## 2022-07-08 LAB
BASOPHILS NFR BLD: 0.9 % (ref 0–1.5)
BUN, POST: 18 MG/DL (ref 6–19)
EOSINOPHIL NFR BLD: 4.8 % (ref 0–7)
ERYTHROCYTE [DISTWIDTH] IN BLOOD BY AUTOMATED COUNT: 15.8 % (ref 11.5–14.5)
HBA1C MFR BLD: 4.8 % (ref 4.8–5.9)
HBV SURFACE AB SER-ACNC: <10 MIU/ML
HBV SURFACE AG SERPL QL IA: NEGATIVE
HCT VFR BLD AUTO: 38.6 % (ref 42–52)
HCV AB SERPL QL IA: NONREACTIVE
HCV S/CO RATIO(INDEX): 0.08 (ref 0–0.79)
HEMOGLOBIN X 3: 35.4 % (ref 42–54)
HGB BLD-MCNC: 11.8 G/DL (ref 14–18)
LUC: 3.3 % (ref 0–4)
LYMPH%: 25.5 % (ref 19–48)
MCH RBC QN AUTO: 28.9 PG (ref 27–31)
MCHC RBC AUTO-ENTMCNC: 30.5 G/DL (ref 30–36)
MCV RBC AUTO: 95 FL (ref 80–100)
MONO%: 9.1 % (ref 3–10)
NEUTROPHILS NFR BLD: 56.4 % (ref 40–75)
PLATELET # BLD AUTO: 185 1000/MCL (ref 130–400)
PTH, INTACT: 1819 PG/ML (ref 16–80)
RBC # BLD AUTO: 4.09 MILL/MCL (ref 4.7–6.1)
SPKT/V, DAUGIRDAS II: 1.09
UREA REDUCTION RATIO: 63 % (ref 65–80)
WBC # BLD AUTO: 4.06 1000/MCL (ref 4.8–10.8)

## 2022-07-08 NOTE — TELEPHONE ENCOUNTER
----- Message from Cuca Silveira sent at 7/8/2022 10:13 AM CDT -----  Regarding: request for an updated Rx for test strips  Type:  Pharmacy Calling to Clarify an RX    Name of Caller: Danuta     Pharmacy Name: [facility]     Prescription Name: test strips     What do they need to clarify? Danuta from [facility] called to get a new Rx for the patient's strips. The patient's current Rx has him testing 1x a day, but the patient stated that he tests 2x a day. Please send over new Rx at earliest convenience.    Can you be contacted via [facility]?no    Best Call Back Number: [phone]

## 2022-07-12 PROBLEM — N18.6 ESRD (END STAGE RENAL DISEASE) ON DIALYSIS: Status: ACTIVE | Noted: 2022-07-12

## 2022-07-12 PROBLEM — Z99.2 ESRD (END STAGE RENAL DISEASE) ON DIALYSIS: Status: ACTIVE | Noted: 2022-07-12

## 2022-07-12 LAB
BUN, POST: 15 MG/DL (ref 6–19)
BUN, PRE: 46 MG/DL (ref 6–19)
SPKT/V, DAUGIRDAS II: 1.24
UREA REDUCTION RATIO: 67 % (ref 65–80)

## 2022-07-19 LAB
HCT VFR BLD AUTO: 37.4 % (ref 42–52)
HEMOGLOBIN X 3: 34.5 % (ref 42–54)
HGB BLD-MCNC: 11.5 G/DL (ref 14–18)

## 2022-07-21 LAB
HCT VFR BLD AUTO: 34.2 % (ref 42–52)
HEMOGLOBIN X 3: 33.3 % (ref 42–54)
HGB BLD-MCNC: 11.1 G/DL (ref 14–18)

## 2022-07-28 LAB
HCT VFR BLD AUTO: 36.1 % (ref 42–52)
HEMOGLOBIN X 3: 33.6 % (ref 42–54)
HGB BLD-MCNC: 11.2 G/DL (ref 14–18)

## 2022-08-01 ENCOUNTER — TELEPHONE (OUTPATIENT)
Dept: VASCULAR SURGERY | Facility: CLINIC | Age: 67
End: 2022-08-01
Payer: COMMERCIAL

## 2022-08-01 DIAGNOSIS — Z99.2 ESRD (END STAGE RENAL DISEASE) ON DIALYSIS: Primary | ICD-10-CM

## 2022-08-01 DIAGNOSIS — N18.6 ESRD (END STAGE RENAL DISEASE) ON DIALYSIS: Primary | ICD-10-CM

## 2022-08-01 NOTE — TELEPHONE ENCOUNTER
Attempted to contact the pt to schedule an appt but no answer.Left a voice message with a call back number [phone].  ----- Message from Joaquin Kidd sent at 8/1/2022  2:05 PM CDT -----  Regarding: Oly with [facility]      The caller states that they are referring the Pt to be sched for a permanent access for dialysis.    Please contact the Pt to sched.    # [phone]

## 2022-08-05 LAB
ALBUMIN SERPL-MCNC: 4.3 G/DL (ref 3.5–5.2)
ALP SERPL-CCNC: 185 U/L (ref 40–129)
ALT SERPL W P-5'-P-CCNC: 8 U/L (ref 7–52)
BASOPHILS NFR BLD: 1.1 % (ref 0–1.5)
BICARBONATE: 26 MEQ/L (ref 20–31)
BUN, POST: 18 MG/DL (ref 6–19)
BUN, PRE: 53 MG/DL (ref 6–19)
BUN/CREAT SERPL: 6.3 (ref 10–20)
CALCIUM PHOS PRODUCT: 74 (ref 0–54)
CALCIUM SERPL-MCNC: 8.8 MG/DL (ref 8.7–10.4)
CHLORIDE: 102 MEQ/L (ref 96–108)
CREAT SERPL-MCNC: 8.43 MG/DL (ref 0.6–1.3)
EOSINOPHIL NFR BLD: 4.8 % (ref 0–7)
ERYTHROCYTE [DISTWIDTH] IN BLOOD BY AUTOMATED COUNT: 15.4 % (ref 11.5–14.5)
FERRITIN SERPL-MCNC: 229 NG/ML (ref 22–322)
GLUCOSE SERPL-MCNC: 164 MG/DL (ref 70–100)
HBV SURFACE AG SERPL QL IA: NEGATIVE
HCT VFR BLD AUTO: 35.4 % (ref 42–52)
HEMOGLOBIN X 3: 33.6 % (ref 42–54)
HGB BLD-MCNC: 11.2 G/DL (ref 14–18)
IRON: 49 MCG/DL (ref 45–160)
LUC: 4.5 % (ref 0–4)
LYMPH%: 27.9 % (ref 19–48)
MCH RBC QN AUTO: 28.8 PG (ref 27–31)
MCHC RBC AUTO-ENTMCNC: 31.8 G/DL (ref 30–36)
MCV RBC AUTO: 91 FL (ref 80–100)
MONO%: 8.4 % (ref 3–10)
NEUTROPHILS NFR BLD: 53.4 % (ref 40–75)
PHOSPHATE FLD-MCNC: 8.4 MG/DL (ref 2.6–4.5)
PLATELET # BLD AUTO: 209 1000/MCL (ref 130–400)
POTASSIUM SERPL-SCNC: 4.2 MEQ/L (ref 3.5–5.1)
PTH, INTACT: 1489 PG/ML (ref 16–80)
RBC # BLD AUTO: 3.9 MILL/MCL (ref 4.7–6.1)
SODIUM BLD-SCNC: 141 MEQ/L (ref 136–145)
SPKT/V, DAUGIRDAS II: 1.2
TOTAL IRON BINDING CAPACITY: 231 MCG/DL (ref 185–515)
TRANSFERRIN SATURATION: 21 % (ref 20–55)
UIBC SERPL-MCNC: 182 MCG/DL (ref 155–355)
UREA REDUCTION RATIO: 66 % (ref 65–80)
WBC # BLD AUTO: 4.1 1000/MCL (ref 4.8–10.8)

## 2022-08-10 ENCOUNTER — HOSPITAL ENCOUNTER (OUTPATIENT)
Dept: VASCULAR SURGERY | Facility: CLINIC | Age: 67
Discharge: HOME | End: 2022-08-10
Attending: SURGERY
Payer: COMMERCIAL

## 2022-08-10 ENCOUNTER — LAB VISIT (OUTPATIENT)
Dept: LAB | Facility: HOSPITAL | Age: 67
End: 2022-08-10
Attending: SURGERY
Payer: COMMERCIAL

## 2022-08-10 ENCOUNTER — INITIAL CONSULT (OUTPATIENT)
Dept: VASCULAR SURGERY | Facility: CLINIC | Age: 67
End: 2022-08-10
Payer: COMMERCIAL

## 2022-08-10 VITALS
WEIGHT: 218.25 LBS | DIASTOLIC BLOOD PRESSURE: 80 MMHG | HEIGHT: 75 IN | SYSTOLIC BLOOD PRESSURE: 147 MMHG | TEMPERATURE: 98 F | HEART RATE: 72 BPM | BODY MASS INDEX: 27.14 KG/M2

## 2022-08-10 DIAGNOSIS — Z01.818 PRE-OP EXAM: ICD-10-CM

## 2022-08-10 DIAGNOSIS — Z01.818 PREOP TESTING: ICD-10-CM

## 2022-08-10 DIAGNOSIS — Z99.2 ESRD (END STAGE RENAL DISEASE) ON DIALYSIS: ICD-10-CM

## 2022-08-10 DIAGNOSIS — N18.6 ESRD (END STAGE RENAL DISEASE) ON DIALYSIS: ICD-10-CM

## 2022-08-10 DIAGNOSIS — N18.6 ESRD (END STAGE RENAL DISEASE) ON DIALYSIS: Primary | ICD-10-CM

## 2022-08-10 DIAGNOSIS — N18.6 ESRD (END STAGE RENAL DISEASE): Primary | ICD-10-CM

## 2022-08-10 DIAGNOSIS — Z99.2 ESRD (END STAGE RENAL DISEASE) ON DIALYSIS: Primary | ICD-10-CM

## 2022-08-10 LAB
ANION GAP SERPL CALC-SCNC: 14 MMOL/L (ref 8–16)
BUN SERPL-MCNC: 23 MG/DL (ref 8–23)
CALCIUM SERPL-MCNC: 9.8 MG/DL (ref 8.7–10.5)
CHLORIDE SERPL-SCNC: 97 MMOL/L (ref 95–110)
CO2 SERPL-SCNC: 26 MMOL/L (ref 23–29)
CREAT SERPL-MCNC: 6.1 MG/DL (ref 0.5–1.4)
EST. GFR  (NO RACE VARIABLE): 9.6 ML/MIN/1.73 M^2
GLUCOSE SERPL-MCNC: 147 MG/DL (ref 70–110)
POTASSIUM SERPL-SCNC: 3.8 MMOL/L (ref 3.5–5.1)
SODIUM SERPL-SCNC: 137 MMOL/L (ref 136–145)

## 2022-08-10 PROCEDURE — 93970 PR US DUPLEX, UPPER OR LOWER EXT VENOUS,COMPLETE BILAT: ICD-10-PCS | Mod: S$GLB,,, | Performed by: SURGERY

## 2022-08-10 PROCEDURE — 99204 PR OFFICE/OUTPT VISIT, NEW, LEVL IV, 45-59 MIN: ICD-10-PCS | Mod: S$GLB,,, | Performed by: SURGERY

## 2022-08-10 PROCEDURE — 80048 BASIC METABOLIC PNL TOTAL CA: CPT | Performed by: SURGERY

## 2022-08-10 PROCEDURE — 99999 PR PBB SHADOW E&M-EST. PATIENT-LVL IV: CPT | Mod: PBBFAC,,, | Performed by: SURGERY

## 2022-08-10 PROCEDURE — 99204 OFFICE O/P NEW MOD 45 MIN: CPT | Mod: S$GLB,,, | Performed by: SURGERY

## 2022-08-10 PROCEDURE — 99999 PR PBB SHADOW E&M-EST. PATIENT-LVL IV: ICD-10-PCS | Mod: PBBFAC,,, | Performed by: SURGERY

## 2022-08-10 PROCEDURE — 93970 EXTREMITY STUDY: CPT | Mod: S$GLB,,, | Performed by: SURGERY

## 2022-08-10 PROCEDURE — 36415 COLL VENOUS BLD VENIPUNCTURE: CPT | Performed by: SURGERY

## 2022-08-10 NOTE — PROGRESS NOTES
Elbert THAKKAR Tessy Pickens  08/10/2022    HPI:  Patient is a 66 y.o. male with a h/o HTN, HLD, DM, stage V CKD on HD, and gout who is here today for evaluation of permanent dialysis access. He denies any complaints today. He is currently undergoing dialysis three days a week on M, W, F via a R permacath.    Comes with wife  R-hand dominant     No MI / stroke    Renal is Dr ARIANE Díaz    [facility] [employer]    Past Medical History:   Diagnosis Date    Essential (primary) hypertension 9/21/2017     Past Surgical History:   Procedure Laterality Date    ADENOIDECTOMY      nasal septum repair      Tonsillectomy      TONSILLECTOMY       Family History   Problem Relation Age of Onset    No Known Problems Mother     No Known Problems Father     No Known Problems Sister     No Known Problems Brother     No Known Problems Maternal Aunt     No Known Problems Maternal Uncle     No Known Problems Paternal Aunt     No Known Problems Paternal Uncle     No Known Problems Maternal Grandmother     No Known Problems Maternal Grandfather     No Known Problems Paternal Grandmother     No Known Problems Paternal Grandfather     Cancer Neg Hx         Negative for prostate or colon cancer    Kidney disease Neg Hx     Amblyopia Neg Hx     Blindness Neg Hx     Cataracts Neg Hx     Diabetes Neg Hx     Glaucoma Neg Hx     Hypertension Neg Hx     Macular degeneration Neg Hx     Retinal detachment Neg Hx     Strabismus Neg Hx     Stroke Neg Hx     Thyroid disease Neg Hx      Social History     Socioeconomic History    Marital status:    Occupational History     Employer: [employer]   Tobacco Use    Smoking status: Never Smoker    Smokeless tobacco: Never Used    Tobacco comment: .  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     Substance and Sexual Activity    Alcohol use: No    Drug use: No    Sexual activity: Yes     Partners: Female       Current Outpatient  Medications:     acetaminophen (TYLENOL) 500 MG tablet, Take 2 tablets (1,000 mg total) by mouth every 6 (six) hours as needed for Pain., Disp: 30 tablet, Rfl: 0    atorvastatin (LIPITOR) 20 MG tablet, TAKE 1 TABLET BY MOUTH EVERY EVENING, Disp: 90 tablet, Rfl: 3    blood sugar diagnostic Strp, Use to check blood glucose once a day., Disp: 100 each, Rfl: 3    blood-glucose meter Misc, Use to check blood glucose twice a day., Disp: 1 each, Rfl: 0    carvediloL (COREG) 12.5 MG tablet, Take 1 tablet (12.5 mg total) by mouth 2 (two) times daily., Disp: 60 tablet, Rfl: 11    colchicine (COLCRYS) 0.6 mg tablet, Take half tab (0.3 mg) by mouth daily as needed for gout flare., Disp: 90 tablet, Rfl: 3    fluticasone propionate (FLONASE) 50 mcg/actuation nasal spray, 2 sprays (100 mcg total) by Each Nostril route once daily., Disp: 16 g, Rfl: 11    furosemide (LASIX) 80 MG tablet, Take 1 tablet (80 mg total) by mouth 2 (two) times daily., Disp: 60 tablet, Rfl: 2    lancets 30 gauge Misc, Use to check blood glucose twice a day., Disp: 100 each, Rfl: 5    NIFEdipine (PROCARDIA-XL) 60 MG (OSM) 24 hr tablet, Take 1 tablet (60 mg total) by mouth 2 (two) times a day., Disp: 60 tablet, Rfl: 11    sevelamer (RENAGEL) 400 MG Tab, Take 1 tablet (400 mg total) by mouth 3 (three) times daily with meals., Disp: 90 tablet, Rfl: 11    sevelamer HCL (RENAGEL) 800 MG Tab, Take 1 tablet by mouth twice a day with meals. (Patient not taking: Reported on 5/16/2022), Disp: 60 tablet, Rfl: 11    tamsulosin (FLOMAX) 0.4 mg Cap, Take 1 capsule (0.4 mg total) by mouth once daily., Disp: 30 capsule, Rfl: 3  No current facility-administered medications for this visit.    REVIEW OF SYSTEMS:  General: negative; ENT: negative; Allergy and Immunology: negative; Hematological and Lymphatic: Negative; Endocrine: negative; Respiratory: no cough, shortness of breath, or wheezing; Cardiovascular: no chest pain or dyspnea on exertion;  Gastrointestinal: no abdominal pain/back, change in bowel habits, or bloody stools; Genito-Urinary: no dysuria, trouble voiding, or hematuria; Musculoskeletal: negative  Neurological: no TIA or stroke symptoms; Psychiatric: no nervousness, anxiety or depression.    PHYSICAL EXAM:   Vitals:    08/10/22 1311   BP: (!) 147/80   Pulse: 72   Temp: 98.4 °F (36.9 °C)     Right Arm BP - Sittin/80 (08/10/22 1312)  Left Arm BP - Sittin/80 (08/10/22 1312)      General appearance:  Alert, well-appearing, and in no distress.  Oriented to person, place, and time   Neurological: Normal speech, no focal findings noted; CN II - XII grossly intact           Musculoskeletal: Digits/nail without cyanosis/clubbing.  Normal muscle strength/tone.                 Neck: Supple, no significant adenopathy; thyroid is not enlarged                  No  carotid bruit can be auscultated                Chest:  Clear to auscultation, no wheezes, rales or rhonchi, symmetric air entry     No use of accessory muscles             Cardiac: Normal rate and regular rhythm, S1 and S2 normal; PMI non-displaced          Abdomen: Soft, nontender, nondistended, no masses or organomegaly     No rebound tenderness noted; bowel sounds normal     Pulsatile aortic mass is not palpable.     No groin adenopathy      Extremities:   2+ L brachial and radial pulse     Positive Austin's test    LAB RESULTS:  Lab Results   Component Value Date    K 4.2 2022    K 4.7 2022    K 4.3 2022    CREATININE 8.43 (H) 2022    CREATININE 7.84 (H) 2022    CREATININE 8.30 (H) 2022     Lab Results   Component Value Date    WBC 4.10 (L) 2022    WBC 4.06 (L) 2022    WBC 4.21 (L) 2022    HCT 35.4 (L) 2022    HCT 36.1 (L) 2022    HCT 34.2 (L) 2022     2022     2022     2022     Lab Results   Component Value Date    HGBA1C 4.8 2022    HGBA1C 5.0 2022    BA1C  4.4 04/30/2022     IMAGING:  Vein mapping shows suitable L cephalic vein in a/c fossa    IMP/PLAN:     66 y.o. male with a h/o HTN, HLD, DM, stage V CKD on HD since Spring 2022, and gout -- Plan L BC AVF 9/6/22     Prince Gardiner MD DFSVS FACS   Vascular/Endovascular Surgery

## 2022-08-11 ENCOUNTER — DOCUMENTATION ONLY (OUTPATIENT)
Dept: VASCULAR SURGERY | Facility: CLINIC | Age: 67
End: 2022-08-11
Payer: COMMERCIAL

## 2022-08-11 DIAGNOSIS — N18.6 ESRD (END STAGE RENAL DISEASE): Primary | ICD-10-CM

## 2022-08-12 ENCOUNTER — OFFICE VISIT (OUTPATIENT)
Dept: OPTOMETRY | Facility: CLINIC | Age: 67
End: 2022-08-12
Payer: COMMERCIAL

## 2022-08-12 DIAGNOSIS — Z01.00 DIABETIC EYE EXAM: Primary | ICD-10-CM

## 2022-08-12 DIAGNOSIS — Z95.828 STATUS POST PLACEMENT OF ARTERIOVENOUS GRAFT: Primary | ICD-10-CM

## 2022-08-12 DIAGNOSIS — E11.9 DIABETIC EYE EXAM: Primary | ICD-10-CM

## 2022-08-12 DIAGNOSIS — H25.13 NUCLEAR SCLEROSIS OF BOTH EYES: ICD-10-CM

## 2022-08-12 DIAGNOSIS — H52.7 REFRACTIVE ERROR: ICD-10-CM

## 2022-08-12 DIAGNOSIS — H25.019 CORTICAL AGE-RELATED CATARACT, UNSPECIFIED LATERALITY: ICD-10-CM

## 2022-08-12 PROCEDURE — 3066F NEPHROPATHY DOC TX: CPT | Mod: CPTII,S$GLB,, | Performed by: OPTOMETRIST

## 2022-08-12 PROCEDURE — 2023F DILAT RTA XM W/O RTNOPTHY: CPT | Mod: CPTII,S$GLB,, | Performed by: OPTOMETRIST

## 2022-08-12 PROCEDURE — 92004 COMPRE OPH EXAM NEW PT 1/>: CPT | Mod: S$GLB,,, | Performed by: OPTOMETRIST

## 2022-08-12 PROCEDURE — 92004 PR EYE EXAM, NEW PATIENT,COMPREHESV: ICD-10-PCS | Mod: S$GLB,,, | Performed by: OPTOMETRIST

## 2022-08-12 PROCEDURE — 99999 PR PBB SHADOW E&M-EST. PATIENT-LVL III: ICD-10-PCS | Mod: PBBFAC,,, | Performed by: OPTOMETRIST

## 2022-08-12 PROCEDURE — 92015 PR REFRACTION: ICD-10-PCS | Mod: S$GLB,,, | Performed by: OPTOMETRIST

## 2022-08-12 PROCEDURE — 1160F RVW MEDS BY RX/DR IN RCRD: CPT | Mod: CPTII,S$GLB,, | Performed by: OPTOMETRIST

## 2022-08-12 PROCEDURE — 3044F PR MOST RECENT HEMOGLOBIN A1C LEVEL <7.0%: ICD-10-PCS | Mod: CPTII,S$GLB,, | Performed by: OPTOMETRIST

## 2022-08-12 PROCEDURE — 1160F PR REVIEW ALL MEDS BY PRESCRIBER/CLIN PHARMACIST DOCUMENTED: ICD-10-PCS | Mod: CPTII,S$GLB,, | Performed by: OPTOMETRIST

## 2022-08-12 PROCEDURE — 3044F HG A1C LEVEL LT 7.0%: CPT | Mod: CPTII,S$GLB,, | Performed by: OPTOMETRIST

## 2022-08-12 PROCEDURE — 1159F MED LIST DOCD IN RCRD: CPT | Mod: CPTII,S$GLB,, | Performed by: OPTOMETRIST

## 2022-08-12 PROCEDURE — 2023F PR DILATED RETINAL EXAM W/O EVID OF RETINOPATHY: ICD-10-PCS | Mod: CPTII,S$GLB,, | Performed by: OPTOMETRIST

## 2022-08-12 PROCEDURE — 1159F PR MEDICATION LIST DOCUMENTED IN MEDICAL RECORD: ICD-10-PCS | Mod: CPTII,S$GLB,, | Performed by: OPTOMETRIST

## 2022-08-12 PROCEDURE — 99999 PR PBB SHADOW E&M-EST. PATIENT-LVL III: CPT | Mod: PBBFAC,,, | Performed by: OPTOMETRIST

## 2022-08-12 PROCEDURE — 92015 DETERMINE REFRACTIVE STATE: CPT | Mod: S$GLB,,, | Performed by: OPTOMETRIST

## 2022-08-12 PROCEDURE — 3066F PR DOCUMENTATION OF TREATMENT FOR NEPHROPATHY: ICD-10-PCS | Mod: CPTII,S$GLB,, | Performed by: OPTOMETRIST

## 2022-08-12 NOTE — PROGRESS NOTES
Subjective:       Patient ID: Elbert Still Jr. is a 66 y.o. male      Chief Complaint   Patient presents with    Concerns About Ocular Health    Diabetic Eye Exam     History of Present Illness  Dls: 10/17/17 Dr. Krishna     67 y/o male presents today for diabetic eye exam.   Pt c/o blurry vision at near ou. Pt wears otc readers.      x 1 day     + tearing  No itching  No burning  No pain  No ha's  + floaters  No flashes    Eye meds  Otc gtts ou prn    Hemoglobin A1C       Date                     Value               Ref Range             Status                07/06/2022               4.8                 4.8 - 5.9 %           Final                 05/11/2022               5.0                 4.8 - 5.9 %           Final                 04/30/2022               4.4                 4.0 - 5.6 %           Final              Assessment/Plan:     1. Diabetic eye exam  Ab vision    No diabetic retinopathy. Discussed with pt the effects of diabetes on vision, importance of good blood sugar control, compliance with meds, and follow up care with PCP. Return in 1 year for dilated eye exam, sooner PRN.    2. Refractive error  Educated patient on refractive error and discussed lens options. Dispensed updated spectacle Rx. Educated about adaptation period to new specs.    Eyeglass Final Rx     Eyeglass Final Rx       Sphere Cylinder Axis Add    Right +0.25 Sphere  +2.50    Left +0.50 +0.50 010 +2.50    Expiration Date: 8/12/2023                3. Cortical age-related cataract, unspecified laterality  4. Nuclear sclerosis of both eyes  Educated pt on presence of cataracts and effects on vision. No surgery at this time. Recheck in one year, sooner PRN.      Follow up in about 1 year (around 8/12/2023) for Diabetic Eye Exam.

## 2022-08-18 LAB
HCT VFR BLD AUTO: 32 % (ref 42–52)
HEMOGLOBIN X 3: 31.5 % (ref 42–54)
HGB BLD-MCNC: 10.5 G/DL (ref 14–18)

## 2022-08-30 ENCOUNTER — TELEPHONE (OUTPATIENT)
Dept: TRANSPLANT | Facility: CLINIC | Age: 67
End: 2022-08-30

## 2022-09-01 LAB — PTH, INTACT: 883 PG/ML (ref 16–80)

## 2022-09-02 ENCOUNTER — TELEPHONE (OUTPATIENT)
Dept: VASCULAR SURGERY | Facility: CLINIC | Age: 67
End: 2022-09-02
Payer: COMMERCIAL

## 2022-09-02 NOTE — PRE-PROCEDURE INSTRUCTIONS
PRE-OP INSTRUCTIONS:  Instructed to have nothing by mouth past midnight.  It is ok to take AM medications with a few sips of water.  Instructed to shower the night before surgery as well as the morning of surgery with an antibacterial soap like dial or hibiclens from the neck down.  Encouraged to wear loose fitting, comfortable clothing .  Medication instructions for pm prior to and am of surgery reviewed.  Instructed to avoid taking vitamins,supplements or ibuprofen the am of surgery.    Patient reported that he usually doesn't take any medications before 9am so planned to take none the morning of surgery    Patient denies any side effects or issues with anesthesia or sedation.

## 2022-09-02 NOTE — TELEPHONE ENCOUNTER
Spoke with the pt and informed him of the time of arrival for surgery on 9/06/22 is 5am at the main campus on [facility],second floor,[facility].Pt also reminded not to eat or drink anything after midnight 9/05/22 and he verbalized understanding of information received.

## 2022-09-03 ENCOUNTER — LAB VISIT (OUTPATIENT)
Dept: SURGERY | Facility: CLINIC | Age: 67
End: 2022-09-03
Payer: COMMERCIAL

## 2022-09-03 DIAGNOSIS — N18.6 ESRD (END STAGE RENAL DISEASE): ICD-10-CM

## 2022-09-03 LAB — SARS-COV-2 RNA RESP QL NAA+PROBE: NOT DETECTED

## 2022-09-03 PROCEDURE — U0003 INFECTIOUS AGENT DETECTION BY NUCLEIC ACID (DNA OR RNA); SEVERE ACUTE RESPIRATORY SYNDROME CORONAVIRUS 2 (SARS-COV-2) (CORONAVIRUS DISEASE [COVID-19]), AMPLIFIED PROBE TECHNIQUE, MAKING USE OF HIGH THROUGHPUT TECHNOLOGIES AS DESCRIBED BY CMS-2020-01-R: HCPCS | Mod: TXP | Performed by: SURGERY

## 2022-09-03 PROCEDURE — U0005 INFEC AGEN DETEC AMPLI PROBE: HCPCS | Mod: NTX | Performed by: SURGERY

## 2022-09-05 ENCOUNTER — ANESTHESIA EVENT (OUTPATIENT)
Dept: SURGERY | Facility: HOSPITAL | Age: 67
End: 2022-09-05
Payer: COMMERCIAL

## 2022-09-06 ENCOUNTER — HOSPITAL ENCOUNTER (OUTPATIENT)
Facility: HOSPITAL | Age: 67
Discharge: HOME | End: 2022-09-06
Attending: SURGERY | Admitting: SURGERY
Payer: COMMERCIAL

## 2022-09-06 ENCOUNTER — ANESTHESIA (OUTPATIENT)
Dept: SURGERY | Facility: HOSPITAL | Age: 67
End: 2022-09-06
Payer: COMMERCIAL

## 2022-09-06 VITALS
HEIGHT: 75 IN | HEART RATE: 76 BPM | SYSTOLIC BLOOD PRESSURE: 152 MMHG | OXYGEN SATURATION: 99 % | BODY MASS INDEX: 27.14 KG/M2 | TEMPERATURE: 98 F | RESPIRATION RATE: 15 BRPM | DIASTOLIC BLOOD PRESSURE: 86 MMHG | WEIGHT: 218.25 LBS

## 2022-09-06 DIAGNOSIS — Z99.2 ESRD (END STAGE RENAL DISEASE) ON DIALYSIS: ICD-10-CM

## 2022-09-06 DIAGNOSIS — N18.6 ESRD (END STAGE RENAL DISEASE) ON DIALYSIS: ICD-10-CM

## 2022-09-06 LAB
ABO + RH BLD: NORMAL
BLD GP AB SCN CELLS X3 SERPL QL: NORMAL
POCT GLUCOSE: 187 MG/DL (ref 70–110)
POCT GLUCOSE: 195 MG/DL (ref 70–110)

## 2022-09-06 PROCEDURE — 63600175 PHARM REV CODE 636 W HCPCS: Mod: TXP

## 2022-09-06 PROCEDURE — 36000707: Mod: TXP | Performed by: SURGERY

## 2022-09-06 PROCEDURE — 27201423 OPTIME MED/SURG SUP & DEVICES STERILE SUPPLY: Mod: TXP | Performed by: SURGERY

## 2022-09-06 PROCEDURE — 82962 GLUCOSE BLOOD TEST: CPT | Mod: NTX | Performed by: SURGERY

## 2022-09-06 PROCEDURE — 71000044 HC DOSC ROUTINE RECOVERY FIRST HOUR: Mod: NTX | Performed by: SURGERY

## 2022-09-06 PROCEDURE — 25000003 PHARM REV CODE 250: Mod: TXP

## 2022-09-06 PROCEDURE — 37000008 HC ANESTHESIA 1ST 15 MINUTES: Mod: NTX | Performed by: SURGERY

## 2022-09-06 PROCEDURE — 71000015 HC POSTOP RECOV 1ST HR: Mod: NTX | Performed by: SURGERY

## 2022-09-06 PROCEDURE — D9220A PRA ANESTHESIA: ICD-10-PCS | Mod: NTX,,, | Performed by: ANESTHESIOLOGY

## 2022-09-06 PROCEDURE — 76942 ECHO GUIDE FOR BIOPSY: CPT | Mod: NTX

## 2022-09-06 PROCEDURE — 37000009 HC ANESTHESIA EA ADD 15 MINS: Mod: NTX | Performed by: SURGERY

## 2022-09-06 PROCEDURE — 36415 COLL VENOUS BLD VENIPUNCTURE: CPT | Mod: NTX | Performed by: SURGERY

## 2022-09-06 PROCEDURE — D9220A PRA ANESTHESIA: Mod: NTX,,, | Performed by: ANESTHESIOLOGY

## 2022-09-06 PROCEDURE — 36821 PR ANASTOMOSIS,AV,ANY SITE: ICD-10-PCS | Mod: NTX,,, | Performed by: SURGERY

## 2022-09-06 PROCEDURE — 63600175 PHARM REV CODE 636 W HCPCS: Mod: NTX | Performed by: ANESTHESIOLOGY

## 2022-09-06 PROCEDURE — 25000003 PHARM REV CODE 250: Mod: TXP | Performed by: STUDENT IN AN ORGANIZED HEALTH CARE EDUCATION/TRAINING PROGRAM

## 2022-09-06 PROCEDURE — 63600175 PHARM REV CODE 636 W HCPCS: Mod: NTX

## 2022-09-06 PROCEDURE — 36000706: Mod: TXP | Performed by: SURGERY

## 2022-09-06 PROCEDURE — 25000003 PHARM REV CODE 250: Mod: NTX | Performed by: STUDENT IN AN ORGANIZED HEALTH CARE EDUCATION/TRAINING PROGRAM

## 2022-09-06 PROCEDURE — 63600175 PHARM REV CODE 636 W HCPCS: Mod: TXP | Performed by: SURGERY

## 2022-09-06 PROCEDURE — 86901 BLOOD TYPING SEROLOGIC RH(D): CPT | Mod: TXP

## 2022-09-06 PROCEDURE — 36821 AV FUSION DIRECT ANY SITE: CPT | Mod: NTX,,, | Performed by: SURGERY

## 2022-09-06 RX ORDER — MIDAZOLAM HYDROCHLORIDE 1 MG/ML
INJECTION, SOLUTION INTRAMUSCULAR; INTRAVENOUS
Status: DISCONTINUED | OUTPATIENT
Start: 2022-09-06 | End: 2022-09-06

## 2022-09-06 RX ORDER — HEPARIN SODIUM 1000 [USP'U]/ML
INJECTION, SOLUTION INTRAVENOUS; SUBCUTANEOUS
Status: DISCONTINUED | OUTPATIENT
Start: 2022-09-06 | End: 2022-09-06

## 2022-09-06 RX ORDER — MUPIROCIN 20 MG/G
OINTMENT TOPICAL
Status: DISCONTINUED | OUTPATIENT
Start: 2022-09-06 | End: 2022-09-06 | Stop reason: HOSPADM

## 2022-09-06 RX ORDER — HYDROMORPHONE HYDROCHLORIDE 1 MG/ML
0.2 INJECTION, SOLUTION INTRAMUSCULAR; INTRAVENOUS; SUBCUTANEOUS EVERY 5 MIN PRN
Status: DISCONTINUED | OUTPATIENT
Start: 2022-09-06 | End: 2022-09-06 | Stop reason: HOSPADM

## 2022-09-06 RX ORDER — SODIUM CHLORIDE 0.9 % (FLUSH) 0.9 %
10 SYRINGE (ML) INJECTION
Status: DISCONTINUED | OUTPATIENT
Start: 2022-09-06 | End: 2022-09-06 | Stop reason: HOSPADM

## 2022-09-06 RX ORDER — CEFAZOLIN SODIUM/WATER 2 G/20 ML
2 SYRINGE (ML) INTRAVENOUS
Status: COMPLETED | OUTPATIENT
Start: 2022-09-06 | End: 2022-09-06

## 2022-09-06 RX ORDER — PROPOFOL 10 MG/ML
VIAL (ML) INTRAVENOUS
Status: DISCONTINUED | OUTPATIENT
Start: 2022-09-06 | End: 2022-09-06

## 2022-09-06 RX ORDER — PAPAVERINE HYDROCHLORIDE 30 MG/ML
INJECTION INTRAMUSCULAR; INTRAVENOUS
Status: DISCONTINUED | OUTPATIENT
Start: 2022-09-06 | End: 2022-09-06 | Stop reason: HOSPADM

## 2022-09-06 RX ORDER — OXYCODONE HYDROCHLORIDE 5 MG/1
5 TABLET ORAL EVERY 4 HOURS PRN
Qty: 15 TABLET | Refills: 0 | Status: SHIPPED | OUTPATIENT
Start: 2022-09-06 | End: 2022-10-27

## 2022-09-06 RX ORDER — OXYCODONE HYDROCHLORIDE 5 MG/1
TABLET ORAL
Status: DISCONTINUED
Start: 2022-09-06 | End: 2022-09-06 | Stop reason: HOSPADM

## 2022-09-06 RX ORDER — VANCOMYCIN HYDROCHLORIDE 1 G/20ML
INJECTION, POWDER, LYOPHILIZED, FOR SOLUTION INTRAVENOUS
Status: DISCONTINUED | OUTPATIENT
Start: 2022-09-06 | End: 2022-09-06

## 2022-09-06 RX ORDER — HALOPERIDOL 5 MG/ML
0.5 INJECTION INTRAMUSCULAR EVERY 10 MIN PRN
Status: DISCONTINUED | OUTPATIENT
Start: 2022-09-06 | End: 2022-09-06 | Stop reason: HOSPADM

## 2022-09-06 RX ORDER — LIDOCAINE HYDROCHLORIDE 10 MG/ML
1 INJECTION, SOLUTION EPIDURAL; INFILTRATION; INTRACAUDAL; PERINEURAL ONCE
Status: COMPLETED | OUTPATIENT
Start: 2022-09-06 | End: 2022-09-06

## 2022-09-06 RX ORDER — PROTAMINE SULFATE 10 MG/ML
INJECTION, SOLUTION INTRAVENOUS
Status: DISCONTINUED | OUTPATIENT
Start: 2022-09-06 | End: 2022-09-06

## 2022-09-06 RX ORDER — HEPARIN SODIUM 1000 [USP'U]/ML
INJECTION, SOLUTION INTRAVENOUS; SUBCUTANEOUS
Status: DISCONTINUED | OUTPATIENT
Start: 2022-09-06 | End: 2022-09-06 | Stop reason: HOSPADM

## 2022-09-06 RX ORDER — OXYCODONE HYDROCHLORIDE 5 MG/1
5 TABLET ORAL ONCE
Status: COMPLETED | OUTPATIENT
Start: 2022-09-06 | End: 2022-09-06

## 2022-09-06 RX ORDER — ONDANSETRON 2 MG/ML
INJECTION INTRAMUSCULAR; INTRAVENOUS
Status: DISCONTINUED | OUTPATIENT
Start: 2022-09-06 | End: 2022-09-06

## 2022-09-06 RX ORDER — PROPOFOL 10 MG/ML
VIAL (ML) INTRAVENOUS CONTINUOUS PRN
Status: DISCONTINUED | OUTPATIENT
Start: 2022-09-06 | End: 2022-09-06

## 2022-09-06 RX ORDER — FENTANYL CITRATE 50 UG/ML
INJECTION, SOLUTION INTRAMUSCULAR; INTRAVENOUS
Status: DISCONTINUED | OUTPATIENT
Start: 2022-09-06 | End: 2022-09-06

## 2022-09-06 RX ADMIN — Medication 20 MG: at 10:09

## 2022-09-06 RX ADMIN — Medication 20 MG: at 07:09

## 2022-09-06 RX ADMIN — PROPOFOL 25 MCG/KG/MIN: 10 INJECTION, EMULSION INTRAVENOUS at 07:09

## 2022-09-06 RX ADMIN — OXYCODONE 5 MG: 5 TABLET ORAL at 11:09

## 2022-09-06 RX ADMIN — PROTAMINE SULFATE 10 MG: 10 INJECTION, SOLUTION INTRAVENOUS at 10:09

## 2022-09-06 RX ADMIN — HYDROMORPHONE HYDROCHLORIDE 0.2 MG: 1 INJECTION, SOLUTION INTRAMUSCULAR; INTRAVENOUS; SUBCUTANEOUS at 11:09

## 2022-09-06 RX ADMIN — Medication 30 MG: at 10:09

## 2022-09-06 RX ADMIN — MEPIVACAINE HYDROCHLORIDE 50 ML: 15 INJECTION, SOLUTION EPIDURAL; INFILTRATION at 07:09

## 2022-09-06 RX ADMIN — Medication 2 G: at 07:09

## 2022-09-06 RX ADMIN — SODIUM CHLORIDE: 0.9 INJECTION, SOLUTION INTRAVENOUS at 07:09

## 2022-09-06 RX ADMIN — FENTANYL CITRATE 25 MCG: 0.05 INJECTION, SOLUTION INTRAMUSCULAR; INTRAVENOUS at 08:09

## 2022-09-06 RX ADMIN — HEPARIN SODIUM 2000 UNITS: 1000 INJECTION, SOLUTION INTRAVENOUS; SUBCUTANEOUS at 09:09

## 2022-09-06 RX ADMIN — FENTANYL CITRATE 25 MCG: 0.05 INJECTION, SOLUTION INTRAMUSCULAR; INTRAVENOUS at 07:09

## 2022-09-06 RX ADMIN — MIDAZOLAM 2 MG: 1 INJECTION INTRAMUSCULAR; INTRAVENOUS at 07:09

## 2022-09-06 RX ADMIN — PROTAMINE SULFATE 5 MG: 10 INJECTION, SOLUTION INTRAVENOUS at 10:09

## 2022-09-06 RX ADMIN — ONDANSETRON 4 MG: 2 INJECTION INTRAMUSCULAR; INTRAVENOUS at 10:09

## 2022-09-06 RX ADMIN — MUPIROCIN: 20 OINTMENT TOPICAL at 06:09

## 2022-09-06 RX ADMIN — HEPARIN SODIUM 7000 UNITS: 1000 INJECTION, SOLUTION INTRAVENOUS; SUBCUTANEOUS at 08:09

## 2022-09-06 RX ADMIN — LIDOCAINE HYDROCHLORIDE 10 MG: 10 INJECTION, SOLUTION EPIDURAL; INFILTRATION; INTRACAUDAL; PERINEURAL at 06:09

## 2022-09-06 RX ADMIN — VANCOMYCIN HYDROCHLORIDE 1 G: 1 INJECTION, POWDER, LYOPHILIZED, FOR SOLUTION INTRAVENOUS at 08:09

## 2022-09-06 NOTE — PATIENT INSTRUCTIONS
VASCULAR SURGERY DISCHARGE INSTRUCTIONS    Woundcare:  - Take your incision dressing off 2 days after your surgery and gently rinse your incision with soap and water daily. Pad the incision dry afterward  - If you have a dialysis catheter in place, keep your catheter dressing clean and dry  - If you do not have a catheter, take a full shower daily beginning 2 days after the surgery. Allow soap and water to run over your incision. Pad the incision dry afterward  - When resting or sleeping, try to keep your arm elevated to shoulder level on pillows to reduce swelling  - If you notice clear drainage from your incision, you can apply dry gauze daily and secure in place with tape or gentle elastic wrap    Activity:  - Avoid prolonged exertion of the affected arm  - Avoid keeping your arm down below your chest for prolonged periods of time (this could lead to increased swelling)  - No heavy lifting with the affected arm  - Sleep with your arm elevated on pillows at night to reduce swelling  -- No swimming in pools, lakes, [place] etc. for 6 weeks after your surgery    Diet:  -Resume your pre-operative home diet    Follow up:  -Refer to follow up instructions     Call Vascular Surgery Office at [phone] if you experience:  -Increased redness, warmth, tenderness, or draining pus at your incision(s)  -Worsening fevers, chills, nausea/vomiting  -Pain, weakness, coldness, or numbness in your hand  -Uncontrolled pain  -Your call will be returned within 24 hours and further instructions will be provided    Go to ER/Urgent Care if you experience:  -Worsening shortness of breath or chest pain

## 2022-09-06 NOTE — OP NOTE
Operative Note  Date: 09/06/2022     Pre-op Diagnosis:  ESRD (end stage renal disease) [N18.6     Post-op Diagnosis:  Same     Procedure(s):  Creation 1st stage L brachial artery/vein AVF     Surgeon: HUONG Gardiner MD [facility] FACS     Assistant: Birgit Ding MD     Anesthesia: Regional     Findings/Key Components:  Strong thrill in 1st stage L brachial artery/vein AVF; L brachial vein > 5mm  Attempted L BC AVF but the cephalic vein wall had tears in it and needed to be abandoned  2+ L radial pulse; minimal augmentation in triphasic flow by doppler  Will need transposition in 2-3 months     EBL: < 10 ml    Complications: None    Procedure:  After informed consent was obtained the patient was taken to the OR in supine position. Pre-operative antibiotics were administered. Regional block was administered by the anesthesia team. The  left arm was prepped and draped in normal sterile fashion with chloraprep.  A time out was performed to confirm appropriate patient, site, positioning, and laterality. A linear incision was made below the antecubital fossa and deepened with electrocautery, blunt dissection, and sharp dissection. The cephalic vein was identified and dissected circumferentially. It measured approximately 4mm in size. The brachial artery was dissected circumferentially and controlled proximally and distally with large vessel loops. The artery measured approximately 4mm in diameter. 7000 units of heparin were then administered intravenously. Proximal and distal clamps were applied to the brachial artery. The cephalic vein was ligated distally and transected. A 6mm longitudinal arteriotomy was made on the artery. The proximal end of the vein was anastomosed to the artery with 6-0 prolene. Prior to completion the proximal and distal clamps were released from the artery and there was brisk flow with no thrombus. Clamps were reapplied and the lumen was flushed with heparinized saline and then the anastomosis was  completed. The proximal arterial clamp was released followed by the distal clamp. There was a leak and a repair suture was attempted to be placed, however, there was a tear noted in the vein. The anastomosis was reattempted using the cephalic vein but ultimately had to be abandoned due to tears in the wall.     The brachial vein was then targeted and dissected circumferentially. It measured >5mm in size. An additional 2000 units of heparin was administered intravenously. The vein was ligated distally and the proximal end was anastomosed to the artery with 6-0 prolene. Prior to completion the proximal and distal clamps were released from the artery and there was brisk flow with no thrombus. Clamps were reapplied and the lumen was flushed with heparinized saline and then the anastomosis was completed. Clamps were released after completion of the anastomosis and there was a palpable thrill over the artery and vein. Two repair sutures were necessary. Doppler insonation in the vein and artery proximal and distal to the anastomosis respectively revealed clear continuous signal. There was a 2+ L radial pulse palpated with minimal augmentation in triphasic flow by doppler. 35 protamine was administered. Surgicel was placed in the wound to assist with hemostasis. The wound was thoroughly irrigated with saline irrigation. The fascia was closed with interrupted 2-0 vicryl sutures. The deep dermis was closed with interrupted 3-0 vicryl sutures. 3-0 nylon vertical mattress sutures were used to close the skin. The incision was dressed with 4x4 gauze secured in place with tegaderm. All sponge needle and instrument counts were correct.     The patient tolerated the procedure well and was transported to the PACU for recovery.    Dr. Gardiner was present and scrubbed for the entire case.     Birgit Ding MD  General Surgery, PGY-3    Prince Gardiner MD [facility] FACS   Vascular/Endovascular Surgery

## 2022-09-06 NOTE — TRANSFER OF CARE
"Anesthesia Transfer of Care Note    Patient: Elbert Still Jr.    Procedure(s) Performed: Procedure(s) (LRB):  CREATION, AV FISTULA (Left)    Patient location: [facility]    Anesthesia Type: general    Transport from OR: Transported from OR on 6-10 L/min O2 by face mask with adequate spontaneous ventilation    Post pain: adequate analgesia    Post assessment: no apparent anesthetic complications and tolerated procedure well    Post vital signs: stable    Level of consciousness: awake and alert    Nausea/Vomiting: no nausea/vomiting    Complications: none    Transfer of care protocol was followed      Last vitals:   Visit Vitals  BP (!) 144/84   Pulse 71   Temp 37.2 °C (98.9 °F) (Oral)   Resp 16   Ht 6' 3" (1.905 m)   Wt 99 kg (218 lb 4.1 oz)   SpO2 100%   BMI 27.28 kg/m²     "

## 2022-09-06 NOTE — BRIEF OP NOTE
Brief Operative Note  Date: 09/06/2022    Pre-op Diagnosis:  ESRD (end stage renal disease) [N18.6    Post-op Diagnosis:  Same    Procedure(s):  Creation 1st stage L brachial artery/vein AVF    Surgeon: HUONG Gardiner MD [facility] FACS    Assistant: Birgit Ding MD    Anesthesia: Regional    Findings/Key Components:  Strong thrill in 1st stage L brachial artery/vein AVF; L brachial vein > 5mm  Attempted L BC AVF but the cephalic vein wall had tears in it and needed to be abandoned  2+ L radial pulse; minimal augmentation in triphasic flow by doppler  Will need transposition in 2-3 months    EBL: < 10 ml      Specimens (From admission, onward)      None          I attest to being present for the procedure and performing the case.  Prince Gardiner MD [facility] FACS  Discharge Note  SUMMARY    Admit Date: 9/6/2022    Attending Physician: Prince Gardiner MD FACS    Discharge Physician: Prince Gardiner MD FACS    Discharge Date: 09/06/2022    Final Diagnosis: ESRD (end stage renal disease) [N18.6]    Outcome of Treatment: Successful AVF    Disposition: Home or self-care    Patient Instructions:   Current Discharge Medication List        CONTINUE these medications which have NOT CHANGED    Details   atorvastatin (LIPITOR) 20 MG tablet TAKE 1 TABLET BY MOUTH EVERY EVENING  Qty: 90 tablet, Refills: 3    Associated Diagnoses: Mixed hyperlipidemia; Diabetes mellitus due to underlying condition with diabetic nephropathy, without long-term current use of insulin      blood sugar diagnostic Strp Use to check blood glucose once a day.  Qty: 100 each, Refills: 3    Associated Diagnoses: Diabetes mellitus due to underlying condition with diabetic nephropathy, without long-term current use of insulin      blood-glucose meter Misc Use to check blood glucose twice a day.  Qty: 1 each, Refills: 0      carvediloL (COREG) 12.5 MG tablet Take 1 tablet (12.5 mg total) by mouth 2 (two) times daily.  Qty: 60 tablet, Refills: 11    Comments: .      colchicine  (COLCRYS) 0.6 mg tablet Take half tab (0.3 mg) by mouth daily as needed for gout flare.  Qty: 90 tablet, Refills: 3    Associated Diagnoses: Gout, unspecified cause, unspecified chronicity, unspecified site      fluticasone propionate (FLONASE) 50 mcg/actuation nasal spray 2 sprays (100 mcg total) by Each Nostril route once daily.  Qty: 16 g, Refills: 11      furosemide (LASIX) 80 MG tablet Take 1 tablet (80 mg total) by mouth 2 (two) times daily.  Qty: 60 tablet, Refills: 2      lancets 30 gauge Misc Use to check blood glucose twice a day.  Qty: 100 each, Refills: 5      NIFEdipine (PROCARDIA-XL) 60 MG (OSM) 24 hr tablet Take 1 tablet (60 mg total) by mouth 2 (two) times a day.  Qty: 60 tablet, Refills: 11    Comments: .      !! sevelamer (RENAGEL) 400 MG Tab Take 1 tablet (400 mg total) by mouth 3 (three) times daily with meals.  Qty: 90 tablet, Refills: 11      !! sevelamer HCL (RENAGEL) 800 MG Tab Take 1 tablet by mouth twice a day with meals.  Qty: 60 tablet, Refills: 11      tamsulosin (FLOMAX) 0.4 mg Cap TAKE ONE CAPSULE BY MOUTH ONCE DAILY  Qty: 90 capsule, Refills: 3      acetaminophen (TYLENOL) 500 MG tablet Take 2 tablets (1,000 mg total) by mouth every 6 (six) hours as needed for Pain.  Qty: 30 tablet, Refills: 0       !! - Potential duplicate medications found. Please discuss with provider.        STOP taking these medications       amLODIPine (NORVASC) 10 MG tablet Comments:   Reason for Stopping:         glipiZIDE (GLUCOTROL) 10 MG tablet Comments:   Reason for Stopping:         metoprolol succinate (TOPROL-XL) 25 MG 24 hr tablet Comments:   Reason for Stopping:               Diet:  Resume pre-operative diet    Activity:  Ad layla    Follow-up:  Follow-up in clinic with Dr Gardiner within 4 weeks; please call clinic nurse at

## 2022-09-06 NOTE — ANESTHESIA POSTPROCEDURE EVALUATION
Anesthesia Post Evaluation    Patient: Elbert Still Jr.    Procedure(s) Performed: Procedure(s) (LRB):  CREATION, AV FISTULA (Left)    Final Anesthesia Type: MAC      Patient location during evaluation: PACU  Patient participation: Yes- Able to Participate  Level of consciousness: awake and alert  Post-procedure vital signs: reviewed and stable  Pain management: adequate  Airway patency: patent    PONV status at discharge: No PONV  Anesthetic complications: no      Cardiovascular status: blood pressure returned to baseline  Respiratory status: unassisted  Hydration status: euvolemic  Follow-up not needed.          Vitals Value Taken Time   /86 09/06/22 1215   Temp 36.7 °C (98 °F) 09/06/22 1215   Pulse 76 09/06/22 1215   Resp 15 09/06/22 1215   SpO2 99 % 09/06/22 1215   Vitals shown include unvalidated device data.      No case tracking events are documented in the log.      Pain/Sophie Score: Pain Rating Prior to Med Admin: 8 (9/6/2022 11:45 AM)  Pain Rating Post Med Admin: 0 (9/6/2022 12:15 PM)  Sophie Score: 10 (9/6/2022 12:15 PM)

## 2022-09-06 NOTE — ANESTHESIA PREPROCEDURE EVALUATION
[facility]  Anesthesia Pre-Operative Evaluation         Patient Name/: Elbert Still Jr., 1955  MRN: [id number]    SUBJECTIVE:     Pre-operative evaluation for Procedure(s) (LRB):  INSERTION, GRAFT, ARTERIOVENOUS // POSSIBLE AVF (Left)     2022    Elebrt Still Jr. is a 66 y.o. male w/ a significant PMHx of HTN, HLD, DM, stage V CKD on HD,  undergoing dialysis three days a week on M, W, F via a R permacath. Most recent dialysis . Now planning for permanent dialysis access.    Patient now presents for the above procedure(s).      Prev airway: None documented.    LDA:        Hemodialysis Catheter 22 right internal jugular (Active)   Verification by X-ray No 22   Site Assessment No drainage;No redness;No swelling 22   Line Securement Device Secured with sutureless device 22   Dressing Type Gauze 22   Dressing Status Clean;Dry;Intact 22   Dressing Intervention Integrity maintained 22   Date on Dressing 22   Dressing Due to be Changed 22   Venous Patency/Care flushed w/o difficulty;heparin locked;intermittent infusion cap applied 22   Arterial Patency/Care flushed w/o difficulty;heparin locked;intermittent infusion cap applied 22   Line Necessity Review CRRT/HD 22 0535   Number of days: 123            Peripheral IV - Single Lumen 22 0427 20 G Right Antecubital (Active)   Number of days: 127         Patient Active Problem List   Diagnosis    -Diabetes mellitus due to underlying condition with diabetic nephropathy    Proteinuria    -Essential (primary) hypertension    Mixed hyperlipidemia    -CKD (chronic kidney disease) stage 4, GFR 15-29 ml/min    -Gout    Anemia of chronic disease    -HLD associated with type 2 DM    Hypoglycemia    Hyperkalemia    Acute kidney injury superimposed on chronic kidney disease    -Dialysis  patient - MWF - started 5/2022    ESRD (end stage renal disease) on dialysis    Pre-op exam    Nuclear sclerosis of both eyes    Refractive error    Cortical age-related cataract       Review of patient's allergies indicates:   Allergen Reactions    Iodine and iodide containing products Hives     Hypotension, Flushing       Current Inpatient Medications:       No current facility-administered medications on file prior to encounter.     Current Outpatient Medications on File Prior to Encounter   Medication Sig Dispense Refill    atorvastatin (LIPITOR) 20 MG tablet TAKE 1 TABLET BY MOUTH EVERY EVENING 90 tablet 3    carvediloL (COREG) 12.5 MG tablet Take 1 tablet (12.5 mg total) by mouth 2 (two) times daily. 60 tablet 11    fluticasone propionate (FLONASE) 50 mcg/actuation nasal spray 2 sprays (100 mcg total) by Each Nostril route once daily. (Patient taking differently: 2 sprays by Each Nostril route every evening.) 16 g 11    furosemide (LASIX) 80 MG tablet Take 1 tablet (80 mg total) by mouth 2 (two) times daily. 60 tablet 2    NIFEdipine (PROCARDIA-XL) 60 MG (OSM) 24 hr tablet Take 1 tablet (60 mg total) by mouth 2 (two) times a day. 60 tablet 11    acetaminophen (TYLENOL) 500 MG tablet Take 2 tablets (1,000 mg total) by mouth every 6 (six) hours as needed for Pain. 30 tablet 0    blood sugar diagnostic Strp Use to check blood glucose once a day. 100 each 3    blood-glucose meter Misc Use to check blood glucose twice a day. 1 each 0    colchicine (COLCRYS) 0.6 mg tablet Take half tab (0.3 mg) by mouth daily as needed for gout flare. 90 tablet 3    lancets 30 gauge Misc Use to check blood glucose twice a day. 100 each 5    sevelamer (RENAGEL) 400 MG Tab Take 1 tablet (400 mg total) by mouth 3 (three) times daily with meals. 90 tablet 11    sevelamer HCL (RENAGEL) 800 MG Tab Take 1 tablet by mouth twice a day with meals. 60 tablet 11    [DISCONTINUED] amLODIPine (NORVASC) 10 MG tablet TAKE 1  TABLET BY MOUTH ONCE DAILY 90 tablet 3    [DISCONTINUED] glipiZIDE (GLUCOTROL) 10 MG tablet TAKE 1 TABLET BY MOUTH TWICE DAILY WITH MEALS 180 tablet 1    [DISCONTINUED] metoprolol succinate (TOPROL-XL) 25 MG 24 hr tablet Take 1 tablet (25 mg total) by mouth once daily. 90 tablet 3       Past Surgical History:   Procedure Laterality Date    ADENOIDECTOMY      nasal septum repair      Tonsillectomy      TONSILLECTOMY         Social History:  Tobacco Use: Low Risk     Smoking Tobacco Use: Never    Smokeless Tobacco Use: Never       Alcohol Use: Not on file       OBJECTIVE:     Vital Signs Range:  BMI Readings from Last 1 Encounters:   08/10/22 27.28 kg/m²       Pulse:  [66-68]   BP: (126-140)/(81-85)        Significant Labs:        Component Value Date/Time    WBC 4.10 (L) 08/03/2022 0000    HGB 10.5 (L) 08/17/2022 0000    HCT 32.0 (L) 08/17/2022 0000     08/03/2022 0000     08/10/2022 1420    K 3.8 08/10/2022 1420    CL 97 08/10/2022 1420     08/03/2022 0000    CO2 26 08/10/2022 1420     (H) 08/10/2022 1420    BUN 23 08/10/2022 1420    CREATININE 6.1 (H) 08/10/2022 1420    MG 1.7 05/08/2022 0343    PHOS 7.6 (H) 08/15/2022 0000    CALCIUM 9.2 08/15/2022 0000    ALBUMIN 4.3 08/03/2022 0000    PROT 7.2 06/22/2022 0000    ALKPHOS 185 (H) 08/03/2022 0000    BILITOT 0.19 06/22/2022 0000    AST 17 06/22/2022 0000    ALT 8 08/03/2022 0000    INR 1.0 05/04/2022 1316    HGBA1C 4.8 07/06/2022 0000        Please see Results Review for additional labs.     Diagnostic Studies: No relevant studies.    EKG:   Results for orders placed or performed during the hospital encounter of 04/30/22   EKG 12-lead    Collection Time: 04/30/22 12:58 AM    Narrative    Test Reason :     Vent. Rate : 091 BPM     Atrial Rate : 091 BPM     P-R Int : 142 ms          QRS Dur : 072 ms      QT Int : 354 ms       P-R-T Axes : 047 039 054 degrees     QTc Int : 435 ms    Normal sinus rhythm  Nonspecific T wave  abnormality  Abnormal ECG  When compared with ECG of 05-MAY-2020 11:54,  No significant change was found  Confirmed by Saulo DIEZ MD ([id number]) on 4/30/2022 10:54:01 AM    Referred By: FELISHA   SELF           Confirmed By:Saulo DIEZ MD       ECHO:  No results found for this or any previous visit.        ASSESSMENT/PLAN:       Pre-op Assessment    I have reviewed the Patient Summary Reports.     I have reviewed the Nursing Notes. I have reviewed the NPO Status.   I have reviewed the Medications.     Review of Systems  Anesthesia Hx:  No problems with previous Anesthesia   Denies Personal Hx of Anesthesia complications.   Hematology/Oncology:     Oncology Normal     Cardiovascular:   Hypertension  Denies Angina.    Pulmonary:   Denies Shortness of breath.    Renal/:   Chronic Renal Disease, ESRD, Dialysis    Hepatic/GI:  Hepatic/GI Normal    Neurological:   Denies CVA. Denies Seizures.    Endocrine:   Diabetes        Physical Exam  General: Well nourished, Cooperative, Alert and Oriented    Airway:  Mallampati: III / II  Mouth Opening: Normal  TM Distance: > 6 cm  Tongue: Normal  Neck ROM: Normal ROM    Dental:        Anesthesia Plan  Type of Anesthesia, risks & benefits discussed:    Anesthesia Type: Gen Natural Airway, MAC, Regional  Intra-op Monitoring Plan: Standard ASA Monitors  Post Op Pain Control Plan: multimodal analgesia and IV/PO Opioids PRN  Induction:  IV  Informed Consent: Informed consent signed with the Patient and all parties understand the risks and agree with anesthesia plan.  All questions answered. Patient consented to blood products? Yes  ASA Score: 3  Day of Surgery Review of History & Physical: H&P Update referred to the surgeon/provider.    Ready For Surgery From Anesthesia Perspective.     .

## 2022-09-06 NOTE — H&P
[facility] - Surgery  General Surgery  History & Physical    Patient Name: Elbert Still Jr.  MRN: [id number]  Admission Date: 9/6/2022  Attending Physician: Prince Gardiner MD   Primary Care Provider: Angel Luis Boo MD      Subjective:     HPI:  Patient is a 66 y.o. male with a h/o HTN, HLD, DM, stage V CKD on HD, and gout who is here today for evaluation of permanent dialysis access. He denies any complaints today. He is currently undergoing dialysis three days a week on M, W, F via a R permacath.    Comes with wife  R-hand dominant      No MI / stroke     Renal is Dr ARIANE Díaz     Used to work in [employer]          Past Medical History:   Diagnosis Date    Essential (primary) hypertension 9/21/2017            Past Surgical History:   Procedure Laterality Date    ADENOIDECTOMY        nasal septum repair        Tonsillectomy        TONSILLECTOMY                Family History   Problem Relation Age of Onset    No Known Problems Mother      No Known Problems Father      No Known Problems Sister      No Known Problems Brother      No Known Problems Maternal Aunt      No Known Problems Maternal Uncle      No Known Problems Paternal Aunt      No Known Problems Paternal Uncle      No Known Problems Maternal Grandmother      No Known Problems Maternal Grandfather      No Known Problems Paternal Grandmother      No Known Problems Paternal Grandfather      Cancer Neg Hx           Negative for prostate or colon cancer    Kidney disease Neg Hx      Amblyopia Neg Hx      Blindness Neg Hx      Cataracts Neg Hx      Diabetes Neg Hx      Glaucoma Neg Hx      Hypertension Neg Hx      Macular degeneration Neg Hx      Retinal detachment Neg Hx      Strabismus Neg Hx      Stroke Neg Hx      Thyroid disease Neg Hx        Social History               Socioeconomic History    Marital status:    Occupational History       Employer: [employer]   Tobacco Use    Smoking status: Never Smoker     Smokeless tobacco: Never Used    Tobacco comment: .  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     Substance and Sexual Activity    Alcohol use: No    Drug use: No    Sexual activity: Yes       Partners: Female            Current Outpatient Medications:     acetaminophen (TYLENOL) 500 MG tablet, Take 2 tablets (1,000 mg total) by mouth every 6 (six) hours as needed for Pain., Disp: 30 tablet, Rfl: 0    atorvastatin (LIPITOR) 20 MG tablet, TAKE 1 TABLET BY MOUTH EVERY EVENING, Disp: 90 tablet, Rfl: 3    blood sugar diagnostic Strp, Use to check blood glucose once a day., Disp: 100 each, Rfl: 3    blood-glucose meter Misc, Use to check blood glucose twice a day., Disp: 1 each, Rfl: 0    carvediloL (COREG) 12.5 MG tablet, Take 1 tablet (12.5 mg total) by mouth 2 (two) times daily., Disp: 60 tablet, Rfl: 11    colchicine (COLCRYS) 0.6 mg tablet, Take half tab (0.3 mg) by mouth daily as needed for gout flare., Disp: 90 tablet, Rfl: 3    fluticasone propionate (FLONASE) 50 mcg/actuation nasal spray, 2 sprays (100 mcg total) by Each Nostril route once daily., Disp: 16 g, Rfl: 11    furosemide (LASIX) 80 MG tablet, Take 1 tablet (80 mg total) by mouth 2 (two) times daily., Disp: 60 tablet, Rfl: 2    lancets 30 gauge Misc, Use to check blood glucose twice a day., Disp: 100 each, Rfl: 5    NIFEdipine (PROCARDIA-XL) 60 MG (OSM) 24 hr tablet, Take 1 tablet (60 mg total) by mouth 2 (two) times a day., Disp: 60 tablet, Rfl: 11    sevelamer (RENAGEL) 400 MG Tab, Take 1 tablet (400 mg total) by mouth 3 (three) times daily with meals., Disp: 90 tablet, Rfl: 11    sevelamer HCL (RENAGEL) 800 MG Tab, Take 1 tablet by mouth twice a day with meals. (Patient not taking: Reported on 5/16/2022), Disp: 60 tablet, Rfl: 11    tamsulosin (FLOMAX) 0.4 mg Cap, Take 1 capsule (0.4 mg total) by mouth once daily., Disp: 30 capsule, Rfl: 3  No current facility-administered medications for this visit.     REVIEW OF SYSTEMS:  General: negative; ENT:  negative; Allergy and Immunology: negative; Hematological and Lymphatic: Negative; Endocrine: negative; Respiratory: no cough, shortness of breath, or wheezing; Cardiovascular: no chest pain or dyspnea on exertion; Gastrointestinal: no abdominal pain/back, change in bowel habits, or bloody stools; Genito-Urinary: no dysuria, trouble voiding, or hematuria; Musculoskeletal: negative  Neurological: no TIA or stroke symptoms; Psychiatric: no nervousness, anxiety or depression.     PHYSICAL EXAM:       Vitals:     08/10/22 1311   BP: (!) 147/80   Pulse: 72   Temp: 98.4 °F (36.9 °C)      Right Arm BP - Sittin/80 (08/10/22 1312)  Left Arm BP - Sittin/80 (08/10/22 1312)       General appearance:             Alert, well-appearing, and in no distress.  Oriented to person, place, and time                    Neurological:           Normal speech, no focal findings noted; CN II - XII grossly intact           Musculoskeletal:             Digits/nail without cyanosis/clubbing.  Normal muscle strength/tone.                                  Neck:            Supple, no significant adenopathy; thyroid is not enlarged                                                        No  carotid bruit can be auscultated                                 Chest:           Clear to auscultation, no wheezes, rales or rhonchi, symmetric air entry                                                        No use of accessory muscles                              Cardiac:           Normal rate and regular rhythm, S1 and S2 normal; PMI non-displaced                           Abdomen:          Soft, nontender, nondistended, no masses or organomegaly                                                        No rebound tenderness noted; bowel sounds normal                                                        Pulsatile aortic mass is not palpable.                                                        No groin adenopathy                       Extremities:            2+ L brachial and radial pulse                                                        Positive Austin's test     LAB RESULTS:        Lab Results   Component Value Date     K 4.2 08/03/2022     K 4.7 07/06/2022     K 4.3 06/22/2022     CREATININE 8.43 (H) 08/03/2022     CREATININE 7.84 (H) 07/06/2022     CREATININE 8.30 (H) 06/22/2022            Lab Results   Component Value Date     WBC 4.10 (L) 08/03/2022     WBC 4.06 (L) 07/06/2022     WBC 4.21 (L) 06/22/2022     HCT 35.4 (L) 08/03/2022     HCT 36.1 (L) 07/27/2022     HCT 34.2 (L) 07/20/2022      08/03/2022      07/06/2022      06/06/2022            Lab Results   Component Value Date     HGBA1C 4.8 07/06/2022     HGBA1C 5.0 05/11/2022     HGBA1C 4.4 04/30/2022      IMAGING:  Vein mapping shows suitable L cephalic vein in a/c fossa    IMP/PLAN:  66 y.o. male with a h/o HTN, HLD, DM, stage V CKD on HD since Spring 2022, and gout -- Plan L BC AVF 9/6/22    Prince Gardiner MD DFSVS FACS   Vascular/Endovascular Surgery

## 2022-09-06 NOTE — PLAN OF CARE
Pt in post op recovery. Pt denies any pain and nausea. Pt received discharge instructions and verbalizes understanding.

## 2022-09-07 ENCOUNTER — TELEPHONE (OUTPATIENT)
Dept: TRANSPLANT | Facility: CLINIC | Age: 67
End: 2022-09-07
Payer: COMMERCIAL

## 2022-09-07 NOTE — ADDENDUM NOTE
Addendum  created 09/07/22 0835 by Naveen Tom, DO    Child order released for a procedure order, Clinical Note Signed, Intraprocedure Blocks edited, Pend clinical note, SmartForm saved

## 2022-09-07 NOTE — ANESTHESIA PROCEDURE NOTES
Supraclav & ICB    Patient location during procedure: OR    Reason for block: primary anesthetic    Diagnosis: ESRD   Start time: 9/7/2022 7:10 AM  Timeout: 9/7/2022 7:01 AM   End time: 9/7/2022 7:30 AM    Staffing  Authorizing Provider: Zuleyma Ron MD  Performing Provider: Naveen Tom DO    Preanesthetic Checklist  Completed: patient identified, IV checked, site marked, risks and benefits discussed, surgical consent, monitors and equipment checked, pre-op evaluation and timeout performed  Peripheral Block  Patient position: supine  Prep: ChloraPrep  Patient monitoring: heart rate, cardiac monitor, continuous pulse ox, continuous capnometry and frequent blood pressure checks  Block type: supraclavicular  Laterality: left  Injection technique: single shot  Needle  Needle type: Stimuplex   Needle gauge: 22 G  Needle length: 2 in  Needle localization: anatomical landmarks and ultrasound guidance   -ultrasound image captured on disc.  Assessment  Injection assessment: negative aspiration, negative parasthesia and local visualized surrounding nerve  Paresthesia pain: none  Heart rate change: no  Slow fractionated injection: yes    Medications:    Medications: mepivacaine (CARBOCAINE) injection 15 mg/mL (1.5%) - Perineural   50 mL - 9/6/2022 7:25:00 AM    Additional Notes  VSS.  [facility] RN monitoring vitals throughout procedure.  Patient tolerated procedure well.

## 2022-09-08 LAB
BASOPHILS NFR BLD: 0.5 % (ref 0–1.5)
EOSINOPHIL NFR BLD: 4.9 % (ref 0–7)
ERYTHROCYTE [DISTWIDTH] IN BLOOD BY AUTOMATED COUNT: 16.5 % (ref 11.5–14.5)
HCT VFR BLD AUTO: 31.9 % (ref 42–52)
HEMOGLOBIN X 3: 31.5 % (ref 42–54)
HGB BLD-MCNC: 10.5 G/DL (ref 14–18)
LUC: 2.8 % (ref 0–4)
LYMPH%: 18 % (ref 19–48)
MCH RBC QN AUTO: 28.7 PG (ref 27–31)
MCHC RBC AUTO-ENTMCNC: 33 G/DL (ref 30–36)
MCV RBC AUTO: 87 FL (ref 80–100)
MONO%: 7 % (ref 3–10)
NEUTROPHILS NFR BLD: 66.9 % (ref 40–75)
PLATELET # BLD AUTO: 182 1000/MCL (ref 130–400)
RBC # BLD AUTO: 3.66 MILL/MCL (ref 4.7–6.1)
WBC # BLD AUTO: 6.15 1000/MCL (ref 4.8–10.8)

## 2022-09-13 ENCOUNTER — PATIENT MESSAGE (OUTPATIENT)
Dept: TRANSPLANT | Facility: CLINIC | Age: 67
End: 2022-09-13
Payer: COMMERCIAL

## 2022-09-13 DIAGNOSIS — Z91.89 CARDIOVASCULAR EVENT RISK: ICD-10-CM

## 2022-09-13 DIAGNOSIS — Z76.82 ORGAN TRANSPLANT CANDIDATE: Primary | ICD-10-CM

## 2022-09-15 ENCOUNTER — TELEPHONE (OUTPATIENT)
Dept: TRANSPLANT | Facility: CLINIC | Age: 67
End: 2022-09-15
Payer: COMMERCIAL

## 2022-09-22 ENCOUNTER — TELEPHONE (OUTPATIENT)
Dept: TRANSPLANT | Facility: CLINIC | Age: 67
End: 2022-09-22
Payer: COMMERCIAL

## 2022-09-22 LAB
CALCIUM PHOS PRODUCT: 53 (ref 0–54)
CALCIUM SERPL-MCNC: 8.9 MG/DL (ref 8.7–10.4)
HCT VFR BLD AUTO: 33.4 % (ref 42–52)
HEMOGLOBIN X 3: 29.7 % (ref 42–54)
HGB BLD-MCNC: 9.9 G/DL (ref 14–18)
PHOSPHATE FLD-MCNC: 5.9 MG/DL (ref 2.6–4.5)

## 2022-09-23 NOTE — ADDENDUM NOTE
Addendum  created 09/23/22 1323 by Naveen Tom,     Clinical Note Signed, Diagnosis association updated, Intraprocedure Blocks edited, SmartForm saved

## 2022-10-04 ENCOUNTER — HOSPITAL ENCOUNTER (OUTPATIENT)
Dept: VASCULAR SURGERY | Facility: CLINIC | Age: 67
Discharge: HOME | End: 2022-10-04
Attending: SURGERY
Payer: COMMERCIAL

## 2022-10-04 DIAGNOSIS — Z95.828 STATUS POST PLACEMENT OF ARTERIOVENOUS GRAFT: ICD-10-CM

## 2022-10-04 PROCEDURE — 93990 DOPPLER FLOW TESTING: CPT | Mod: S$GLB,TXP,, | Performed by: SURGERY

## 2022-10-04 PROCEDURE — 93990 PR DUPLEX HEMODIALYSIS ACCESS: ICD-10-PCS | Mod: S$GLB,TXP,, | Performed by: SURGERY

## 2022-10-05 ENCOUNTER — LAB VISIT (OUTPATIENT)
Dept: LAB | Facility: HOSPITAL | Age: 67
End: 2022-10-05
Attending: SURGERY
Payer: COMMERCIAL

## 2022-10-05 ENCOUNTER — OFFICE VISIT (OUTPATIENT)
Dept: VASCULAR SURGERY | Facility: CLINIC | Age: 67
End: 2022-10-05
Payer: COMMERCIAL

## 2022-10-05 VITALS
WEIGHT: 213.88 LBS | BODY MASS INDEX: 27.45 KG/M2 | HEIGHT: 74 IN | TEMPERATURE: 98 F | DIASTOLIC BLOOD PRESSURE: 70 MMHG | HEART RATE: 75 BPM | SYSTOLIC BLOOD PRESSURE: 122 MMHG

## 2022-10-05 DIAGNOSIS — Z95.828 STATUS POST PLACEMENT OF ARTERIOVENOUS GRAFT: Primary | ICD-10-CM

## 2022-10-05 DIAGNOSIS — Z01.818 PREOP TESTING: ICD-10-CM

## 2022-10-05 DIAGNOSIS — N18.6 ESRD (END STAGE RENAL DISEASE): Primary | ICD-10-CM

## 2022-10-05 LAB
ANION GAP SERPL CALC-SCNC: 14 MMOL/L (ref 8–16)
BASOPHILS # BLD AUTO: 0.03 K/UL (ref 0–0.2)
BASOPHILS NFR BLD: 0.7 % (ref 0–1.9)
BUN SERPL-MCNC: 27 MG/DL (ref 8–23)
CALCIUM SERPL-MCNC: 9.3 MG/DL (ref 8.7–10.5)
CHLORIDE SERPL-SCNC: 98 MMOL/L (ref 95–110)
CO2 SERPL-SCNC: 28 MMOL/L (ref 23–29)
CREAT SERPL-MCNC: 6.5 MG/DL (ref 0.5–1.4)
DIFFERENTIAL METHOD: ABNORMAL
EOSINOPHIL # BLD AUTO: 0.2 K/UL (ref 0–0.5)
EOSINOPHIL NFR BLD: 4.9 % (ref 0–8)
ERYTHROCYTE [DISTWIDTH] IN BLOOD BY AUTOMATED COUNT: 16.4 % (ref 11.5–14.5)
EST. GFR  (NO RACE VARIABLE): 8.9 ML/MIN/1.73 M^2
GLUCOSE SERPL-MCNC: 119 MG/DL (ref 70–110)
HCT VFR BLD AUTO: 34.2 % (ref 40–54)
HGB BLD-MCNC: 11 G/DL (ref 14–18)
IMM GRANULOCYTES # BLD AUTO: 0.01 K/UL (ref 0–0.04)
IMM GRANULOCYTES NFR BLD AUTO: 0.2 % (ref 0–0.5)
LYMPHOCYTES # BLD AUTO: 1.2 K/UL (ref 1–4.8)
LYMPHOCYTES NFR BLD: 27.7 % (ref 18–48)
MCH RBC QN AUTO: 28.8 PG (ref 27–31)
MCHC RBC AUTO-ENTMCNC: 32.2 G/DL (ref 32–36)
MCV RBC AUTO: 90 FL (ref 82–98)
MONOCYTES # BLD AUTO: 0.8 K/UL (ref 0.3–1)
MONOCYTES NFR BLD: 16.8 % (ref 4–15)
NEUTROPHILS # BLD AUTO: 2.2 K/UL (ref 1.8–7.7)
NEUTROPHILS NFR BLD: 49.7 % (ref 38–73)
NRBC BLD-RTO: 0 /100 WBC
PLATELET # BLD AUTO: 209 K/UL (ref 150–450)
PMV BLD AUTO: 10.6 FL (ref 9.2–12.9)
POTASSIUM SERPL-SCNC: 3.6 MMOL/L (ref 3.5–5.1)
RBC # BLD AUTO: 3.82 M/UL (ref 4.6–6.2)
SODIUM SERPL-SCNC: 140 MMOL/L (ref 136–145)
WBC # BLD AUTO: 4.47 K/UL (ref 3.9–12.7)

## 2022-10-05 PROCEDURE — 3078F DIAST BP <80 MM HG: CPT | Mod: CPTII,NTX,S$GLB, | Performed by: SURGERY

## 2022-10-05 PROCEDURE — 99999 PR PBB SHADOW E&M-EST. PATIENT-LVL IV: CPT | Mod: PBBFAC,TXP,, | Performed by: SURGERY

## 2022-10-05 PROCEDURE — 80048 BASIC METABOLIC PNL TOTAL CA: CPT | Mod: TXP | Performed by: SURGERY

## 2022-10-05 PROCEDURE — 3074F PR MOST RECENT SYSTOLIC BLOOD PRESSURE < 130 MM HG: ICD-10-PCS | Mod: CPTII,NTX,S$GLB, | Performed by: SURGERY

## 2022-10-05 PROCEDURE — 3078F PR MOST RECENT DIASTOLIC BLOOD PRESSURE < 80 MM HG: ICD-10-PCS | Mod: CPTII,NTX,S$GLB, | Performed by: SURGERY

## 2022-10-05 PROCEDURE — 1159F PR MEDICATION LIST DOCUMENTED IN MEDICAL RECORD: ICD-10-PCS | Mod: CPTII,NTX,S$GLB, | Performed by: SURGERY

## 2022-10-05 PROCEDURE — 3044F PR MOST RECENT HEMOGLOBIN A1C LEVEL <7.0%: ICD-10-PCS | Mod: CPTII,NTX,S$GLB, | Performed by: SURGERY

## 2022-10-05 PROCEDURE — 3008F PR BODY MASS INDEX (BMI) DOCUMENTED: ICD-10-PCS | Mod: CPTII,NTX,S$GLB, | Performed by: SURGERY

## 2022-10-05 PROCEDURE — 36415 COLL VENOUS BLD VENIPUNCTURE: CPT | Mod: TXP | Performed by: SURGERY

## 2022-10-05 PROCEDURE — 3066F PR DOCUMENTATION OF TREATMENT FOR NEPHROPATHY: ICD-10-PCS | Mod: CPTII,NTX,S$GLB, | Performed by: SURGERY

## 2022-10-05 PROCEDURE — 99024 PR POST-OP FOLLOW-UP VISIT: ICD-10-PCS | Mod: NTX,S$GLB,, | Performed by: SURGERY

## 2022-10-05 PROCEDURE — 3074F SYST BP LT 130 MM HG: CPT | Mod: CPTII,NTX,S$GLB, | Performed by: SURGERY

## 2022-10-05 PROCEDURE — 3044F HG A1C LEVEL LT 7.0%: CPT | Mod: CPTII,NTX,S$GLB, | Performed by: SURGERY

## 2022-10-05 PROCEDURE — 3066F NEPHROPATHY DOC TX: CPT | Mod: CPTII,NTX,S$GLB, | Performed by: SURGERY

## 2022-10-05 PROCEDURE — 1159F MED LIST DOCD IN RCRD: CPT | Mod: CPTII,NTX,S$GLB, | Performed by: SURGERY

## 2022-10-05 PROCEDURE — 99999 PR PBB SHADOW E&M-EST. PATIENT-LVL IV: ICD-10-PCS | Mod: PBBFAC,TXP,, | Performed by: SURGERY

## 2022-10-05 PROCEDURE — 99024 POSTOP FOLLOW-UP VISIT: CPT | Mod: NTX,S$GLB,, | Performed by: SURGERY

## 2022-10-05 PROCEDURE — 3008F BODY MASS INDEX DOCD: CPT | Mod: CPTII,NTX,S$GLB, | Performed by: SURGERY

## 2022-10-05 PROCEDURE — 85025 COMPLETE CBC W/AUTO DIFF WBC: CPT | Mod: TXP | Performed by: SURGERY

## 2022-10-05 NOTE — H&P (VIEW-ONLY)
Elbert Still Jr.  10/05/2022    HPI:  Patient is a 66 y.o. male with a h/o HTN, HLD, DM, stage V CKD on HD, and gout who is here s/p brachial-brachial AVF creation for HD. Reports decreased sensation over left arm but has been improving since surgery. Otherwise no other complaints.  He is currently undergoing dialysis three days a week on M, W, F via a R permacath.    Comes with wife  R-hand dominant     10/5/22  S/p 9/6/22 brachial-brachial AVF creation for HD.  Findings/Key Components:  Strong thrill in 1st stage L brachial artery/vein AVF; L brachial vein > 5mm  Attempted L BC AVF but the cephalic vein wall had tears in it and needed to be abandoned  2+ L radial pulse; minimal augmentation in triphasic flow by doppler  Will need transposition in 2-3 months    No MI / stroke    Renal is Dr ARIANE Díaz    Used to work in [employer]    Past Medical History:   Diagnosis Date    Diabetes mellitus     Essential (primary) hypertension 9/21/2017    Nuclear sclerosis of both eyes 8/12/2022     Past Surgical History:   Procedure Laterality Date    ADENOIDECTOMY      AV FISTULA PLACEMENT Left 9/6/2022    Procedure: CREATION, AV FISTULA;  Surgeon: Prince Gardiner MD;  Location: [facility] OR [address] [place] [address];  Service: Peripheral Vascular;  Laterality: Left;    nasal septum repair      Tonsillectomy      TONSILLECTOMY       Family History   Problem Relation Age of Onset    No Known Problems Mother     No Known Problems Father     No Known Problems Sister     No Known Problems Brother     No Known Problems Maternal Aunt     No Known Problems Maternal Uncle     No Known Problems Paternal Aunt     No Known Problems Paternal Uncle     No Known Problems Maternal Grandmother     No Known Problems Maternal Grandfather     No Known Problems Paternal Grandmother     No Known Problems Paternal Grandfather     No Known Problems Other     Cancer Neg Hx         Negative for prostate or colon cancer    Kidney disease Neg Hx      Amblyopia Neg Hx     Blindness Neg Hx     Cataracts Neg Hx     Diabetes Neg Hx     Glaucoma Neg Hx     Hypertension Neg Hx     Macular degeneration Neg Hx     Retinal detachment Neg Hx     Strabismus Neg Hx     Stroke Neg Hx     Thyroid disease Neg Hx      Social History     Socioeconomic History    Marital status:    Occupational History     Employer: [employer]   Tobacco Use    Smoking status: Never    Smokeless tobacco: Never    Tobacco comments:     .  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     Substance and Sexual Activity    Alcohol use: No    Drug use: No    Sexual activity: Yes     Partners: Female       Current Outpatient Medications:     acetaminophen (TYLENOL) 500 MG tablet, Take 2 tablets (1,000 mg total) by mouth every 6 (six) hours as needed for Pain., Disp: 30 tablet, Rfl: 0    atorvastatin (LIPITOR) 20 MG tablet, TAKE 1 TABLET BY MOUTH EVERY EVENING, Disp: 90 tablet, Rfl: 3    blood sugar diagnostic Strp, Use to check blood glucose once a day., Disp: 100 each, Rfl: 3    blood-glucose meter Misc, Use to check blood glucose twice a day., Disp: 1 each, Rfl: 0    carvediloL (COREG) 12.5 MG tablet, Take 1 tablet (12.5 mg total) by mouth 2 (two) times daily., Disp: 60 tablet, Rfl: 11    colchicine (COLCRYS) 0.6 mg tablet, Take half tab (0.3 mg) by mouth daily as needed for gout flare., Disp: 90 tablet, Rfl: 3    fluticasone propionate (FLONASE) 50 mcg/actuation nasal spray, 2 sprays (100 mcg total) by Each Nostril route once daily. (Patient taking differently: 2 sprays by Each Nostril route every evening.), Disp: 16 g, Rfl: 11    furosemide (LASIX) 80 MG tablet, Take 1 tablet (80 mg total) by mouth 2 (two) times daily., Disp: 60 tablet, Rfl: 2    lancets 30 gauge Misc, Use to check blood glucose twice a day., Disp: 100 each, Rfl: 5    NIFEdipine (PROCARDIA-XL) 60 MG (OSM) 24 hr tablet, Take 1 tablet (60 mg total) by mouth 2 (two) times a day., Disp: 60 tablet, Rfl: 11     oxyCODONE (ROXICODONE) 5 MG immediate release tablet, Take 1 tablet (5 mg total) by mouth every 4 (four) hours as needed for Pain., Disp: 15 tablet, Rfl: 0    sevelamer (RENAGEL) 400 MG Tab, Take 1 tablet (400 mg total) by mouth 3 (three) times daily with meals., Disp: 90 tablet, Rfl: 11    sevelamer HCL (RENAGEL) 800 MG Tab, Take 1 tablet by mouth twice a day with meals., Disp: 60 tablet, Rfl: 11    tamsulosin (FLOMAX) 0.4 mg Cap, TAKE ONE CAPSULE BY MOUTH ONCE DAILY (Patient taking differently: Take by mouth every morning.), Disp: 90 capsule, Rfl: 3  No current facility-administered medications for this visit.    REVIEW OF SYSTEMS:  General: negative; ENT: negative; Allergy and Immunology: negative; Hematological and Lymphatic: Negative; Endocrine: negative; Respiratory: no cough, shortness of breath, or wheezing; Cardiovascular: no chest pain or dyspnea on exertion; Gastrointestinal: no abdominal pain/back, change in bowel habits, or bloody stools; Genito-Urinary: no dysuria, trouble voiding, or hematuria; Musculoskeletal: negative  Neurological: no TIA or stroke symptoms; Psychiatric: no nervousness, anxiety or depression.    PHYSICAL EXAM:   Vitals:    10/05/22 1314   BP: 122/70   Pulse: 75   Temp: 97.8 °F (36.6 °C)     Right Arm BP - Sittin/70 (10/05/22 1315)      General appearance:  Alert, well-appearing, and in no distress.  Oriented to person, place, and time   Neurological: Normal speech, no focal findings noted; CN II - XII grossly intact           Musculoskeletal: Digits/nail without cyanosis/clubbing.  Normal muscle strength/tone.                 Neck: Supple, no significant adenopathy; thyroid is not enlarged                  No  carotid bruit can be auscultated                Chest:  Clear to auscultation, no wheezes, rales or rhonchi, symmetric air entry     No use of accessory muscles             Cardiac: Normal rate and regular rhythm, S1 and S2 normal; PMI non-displaced           Abdomen: Soft, nontender, nondistended, no masses or organomegaly     No rebound tenderness noted; bowel sounds normal     Pulsatile aortic mass is not palpable.     No groin adenopathy      Extremities:   2+ L brachial and radial pulse     L 1st stage brachial artery/basilic AVF with good thrill. Incision well healed    LAB RESULTS:  Lab Results   Component Value Date    K 4.4 09/07/2022    K 3.8 08/10/2022    K 4.2 08/03/2022    CREATININE 8.96 (H) 09/07/2022    CREATININE 6.1 (H) 08/10/2022    CREATININE 8.43 (H) 08/03/2022     Lab Results   Component Value Date    WBC 6.15 09/07/2022    WBC 4.10 (L) 08/03/2022    WBC 4.06 (L) 07/06/2022    HCT 33.4 (L) 09/21/2022    HCT 31.9 (L) 09/07/2022    HCT 32.0 (L) 08/17/2022     09/07/2022     08/03/2022     07/06/2022     Lab Results   Component Value Date    HGBA1C 4.8 07/06/2022    HGBA1C 5.0 05/11/2022    HGBA1C 4.4 04/30/2022     IMAGING:  Flow vol: 2615 mL/min; no stenosis  Diam > 8mm  Dept > 10mm    IMP/PLAN:     66 y.o. male with a h/o HTN, HLD, DM, stage V CKD on HD since Spring 2022, and gout -- good diameter and flow volume on US.   Plan transposition/L BVT AVF 11/3/22     Prince Gardiner MD [facility] FACS   Vascular/Endovascular Surgery

## 2022-10-05 NOTE — PROGRESS NOTES
Elbert Still Jr.  10/05/2022    HPI:  Patient is a 66 y.o. male with a h/o HTN, HLD, DM, stage V CKD on HD, and gout who is here s/p brachial-brachial AVF creation for HD. Reports decreased sensation over left arm but has been improving since surgery. Otherwise no other complaints.  He is currently undergoing dialysis three days a week on M, W, F via a R permacath.    Comes with wife  R-hand dominant     10/5/22  S/p 9/6/22 brachial-brachial AVF creation for HD.  Findings/Key Components:  Strong thrill in 1st stage L brachial artery/vein AVF; L brachial vein > 5mm  Attempted L BC AVF but the cephalic vein wall had tears in it and needed to be abandoned  2+ L radial pulse; minimal augmentation in triphasic flow by doppler  Will need transposition in 2-3 months    No MI / stroke    Renal is Dr ARIANE Díaz    Used to work in [employer]    Past Medical History:   Diagnosis Date    Diabetes mellitus     Essential (primary) hypertension 9/21/2017    Nuclear sclerosis of both eyes 8/12/2022     Past Surgical History:   Procedure Laterality Date    ADENOIDECTOMY      AV FISTULA PLACEMENT Left 9/6/2022    Procedure: CREATION, AV FISTULA;  Surgeon: Prince Gardiner MD;  Location: [facility] OR [address] [place] [address];  Service: Peripheral Vascular;  Laterality: Left;    nasal septum repair      Tonsillectomy      TONSILLECTOMY       Family History   Problem Relation Age of Onset    No Known Problems Mother     No Known Problems Father     No Known Problems Sister     No Known Problems Brother     No Known Problems Maternal Aunt     No Known Problems Maternal Uncle     No Known Problems Paternal Aunt     No Known Problems Paternal Uncle     No Known Problems Maternal Grandmother     No Known Problems Maternal Grandfather     No Known Problems Paternal Grandmother     No Known Problems Paternal Grandfather     No Known Problems Other     Cancer Neg Hx         Negative for prostate or colon cancer    Kidney disease Neg Hx      Amblyopia Neg Hx     Blindness Neg Hx     Cataracts Neg Hx     Diabetes Neg Hx     Glaucoma Neg Hx     Hypertension Neg Hx     Macular degeneration Neg Hx     Retinal detachment Neg Hx     Strabismus Neg Hx     Stroke Neg Hx     Thyroid disease Neg Hx      Social History     Socioeconomic History    Marital status:    Occupational History     Employer: [employer]   Tobacco Use    Smoking status: Never    Smokeless tobacco: Never    Tobacco comments:     .  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     Substance and Sexual Activity    Alcohol use: No    Drug use: No    Sexual activity: Yes     Partners: Female       Current Outpatient Medications:     acetaminophen (TYLENOL) 500 MG tablet, Take 2 tablets (1,000 mg total) by mouth every 6 (six) hours as needed for Pain., Disp: 30 tablet, Rfl: 0    atorvastatin (LIPITOR) 20 MG tablet, TAKE 1 TABLET BY MOUTH EVERY EVENING, Disp: 90 tablet, Rfl: 3    blood sugar diagnostic Strp, Use to check blood glucose once a day., Disp: 100 each, Rfl: 3    blood-glucose meter Misc, Use to check blood glucose twice a day., Disp: 1 each, Rfl: 0    carvediloL (COREG) 12.5 MG tablet, Take 1 tablet (12.5 mg total) by mouth 2 (two) times daily., Disp: 60 tablet, Rfl: 11    colchicine (COLCRYS) 0.6 mg tablet, Take half tab (0.3 mg) by mouth daily as needed for gout flare., Disp: 90 tablet, Rfl: 3    fluticasone propionate (FLONASE) 50 mcg/actuation nasal spray, 2 sprays (100 mcg total) by Each Nostril route once daily. (Patient taking differently: 2 sprays by Each Nostril route every evening.), Disp: 16 g, Rfl: 11    furosemide (LASIX) 80 MG tablet, Take 1 tablet (80 mg total) by mouth 2 (two) times daily., Disp: 60 tablet, Rfl: 2    lancets 30 gauge Misc, Use to check blood glucose twice a day., Disp: 100 each, Rfl: 5    NIFEdipine (PROCARDIA-XL) 60 MG (OSM) 24 hr tablet, Take 1 tablet (60 mg total) by mouth 2 (two) times a day., Disp: 60 tablet, Rfl: 11     oxyCODONE (ROXICODONE) 5 MG immediate release tablet, Take 1 tablet (5 mg total) by mouth every 4 (four) hours as needed for Pain., Disp: 15 tablet, Rfl: 0    sevelamer (RENAGEL) 400 MG Tab, Take 1 tablet (400 mg total) by mouth 3 (three) times daily with meals., Disp: 90 tablet, Rfl: 11    sevelamer HCL (RENAGEL) 800 MG Tab, Take 1 tablet by mouth twice a day with meals., Disp: 60 tablet, Rfl: 11    tamsulosin (FLOMAX) 0.4 mg Cap, TAKE ONE CAPSULE BY MOUTH ONCE DAILY (Patient taking differently: Take by mouth every morning.), Disp: 90 capsule, Rfl: 3  No current facility-administered medications for this visit.    REVIEW OF SYSTEMS:  General: negative; ENT: negative; Allergy and Immunology: negative; Hematological and Lymphatic: Negative; Endocrine: negative; Respiratory: no cough, shortness of breath, or wheezing; Cardiovascular: no chest pain or dyspnea on exertion; Gastrointestinal: no abdominal pain/back, change in bowel habits, or bloody stools; Genito-Urinary: no dysuria, trouble voiding, or hematuria; Musculoskeletal: negative  Neurological: no TIA or stroke symptoms; Psychiatric: no nervousness, anxiety or depression.    PHYSICAL EXAM:   Vitals:    10/05/22 1314   BP: 122/70   Pulse: 75   Temp: 97.8 °F (36.6 °C)     Right Arm BP - Sittin/70 (10/05/22 1315)      General appearance:  Alert, well-appearing, and in no distress.  Oriented to person, place, and time   Neurological: Normal speech, no focal findings noted; CN II - XII grossly intact           Musculoskeletal: Digits/nail without cyanosis/clubbing.  Normal muscle strength/tone.                 Neck: Supple, no significant adenopathy; thyroid is not enlarged                  No  carotid bruit can be auscultated                Chest:  Clear to auscultation, no wheezes, rales or rhonchi, symmetric air entry     No use of accessory muscles             Cardiac: Normal rate and regular rhythm, S1 and S2 normal; PMI non-displaced           Abdomen: Soft, nontender, nondistended, no masses or organomegaly     No rebound tenderness noted; bowel sounds normal     Pulsatile aortic mass is not palpable.     No groin adenopathy      Extremities:   2+ L brachial and radial pulse     L 1st stage brachial artery/basilic AVF with good thrill. Incision well healed    LAB RESULTS:  Lab Results   Component Value Date    K 4.4 09/07/2022    K 3.8 08/10/2022    K 4.2 08/03/2022    CREATININE 8.96 (H) 09/07/2022    CREATININE 6.1 (H) 08/10/2022    CREATININE 8.43 (H) 08/03/2022     Lab Results   Component Value Date    WBC 6.15 09/07/2022    WBC 4.10 (L) 08/03/2022    WBC 4.06 (L) 07/06/2022    HCT 33.4 (L) 09/21/2022    HCT 31.9 (L) 09/07/2022    HCT 32.0 (L) 08/17/2022     09/07/2022     08/03/2022     07/06/2022     Lab Results   Component Value Date    HGBA1C 4.8 07/06/2022    HGBA1C 5.0 05/11/2022    HGBA1C 4.4 04/30/2022     IMAGING:  Flow vol: 2615 mL/min; no stenosis  Diam > 8mm  Dept > 10mm    IMP/PLAN:     66 y.o. male with a h/o HTN, HLD, DM, stage V CKD on HD since Spring 2022, and gout -- good diameter and flow volume on US.   Plan transposition/L BVT AVF 11/3/22     Prince Gardiner MD [facility] FACS   Vascular/Endovascular Surgery

## 2022-10-06 LAB
DIALYSIS START TIME: 529
DIALYSIS STOP TIME: 848
POST-WEIGHT, KG: 97.6 KG
POST-WEIGHT, LB: 214.7
PRE-WEIGHT, KG: 99.6 KG
PRE-WEIGHT, LB: 219.1

## 2022-10-07 ENCOUNTER — DOCUMENTATION ONLY (OUTPATIENT)
Dept: VASCULAR SURGERY | Facility: CLINIC | Age: 67
End: 2022-10-07
Payer: COMMERCIAL

## 2022-10-07 DIAGNOSIS — Z95.828 STATUS POST PLACEMENT OF ARTERIOVENOUS GRAFT: Primary | ICD-10-CM

## 2022-10-07 NOTE — TELEPHONE ENCOUNTER
----- Message from Jocelin Toure sent at 10/7/2022  1:34 PM CDT -----  Type: Patient Call Back    Who called: SELF     What is the request in detail:Pt calling in about a refill he states that Bobby has been calling about furosemide (LASIX) 80 MG tablet, he need a refill asap     Can the clinic reply by [facility]?    Would the patient rather a call back or a response via [facility]?     Best call back number: [phone] (home)       Additional Information:   [id number] - [place] - [address] AT [facility]  [address]  [place] [address]  Phone: [phone] Fax: [phone]

## 2022-10-07 NOTE — TELEPHONE ENCOUNTER
No new care gaps identified.  [facility] Embedded Care Gaps. Reference number: [id number]. 10/07/2022   2:04:47 PM CDT

## 2022-10-10 RX ORDER — FUROSEMIDE 80 MG/1
80 TABLET ORAL 2 TIMES DAILY
Qty: 60 TABLET | Refills: 11 | Status: SHIPPED | OUTPATIENT
Start: 2022-10-10 | End: 2023-10-24

## 2022-10-11 LAB
BUN, POST: 19 MG/DL (ref 6–19)
BUN, PRE: 54 MG/DL (ref 6–19)
DIALYSIS START TIME: 540
DIALYSIS STOP TIME: 856
POST-WEIGHT, KG: 98 KG
POST-WEIGHT, LB: 215.6
PRE-WEIGHT, KG: 100.3 KG
PRE-WEIGHT, LB: 220.7
SPKT/V, DAUGIRDAS II: 1.19
UREA REDUCTION RATIO: 65 % (ref 65–80)

## 2022-10-12 ENCOUNTER — TELEPHONE (OUTPATIENT)
Dept: VASCULAR SURGERY | Facility: CLINIC | Age: 67
End: 2022-10-12
Payer: COMMERCIAL

## 2022-10-12 NOTE — TELEPHONE ENCOUNTER
"Per Dr Gardiner notified Mr Still fo his surgery date has been changed from 11/3/2022 to 11/1/2022. Mr Still states, "November,1st is okay with me."  "

## 2022-10-13 LAB
BUN, PRE: 48 MG/DL (ref 6–19)
DIALYSIS START TIME: 538
DIALYSIS STOP TIME: 855
POST-WEIGHT, KG: 98 KG
POST-WEIGHT, LB: 215.6
PRE-WEIGHT, KG: 100 KG
PRE-WEIGHT, LB: 220

## 2022-10-14 LAB
BUN, POST: 19 MG/DL (ref 6–19)
SPKT/V, DAUGIRDAS II: 1.05
UREA REDUCTION RATIO: 60 % (ref 65–80)

## 2022-10-19 ENCOUNTER — TELEPHONE (OUTPATIENT)
Dept: TRANSPLANT | Facility: CLINIC | Age: 67
End: 2022-10-19
Payer: COMMERCIAL

## 2022-10-20 LAB
BUN, POST: 20 MG/DL (ref 6–19)
BUN, PRE: 59 MG/DL (ref 6–19)
DIALYSIS START TIME: 548
DIALYSIS STOP TIME: 928
HCT VFR BLD AUTO: 35 % (ref 42–52)
HEMOGLOBIN X 3: 33.6 % (ref 42–54)
HGB BLD-MCNC: 11.2 G/DL (ref 14–18)
POST-WEIGHT, KG: 97.6 KG
POST-WEIGHT, LB: 214.7
PRE-WEIGHT, KG: 99.7 KG
PRE-WEIGHT, LB: 219.3
SPKT/V, DAUGIRDAS II: 1.23
UREA REDUCTION RATIO: 66 % (ref 65–80)

## 2022-10-25 ENCOUNTER — TELEPHONE (OUTPATIENT)
Dept: TRANSPLANT | Facility: CLINIC | Age: 67
End: 2022-10-25
Payer: COMMERCIAL

## 2022-10-26 NOTE — PROGRESS NOTES
Verified demographics, appointment times, fasting labs, current caregiver restrictions: one caregiver and to bring a light snack to appointments. Assessed patient regarding need for . Noted patient does not require . Updated in Epic: patient health/surgical history, allergies, and family history per patient.

## 2022-10-27 ENCOUNTER — HOSPITAL ENCOUNTER (OUTPATIENT)
Dept: RADIOLOGY | Facility: HOSPITAL | Age: 67
Discharge: HOME | End: 2022-10-27
Attending: NURSE PRACTITIONER
Payer: COMMERCIAL

## 2022-10-27 ENCOUNTER — TELEPHONE (OUTPATIENT)
Dept: TRANSPLANT | Facility: CLINIC | Age: 67
End: 2022-10-27
Payer: COMMERCIAL

## 2022-10-27 ENCOUNTER — HOSPITAL ENCOUNTER (OUTPATIENT)
Dept: RADIOLOGY | Facility: HOSPITAL | Age: 67
Discharge: HOME | End: 2022-10-27
Attending: NURSE PRACTITIONER
Payer: MEDICARE

## 2022-10-27 ENCOUNTER — OFFICE VISIT (OUTPATIENT)
Dept: TRANSPLANT | Facility: CLINIC | Age: 67
End: 2022-10-27
Payer: COMMERCIAL

## 2022-10-27 VITALS
SYSTOLIC BLOOD PRESSURE: 124 MMHG | HEIGHT: 73 IN | BODY MASS INDEX: 28.02 KG/M2 | WEIGHT: 211.44 LBS | RESPIRATION RATE: 16 BRPM | DIASTOLIC BLOOD PRESSURE: 72 MMHG | OXYGEN SATURATION: 98 % | HEART RATE: 71 BPM | TEMPERATURE: 98 F

## 2022-10-27 DIAGNOSIS — R80.9 PROTEINURIA, UNSPECIFIED TYPE: Chronic | ICD-10-CM

## 2022-10-27 DIAGNOSIS — Z76.82 ORGAN TRANSPLANT CANDIDATE: ICD-10-CM

## 2022-10-27 DIAGNOSIS — D63.8 ANEMIA OF CHRONIC DISEASE: Chronic | ICD-10-CM

## 2022-10-27 DIAGNOSIS — E08.21 DIABETES MELLITUS DUE TO UNDERLYING CONDITION WITH DIABETIC NEPHROPATHY, WITHOUT LONG-TERM CURRENT USE OF INSULIN: Chronic | ICD-10-CM

## 2022-10-27 DIAGNOSIS — N18.6 ESRD (END STAGE RENAL DISEASE) ON DIALYSIS: ICD-10-CM

## 2022-10-27 DIAGNOSIS — I10 ESSENTIAL (PRIMARY) HYPERTENSION: Chronic | ICD-10-CM

## 2022-10-27 DIAGNOSIS — E78.2 MIXED HYPERLIPIDEMIA: Chronic | ICD-10-CM

## 2022-10-27 DIAGNOSIS — Z01.818 PRE-TRANSPLANT EVALUATION FOR CHRONIC KIDNEY DISEASE: Primary | ICD-10-CM

## 2022-10-27 DIAGNOSIS — Z99.2 ESRD (END STAGE RENAL DISEASE) ON DIALYSIS: ICD-10-CM

## 2022-10-27 PROCEDURE — 71046 X-RAY EXAM CHEST 2 VIEWS: CPT | Mod: TC,TXP

## 2022-10-27 PROCEDURE — 72170 XR PELVIS ROUTINE AP: ICD-10-PCS | Mod: 26,TXP,, | Performed by: RADIOLOGY

## 2022-10-27 PROCEDURE — 1159F PR MEDICATION LIST DOCUMENTED IN MEDICAL RECORD: ICD-10-PCS | Mod: CPTII,S$GLB,TXP, | Performed by: NURSE PRACTITIONER

## 2022-10-27 PROCEDURE — 1159F MED LIST DOCD IN RCRD: CPT | Mod: CPTII,S$GLB,TXP, | Performed by: NURSE PRACTITIONER

## 2022-10-27 PROCEDURE — 99205 OFFICE O/P NEW HI 60 MIN: CPT | Mod: S$GLB,TXP,, | Performed by: NURSE PRACTITIONER

## 2022-10-27 PROCEDURE — 93978 US DOPP ILIACS BILATERAL: ICD-10-PCS | Mod: 26,TXP,, | Performed by: RADIOLOGY

## 2022-10-27 PROCEDURE — 71046 X-RAY EXAM CHEST 2 VIEWS: CPT | Mod: 26,TXP,, | Performed by: RADIOLOGY

## 2022-10-27 PROCEDURE — 99999 PR PBB SHADOW E&M-EST. PATIENT-LVL V: ICD-10-PCS | Mod: PBBFAC,TXP,, | Performed by: NURSE PRACTITIONER

## 2022-10-27 PROCEDURE — 99205 PR OFFICE/OUTPT VISIT, NEW, LEVL V, 60-74 MIN: ICD-10-PCS | Mod: S$GLB,TXP,, | Performed by: NURSE PRACTITIONER

## 2022-10-27 PROCEDURE — 3066F PR DOCUMENTATION OF TREATMENT FOR NEPHROPATHY: ICD-10-PCS | Mod: CPTII,S$GLB,TXP, | Performed by: NURSE PRACTITIONER

## 2022-10-27 PROCEDURE — 99999 PR PBB SHADOW E&M-EST. PATIENT-LVL V: CPT | Mod: PBBFAC,TXP,, | Performed by: NURSE PRACTITIONER

## 2022-10-27 PROCEDURE — 99214 PR OFFICE/OUTPT VISIT, EST, LEVL IV, 30-39 MIN: ICD-10-PCS | Mod: S$GLB,TXP,, | Performed by: TRANSPLANT SURGERY

## 2022-10-27 PROCEDURE — 72170 X-RAY EXAM OF PELVIS: CPT | Mod: 26,TXP,, | Performed by: RADIOLOGY

## 2022-10-27 PROCEDURE — 3044F HG A1C LEVEL LT 7.0%: CPT | Mod: CPTII,S$GLB,TXP, | Performed by: NURSE PRACTITIONER

## 2022-10-27 PROCEDURE — 93978 VASCULAR STUDY: CPT | Mod: 26,TXP,, | Performed by: RADIOLOGY

## 2022-10-27 PROCEDURE — 3074F SYST BP LT 130 MM HG: CPT | Mod: CPTII,S$GLB,TXP, | Performed by: NURSE PRACTITIONER

## 2022-10-27 PROCEDURE — 1160F PR REVIEW ALL MEDS BY PRESCRIBER/CLIN PHARMACIST DOCUMENTED: ICD-10-PCS | Mod: CPTII,S$GLB,TXP, | Performed by: NURSE PRACTITIONER

## 2022-10-27 PROCEDURE — 99214 OFFICE O/P EST MOD 30 MIN: CPT | Mod: S$GLB,TXP,, | Performed by: TRANSPLANT SURGERY

## 2022-10-27 PROCEDURE — 71046 XR CHEST PA AND LATERAL: ICD-10-PCS | Mod: 26,TXP,, | Performed by: RADIOLOGY

## 2022-10-27 PROCEDURE — 3078F DIAST BP <80 MM HG: CPT | Mod: CPTII,S$GLB,TXP, | Performed by: NURSE PRACTITIONER

## 2022-10-27 PROCEDURE — 72170 X-RAY EXAM OF PELVIS: CPT | Mod: TC,TXP

## 2022-10-27 PROCEDURE — 93978 VASCULAR STUDY: CPT | Mod: TC,TXP

## 2022-10-27 PROCEDURE — 3078F PR MOST RECENT DIASTOLIC BLOOD PRESSURE < 80 MM HG: ICD-10-PCS | Mod: CPTII,S$GLB,TXP, | Performed by: NURSE PRACTITIONER

## 2022-10-27 PROCEDURE — 3066F NEPHROPATHY DOC TX: CPT | Mod: CPTII,S$GLB,TXP, | Performed by: NURSE PRACTITIONER

## 2022-10-27 PROCEDURE — 1160F RVW MEDS BY RX/DR IN RCRD: CPT | Mod: CPTII,S$GLB,TXP, | Performed by: NURSE PRACTITIONER

## 2022-10-27 PROCEDURE — 3044F PR MOST RECENT HEMOGLOBIN A1C LEVEL <7.0%: ICD-10-PCS | Mod: CPTII,S$GLB,TXP, | Performed by: NURSE PRACTITIONER

## 2022-10-27 PROCEDURE — 3074F PR MOST RECENT SYSTOLIC BLOOD PRESSURE < 130 MM HG: ICD-10-PCS | Mod: CPTII,S$GLB,TXP, | Performed by: NURSE PRACTITIONER

## 2022-10-27 RX ORDER — CINACALCET 60 MG/1
30 TABLET, FILM COATED ORAL
COMMUNITY
End: 2023-12-09

## 2022-10-27 NOTE — LETTER
October 28, 2022        Nawaf Butler  [address]  [facility] [address]  Phone: [phone]  Fax: [phone]             [facility]- Transplant 1st Fl  [address]  [facility] [address]  Phone: [phone]   Patient: Elbert Still Jr.   MR Number: [id number]   YOB: 1955   Date of Visit: 10/27/2022       Dear Dr. Nawaf Butler    Thank you for referring Elbert Still to me for evaluation. Attached you will find relevant portions of my assessment and plan of care.    If you have questions, please do not hesitate to call me. I look forward to following Elbert Still along with you.    Sincerely,    Gaby Knapp, NP    Enclosure    If you would like to receive this communication electronically, please contact [email or web address] or [phone] to request [employer] access.    [employer] is a tool which provides read-only access to select patient information with whom you have a relationship. Its easy to use and provides real time access to review your patients record including encounter summaries, notes, results, and demographic information.    If you feel you have received this communication in error or would no longer like to receive these types of communications, please e-mail [email or web address]

## 2022-10-27 NOTE — PROGRESS NOTES
INITIAL PATIENT EDUCATION NOTE    Mr. Elbert Still Jr. was seen in pre-kidney transplant clinic for evaluation for kidney, kidney/pancreas or pancreas only transplant.  The patient attended a an individual video education session that discussed/reviewed the following aspects of transplantation: evaluation and selection committee process, UNOS waitlist management/multiple listings, types of organs offered (KDPI < 85%, KDPI > 85%, PHS risk, DCD, HCV+, HIV+ for HIV+ recipients and enbloc/dual), financial aspects, surgical procedures, dietary instruction pre- and post-transplant, health maintenance pre- and post-transplant, post-transplant hospitalization and outpatient follow-up, potential to participate in a research protocol, and medication management and side effects.  A question and answer session was provided after the presentation.    The patient was seen by all members of the multi-disciplinary team to include: Nephrologist/PA, Surgeon, , Transplant Coordinator, , Pharmacist and Dietician (if applicable).    The patient reviewed and signed all consents for evaluation which were witnessed and sent to scanning into the EPIC chart.    The patient was given an education book and plan for further evaluation based on his individual assessment.      Reviewed program requirement for complete COVID vaccination with documentation prior to listing.  COVID education information reviewed with patient. Patient encouraged to be up to date on all vaccinations.       The patient was informed that the transplant team would manage immediate post op pain. If the patient requires long term pain management, they will need to have that pain management addressed by their PCP or previous provider who wrote for long term pain medicines.    The patient was encouraged to call with any questions or concerns.

## 2022-10-27 NOTE — PROGRESS NOTES
Transplant Nephrology  Kidney Transplant Recipient Evaluation    Referring Physician: Nawaf Butler  Current Nephrologist: Nawaf Butler    Subjective:   CC:  Initial evaluation of kidney transplant candidacy.    HPI:  Mr. Still is a 66 y.o. year old Black or  male who has presented to be evaluated as a potential kidney transplant recipient.  He has ESRD secondary to diabetic nephropathy (diagnosed ~ 26 years ago) . Denies hx cardiovascular dz.  Patient is currently on hemodialysis started on 5/2022. Patient is dialyzing on MWF schedule.  Patient reports that he is tolerating dialysis well.. He has a LUE AV fistula (healing) and a chest catheter for dialysis access. He is dialyzing for 3 1/2 hours per session.     FX ASSESSMENT: Is an active person, feels he has had to slow down some since starting dialysis, but is still riding his bike, mowing the grass/lawn care. Looks good not frail.     Previous Transplant: no  DM II-DIAGNOSED ~ 26 years ago   MEDS: initially on oral meds, NO LONGER ON MEDS  COMPLICATIONS: KIDNEY,   Lab Results   Component Value Date    HGBA1C 5.2 10/05/2022     Ac: NO  DONORS: no  KIDNEY BX : NO    Past Medical History:   Diagnosis Date    Diabetes mellitus     Disorder of kidney and ureter     Essential (primary) hypertension 09/21/2017    Gout, unspecified     Hepatitis     Nuclear sclerosis of both eyes 08/12/2022       Past Medical and Surgical History: Mr. Still  has a past medical history of Diabetes mellitus, Disorder of kidney and ureter, Essential (primary) hypertension, Gout, unspecified, Hepatitis, and Nuclear sclerosis of both eyes.  He has a past surgical history that includes Tonsillectomy; Adenoidectomy; nasal septum repair; Tonsillectomy; AV fistula placement (Left, 09/06/2022); Adenoidectomy; and Nasal septum surgery.    Past Social and Family History: Mr. Still reports that he has never smoked. He has never used smokeless tobacco. He reports that he  "does not drink alcohol and does not use drugs. His family history includes Heart disease in his mother; Hypertension in his mother; Kidney disease in his mother; No Known Problems in his father.    Review of Systems   Constitutional:  Positive for fatigue and unexpected weight change (LOST WEIGHT). Negative for activity change, appetite change, chills and fever.   HENT:  Negative for congestion, facial swelling, postnasal drip, rhinorrhea, sinus pressure, sore throat and trouble swallowing.    Eyes:  Positive for visual disturbance (WEARS READING GLASSES). Negative for pain and redness.   Respiratory:  Negative for cough, chest tightness, shortness of breath and wheezing.    Cardiovascular: Negative.  Negative for chest pain, palpitations and leg swelling.   Gastrointestinal:  Negative for abdominal pain, diarrhea, nausea and vomiting.        INDIGESTION    Genitourinary:  Negative for dysuria, flank pain and urgency.   Musculoskeletal:  Negative for gait problem, neck pain and neck stiffness.   Skin:  Negative for rash.   Allergic/Immunologic: Negative for environmental allergies, food allergies and immunocompromised state.   Neurological:  Positive for dizziness and headaches. Negative for weakness and light-headedness.   Psychiatric/Behavioral:  Negative for agitation and confusion. The patient is not nervous/anxious.      Objective:   Blood pressure 124/72, pulse 71, temperature 97.7 °F (36.5 °C), temperature source Tympanic, resp. rate 16, height 6' 0.68" (1.846 m), weight 95.9 kg (211 lb 6.7 oz), SpO2 98 %.body mass index is 28.14 kg/m².    Physical Exam  Vitals reviewed.   Constitutional:       Appearance: Normal appearance. He is well-developed.   HENT:      Head: Normocephalic.   Eyes:      Pupils: Pupils are equal, round, and reactive to light.   Cardiovascular:      Rate and Rhythm: Normal rate and regular rhythm.      Heart sounds: Normal heart sounds.   Pulmonary:      Effort: Pulmonary effort is " normal.      Breath sounds: Normal breath sounds.   Chest:       Abdominal:      General: Bowel sounds are normal.      Palpations: Abdomen is soft.   Musculoskeletal:         General: Normal range of motion.        Arms:       Cervical back: Normal range of motion and neck supple.   Skin:     General: Skin is warm and dry.   Neurological:      Mental Status: He is alert and oriented to person, place, and time.      Motor: No abnormal muscle tone.   Psychiatric:         Behavior: Behavior normal.       Labs:  Lab Results   Component Value Date    WBC 4.47 10/05/2022    HGB 11.2 (L) 10/19/2022    HCT 35.0 (L) 10/19/2022     10/05/2022    K 3.6 10/05/2022    CL 98 10/05/2022    CO2 28 10/05/2022    BUN 27 (H) 10/05/2022    CREATININE 6.5 (H) 10/05/2022    EGFRNONAA 9.5 (A) 05/10/2022    CALCIUM 9.3 10/05/2022    PHOS 7.8 (H) 10/05/2022    MG 1.7 05/08/2022    ALBUMIN 4.3 10/05/2022    AST 17 06/22/2022    ALT 9 10/05/2022    UTPCR 7.41 (H) 04/30/2022     (H) 10/05/2022       Lab Results   Component Value Date    BILIRUBINUA Negative 04/30/2022    LIPASE 34 04/30/2022    PROTEINUA 3+ (A) 04/30/2022    NITRITE Negative 04/30/2022    RBCUA 1 04/30/2022    WBCUA 1 04/30/2022       No results found for: HLAABCTYPE    Labs were reviewed with the patient.    Assessment:     1. Pre-transplant evaluation for chronic kidney disease    2. ESRD (end stage renal disease) on dialysis    3. -Essential (primary) hypertension    4. Diabetes mellitus due to underlying condition with diabetic nephropathy, without long-term current use of insulin    5. Anemia of chronic disease    6. Mixed hyperlipidemia    7. Proteinuria, unspecified type    8. BMI 28.0-28.9,adult        Plan:     Mr. Still is a 66 y.o. year old Black or  male who has presented to be evaluated as a potential kidney transplant recipient.  He has ESRD secondary to diabetic nephropathy (diagnosed ~ 26 years ago) . Denies hx cardiovascular dz.   Patient is currently on hemodialysis started on 5/2022. Patient is dialyzing on MWF schedule.  Patient reports that he is tolerating dialysis well.. He has a LUE AV fistula (healing) and a chest catheter for dialysis access. He is dialyzing for 3 1/2 hours per session.   FX ASSESSMENT: Is an active person, feels he has had to slow down some since starting dialysis, but is still riding his bike, mowing the grass/lawn care. Looks good not frail.   Ac: NO  DONORS: no  KIDNEY BX : NO    Transplant Candidacy:   Based on available information, Mr. Still is a suitable kidney transplant candidate.   Meets center eligibility for accepting HCV+ donor offer - yes.  Patient educated on HCV+ donors. Elbert is willing to accept HCV+ donor offer - yes   Patient is a candidate for KDPI > 85 kidney donor offer - no d/t weight.  Final determination of transplant candidacy will be made once workup is complete and reviewed by the selection committee.    Patient advised that it is recommended that all transplant candidates, and their close contacts and household members receive Covid vaccination.    LUCILA WoodsOS Patient Status  Functional Status: 60% - Requires occasional assistance but is able to care for needs  Physical Capacity: No Limitations

## 2022-10-27 NOTE — PROGRESS NOTES
Transplant Recipient Adult Psychosocial Assessment    Elbert Still  [address] [place] [address]  Telephone Information:   Mobile [phone]   Home  [phone] (home)  Work  There is no work phone number on file.  E-mail  [email or web address]    Sex: male  YOB: 1955  Age: 66 y.o.    Encounter Date: 10/27/2022  [place] Citizen: yes  Primary Language: English   Needed: no    Emergency Contact:  Kristine Still, 60 yo spouse, Fran [place], does drive/own car, works [employer] as  for 20 years. [phone]    Family/Social Support:   Number of dependents/: pt denies  Marital history:  to Kristine   Other family dynamics: Pt reports wife Kristine and daughter Charlotte will be assisting with transplant as caregivers. Pt reports having 3 young adult children but will not be able assist with transplant due to their other responsibilities.  Pt's children:  Charlotte Still, 27 yo dtr [phone]   Kit Still, 22 yo son [phone]  Annette Still, 21 yo dtr [phone]    Household Composition:  Kristine Still, 60 yo spouse, Fran [place], does drive/own car, works FT [employer] as  for 20 years. [phone]  Charlotte Still, 27 yo daughter, Fran [place], does drive/own car, works at [employer]. [phone]  Kit Still, 22 yo son, Fran [place], does drive/own car, unemployed. [phone]  Annette Still, 21 yo daughter, VISHAL, does drive/own car, works for [employer]. Former college student at [employer]. [phone]    Do you and your caregivers have access to reliable transportation? yes  PRIMARY CAREGIVER: Kristine Still, wife,  will be primary caregiver, phone number [phone]     provided in-depth information to patient and caregiver regarding pre- and post-transplant caregiver role.   strongly encourages patient and caregiver to have concrete plan regarding post-transplant care  giving, including back-up caregiver(s) to ensure care giving needs are met as needed.    Patient and Caregiver states understanding all aspects of caregiver role/commitment and is able/willing/committed to being caregiver to the fullest extent necessary.    Patient and Caregiver verbalizes understanding of the education provided today and caregiver responsibilities.         remains available. Patient and Caregiver agree to contact  in a timely manner if concerns arise.      Able to take time off work without financial concerns: yes.     Additional Significant Others who will Assist with Transplant:  Charlotte Still, 27 yo daughter, Fran [place], does drive/own car, works at [employer]. [phone]    Living Will: no  Healthcare Power of : no  Advance Directives on file: <<no information> per medical record.  Verbally reviewed LW/HCPA information.   provided patient with copy of LW/HCPA documents and provided education on completion of forms.    Living Donors: Education and resource information given to patient.    Highest Education Level: High School (9-12) or GED  Reading Ability: 12th grade  Reports difficulty with: N/A  Learns Best By:  multisensory     Status: no  VA Benefits: no     Working for Income: No  If no, reason not working: Patient Choice - Retired  Patient reports history of working full time as  at [employer] for 15 years. Last worked in April 2022.    Spouse/Significant Other Employment: Wife reports works FT [email or web address] as  for 20 years.    Disabled: disability pending    Monthly Income:  $1666 pt's [place] jail   wife's earned income $1360  Able to afford all costs now and if transplanted, including medications: yes  Patient and Caregiver verbalizes understanding of personal responsibilities related to transplant costs and the importance of having a financial plan to ensure  that patients transplant costs are fully covered.       provided fundraising information/education. Patient and Caregiververbalizes understanding.   remains available.    Insurance:   Payer/Plan Subscr  Sex Relation Sub. Ins. ID Effective Group Num   1. BLUE CROSS BL* WHITE,OLMAN 1961 Female Spouse [id number]                                   [address] [address], [place] [address]   2. MAISHA PORTER * 1955 Male Self [id number] 11/23/15 [id number]                                   [address] [address]     Primary Insurance (for UNOS reporting): Private Insurance  Secondary Insurance (for UNOS reporting): Public Insurance - Medicare FFS (Fee For Service)  Patient and Caregiver verbalizes clear understanding that patient may experience difficulty obtaining and/or be denied insurance coverage post-surgery. This includes and is not limited to disability insurance, life insurance, health insurance, burial insurance, long term care insurance, and other insurances.      Patient and Caregiver also reports understanding that future health concerns related to or unrelated to transplantation may not be covered by patient's insurance.  Resources and information provided and reviewed.     Patient and Caregiver provides verbal permission to release any necessary information to outside resources for patient care and discharge planning.  Resources and information provided are reviewed.      [facility], [phone]. Hemodialysis MWF 3.5 hours    Dialysis Adherence: Patient and Caregiver reports high dialysis compliance with treatments and instructions within last 3 mos.  Dialysis compliance update requested.    Infusion Service: patient utilizing? no  Home Health: patient utilizing? no  DME: yes glucometer; bpc  Pulmonary/Cardiac Rehab: pt denies  ADLS:  independent self care, medication management, drives self/own car    Adherence:   Pt reports high medical  compliance with appointments and instructions within last 3 months.  Adherence education and counseling provided.     Per History Section:  Past Medical History:   Diagnosis Date    Anemia     Diabetes mellitus     Diabetes mellitus, type 2     Disorder of kidney and ureter     Essential (primary) hypertension 2017    Gout, unspecified      jaundice, unspecified     Nuclear sclerosis of both eyes 2022     Social History     Tobacco Use    Smoking status: Never    Smokeless tobacco: Never    Tobacco comments:     .  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     Substance Use Topics    Alcohol use: No     Social History     Substance and Sexual Activity   Drug Use No     Social History     Substance and Sexual Activity   Sexual Activity Yes    Partners: Female       Per Today's Psychosocial:  Tobacco: none, patient denies any use.  Alcohol: occasionally  Illicit Drugs/Non-prescribed Medications: none, patient denies any use.    Patient and Caregiver states clear understanding of the potential impact of substance use as it relates to transplant candidacy and is aware of possible random substance screening.  Substance abstinence/cessation counseling, education and resources provided and reviewed.     Arrests/DWI/Treatment/Rehab: patient denies    Psychiatric History:    Mental Health: Pt denies any mental health history or current mental concerns at this time. Pt denies any need for mental health referral at this time.  Psychiatrist/Counselor: pt denies  Medications:  pt denies  Suicide/Homicide Issues: pt denies any si/hi history  Safety at home: Pt reports living in safe home environment with no abuse    Knowledge: Patient and Caregiver states having clear understanding and realistic expectations regarding the potential risks and potential benefits of organ transplantation and organ donation and agrees to discuss with health care team members and support system members, as well as to utilize  available resources and express questions and/or concerns in order to further facilitate the pt informed decision-making.  Resources and information provided and reviewed.    Patient and Caregiver is aware of [facility]'s affiliation and/or partnership with agencies in home health care, LTAC, SNF, [facility], and other hospitals and clinics.    Understanding: Patient and Caregiver reports having a clear understanding of the many lifetime commitments involved with being a transplant recipient, including costs, compliance, medications, lab work, procedures, appointments, concrete and financial planning, preparedness, timely and appropriate communication of concerns, abstinence (ETOH, tobacco, illicit non-prescribed drugs), adherence to all health care team recommendations, support system and caregiver involvement, appropriate and timely resource utilization and follow-through, mental health counseling as needed/recommended, and patient and caregiver responsibilities.  Social Service Handbook, resources and detailed educational information provided and reviewed.  Educational information provided.    Patient and Caregiver also reports current and expected compliance with health care regime and states having a clear understanding of the importance of compliance.      Patient and Caregiver reports a clear understanding that risks and benefits may be involved with organ transplantation and with organ donation.       Patient and Caregiver also reports clear understanding that psychosocial risk factors may affect patient, and include but are not limited to feelings of depression, generalized anxiety, anxiety regarding dependence on others, post traumatic stress disorder, feelings of guilt and other emotional and/or mental concerns, and/or exacerbation of existing mental health concerns.  Detailed resources provided and discussed.      Patient and Caregiver agrees to access appropriate resources in a timely manner as needed and/or as  recommended, and to communicate concerns appropriately.  Patient and Caregiver also reports a clear understanding of treatment options available.     Patient and Caregiver received education in a group setting.   reviewed education, provided additional information, and answered questions.    Feelings or Concerns: Pt reports high motivation to pursue kidney organ transplant at this time.    Coping: Identify Patient & Caregiver Strategies to [place]:   1. Currently & Pre-transplant - family support; bushra and prayer; exercises by doing lawn work and riding bike.   2. At the time of surgery - family support; bushra and prayer   3. During post-Transplant & Recovery Period - family support; bushra and prayer    Goals: Pt reports hope for successful kidney transplant so he may discontinue dialysis and have healthier life. Patient referred to Vocational Rehabilitation.    Interview Behavior: Patient and Caregiver presents as alert and oriented x 4, pleasant, good eye contact, well groomed, recall good, concentration/judgement good, average intelligence, calm, communicative, cooperative, and asking and answering questions appropriately. Pt's highly supportive wife in session with pt's permission.         Transplant Social Work - Candidacy  Assessment/Plan:     Psychosocial Suitability: Patient presents as low risk candidate for kidney transplant at this time. Based on psychosocial risk factors, patient presents as low risk due to denying any psychosocial barriers to kidney transplant at this time. Pt reports having organ transplant caregiver/transportation plan, medical insurance, plan to afford transplant costs all in place.    Recommendations/Additional Comments: Dialysis compliance update requested.     SW recommends that pt conduct fundraising to assist pt with pay for cost of medications, food, gas, and other transplant related needs. SW recommends that pt remain aware of potential mental health concerns and  contact the team if any concerns arise. SW recommends that pt remain abstinent from tobacco, ETOH, and drug use. SW supports pt's continued adherence. SW remains available to answer any questions or concerns that arise as the pt moves through the transplant process.      Final determination of transplant candidacy will be reviewed by the selection committee.      [facility]OLES [facility]

## 2022-10-27 NOTE — PROGRESS NOTES
Transplant Surgery  Kidney Transplant Recipient Evaluation    Referring Physician: Nawaf Butler  Current Nephrologist: Nawaf Butler    Subjective:     Reason for Visit: evaluate transplant candidacy    History of Present Illness: Elbert Still is a 66 y.o. year old male undergoing transplant evaluation.    Dialysis History: Elbert is on hemodialysis.      Transplant History: N/A    Etiology of Renal Disease: Diabetes Mellitus - Type II (based on medical records from referral).    External provider notes reviewed: Yes    Review of Systems  Objective:     Physical Exam:  Constitutional:   Vitals reviewed: yes   Well-nourished and well-groomed: yes  Eyes:   Sclerae icteric: no   Extraocular movements intact: yes  GI:    Bowel sounds normal: yes   Tenderness: no    If yes, quadrant/location: not applicable   Palpable masses: no    If yes, quadrant/location: not applicable   Hepatosplenomegaly: no   Ascites: no   Hernia: no    If yes, type/location: not applicable   Surgical scars: no    If yes, type/location: not applicable  Resp:   Effort normal: yes   Breath sounds normal: yes    CV:   Regular rate and rhythm: yes   Heart sounds normal: yes   Femoral pulses normal: yes   Extremities edematous: no  Skin:   Rashes or lesions present: no    If yes, describe:not applicable   Jaundice:: no    Musculoskeletal:   Gait normal: yes   Strength normal: yes  Psych:   Oriented to person, place, and time: yes   Affect and mood normal: yes    Additional comments: not applicable    Diagnostics:  The following labs have been reviewed: CBC  CMP  INR  The following radiology images have been independently reviewed and interpreted: pending    Counseling: We provided Elbert Still JrJaky with a group education session today.  We discussed kidney transplantation at length with him, including risks, potential complications, and alternatives in the management of his renal failure.  The discussion included complications related to  anesthesia, bleeding, infection, primary nonfunction, and ATN.  I discussed the typical postoperative course, length of hospitalization, the need for long-term immunosuppression, and the need for long-term routine follow-up.  I discussed living-donor and -donor transplantation and the relative advantages and disadvantages of each.  I also discussed average waiting times for both living donation and  donation.  I discussed national and center-specific survival rates.  I also mentioned the potential benefit of multicenter listing to candidates listed with centers within more than one organ procurement organization.  All questions were answered.    Patient advised that it is recommended that all transplant candidates, and their close contacts and household members receive Covid vaccination.    Final determination of transplant candidacy will be made once evaluation is complete and reviewed by the Kidney & Kidney/Pancreas Selection Committee.    Coronavirus disease (COVID-19) caused by severe acute respiratory virus coronavirus 2 (SARS-C0V 2) is associated with increased mortality in solid organ transplant recipients (SOT) compared to non-transplant patients. Vaccine responses to vaccination are depressed against SARS-CoV2 compared to normal individuals but improve with third vaccination doses. Vaccination prior to SOT provides both the best opportunity for transplant candidates to develop protective immunity and to reduce the risk of serious COVID19 infections post transplantation. Organ transplant candidates at [facility] will be required to receive SARS-CoV-2 vaccination prior to being listed with a an active status, whenever possible. Exceptions will be made for disability related reasons or for sincerely held Zoroastrianism beliefs.          Transplant Surgery - Candidacy   Assessment/Plan:   Elbert ARIANE Still Jr. has end stage renal disease (ESRD) on dialysis. I see no  surgical contraindication to placing a kidney transplant. Based on available information, Elbert Still Jr. is a suitable kidney transplant candidate.     Additional testing to be completed according to the Written Order Guidelines for Adult Pre-kidney and Pancreas Transplant Evaluation (KI-02).  Interpretation of tests and discussion of patient management involves all members of the multidisciplinary transplant team.    Milind Car Jr, MD

## 2022-10-27 NOTE — LETTER
Date: 10/27/2022     To:  Dietitian From: Ama Trevizo RD     Patient: Elbert Still Jr.   1955  Department: Pre-Kidney Transplant   Fax: [phone] Fax: [phone]   Phone:  Phone: [phone]   Subject: Patient Transplant Labs (Review)   Number of pages (including cover sheet):  2       Dietitian,         The above patient has been referred to the [facility].    A review of the patients transplant labs has been completed.  The following lab(s) dated ____________ were abnormal:    Cholesterol 68  Phos 5.0    [employer] is now requiring that transplant centers obtain a copy of the Dietitian Assessment from the dialysis unit, as part of the transplant evaluation process.     Once the patient has completed their evaluation and approved for transplant the patient CANNOT be placed on the transplant list until the requested document is received and reviewed.    Please fax your most recent dietitian note to the fax number as identified below:    --  Ama Trevizo RD @ [phone]          Thank you for your cooperation in the care of this patient,    Ama Trevizo RD

## 2022-10-27 NOTE — PROGRESS NOTES
PHARM.D. PRE-TRANSPLANT NOTE:    This patient's medication therapy was evaluated as part of his pre-transplant evaluation.      The following general pharmacologic concerns were noted: none    The following concerns for post-operative pain management were noted: none    The following pharmacologic concerns related to HCV therapy were noted: none      This patient's medication profile was reviewed for considerations for DAA Hepatitis C therapy:    [x]  No current inducers of CYP 3A4 or PGP  [x]  No amiodarone on this patient's EMR profile in the last 24 months  [x]  No past or current atrial fibrillation on this patient's EMR profile       Current Outpatient Medications   Medication Sig Dispense Refill    acetaminophen (TYLENOL) 500 MG tablet Take 2 tablets (1,000 mg total) by mouth every 6 (six) hours as needed for Pain. 30 tablet 0    atorvastatin (LIPITOR) 20 MG tablet TAKE 1 TABLET BY MOUTH EVERY EVENING 90 tablet 3    carvediloL (COREG) 12.5 MG tablet Take 1 tablet (12.5 mg total) by mouth 2 (two) times daily. 60 tablet 11    cinacalcet (SENSIPAR) 60 MG Tab Take 60 mg by mouth daily with breakfast.      colchicine (COLCRYS) 0.6 mg tablet Take 0.6 mg by mouth as needed. Take half tab (0.3 mg) by mouth daily as needed for gout flare. 90 tablet 3    furosemide (LASIX) 80 MG tablet Take 1 tablet (80 mg total) by mouth 2 (two) times daily. 60 tablet 11    NIFEdipine (PROCARDIA-XL) 60 MG (OSM) 24 hr tablet Take 1 tablet (60 mg total) by mouth 2 (two) times a day. 60 tablet 11    sevelamer HCL (RENAGEL) 800 MG Tab Take 2 tablets (1,600 mg total) by mouth 3 (three) times daily with meals. 180 tablet 11    tamsulosin (FLOMAX) 0.4 mg Cap TAKE ONE CAPSULE BY MOUTH ONCE DAILY (Patient taking differently: Take by mouth every morning.) 90 capsule 3    blood sugar diagnostic Strp Use to check blood glucose once a day. 100 each 3    blood-glucose meter Misc Use to check blood glucose twice a day. 1 each 0    fluticasone  propionate (FLONASE) 50 mcg/actuation nasal spray 2 sprays (100 mcg total) by Each Nostril route once daily. (Patient taking differently: 2 sprays by Each Nostril route every evening.) 16 g 11    lancets 30 gauge Misc Use to check blood glucose twice a day. 100 each 5     No current facility-administered medications for this visit.           I am available for consultation and can be contacted, as needed by the other members of the Transplant team.

## 2022-10-27 NOTE — TELEPHONE ENCOUNTER
Reviewed pt transplant labs.  Notified dialysis unit dietitian of the following abnormal labs via fax and requested their most recent nutrition note on this pt.  Once this note is received it will be scanned into pt's chart.    Cholesterol 68  Phos 5.0

## 2022-10-29 ENCOUNTER — LAB VISIT (OUTPATIENT)
Dept: SURGERY | Facility: CLINIC | Age: 67
End: 2022-10-29
Payer: COMMERCIAL

## 2022-10-29 DIAGNOSIS — Z01.818 PREOP TESTING: ICD-10-CM

## 2022-10-29 LAB — SARS-COV-2 RNA RESP QL NAA+PROBE: NOT DETECTED

## 2022-10-29 PROCEDURE — U0005 INFEC AGEN DETEC AMPLI PROBE: HCPCS | Performed by: SURGERY

## 2022-10-29 PROCEDURE — U0003 INFECTIOUS AGENT DETECTION BY NUCLEIC ACID (DNA OR RNA); SEVERE ACUTE RESPIRATORY SYNDROME CORONAVIRUS 2 (SARS-COV-2) (CORONAVIRUS DISEASE [COVID-19]), AMPLIFIED PROBE TECHNIQUE, MAKING USE OF HIGH THROUGHPUT TECHNOLOGIES AS DESCRIBED BY CMS-2020-01-R: HCPCS | Mod: TXP | Performed by: SURGERY

## 2022-10-30 ENCOUNTER — ANESTHESIA EVENT (OUTPATIENT)
Dept: SURGERY | Facility: HOSPITAL | Age: 67
End: 2022-10-30
Payer: COMMERCIAL

## 2022-10-31 ENCOUNTER — TELEPHONE (OUTPATIENT)
Dept: VASCULAR SURGERY | Facility: CLINIC | Age: 67
End: 2022-10-31
Payer: COMMERCIAL

## 2022-10-31 NOTE — TELEPHONE ENCOUNTER
Attempted to contact the pt to inform him of the time of arrival for surgery tomorrow but no answer.aLeft a voice message informing him to arrive at 830am at the main campus on [facility],second floor DOS.Also left a message for the pt informing him not to eat or drink anything after midnight.Attempted to contact the pt's spouse but no answer.Left a voice message with the above information.

## 2022-11-01 ENCOUNTER — DOCUMENTATION ONLY (OUTPATIENT)
Dept: TRANSPLANT | Facility: CLINIC | Age: 67
End: 2022-11-01
Payer: COMMERCIAL

## 2022-11-01 ENCOUNTER — ANESTHESIA (OUTPATIENT)
Dept: SURGERY | Facility: HOSPITAL | Age: 67
End: 2022-11-01
Payer: COMMERCIAL

## 2022-11-01 ENCOUNTER — HOSPITAL ENCOUNTER (OUTPATIENT)
Facility: HOSPITAL | Age: 67
Discharge: HOME | End: 2022-11-01
Attending: SURGERY | Admitting: SURGERY
Payer: COMMERCIAL

## 2022-11-01 VITALS
HEART RATE: 70 BPM | SYSTOLIC BLOOD PRESSURE: 141 MMHG | WEIGHT: 220 LBS | OXYGEN SATURATION: 100 % | HEIGHT: 60 IN | BODY MASS INDEX: 43.19 KG/M2 | TEMPERATURE: 98 F | RESPIRATION RATE: 18 BRPM | DIASTOLIC BLOOD PRESSURE: 83 MMHG

## 2022-11-01 DIAGNOSIS — I77.0 ARTERIOVENOUS FISTULA: Primary | ICD-10-CM

## 2022-11-01 LAB — POCT GLUCOSE: 136 MG/DL (ref 70–110)

## 2022-11-01 PROCEDURE — D9220A PRA ANESTHESIA: ICD-10-PCS | Mod: CRNA,NTX,, | Performed by: NURSE ANESTHETIST, CERTIFIED REGISTERED

## 2022-11-01 PROCEDURE — 71000015 HC POSTOP RECOV 1ST HR: Mod: TXP | Performed by: SURGERY

## 2022-11-01 PROCEDURE — 25000003 PHARM REV CODE 250: Mod: TXP | Performed by: NURSE ANESTHETIST, CERTIFIED REGISTERED

## 2022-11-01 PROCEDURE — D9220A PRA ANESTHESIA: Mod: CRNA,NTX,, | Performed by: NURSE ANESTHETIST, CERTIFIED REGISTERED

## 2022-11-01 PROCEDURE — 71000016 HC POSTOP RECOV ADDL HR: Mod: NTX | Performed by: SURGERY

## 2022-11-01 PROCEDURE — 36832 PR AV FISTULA REVISION, OPEN, W/O THROMBECTOMY: ICD-10-PCS | Mod: 58,NTX,, | Performed by: SURGERY

## 2022-11-01 PROCEDURE — D9220A PRA ANESTHESIA: ICD-10-PCS | Mod: ANES,NTX,, | Performed by: ANESTHESIOLOGY

## 2022-11-01 PROCEDURE — 63600175 PHARM REV CODE 636 W HCPCS: Mod: TXP | Performed by: ANESTHESIOLOGY

## 2022-11-01 PROCEDURE — 27201423 OPTIME MED/SURG SUP & DEVICES STERILE SUPPLY: Mod: TXP | Performed by: SURGERY

## 2022-11-01 PROCEDURE — 25000003 PHARM REV CODE 250: Mod: TXP | Performed by: SURGERY

## 2022-11-01 PROCEDURE — 71000044 HC DOSC ROUTINE RECOVERY FIRST HOUR: Mod: TXP | Performed by: SURGERY

## 2022-11-01 PROCEDURE — 63600175 PHARM REV CODE 636 W HCPCS: Mod: TXP | Performed by: NURSE ANESTHETIST, CERTIFIED REGISTERED

## 2022-11-01 PROCEDURE — D9220A PRA ANESTHESIA: Mod: ANES,NTX,, | Performed by: ANESTHESIOLOGY

## 2022-11-01 PROCEDURE — 63600175 PHARM REV CODE 636 W HCPCS: Mod: NTX | Performed by: SURGERY

## 2022-11-01 PROCEDURE — 63600175 PHARM REV CODE 636 W HCPCS: Mod: NTX | Performed by: ANESTHESIOLOGY

## 2022-11-01 PROCEDURE — 36000707: Mod: TXP | Performed by: SURGERY

## 2022-11-01 PROCEDURE — 63600175 PHARM REV CODE 636 W HCPCS: Mod: JG,TXP | Performed by: SURGERY

## 2022-11-01 PROCEDURE — 36832 AV FISTULA REVISION OPEN: CPT | Mod: 58,NTX,, | Performed by: SURGERY

## 2022-11-01 PROCEDURE — 82962 GLUCOSE BLOOD TEST: CPT | Mod: TXP | Performed by: SURGERY

## 2022-11-01 PROCEDURE — 37000008 HC ANESTHESIA 1ST 15 MINUTES: Mod: NTX | Performed by: SURGERY

## 2022-11-01 PROCEDURE — 36000706: Mod: NTX | Performed by: SURGERY

## 2022-11-01 PROCEDURE — 37000009 HC ANESTHESIA EA ADD 15 MINS: Mod: TXP | Performed by: SURGERY

## 2022-11-01 RX ORDER — FENTANYL CITRATE 50 UG/ML
INJECTION, SOLUTION INTRAMUSCULAR; INTRAVENOUS
Status: DISCONTINUED | OUTPATIENT
Start: 2022-11-01 | End: 2022-11-01

## 2022-11-01 RX ORDER — LIDOCAINE HYDROCHLORIDE 20 MG/ML
INJECTION INTRAVENOUS
Status: DISCONTINUED | OUTPATIENT
Start: 2022-11-01 | End: 2022-11-01

## 2022-11-01 RX ORDER — DEXAMETHASONE SODIUM PHOSPHATE 4 MG/ML
INJECTION, SOLUTION INTRA-ARTICULAR; INTRALESIONAL; INTRAMUSCULAR; INTRAVENOUS; SOFT TISSUE
Status: DISCONTINUED | OUTPATIENT
Start: 2022-11-01 | End: 2022-11-01

## 2022-11-01 RX ORDER — MEPERIDINE HYDROCHLORIDE 50 MG/ML
12.5 INJECTION INTRAMUSCULAR; INTRAVENOUS; SUBCUTANEOUS ONCE AS NEEDED
Status: COMPLETED | OUTPATIENT
Start: 2022-11-01 | End: 2022-11-01

## 2022-11-01 RX ORDER — PHENYLEPHRINE HYDROCHLORIDE 10 MG/ML
INJECTION INTRAVENOUS
Status: DISCONTINUED | OUTPATIENT
Start: 2022-11-01 | End: 2022-11-01

## 2022-11-01 RX ORDER — SODIUM CHLORIDE 9 MG/ML
INJECTION, SOLUTION INTRAVENOUS CONTINUOUS
Status: DISCONTINUED | OUTPATIENT
Start: 2022-11-01 | End: 2022-11-01 | Stop reason: HOSPADM

## 2022-11-01 RX ORDER — HEPARIN SODIUM 1000 [USP'U]/ML
INJECTION, SOLUTION INTRAVENOUS; SUBCUTANEOUS
Status: DISCONTINUED | OUTPATIENT
Start: 2022-11-01 | End: 2022-11-01 | Stop reason: HOSPADM

## 2022-11-01 RX ORDER — MIDAZOLAM HYDROCHLORIDE 1 MG/ML
.5-4 INJECTION INTRAMUSCULAR; INTRAVENOUS
Status: DISCONTINUED | OUTPATIENT
Start: 2022-11-01 | End: 2022-11-01 | Stop reason: HOSPADM

## 2022-11-01 RX ORDER — FENTANYL CITRATE 50 UG/ML
25-200 INJECTION, SOLUTION INTRAMUSCULAR; INTRAVENOUS
Status: DISCONTINUED | OUTPATIENT
Start: 2022-11-01 | End: 2022-11-01 | Stop reason: HOSPADM

## 2022-11-01 RX ORDER — HYDROMORPHONE HYDROCHLORIDE 1 MG/ML
INJECTION, SOLUTION INTRAMUSCULAR; INTRAVENOUS; SUBCUTANEOUS
Status: DISCONTINUED
Start: 2022-11-01 | End: 2022-11-01 | Stop reason: HOSPADM

## 2022-11-01 RX ORDER — HYDROMORPHONE HYDROCHLORIDE 1 MG/ML
0.2 INJECTION, SOLUTION INTRAMUSCULAR; INTRAVENOUS; SUBCUTANEOUS EVERY 5 MIN PRN
Status: DISCONTINUED | OUTPATIENT
Start: 2022-11-01 | End: 2022-11-01 | Stop reason: HOSPADM

## 2022-11-01 RX ORDER — MIDAZOLAM HYDROCHLORIDE 1 MG/ML
INJECTION, SOLUTION INTRAMUSCULAR; INTRAVENOUS
Status: DISCONTINUED | OUTPATIENT
Start: 2022-11-01 | End: 2022-11-01

## 2022-11-01 RX ORDER — PROPOFOL 10 MG/ML
VIAL (ML) INTRAVENOUS CONTINUOUS PRN
Status: DISCONTINUED | OUTPATIENT
Start: 2022-11-01 | End: 2022-11-01

## 2022-11-01 RX ORDER — HYDROCODONE BITARTRATE AND ACETAMINOPHEN 5; 325 MG/1; MG/1
1 TABLET ORAL EVERY 6 HOURS PRN
Qty: 24 TABLET | Refills: 0 | Status: SHIPPED | OUTPATIENT
Start: 2022-11-01 | End: 2023-09-20

## 2022-11-01 RX ORDER — LORAZEPAM 2 MG/ML
0.25 INJECTION INTRAMUSCULAR ONCE AS NEEDED
Status: DISCONTINUED | OUTPATIENT
Start: 2022-11-01 | End: 2022-11-01 | Stop reason: HOSPADM

## 2022-11-01 RX ORDER — ONDANSETRON 2 MG/ML
INJECTION INTRAMUSCULAR; INTRAVENOUS
Status: DISCONTINUED | OUTPATIENT
Start: 2022-11-01 | End: 2022-11-01

## 2022-11-01 RX ORDER — PROPOFOL 10 MG/ML
VIAL (ML) INTRAVENOUS
Status: DISCONTINUED | OUTPATIENT
Start: 2022-11-01 | End: 2022-11-01

## 2022-11-01 RX ADMIN — Medication 115 MCG/KG/MIN: at 12:11

## 2022-11-01 RX ADMIN — HYDROMORPHONE HYDROCHLORIDE 0.2 MG: 1 INJECTION, SOLUTION INTRAMUSCULAR; INTRAVENOUS; SUBCUTANEOUS at 03:11

## 2022-11-01 RX ADMIN — DEXAMETHASONE SODIUM PHOSPHATE 4 MG: 4 INJECTION, SOLUTION INTRAMUSCULAR; INTRAVENOUS at 11:11

## 2022-11-01 RX ADMIN — Medication 75 MCG/KG/MIN: at 11:11

## 2022-11-01 RX ADMIN — GLYCOPYRROLATE 0.2 MG: 0.2 INJECTION, SOLUTION INTRAMUSCULAR; INTRAVITREAL at 11:11

## 2022-11-01 RX ADMIN — ONDANSETRON 4 MG: 2 INJECTION INTRAMUSCULAR; INTRAVENOUS at 11:11

## 2022-11-01 RX ADMIN — LIDOCAINE HYDROCHLORIDE 20 MG: 20 INJECTION INTRAVENOUS at 11:11

## 2022-11-01 RX ADMIN — FENTANYL CITRATE 50 MCG: 50 INJECTION, SOLUTION INTRAMUSCULAR; INTRAVENOUS at 11:11

## 2022-11-01 RX ADMIN — MIDAZOLAM HYDROCHLORIDE 2 MG: 1 INJECTION, SOLUTION INTRAMUSCULAR; INTRAVENOUS at 11:11

## 2022-11-01 RX ADMIN — PROPOFOL 50 MG: 10 INJECTION, EMULSION INTRAVENOUS at 11:11

## 2022-11-01 RX ADMIN — PHENYLEPHRINE HYDROCHLORIDE 50 MCG: 10 INJECTION INTRAVENOUS at 01:11

## 2022-11-01 RX ADMIN — DESMOPRESSIN ACETATE 19.96 MCG: 4 SOLUTION INTRAVENOUS at 01:11

## 2022-11-01 RX ADMIN — MEPIVACAINE HYDROCHLORIDE 30 ML: 15 INJECTION, SOLUTION EPIDURAL; INFILTRATION at 11:11

## 2022-11-01 RX ADMIN — Medication 2 G: at 11:11

## 2022-11-01 RX ADMIN — MEPERIDINE HYDROCHLORIDE 12.5 MG: 50 INJECTION INTRAMUSCULAR; INTRAVENOUS; SUBCUTANEOUS at 03:11

## 2022-11-01 RX ADMIN — SODIUM CHLORIDE: 9 INJECTION, SOLUTION INTRAVENOUS at 11:11

## 2022-11-01 NOTE — BRIEF OP NOTE
Brief Operative Note  Date: 11/01/2022    Pre-op Diagnosis:  Status post placement of arteriovenous graft [Z95.828]; Stage V CKD in need of AV access    Post-op Diagnosis:  Same    Procedure(s):  L brachial vein AVF transposition, 2nd stage    Surgeon: HUONG Gardiner MD [facility]    Assistant: Estelle Valentin MD - Resident - Assisting    Anesthesia: General    Findings/Key Components:  Nearly 20cm segment of L brachial vein dissected and transposed  Dilation to 7-8mm throughout its course  Strong thrill   May be able to be used in 4-6 weeks    EBL: < 10 ml      Specimens (From admission, onward)      None          I attest to being present for the procedure and performing the case.  Prince Gardiner MD [facility] FACS  Discharge Note  SUMMARY    Admit Date: 11/1/2022    Attending Physician: Prince Gardiner MD [facility]    Discharge Physician: Prince Gardiner MD FACS    Discharge Date: 11/01/2022    Final Diagnosis: Status post placement of arteriovenous graft [Z95.828]    Outcome of Treatment: Successful AVF transposition    Disposition: Home or self-care    Patient Instructions:   Current Discharge Medication List        CONTINUE these medications which have NOT CHANGED    Details   acetaminophen (TYLENOL) 500 MG tablet Take 2 tablets (1,000 mg total) by mouth every 6 (six) hours as needed for Pain.  Qty: 30 tablet, Refills: 0      atorvastatin (LIPITOR) 20 MG tablet TAKE 1 TABLET BY MOUTH EVERY EVENING  Qty: 90 tablet, Refills: 3    Associated Diagnoses: Mixed hyperlipidemia; Diabetes mellitus due to underlying condition with diabetic nephropathy, without long-term current use of insulin      carvediloL (COREG) 12.5 MG tablet Take 1 tablet (12.5 mg total) by mouth 2 (two) times daily.  Qty: 60 tablet, Refills: 11    Comments: .      cinacalcet (SENSIPAR) 60 MG Tab Take 60 mg by mouth daily with breakfast.      colchicine (COLCRYS) 0.6 mg tablet Take 0.6 mg by mouth as needed. Take half tab (0.3 mg) by mouth daily as needed for gout  flare.  Qty: 90 tablet, Refills: 3    Associated Diagnoses: Gout, unspecified cause, unspecified chronicity, unspecified site      fluticasone propionate (FLONASE) 50 mcg/actuation nasal spray 2 sprays (100 mcg total) by Each Nostril route once daily.  Qty: 16 g, Refills: 11      furosemide (LASIX) 80 MG tablet Take 1 tablet (80 mg total) by mouth 2 (two) times daily.  Qty: 60 tablet, Refills: 11      NIFEdipine (PROCARDIA-XL) 60 MG (OSM) 24 hr tablet Take 1 tablet (60 mg total) by mouth 2 (two) times a day.  Qty: 60 tablet, Refills: 11    Comments: .      sevelamer HCL (RENAGEL) 800 MG Tab Take 2 tablets (1,600 mg total) by mouth 3 (three) times daily with meals.  Qty: 180 tablet, Refills: 11    Associated Diagnoses: Hyperphosphatemia      tamsulosin (FLOMAX) 0.4 mg Cap TAKE ONE CAPSULE BY MOUTH ONCE DAILY  Qty: 90 capsule, Refills: 3      blood sugar diagnostic Strp Use to check blood glucose once a day.  Qty: 100 each, Refills: 3    Associated Diagnoses: Diabetes mellitus due to underlying condition with diabetic nephropathy, without long-term current use of insulin      blood-glucose meter Misc Use to check blood glucose twice a day.  Qty: 1 each, Refills: 0      lancets 30 gauge Misc Use to check blood glucose twice a day.  Qty: 100 each, Refills: 5             Diet:  Resume pre-operative diet    Activity:  Ad layla    Follow-up:  Follow-up in clinic with Dr Gardiner within 4-6 weeks; please call clinic nurse at

## 2022-11-01 NOTE — ANESTHESIA PROCEDURE NOTES
Left supraclavicular Peripheral Block    Patient location during procedure: OR    Reason for block: primary anesthetic    Diagnosis: L basilic vein transposition   Start time: 11/1/2022 11:21 AM  Timeout: 11/1/2022 11:20 AM   End time: 11/1/2022 11:35 AM    Staffing  Authorizing Provider: Vlad Cedillo MD  Performing Provider: Brenda Acuña MD    Preanesthetic Checklist  Completed: patient identified, IV checked, site marked, risks and benefits discussed, surgical consent, monitors and equipment checked, pre-op evaluation and timeout performed  Peripheral Block  Patient position: supine  Prep: ChloraPrep  Patient monitoring: heart rate, cardiac monitor, continuous pulse ox, continuous capnometry and frequent blood pressure checks  Block type: supraclavicular  Laterality: left  Injection technique: single shot  Needle  Needle type: Stimuplex   Needle gauge: 22 G  Needle length: 2 in  Needle localization: anatomical landmarks and ultrasound guidance   -ultrasound image captured on disc.  Assessment  Injection assessment: negative aspiration, negative parasthesia and local visualized surrounding nerve  Paresthesia pain: none  Heart rate change: no  Slow fractionated injection: yes    Medications:    Medications: mepivacaine (CARBOCAINE) injection 15 mg/mL (1.5%) - Perineural   30 mL - 11/1/2022 11:30:00 AM    Additional Notes  Intercostal brachial field block performed with mepivacaine 1.5% 10ml for additional coverage.    VSS.  See anesthesia record.  Patient tolerated procedure well.

## 2022-11-01 NOTE — ANESTHESIA PREPROCEDURE EVALUATION
11/01/2022  Elbert Still Jr. is a 66 y.o., male.      Pre-op Assessment    I have reviewed the Patient Summary Reports.     I have reviewed the Nursing Notes.       Review of Systems  Anesthesia Hx:  No problems with previous Anesthesia    Hematology/Oncology:  Hematology Normal   Oncology Normal     EENT/Dental:EENT/Dental Normal   Cardiovascular:   Hypertension    Pulmonary:  Pulmonary Normal    Renal/:   Chronic Renal Disease    Hepatic/GI:  Hepatic/GI Normal    Musculoskeletal:  Musculoskeletal Normal    Neurological:  Neurology Normal    Endocrine:   Diabetes    Dermatological:  Skin Normal    Psych:  Psychiatric Normal           Physical Exam  General: Well nourished    Airway:  Mallampati: III / II  Mouth Opening: Normal  TM Distance: Normal  Tongue: Normal  Neck ROM: Normal ROM    Dental:  Intact        Anesthesia Plan  Type of Anesthesia, risks & benefits discussed:    Anesthesia Type: Gen Natural Airway, Regional  Intra-op Monitoring Plan: Standard ASA Monitors  Post Op Pain Control Plan: multimodal analgesia  Induction:  IV  Airway Plan: Direct  Informed Consent: Informed consent signed with the Patient and all parties understand the risks and agree with anesthesia plan.  All questions answered. Patient consented to blood products? No  ASA Score: 4  Day of Surgery Review of History & Physical: H&P Update referred to the surgeon/provider.    Ready For Surgery From Anesthesia Perspective.     .       [FreeTextEntry1] : 16 year old female with strep throat - rapid strep positive. Complete 10 days of antibiotics. Use antipyretics as needed. After being on antibiotics for at least 24 hours patient less likely to spread infection. RTO as needed

## 2022-11-01 NOTE — PROGRESS NOTES
Nutritional assessment from dialysis unit received and reviewed--no nutritional changes to plan needed at this time.  Pt to continue to follow-up with renal dietitians recommendations.

## 2022-11-01 NOTE — TRANSFER OF CARE
Anesthesia Transfer of Care Note    Patient: Elbert Still JrJaky    Procedure(s) Performed: Procedure(s) (LRB):  TRANSPOSITION, VEIN, BASILIC (Left)    Patient location: [facility]    Anesthesia Type: MAC    Transport from OR: Transported from OR on 6-10 L/min O2 by face mask with adequate spontaneous ventilation    Post pain: adequate analgesia    Post assessment: no apparent anesthetic complications    Post vital signs: stable    Level of consciousness: awake    Nausea/Vomiting: no nausea/vomiting    Complications: none    Transfer of care protocol was followed      Last vitals:

## 2022-11-02 NOTE — ANESTHESIA POSTPROCEDURE EVALUATION
Anesthesia Post Evaluation    Patient: Elbert tSill Jr.    Procedure(s) Performed: Procedure(s) (LRB):  TRANSPOSITION, VEIN, BASILIC (Left)    Final Anesthesia Type: general      Patient location during evaluation: PACU  Patient participation: Yes- Able to Participate  Level of consciousness: awake and alert  Post-procedure vital signs: reviewed and stable  Pain management: adequate  Airway patency: patent    PONV status at discharge: No PONV  Anesthetic complications: no      Cardiovascular status: blood pressure returned to baseline  Respiratory status: unassisted  Hydration status: euvolemic  Follow-up not needed.          Vitals Value Taken Time   /83 11/01/22 1647   Temp 36.8 °C (98.2 °F) 11/01/22 1600   Pulse 74 11/01/22 1703   Resp 18 11/01/22 1600   SpO2 99 % 11/01/22 1702   Vitals shown include unvalidated device data.      No case tracking events are documented in the log.      Pain/Sophie Score: Pain Rating Prior to Med Admin: 3 (11/1/2022  5:10 PM)  Sophie Score: 10 (11/1/2022  3:30 PM)

## 2022-11-03 LAB
ALBUMIN SERPL-MCNC: 4.3 G/DL (ref 3.5–5.2)
ALP SERPL-CCNC: 98 U/L (ref 40–129)
ALT SERPL W P-5'-P-CCNC: <7 U/L (ref 7–52)
BASOPHILS NFR BLD: 0.3 % (ref 0–1.5)
BICARBONATE: 27 MEQ/L (ref 20–31)
BUN, POST: <2 MG/DL (ref 6–19)
BUN, PRE: 46 MG/DL (ref 6–19)
BUN/CREAT SERPL: 5.3 (ref 10–20)
CALCIUM PHOS PRODUCT: 42 (ref 0–54)
CALCIUM SERPL-MCNC: 8.6 MG/DL (ref 8.7–10.4)
CHLORIDE: 100 MEQ/L (ref 96–108)
CREAT SERPL-MCNC: 8.73 MG/DL (ref 0.6–1.3)
DIALYSIS START TIME: 544
DIALYSIS STOP TIME: 926
EOSINOPHIL NFR BLD: 0.8 % (ref 0–7)
ERYTHROCYTE [DISTWIDTH] IN BLOOD BY AUTOMATED COUNT: 15.9 % (ref 11.5–14.5)
FERRITIN SERPL-MCNC: 390 NG/ML (ref 22–322)
GLUCOSE SERPL-MCNC: 117 MG/DL (ref 70–100)
HBV SURFACE AG SERPL QL IA: NEGATIVE
HCT VFR BLD AUTO: 32.2 % (ref 42–52)
HEMOGLOBIN X 3: 31.8 % (ref 42–54)
HGB BLD-MCNC: 10.6 G/DL (ref 14–18)
IRON: 39 MCG/DL (ref 45–160)
LUC: 2 % (ref 0–4)
LYMPH%: 15.9 % (ref 19–48)
MCH RBC QN AUTO: 29.9 PG (ref 27–31)
MCHC RBC AUTO-ENTMCNC: 33 G/DL (ref 30–36)
MCV RBC AUTO: 90 FL (ref 80–100)
MONO%: 7.8 % (ref 3–10)
NEUTROPHILS NFR BLD: 73.1 % (ref 40–75)
PHOSPHATE FLD-MCNC: 4.9 MG/DL (ref 2.6–4.5)
PLATELET # BLD AUTO: 207 1000/MCL (ref 130–400)
POST-WEIGHT, KG: 97.1 KG
POST-WEIGHT, LB: 213.6
POTASSIUM SERPL-SCNC: 4.4 MEQ/L (ref 3.5–5.1)
PRE-WEIGHT, KG: 99.4 KG
PRE-WEIGHT, LB: 218.7
PTH, INTACT: 213 PG/ML (ref 16–80)
RBC # BLD AUTO: 3.56 MILL/MCL (ref 4.7–6.1)
SODIUM BLD-SCNC: 140 MEQ/L (ref 136–145)
TOTAL IRON BINDING CAPACITY: 246 MCG/DL (ref 185–515)
TRANSFERRIN SATURATION: 16 % (ref 20–55)
UIBC SERPL-MCNC: 207 MCG/DL (ref 155–355)
WBC # BLD AUTO: 5.94 1000/MCL (ref 4.8–10.8)

## 2022-11-03 NOTE — OP NOTE
Operative Report    Date of Operation: 11/01/2022    Pre-operative Diagnosis: ESRD    Post-operative Diagnosis: same    Attending Surgeon: Prince Gardiner MD    Resident/Fellow: Estelle Valentin MD - Greg     Operation/Procedure Performed:  left transposed brachiobasilic arteriovenous fistula creation    Findings:  Nearly 20cm segment of L brachial vein dissected and transposed  Dilation to 7-8mm throughout its course  Strong thrill   May be able to be used in 4-6 weeks    Indications:  66 year old ESRD patient who underwent L brachio-brachial AVF creation now presenting for transposition.     Procedure in Detail:  After informed consent was obtained the patient was taken to the OR in supine position. Pre-operative antibiotics were administered. Mac and regional anesthesia was administered by the anesthesia team. The left arm was prepped and draped in normal sterile fashion with chloraprep. A time out was performed to confirm appropriate patient, site, positioning, and laterality. An oblique incision was made at the antecubital fossa. The incision was deepened with a combination of electrocautery, blunt dissection, and sharp dissection. The basilic vein was identified on the medial aspect of the incision, proximal to the prior arteriovenous anastomosis. The incision was extended up the medial arm along the path of the basilic vein as we continued to expose the vein. We exposed the vein 3/4 of the way up the arm. Next we encircled the vein with a vessel loop to allow less traumatic manipulation. We then proceeded to dissect the vein out circumferentially and ligated all branches with silk ties and hand applied clips. Once all the branches had been ligated, we marked the vein with a  marker. We then elevated the vein and approximated the soft tissues posterior to allow for subcutaneous positioning. There was a palpable continuous thrill over the fistula. The radial artery had a palpable pulse. The wounds were thoroughly  irrigated with saline irrigation. The deep dermis was closed with interrupted vicryl sutures and a running 4-0 monocryl was used to close the subcuticular layer. The incisions were dressed with telfa, 4x4 gauze and tegaderm. All sponge needle and instrument counts were  correct.    The patient tolerated the procedure well and was transported to the PACU for recovery.    Dr. Gardiner was present and scrubbed for the procedure.     Prince Gardiner MD [facility] FACS   Vascular/Endovascular Surgery

## 2022-11-10 LAB
BUN, POST: 15 MG/DL (ref 6–19)
BUN, PRE: 45 MG/DL (ref 6–19)
DIALYSIS START TIME: 543
DIALYSIS STOP TIME: 921
POST-WEIGHT, KG: 97.1 KG
POST-WEIGHT, LB: 213.6
PRE-WEIGHT, KG: 98.1 KG
PRE-WEIGHT, LB: 215.8
SPKT/V, DAUGIRDAS II: 1.22
UREA REDUCTION RATIO: 67 % (ref 65–80)

## 2022-11-11 ENCOUNTER — TELEPHONE (OUTPATIENT)
Dept: CARDIOLOGY | Facility: HOSPITAL | Age: 67
End: 2022-11-11
Payer: COMMERCIAL

## 2022-11-15 ENCOUNTER — HOSPITAL ENCOUNTER (OUTPATIENT)
Dept: CARDIOLOGY | Facility: HOSPITAL | Age: 67
Discharge: HOME | End: 2022-11-15
Attending: NURSE PRACTITIONER
Payer: COMMERCIAL

## 2022-11-15 ENCOUNTER — OFFICE VISIT (OUTPATIENT)
Dept: CARDIOLOGY | Facility: CLINIC | Age: 67
End: 2022-11-15
Payer: COMMERCIAL

## 2022-11-15 ENCOUNTER — HOSPITAL ENCOUNTER (OUTPATIENT)
Dept: CARDIOLOGY | Facility: HOSPITAL | Age: 67
Discharge: HOME | End: 2022-11-15
Attending: NURSE PRACTITIONER
Payer: MEDICARE

## 2022-11-15 VITALS
HEART RATE: 73 BPM | DIASTOLIC BLOOD PRESSURE: 66 MMHG | HEIGHT: 72 IN | SYSTOLIC BLOOD PRESSURE: 145 MMHG | WEIGHT: 220 LBS | BODY MASS INDEX: 29.8 KG/M2

## 2022-11-15 VITALS
WEIGHT: 213.19 LBS | SYSTOLIC BLOOD PRESSURE: 146 MMHG | DIASTOLIC BLOOD PRESSURE: 74 MMHG | HEART RATE: 68 BPM | HEIGHT: 74 IN | BODY MASS INDEX: 27.36 KG/M2

## 2022-11-15 DIAGNOSIS — Z99.2 ESRD (END STAGE RENAL DISEASE) ON DIALYSIS: ICD-10-CM

## 2022-11-15 DIAGNOSIS — Z01.818 PREOPERATIVE CLEARANCE: Primary | ICD-10-CM

## 2022-11-15 DIAGNOSIS — N18.9 ACUTE KIDNEY INJURY SUPERIMPOSED ON CHRONIC KIDNEY DISEASE: ICD-10-CM

## 2022-11-15 DIAGNOSIS — Z95.828 STATUS POST PLACEMENT OF ARTERIOVENOUS GRAFT: ICD-10-CM

## 2022-11-15 DIAGNOSIS — Z76.82 ORGAN TRANSPLANT CANDIDATE: ICD-10-CM

## 2022-11-15 DIAGNOSIS — E87.5 HYPERKALEMIA: ICD-10-CM

## 2022-11-15 DIAGNOSIS — E11.69 HYPERLIPIDEMIA ASSOCIATED WITH TYPE 2 DIABETES MELLITUS: ICD-10-CM

## 2022-11-15 DIAGNOSIS — E78.5 HYPERLIPIDEMIA ASSOCIATED WITH TYPE 2 DIABETES MELLITUS: ICD-10-CM

## 2022-11-15 DIAGNOSIS — Z99.2 DIALYSIS PATIENT: ICD-10-CM

## 2022-11-15 DIAGNOSIS — E08.21 DIABETES MELLITUS DUE TO UNDERLYING CONDITION WITH DIABETIC NEPHROPATHY, WITHOUT LONG-TERM CURRENT USE OF INSULIN: Chronic | ICD-10-CM

## 2022-11-15 DIAGNOSIS — E66.3 OVERWEIGHT (BMI 25.0-29.9): ICD-10-CM

## 2022-11-15 DIAGNOSIS — N18.6 ESRD (END STAGE RENAL DISEASE) ON DIALYSIS: ICD-10-CM

## 2022-11-15 DIAGNOSIS — N17.9 ACUTE KIDNEY INJURY SUPERIMPOSED ON CHRONIC KIDNEY DISEASE: ICD-10-CM

## 2022-11-15 DIAGNOSIS — Z91.89 CARDIOVASCULAR EVENT RISK: ICD-10-CM

## 2022-11-15 DIAGNOSIS — E78.2 MIXED HYPERLIPIDEMIA: Chronic | ICD-10-CM

## 2022-11-15 DIAGNOSIS — N18.4 CKD (CHRONIC KIDNEY DISEASE) STAGE 4, GFR 15-29 ML/MIN: Chronic | ICD-10-CM

## 2022-11-15 DIAGNOSIS — I10 ESSENTIAL (PRIMARY) HYPERTENSION: Chronic | ICD-10-CM

## 2022-11-15 LAB
ASCENDING AORTA: 2.97 CM
AV INDEX (PROSTH): 0.65
AV MEAN GRADIENT: 6 MMHG
AV PEAK GRADIENT: 11 MMHG
AV VALVE AREA: 2.64 CM2
AV VELOCITY RATIO: 0.7
BSA FOR ECHO PROCEDURE: 2.25 M2
CV ECHO LV RWT: 0.33 CM
DOP CALC AO PEAK VEL: 1.65 M/S
DOP CALC AO VTI: 37.09 CM
DOP CALC LVOT AREA: 4 CM2
DOP CALC LVOT DIAMETER: 2.27 CM
DOP CALC LVOT PEAK VEL: 1.16 M/S
DOP CALC LVOT STROKE VOLUME: 97.77 CM3
DOP CALC MV VTI: 20.16 CM
DOP CALCLVOT PEAK VEL VTI: 24.17 CM
E WAVE DECELERATION TIME: 197.88 MSEC
E/A RATIO: 1.05
E/E' RATIO: 9.16 M/S
ECHO LV POSTERIOR WALL: 0.8 CM (ref 0.6–1.1)
EJECTION FRACTION: 63 %
FRACTIONAL SHORTENING: 34 % (ref 28–44)
INTERVENTRICULAR SEPTUM: 0.82 CM (ref 0.6–1.1)
IVRT: 79.92 MSEC
LA MAJOR: 5.91 CM
LA MINOR: 5.88 CM
LA WIDTH: 4.44 CM
LEFT ATRIUM SIZE: 3.84 CM
LEFT ATRIUM VOLUME INDEX MOD: 29.7 ML/M2
LEFT ATRIUM VOLUME INDEX: 38.5 ML/M2
LEFT ATRIUM VOLUME MOD: 65.97 CM3
LEFT ATRIUM VOLUME: 85.43 CM3
LEFT INTERNAL DIMENSION IN SYSTOLE: 3.17 CM (ref 2.1–4)
LEFT VENTRICLE DIASTOLIC VOLUME INDEX: 48.57 ML/M2
LEFT VENTRICLE DIASTOLIC VOLUME: 107.83 ML
LEFT VENTRICLE MASS INDEX: 58 G/M2
LEFT VENTRICLE SYSTOLIC VOLUME INDEX: 18.1 ML/M2
LEFT VENTRICLE SYSTOLIC VOLUME: 40.18 ML
LEFT VENTRICULAR INTERNAL DIMENSION IN DIASTOLE: 4.81 CM (ref 3.5–6)
LEFT VENTRICULAR MASS: 129.2 G
LV LATERAL E/E' RATIO: 7.91 M/S
LV SEPTAL E/E' RATIO: 10.88 M/S
MV A" WAVE DURATION": 11.13 MSEC
MV MEAN GRADIENT: 1 MMHG
MV PEAK A VEL: 0.83 M/S
MV PEAK E VEL: 0.87 M/S
MV PEAK GRADIENT: 3 MMHG
MV STENOSIS PRESSURE HALF TIME: 40.86 MS
MV VALVE AREA BY CONTINUITY EQUATION: 4.85 CM2
MV VALVE AREA P 1/2 METHOD: 5.38 CM2
PISA MRMAX VEL: 0.06 M/S
PISA TR MAX VEL: 2.7 M/S
PULM VEIN S/D RATIO: 1.02
PV PEAK D VEL: 0.56 M/S
PV PEAK S VEL: 0.57 M/S
RA MAJOR: 5.53 CM
RA PRESSURE: 3 MMHG
RA WIDTH: 4.83 CM
RIGHT VENTRICULAR END-DIASTOLIC DIMENSION: 3.95 CM
RV TISSUE DOPPLER FREE WALL SYSTOLIC VELOCITY 1 (APICAL 4 CHAMBER VIEW): 13.9 CM/S
SINUS: 3.31 CM
STJ: 2.82 CM
TDI LATERAL: 0.11 M/S
TDI SEPTAL: 0.08 M/S
TDI: 0.1 M/S
TR MAX PG: 29 MMHG
TRICUSPID ANNULAR PLANE SYSTOLIC EXCURSION: 2.43 CM
TV REST PULMONARY ARTERY PRESSURE: 32 MMHG

## 2022-11-15 PROCEDURE — 93306 TTE W/DOPPLER COMPLETE: CPT | Mod: TXP

## 2022-11-15 PROCEDURE — 3078F DIAST BP <80 MM HG: CPT | Mod: CPTII,S$GLB,TXP, | Performed by: INTERNAL MEDICINE

## 2022-11-15 PROCEDURE — 3008F BODY MASS INDEX DOCD: CPT | Mod: CPTII,S$GLB,TXP, | Performed by: INTERNAL MEDICINE

## 2022-11-15 PROCEDURE — 93306 ECHO (CUPID ONLY): ICD-10-PCS | Mod: 26,TXP,, | Performed by: INTERNAL MEDICINE

## 2022-11-15 PROCEDURE — 3066F PR DOCUMENTATION OF TREATMENT FOR NEPHROPATHY: ICD-10-PCS | Mod: CPTII,S$GLB,TXP, | Performed by: INTERNAL MEDICINE

## 2022-11-15 PROCEDURE — 3288F PR FALLS RISK ASSESSMENT DOCUMENTED: ICD-10-PCS | Mod: CPTII,S$GLB,TXP, | Performed by: INTERNAL MEDICINE

## 2022-11-15 PROCEDURE — 1160F RVW MEDS BY RX/DR IN RCRD: CPT | Mod: CPTII,S$GLB,TXP, | Performed by: INTERNAL MEDICINE

## 2022-11-15 PROCEDURE — 3044F HG A1C LEVEL LT 7.0%: CPT | Mod: CPTII,S$GLB,TXP, | Performed by: INTERNAL MEDICINE

## 2022-11-15 PROCEDURE — 3066F NEPHROPATHY DOC TX: CPT | Mod: CPTII,S$GLB,TXP, | Performed by: INTERNAL MEDICINE

## 2022-11-15 PROCEDURE — 3077F SYST BP >= 140 MM HG: CPT | Mod: CPTII,S$GLB,TXP, | Performed by: INTERNAL MEDICINE

## 2022-11-15 PROCEDURE — 3044F PR MOST RECENT HEMOGLOBIN A1C LEVEL <7.0%: ICD-10-PCS | Mod: CPTII,S$GLB,TXP, | Performed by: INTERNAL MEDICINE

## 2022-11-15 PROCEDURE — 1159F MED LIST DOCD IN RCRD: CPT | Mod: CPTII,S$GLB,TXP, | Performed by: INTERNAL MEDICINE

## 2022-11-15 PROCEDURE — 99204 OFFICE O/P NEW MOD 45 MIN: CPT | Mod: S$GLB,TXP,, | Performed by: INTERNAL MEDICINE

## 2022-11-15 PROCEDURE — 99999 PR PBB SHADOW E&M-EST. PATIENT-LVL V: CPT | Mod: PBBFAC,TXP,, | Performed by: INTERNAL MEDICINE

## 2022-11-15 PROCEDURE — 3078F PR MOST RECENT DIASTOLIC BLOOD PRESSURE < 80 MM HG: ICD-10-PCS | Mod: CPTII,S$GLB,TXP, | Performed by: INTERNAL MEDICINE

## 2022-11-15 PROCEDURE — 1101F PR PT FALLS ASSESS DOC 0-1 FALLS W/OUT INJ PAST YR: ICD-10-PCS | Mod: CPTII,S$GLB,TXP, | Performed by: INTERNAL MEDICINE

## 2022-11-15 PROCEDURE — 99999 PR PBB SHADOW E&M-EST. PATIENT-LVL V: ICD-10-PCS | Mod: PBBFAC,TXP,, | Performed by: INTERNAL MEDICINE

## 2022-11-15 PROCEDURE — 1101F PT FALLS ASSESS-DOCD LE1/YR: CPT | Mod: CPTII,S$GLB,TXP, | Performed by: INTERNAL MEDICINE

## 2022-11-15 PROCEDURE — 3077F PR MOST RECENT SYSTOLIC BLOOD PRESSURE >= 140 MM HG: ICD-10-PCS | Mod: CPTII,S$GLB,TXP, | Performed by: INTERNAL MEDICINE

## 2022-11-15 PROCEDURE — 1160F PR REVIEW ALL MEDS BY PRESCRIBER/CLIN PHARMACIST DOCUMENTED: ICD-10-PCS | Mod: CPTII,S$GLB,TXP, | Performed by: INTERNAL MEDICINE

## 2022-11-15 PROCEDURE — 1159F PR MEDICATION LIST DOCUMENTED IN MEDICAL RECORD: ICD-10-PCS | Mod: CPTII,S$GLB,TXP, | Performed by: INTERNAL MEDICINE

## 2022-11-15 PROCEDURE — 3288F FALL RISK ASSESSMENT DOCD: CPT | Mod: CPTII,S$GLB,TXP, | Performed by: INTERNAL MEDICINE

## 2022-11-15 PROCEDURE — 99204 PR OFFICE/OUTPT VISIT, NEW, LEVL IV, 45-59 MIN: ICD-10-PCS | Mod: S$GLB,TXP,, | Performed by: INTERNAL MEDICINE

## 2022-11-15 PROCEDURE — 3008F PR BODY MASS INDEX (BMI) DOCUMENTED: ICD-10-PCS | Mod: CPTII,S$GLB,TXP, | Performed by: INTERNAL MEDICINE

## 2022-11-15 PROCEDURE — 93306 TTE W/DOPPLER COMPLETE: CPT | Mod: 26,TXP,, | Performed by: INTERNAL MEDICINE

## 2022-11-15 NOTE — PROGRESS NOTES
"Subjective:   Patient ID:  Elbert Still Jr. is a 67 y.o. male who presents for follow-up of Establish Care and Pre-op Exam  DM x 20, ESRD x 6 months    HPI:   Some palpitations noted on dialysis days  No chest pain, Orthopnea, PND of heart failure symptoms.   Rides a bike 1 hr, cuts grass twice a week   Non smoker  Mother had a pacemaker    Echo 2022  The left ventricle is normal in size with normal systolic function.  The estimated ejection fraction is 63%.  Mild left atrial enlargement.  Normal left ventricular diastolic function.  Normal right ventricular size with normal right ventricular systolic function.  Mild right atrial enlargement.  Mild tricuspid regurgitation.  Normal central venous pressure (3 mmHg).  The estimated PA systolic pressure is 32 mmHg.     Patient Active Problem List   Diagnosis    -Diabetes mellitus due to underlying condition with diabetic nephropathy    Proteinuria    -Essential (primary) hypertension    Mixed hyperlipidemia    -CKD (chronic kidney disease) stage 4, GFR 15-29 ml/min    -Gout    Anemia of chronic disease    -HLD associated with type 2 DM    Hypoglycemia    Hyperkalemia    Acute kidney injury superimposed on chronic kidney disease    -Dialysis patient - MWF - started 5/2022    ESRD (end stage renal disease) on dialysis    Nuclear sclerosis of both eyes    Refractive error    Cortical age-related cataract    Status post placement of arteriovenous graft    BMI 28.0-28.9,adult     BP (!) 146/74 (BP Location: Right arm, Patient Position: Sitting, BP Method: Medium (Automatic))   Pulse 68   Ht 6' 2" (1.88 m)   Wt 96.7 kg (213 lb 3 oz)   BMI 27.37 kg/m²   Body mass index is 27.37 kg/m².  CrCl cannot be calculated (Patient's most recent lab result is older than the maximum 7 days allowed.).    Lab Results   Component Value Date     11/02/2022    K 4.4 11/02/2022     11/02/2022    CO2 22 (L) 10/27/2022    BUN 35 (H) 10/27/2022    CREATININE 8.73 (H) 11/02/2022 "     (H) 11/02/2022    HGBA1C 4.8 10/27/2022    MG 1.7 05/08/2022    AST 18 10/27/2022    ALT <7 (L) 11/02/2022    ALBUMIN 4.3 11/02/2022    PROT 8.3 10/27/2022    BILITOT 0.6 10/27/2022    WBC 5.94 11/02/2022    HGB 10.6 (L) 11/02/2022    HCT 32.2 (L) 11/02/2022    MCV 90 11/02/2022     11/02/2022    INR 1.1 10/27/2022    PSA 4.7 (H) 10/27/2022    TSH 0.879 04/30/2022    CHOL 68 (L) 10/27/2022    HDL 29 (L) 10/27/2022    LDLCALC 19.4 (L) 10/27/2022    TRIG 98 10/27/2022       Current Outpatient Medications   Medication Sig    acetaminophen (TYLENOL) 500 MG tablet Take 2 tablets (1,000 mg total) by mouth every 6 (six) hours as needed for Pain.    atorvastatin (LIPITOR) 20 MG tablet TAKE 1 TABLET BY MOUTH EVERY EVENING    blood sugar diagnostic Strp Use to check blood glucose once a day.    blood-glucose meter Misc Use to check blood glucose twice a day.    carvediloL (COREG) 12.5 MG tablet Take 1 tablet (12.5 mg total) by mouth 2 (two) times daily.    cinacalcet (SENSIPAR) 60 MG Tab Take 60 mg by mouth daily with breakfast.    colchicine (COLCRYS) 0.6 mg tablet Take 0.6 mg by mouth as needed. Take half tab (0.3 mg) by mouth daily as needed for gout flare.    fluticasone propionate (FLONASE) 50 mcg/actuation nasal spray 2 sprays (100 mcg total) by Each Nostril route once daily. (Patient taking differently: 2 sprays by Each Nostril route every evening.)    furosemide (LASIX) 80 MG tablet Take 1 tablet (80 mg total) by mouth 2 (two) times daily.    HYDROcodone-acetaminophen (NORCO) 5-325 mg per tablet Take 1 tablet by mouth every 6 (six) hours as needed for Pain.    lancets 30 gauge Misc Use to check blood glucose twice a day.    NIFEdipine (PROCARDIA-XL) 60 MG (OSM) 24 hr tablet Take 1 tablet (60 mg total) by mouth 2 (two) times a day.    sevelamer HCL (RENAGEL) 800 MG Tab Take 2 tablets (1,600 mg total) by mouth 3 (three) times daily with meals.    tamsulosin (FLOMAX) 0.4 mg Cap TAKE ONE CAPSULE BY MOUTH  ONCE DAILY (Patient taking differently: Take by mouth every morning.)     No current facility-administered medications for this visit.       Review of Systems   Constitutional: Negative for chills, decreased appetite, malaise/fatigue, night sweats, weight gain and weight loss.   Eyes:  Negative for blurred vision, double vision, visual disturbance and visual halos.   Cardiovascular:  Negative for chest pain, claudication, cyanosis, dyspnea on exertion, irregular heartbeat, leg swelling, near-syncope, orthopnea, palpitations, paroxysmal nocturnal dyspnea and syncope.   Respiratory:  Negative for cough, hemoptysis, snoring, sputum production and wheezing.    Endocrine: Negative for cold intolerance, heat intolerance, polydipsia and polyphagia.   Hematologic/Lymphatic: Negative for adenopathy and bleeding problem. Does not bruise/bleed easily.   Skin:  Negative for flushing, itching, poor wound healing and rash.   Musculoskeletal:  Negative for arthritis, back pain, falls, gout, joint pain, joint swelling, muscle cramps, muscle weakness, myalgias, neck pain and stiffness.   Gastrointestinal:  Negative for bloating, abdominal pain, anorexia, diarrhea, dysphagia, excessive appetite, flatus, hematemesis, jaundice, melena and nausea.   Genitourinary:  Negative for hesitancy and incomplete emptying.   Neurological:  Negative for aphonia, brief paralysis, difficulty with concentration, disturbances in coordination, excessive daytime sleepiness, dizziness, focal weakness, light-headedness, loss of balance and weakness.   Psychiatric/Behavioral:  Negative for altered mental status, depression, hallucinations, hypervigilance, memory loss, substance abuse and suicidal ideas. The patient does not have insomnia and is not nervous/anxious.    All other systems reviewed and are negative.    Objective:   Physical Exam  Vitals reviewed: recent left arm surgery.   Constitutional:       General: He is not in acute distress.      Appearance: He is well-developed. He is not diaphoretic.   HENT:      Head: Normocephalic and atraumatic.      Nose: Nose normal.      Mouth/Throat:      Pharynx: No oropharyngeal exudate.   Eyes:      General: No scleral icterus.        Right eye: No discharge.         Left eye: No discharge.      Conjunctiva/sclera: Conjunctivae normal.      Pupils: Pupils are equal, round, and reactive to light.   Neck:      Thyroid: No thyromegaly.      Vascular: No JVD.      Trachea: No tracheal deviation.   Cardiovascular:      Rate and Rhythm: Normal rate and regular rhythm.      Pulses: Intact distal pulses.      Heart sounds: Normal heart sounds. No murmur heard.    No friction rub. No gallop.   Pulmonary:      Effort: Pulmonary effort is normal. No respiratory distress.      Breath sounds: Normal breath sounds. No stridor. No wheezing or rales.   Chest:      Chest wall: No tenderness.   Abdominal:      General: Bowel sounds are normal. There is no distension.      Palpations: Abdomen is soft. There is no mass.      Tenderness: There is no abdominal tenderness.   Musculoskeletal:         General: No tenderness.      Cervical back: Normal range of motion and neck supple.   Lymphadenopathy:      Cervical: No cervical adenopathy.   Skin:     General: Skin is warm.      Coloration: Skin is not pale.      Findings: No erythema or rash.   Neurological:      Mental Status: He is alert and oriented to person, place, and time.      Cranial Nerves: No cranial nerve deficit.      Motor: No abnormal muscle tone.      Coordination: Coordination normal.      Deep Tendon Reflexes: Reflexes are normal and symmetric. Reflexes normal.   Psychiatric:         Behavior: Behavior normal.         Thought Content: Thought content normal.         Judgment: Judgment normal.     Assessment:     1. Preoperative clearance    2. Organ transplant candidate    3. -Essential (primary) hypertension    4. Mixed hyperlipidemia    5. -HLD associated with type  2 DM    6. Status post placement of arteriovenous graft    7. -CKD (chronic kidney disease) stage 4, GFR 15-29 ml/min    8. Hyperkalemia    9. Acute kidney injury superimposed on chronic kidney disease    10. -Dialysis patient - MWF - started 5/2022    11. ESRD (end stage renal disease) on dialysis    12. BMI 28.0-28.9,adult    13. Diabetes mellitus due to underlying condition with diabetic nephropathy, without long-term current use of insulin    14. Overweight (BMI 25.0-29.9)      Plan:     Patient was claustrophobic while getting PET and is interenested in exercise stress echo. Given that he has excellent exercise tolerance, recommend treadmill stress echo for him, if he fails we can reconsider PET.   Elbert was seen today for establish care and pre-op exam.    Diagnoses and all orders for this visit:    Preoperative clearance  -     Stress Echo Which stress agent will be used? Exercise; Future    Organ transplant candidate  -     Ambulatory referral/consult to Cardiology  -     Stress Echo Which stress agent will be used? Exercise; Future    -Essential (primary) hypertension  -     Stress Echo Which stress agent will be used? Exercise; Future    Mixed hyperlipidemia    -HLD associated with type 2 DM    Status post placement of arteriovenous graft    -CKD (chronic kidney disease) stage 4, GFR 15-29 ml/min    Hyperkalemia    Acute kidney injury superimposed on chronic kidney disease    -Dialysis patient - MWF - started 5/2022  -     Stress Echo Which stress agent will be used? Exercise; Future    ESRD (end stage renal disease) on dialysis  -     Stress Echo Which stress agent will be used? Exercise; Future    BMI 28.0-28.9,adult    Diabetes mellitus due to underlying condition with diabetic nephropathy, without long-term current use of insulin    Overweight (BMI 25.0-29.9)    RTC prn

## 2022-11-15 NOTE — NURSING
"Pt arrived for cardiac PET stress and states he is unable to do test because of being claustrophobia stating "there is no way I'm going in it."  Pt also stated he would not do test if his wife can not be at his side entire time to hold his hand.  Pt informed about not having someone in room entire time because of testing and radiation.  Pt refused testing before being able to explain entire test by andra Meehan.  Pt would need to be reconsidered for other stress testing.  Message sent to Deysi Padilla NP and staff in-basket.    "

## 2022-11-17 DIAGNOSIS — Z76.82 ORGAN TRANSPLANT CANDIDATE: Primary | ICD-10-CM

## 2022-11-17 LAB
HCT VFR BLD AUTO: 35.8 % (ref 42–52)
HEMOGLOBIN X 3: 34.2 % (ref 42–54)
HGB BLD-MCNC: 11.4 G/DL (ref 14–18)

## 2022-11-21 ENCOUNTER — TELEPHONE (OUTPATIENT)
Dept: VASCULAR SURGERY | Facility: CLINIC | Age: 67
End: 2022-11-21
Payer: COMMERCIAL

## 2022-11-21 ENCOUNTER — HOSPITAL ENCOUNTER (OUTPATIENT)
Facility: HOSPITAL | Age: 67
Discharge: HOME | End: 2022-11-22
Attending: EMERGENCY MEDICINE | Admitting: SURGERY
Payer: COMMERCIAL

## 2022-11-21 DIAGNOSIS — T82.898A STEAL SYNDROME AS COMPLICATION OF DIALYSIS ACCESS, INITIAL ENCOUNTER: ICD-10-CM

## 2022-11-21 DIAGNOSIS — T82.898A STEAL SYNDROME DIALYSIS VASCULAR ACCESS, INITIAL ENCOUNTER: Primary | ICD-10-CM

## 2022-11-21 DIAGNOSIS — N18.6 ESRD (END STAGE RENAL DISEASE): ICD-10-CM

## 2022-11-21 PROCEDURE — 93990 DOPPLER FLOW TESTING: CPT | Mod: 26,NTX,, | Performed by: SURGERY

## 2022-11-21 PROCEDURE — 99284 EMERGENCY DEPT VISIT MOD MDM: CPT | Mod: NTX,,, | Performed by: PHYSICIAN ASSISTANT

## 2022-11-21 PROCEDURE — 93922 UPR/L XTREMITY ART 2 LEVELS: CPT | Mod: 26,52,NTX, | Performed by: SURGERY

## 2022-11-21 PROCEDURE — 99284 PR EMERGENCY DEPT VISIT,LEVEL IV: ICD-10-PCS | Mod: NTX,,, | Performed by: PHYSICIAN ASSISTANT

## 2022-11-21 PROCEDURE — 99285 EMERGENCY DEPT VISIT HI MDM: CPT | Mod: 25,NTX

## 2022-11-21 PROCEDURE — 25000003 PHARM REV CODE 250: Mod: NTX | Performed by: SURGERY

## 2022-11-21 PROCEDURE — 93970 PR US DUPLEX, UPPER OR LOWER EXT VENOUS,COMPLETE BILAT: ICD-10-PCS | Mod: 26,59,52,NTX | Performed by: SURGERY

## 2022-11-21 PROCEDURE — 93922 PR NON-INVAS PHYSIOLOGIC STD EXTREMITY ART 1-2 LEVEL: ICD-10-PCS | Mod: 26,52,NTX, | Performed by: SURGERY

## 2022-11-21 PROCEDURE — G0378 HOSPITAL OBSERVATION PER HR: HCPCS | Mod: NTX

## 2022-11-21 PROCEDURE — 93990 PR DUPLEX HEMODIALYSIS ACCESS: ICD-10-PCS | Mod: 26,NTX,, | Performed by: SURGERY

## 2022-11-21 PROCEDURE — 82962 GLUCOSE BLOOD TEST: CPT | Mod: NTX

## 2022-11-21 PROCEDURE — 93970 EXTREMITY STUDY: CPT | Mod: 26,59,52,NTX | Performed by: SURGERY

## 2022-11-21 RX ORDER — HYDROCODONE BITARTRATE AND ACETAMINOPHEN 5; 325 MG/1; MG/1
1 TABLET ORAL EVERY 4 HOURS PRN
Status: DISCONTINUED | OUTPATIENT
Start: 2022-11-21 | End: 2022-11-22 | Stop reason: HOSPADM

## 2022-11-21 RX ORDER — ACETAMINOPHEN 325 MG/1
650 TABLET ORAL EVERY 4 HOURS PRN
Status: DISCONTINUED | OUTPATIENT
Start: 2022-11-21 | End: 2022-11-22 | Stop reason: HOSPADM

## 2022-11-21 RX ORDER — ASPIRIN 81 MG/1
81 TABLET ORAL ONCE
Status: DISCONTINUED | OUTPATIENT
Start: 2022-11-21 | End: 2022-11-22

## 2022-11-21 RX ORDER — INSULIN ASPART 100 [IU]/ML
1-10 INJECTION, SOLUTION INTRAVENOUS; SUBCUTANEOUS
Status: DISCONTINUED | OUTPATIENT
Start: 2022-11-21 | End: 2022-11-22 | Stop reason: HOSPADM

## 2022-11-21 RX ORDER — TAMSULOSIN HYDROCHLORIDE 0.4 MG/1
1 CAPSULE ORAL DAILY
Status: DISCONTINUED | OUTPATIENT
Start: 2022-11-22 | End: 2022-11-22 | Stop reason: HOSPADM

## 2022-11-21 RX ORDER — CLOPIDOGREL BISULFATE 75 MG/1
75 TABLET ORAL DAILY
Status: DISCONTINUED | OUTPATIENT
Start: 2022-11-22 | End: 2022-11-22 | Stop reason: HOSPADM

## 2022-11-21 RX ORDER — CLOPIDOGREL BISULFATE 75 MG/1
150 TABLET ORAL DAILY
Status: DISCONTINUED | OUTPATIENT
Start: 2022-11-22 | End: 2022-11-21

## 2022-11-21 RX ORDER — DIPHENHYDRAMINE HCL 25 MG
25 CAPSULE ORAL
Status: DISCONTINUED | OUTPATIENT
Start: 2022-11-21 | End: 2022-11-22 | Stop reason: HOSPADM

## 2022-11-21 RX ORDER — SODIUM CHLORIDE 0.9 % (FLUSH) 0.9 %
10 SYRINGE (ML) INJECTION
Status: DISCONTINUED | OUTPATIENT
Start: 2022-11-21 | End: 2022-11-22 | Stop reason: HOSPADM

## 2022-11-21 RX ORDER — ASPIRIN 81 MG/1
81 TABLET ORAL DAILY
Status: DISCONTINUED | OUTPATIENT
Start: 2022-11-22 | End: 2022-11-22 | Stop reason: HOSPADM

## 2022-11-21 RX ORDER — HEPARIN SODIUM 5000 [USP'U]/ML
5000 INJECTION, SOLUTION INTRAVENOUS; SUBCUTANEOUS EVERY 8 HOURS
Status: DISCONTINUED | OUTPATIENT
Start: 2022-11-21 | End: 2022-11-21

## 2022-11-21 RX ORDER — ATORVASTATIN CALCIUM 20 MG/1
20 TABLET, FILM COATED ORAL NIGHTLY
Status: DISCONTINUED | OUTPATIENT
Start: 2022-11-21 | End: 2022-11-22 | Stop reason: HOSPADM

## 2022-11-21 RX ORDER — HYDROCODONE BITARTRATE AND ACETAMINOPHEN 10; 325 MG/1; MG/1
1 TABLET ORAL EVERY 4 HOURS PRN
Status: DISCONTINUED | OUTPATIENT
Start: 2022-11-21 | End: 2022-11-22 | Stop reason: HOSPADM

## 2022-11-21 RX ORDER — POLYETHYLENE GLYCOL 3350 17 G/17G
17 POWDER, FOR SOLUTION ORAL DAILY
Status: DISCONTINUED | OUTPATIENT
Start: 2022-11-21 | End: 2022-11-22 | Stop reason: HOSPADM

## 2022-11-21 RX ORDER — CARVEDILOL 12.5 MG/1
12.5 TABLET ORAL 2 TIMES DAILY
Status: DISCONTINUED | OUTPATIENT
Start: 2022-11-21 | End: 2022-11-22 | Stop reason: HOSPADM

## 2022-11-21 RX ORDER — ONDANSETRON 8 MG/1
8 TABLET, ORALLY DISINTEGRATING ORAL EVERY 8 HOURS PRN
Status: DISCONTINUED | OUTPATIENT
Start: 2022-11-21 | End: 2022-11-22 | Stop reason: HOSPADM

## 2022-11-21 RX ORDER — GLUCAGON 1 MG
1 KIT INJECTION
Status: DISCONTINUED | OUTPATIENT
Start: 2022-11-21 | End: 2022-11-22 | Stop reason: HOSPADM

## 2022-11-21 RX ORDER — CLOPIDOGREL BISULFATE 75 MG/1
150 TABLET ORAL ONCE
Status: COMPLETED | OUTPATIENT
Start: 2022-11-21 | End: 2022-11-21

## 2022-11-21 RX ORDER — IBUPROFEN 200 MG
16 TABLET ORAL
Status: DISCONTINUED | OUTPATIENT
Start: 2022-11-21 | End: 2022-11-22 | Stop reason: HOSPADM

## 2022-11-21 RX ORDER — IBUPROFEN 200 MG
24 TABLET ORAL
Status: DISCONTINUED | OUTPATIENT
Start: 2022-11-21 | End: 2022-11-22 | Stop reason: HOSPADM

## 2022-11-21 RX ADMIN — CLOPIDOGREL BISULFATE 150 MG: 75 TABLET ORAL at 07:11

## 2022-11-21 RX ADMIN — CARVEDILOL 12.5 MG: 12.5 TABLET, FILM COATED ORAL at 08:11

## 2022-11-21 RX ADMIN — HYDROCODONE BITARTRATE AND ACETAMINOPHEN 1 TABLET: 10; 325 TABLET ORAL at 09:11

## 2022-11-21 RX ADMIN — ATORVASTATIN CALCIUM 20 MG: 20 TABLET, FILM COATED ORAL at 08:11

## 2022-11-21 NOTE — SUBJECTIVE & OBJECTIVE
(Not in a hospital admission)      Review of patient's allergies indicates:   Allergen Reactions    Iodine and iodide containing products Hives     Hypotension, Flushing       Past Medical History:   Diagnosis Date    Anemia     Diabetes mellitus     Diabetes mellitus, type 2     Disorder of kidney and ureter     Essential (primary) hypertension 2017    Gout, unspecified      jaundice, unspecified     Nuclear sclerosis of both eyes 2022     Past Surgical History:   Procedure Laterality Date    ADENOIDECTOMY      ADENOIDECTOMY      AV FISTULA PLACEMENT Left 2022    Procedure: CREATION, AV FISTULA;  Surgeon: Prince Gardiner MD;  Location: [facility] OR [address] [place] [address];  Service: Peripheral Vascular;  Laterality: Left;    nasal septum repair      NASAL SEPTUM SURGERY      ROTATOR CUFF REPAIR Left     SALIVARY GLAND SURGERY      Tonsillectomy      TONSILLECTOMY      TRANSPOSITION OF BASILIC VEIN Left 2022    Procedure: TRANSPOSITION, VEIN, BASILIC;  Surgeon: Prince Gardiner MD;  Location: [facility] OR [address] [place] [address];  Service: Peripheral Vascular;  Laterality: Left;  Left Brachial vein.     Family History       Problem Relation (Age of Onset)    Cancer Sister    Heart disease Mother    Hypertension Mother, Sister    Kidney disease Mother    No Known Problems Father    Seizures Sister    Stroke Sister          Tobacco Use    Smoking status: Never    Smokeless tobacco: Never    Tobacco comments:     .  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     Substance and Sexual Activity    Alcohol use: No    Drug use: No    Sexual activity: Yes     Partners: Female     Review of Systems   Constitutional:  Negative for chills, fatigue and fever.   Respiratory: Negative.     Cardiovascular:  Negative for chest pain and leg swelling.   Gastrointestinal: Negative.    Genitourinary: Negative.    Skin:  Positive for color change and pallor. Negative for rash and wound.   Neurological:  Positive for numbness. Negative for weakness.         Left fingertip numbness (intermittent)  Numbness to anterior forearm   Psychiatric/Behavioral: Negative.     Objective:     Vital Signs (Most Recent):  Temp: 98.3 °F (36.8 °C) (11/21/22 1557)  Pulse: 67 (11/21/22 1557)  Resp: 16 (11/21/22 1557)  BP: (!) 146/83 (11/21/22 1557)  SpO2: 99 % (11/21/22 1109)   Vital Signs (24h Range):  Temp:  [98.3 °F (36.8 °C)-98.5 °F (36.9 °C)] 98.3 °F (36.8 °C)  Pulse:  [64-74] 67  Resp:  [16-18] 16  SpO2:  [99 %] 99 %  BP: (127-173)/() 146/83     Weight: 97.5 kg (215 lb)  Body mass index is 27.6 kg/m².    Physical Exam  Vitals and nursing note reviewed.   Constitutional:       Appearance: Normal appearance.   HENT:      Head: Normocephalic and atraumatic.   Cardiovascular:      Rate and Rhythm: Normal rate and regular rhythm.      Comments: Weak, monophasic doppler signal to LUE radial, ulnar arteries  Weak, monophasic doppler signal palmar arch   Pulmonary:      Effort: Pulmonary effort is normal. No respiratory distress.   Abdominal:      General: Abdomen is flat. There is no distension.      Palpations: Abdomen is soft. There is no mass.      Tenderness: There is no abdominal tenderness.      Hernia: No hernia is present.   Musculoskeletal:      Right lower leg: No edema.      Left lower leg: No edema.   Skin:     Coloration: Skin is pale.      Comments: Left hand with slower cap refill  Slight fingertip discoloration   Neurological:      General: No focal deficit present.      Mental Status: He is alert and oriented to person, place, and time.      Sensory: No sensory deficit.      Motor: No weakness.   Psychiatric:         Mood and Affect: Mood normal.         Behavior: Behavior normal.       Significant Labs:  AM labs     Significant Diagnostics:  I have reviewed all pertinent imaging results/findings within the past 24 hours.

## 2022-11-21 NOTE — TELEPHONE ENCOUNTER
Received call from nurse at pt's dialysis center stating pt's hand is cold and painful and that nail beds are blue. States pt complained of hand pain during HD on Friday, however hand is cold and nail beds are blue today. Instructed nurse to have pt report to ED at [facility] for eval.

## 2022-11-21 NOTE — H&P
Please see consult for H&P.    Estelle Valentin MD  PGY-3, General Surgery  [facility]

## 2022-11-21 NOTE — ED PROVIDER NOTES
"Encounter Date: 2022       History     Chief Complaint   Patient presents with    Post-op Problem     Left arm fistula placed, states sent here for "circulation issues" - surgery on - had a full session on HD today      This is a 67 year old male with a PMH of HTN, HM, ESRD on HD [facility] presenting to the ED with a chief complaint of hand pain. He is s/p left transposed brachiobasilic arteriovenous fistula creation on . He reports a 5 day history of sharp shooting pain in the right forearm. Associated symptoms include forearm swelling, bluish discoloration of left palm and cold skin of the hand. He was dialyzed this morning per his usual routine. He denies fever, chills, chest pain, SOB.    Review of patient's allergies indicates:   Allergen Reactions    Iodine and iodide containing products Hives     Hypotension, Flushing     Past Medical History:   Diagnosis Date    Anemia     Diabetes mellitus     Diabetes mellitus, type 2     Disorder of kidney and ureter     Essential (primary) hypertension 2017    Gout, unspecified      jaundice, unspecified     Nuclear sclerosis of both eyes 2022     Past Surgical History:   Procedure Laterality Date    ADENOIDECTOMY      ADENOIDECTOMY      AV FISTULA PLACEMENT Left 2022    Procedure: CREATION, AV FISTULA;  Surgeon: Prince Gardiner MD;  Location: [facility] OR [address] [place] [address];  Service: Peripheral Vascular;  Laterality: Left;    nasal septum repair      NASAL SEPTUM SURGERY      ROTATOR CUFF REPAIR Left     SALIVARY GLAND SURGERY      Tonsillectomy      TONSILLECTOMY      TRANSPOSITION OF BASILIC VEIN Left 2022    Procedure: TRANSPOSITION, VEIN, BASILIC;  Surgeon: Prince Gardiner MD;  Location: [facility] OR [address] [place] [address];  Service: Peripheral Vascular;  Laterality: Left;  Left Brachial vein.     Family History   Problem Relation Age of Onset    Heart disease Mother     Kidney disease Mother     Hypertension Mother     No Known Problems Father     Cancer " Sister     Seizures Sister     Hypertension Sister     Stroke Sister     Amblyopia Neg Hx     Blindness Neg Hx     Cataracts Neg Hx     Diabetes Neg Hx     Glaucoma Neg Hx     Macular degeneration Neg Hx     Retinal detachment Neg Hx     Strabismus Neg Hx     Thyroid disease Neg Hx      Social History     Tobacco Use    Smoking status: Never    Smokeless tobacco: Never    Tobacco comments:     .  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     Substance Use Topics    Alcohol use: No    Drug use: No     Review of Systems   Constitutional:  Negative for chills and fever.   Respiratory:  Negative for shortness of breath.    Cardiovascular:  Negative for chest pain.   Gastrointestinal:  Negative for nausea and vomiting.   Musculoskeletal:  Positive for myalgias.   Skin:  Positive for color change.   Allergic/Immunologic: Positive for immunocompromised state.   Neurological:  Negative for weakness and numbness.   Hematological:  Does not bruise/bleed easily.     Physical Exam     Initial Vitals [11/21/22 1109]   BP Pulse Resp Temp SpO2   127/76 74 18 98.3 °F (36.8 °C) 99 %      MAP       --         Physical Exam    Constitutional: He appears well-developed and well-nourished. No distress.   HENT:   Head: Atraumatic.   Eyes: Conjunctivae and EOM are normal. Pupils are equal, round, and reactive to light.   Cardiovascular:  Normal rate, regular rhythm and normal heart sounds.     Exam reveals decreased pulses.           Left radial artery with a monophasic signal. Left brachial artery with biphasic signal  by doppler. LUE fistula with palpable thrill.    Pulmonary/Chest: Breath sounds normal. No respiratory distress. He has no wheezes. He has no rhonchi. He has no rales.     Neurological: He is alert and oriented to person, place, and time.   Skin: Skin is warm and dry.            ED Course   Procedures  Labs Reviewed   COMPREHENSIVE METABOLIC PANEL   MAGNESIUM   PHOSPHORUS   CBC W/ AUTO DIFFERENTIAL   PROTIME-INR           Imaging Results    None          Medications   atorvastatin tablet 20 mg (has no administration in time range)   carvediloL tablet 12.5 mg (has no administration in time range)   tamsulosin 24 hr capsule 0.4 mg (has no administration in time range)   sodium chloride 0.9% flush 10 mL (has no administration in time range)   heparin (porcine) injection 5,000 Units (has no administration in time range)   acetaminophen tablet 650 mg (has no administration in time range)   HYDROcodone-acetaminophen 5-325 mg per tablet 1 tablet (has no administration in time range)   HYDROcodone-acetaminophen  mg per tablet 1 tablet (has no administration in time range)   polyethylene glycol packet 17 g (has no administration in time range)   ondansetron disintegrating tablet 8 mg (has no administration in time range)     Medical Decision Making:   History:   Old Medical Records: I decided to obtain old medical records.  Old Records Summarized: records from previous admission(s).  Clinical Tests:   Lab Tests: Ordered and Reviewed  Radiological Study: Ordered and Reviewed     APC / Resident Notes:   67 year old male presenting with left upper extremity swelling, cyanosis, and temperature changes s/p AV fistula creation.    I discussed the case with vascular surgery who evaluated the patient in the ED and admitted him for management. I discussed the care of this patient with my supervising MD.                    Clinical Impression:   Final diagnoses:  [T82.918A] Steal syndrome as complication of dialysis access, initial encounter      ED Disposition Condition    Observation                 Ama Wright PA-C  11/21/22 6103

## 2022-11-21 NOTE — ASSESSMENT & PLAN NOTE
66 yo M w/ above history s/p brachial-brachial AVF creation 09/06 with subsequent transposition 11/01/2022 with Dr. Gardiner presenting with significant steal symptoms to [facility].     - admit to vascular surgery, obs    - US hemodialysis access, vein mapping  - Plan for cath lab fistulogram vs graft revision tomorrow  - Will obtain consent   - Diabetic diet tonight, NPO at midnight  - SSI   - midline insertion  - Nephrology consult given ESRD on HD; last HD Mod[place] 11/21  - AM labs

## 2022-11-21 NOTE — TREATMENT PLAN
Vascular Surgery Treatment Plan    Patient seen and examined. ESRD s/p AVF transposition 11/01/2022 with significant symptoms of steal. Plan for admission, imaging work up, and likely AVF revision.    Full consult/H&P to follow.    Estelle Valentin MD  PGY-3, General Surgery  [facility]

## 2022-11-21 NOTE — CONSULTS
[facility] - Emergency Dept  Vascular Surgery  Consult Note    Inpatient consult to Vascular Surgery  Consult performed by: sEtelle Valentin MD  Consult ordered by: Estelle Valentin MD        Subjective:     Chief Complaint/Reason for Admission: Steal syndrome     History of Present Illness: 67 year old male with a PMH of HTN, HM, ESRD on HD MWFr (currently dialyzing through R chest permacath)  who underwent s/p brachial-brachial AVF creation  with subsequent transposition 2022 with Dr. Gardiner. He presents today as directed by HD nurse due to ischemic signs to left hand. Per patient, he developed anterior forearm numbness following initial AVF creation, reportedly improving on office follow up. Following transposition he developed swelling of the entire forearm with worsening numbness to anterior forearm. He also developed intermittent numbness to his fingertips, worse with elevation of his hand and resolved with hanging his hand in dependent position. Fingertips with darkening discoloration and he has noticed his left hand is paler than his right.           (Not in a hospital admission)      Review of patient's allergies indicates:   Allergen Reactions    Iodine and iodide containing products Hives     Hypotension, Flushing       Past Medical History:   Diagnosis Date    Anemia     Diabetes mellitus     Diabetes mellitus, type 2     Disorder of kidney and ureter     Essential (primary) hypertension 2017    Gout, unspecified      jaundice, unspecified     Nuclear sclerosis of both eyes 2022     Past Surgical History:   Procedure Laterality Date    ADENOIDECTOMY      ADENOIDECTOMY      AV FISTULA PLACEMENT Left 2022    Procedure: CREATION, AV FISTULA;  Surgeon: Prince Gardiner MD;  Location: [facility] OR [address] [place] [address];  Service: Peripheral Vascular;  Laterality: Left;    nasal septum repair      NASAL SEPTUM SURGERY      ROTATOR CUFF REPAIR Left     SALIVARY GLAND SURGERY       Tonsillectomy      TONSILLECTOMY      TRANSPOSITION OF BASILIC VEIN Left 11/1/2022    Procedure: TRANSPOSITION, VEIN, BASILIC;  Surgeon: Prince Gardiner MD;  Location: [facility] OR [address] [place] [address];  Service: Peripheral Vascular;  Laterality: Left;  Left Brachial vein.     Family History       Problem Relation (Age of Onset)    Cancer Sister    Heart disease Mother    Hypertension Mother, Sister    Kidney disease Mother    No Known Problems Father    Seizures Sister    Stroke Sister          Tobacco Use    Smoking status: Never    Smokeless tobacco: Never    Tobacco comments:     .  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     Substance and Sexual Activity    Alcohol use: No    Drug use: No    Sexual activity: Yes     Partners: Female     Review of Systems   Constitutional:  Negative for chills, fatigue and fever.   Respiratory: Negative.     Cardiovascular:  Negative for chest pain and leg swelling.   Gastrointestinal: Negative.    Genitourinary: Negative.    Skin:  Positive for color change and pallor. Negative for rash and wound.   Neurological:  Positive for numbness. Negative for weakness.        Left fingertip numbness (intermittent)  Numbness to anterior forearm   Psychiatric/Behavioral: Negative.     Objective:     Vital Signs (Most Recent):  Temp: 98.3 °F (36.8 °C) (11/21/22 1557)  Pulse: 67 (11/21/22 1557)  Resp: 16 (11/21/22 1557)  BP: (!) 146/83 (11/21/22 1557)  SpO2: 99 % (11/21/22 1109)   Vital Signs (24h Range):  Temp:  [98.3 °F (36.8 °C)-98.5 °F (36.9 °C)] 98.3 °F (36.8 °C)  Pulse:  [64-74] 67  Resp:  [16-18] 16  SpO2:  [99 %] 99 %  BP: (127-173)/() 146/83     Weight: 97.5 kg (215 lb)  Body mass index is 27.6 kg/m².    Physical Exam  Vitals and nursing note reviewed.   Constitutional:       Appearance: Normal appearance.   HENT:      Head: Normocephalic and atraumatic.   Cardiovascular:      Rate and Rhythm: Normal rate and regular rhythm.      Comments: Weak, monophasic doppler signal to LUE radial,  ulnar arteries  Weak, monophasic doppler signal palmar arch   Pulmonary:      Effort: Pulmonary effort is normal. No respiratory distress.   Abdominal:      General: Abdomen is flat. There is no distension.      Palpations: Abdomen is soft. There is no mass.      Tenderness: There is no abdominal tenderness.      Hernia: No hernia is present.   Musculoskeletal:      Right lower leg: No edema.      Left lower leg: No edema.   Skin:     Coloration: Skin is pale.      Comments: Left hand with slower cap refill  Slight fingertip discoloration   Neurological:      General: No focal deficit present.      Mental Status: He is alert and oriented to person, place, and time.      Sensory: No sensory deficit.      Motor: No weakness.   Psychiatric:         Mood and Affect: Mood normal.         Behavior: Behavior normal.       Significant Labs:  AM labs     Significant Diagnostics:  I have reviewed all pertinent imaging results/findings within the past 24 hours.    Assessment/Plan:     * Steal syndrome dialysis vascular access, initial encounter  66 yo M w/ above history s/p brachial-brachial AVF creation 09/06 with subsequent transposition 11/01/2022 with Dr. Gardiner presenting with significant steal symptoms to [facility].     - admit to vascular surgery, obs    - US hemodialysis access, vein mapping  - Plan for cath lab fistulogram vs graft revision tomorrow  - Will obtain consent   - Diabetic diet tonight, NPO at midnight  - SSI   - midline insertion  - Nephrology consult given ESRD on HD; last HD [place] 11/21  - AM labs                Thank you for your consult. I will follow-up with patient. Please contact us if you have any additional questions.    Estelle Valentin MD  Vascular Surgery  [facility] - Emergency Dept

## 2022-11-21 NOTE — HPI
67 year old male with a PMH of HTN, HM, ESRD on HD MWFr (currently dialyzing through R [facility])  who underwent s/p brachial-brachial AVF creation 09/06 with subsequent transposition 11/01/2022 with Dr. Gardiner. He presents today as directed by HD nurse due to ischemic signs to left hand. Per patient, he developed anterior forearm numbness following initial AVF creation, reportedly improving on office follow up. Following transposition he developed swelling of the entire forearm with worsening numbness to anterior forearm. He also developed intermittent numbness to his fingertips, worse with elevation of his hand and resolved with hanging his hand in dependent position. Fingertips with darkening discoloration and he has noticed his left hand is paler than his right.

## 2022-11-22 VITALS
HEART RATE: 80 BPM | OXYGEN SATURATION: 100 % | WEIGHT: 215 LBS | HEIGHT: 74 IN | RESPIRATION RATE: 16 BRPM | DIASTOLIC BLOOD PRESSURE: 94 MMHG | BODY MASS INDEX: 27.59 KG/M2 | TEMPERATURE: 98 F | SYSTOLIC BLOOD PRESSURE: 169 MMHG

## 2022-11-22 LAB
ALBUMIN SERPL BCP-MCNC: 4.1 G/DL (ref 3.5–5.2)
ALP SERPL-CCNC: 119 U/L (ref 55–135)
ALT SERPL W/O P-5'-P-CCNC: 11 U/L (ref 10–44)
ANION GAP SERPL CALC-SCNC: 14 MMOL/L (ref 8–16)
AST SERPL-CCNC: 17 U/L (ref 10–40)
BASOPHILS # BLD AUTO: 0.02 K/UL (ref 0–0.2)
BASOPHILS NFR BLD: 0.6 % (ref 0–1.9)
BILIRUB SERPL-MCNC: 0.3 MG/DL (ref 0.1–1)
BUN SERPL-MCNC: 46 MG/DL (ref 8–23)
CALCIUM SERPL-MCNC: 9.9 MG/DL (ref 8.7–10.5)
CHLORIDE SERPL-SCNC: 99 MMOL/L (ref 95–110)
CO2 SERPL-SCNC: 25 MMOL/L (ref 23–29)
CREAT SERPL-MCNC: 8.8 MG/DL (ref 0.5–1.4)
DIFFERENTIAL METHOD: ABNORMAL
EOSINOPHIL # BLD AUTO: 0.3 K/UL (ref 0–0.5)
EOSINOPHIL NFR BLD: 7.5 % (ref 0–8)
ERYTHROCYTE [DISTWIDTH] IN BLOOD BY AUTOMATED COUNT: 14.6 % (ref 11.5–14.5)
EST. GFR  (NO RACE VARIABLE): 6.1 ML/MIN/1.73 M^2
GLUCOSE SERPL-MCNC: 116 MG/DL (ref 70–110)
HCT VFR BLD AUTO: 38.3 % (ref 40–54)
HGB BLD-MCNC: 12.4 G/DL (ref 14–18)
IMM GRANULOCYTES # BLD AUTO: 0.01 K/UL (ref 0–0.04)
IMM GRANULOCYTES NFR BLD AUTO: 0.3 % (ref 0–0.5)
INR PPP: 1.1 (ref 0.8–1.2)
LYMPHOCYTES # BLD AUTO: 1.3 K/UL (ref 1–4.8)
LYMPHOCYTES NFR BLD: 37.6 % (ref 18–48)
MAGNESIUM SERPL-MCNC: 2.1 MG/DL (ref 1.6–2.6)
MCH RBC QN AUTO: 29.8 PG (ref 27–31)
MCHC RBC AUTO-ENTMCNC: 32.4 G/DL (ref 32–36)
MCV RBC AUTO: 92 FL (ref 82–98)
MONOCYTES # BLD AUTO: 0.5 K/UL (ref 0.3–1)
MONOCYTES NFR BLD: 15.3 % (ref 4–15)
NEUTROPHILS # BLD AUTO: 1.3 K/UL (ref 1.8–7.7)
NEUTROPHILS NFR BLD: 38.7 % (ref 38–73)
NRBC BLD-RTO: 0 /100 WBC
PHOSPHATE SERPL-MCNC: 5.5 MG/DL (ref 2.7–4.5)
PLATELET # BLD AUTO: 188 K/UL (ref 150–450)
PMV BLD AUTO: 10.1 FL (ref 9.2–12.9)
POCT GLUCOSE: 97 MG/DL (ref 70–110)
POTASSIUM SERPL-SCNC: 4.1 MMOL/L (ref 3.5–5.1)
PROT SERPL-MCNC: 8.3 G/DL (ref 6–8.4)
PROTHROMBIN TIME: 11.1 SEC (ref 9–12.5)
RBC # BLD AUTO: 4.16 M/UL (ref 4.6–6.2)
SODIUM SERPL-SCNC: 138 MMOL/L (ref 136–145)
WBC # BLD AUTO: 3.46 K/UL (ref 3.9–12.7)

## 2022-11-22 PROCEDURE — 25000003 PHARM REV CODE 250: Mod: NTX | Performed by: SURGERY

## 2022-11-22 PROCEDURE — 27201423 OPTIME MED/SURG SUP & DEVICES STERILE SUPPLY: Mod: NTX | Performed by: SURGERY

## 2022-11-22 PROCEDURE — 84100 ASSAY OF PHOSPHORUS: CPT | Mod: NTX | Performed by: SURGERY

## 2022-11-22 PROCEDURE — 99152 MOD SED SAME PHYS/QHP 5/>YRS: CPT | Mod: NTX,,, | Performed by: SURGERY

## 2022-11-22 PROCEDURE — C1769 GUIDE WIRE: HCPCS | Mod: NTX | Performed by: SURGERY

## 2022-11-22 PROCEDURE — 63600175 PHARM REV CODE 636 W HCPCS: Mod: NTX | Performed by: SURGERY

## 2022-11-22 PROCEDURE — 99214 PR OFFICE/OUTPT VISIT, EST, LEVL IV, 30-39 MIN: ICD-10-PCS | Mod: NTX,,, | Performed by: NURSE PRACTITIONER

## 2022-11-22 PROCEDURE — 99152 PR MOD CONSCIOUS SEDATION, SAME PHYS, 5+ YRS, FIRST 15 MIN: ICD-10-PCS | Mod: NTX,,, | Performed by: SURGERY

## 2022-11-22 PROCEDURE — G0378 HOSPITAL OBSERVATION PER HR: HCPCS | Mod: NTX

## 2022-11-22 PROCEDURE — C1894 INTRO/SHEATH, NON-LASER: HCPCS | Mod: NTX | Performed by: SURGERY

## 2022-11-22 PROCEDURE — C1725 CATH, TRANSLUMIN NON-LASER: HCPCS | Mod: NTX | Performed by: SURGERY

## 2022-11-22 PROCEDURE — 80053 COMPREHEN METABOLIC PANEL: CPT | Mod: NTX | Performed by: SURGERY

## 2022-11-22 PROCEDURE — 99214 OFFICE O/P EST MOD 30 MIN: CPT | Mod: NTX,,, | Performed by: NURSE PRACTITIONER

## 2022-11-22 PROCEDURE — 36902 PR INTRO CATH, DIALYSIS CIRCUIT W/TRANSLML BALLOON ANGIO: ICD-10-PCS | Mod: 78,NTX,, | Performed by: SURGERY

## 2022-11-22 PROCEDURE — 83735 ASSAY OF MAGNESIUM: CPT | Mod: NTX | Performed by: SURGERY

## 2022-11-22 PROCEDURE — 36902 INTRO CATH DIALYSIS CIRCUIT: CPT | Mod: 78,NTX,, | Performed by: SURGERY

## 2022-11-22 PROCEDURE — 36902 INTRO CATH DIALYSIS CIRCUIT: CPT | Mod: NTX | Performed by: SURGERY

## 2022-11-22 PROCEDURE — 85610 PROTHROMBIN TIME: CPT | Mod: NTX | Performed by: SURGERY

## 2022-11-22 PROCEDURE — 99153 MOD SED SAME PHYS/QHP EA: CPT | Mod: NTX | Performed by: SURGERY

## 2022-11-22 PROCEDURE — 85025 COMPLETE CBC W/AUTO DIFF WBC: CPT | Mod: NTX | Performed by: SURGERY

## 2022-11-22 PROCEDURE — 99152 MOD SED SAME PHYS/QHP 5/>YRS: CPT | Mod: NTX | Performed by: SURGERY

## 2022-11-22 PROCEDURE — C1887 CATHETER, GUIDING: HCPCS | Mod: NTX | Performed by: SURGERY

## 2022-11-22 PROCEDURE — 25500020 PHARM REV CODE 255: Mod: NTX | Performed by: SURGERY

## 2022-11-22 RX ORDER — MIDAZOLAM HYDROCHLORIDE 1 MG/ML
INJECTION INTRAMUSCULAR; INTRAVENOUS
Status: DISCONTINUED | OUTPATIENT
Start: 2022-11-22 | End: 2022-11-22 | Stop reason: HOSPADM

## 2022-11-22 RX ORDER — DIPHENHYDRAMINE HCL 50 MG
50 CAPSULE ORAL ONCE
Status: COMPLETED | OUTPATIENT
Start: 2022-11-22 | End: 2022-11-22

## 2022-11-22 RX ORDER — ASPIRIN 81 MG/1
81 TABLET ORAL DAILY
Qty: 90 TABLET | Refills: 3 | Status: SHIPPED | OUTPATIENT
Start: 2022-11-23 | End: 2023-12-19

## 2022-11-22 RX ORDER — LIDOCAINE HYDROCHLORIDE 10 MG/ML
INJECTION INFILTRATION; PERINEURAL
Status: DISCONTINUED | OUTPATIENT
Start: 2022-11-22 | End: 2022-11-22 | Stop reason: HOSPADM

## 2022-11-22 RX ORDER — FENTANYL CITRATE 50 UG/ML
INJECTION, SOLUTION INTRAMUSCULAR; INTRAVENOUS
Status: DISCONTINUED | OUTPATIENT
Start: 2022-11-22 | End: 2022-11-22 | Stop reason: HOSPADM

## 2022-11-22 RX ORDER — HEPARIN SOD,PORCINE/0.9 % NACL 1000/500ML
INTRAVENOUS SOLUTION INTRAVENOUS
Status: DISCONTINUED | OUTPATIENT
Start: 2022-11-22 | End: 2022-11-22 | Stop reason: HOSPADM

## 2022-11-22 RX ORDER — CLOPIDOGREL BISULFATE 75 MG/1
75 TABLET ORAL DAILY
Qty: 30 TABLET | Refills: 11 | Status: SHIPPED | OUTPATIENT
Start: 2022-11-23 | End: 2023-09-05 | Stop reason: ALTCHOICE

## 2022-11-22 RX ORDER — HEPARIN SODIUM 1000 [USP'U]/ML
INJECTION, SOLUTION INTRAVENOUS; SUBCUTANEOUS
Status: DISCONTINUED | OUTPATIENT
Start: 2022-11-22 | End: 2022-11-22 | Stop reason: HOSPADM

## 2022-11-22 RX ADMIN — NITROGLYCERIN 0.5 INCH: 20 OINTMENT TOPICAL at 12:11

## 2022-11-22 RX ADMIN — HYDROCODONE BITARTRATE AND ACETAMINOPHEN 1 TABLET: 5; 325 TABLET ORAL at 03:11

## 2022-11-22 RX ADMIN — ACETAMINOPHEN 650 MG: 325 TABLET ORAL at 05:11

## 2022-11-22 RX ADMIN — NITROGLYCERIN 0.5 INCH: 20 OINTMENT TOPICAL at 06:11

## 2022-11-22 RX ADMIN — DIPHENHYDRAMINE HYDROCHLORIDE 50 MG: 50 CAPSULE ORAL at 02:11

## 2022-11-22 RX ADMIN — HYDROCORTISONE SODIUM SUCCINATE 100 MG: 100 INJECTION, POWDER, FOR SOLUTION INTRAMUSCULAR; INTRAVENOUS at 02:11

## 2022-11-22 NOTE — SUBJECTIVE & OBJECTIVE
Medications:  Continuous Infusions:  Scheduled Meds:   aspirin  81 mg Oral Daily    aspirin  81 mg Oral Once    atorvastatin  20 mg Oral QHS    carvediloL  12.5 mg Oral BID    clopidogreL  75 mg Oral Daily    nitroGLYCERIN 2% TD oint  0.5 inch Topical (Top) Q6H    polyethylene glycol  17 g Oral Daily    tamsulosin  1 capsule Oral Daily     PRN Meds:acetaminophen, dextrose 10%, dextrose 10%, diphenhydrAMINE, glucagon (human recombinant), glucose, glucose, HYDROcodone-acetaminophen, HYDROcodone-acetaminophen, insulin aspart U-100, ondansetron, sodium chloride 0.9%     Objective:     Vital Signs (Most Recent):  Temp: 97.9 °F (36.6 °C) (11/22/22 0720)  Pulse: 67 (11/22/22 0720)  Resp: 16 (11/22/22 0720)  BP: (!) 140/65 (11/22/22 0720)  SpO2: 99 % (11/22/22 0720)   Vital Signs (24h Range):  Temp:  [97.7 °F (36.5 °C)-98.5 °F (36.9 °C)] 97.9 °F (36.6 °C)  Pulse:  [65-75] 67  Resp:  [16-19] 16  SpO2:  [97 %-99 %] 99 %  BP: (127-167)/(65-89) 140/65         Physical Exam  Vitals and nursing note reviewed.   Constitutional:       Appearance: Normal appearance.   HENT:      Head: Normocephalic and atraumatic.   Cardiovascular:      Rate and Rhythm: Normal rate and regular rhythm.      Comments: Weak, monophasic doppler signal to LUE radial, ulnar arteries  Weak, monophasic doppler signal palmar arch   Pulmonary:      Effort: Pulmonary effort is normal. No respiratory distress.   Abdominal:      General: Abdomen is flat. There is no distension.      Palpations: Abdomen is soft. There is no mass.      Tenderness: There is no abdominal tenderness.      Hernia: No hernia is present.   Musculoskeletal:      Right lower leg: No edema.      Left lower leg: No edema.   Skin:     Coloration: Skin is pale.      Comments: Left hand with slower cap refill  Slight fingertip discoloration   Neurological:      General: No focal deficit present.      Mental Status: He is alert and oriented to person, place, and time.      Sensory: No sensory  deficit.      Motor: No weakness.   Psychiatric:         Mood and Affect: Mood normal.         Behavior: Behavior normal.       Significant Labs:  CBC:   Recent Labs   Lab 11/22/22 0248   WBC 3.46*   RBC 4.16*   HGB 12.4*   HCT 38.3*      MCV 92   MCH 29.8   MCHC 32.4     CMP:   Recent Labs   Lab 11/22/22 0248   *   CALCIUM 9.9   ALBUMIN 4.1   PROT 8.3      K 4.1   CO2 25   CL 99   BUN 46*   CREATININE 8.8*   ALKPHOS 119   ALT 11   AST 17   BILITOT 0.3       Significant Diagnostics:  I have reviewed all pertinent imaging results/findings within the past 24 hours.

## 2022-11-22 NOTE — DISCHARGE SUMMARY
[facility] - Short Stay Cardiac Unit  Vascular Surgery  Discharge Summary      Patient Name: Elbert Still Jr.  MRN: [id number]  Admission Date: 11/21/2022  Hospital Length of Stay: 0 days  Discharge Date and Time:  11/22/2022 5:23 PM  Attending Physician: Prince Gardiner MD   Discharging Provider: Duane Gutierrez MD  Primary Care Provider: Angel Luis Boo MD    HPI:   67 year old male with a PMH of HTN, HM, ESRD on HD MWFr (currently dialyzing through R chest permacath)  who underwent s/p brachial-brachial AVF creation 09/06 with subsequent transposition 11/01/2022 with Dr. Gardiner. He presents today as directed by HD nurse due to ischemic signs to left hand. Per patient, he developed anterior forearm numbness following initial AVF creation, reportedly improving on office follow up. Following transposition he developed swelling of the entire forearm with worsening numbness to anterior forearm. He also developed intermittent numbness to his fingertips, worse with elevation of his hand and resolved with hanging his hand in dependent position. Fingertips with darkening discoloration and he has noticed his left hand is paler than his right.           Procedure(s) (LRB):  PTA, AV FISTULA (Left)     Hospital Course: Elbert Still Jr. presented to [facility] on the morning of 11/21/2022 for steal symptoms in his left hand. He underwent fistulogram with PTA of brachial artery stenosis on 11/22/22. The procedure was performed without complication and he was transferred to the [facility] for further post op care and monitoring. Their postoperative course was uneventful and progressed according to plan.  His pain was controlled with a combination of IV and PO narcotic pain medications. Patient now meets all criteria for discharge.        Goals of Care Treatment Preferences:  Code Status: Full Code      Consults:   Consults (From admission, onward)        Status Ordering Provider     Inpatient consult to Nephrology  Once        Provider:  (Not  yet assigned)    Completed LEN PANG     Inpatient consult to Vascular Surgery  Once        Provider:  (Not yet assigned)    Completed LEN PANG          Significant Diagnostic Studies: Labs: All labs within the past 24 hours have been reviewed    Pending Diagnostic Studies:     None        Final Active Diagnoses:    Diagnosis Date Noted POA    PRINCIPAL PROBLEM:  Steal syndrome dialysis vascular access, initial encounter [T82.898A] 11/21/2022 Yes    ESRD (end stage renal disease) [N18.6] 07/12/2022 Yes      Problems Resolved During this Admission:      Discharged Condition: good    Disposition: home    Follow Up:   Follow-up Information     Prince Gardiner MD Follow up in 3 week(s).    Specialty: Vascular Surgery  Contact information:  [facility] [facility] [address]  [phone]                       Patient Instructions:   Discharge Instructions and Care of Your Wrist After a Cardiac Catheterization Procedure Performed via the Radial Artery     For 24 Hours following the procedure:   Do not subject your affected hand/arm to any forceful movements (i.e. supporting weight when rising from a chair or bed)   Do not drive a car for 24 hours   The dressing (band-aid) on the puncture site may be removed after 24 hours and left open to air.  If there is minor oozing, you may apply another Band-aid and remove after 12 hours   You may shower on the day following your procedure.  Do not take a tub bath or submerge the puncture site in water for 3 days following the procedure     For 48 hours following the procedure:    Do not lift anything heavier than 3 to 5 pounds with the affected hand/arm   Do not operate a lawnmower, motorcycle, chainsaw, or all-terrain vehicle    Avoid excessive (extension/flexion) wrist movement (i.e. supporting weight when rising from a chair or bed, push-ups, lifting garage doors, etc.)   Do not engage in vigorous exercise (i.e. Tennis, Golf, Bowling) using the  affected arm     If bleeding should occur following discharge:   Sit down and apply firm pressure to the puncture site with your fingers for 10 minutes    If the bleeding stops, continue to sit quietly, keeping your wrist straight for 2 hours. Notify your physician as soon as possible    If bleeding does not stop after 10 minutes or if there is a large amount of bleeding or spurting, call 911 immediately.  Do not drive yourself to the hospital.      You should expect mild tingling in your hand and tenderness at the puncture site for up to 3 days.      Notify your physician if these symptoms persist or if you experience:   Change in color or temperature of the hand or arm   Redness, heat, or pus at the puncture site  Chills or fever greater than 101.0 F    Medications:  Reconciled Home Medications:      Medication List      START taking these medications    aspirin 81 MG EC tablet  Commonly known as: ECOTRIN  Take 1 tablet (81 mg total) by mouth once daily.  Start taking on: November 23, 2022     clopidogreL 75 mg tablet  Commonly known as: PLAVIX  Take 1 tablet (75 mg total) by mouth once daily.  Start taking on: November 23, 2022        CHANGE how you take these medications    fluticasone propionate 50 mcg/actuation nasal spray  Commonly known as: FLONASE  2 sprays (100 mcg total) by Each Nostril route once daily.  What changed: when to take this     tamsulosin 0.4 mg Cap  Commonly known as: FLOMAX  TAKE ONE CAPSULE BY MOUTH ONCE DAILY  What changed:   · how much to take  · when to take this        CONTINUE taking these medications    acetaminophen 500 MG tablet  Commonly known as: TYLENOL  Take 2 tablets (1,000 mg total) by mouth every 6 (six) hours as needed for Pain.     atorvastatin 20 MG tablet  Commonly known as: LIPITOR  TAKE 1 TABLET BY MOUTH EVERY EVENING     blood sugar diagnostic Strp  Use to check blood glucose once a day.     carvediloL 12.5 MG tablet  Commonly known as: COREG  Take 1 tablet (12.5  mg total) by mouth 2 (two) times daily.     cinacalcet 60 MG Tab  Commonly known as: SENSIPAR  Take 60 mg by mouth daily with breakfast.     colchicine 0.6 mg tablet  Commonly known as: COLCRYS  Take 0.6 mg by mouth as needed. Take half tab (0.3 mg) by mouth daily as needed for gout flare.     furosemide 80 MG tablet  Commonly known as: LASIX  Take 1 tablet (80 mg total) by mouth 2 (two) times daily.     HYDROcodone-acetaminophen 5-325 mg per tablet  Commonly known as: NORCO  Take 1 tablet by mouth every 6 (six) hours as needed for Pain.     NIFEdipine 60 MG (OSM) 24 hr tablet  Commonly known as: PROCARDIA-XL  Take 1 tablet (60 mg total) by mouth 2 (two) times a day.     ONETOUCH DELICA PLUS LANCET 30 gauge Misc  Generic drug: lancets  Use to check blood glucose twice a day.     ONETOUCH VERIO FLEX METER Misc  Generic drug: blood-glucose meter  Use to check blood glucose twice a day.     sevelamer  MG Tab  Commonly known as: RenageL  Take 2 tablets (1,600 mg total) by mouth 3 (three) times daily with meals.            Duane Gutierrez MD  Vascular Surgery  [facility] - Short Stay Cardiac Unit

## 2022-11-22 NOTE — HPI
67 year old male with a PMH of HTN, HM, ESRD on HD MWF (currently dialyzing through R [facility])  who underwent s/p brachial-brachial AVF creation 09/06 with subsequent transposition 11/01/2022 with Dr. Gardiner. He presents today as directed by HD nurse due to ischemic signs to left hand. Per patient, he developed anterior forearm numbness following initial AVF creation, reportedly improving on office follow up. Following transposition he developed swelling of the entire forearm with worsening numbness to anterior forearm. He also developed intermittent numbness to his fingertips, worse with elevation of his hand and resolved with hanging his hand in dependent position. Last HD 11/21. Electrolytes stable. Nephrology consulted for ESRD on HD.

## 2022-11-22 NOTE — CONSULTS
[facility] Emergency Dept  Nephrology  Consult Note    Patient Name: Elbert Still Jr.  MRN: [id number]  Admission Date: 2022  Hospital Length of Stay: 0 days  Attending Provider: Prince Gardiner MD   Primary Care Physician: Angel Luis Boo MD  Principal Problem:Steal syndrome dialysis vascular access, initial encounter    Inpatient consult to Nephrology  Consult performed by: Candice Juarez DNP  Consult ordered by: Estelle Valentin MD  Reason for consult: ESRD on HD        Subjective:     HPI: 67 year old male with a PMH of HTN, HM, ESRD on HD MWF (currently dialyzing through R chest permacath)  who underwent s/p brachial-brachial AVF creation  with subsequent transposition 2022 with Dr. Gardiner. He presents today as directed by HD nurse due to ischemic signs to left hand. Per patient, he developed anterior forearm numbness following initial AVF creation, reportedly improving on office follow up. Following transposition he developed swelling of the entire forearm with worsening numbness to anterior forearm. He also developed intermittent numbness to his fingertips, worse with elevation of his hand and resolved with hanging his hand in dependent position. Last HD . Electrolytes stable. Nephrology consulted for ESRD on HD.           Past Medical History:   Diagnosis Date    Anemia     Diabetes mellitus     Diabetes mellitus, type 2     Disorder of kidney and ureter     Essential (primary) hypertension 2017    Gout, unspecified      jaundice, unspecified     Nuclear sclerosis of both eyes 2022       Past Surgical History:   Procedure Laterality Date    ADENOIDECTOMY      ADENOIDECTOMY      AV FISTULA PLACEMENT Left 2022    Procedure: CREATION, AV FISTULA;  Surgeon: Prince Gardiner MD;  Location: [facility] OR [address] [place] [address];  Service: Peripheral Vascular;  Laterality: Left;    nasal septum repair      NASAL SEPTUM SURGERY      ROTATOR CUFF REPAIR Left     SALIVARY GLAND  SURGERY      Tonsillectomy      TONSILLECTOMY      TRANSPOSITION OF BASILIC VEIN Left 11/1/2022    Procedure: TRANSPOSITION, VEIN, BASILIC;  Surgeon: Prince Gardiner MD;  Location: [facility] OR [address] [place] [address];  Service: Peripheral Vascular;  Laterality: Left;  Left Brachial vein.       Review of patient's allergies indicates:   Allergen Reactions    Iodine and iodide containing products Hives     Hypotension, Flushing     Current Facility-Administered Medications   Medication Frequency    acetaminophen tablet 650 mg Q4H PRN    aspirin EC tablet 81 mg Daily    aspirin EC tablet 81 mg Once    atorvastatin tablet 20 mg QHS    carvediloL tablet 12.5 mg BID    clopidogreL tablet 75 mg Daily    dextrose 10% bolus 125 mL PRN    dextrose 10% bolus 250 mL PRN    diphenhydrAMINE capsule 25 mg On Call Procedure    glucagon (human recombinant) injection 1 mg PRN    glucose chewable tablet 16 g PRN    glucose chewable tablet 24 g PRN    HYDROcodone-acetaminophen  mg per tablet 1 tablet Q4H PRN    HYDROcodone-acetaminophen 5-325 mg per tablet 1 tablet Q4H PRN    insulin aspart U-100 pen 1-10 Units QID (AC + HS) PRN    nitroGLYCERIN 2% TD oint ointment 0.5 inch Q6H    ondansetron disintegrating tablet 8 mg Q8H PRN    polyethylene glycol packet 17 g Daily    sodium chloride 0.9% flush 10 mL PRN    tamsulosin 24 hr capsule 0.4 mg Daily     Current Outpatient Medications   Medication    carvediloL (COREG) 12.5 MG tablet    cinacalcet (SENSIPAR) 60 MG Tab    furosemide (LASIX) 80 MG tablet    HYDROcodone-acetaminophen (NORCO) 5-325 mg per tablet    NIFEdipine (PROCARDIA-XL) 60 MG (OSM) 24 hr tablet    sevelamer HCL (RENAGEL) 800 MG Tab    tamsulosin (FLOMAX) 0.4 mg Cap    acetaminophen (TYLENOL) 500 MG tablet    atorvastatin (LIPITOR) 20 MG tablet    blood sugar diagnostic Strp    blood-glucose meter Misc    colchicine (COLCRYS) 0.6 mg tablet    fluticasone propionate (FLONASE) 50 mcg/actuation nasal  spray    lancets 30 gauge Misc     Family History       Problem Relation (Age of Onset)    Cancer Sister    Heart disease Mother    Hypertension Mother, Sister    Kidney disease Mother    No Known Problems Father    Seizures Sister    Stroke Sister          Tobacco Use    Smoking status: Never    Smokeless tobacco: Never    Tobacco comments:     .  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     Substance and Sexual Activity    Alcohol use: No    Drug use: No    Sexual activity: Yes     Partners: Female     Review of Systems   Constitutional: Negative.    HENT: Negative.     Cardiovascular: Negative.    Gastrointestinal: Negative.    Genitourinary:  Positive for decreased urine volume.   Skin:  Positive for color change and wound.   Neurological:  Positive for numbness.   Hematological: Negative.    Psychiatric/Behavioral: Negative.     Objective:     Vital Signs (Most Recent):  Temp: 97.9 °F (36.6 °C) (11/22/22 0720)  Pulse: 67 (11/22/22 0720)  Resp: 16 (11/22/22 0720)  BP: (!) 140/65 (11/22/22 0720)  SpO2: 99 % (11/22/22 0720)  O2 Device (Oxygen Therapy): room air (11/22/22 0621)   Vital Signs (24h Range):  Temp:  [97.7 °F (36.5 °C)-98.5 °F (36.9 °C)] 97.9 °F (36.6 °C)  Pulse:  [65-75] 67  Resp:  [16-19] 16  SpO2:  [97 %-99 %] 99 %  BP: (127-167)/(65-89) 140/65     Weight: 97.5 kg (215 lb) (11/21/22 1109)  Body mass index is 27.6 kg/m².  Body surface area is 2.26 meters squared.    No intake/output data recorded.    Physical Exam  Vitals and nursing note reviewed.   Constitutional:       Appearance: Normal appearance.   HENT:      Head: Normocephalic and atraumatic.   Eyes:      Conjunctiva/sclera: Conjunctivae normal.   Cardiovascular:      Rate and Rhythm: Normal rate and regular rhythm.      Arteriovenous access: Left arteriovenous access is present.     Comments: +thrill  Pulmonary:      Effort: Pulmonary effort is normal. No respiratory distress.   Abdominal:      General: Abdomen is flat. There is no  distension.      Palpations: Abdomen is soft. There is no mass.      Tenderness: There is no abdominal tenderness.      Hernia: No hernia is present.   Musculoskeletal:      Right lower leg: No edema.      Left lower leg: No edema.   Skin:     Coloration: Skin is pale.      Comments: Left hand with slower cap refill  Discoloration noted   Neurological:      General: No focal deficit present.      Mental Status: He is alert and oriented to person, place, and time.      Sensory: No sensory deficit.      Motor: No weakness.   Psychiatric:         Mood and Affect: Mood normal.         Behavior: Behavior normal.     Significant Labs:  CBC:   Recent Labs   Lab 11/22/22  0248   WBC 3.46*   RBC 4.16*   HGB 12.4*   HCT 38.3*      MCV 92   MCH 29.8   MCHC 32.4     CMP:   Recent Labs   Lab 11/22/22 0248   *   CALCIUM 9.9   ALBUMIN 4.1   PROT 8.3      K 4.1   CO2 25   CL 99   BUN 46*   CREATININE 8.8*   ALKPHOS 119   ALT 11   AST 17   BILITOT 0.3     All labs within the past 24 hours have been reviewed.        Assessment/Plan:     * Steal syndrome dialysis vascular access, initial encounter  -management per primary team     ESRD (end stage renal disease)  Nephrology History  iHD Schedule: MWF   Unit/MD: [facility]  Duration: 3.5 hours   UF: 1-2  EDW: 97.5  Access: R.TDC and L AVF   Residual Renal Function: minimal   Last HD: 11/21/22,   Initiated HD May 2022    Assessment:     -plan for LUE angiogram with vascular today   - Will provide dialysis for metabolic clearance and volume management tomorrow 11/23 per vascular.   - Dialysate adjusted to current labs   - Will obtain OP dialysis records  - Continue to monitor intake and output, daily weights   - Avoid nephrotoxic medication and renal dose medications to GFR  -HGB goal 10-12  -continue home phos binders        Thank you for your consult. I will follow-up with patient. Please contact us if you have any additional questions.    Candice Juarez  DWAIN  Nephrology  [facility] - Emergency Dept

## 2022-11-22 NOTE — SUBJECTIVE & OBJECTIVE
Past Medical History:   Diagnosis Date    Anemia     Diabetes mellitus     Diabetes mellitus, type 2     Disorder of kidney and ureter     Essential (primary) hypertension 2017    Gout, unspecified      jaundice, unspecified     Nuclear sclerosis of both eyes 2022       Past Surgical History:   Procedure Laterality Date    ADENOIDECTOMY      ADENOIDECTOMY      AV FISTULA PLACEMENT Left 2022    Procedure: CREATION, AV FISTULA;  Surgeon: Prince Gardiner MD;  Location: [facility] OR [address] [place] [address];  Service: Peripheral Vascular;  Laterality: Left;    nasal septum repair      NASAL SEPTUM SURGERY      ROTATOR CUFF REPAIR Left     SALIVARY GLAND SURGERY      Tonsillectomy      TONSILLECTOMY      TRANSPOSITION OF BASILIC VEIN Left 2022    Procedure: TRANSPOSITION, VEIN, BASILIC;  Surgeon: Prince Gardiner MD;  Location: [facility] OR [address] [place] [address];  Service: Peripheral Vascular;  Laterality: Left;  Left Brachial vein.       Review of patient's allergies indicates:   Allergen Reactions    Iodine and iodide containing products Hives     Hypotension, Flushing     Current Facility-Administered Medications   Medication Frequency    acetaminophen tablet 650 mg Q4H PRN    aspirin EC tablet 81 mg Daily    aspirin EC tablet 81 mg Once    atorvastatin tablet 20 mg QHS    carvediloL tablet 12.5 mg BID    clopidogreL tablet 75 mg Daily    dextrose 10% bolus 125 mL PRN    dextrose 10% bolus 250 mL PRN    diphenhydrAMINE capsule 25 mg On Call Procedure    glucagon (human recombinant) injection 1 mg PRN    glucose chewable tablet 16 g PRN    glucose chewable tablet 24 g PRN    HYDROcodone-acetaminophen  mg per tablet 1 tablet Q4H PRN    HYDROcodone-acetaminophen 5-325 mg per tablet 1 tablet Q4H PRN    insulin aspart U-100 pen 1-10 Units QID (AC + HS) PRN    nitroGLYCERIN 2% TD oint ointment 0.5 inch Q6H    ondansetron disintegrating tablet 8 mg Q8H PRN    polyethylene glycol packet 17 g Daily    sodium chloride 0.9% flush  10 mL PRN    tamsulosin 24 hr capsule 0.4 mg Daily     Current Outpatient Medications   Medication    carvediloL (COREG) 12.5 MG tablet    cinacalcet (SENSIPAR) 60 MG Tab    furosemide (LASIX) 80 MG tablet    HYDROcodone-acetaminophen (NORCO) 5-325 mg per tablet    NIFEdipine (PROCARDIA-XL) 60 MG (OSM) 24 hr tablet    sevelamer HCL (RENAGEL) 800 MG Tab    tamsulosin (FLOMAX) 0.4 mg Cap    acetaminophen (TYLENOL) 500 MG tablet    atorvastatin (LIPITOR) 20 MG tablet    blood sugar diagnostic Strp    blood-glucose meter Misc    colchicine (COLCRYS) 0.6 mg tablet    fluticasone propionate (FLONASE) 50 mcg/actuation nasal spray    lancets 30 gauge Misc     Family History       Problem Relation (Age of Onset)    Cancer Sister    Heart disease Mother    Hypertension Mother, Sister    Kidney disease Mother    No Known Problems Father    Seizures Sister    Stroke Sister          Tobacco Use    Smoking status: Never    Smokeless tobacco: Never    Tobacco comments:     .  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     Substance and Sexual Activity    Alcohol use: No    Drug use: No    Sexual activity: Yes     Partners: Female     Review of Systems   Constitutional: Negative.    HENT: Negative.     Cardiovascular: Negative.    Gastrointestinal: Negative.    Genitourinary:  Positive for decreased urine volume.   Skin:  Positive for color change and wound.   Neurological:  Positive for numbness.   Hematological: Negative.    Psychiatric/Behavioral: Negative.     Objective:     Vital Signs (Most Recent):  Temp: 97.9 °F (36.6 °C) (11/22/22 0720)  Pulse: 67 (11/22/22 0720)  Resp: 16 (11/22/22 0720)  BP: (!) 140/65 (11/22/22 0720)  SpO2: 99 % (11/22/22 0720)  O2 Device (Oxygen Therapy): room air (11/22/22 0621)   Vital Signs (24h Range):  Temp:  [97.7 °F (36.5 °C)-98.5 °F (36.9 °C)] 97.9 °F (36.6 °C)  Pulse:  [65-75] 67  Resp:  [16-19] 16  SpO2:  [97 %-99 %] 99 %  BP: (127-167)/(65-89) 140/65     Weight: 97.5 kg (215 lb) (11/21/22  1109)  Body mass index is 27.6 kg/m².  Body surface area is 2.26 meters squared.    No intake/output data recorded.    Physical Exam  Vitals and nursing note reviewed.   Constitutional:       Appearance: Normal appearance.   HENT:      Head: Normocephalic and atraumatic.   Eyes:      Conjunctiva/sclera: Conjunctivae normal.   Cardiovascular:      Rate and Rhythm: Normal rate and regular rhythm.      Arteriovenous access: Left arteriovenous access is present.     Comments: +thrill  Pulmonary:      Effort: Pulmonary effort is normal. No respiratory distress.   Abdominal:      General: Abdomen is flat. There is no distension.      Palpations: Abdomen is soft. There is no mass.      Tenderness: There is no abdominal tenderness.      Hernia: No hernia is present.   Musculoskeletal:      Right lower leg: No edema.      Left lower leg: No edema.   Skin:     Coloration: Skin is pale.      Comments: Left hand with slower cap refill  Discoloration noted   Neurological:      General: No focal deficit present.      Mental Status: He is alert and oriented to person, place, and time.      Sensory: No sensory deficit.      Motor: No weakness.   Psychiatric:         Mood and Affect: Mood normal.         Behavior: Behavior normal.     Significant Labs:  CBC:   Recent Labs   Lab 11/22/22 0248   WBC 3.46*   RBC 4.16*   HGB 12.4*   HCT 38.3*      MCV 92   MCH 29.8   MCHC 32.4     CMP:   Recent Labs   Lab 11/22/22 0248   *   CALCIUM 9.9   ALBUMIN 4.1   PROT 8.3      K 4.1   CO2 25   CL 99   BUN 46*   CREATININE 8.8*   ALKPHOS 119   ALT 11   AST 17   BILITOT 0.3     All labs within the past 24 hours have been reviewed.

## 2022-11-22 NOTE — BRIEF OP NOTE
Brief Operative Note  Date: 11/22/2022    Surgeon:  Prince Gardiner MD [facility]    Assitant: ADDIS Gutierrez MD    Pre-op Diagnosis:  L hand ischemia with distal  L brachial artery stenosis by u/s    Post-op Diagnosis:  Same    Procedure(s):  1) U/S-guided L transradial access; L brachial angiogram; arch angiogram  2) PTA L distal brachial artery x3 lesions (most distal, 3rd was more severe) with a 5x40 and 6x60mm Nirmal balloons  3) Conscious sedation    Anesthesia: Local MAC    Findings/Key Components:  Successful treatment of proximal stenosis  Good palpable thrill    EBL: < 10 ml    Anesthesia: IV regional    Findings: Resolution of stenoses; strong palpable thrill  PTA L distal brachial artery x3 lesions (most distal, 3rd was more severe) with a 5x40 and 6x60mm Nirmal balloons - no remaining lesions  Arch angio shows no inflow lesion in L subclavian or axillary arteries           Complications:  None; patient tolerated the procedure well.           Disposition: Recoery- hemodynamically stable in good condition    Attending Attestation: I was present and scrubbed for the entire procedure.      Specimens (From admission, onward)      None          I attest to being present for the procedure and performing the case.  Prince Gardiner MD [facility] FACS  Discharge Note  SUMMARY    Admit Date: 11/21/2022    Attending Physician: Prince Gardiner MD [facility]    Discharge Physician: Prince Gardiner MD [facility]    Discharge Date: 11/22/2022    Final Diagnosis: Steal syndrome dialysis vascular access, initial encounter [T82.898A]    Outcome of Treatment: Successful PTA    Disposition: Home or self-care    Patient Instructions:   Current Discharge Medication List        CONTINUE these medications which have NOT CHANGED    Details   carvediloL (COREG) 12.5 MG tablet Take 1 tablet (12.5 mg total) by mouth 2 (two) times daily.  Qty: 60 tablet, Refills: 11    Comments: .      cinacalcet (SENSIPAR) 60 MG Tab Take 60 mg by mouth daily with  breakfast.      furosemide (LASIX) 80 MG tablet Take 1 tablet (80 mg total) by mouth 2 (two) times daily.  Qty: 60 tablet, Refills: 11      HYDROcodone-acetaminophen (NORCO) 5-325 mg per tablet Take 1 tablet by mouth every 6 (six) hours as needed for Pain.  Qty: 24 tablet, Refills: 0    Comments: Quantity prescribed more than 7 day supply? No      NIFEdipine (PROCARDIA-XL) 60 MG (OSM) 24 hr tablet Take 1 tablet (60 mg total) by mouth 2 (two) times a day.  Qty: 60 tablet, Refills: 11    Comments: .      sevelamer HCL (RENAGEL) 800 MG Tab Take 2 tablets (1,600 mg total) by mouth 3 (three) times daily with meals.  Qty: 180 tablet, Refills: 11    Associated Diagnoses: Hyperphosphatemia      tamsulosin (FLOMAX) 0.4 mg Cap TAKE ONE CAPSULE BY MOUTH ONCE DAILY  Qty: 90 capsule, Refills: 3      acetaminophen (TYLENOL) 500 MG tablet Take 2 tablets (1,000 mg total) by mouth every 6 (six) hours as needed for Pain.  Qty: 30 tablet, Refills: 0      atorvastatin (LIPITOR) 20 MG tablet TAKE 1 TABLET BY MOUTH EVERY EVENING  Qty: 90 tablet, Refills: 3    Associated Diagnoses: Mixed hyperlipidemia; Diabetes mellitus due to underlying condition with diabetic nephropathy, without long-term current use of insulin      blood sugar diagnostic Strp Use to check blood glucose once a day.  Qty: 100 each, Refills: 3    Associated Diagnoses: Diabetes mellitus due to underlying condition with diabetic nephropathy, without long-term current use of insulin      blood-glucose meter Misc Use to check blood glucose twice a day.  Qty: 1 each, Refills: 0      colchicine (COLCRYS) 0.6 mg tablet Take 0.6 mg by mouth as needed. Take half tab (0.3 mg) by mouth daily as needed for gout flare.  Qty: 90 tablet, Refills: 3    Associated Diagnoses: Gout, unspecified cause, unspecified chronicity, unspecified site      fluticasone propionate (FLONASE) 50 mcg/actuation nasal spray 2 sprays (100 mcg total) by Each Nostril route once daily.  Qty: 16 g, Refills: 11       lancets 30 gauge Misc Use to check blood glucose twice a day.  Qty: 100 each, Refills: 5             Diet:  Resume pre-operative diet    Activity:  Ad layla    Follow-up:  Follow-up in clinic with Dr Gardiner within 3-4 weeks; please call clinic nurse at

## 2022-11-22 NOTE — ASSESSMENT & PLAN NOTE
Nephrology History  iHD Schedule: MWF   Unit/MD: Mini  Duration: 3.5 hours   UF: 1-2  EDW: 97.5  Access: R.TDC and L AVF   Residual Renal Function: minimal   Last HD: 11/21/22,   Initiated HD May 2022    Assessment:     -plan for LUE angiogram with vascular today   - Will provide dialysis for metabolic clearance and volume management tomorrow 11/23 per vascular.   - Dialysate adjusted to current labs   - Will obtain OP dialysis records  - Continue to monitor intake and output, daily weights   - Avoid nephrotoxic medication and renal dose medications to GFR  -HGB goal 10-12  -continue home phos binders

## 2022-11-22 NOTE — ED NOTES
"ROULA Stelring contacted MD Bynum during shift change, this RN added to secure chat, regarding pt concerns for ordered nitro, ASA. Pt wanting MD clarification on order prior to taking medication. States "I saw my cardiologist last week and I don't take that at home". No answer from provider at this time.   "
Admitting MD Bynum no longer on call, internal medicine intern to come speak with patient regarding POC.   
BS 97  
IM intern to see pt, pt agreeable to IV placement at this time.  
Internal Med Intern, Khadijah KLINE, notified pt requesting at home binding medication. States medication is typically taken HS. MD aware.  
Left upper arm with incision, staples intact, swelling and warmth noted to LFA below elbow, fingertips discolored, purple, slightly cool to touch, able to faintly doppler radial pulse, ( marked) intermittent, reports decreased sensation to fingertips  
MD Marie w/ vascular surgery at pt recliner side. Pt updated on POC and consented for surgery at this time. Care ongoing.   
No order for PICC line at this time, med team contacted per charge ROULA Iqzuierdo, no answer.   
OK to move to CCR  
Patient had second surgery on LFA dialysis access with pain and swelling at site. No Current antibiotics, denies fevers  
Patient identifiers verified and correct for Mr Still  C/C: Left arm pain  SEE NN  APPEARANCE: awake and alert in NAD. PAIN  2/10  SKIN: warm, dry swelling , warmth to Left lower arm, staples intact to right upper arm, fingers discolored, pain worse with stretching fingers, access min swelling   MUSCULOSKELETAL: Patient moving all extremities spontaneously, no obvious swelling or deformities noted. Ambulates independently.  RESPIRATORY: Denies shortness of breath.Respirations unlabored. Dialysis access right chest DL Denies fevers  CARDIAC: Denies CP, 2+ distal pulses; no peripheral edema  ABDOMEN: S/ND/NT, Denies nausea  : voids spontaneously, Dialysis M/W/F, states still voids  Neurologic: AAO x 4; follows commands equal strength in all extremities; denies numbness/tingling. Denies dizziness Deneis new weakness   
Patient refuses iv/blood draw attempt.  
Pt and family member updated on POC. Verbalized understanding that pt will not be assigned a bed without IV access. Informed that med team has been contacted to come to ED and formulate new plan of care regarding current hospital course.   
Pt reports marked improvement since clopidogrel administration. Reports sensation has improved, color, warmth returned.    
Update to CCR staff  
Vascular team to see pt, pt not consented for procedure at this time.  
No

## 2022-11-22 NOTE — HOSPITAL COURSE
Elbert THAKKAR Tessy Pickens presented to [facility] on the morning of 11/21/2022 for steal symptoms in his left hand. He underwent fistulogram with PTA of brachial artery stenosis on 11/22/22. The procedure was performed without complication and he was transferred to the [facility] for further post op care and monitoring. Their postoperative course was uneventful and progressed according to plan.  His pain was controlled with a combination of IV and PO narcotic pain medications. Patient now meets all criteria for discharge.

## 2022-11-22 NOTE — ASSESSMENT & PLAN NOTE
66 yo M w/ above history s/p brachial-brachial AVF creation 09/06 with subsequent transposition 11/01/2022 with Dr. Gardiner presenting with significant steal symptoms to [facility].     - admit to vascular surgery  - Plan for cath lab fistulogram today  - consented and marked  - Keep NPO  - Nephrology consult given ESRD on HD; last HD [place] 11/21  - Daily labs

## 2022-11-22 NOTE — PROGRESS NOTES
Report received from ROULA Love. Patient s/p LUE fistulagram. TR band to L wrist cdi, no bleeding or hematoma noted. Vss. No complaints from patient. Call light in reach.

## 2022-11-22 NOTE — PROGRESS NOTES
[facility] Emergency Dept  Vascular Surgery  Progress Note    Patient Name: Elbert Still Jr.  MRN: [id number]  Admission Date: 11/21/2022  Primary Care Provider: Angel Luis Boo MD    Subjective:     Interval History: NAEO. VSS. Patient continues to have left hand pain and numbness. Cath lab today for LUE angiogram, risks and benefits reviewed with patient and wife. Patient is agreeable with plan.     Post-Op Info:  Procedure(s) (LRB):  PTA, AV FISTULA (Left)           Medications:  Continuous Infusions:  Scheduled Meds:   aspirin  81 mg Oral Daily    aspirin  81 mg Oral Once    atorvastatin  20 mg Oral QHS    carvediloL  12.5 mg Oral BID    clopidogreL  75 mg Oral Daily    nitroGLYCERIN 2% TD oint  0.5 inch Topical (Top) Q6H    polyethylene glycol  17 g Oral Daily    tamsulosin  1 capsule Oral Daily     PRN Meds:acetaminophen, dextrose 10%, dextrose 10%, diphenhydrAMINE, glucagon (human recombinant), glucose, glucose, HYDROcodone-acetaminophen, HYDROcodone-acetaminophen, insulin aspart U-100, ondansetron, sodium chloride 0.9%     Objective:     Vital Signs (Most Recent):  Temp: 97.9 °F (36.6 °C) (11/22/22 0720)  Pulse: 67 (11/22/22 0720)  Resp: 16 (11/22/22 0720)  BP: (!) 140/65 (11/22/22 0720)  SpO2: 99 % (11/22/22 0720)   Vital Signs (24h Range):  Temp:  [97.7 °F (36.5 °C)-98.5 °F (36.9 °C)] 97.9 °F (36.6 °C)  Pulse:  [65-75] 67  Resp:  [16-19] 16  SpO2:  [97 %-99 %] 99 %  BP: (127-167)/(65-89) 140/65         Physical Exam  Vitals and nursing note reviewed.   Constitutional:       Appearance: Normal appearance.   HENT:      Head: Normocephalic and atraumatic.   Cardiovascular:      Rate and Rhythm: Normal rate and regular rhythm.      Comments: Weak, monophasic doppler signal to LUE radial, ulnar arteries  Weak, monophasic doppler signal palmar arch  Increased radial signal with graft occlusion   Pulmonary:      Effort: Pulmonary effort is normal. No respiratory distress.   Abdominal:      General: Abdomen  is flat. There is no distension.      Palpations: Abdomen is soft. There is no mass.      Tenderness: There is no abdominal tenderness.      Hernia: No hernia is present.   Musculoskeletal:      Right lower leg: No edema.      Left lower leg: No edema.   Skin:     Coloration: Skin is pale.      Comments: Left hand with slower cap refill  Slight fingertip discoloration   Neurological:      General: No focal deficit present.      Mental Status: He is alert and oriented to person, place, and time.      Sensory: No sensory deficit.      Motor: No weakness.   Psychiatric:         Mood and Affect: Mood normal.         Behavior: Behavior normal.       Significant Labs:  CBC:   Recent Labs   Lab 11/22/22 0248   WBC 3.46*   RBC 4.16*   HGB 12.4*   HCT 38.3*      MCV 92   MCH 29.8   MCHC 32.4     CMP:   Recent Labs   Lab 11/22/22 0248   *   CALCIUM 9.9   ALBUMIN 4.1   PROT 8.3      K 4.1   CO2 25   CL 99   BUN 46*   CREATININE 8.8*   ALKPHOS 119   ALT 11   AST 17   BILITOT 0.3       Significant Diagnostics:  I have reviewed all pertinent imaging results/findings within the past 24 hours.    Assessment/Plan:     * Steal syndrome dialysis vascular access, initial encounter  68 yo M w/ above history s/p brachial-brachial AVF creation 09/06 with subsequent transposition 11/01/2022 with Dr. Gardiner presenting with significant steal symptoms to [facility].     - admit to vascular surgery  - Plan for cath lab fistulogram today  - consented and marked  - Keep NPO  - Nephrology consult given ESRD on HD; last HD [facility] 11/21  - Daily labs                Zaida Salazar MD  Vascular Surgery  [facility] - Emergency Dept

## 2022-11-22 NOTE — PROGRESS NOTES
Patient discharged per MD orders. Instructions given on medications, wound care, activity, signs of infection, when to call MD, and follow up appointments. Pt verbalized understanding.  Patient AAOx3, VSS, no c/o pain or discomfort at this time. PIV removed. Patient left unit via wheelchair with transport and family.

## 2022-11-22 NOTE — DISCHARGE INSTRUCTIONS
Discharge Instructions and Care of Your Wrist After a Cardiac Catheterization Procedure Performed via the Radial Artery    For 24 Hours following the procedure:  Do not subject your affected hand/arm to any forceful movements (i.e. supporting weight when rising from a chair or bed)  Do not drive a car for 24 hours  The dressing (band-aid) on the puncture site may be removed after 24 hours and left open to air.  If there is minor oozing, you may apply another Band-aid and remove after 12 hours  You may shower on the day following your procedure.  Do not take a tub bath or submerge the puncture site in water for 3 days following the procedure    For 48 hours following the procedure:   Do not lift anything heavier than 3 to 5 pounds with the affected hand/arm  Do not operate a lawnmower, motorcycle, chainsaw, or all-terrain vehicle   Avoid excessive (extension/flexion) wrist movement (i.e. supporting weight when rising from a chair or bed, push-ups, lifting garage doors, etc.)  Do not engage in vigorous exercise (i.e. Tennis, Golf, Bowling) using the affected arm    If bleeding should occur following discharge:  Sit down and apply firm pressure to the puncture site with your fingers for 10 minutes   If the bleeding stops, continue to sit quietly, keeping your wrist straight for 2 hours. Notify your physician as soon as possible   If bleeding does not stop after 10 minutes or if there is a large amount of bleeding or spurting, call 911 immediately.  Do not drive yourself to the hospital.     You should expect mild tingling in your hand and tenderness at the puncture site for up to 3 days.     Notify your physician if these symptoms persist or if you experience:  Change in color or temperature of the hand or arm  Redness, heat, or pus at the puncture site  Chills or fever greater than 101.0 F

## 2022-11-22 NOTE — OP NOTE
Date: 11/22/2022    Surgeon:  Prince Gardiner MD DFS FACS    Assitant: ADDIS Gutierrez MD    Pre-op Diagnosis:  L hand ischemia with distal  L brachial artery stenosis by u/s    Post-op Diagnosis:  Same    Procedure(s):  1) U/S-guided L transradial access; L brachial angiogram; arch angiogram  2) PTA L distal brachial artery x3 lesions (most distal, 3rd was more severe) with a 5x40 and 6x60mm Nirmal balloons  3) Conscious sedation    Anesthesia: Local MAC    Findings/Key Components:  Successful treatment of proximal stenosis  Good palpable thrill    EBL: < 10 ml    Anesthesia: IV regional    Findings: Resolution of stenoses; strong palpable thrill  PTA L distal brachial artery x3 lesions (most distal, 3rd was more severe) with a 5x40 and 6x60mm Nirmal balloons - no remaining lesions  Arch angio shows no inflow lesion in L subclavian or axillary arteries           Complications:  None; patient tolerated the procedure well.           Disposition: Recoery- hemodynamically stable in good condition    Attending Attestation: I was present and scrubbed for the entire procedure.    Procedure detail: The patient was brought to the interventional suite, placed in supine. Arm was prepped and draped in the standard surgical fashion. Under ultrasound guidance, the radial artery was accessed with a micropuncture needle; ultrasound confirmed vessel patency, followed by placement of 4/3-Cypriot micropuncture dilator. Through this, an 0.018-inch wire was placed in a 4/5-Cypriot transradial sheath. Through this, an 0.018-inch Glidewire was placed through the high-grade stenosis, which was demonstrated by the angiogram.  The ultrasound demonstrated vessel patency. A retrograde L brachial artery angio showed a moderate-grade stenoses in 3 areas in the distal L brachial artery, which was crossed with a hyrophilic 0.018 in glidewire. This was treated first with PTA L distal brachial artery x3 lesions (most distal, 3rd was more severe)  with a 5x40 and 6x60mm Nirmal balloons high-pressure, noncompliant balloon. Resolution of the stenosis was noted. Strong thrill could be felt.  An Arch angio with a 5fr VCF catheter shows no inflow lesion in L subclavian or axillary arteries  The sheath was removed and a trans-radial artery band placed with good hemostasis; hand was well-perfused and thrill palpable.  *MODERATE SEDATION IN CATH LAB   Prince Gardiner MD FACS was present for moderate sedation  Dr. Gardiner monitored the patients cardio respiratory functions during the moderate sedation   See nurses notes for Intra-service start and end times and for the log of the name of the RN who administered the medicines for the moderate sedation, including their doseage and route.    Time of sedation:  45mins.

## 2022-11-29 ENCOUNTER — HOSPITAL ENCOUNTER (OUTPATIENT)
Dept: VASCULAR SURGERY | Facility: CLINIC | Age: 67
Discharge: HOME | End: 2022-11-29
Payer: COMMERCIAL

## 2022-11-29 DIAGNOSIS — Z99.2 ESRD ON DIALYSIS: Primary | ICD-10-CM

## 2022-11-29 DIAGNOSIS — N18.6 ESRD ON DIALYSIS: Primary | ICD-10-CM

## 2022-11-29 DIAGNOSIS — Z95.828 STATUS POST PLACEMENT OF ARTERIOVENOUS GRAFT: ICD-10-CM

## 2022-11-29 PROCEDURE — 93990 DOPPLER FLOW TESTING: CPT | Mod: S$GLB,TXP,, | Performed by: SURGERY

## 2022-11-29 PROCEDURE — 93990 PR DUPLEX HEMODIALYSIS ACCESS: ICD-10-PCS | Mod: S$GLB,TXP,, | Performed by: SURGERY

## 2022-11-30 ENCOUNTER — OFFICE VISIT (OUTPATIENT)
Dept: VASCULAR SURGERY | Facility: CLINIC | Age: 67
End: 2022-11-30
Payer: COMMERCIAL

## 2022-11-30 VITALS
HEIGHT: 74 IN | WEIGHT: 211.63 LBS | SYSTOLIC BLOOD PRESSURE: 135 MMHG | DIASTOLIC BLOOD PRESSURE: 75 MMHG | HEART RATE: 72 BPM | TEMPERATURE: 98 F | BODY MASS INDEX: 27.16 KG/M2

## 2022-11-30 DIAGNOSIS — N18.6 ESRD ON DIALYSIS: Primary | ICD-10-CM

## 2022-11-30 DIAGNOSIS — Z99.2 ESRD ON DIALYSIS: Primary | ICD-10-CM

## 2022-11-30 PROCEDURE — 99214 PR OFFICE/OUTPT VISIT, EST, LEVL IV, 30-39 MIN: ICD-10-PCS | Mod: 25,NTX,S$GLB, | Performed by: SURGERY

## 2022-11-30 PROCEDURE — 3044F HG A1C LEVEL LT 7.0%: CPT | Mod: CPTII,NTX,S$GLB, | Performed by: SURGERY

## 2022-11-30 PROCEDURE — 99999 PR PBB SHADOW E&M-EST. PATIENT-LVL IV: ICD-10-PCS | Mod: PBBFAC,TXP,, | Performed by: SURGERY

## 2022-11-30 PROCEDURE — 3008F BODY MASS INDEX DOCD: CPT | Mod: CPTII,NTX,S$GLB, | Performed by: SURGERY

## 2022-11-30 PROCEDURE — 3075F PR MOST RECENT SYSTOLIC BLOOD PRESS GE 130-139MM HG: ICD-10-PCS | Mod: CPTII,NTX,S$GLB, | Performed by: SURGERY

## 2022-11-30 PROCEDURE — 3008F PR BODY MASS INDEX (BMI) DOCUMENTED: ICD-10-PCS | Mod: CPTII,NTX,S$GLB, | Performed by: SURGERY

## 2022-11-30 PROCEDURE — 1159F PR MEDICATION LIST DOCUMENTED IN MEDICAL RECORD: ICD-10-PCS | Mod: CPTII,NTX,S$GLB, | Performed by: SURGERY

## 2022-11-30 PROCEDURE — 3075F SYST BP GE 130 - 139MM HG: CPT | Mod: CPTII,NTX,S$GLB, | Performed by: SURGERY

## 2022-11-30 PROCEDURE — 3078F PR MOST RECENT DIASTOLIC BLOOD PRESSURE < 80 MM HG: ICD-10-PCS | Mod: CPTII,NTX,S$GLB, | Performed by: SURGERY

## 2022-11-30 PROCEDURE — 3288F PR FALLS RISK ASSESSMENT DOCUMENTED: ICD-10-PCS | Mod: CPTII,NTX,S$GLB, | Performed by: SURGERY

## 2022-11-30 PROCEDURE — 3066F PR DOCUMENTATION OF TREATMENT FOR NEPHROPATHY: ICD-10-PCS | Mod: CPTII,NTX,S$GLB, | Performed by: SURGERY

## 2022-11-30 PROCEDURE — 1101F PT FALLS ASSESS-DOCD LE1/YR: CPT | Mod: CPTII,NTX,S$GLB, | Performed by: SURGERY

## 2022-11-30 PROCEDURE — 3066F NEPHROPATHY DOC TX: CPT | Mod: CPTII,NTX,S$GLB, | Performed by: SURGERY

## 2022-11-30 PROCEDURE — 99214 OFFICE O/P EST MOD 30 MIN: CPT | Mod: 25,NTX,S$GLB, | Performed by: SURGERY

## 2022-11-30 PROCEDURE — 3044F PR MOST RECENT HEMOGLOBIN A1C LEVEL <7.0%: ICD-10-PCS | Mod: CPTII,NTX,S$GLB, | Performed by: SURGERY

## 2022-11-30 PROCEDURE — 1159F MED LIST DOCD IN RCRD: CPT | Mod: CPTII,NTX,S$GLB, | Performed by: SURGERY

## 2022-11-30 PROCEDURE — 99999 PR PBB SHADOW E&M-EST. PATIENT-LVL IV: CPT | Mod: PBBFAC,TXP,, | Performed by: SURGERY

## 2022-11-30 PROCEDURE — 1101F PR PT FALLS ASSESS DOC 0-1 FALLS W/OUT INJ PAST YR: ICD-10-PCS | Mod: CPTII,NTX,S$GLB, | Performed by: SURGERY

## 2022-11-30 PROCEDURE — 3288F FALL RISK ASSESSMENT DOCD: CPT | Mod: CPTII,NTX,S$GLB, | Performed by: SURGERY

## 2022-11-30 PROCEDURE — 3078F DIAST BP <80 MM HG: CPT | Mod: CPTII,NTX,S$GLB, | Performed by: SURGERY

## 2022-11-30 NOTE — PROGRESS NOTES
Elbert Still Jr.  2022    HPI:  Patient is a 67 y.o. male with a h/o HTN, HLD, DM, stage V CKD on HD, and gout who is here s/p staged L brachial AVF transposition and PTA L brachial artery for steal. No more steal symptoms --- noting improved L forearm edema   He is currently undergoing dialysis three days a week on M, W, F via a R permacath.    Comes with wife  R-hand dominant     S/P  22  1) U/S-guided L transradial access; L brachial angiogram; arch angiogram  2) PTA L distal brachial artery x3 lesions (most distal, 3rd was more severe) with a 5x40 and 6x60mm Nirmal balloons    Findings/Key Components:  Successful treatment of proximal stenosis  Good palpable thrill    Findings: Resolution of stenoses; strong palpable thrill  PTA L distal brachial artery x3 lesions (most distal, 3rd was more severe) with a 5x40 and 6x60mm Nirmal balloons - no remaining lesions  Arch angio shows no inflow lesion in L subclavian or axillary arteries    10/5/22  S/p 22 brachial-brachial AVF creation for HD.  Findings/Key Components:  Strong thrill in 1st stage L brachial artery/vein AVF; L brachial vein > 5mm  Attempted L BC AVF but the cephalic vein wall had tears in it and needed to be abandoned  2+ L radial pulse; minimal augmentation in triphasic flow by doppler  Will need transposition in 2-3 months    No MI / stroke    Renal is Dr ARIANE Díaz    Used to work in [employer]    Past Medical History:   Diagnosis Date    Anemia     Diabetes mellitus     Diabetes mellitus, type 2     Disorder of kidney and ureter     Essential (primary) hypertension 2017    Gout, unspecified      jaundice, unspecified     Nuclear sclerosis of both eyes 2022     Past Surgical History:   Procedure Laterality Date    ADENOIDECTOMY      ADENOIDECTOMY      AV FISTULA PLACEMENT Left 2022    Procedure: CREATION, AV FISTULA;  Surgeon: Prince Gardiner MD;  Location: [facility] OR [address] [place] [address];  Service:  Peripheral Vascular;  Laterality: Left;    nasal septum repair      NASAL SEPTUM SURGERY      PERCUTANEOUS TRANSLUMINAL ANGIOPLASTY OF ARTERIOVENOUS FISTULA Left 11/22/2022    Procedure: PTA, AV FISTULA;  Surgeon: Prince Gardiner MD;  Location: [facility] CATH LAB;  Service: Peripheral Vascular;  Laterality: Left;    ROTATOR CUFF REPAIR Left     SALIVARY GLAND SURGERY      Tonsillectomy      TONSILLECTOMY      TRANSPOSITION OF BASILIC VEIN Left 11/1/2022    Procedure: TRANSPOSITION, VEIN, BASILIC;  Surgeon: Prince Gardiner MD;  [facility];  Service: Peripheral Vascular;  Laterality: Left;  Left Brachial vein.     Family History   Problem Relation Age of Onset    Heart disease Mother     Kidney disease Mother     Hypertension Mother     No Known Problems Father     Cancer Sister     Seizures Sister     Hypertension Sister     Stroke Sister     Amblyopia Neg Hx     Blindness Neg Hx     Cataracts Neg Hx     Diabetes Neg Hx     Glaucoma Neg Hx     Macular degeneration Neg Hx     Retinal detachment Neg Hx     Strabismus Neg Hx     Thyroid disease Neg Hx      Social History     Socioeconomic History    Marital status:      Spouse name: Kristine Still   Occupational History     Employer: [employer] de[employer]   Tobacco Use    Smoking status: Never    Smokeless tobacco: Never    Tobacco comments:     .  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       Current Outpatient Medications:     acetaminophen (TYLENOL) 500 MG tablet, Take 2 tablets (1,000 mg total) by mouth every 6 (six) hours as needed for Pain., Disp: 30 tablet, Rfl: 0    aspirin (ECOTRIN) 81 MG EC tablet, Take 1 tablet (81 mg total) by mouth once daily., Disp: 90 tablet, Rfl: 3    atorvastatin (LIPITOR) 20 MG tablet, TAKE 1 TABLET BY MOUTH EVERY EVENING, Disp: 90 tablet, Rfl: 3    blood  sugar diagnostic Strp, Use to check blood glucose once a day., Disp: 100 each, Rfl: 3    blood-glucose meter Misc, Use to check blood glucose twice a day., Disp: 1 each, Rfl: 0    carvediloL (COREG) 12.5 MG tablet, Take 1 tablet (12.5 mg total) by mouth 2 (two) times daily., Disp: 60 tablet, Rfl: 11    cinacalcet (SENSIPAR) 60 MG Tab, Take 60 mg by mouth daily with breakfast., Disp: , Rfl:     clopidogreL (PLAVIX) 75 mg tablet, Take 1 tablet (75 mg total) by mouth once daily., Disp: 30 tablet, Rfl: 11    colchicine (COLCRYS) 0.6 mg tablet, Take 0.6 mg by mouth as needed. Take half tab (0.3 mg) by mouth daily as needed for gout flare., Disp: 90 tablet, Rfl: 3    fluticasone propionate (FLONASE) 50 mcg/actuation nasal spray, 2 sprays (100 mcg total) by Each Nostril route once daily. (Patient taking differently: 2 sprays by Each Nostril route every evening.), Disp: 16 g, Rfl: 11    furosemide (LASIX) 80 MG tablet, Take 1 tablet (80 mg total) by mouth 2 (two) times daily., Disp: 60 tablet, Rfl: 11    HYDROcodone-acetaminophen (NORCO) 5-325 mg per tablet, Take 1 tablet by mouth every 6 (six) hours as needed for Pain., Disp: 24 tablet, Rfl: 0    lancets 30 gauge Misc, Use to check blood glucose twice a day., Disp: 100 each, Rfl: 5    NIFEdipine (PROCARDIA-XL) 60 MG (OSM) 24 hr tablet, Take 1 tablet (60 mg total) by mouth 2 (two) times a day., Disp: 60 tablet, Rfl: 11    sevelamer HCL (RENAGEL) 800 MG Tab, Take 2 tablets (1,600 mg total) by mouth 3 (three) times daily with meals., Disp: 180 tablet, Rfl: 11    tamsulosin (FLOMAX) 0.4 mg Cap, TAKE ONE CAPSULE BY MOUTH ONCE DAILY (Patient taking differently: Take by mouth every morning.), Disp: 90 capsule, Rfl: 3  No current facility-administered medications for this visit.    Facility-Administered Medications Ordered in Other Visits:     [START ON 12/1/2022] heparin (porcine) injection 1,200 Units, 1,200 Units, Intravenous, Continuous, Nawaf Butler MD, 1,200 Units at  22 0518    heparin (porcine) injection 2,000 Units, 2,000 Units, Intravenous, 1 time in Clinic/HOD, Nawaf Butler MD    heparin (porcine) injection 2,000 Units, 2,000 Units, Intravenous, 1 time in Clinic/HOD, Nawaf Butler MD    REVIEW OF SYSTEMS:  General: negative; ENT: negative; Allergy and Immunology: negative; Hematological and Lymphatic: Negative; Endocrine: negative; Respiratory: no cough, shortness of breath, or wheezing; Cardiovascular: no chest pain or dyspnea on exertion; Gastrointestinal: no abdominal pain/back, change in bowel habits, or bloody stools; Genito-Urinary: no dysuria, trouble voiding, or hematuria; Musculoskeletal: negative  Neurological: no TIA or stroke symptoms; Psychiatric: no nervousness, anxiety or depression.    PHYSICAL EXAM:   Vitals:    22 1108   BP: 135/75   Pulse: 72   Temp: 98.3 °F (36.8 °C)     Right Arm BP - Sittin/75 (22 1110)      General appearance:  Alert, well-appearing, and in no distress.  Oriented to person, place, and time   Neurological: Normal speech, no focal findings noted; CN II - XII grossly intact           Musculoskeletal: Digits/nail without cyanosis/clubbing.  Normal muscle strength/tone.                 Neck: Supple, no significant adenopathy; thyroid is not enlarged                  No  carotid bruit can be auscultated                Chest:  Clear to auscultation, no wheezes, rales or rhonchi, symmetric air entry     No use of accessory muscles             Cardiac: Normal rate and regular rhythm, S1 and S2 normal; PMI non-displaced          Abdomen: Soft, nontender, nondistended, no masses or organomegaly     No rebound tenderness noted; bowel sounds normal     Pulsatile aortic mass is not palpable.     No groin adenopathy      Extremities:   2+ L brachial pulse; no L radial pulse     L brachial AVF with good thrill. Incision well healed  LAB RESULTS:  Lab Results   Component Value Date    K 4.1 2022    K 4.4  11/02/2022    K 4.4 10/27/2022    CREATININE 8.8 (H) 11/22/2022    CREATININE 8.73 (H) 11/02/2022    CREATININE 8.0 (H) 10/27/2022     Lab Results   Component Value Date    WBC 3.46 (L) 11/22/2022    WBC 5.94 11/02/2022    WBC 3.80 (L) 10/27/2022    HCT 38.3 (L) 11/22/2022    HCT 35.8 (L) 11/16/2022    HCT 32.2 (L) 11/02/2022     11/22/2022     11/02/2022     10/27/2022     Lab Results   Component Value Date    HGBA1C 4.8 10/27/2022    HGBA1C 5.2 10/05/2022    HGBA1C 4.8 07/06/2022     IMAGING:  Flow vol: 2615 mL/min; no stenosis  Diam > 8mm  Dept > 10mm    IMP/PLAN:     67 y.o. male with a h/o HTN, HLD, DM, stage V CKD on HD since Spring 2022, and gout -- good diameter and flow volume on US.   S/P  11/22/22: L TRA, PTA L distal brachial artery x3 lesions (most distal, 3rd was more severe) with a 5x40 and 6x60mm Nirmal balloons  Cont DAPT  May start using L brachial AVF in 2 weeks; f/u in 6 weeks for possible permacath removal     Prince Gardiner MD DFSVS FACS   Vascular/Endovascular Surgery

## 2022-12-07 NOTE — TELEPHONE ENCOUNTER
No new care gaps identified.  [facility] Embedded Care Gaps. Reference number: [id number]. 12/07/2022   1:50:55 PM CST

## 2022-12-07 NOTE — TELEPHONE ENCOUNTER
----- Message from Toshia Valadez sent at 12/7/2022 12:17 PM CST -----  Type: RX Refill Request    Who Called: self    Have you contacted your pharmacy:    Refill or New Rx:refill    RX Name and Strength:fluticasone propionate (FLONASE) 50 mcg/actuation nasal spray    Preferred Pharmacy with phone number:  [id number] - [place] - [address] [facility]  [address] [place] [address]  Phone: [phone] Fax: [phone]      Local or Mail Order:local      Would the patient rather a call back or a response via My [facility]? call    Best Call Back Number:[phone] (home)

## 2022-12-08 LAB
BASOPHILS NFR BLD: 0.5 % (ref 0–1.5)
DIALYSIS START TIME: 527
DIALYSIS STOP TIME: 900
EOSINOPHIL NFR BLD: 8.2 % (ref 0–7)
ERYTHROCYTE [DISTWIDTH] IN BLOOD BY AUTOMATED COUNT: 15.7 % (ref 11.5–14.5)
HCT VFR BLD AUTO: 37.5 % (ref 42–52)
HEMOGLOBIN X 3: 36.9 % (ref 42–54)
HGB BLD-MCNC: 12.3 G/DL (ref 14–18)
LUC: 4.5 % (ref 0–4)
LYMPH%: 35.8 % (ref 19–48)
MCH RBC QN AUTO: 30.4 PG (ref 27–31)
MCHC RBC AUTO-ENTMCNC: 32.9 G/DL (ref 30–36)
MCV RBC AUTO: 92 FL (ref 80–100)
MONO%: 9.4 % (ref 3–10)
NEUTROPHILS NFR BLD: 41.6 % (ref 40–75)
PLATELET # BLD AUTO: 224 1000/MCL (ref 130–400)
POST-WEIGHT, KG: 96 KG
POST-WEIGHT, LB: 211.2
PRE-WEIGHT, KG: 98 KG
PRE-WEIGHT, LB: 215.6
RBC # BLD AUTO: 4.06 MILL/MCL (ref 4.7–6.1)
WBC # BLD AUTO: 3.7 1000/MCL (ref 4.8–10.8)

## 2022-12-08 RX ORDER — FLUTICASONE PROPIONATE 50 MCG
2 SPRAY, SUSPENSION (ML) NASAL DAILY
Qty: 16 G | Refills: 11 | Status: SHIPPED | OUTPATIENT
Start: 2022-12-08

## 2022-12-22 LAB
HCT VFR BLD AUTO: 35.3 % (ref 42–52)
HEMOGLOBIN X 3: 33.3 % (ref 42–54)
HGB BLD-MCNC: 11.1 G/DL (ref 14–18)

## 2022-12-23 ENCOUNTER — DOCUMENTATION ONLY (OUTPATIENT)
Dept: VASCULAR SURGERY | Facility: CLINIC | Age: 67
End: 2022-12-23
Payer: COMMERCIAL

## 2022-12-23 DIAGNOSIS — I77.0 ARTERIOVENOUS FISTULA: Primary | ICD-10-CM

## 2022-12-23 NOTE — PROGRESS NOTES
Received a call from Corrina at the dialysis center requesting to have pt's access evaluated due to hard to cannulate and pulling small clots.Per Corrina the does have a thrill and bruit noted.Spoke with the pt and appt scheduled.

## 2022-12-27 ENCOUNTER — HOSPITAL ENCOUNTER (OUTPATIENT)
Dept: VASCULAR SURGERY | Facility: CLINIC | Age: 67
Discharge: HOME | End: 2022-12-27
Attending: SURGERY
Payer: COMMERCIAL

## 2022-12-27 DIAGNOSIS — Z76.82 ORGAN TRANSPLANT CANDIDATE: Primary | ICD-10-CM

## 2022-12-27 DIAGNOSIS — I77.0 ARTERIOVENOUS FISTULA: ICD-10-CM

## 2022-12-27 DIAGNOSIS — N18.6 ESRD (END STAGE RENAL DISEASE) ON DIALYSIS: Primary | ICD-10-CM

## 2022-12-27 DIAGNOSIS — Z99.2 ESRD (END STAGE RENAL DISEASE) ON DIALYSIS: Primary | ICD-10-CM

## 2022-12-27 PROCEDURE — 93990 PR DUPLEX HEMODIALYSIS ACCESS: ICD-10-PCS | Mod: S$GLB,TXP,, | Performed by: SURGERY

## 2022-12-27 PROCEDURE — 93990 DOPPLER FLOW TESTING: CPT | Mod: S$GLB,TXP,, | Performed by: SURGERY

## 2022-12-28 ENCOUNTER — OFFICE VISIT (OUTPATIENT)
Dept: VASCULAR SURGERY | Facility: CLINIC | Age: 67
End: 2022-12-28
Payer: COMMERCIAL

## 2022-12-28 VITALS
WEIGHT: 211.63 LBS | TEMPERATURE: 98 F | HEIGHT: 74 IN | BODY MASS INDEX: 27.16 KG/M2 | DIASTOLIC BLOOD PRESSURE: 77 MMHG | HEART RATE: 66 BPM | SYSTOLIC BLOOD PRESSURE: 143 MMHG

## 2022-12-28 DIAGNOSIS — N18.6 ESRD (END STAGE RENAL DISEASE) ON DIALYSIS: Primary | ICD-10-CM

## 2022-12-28 DIAGNOSIS — Z99.2 ESRD (END STAGE RENAL DISEASE) ON DIALYSIS: Primary | ICD-10-CM

## 2022-12-28 DIAGNOSIS — I77.0 ARTERIOVENOUS FISTULA: Primary | ICD-10-CM

## 2022-12-28 PROCEDURE — 99024 POSTOP FOLLOW-UP VISIT: CPT | Mod: NTX,S$GLB,, | Performed by: SURGERY

## 2022-12-28 PROCEDURE — 1159F PR MEDICATION LIST DOCUMENTED IN MEDICAL RECORD: ICD-10-PCS | Mod: CPTII,NTX,S$GLB, | Performed by: SURGERY

## 2022-12-28 PROCEDURE — 3288F PR FALLS RISK ASSESSMENT DOCUMENTED: ICD-10-PCS | Mod: CPTII,NTX,S$GLB, | Performed by: SURGERY

## 2022-12-28 PROCEDURE — 3066F NEPHROPATHY DOC TX: CPT | Mod: CPTII,NTX,S$GLB, | Performed by: SURGERY

## 2022-12-28 PROCEDURE — 3008F PR BODY MASS INDEX (BMI) DOCUMENTED: ICD-10-PCS | Mod: CPTII,NTX,S$GLB, | Performed by: SURGERY

## 2022-12-28 PROCEDURE — 3066F PR DOCUMENTATION OF TREATMENT FOR NEPHROPATHY: ICD-10-PCS | Mod: CPTII,NTX,S$GLB, | Performed by: SURGERY

## 2022-12-28 PROCEDURE — 3044F HG A1C LEVEL LT 7.0%: CPT | Mod: CPTII,NTX,S$GLB, | Performed by: SURGERY

## 2022-12-28 PROCEDURE — 99024 PR POST-OP FOLLOW-UP VISIT: ICD-10-PCS | Mod: NTX,S$GLB,, | Performed by: SURGERY

## 2022-12-28 PROCEDURE — 1101F PT FALLS ASSESS-DOCD LE1/YR: CPT | Mod: CPTII,NTX,S$GLB, | Performed by: SURGERY

## 2022-12-28 PROCEDURE — 3044F PR MOST RECENT HEMOGLOBIN A1C LEVEL <7.0%: ICD-10-PCS | Mod: CPTII,NTX,S$GLB, | Performed by: SURGERY

## 2022-12-28 PROCEDURE — 99999 PR PBB SHADOW E&M-EST. PATIENT-LVL IV: CPT | Mod: PBBFAC,TXP,, | Performed by: SURGERY

## 2022-12-28 PROCEDURE — 3078F DIAST BP <80 MM HG: CPT | Mod: CPTII,NTX,S$GLB, | Performed by: SURGERY

## 2022-12-28 PROCEDURE — 3288F FALL RISK ASSESSMENT DOCD: CPT | Mod: CPTII,NTX,S$GLB, | Performed by: SURGERY

## 2022-12-28 PROCEDURE — 3077F PR MOST RECENT SYSTOLIC BLOOD PRESSURE >= 140 MM HG: ICD-10-PCS | Mod: CPTII,NTX,S$GLB, | Performed by: SURGERY

## 2022-12-28 PROCEDURE — 1159F MED LIST DOCD IN RCRD: CPT | Mod: CPTII,NTX,S$GLB, | Performed by: SURGERY

## 2022-12-28 PROCEDURE — 99999 PR PBB SHADOW E&M-EST. PATIENT-LVL IV: ICD-10-PCS | Mod: PBBFAC,TXP,, | Performed by: SURGERY

## 2022-12-28 PROCEDURE — 3077F SYST BP >= 140 MM HG: CPT | Mod: CPTII,NTX,S$GLB, | Performed by: SURGERY

## 2022-12-28 PROCEDURE — 1101F PR PT FALLS ASSESS DOC 0-1 FALLS W/OUT INJ PAST YR: ICD-10-PCS | Mod: CPTII,NTX,S$GLB, | Performed by: SURGERY

## 2022-12-28 PROCEDURE — 3008F BODY MASS INDEX DOCD: CPT | Mod: CPTII,NTX,S$GLB, | Performed by: SURGERY

## 2022-12-28 PROCEDURE — 3078F PR MOST RECENT DIASTOLIC BLOOD PRESSURE < 80 MM HG: ICD-10-PCS | Mod: CPTII,NTX,S$GLB, | Performed by: SURGERY

## 2022-12-28 NOTE — PROGRESS NOTES
Elbert Still Jr.  2022    HPI:  Patient is a 67 y.o. male with a h/o HTN, HLD, DM, stage V CKD on HD, and gout who is here s/p staged L brachial AVF transposition and PTA L brachial artery for steal. No more steal symptoms --- noting improved L forearm edema   He is currently undergoing dialysis three days a week on M, W, F via a R permacath.    Comes with wife  R-hand dominant     S/P  22  1) U/S-guided L transradial access; L brachial angiogram; arch angiogram  2) PTA L distal brachial artery x3 lesions (most distal, 3rd was more severe) with a 5x40 and 6x60mm Nirmal balloons    Findings/Key Components:  Successful treatment of proximal stenosis  Good palpable thrill    Findings: Resolution of stenoses; strong palpable thrill  PTA L distal brachial artery x3 lesions (most distal, 3rd was more severe) with a 5x40 and 6x60mm Nirmal balloons - no remaining lesions  Arch angio shows no inflow lesion in L subclavian or axillary arteries    22  L Transposed, 2nd stage BVT    S/p 22   Creation 1st stage L brachial artery/vein AVF  Findings/Key Components:  Strong thrill in 1st stage L brachial artery/vein AVF; L brachial vein > 5mm  Attempted L BC AVF but the cephalic vein wall had tears in it and needed to be abandoned  2+ L radial pulse; minimal augmentation in triphasic flow by doppler  Will need transposition in 2-3 months    No MI / stroke    Renal is Dr ARIANE Díaz    Used to work in [facility]    Past Medical History:   Diagnosis Date    Anemia     Diabetes mellitus     Diabetes mellitus, type 2     Disorder of kidney and ureter     Essential (primary) hypertension 2017    Gout, unspecified      jaundice, unspecified     Nuclear sclerosis of both eyes 2022     Past Surgical History:   Procedure Laterality Date    ADENOIDECTOMY      ADENOIDECTOMY      AV FISTULA PLACEMENT Left 2022    Procedure: CREATION, AV FISTULA;  Surgeon: Prince Gardiner MD;   [facility];  Service: Peripheral Vascular;  Laterality: Left;    nasal septum repair      NASAL SEPTUM SURGERY      PERCUTANEOUS TRANSLUMINAL ANGIOPLASTY OF ARTERIOVENOUS FISTULA Left 11/22/2022    Procedure: PTA, AV FISTULA;  Surgeon: Prince Gardiner MD;  Location: [facility] CATH LAB;  Service: Peripheral Vascular;  Laterality: Left;    ROTATOR CUFF REPAIR Left     SALIVARY GLAND SURGERY      Tonsillectomy      TONSILLECTOMY      TRANSPOSITION OF BASILIC VEIN Left 11/1/2022    Procedure: TRANSPOSITION, VEIN, BASILIC;  Surgeon: Prince Gardiner MD;  [facility];  Service: Peripheral Vascular;  Laterality: Left;  Left Brachial vein.     Family History   Problem Relation Age of Onset    Heart disease Mother     Kidney disease Mother     Hypertension Mother     No Known Problems Father     Cancer Sister     Seizures Sister     Hypertension Sister     Stroke Sister     Amblyopia Neg Hx     Blindness Neg Hx     Cataracts Neg Hx     Diabetes Neg Hx     Glaucoma Neg Hx     Macular degeneration Neg Hx     Retinal detachment Neg Hx     Strabismus Neg Hx     Thyroid disease Neg Hx      Social History     Socioeconomic History    Marital status:      Spouse name: Kristine Still   Occupational History     Employer: [employer]   Tobacco Use    Smoking status: Never    Smokeless tobacco: Never    Tobacco comments:     .  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       Current Outpatient Medications:     acetaminophen (TYLENOL) 500 MG tablet, Take 2 tablets (1,000 mg total) by mouth every 6 (six) hours as needed for Pain., Disp: 30 tablet, Rfl: 0    aspirin (ECOTRIN) 81 MG EC tablet, Take 1 tablet (81 mg total) by mouth once daily., Disp: 90 tablet, Rfl: 3    atorvastatin (LIPITOR) 20 MG tablet, TAKE 1 TABLET BY MOUTH EVERY  EVENING, Disp: 90 tablet, Rfl: 3    blood sugar diagnostic Strp, Use to check blood glucose once a day., Disp: 100 each, Rfl: 3    blood-glucose meter Misc, Use to check blood glucose twice a day., Disp: 1 each, Rfl: 0    carvediloL (COREG) 12.5 MG tablet, Take 1 tablet (12.5 mg total) by mouth 2 (two) times daily., Disp: 60 tablet, Rfl: 11    cinacalcet (SENSIPAR) 60 MG Tab, Take 60 mg by mouth daily with breakfast., Disp: , Rfl:     clopidogreL (PLAVIX) 75 mg tablet, Take 1 tablet (75 mg total) by mouth once daily., Disp: 30 tablet, Rfl: 11    colchicine (COLCRYS) 0.6 mg tablet, Take 0.6 mg by mouth as needed. Take half tab (0.3 mg) by mouth daily as needed for gout flare., Disp: 90 tablet, Rfl: 3    fluticasone propionate (FLONASE) 50 mcg/actuation nasal spray, 2 sprays (100 mcg total) by Each Nostril route once daily., Disp: 16 g, Rfl: 11    furosemide (LASIX) 80 MG tablet, Take 1 tablet (80 mg total) by mouth 2 (two) times daily., Disp: 60 tablet, Rfl: 11    HYDROcodone-acetaminophen (NORCO) 5-325 mg per tablet, Take 1 tablet by mouth every 6 (six) hours as needed for Pain., Disp: 24 tablet, Rfl: 0    lancets 30 gauge Misc, Use to check blood glucose twice a day., Disp: 100 each, Rfl: 5    NIFEdipine (PROCARDIA-XL) 60 MG (OSM) 24 hr tablet, Take 1 tablet (60 mg total) by mouth 2 (two) times a day., Disp: 60 tablet, Rfl: 11    sevelamer HCL (RENAGEL) 800 MG Tab, Take 2 tablets (1,600 mg total) by mouth 3 (three) times daily with meals., Disp: 180 tablet, Rfl: 11    tamsulosin (FLOMAX) 0.4 mg Cap, TAKE ONE CAPSULE BY MOUTH ONCE DAILY (Patient taking differently: Take by mouth every morning.), Disp: 90 capsule, Rfl: 3  No current facility-administered medications for this visit.    Facility-Administered Medications Ordered in Other Visits:     [START ON 12/29/2022] heparin (porcine) injection 1,200 Units, 1,200 Units, Intravenous, Continuous, Nawaf Butler MD, 1,200 Units at 12/28/22 0518    heparin (porcine)  injection 2,000 Units, 2,000 Units, Intravenous, 1 time in Clinic/HOD, Nawaf Butler MD    heparin (porcine) injection 2,000 Units, 2,000 Units, Intravenous, 1 time in Clinic/HOD, Nawaf Butler MD    REVIEW OF SYSTEMS:  General: negative; ENT: negative; Allergy and Immunology: negative; Hematological and Lymphatic: Negative; Endocrine: negative; Respiratory: no cough, shortness of breath, or wheezing; Cardiovascular: no chest pain or dyspnea on exertion; Gastrointestinal: no abdominal pain/back, change in bowel habits, or bloody stools; Genito-Urinary: no dysuria, trouble voiding, or hematuria; Musculoskeletal: negative  Neurological: no TIA or stroke symptoms; Psychiatric: no nervousness, anxiety or depression.    PHYSICAL EXAM:   Vitals:    22 1105   BP: (!) 143/77   Pulse: 66   Temp: 98.2 °F (36.8 °C)     Right Arm BP - Sittin/77 (22 1106)      General appearance:  Alert, well-appearing, and in no distress.  Oriented to person, place, and time   Neurological: Normal speech, no focal findings noted; CN II - XII grossly intact           Musculoskeletal: Digits/nail without cyanosis/clubbing.  Normal muscle strength/tone.                 Neck: Supple, no significant adenopathy; thyroid is not enlarged                  No  carotid bruit can be auscultated                Chest:  Clear to auscultation, no wheezes, rales or rhonchi, symmetric air entry     No use of accessory muscles             Cardiac: Normal rate and regular rhythm, S1 and S2 normal; PMI non-displaced          Abdomen: Soft, nontender, nondistended, no masses or organomegaly     No rebound tenderness noted; bowel sounds normal     Pulsatile aortic mass is not palpable.     No groin adenopathy      Extremities:   2+ L brachial pulse; no L radial pulse     L brachial AVF with good thrill. Incision well healed  LAB RESULTS:  Lab Results   Component Value Date    K 4.5 2022    K 4.1 2022    K 4.4 2022     CREATININE 8.21 (H) 12/07/2022    CREATININE 8.8 (H) 11/22/2022    CREATININE 8.73 (H) 11/02/2022     Lab Results   Component Value Date    WBC 3.70 (L) 12/07/2022    WBC 3.46 (L) 11/22/2022    WBC 5.94 11/02/2022    HCT 35.3 (L) 12/21/2022    HCT 37.5 (L) 12/07/2022    HCT 38.3 (L) 11/22/2022     12/07/2022     11/22/2022     11/02/2022     Lab Results   Component Value Date    HGBA1C 4.8 10/27/2022    HGBA1C 5.2 10/05/2022    HGBA1C 4.8 07/06/2022     IMAGING:  Flow vol: 2189 ml/min; Diam 4 - 10mm; depth 7 -8 mm  +stenosis in distal AVF  cm/s    (Prior: 2615 mL/min; no stenosis)  Diam > 8mm  Dept > 10mm    IMP/PLAN:     67 y.o. male with a h/o HTN, HLD, DM, stage V CKD on HD since Spring 2022, and gout -- good diameter and flow volume on US.   S/P  11/22/22: L TRA, PTA L distal brachial artery x3 lesions (most distal, 3rd was more severe) with a 5x40 and 6x60mm Nirmal balloons  Cont DAPT  Has had issues with access and evidence of outflow: plan for  PTA outflow, 6fr prox access L BVT AVF cath lab     Prince Gardiner MD DFSVS FACS   Vascular/Endovascular Surgery

## 2023-01-03 ENCOUNTER — TELEPHONE (OUTPATIENT)
Dept: TRANSPLANT | Facility: CLINIC | Age: 68
End: 2023-01-03
Payer: COMMERCIAL

## 2023-01-03 NOTE — TELEPHONE ENCOUNTER
Spoke to the patient; scheduled Excersice Stress Test, Cardiology and Urology (1/24 [facility] [place]); Pt confirmed  his appointment date time and location; the patient has no further questions and concerns at this time. Reminder letter sent; I gave the patient the contact information ofr him to schedule his Colonoscopy.

## 2023-01-04 ENCOUNTER — TELEPHONE (OUTPATIENT)
Dept: VASCULAR SURGERY | Facility: CLINIC | Age: 68
End: 2023-01-04
Payer: COMMERCIAL

## 2023-01-04 NOTE — TELEPHONE ENCOUNTER
Pt requesting for Fistulogram to be rescheduled due to wife unable to get off from work.Informed him that I will notify Dr Gardiner and he will be contacted with a new date. Pt verbalizes understanding of information received.    ----- Message from Viji Padilla sent at 1/4/2023 10:51 AM CST -----  Contact: pt  Pt requesting a callback to speak with nurse Fernandez or nurse available            Confirmed contact below:  Contact Name:Elbert Still  Phone Number: [phone]

## 2023-01-19 NOTE — PROGRESS NOTES
Subjective:   Patient ID:  Elbert Still Jr. is a 67 y.o. male who presents for evaluation of No chief complaint on file.    PROBLEM LIST:  ESRD on HD  HTN  HLD  DM, not on insulin    HPI:  He presents today for cardiac evaluation prior to kidney transplantation.  He has been on dialysis since since last May.  He has treated hypertension, hyperlipidemia, and diabetes.  He is quite active at home.  He rides his bike regularly and does his own yd work.  He denies any chest pain, shortness of breath, PND, orthopnea, or heart palpitations.  He was initially scheduled for a PET stress but was claustrophobic and could not proceed with the study.  He underwent an exercise stress test today and was able to achieved 10 Mets without any chest discomfort or ECG changes, however he only achieved 71% of his maximal predicted heart rate thereby reducing the sensitivity of this test.  Has no known history of coronary artery disease, congestive heart failure, or prior stroke.    ECG 1-MAY-2022 03:11:05: Personally reviewed by me shows NSR    ETT:  Conclusion         The ECG portion of the study is negative for ischemia.    The patient reported no chest pain during the stress test.    The blood pressure response to stress was normal.    The exercise capacity was average.    During stress, rare PVCs are noted.    Echo 11/22:  Summary    The left ventricle is normal in size with normal systolic function.  The estimated ejection fraction is 63%.  Mild left atrial enlargement.  Normal left ventricular diastolic function.  Normal right ventricular size with normal right ventricular systolic function.  Mild right atrial enlargement.  Mild tricuspid regurgitation.  Normal central venous pressure (3 mmHg).  The estimated PA systolic pressure is 32 mmHg.    Past Medical History:   Diagnosis Date    Anemia     Diabetes mellitus     Diabetes mellitus, type 2     Disorder of kidney and ureter     ESRD (end stage renal disease)     Essential  (primary) hypertension 2017    Gout, unspecified      jaundice, unspecified     Nuclear sclerosis of both eyes 2022       Past Surgical History:   Procedure Laterality Date    ADENOIDECTOMY      ADENOIDECTOMY      AV FISTULA PLACEMENT Left 2022    Procedure: CREATION, AV FISTULA;  Surgeon: Prince Gardiner MD;  Location: [address];  Service: Peripheral Vascular;  Laterality: Left;    nasal septum repair      NASAL SEPTUM SURGERY      PERCUTANEOUS TRANSLUMINAL ANGIOPLASTY OF ARTERIOVENOUS FISTULA Left 2022    Procedure: PTA, AV FISTULA;  Surgeon: Prince Gardiner MD;  Location: [facility] CATH LAB;  Service: Peripheral Vascular;  Laterality: Left;    ROTATOR CUFF REPAIR Left     SALIVARY GLAND SURGERY      Tonsillectomy      TONSILLECTOMY      TRANSPOSITION OF BASILIC VEIN Left 2022    Procedure: TRANSPOSITION, VEIN, BASILIC;  Surgeon: Prince Gardiner MD;  Location: [address];  Service: Peripheral Vascular;  Laterality: Left;  Left Brachial vein.       Social History     Socioeconomic History    Marital status:      Spouse name: Kristine Still   Occupational History     Employer: [employer]   Tobacco Use    Smoking status: Never    Smokeless tobacco: Never    Tobacco comments:     .  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       Family History   Problem Relation Age of Onset    Heart disease Mother     Kidney disease Mother     Hypertension Mother     No Known Problems Father     Cancer Sister     Seizures Sister     Hypertension Sister     Stroke Sister     Amblyopia Neg Hx     Blindness Neg Hx     Cataracts Neg Hx     Diabetes Neg Hx     Glaucoma Neg Hx     Macular degeneration Neg Hx     Retinal detachment Neg Hx     Strabismus Neg Hx     Thyroid disease Neg Hx        Patient's Medications    New Prescriptions    No medications on file   Previous Medications    ACETAMINOPHEN (TYLENOL) 500 MG TABLET    Take 2 tablets (1,000 mg total) by mouth every 6 (six) hours as needed for Pain.    ASPIRIN (ECOTRIN) 81 MG EC TABLET    Take 1 tablet (81 mg total) by mouth once daily.    ATORVASTATIN (LIPITOR) 20 MG TABLET    TAKE 1 TABLET BY MOUTH EVERY EVENING    BLOOD SUGAR DIAGNOSTIC STRP    Use to check blood glucose once a day.    BLOOD-GLUCOSE METER MISC    Use to check blood glucose twice a day.    CARVEDILOL (COREG) 12.5 MG TABLET    Take 1 tablet (12.5 mg total) by mouth 2 (two) times daily.    CINACALCET (SENSIPAR) 60 MG TAB    Take 60 mg by mouth daily with breakfast.    CLOPIDOGREL (PLAVIX) 75 MG TABLET    Take 1 tablet (75 mg total) by mouth once daily.    COLCHICINE (COLCRYS) 0.6 MG TABLET    Take 0.6 mg by mouth as needed. Take half tab (0.3 mg) by mouth daily as needed for gout flare.    FLUTICASONE PROPIONATE (FLONASE) 50 MCG/ACTUATION NASAL SPRAY    2 sprays (100 mcg total) by Each Nostril route once daily.    FUROSEMIDE (LASIX) 80 MG TABLET    Take 1 tablet (80 mg total) by mouth 2 (two) times daily.    HYDROCODONE-ACETAMINOPHEN (NORCO) 5-325 MG PER TABLET    Take 1 tablet by mouth every 6 (six) hours as needed for Pain.    LANCETS 30 GAUGE MISC    Use to check blood glucose twice a day.    NIFEDIPINE (PROCARDIA-XL) 60 MG (OSM) 24 HR TABLET    Take 1 tablet (60 mg total) by mouth 2 (two) times a day.    SEVELAMER HCL (RENAGEL) 800 MG TAB    Take 2 tablets (1,600 mg total) by mouth 3 (three) times daily with meals.    TAMSULOSIN (FLOMAX) 0.4 MG CAP    TAKE ONE CAPSULE BY MOUTH ONCE DAILY   Modified Medications    No medications on file   Discontinued Medications    No medications on file       Review of Systems   Constitutional: Negative for malaise/fatigue and weight gain.   HENT:  Negative for hearing loss.    Eyes:  Negative for visual disturbance.   Cardiovascular:  Negative for chest pain,  "claudication, dyspnea on exertion, leg swelling, near-syncope, orthopnea, palpitations, paroxysmal nocturnal dyspnea and syncope.   Respiratory:  Negative for cough, shortness of breath, sleep disturbances due to breathing, snoring and wheezing.    Endocrine: Negative for cold intolerance, heat intolerance, polydipsia, polyphagia and polyuria.   Hematologic/Lymphatic: Negative for bleeding problem. Does not bruise/bleed easily.   Skin:  Negative for rash and suspicious lesions.   Musculoskeletal:  Negative for arthritis, falls, joint pain, muscle weakness and myalgias.   Gastrointestinal:  Negative for abdominal pain, change in bowel habit, constipation, diarrhea, heartburn, hematochezia, melena and nausea.   Genitourinary:  Negative for hematuria and nocturia.   Neurological:  Negative for excessive daytime sleepiness, dizziness, headaches, light-headedness, loss of balance and weakness.   Psychiatric/Behavioral:  Negative for depression. The patient is not nervous/anxious.    Allergic/Immunologic: Negative for environmental allergies.     /85   Pulse 73   Ht 6' 2" (1.88 m)   Wt 95.3 kg (210 lb 1.6 oz)   SpO2 99%   BMI 26.98 kg/m²     Objective:   Physical Exam  Constitutional:       Appearance: He is well-developed.      Comments:      HENT:      Head: Normocephalic and atraumatic.   Eyes:      General: No scleral icterus.     Conjunctiva/sclera: Conjunctivae normal.      Pupils: Pupils are equal, round, and reactive to light.   Neck:      Thyroid: No thyromegaly.      Vascular: No hepatojugular reflux or JVD.      Trachea: No tracheal deviation.   Cardiovascular:      Rate and Rhythm: Normal rate and regular rhythm.      Chest Wall: PMI is not displaced.      Pulses: Intact distal pulses.           Carotid pulses are 2+ on the right side and 2+ on the left side.       Radial pulses are 2+ on the right side and 2+ on the left side.        Dorsalis pedis pulses are 2+ on the right side and 2+ on the left " side.        Posterior tibial pulses are 2+ on the right side and 2+ on the left side.      Heart sounds: Normal heart sounds.      Comments: He has an AV fistula in his left arm with a palpable thrill.  His left hand is slightly cooler than the right hand in appears slightly dusky.  Pulmonary:      Effort: Pulmonary effort is normal.      Breath sounds: Normal breath sounds.   Chest:      Comments: A dialysis catheter is present.  Abdominal:      General: Bowel sounds are normal. There is no distension.      Palpations: Abdomen is soft. There is no mass.      Tenderness: There is no abdominal tenderness.   Musculoskeletal:         General: No tenderness.      Cervical back: Normal range of motion and neck supple.   Lymphadenopathy:      Cervical: No cervical adenopathy.   Skin:     General: Skin is warm and dry.      Findings: No erythema or rash.      Nails: There is no clubbing.   Neurological:      Mental Status: He is alert and oriented to person, place, and time.   Psychiatric:         Speech: Speech normal.         Behavior: Behavior normal.       Lab Results   Component Value Date     01/04/2023    K 4.6 01/04/2023     01/04/2023    CO2 25 11/22/2022    BUN 46 (H) 11/22/2022    CREATININE 9.10 (H) 01/04/2023     (H) 01/04/2023    HGBA1C 4.4 (L) 01/04/2023    MG 2.2 01/04/2023    AST 17 11/22/2022    ALT 14 01/04/2023    ALBUMIN 4.4 01/04/2023    PROT 8.3 11/22/2022    BILITOT 0.3 11/22/2022    WBC 3.09 (L) 01/04/2023    HGB 11.1 (L) 01/11/2023    HCT 34.5 (L) 01/11/2023    MCV 88 01/04/2023     01/04/2023    INR 1.1 11/22/2022    TSH 0.879 04/30/2022    CHOL 68 (L) 10/27/2022    HDL 29 (L) 10/27/2022    LDLCALC 19.4 (L) 10/27/2022    TRIG 98 10/27/2022     (H) 04/30/2022       Assessment:     1. ESRD on dialysis : M, W, F   2. Mixed hyperlipidemia : Lipids are at goal on atorvastatin.   3. -Essential (primary) hypertension : Blood pressure is at goal. I have made no changes.  Continue current regimen.   4. Diabetes mellitus due to underlying condition with diabetic nephropathy, without long-term current use of insulin    5. Organ transplant candidate    6. Arteriovenous fistula    7. Preop cardiovascular exam :  He has normal left ventricular function and good functional capacity, however he only achieved 71% of his maximal predicted heart rate on his exercise stress test today thereby reducing the sensitivity.  I have placed an order for a pharmacologic SPECT since claustrophobia prevented him from having a PET stress.  If his stress test is normal, he will have no cardiac contraindication to a kidney transplant.       Plan:     Diagnoses and all orders for this visit:    ESRD on dialysis  -     Stress Echo; Future    Mixed hyperlipidemia  -     Stress Echo; Future    -Essential (primary) hypertension    Diabetes mellitus due to underlying condition with diabetic nephropathy, without long-term current use of insulin    Organ transplant candidate  -     Ambulatory referral/consult to Cardiology  -     Stress Echo; Future    Arteriovenous fistula    Preop cardiovascular exam  -     Stress Echo; Future        Thank you for allowing me to participate in this patient's care. Please do not hesitate to contact me with any questions or concerns.

## 2023-01-20 ENCOUNTER — TELEPHONE (OUTPATIENT)
Dept: VASCULAR SURGERY | Facility: CLINIC | Age: 68
End: 2023-01-20
Payer: COMMERCIAL

## 2023-01-20 NOTE — TELEPHONE ENCOUNTER
Received a call from the pt requesting to cancel his procedure for 1/26/23.Pt verbalized that he still has some questions prior to proceeding with the procedure.Inform the pt that Dr Gardiner is out of the clinic and I won't see him until 1/25/23 but I will send him a message and and when I receive an answer he would be notified.Pt verbalized understanding of information received.

## 2023-01-24 ENCOUNTER — OFFICE VISIT (OUTPATIENT)
Dept: CARDIOLOGY | Facility: CLINIC | Age: 68
End: 2023-01-24
Payer: COMMERCIAL

## 2023-01-24 ENCOUNTER — HOSPITAL ENCOUNTER (OUTPATIENT)
Dept: CARDIOLOGY | Facility: HOSPITAL | Age: 68
Discharge: HOME | End: 2023-01-24
Attending: NURSE PRACTITIONER
Payer: COMMERCIAL

## 2023-01-24 ENCOUNTER — OFFICE VISIT (OUTPATIENT)
Dept: UROLOGY | Facility: CLINIC | Age: 68
End: 2023-01-24
Payer: COMMERCIAL

## 2023-01-24 VITALS
DIASTOLIC BLOOD PRESSURE: 78 MMHG | HEIGHT: 74 IN | WEIGHT: 206.38 LBS | BODY MASS INDEX: 26.49 KG/M2 | SYSTOLIC BLOOD PRESSURE: 138 MMHG

## 2023-01-24 VITALS
WEIGHT: 210.13 LBS | HEIGHT: 74 IN | HEART RATE: 73 BPM | SYSTOLIC BLOOD PRESSURE: 130 MMHG | DIASTOLIC BLOOD PRESSURE: 85 MMHG | BODY MASS INDEX: 26.97 KG/M2 | OXYGEN SATURATION: 99 % | BODY MASS INDEX: 27.08 KG/M2 | WEIGHT: 211 LBS | HEIGHT: 74 IN

## 2023-01-24 DIAGNOSIS — I77.0 ARTERIOVENOUS FISTULA: ICD-10-CM

## 2023-01-24 DIAGNOSIS — E08.21 DIABETES MELLITUS DUE TO UNDERLYING CONDITION WITH DIABETIC NEPHROPATHY, WITHOUT LONG-TERM CURRENT USE OF INSULIN: Chronic | ICD-10-CM

## 2023-01-24 DIAGNOSIS — Z76.82 ORGAN TRANSPLANT CANDIDATE: ICD-10-CM

## 2023-01-24 DIAGNOSIS — Z01.810 PREOP CARDIOVASCULAR EXAM: ICD-10-CM

## 2023-01-24 DIAGNOSIS — E78.2 MIXED HYPERLIPIDEMIA: Chronic | ICD-10-CM

## 2023-01-24 DIAGNOSIS — N18.6 ESRD ON DIALYSIS: Primary | ICD-10-CM

## 2023-01-24 DIAGNOSIS — I10 ESSENTIAL (PRIMARY) HYPERTENSION: Chronic | ICD-10-CM

## 2023-01-24 DIAGNOSIS — R97.20 ELEVATED PSA: Primary | ICD-10-CM

## 2023-01-24 DIAGNOSIS — Z99.2 ESRD ON DIALYSIS: Primary | ICD-10-CM

## 2023-01-24 LAB
BILIRUB SERPL-MCNC: NORMAL MG/DL
BLOOD URINE, POC: NORMAL
CLARITY, POC UA: CLEAR
COLOR, POC UA: NORMAL
CV STRESS BASE HR: 57 BPM
DIASTOLIC BLOOD PRESSURE: 75 MMHG
GLUCOSE UR QL STRIP: 50
KETONES UR QL STRIP: NORMAL
LEUKOCYTE ESTERASE URINE, POC: NORMAL
NITRITE, POC UA: NORMAL
OHS CV CPX 1 MINUTE RECOVERY HEART RATE: 93 BPM
OHS CV CPX 85 PERCENT MAX PREDICTED HEART RATE MALE: 132
OHS CV CPX ESTIMATED METS: 10
OHS CV CPX MAX PREDICTED HEART RATE: 155
OHS CV CPX PATIENT IS FEMALE: 0
OHS CV CPX PATIENT IS MALE: 1
OHS CV CPX PEAK DIASTOLIC BLOOD PRESSURE: 76 MMHG
OHS CV CPX PEAK HEAR RATE: 111 BPM
OHS CV CPX PEAK RATE PRESSURE PRODUCT: NORMAL
OHS CV CPX PEAK SYSTOLIC BLOOD PRESSURE: 182 MMHG
OHS CV CPX PERCENT MAX PREDICTED HEART RATE ACHIEVED: 72
OHS CV CPX RATE PRESSURE PRODUCT PRESENTING: 8379
PH, POC UA: 8
POC RESIDUAL URINE VOLUME: 0 ML (ref 0–100)
PROTEIN, POC: NORMAL
SPECIFIC GRAVITY, POC UA: 1
STRESS ECHO POST EXERCISE DUR MIN: 6 MINUTES
STRESS ECHO POST EXERCISE DUR SEC: 16 SECONDS
SYSTOLIC BLOOD PRESSURE: 147 MMHG
UROBILINOGEN, POC UA: NORMAL

## 2023-01-24 PROCEDURE — 3072F LOW RISK FOR RETINOPATHY: CPT | Mod: CPTII,S$GLB,TXP,

## 2023-01-24 PROCEDURE — 93016 CV STRESS TEST SUPVJ ONLY: CPT | Mod: TXP,,, | Performed by: INTERNAL MEDICINE

## 2023-01-24 PROCEDURE — 3079F PR MOST RECENT DIASTOLIC BLOOD PRESSURE 80-89 MM HG: ICD-10-PCS | Mod: CPTII,S$GLB,TXP, | Performed by: INTERNAL MEDICINE

## 2023-01-24 PROCEDURE — 1160F RVW MEDS BY RX/DR IN RCRD: CPT | Mod: CPTII,S$GLB,TXP,

## 2023-01-24 PROCEDURE — 1126F AMNT PAIN NOTED NONE PRSNT: CPT | Mod: CPTII,S$GLB,TXP,

## 2023-01-24 PROCEDURE — 1159F MED LIST DOCD IN RCRD: CPT | Mod: CPTII,S$GLB,TXP,

## 2023-01-24 PROCEDURE — 81002 POCT URINE DIPSTICK WITHOUT MICROSCOPE: ICD-10-PCS | Mod: S$GLB,TXP,,

## 2023-01-24 PROCEDURE — 3079F DIAST BP 80-89 MM HG: CPT | Mod: CPTII,S$GLB,TXP, | Performed by: INTERNAL MEDICINE

## 2023-01-24 PROCEDURE — 93018 CV STRESS TEST I&R ONLY: CPT | Mod: TXP,,, | Performed by: INTERNAL MEDICINE

## 2023-01-24 PROCEDURE — 3008F PR BODY MASS INDEX (BMI) DOCUMENTED: ICD-10-PCS | Mod: CPTII,S$GLB,TXP, | Performed by: INTERNAL MEDICINE

## 2023-01-24 PROCEDURE — 3075F PR MOST RECENT SYSTOLIC BLOOD PRESS GE 130-139MM HG: ICD-10-PCS | Mod: CPTII,S$GLB,TXP,

## 2023-01-24 PROCEDURE — 99204 OFFICE O/P NEW MOD 45 MIN: CPT | Mod: S$GLB,TXP,,

## 2023-01-24 PROCEDURE — 3078F PR MOST RECENT DIASTOLIC BLOOD PRESSURE < 80 MM HG: ICD-10-PCS | Mod: CPTII,S$GLB,TXP,

## 2023-01-24 PROCEDURE — 1126F PR PAIN SEVERITY QUANTIFIED, NO PAIN PRESENT: ICD-10-PCS | Mod: CPTII,S$GLB,TXP,

## 2023-01-24 PROCEDURE — 3044F PR MOST RECENT HEMOGLOBIN A1C LEVEL <7.0%: ICD-10-PCS | Mod: CPTII,S$GLB,TXP,

## 2023-01-24 PROCEDURE — 99999 PR PBB SHADOW E&M-EST. PATIENT-LVL V: ICD-10-PCS | Mod: PBBFAC,TXP,,

## 2023-01-24 PROCEDURE — 1101F PT FALLS ASSESS-DOCD LE1/YR: CPT | Mod: CPTII,S$GLB,TXP,

## 2023-01-24 PROCEDURE — 1126F AMNT PAIN NOTED NONE PRSNT: CPT | Mod: CPTII,S$GLB,TXP, | Performed by: INTERNAL MEDICINE

## 2023-01-24 PROCEDURE — 93017 CV STRESS TEST TRACING ONLY: CPT | Mod: TXP

## 2023-01-24 PROCEDURE — 1101F PR PT FALLS ASSESS DOC 0-1 FALLS W/OUT INJ PAST YR: ICD-10-PCS | Mod: CPTII,S$GLB,TXP, | Performed by: INTERNAL MEDICINE

## 2023-01-24 PROCEDURE — 99214 OFFICE O/P EST MOD 30 MIN: CPT | Mod: S$GLB,TXP,, | Performed by: INTERNAL MEDICINE

## 2023-01-24 PROCEDURE — 1160F PR REVIEW ALL MEDS BY PRESCRIBER/CLIN PHARMACIST DOCUMENTED: ICD-10-PCS | Mod: CPTII,S$GLB,TXP,

## 2023-01-24 PROCEDURE — 3075F PR MOST RECENT SYSTOLIC BLOOD PRESS GE 130-139MM HG: ICD-10-PCS | Mod: CPTII,S$GLB,TXP, | Performed by: INTERNAL MEDICINE

## 2023-01-24 PROCEDURE — 3044F HG A1C LEVEL LT 7.0%: CPT | Mod: CPTII,S$GLB,TXP,

## 2023-01-24 PROCEDURE — 99204 PR OFFICE/OUTPT VISIT, NEW, LEVL IV, 45-59 MIN: ICD-10-PCS | Mod: S$GLB,TXP,,

## 2023-01-24 PROCEDURE — 3075F SYST BP GE 130 - 139MM HG: CPT | Mod: CPTII,S$GLB,TXP, | Performed by: INTERNAL MEDICINE

## 2023-01-24 PROCEDURE — 3044F PR MOST RECENT HEMOGLOBIN A1C LEVEL <7.0%: ICD-10-PCS | Mod: CPTII,S$GLB,TXP, | Performed by: INTERNAL MEDICINE

## 2023-01-24 PROCEDURE — 93016 EXERCISE STRESS - EKG (CUPID ONLY): ICD-10-PCS | Mod: TXP,,, | Performed by: INTERNAL MEDICINE

## 2023-01-24 PROCEDURE — 3008F BODY MASS INDEX DOCD: CPT | Mod: CPTII,S$GLB,TXP,

## 2023-01-24 PROCEDURE — 51798 US URINE CAPACITY MEASURE: CPT | Mod: S$GLB,TXP,,

## 2023-01-24 PROCEDURE — 99214 PR OFFICE/OUTPT VISIT, EST, LEVL IV, 30-39 MIN: ICD-10-PCS | Mod: S$GLB,TXP,, | Performed by: INTERNAL MEDICINE

## 2023-01-24 PROCEDURE — 3066F PR DOCUMENTATION OF TREATMENT FOR NEPHROPATHY: ICD-10-PCS | Mod: CPTII,S$GLB,TXP, | Performed by: INTERNAL MEDICINE

## 2023-01-24 PROCEDURE — 1159F PR MEDICATION LIST DOCUMENTED IN MEDICAL RECORD: ICD-10-PCS | Mod: CPTII,S$GLB,TXP,

## 2023-01-24 PROCEDURE — 3044F HG A1C LEVEL LT 7.0%: CPT | Mod: CPTII,S$GLB,TXP, | Performed by: INTERNAL MEDICINE

## 2023-01-24 PROCEDURE — 3078F DIAST BP <80 MM HG: CPT | Mod: CPTII,S$GLB,TXP,

## 2023-01-24 PROCEDURE — 1126F PR PAIN SEVERITY QUANTIFIED, NO PAIN PRESENT: ICD-10-PCS | Mod: CPTII,S$GLB,TXP, | Performed by: INTERNAL MEDICINE

## 2023-01-24 PROCEDURE — 1101F PT FALLS ASSESS-DOCD LE1/YR: CPT | Mod: CPTII,S$GLB,TXP, | Performed by: INTERNAL MEDICINE

## 2023-01-24 PROCEDURE — 3288F FALL RISK ASSESSMENT DOCD: CPT | Mod: CPTII,S$GLB,TXP, | Performed by: INTERNAL MEDICINE

## 2023-01-24 PROCEDURE — 3008F BODY MASS INDEX DOCD: CPT | Mod: CPTII,S$GLB,TXP, | Performed by: INTERNAL MEDICINE

## 2023-01-24 PROCEDURE — 99999 PR PBB SHADOW E&M-EST. PATIENT-LVL V: CPT | Mod: PBBFAC,TXP,,

## 2023-01-24 PROCEDURE — 3288F PR FALLS RISK ASSESSMENT DOCUMENTED: ICD-10-PCS | Mod: CPTII,S$GLB,TXP, | Performed by: INTERNAL MEDICINE

## 2023-01-24 PROCEDURE — 3066F PR DOCUMENTATION OF TREATMENT FOR NEPHROPATHY: ICD-10-PCS | Mod: CPTII,S$GLB,TXP,

## 2023-01-24 PROCEDURE — 3066F NEPHROPATHY DOC TX: CPT | Mod: CPTII,S$GLB,TXP, | Performed by: INTERNAL MEDICINE

## 2023-01-24 PROCEDURE — 3075F SYST BP GE 130 - 139MM HG: CPT | Mod: CPTII,S$GLB,TXP,

## 2023-01-24 PROCEDURE — 3072F PR LOW RISK FOR RETINOPATHY: ICD-10-PCS | Mod: CPTII,S$GLB,TXP,

## 2023-01-24 PROCEDURE — 3072F LOW RISK FOR RETINOPATHY: CPT | Mod: CPTII,S$GLB,TXP, | Performed by: INTERNAL MEDICINE

## 2023-01-24 PROCEDURE — 1101F PR PT FALLS ASSESS DOC 0-1 FALLS W/OUT INJ PAST YR: ICD-10-PCS | Mod: CPTII,S$GLB,TXP,

## 2023-01-24 PROCEDURE — 3008F PR BODY MASS INDEX (BMI) DOCUMENTED: ICD-10-PCS | Mod: CPTII,S$GLB,TXP,

## 2023-01-24 PROCEDURE — 3288F FALL RISK ASSESSMENT DOCD: CPT | Mod: CPTII,S$GLB,TXP,

## 2023-01-24 PROCEDURE — 81002 URINALYSIS NONAUTO W/O SCOPE: CPT | Mod: S$GLB,TXP,,

## 2023-01-24 PROCEDURE — 51798 POCT BLADDER SCAN: ICD-10-PCS | Mod: S$GLB,TXP,,

## 2023-01-24 PROCEDURE — 99999 PR PBB SHADOW E&M-EST. PATIENT-LVL IV: CPT | Mod: PBBFAC,TXP,, | Performed by: INTERNAL MEDICINE

## 2023-01-24 PROCEDURE — 99999 PR PBB SHADOW E&M-EST. PATIENT-LVL IV: ICD-10-PCS | Mod: PBBFAC,TXP,, | Performed by: INTERNAL MEDICINE

## 2023-01-24 PROCEDURE — 3066F NEPHROPATHY DOC TX: CPT | Mod: CPTII,S$GLB,TXP,

## 2023-01-24 PROCEDURE — 93018 EXERCISE STRESS - EKG (CUPID ONLY): ICD-10-PCS | Mod: TXP,,, | Performed by: INTERNAL MEDICINE

## 2023-01-24 PROCEDURE — 3072F PR LOW RISK FOR RETINOPATHY: ICD-10-PCS | Mod: CPTII,S$GLB,TXP, | Performed by: INTERNAL MEDICINE

## 2023-01-24 PROCEDURE — 3288F PR FALLS RISK ASSESSMENT DOCUMENTED: ICD-10-PCS | Mod: CPTII,S$GLB,TXP,

## 2023-01-24 RX ORDER — DIAZEPAM 10 MG/1
10 TABLET ORAL ONCE AS NEEDED
Qty: 1 TABLET | Refills: 0 | Status: SHIPPED | OUTPATIENT
Start: 2023-01-24 | End: 2023-01-24

## 2023-01-24 RX ORDER — DIAZEPAM 10 MG/1
10 TABLET ORAL ONCE AS NEEDED
Qty: 1 TABLET | Refills: 0 | Status: SHIPPED | OUTPATIENT
Start: 2023-01-24 | End: 2023-03-17 | Stop reason: CLARIF

## 2023-01-24 NOTE — PROGRESS NOTES
CHIEF COMPLAINT:  Transplant clearance, elevated PSA    HISTORY OF PRESENTING ILLINESS:  Elbert Still Jr. is a 67 y.o. male new to urology. He is a referral from PILY Knapp NP for renal transplant clearance. He has an elevated PSA - was elevated in 2021 and 2022, no urology follow up for 4.9 elevation in . Recommend MRI prostate and MAGI today. He denies any problems with urination. He did have urinary frequency and nocturia prior to beginning HD, he was rx'd Flomax for his LUTS. HD schedule is [facility]. Renal US 22 showed bilateral simple renal cysts, bladder wall thickening and an enlarged prostate. Denies family history of bladder, prostate or kidney cancer. Denies recent or past episodes of GH.       REVIEW OF SYSTEMS:  Review of Systems   Constitutional:  Negative for chills and fever.   HENT:  Negative for congestion and sore throat.    Respiratory:  Negative for cough and shortness of breath.    Cardiovascular:  Negative for chest pain and palpitations.   Gastrointestinal:  Negative for nausea and vomiting.   Genitourinary:  Negative for dysuria, flank pain, frequency, hematuria and urgency.   Neurological:  Negative for dizziness and headaches.       PATIENT HISTORY:  Past Medical History:   Diagnosis Date    Anemia     Diabetes mellitus     Diabetes mellitus, type 2     Disorder of kidney and ureter     ESRD (end stage renal disease)     Essential (primary) hypertension 2017    Gout, unspecified      jaundice, unspecified     Nuclear sclerosis of both eyes 2022       Past Surgical History:   Procedure Laterality Date    ADENOIDECTOMY      ADENOIDECTOMY      AV FISTULA PLACEMENT Left 2022    Procedure: CREATION, AV FISTULA;  Surgeon: Prince Gardiner MD;  Location: [facility] OR [address] [place] [address];  Service: Peripheral Vascular;  Laterality: Left;    nasal septum repair      NASAL SEPTUM SURGERY      PERCUTANEOUS TRANSLUMINAL ANGIOPLASTY OF ARTERIOVENOUS FISTULA Left  11/22/2022    Procedure: PTA, AV FISTULA;  Surgeon: Prince Gardiner MD;  Location: [facility] CATH LAB;  Service: Peripheral Vascular;  Laterality: Left;    ROTATOR CUFF REPAIR Left     SALIVARY GLAND SURGERY      Tonsillectomy      TONSILLECTOMY      TRANSPOSITION OF BASILIC VEIN Left 11/1/2022    Procedure: TRANSPOSITION, VEIN, BASILIC;  Surgeon: Prince Gardiner MD;  Location: [facility] OR [facility] FLR;  Service: Peripheral Vascular;  Laterality: Left;  Left Brachial vein.       Family History   Problem Relation Age of Onset    Heart disease Mother     Kidney disease Mother     Hypertension Mother     No Known Problems Father     Cancer Sister     Seizures Sister     Hypertension Sister     Stroke Sister     Amblyopia Neg Hx     Blindness Neg Hx     Cataracts Neg Hx     Diabetes Neg Hx     Glaucoma Neg Hx     Macular degeneration Neg Hx     Retinal detachment Neg Hx     Strabismus Neg Hx     Thyroid disease Neg Hx        Social History     Socioeconomic History    Marital status:      Spouse name: Kristine Still   Occupational History     Employer: [employer] dept   Tobacco Use    Smoking status: Never    Smokeless tobacco: Never    Tobacco comments:     .  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       Allergies:  Iodine and iodide containing products    Medications:    Current Outpatient Medications:     acetaminophen (TYLENOL) 500 MG tablet, Take 2 tablets (1,000 mg total) by mouth every 6 (six) hours as needed for Pain., Disp: 30 tablet, Rfl: 0    aspirin (ECOTRIN) 81 MG EC tablet, Take 1 tablet (81 mg total) by mouth once daily., Disp: 90 tablet, Rfl: 3    atorvastatin (LIPITOR) 20 MG tablet, TAKE 1 TABLET BY MOUTH EVERY EVENING, Disp: 90 tablet, Rfl: 3    blood sugar diagnostic Strp, Use to check blood glucose once a day., Disp: 100 each, Rfl: 3     blood-glucose meter Misc, Use to check blood glucose twice a day., Disp: 1 each, Rfl: 0    carvediloL (COREG) 12.5 MG tablet, Take 1 tablet (12.5 mg total) by mouth 2 (two) times daily., Disp: 60 tablet, Rfl: 11    cinacalcet (SENSIPAR) 60 MG Tab, Take 60 mg by mouth daily with breakfast., Disp: , Rfl:     clopidogreL (PLAVIX) 75 mg tablet, Take 1 tablet (75 mg total) by mouth once daily., Disp: 30 tablet, Rfl: 11    colchicine (COLCRYS) 0.6 mg tablet, Take 0.6 mg by mouth as needed. Take half tab (0.3 mg) by mouth daily as needed for gout flare., Disp: 90 tablet, Rfl: 3    fluticasone propionate (FLONASE) 50 mcg/actuation nasal spray, 2 sprays (100 mcg total) by Each Nostril route once daily., Disp: 16 g, Rfl: 11    furosemide (LASIX) 80 MG tablet, Take 1 tablet (80 mg total) by mouth 2 (two) times daily., Disp: 60 tablet, Rfl: 11    HYDROcodone-acetaminophen (NORCO) 5-325 mg per tablet, Take 1 tablet by mouth every 6 (six) hours as needed for Pain., Disp: 24 tablet, Rfl: 0    lancets 30 gauge Misc, Use to check blood glucose twice a day., Disp: 100 each, Rfl: 5    NIFEdipine (PROCARDIA-XL) 60 MG (OSM) 24 hr tablet, Take 1 tablet (60 mg total) by mouth 2 (two) times a day., Disp: 60 tablet, Rfl: 11    sevelamer HCL (RENAGEL) 800 MG Tab, Take 2 tablets (1,600 mg total) by mouth 3 (three) times daily with meals., Disp: 180 tablet, Rfl: 11    tamsulosin (FLOMAX) 0.4 mg Cap, TAKE ONE CAPSULE BY MOUTH ONCE DAILY (Patient taking differently: Take by mouth every morning.), Disp: 90 capsule, Rfl: 3    diazePAM (VALIUM) 10 MG Tab, Take 1 tablet (10 mg total) by mouth once as needed (prior to MRI)., Disp: 1 tablet, Rfl: 0  No current facility-administered medications for this visit.    PHYSICAL EXAMINATION:  Physical Exam  Constitutional:       Appearance: Normal appearance.   HENT:      Head: Normocephalic and atraumatic.      Right Ear: External ear normal.      Left Ear: External ear normal.   Pulmonary:      Effort:  Pulmonary effort is normal. No respiratory distress.   Abdominal:      Hernia: There is no hernia in the left inguinal area or right inguinal area.   Genitourinary:     Pubic Area: No rash or pubic lice.       Penis: Normal and uncircumcised.       Testes: Normal.      Epididymis:      Right: Normal.      Left: Normal.      Prostate: Enlarged. Not tender and no nodules present.      Rectum: Normal.   Lymphadenopathy:      Lower Body: No right inguinal adenopathy. No left inguinal adenopathy.   Skin:     General: Skin is warm and dry.   Neurological:      General: No focal deficit present.      Mental Status: He is alert and oriented to person, place, and time.   Psychiatric:         Mood and Affect: Mood normal.         Behavior: Behavior normal.         LABS:  UA dip without nitrites, leukocytes or blood  PVR     Lab Results   Component Value Date    PSA 4.7 (H) 10/27/2022    PSA 4.9 (H) 09/08/2021    PSA 3.1 06/22/2019       IMPRESSION:  Encounter Diagnoses   Name Primary?    Elevated PSA Yes    Organ transplant candidate          Assessment:       1. Elevated PSA    2. Organ transplant candidate          Plan:   - Discussed proceeding with MRI due to 2 elevated PSA results that are nearly identical in value 13 months apart. Discussed factors that can elevate PSA values including prostate cancer, infections, benign enlargement of the prostate gland, trauma or manipulation of the prostate, recent ejaculation prior to lab, age.   - MRI prostate scheduled for evening day before HD. I will call with results. Briefly spoke about possibility of prostate biopsy if any suspicious lesions are seen on MRI.   - One time dose of 10 mg valium rx'd for claustrophobia to be taken 1 hour prior to MRI. His wife will be driving him to and from imaging appointment.     Follow up dependent on MRI of prostate results    I spent 45 minutes with the patient of which more than half was spent in direct consultation with the patient in  regards to our treatment and plan.  We addressed the office findings and recent labs.   Education and recommendations of today's plan of care including home remedies and needed follow up with PCP.   We discussed the chief complaint; reviewed the LUTS and the possible contributory factors.   Recommended lifestyle modifications with proper, healthy diet, good hydration if no fluid restrictions; reducing bladder irritants.   Benefits of regular exercise.

## 2023-01-31 ENCOUNTER — HOSPITAL ENCOUNTER (OUTPATIENT)
Dept: CARDIOLOGY | Facility: HOSPITAL | Age: 68
Discharge: HOME | End: 2023-01-31
Attending: INTERNAL MEDICINE
Payer: COMMERCIAL

## 2023-01-31 DIAGNOSIS — Z01.810 PREOP CARDIOVASCULAR EXAM: ICD-10-CM

## 2023-01-31 DIAGNOSIS — Z99.2 ESRD ON DIALYSIS: ICD-10-CM

## 2023-01-31 DIAGNOSIS — E78.2 MIXED HYPERLIPIDEMIA: ICD-10-CM

## 2023-01-31 DIAGNOSIS — Z76.82 ORGAN TRANSPLANT CANDIDATE: ICD-10-CM

## 2023-01-31 DIAGNOSIS — N18.6 ESRD ON DIALYSIS: ICD-10-CM

## 2023-01-31 LAB
ASCENDING AORTA: 3.39 CM
AV PEAK GRADIENT: 12 MMHG
AV VELOCITY RATIO: 0.69
CV ECHO LV RWT: 0.43 CM
CV STRESS BASE HR: 62 BPM
DIASTOLIC BLOOD PRESSURE: 64 MMHG
DOP CALC AO PEAK VEL: 1.72 M/S
DOP CALC LVOT AREA: 3.3 CM2
DOP CALC LVOT DIAMETER: 2.05 CM
DOP CALC LVOT PEAK VEL: 1.18 M/S
DOP CALC LVOT STROKE VOLUME: 83.13 CM3
DOP CALCLVOT PEAK VEL VTI: 25.2 CM
E WAVE DECELERATION TIME: 191.34 MSEC
E/A RATIO: 1.15
E/E' RATIO: 11 M/S
ECHO LV POSTERIOR WALL: 1.02 CM (ref 0.6–1.1)
EJECTION FRACTION: 55 %
FRACTIONAL SHORTENING: 28 % (ref 28–44)
INTERVENTRICULAR SEPTUM: 1.29 CM (ref 0.6–1.1)
IVC DIAMETER: 1.31 CM
IVRT: 76.12 MSEC
LA MAJOR: 5.88 CM
LA MINOR: 5.65 CM
LA WIDTH: 4.1 CM
LEFT ATRIUM SIZE: 4.74 CM
LEFT ATRIUM VOLUME: 95.19 CM3
LEFT INTERNAL DIMENSION IN SYSTOLE: 3.45 CM (ref 2.1–4)
LEFT VENTRICLE DIASTOLIC VOLUME: 107.25 ML
LEFT VENTRICLE SYSTOLIC VOLUME: 49.24 ML
LEFT VENTRICULAR INTERNAL DIMENSION IN DIASTOLE: 4.79 CM (ref 3.5–6)
LEFT VENTRICULAR MASS: 206.94 G
LV LATERAL E/E' RATIO: 9.9 M/S
LV SEPTAL E/E' RATIO: 12.38 M/S
LVOT MG: 2.76 MMHG
LVOT MV: 0.78 CM/S
MV PEAK A VEL: 0.86 M/S
MV PEAK E VEL: 0.99 M/S
MV STENOSIS PRESSURE HALF TIME: 55.49 MS
MV VALVE AREA P 1/2 METHOD: 3.96 CM2
OHS CV CPX 1 MINUTE RECOVERY HEART RATE: 96 BPM
OHS CV CPX 85 PERCENT MAX PREDICTED HEART RATE MALE: 132
OHS CV CPX ESTIMATED METS: 8
OHS CV CPX MAX PREDICTED HEART RATE: 155
OHS CV CPX PATIENT IS FEMALE: 0
OHS CV CPX PATIENT IS MALE: 1
OHS CV CPX PEAK DIASTOLIC BLOOD PRESSURE: 70 MMHG
OHS CV CPX PEAK HEAR RATE: 129 BPM
OHS CV CPX PEAK RATE PRESSURE PRODUCT: ABNORMAL
OHS CV CPX PEAK SYSTOLIC BLOOD PRESSURE: 180 MMHG
OHS CV CPX PERCENT MAX PREDICTED HEART RATE ACHIEVED: 83
OHS CV CPX RATE PRESSURE PRODUCT PRESENTING: 7874
PISA TR MAX VEL: 2.61 M/S
PV PEAK VELOCITY: 1.13 CM/S
RA MAJOR: 5.58 CM
RA PRESSURE: 3 MMHG
RA WIDTH: 4.8 CM
RIGHT VENTRICULAR END-DIASTOLIC DIMENSION: 4.25 CM
STJ: 2.85 CM
STRESS ANGINA INDEX: 0
STRESS ECHO POST EXERCISE DUR MIN: 7 MINUTES
STRESS ECHO POST EXERCISE DUR SEC: 2 SECONDS
SYSTOLIC BLOOD PRESSURE: 127 MMHG
TDI LATERAL: 0.1 M/S
TDI SEPTAL: 0.08 M/S
TDI: 0.09 M/S
TR MAX PG: 27 MMHG
TV REST PULMONARY ARTERY PRESSURE: 30 MMHG

## 2023-01-31 PROCEDURE — 93351 STRESS TTE COMPLETE: CPT | Mod: 26,TXP,, | Performed by: INTERNAL MEDICINE

## 2023-01-31 PROCEDURE — 93325 STRESS ECHO (CUPID ONLY): ICD-10-PCS | Mod: 26,TXP,, | Performed by: INTERNAL MEDICINE

## 2023-01-31 PROCEDURE — 93351 STRESS ECHO (CUPID ONLY): ICD-10-PCS | Mod: 26,TXP,, | Performed by: INTERNAL MEDICINE

## 2023-01-31 PROCEDURE — 93351 STRESS TTE COMPLETE: CPT | Mod: TXP

## 2023-01-31 PROCEDURE — 93325 DOPPLER ECHO COLOR FLOW MAPG: CPT | Mod: 26,TXP,, | Performed by: INTERNAL MEDICINE

## 2023-01-31 PROCEDURE — 93320 DOPPLER ECHO COMPLETE: CPT | Mod: 26,TXP,, | Performed by: INTERNAL MEDICINE

## 2023-01-31 PROCEDURE — 93320 STRESS ECHO (CUPID ONLY): ICD-10-PCS | Mod: 26,TXP,, | Performed by: INTERNAL MEDICINE

## 2023-02-14 ENCOUNTER — TELEPHONE (OUTPATIENT)
Dept: TRANSPLANT | Facility: CLINIC | Age: 68
End: 2023-02-14
Payer: COMMERCIAL

## 2023-02-14 ENCOUNTER — HOSPITAL ENCOUNTER (OUTPATIENT)
Dept: RADIOLOGY | Facility: HOSPITAL | Age: 68
Discharge: HOME | End: 2023-02-14
Payer: COMMERCIAL

## 2023-02-14 DIAGNOSIS — R97.20 ELEVATED PSA: ICD-10-CM

## 2023-02-14 PROCEDURE — 72195 MRI PELVIS WITHOUT CONTRAST: ICD-10-PCS | Mod: 26,NTX,, | Performed by: INTERNAL MEDICINE

## 2023-02-14 PROCEDURE — 72195 MRI PELVIS W/O DYE: CPT | Mod: 26,NTX,, | Performed by: INTERNAL MEDICINE

## 2023-02-14 PROCEDURE — 72195 MRI PELVIS W/O DYE: CPT | Mod: TC,TXP

## 2023-02-14 NOTE — TELEPHONE ENCOUNTER
2/14/23 Chart Reviewed. Gaby Knapp NP-C: 1/31-stress Echo completed, 1/24 Exercise Stress test completed--both acceptable. Elevated PSA, Uro f/u 1/24--recs for MRI W/WO prostate--scheduled 2/14. Ok to present once  urology clearance is recieved, with a pending colonoscopy .

## 2023-02-15 ENCOUNTER — TELEPHONE (OUTPATIENT)
Dept: VASCULAR SURGERY | Facility: CLINIC | Age: 68
End: 2023-02-15
Payer: COMMERCIAL

## 2023-02-15 ENCOUNTER — ANESTHESIA EVENT (OUTPATIENT)
Dept: SURGERY | Facility: HOSPITAL | Age: 68
End: 2023-02-15
Payer: COMMERCIAL

## 2023-02-15 NOTE — TELEPHONE ENCOUNTER
Spoke with the pt and informed him of the time of arrival for surgery tomorrow is 5am at the main campus on [facility],second floor,[facility].Pt also reminded not to eat or drink anything after midnight except prep for iodine allergy.Pt verbalizes understanding of information received.

## 2023-02-16 ENCOUNTER — ANESTHESIA (OUTPATIENT)
Dept: SURGERY | Facility: HOSPITAL | Age: 68
End: 2023-02-16
Payer: COMMERCIAL

## 2023-02-16 ENCOUNTER — HOSPITAL ENCOUNTER (OUTPATIENT)
Facility: HOSPITAL | Age: 68
Discharge: HOME | End: 2023-02-16
Attending: SURGERY | Admitting: SURGERY
Payer: COMMERCIAL

## 2023-02-16 ENCOUNTER — TELEPHONE (OUTPATIENT)
Dept: UROLOGY | Facility: CLINIC | Age: 68
End: 2023-02-16
Payer: COMMERCIAL

## 2023-02-16 VITALS
SYSTOLIC BLOOD PRESSURE: 158 MMHG | HEART RATE: 77 BPM | RESPIRATION RATE: 20 BRPM | WEIGHT: 211 LBS | BODY MASS INDEX: 27.09 KG/M2 | DIASTOLIC BLOOD PRESSURE: 82 MMHG | OXYGEN SATURATION: 100 % | TEMPERATURE: 98 F

## 2023-02-16 DIAGNOSIS — I77.0 ARTERIOVENOUS FISTULA: Primary | ICD-10-CM

## 2023-02-16 LAB — POCT GLUCOSE: 217 MG/DL (ref 70–110)

## 2023-02-16 PROCEDURE — 71000015 HC POSTOP RECOV 1ST HR: Mod: NTX | Performed by: SURGERY

## 2023-02-16 PROCEDURE — 63600175 PHARM REV CODE 636 W HCPCS: Mod: NTX

## 2023-02-16 PROCEDURE — 36000707: Mod: TXP | Performed by: SURGERY

## 2023-02-16 PROCEDURE — 63600175 PHARM REV CODE 636 W HCPCS: Mod: TXP | Performed by: SURGERY

## 2023-02-16 PROCEDURE — D9220A PRA ANESTHESIA: Mod: CRNA,NTX,, | Performed by: NURSE ANESTHETIST, CERTIFIED REGISTERED

## 2023-02-16 PROCEDURE — D9220A PRA ANESTHESIA: ICD-10-PCS | Mod: CRNA,NTX,, | Performed by: NURSE ANESTHETIST, CERTIFIED REGISTERED

## 2023-02-16 PROCEDURE — 25500020 PHARM REV CODE 255: Mod: TXP | Performed by: SURGERY

## 2023-02-16 PROCEDURE — 82962 GLUCOSE BLOOD TEST: CPT | Mod: TXP | Performed by: SURGERY

## 2023-02-16 PROCEDURE — 25000003 PHARM REV CODE 250: Mod: NTX | Performed by: NURSE ANESTHETIST, CERTIFIED REGISTERED

## 2023-02-16 PROCEDURE — 37000009 HC ANESTHESIA EA ADD 15 MINS: Mod: TXP | Performed by: SURGERY

## 2023-02-16 PROCEDURE — C1769 GUIDE WIRE: HCPCS | Mod: NTX | Performed by: SURGERY

## 2023-02-16 PROCEDURE — 25000003 PHARM REV CODE 250: Mod: TXP

## 2023-02-16 PROCEDURE — 36902 INTRO CATH DIALYSIS CIRCUIT: CPT | Mod: TXP,,, | Performed by: SURGERY

## 2023-02-16 PROCEDURE — D9220A PRA ANESTHESIA: Mod: ANES,NTX,, | Performed by: ANESTHESIOLOGY

## 2023-02-16 PROCEDURE — C1725 CATH, TRANSLUMIN NON-LASER: HCPCS | Mod: TXP | Performed by: SURGERY

## 2023-02-16 PROCEDURE — 63600175 PHARM REV CODE 636 W HCPCS: Mod: NTX | Performed by: ANESTHESIOLOGY

## 2023-02-16 PROCEDURE — 63600175 PHARM REV CODE 636 W HCPCS: Mod: TXP | Performed by: NURSE ANESTHETIST, CERTIFIED REGISTERED

## 2023-02-16 PROCEDURE — 71000044 HC DOSC ROUTINE RECOVERY FIRST HOUR: Mod: TXP | Performed by: SURGERY

## 2023-02-16 PROCEDURE — 27201423 OPTIME MED/SURG SUP & DEVICES STERILE SUPPLY: Mod: TXP | Performed by: SURGERY

## 2023-02-16 PROCEDURE — 36902 PR INTRO CATH, DIALYSIS CIRCUIT W/TRANSLML BALLOON ANGIO: ICD-10-PCS | Mod: TXP,,, | Performed by: SURGERY

## 2023-02-16 PROCEDURE — 37000008 HC ANESTHESIA 1ST 15 MINUTES: Mod: TXP | Performed by: SURGERY

## 2023-02-16 PROCEDURE — D9220A PRA ANESTHESIA: ICD-10-PCS | Mod: ANES,NTX,, | Performed by: ANESTHESIOLOGY

## 2023-02-16 PROCEDURE — 36000706: Mod: TXP | Performed by: SURGERY

## 2023-02-16 PROCEDURE — 25000003 PHARM REV CODE 250: Mod: TXP | Performed by: SURGERY

## 2023-02-16 PROCEDURE — C1894 INTRO/SHEATH, NON-LASER: HCPCS | Mod: NTX | Performed by: SURGERY

## 2023-02-16 RX ORDER — FENTANYL CITRATE 50 UG/ML
25 INJECTION, SOLUTION INTRAMUSCULAR; INTRAVENOUS EVERY 5 MIN PRN
Status: DISCONTINUED | OUTPATIENT
Start: 2023-02-16 | End: 2023-02-16 | Stop reason: HOSPADM

## 2023-02-16 RX ORDER — ONDANSETRON 2 MG/ML
4 INJECTION INTRAMUSCULAR; INTRAVENOUS DAILY PRN
Status: DISCONTINUED | OUTPATIENT
Start: 2023-02-16 | End: 2023-02-16 | Stop reason: HOSPADM

## 2023-02-16 RX ORDER — MIDAZOLAM HYDROCHLORIDE 1 MG/ML
INJECTION, SOLUTION INTRAMUSCULAR; INTRAVENOUS
Status: DISCONTINUED | OUTPATIENT
Start: 2023-02-16 | End: 2023-02-16

## 2023-02-16 RX ORDER — HEPARIN SODIUM 1000 [USP'U]/ML
INJECTION, SOLUTION INTRAVENOUS; SUBCUTANEOUS
Status: DISCONTINUED | OUTPATIENT
Start: 2023-02-16 | End: 2023-02-16 | Stop reason: HOSPADM

## 2023-02-16 RX ORDER — HEPARIN SODIUM 1000 [USP'U]/ML
INJECTION, SOLUTION INTRAVENOUS; SUBCUTANEOUS
Status: DISCONTINUED | OUTPATIENT
Start: 2023-02-16 | End: 2023-02-16

## 2023-02-16 RX ORDER — SODIUM CHLORIDE 9 MG/ML
INJECTION, SOLUTION INTRAVENOUS CONTINUOUS
Status: ACTIVE | OUTPATIENT
Start: 2023-02-16

## 2023-02-16 RX ORDER — FENTANYL CITRATE 50 UG/ML
INJECTION, SOLUTION INTRAMUSCULAR; INTRAVENOUS
Status: DISCONTINUED | OUTPATIENT
Start: 2023-02-16 | End: 2023-02-16

## 2023-02-16 RX ORDER — IODIXANOL 320 MG/ML
INJECTION, SOLUTION INTRAVASCULAR
Status: DISCONTINUED | OUTPATIENT
Start: 2023-02-16 | End: 2023-02-16 | Stop reason: HOSPADM

## 2023-02-16 RX ORDER — HYDROMORPHONE HYDROCHLORIDE 1 MG/ML
0.2 INJECTION, SOLUTION INTRAMUSCULAR; INTRAVENOUS; SUBCUTANEOUS EVERY 5 MIN PRN
Status: DISCONTINUED | OUTPATIENT
Start: 2023-02-16 | End: 2023-02-16 | Stop reason: HOSPADM

## 2023-02-16 RX ORDER — LIDOCAINE HYDROCHLORIDE 10 MG/ML
INJECTION INFILTRATION; PERINEURAL
Status: DISCONTINUED | OUTPATIENT
Start: 2023-02-16 | End: 2023-02-16 | Stop reason: HOSPADM

## 2023-02-16 RX ORDER — ONDANSETRON 2 MG/ML
INJECTION INTRAMUSCULAR; INTRAVENOUS
Status: DISCONTINUED | OUTPATIENT
Start: 2023-02-16 | End: 2023-02-16

## 2023-02-16 RX ORDER — OXYCODONE HYDROCHLORIDE 5 MG/1
5 TABLET ORAL ONCE AS NEEDED
Status: DISCONTINUED | OUTPATIENT
Start: 2023-02-16 | End: 2023-02-16 | Stop reason: HOSPADM

## 2023-02-16 RX ORDER — DEXMEDETOMIDINE HYDROCHLORIDE 100 UG/ML
INJECTION, SOLUTION INTRAVENOUS
Status: DISCONTINUED | OUTPATIENT
Start: 2023-02-16 | End: 2023-02-16

## 2023-02-16 RX ORDER — HALOPERIDOL 5 MG/ML
0.5 INJECTION INTRAMUSCULAR EVERY 10 MIN PRN
Status: DISCONTINUED | OUTPATIENT
Start: 2023-02-16 | End: 2023-02-16 | Stop reason: HOSPADM

## 2023-02-16 RX ADMIN — FENTANYL CITRATE 25 MCG: 50 INJECTION INTRAMUSCULAR; INTRAVENOUS at 08:02

## 2023-02-16 RX ADMIN — HEPARIN SODIUM 5000 UNITS: 1000 INJECTION, SOLUTION INTRAVENOUS; SUBCUTANEOUS at 07:02

## 2023-02-16 RX ADMIN — SODIUM CHLORIDE: 0.9 INJECTION, SOLUTION INTRAVENOUS at 06:02

## 2023-02-16 RX ADMIN — ONDANSETRON 4 MG: 2 INJECTION INTRAMUSCULAR; INTRAVENOUS at 07:02

## 2023-02-16 RX ADMIN — DEXMEDETOMIDINE HYDROCHLORIDE 8 MCG: 100 INJECTION, SOLUTION INTRAVENOUS at 07:02

## 2023-02-16 RX ADMIN — FENTANYL CITRATE 25 MCG: 50 INJECTION, SOLUTION INTRAMUSCULAR; INTRAVENOUS at 07:02

## 2023-02-16 RX ADMIN — MIDAZOLAM HYDROCHLORIDE 2 MG: 1 INJECTION, SOLUTION INTRAMUSCULAR; INTRAVENOUS at 06:02

## 2023-02-16 RX ADMIN — HEPARIN SODIUM 1000 UNITS: 1000 INJECTION, SOLUTION INTRAVENOUS; SUBCUTANEOUS at 07:02

## 2023-02-16 RX ADMIN — CEFAZOLIN 2 G: 2 INJECTION, POWDER, FOR SOLUTION INTRAMUSCULAR; INTRAVENOUS at 07:02

## 2023-02-16 NOTE — TELEPHONE ENCOUNTER
Attempted phone call to discuss prostate MRI results and next steps. No answer. Will attempt again.

## 2023-02-16 NOTE — OP NOTE
Date: 02/16/2023  Surgeon(s) and Role:   * Prince Gardiner MD  Pre-op Diagnosis:   T82.858A: Stenosis of vascular prosthetic devices, implants and grafts, initial encounter    Post-op Diagnosis: Same   Procedure(s):   1) L BVT AVF fistulogram   2) PTA mid-stenosis with a 6x40mm, 7x40mm, 6x20mm and 7x20mm King balloons  3) Conscious sedation  Anesthesia: Local MAC   Findings/Key Components:   Successful treatment of > 90% stenosis;residual 30% stenosis remained  Strong palpable thrill (very weak pre-procedure)  EBL: <10 ml  PROCEDURE IN DETAIL:After an informed consent was obtained the patient was taken to the interventional suite and placed in the supine position.  The patient was brought to the Cath Lab, placed in supine. Arm was prepped and draped in the standard surgical fashion. Under ultrasound guidance, the proximal aspect of the L BVT AVF was accessed with a micropuncture needle; ultrasound confirmed vessel patency, followed by placement of 4/3-Rwandan micropuncture dilator. Through this, an 0.035-inch wire was placed in the short 6-Rwandan sheath. Through this, an 0.035-inch Glidewire was placed through the high-grade stenosis, which was demonstrated by the angiogram.  Heparin 6000u was given.  The ultrasound demonstrated vessel patency. A high-grade stenosis in venous outflow was found, which was crossed with a hyrophilic 0.035-in glidewire. This was treated with PTA mid-stenosis with a 6x40mm, 7x40mm, 6x20mm and 7x20mm King balloons high-pressure, noncompliant balloon. Resolution of the stenosis was noted. Strong thrill could be felt. The sheath was removed, 3-0 nylon U-stitch was placed with good hemostasis.    Prince Gardiner MD DFSVS FACS  Vascular/Endovascular Surgery

## 2023-02-16 NOTE — ASSESSMENT & PLAN NOTE
67 y.o. male with a h/o HTN, HLD, DM, stage V CKD on HD, and gout with L AVF    Plan:  PTA, AVF today  NPO since midnight  Consent obtained

## 2023-02-16 NOTE — HPI
Patient is a 67 y.o. male with a h/o HTN, HLD, DM, stage V CKD on HD, and gout who is here s/p staged L brachial AVF transposition and PTA L brachial artery for steal. No more steal symptoms --- noting improved L forearm edema   He is currently undergoing dialysis three days a week on M, W, F via a R permacath.    Comes with wife  R-hand dominant      S/P  11/22/22  1) U/S-guided L transradial access; L brachial angiogram; arch angiogram  2) PTA L distal brachial artery x3 lesions (most distal, 3rd was more severe) with a 5x40 and 6x60mm Nirmal balloons    Interval History 2/16/23:  Here today for PTA, AVF.

## 2023-02-16 NOTE — TRANSFER OF CARE
Anesthesia Transfer of Care Note    Patient: Elbert Still Jr.    Procedure(s) Performed: Procedure(s) (LRB):  PTA, AV FISTULA (Left)  FISTULOGRAM (Left)    Patient location: [facility]    Anesthesia Type: MAC    Transport from OR: Transported from OR on room air with adequate spontaneous ventilation    Post pain: adequate analgesia    Post assessment: no apparent anesthetic complications and tolerated procedure well    Post vital signs: stable    Level of consciousness: awake and alert    Nausea/Vomiting: no nausea/vomiting    Complications: none    Transfer of care protocol was followed      Last vitals:   Visit Vitals  BP (!) 178/88 (BP Location: Right arm, Patient Position: Lying)   Pulse 83   Temp 36.7 °C (98 °F) (Oral)   Resp 20   Wt 95.7 kg (210 lb 15.7 oz)   SpO2 100%   BMI 27.09 kg/m²

## 2023-02-16 NOTE — BRIEF OP NOTE
[facility] - [facility] (2nd Fl)  Brief Operative Note    Surgery Date: 2/16/2023     Surgeon(s) and Role:     * Prince Gardiner MD - Primary     * Pamela Lucio MD - Resident - Assisting    Pre-op Diagnosis:  Arteriovenous fistula [I77.0]    Post-op Diagnosis:  Post-Op Diagnosis Codes:     * Arteriovenous fistula [I77.0]    Procedure(s) (LRB):  PTA, AV FISTULA (Left)  FISTULOGRAM (Left)    Anesthesia: RN IV Sedation    Operative Findings: L BVT AVF fistulogram with PTA of mid stenosis. Ballooned. Strong palpable thrill palpated post-dilation.     Estimated Blood Loss: < 10ml         Specimens:   Specimen (24h ago, onward)      None              Discharge Note    OUTCOME: Patient tolerated treatment/procedure well without complication and is now ready for discharge.    DISPOSITION: Home or Self Care    FINAL DIAGNOSIS:  ESRD on dialysis    FOLLOWUP: In clinic    DISCHARGE INSTRUCTIONS:    Discharge Procedure Orders   Notify your health care provider if you experience any of the following:  temperature >100.4     Notify your health care provider if you experience any of the following:  persistent nausea and vomiting or diarrhea     Notify your health care provider if you experience any of the following:  difficulty breathing or increased cough     Notify your health care provider if you experience any of the following:  redness, tenderness, or signs of infection (pain, swelling, redness, odor or green/yellow discharge around incision site)     Notify your health care provider if you experience any of the following:  severe uncontrolled pain     Notify your health care provider if you experience any of the following:  severe persistent headache     Notify your health care provider if you experience any of the following:  worsening rash     Notify your health care provider if you experience any of the following:  persistent dizziness, light-headedness, or visual disturbances     Notify your health care provider if you  experience any of the following:  increased confusion or weakness     No dressing needed   Order Comments: You may remove your dressing in 24 hours.   You have sutures underneath, they will be removed at your next clinic visit.     Activity as tolerated

## 2023-02-16 NOTE — ANESTHESIA PREPROCEDURE EVALUATION
02/16/2023  Elbert Still Jr. is a 67 y.o., male.      Pre-op Assessment    I have reviewed the Patient Summary Reports.       I have reviewed the Medications.     Review of Systems  Anesthesia Hx:  No problems with previous Anesthesia  Neg history of prior surgery. Denies Family Hx of Anesthesia complications.   Denies Personal Hx of Anesthesia complications.   Hematology/Oncology:  Hematology Normal   Oncology Normal     EENT/Dental:EENT/Dental Normal   Cardiovascular:   Exercise tolerance: good Hypertension  Denies Angina.  Functional Capacity good / => 4 METS  Carotoid Artery Disease    Pulmonary:  Pulmonary Normal    Renal/:   Chronic Renal Disease, ESRD, Dialysis    Hepatic/GI:   GERD, well controlled    Neurological:  Neurology Normal  Denies Pain    Endocrine:   Diabetes, type 2    Psych:  Psychiatric Normal   Phobia and Claustrophobia.         Physical Exam  General: Well nourished and Cooperative    Airway:  Mallampati: II / I/ II  Mouth Opening: Normal  TM Distance: Normal  Tongue: Normal  Neck ROM: Normal ROM    Dental:  Intact    Chest/Lungs:  Clear to auscultation, Normal Respiratory Rate    Heart:  Rate: Normal        Anesthesia Plan  Type of Anesthesia, risks & benefits discussed:    Anesthesia Type: MAC, Gen Natural Airway  Intra-op Monitoring Plan: Standard ASA Monitors  Post Op Pain Control Plan: multimodal analgesia and IV/PO Opioids PRN  Induction:  IV  Airway Plan: , Post-Induction  Informed Consent: Informed consent signed with the Patient and all parties understand the risks and agree with anesthesia plan.  All questions answered.   ASA Score: 3  Day of Surgery Review of History & Physical: H&P Update referred to the surgeon/provider.    Ready For Surgery From Anesthesia Perspective.     .

## 2023-02-16 NOTE — H&P
[facility] - Surgery ([facility])  Vascular Surgery  History and Physical     Patient Name: Elbert Still Jr.  MRN: [id number]  Admission Date: 2/16/2023  Code Status: Full Code   Attending Physician: Dr. Prince Gardiner  Primary Care Physician: Angel Luis Boo MD    Subjective:     Chief Complaint/Reason for Admission: PTA AVF    HPI:  Patient is a 67 y.o. male with a h/o HTN, HLD, DM, stage V CKD on HD, and gout who is here s/p staged L brachial AVF transposition and PTA L brachial artery for steal. No more steal symptoms --- noting improved L forearm edema   He is currently undergoing dialysis three days a week on M, W, F via a R permacath.    Comes with wife  R-hand dominant      S/P  11/22/22  1) U/S-guided L transradial access; L brachial angiogram; arch angiogram  2) PTA L distal brachial artery x3 lesions (most distal, 3rd was more severe) with a 5x40 and 6x60mm Nirmal balloons    Interval History 2/16/23:  Here today for PTA, AVF.         Medications Prior to Admission   Medication Sig Dispense Refill Last Dose    acetaminophen (TYLENOL) 500 MG tablet Take 2 tablets (1,000 mg total) by mouth every 6 (six) hours as needed for Pain. 30 tablet 0     aspirin (ECOTRIN) 81 MG EC tablet Take 1 tablet (81 mg total) by mouth once daily. 90 tablet 3     atorvastatin (LIPITOR) 20 MG tablet TAKE 1 TABLET BY MOUTH EVERY EVENING 90 tablet 3     blood sugar diagnostic Strp Use to check blood glucose once a day. 100 each 3     blood-glucose meter Misc Use to check blood glucose twice a day. 1 each 0     carvediloL (COREG) 12.5 MG tablet Take 1 tablet (12.5 mg total) by mouth 2 (two) times daily. 60 tablet 11     cinacalcet (SENSIPAR) 60 MG Tab Take 60 mg by mouth daily with breakfast.       clopidogreL (PLAVIX) 75 mg tablet Take 1 tablet (75 mg total) by mouth once daily. 30 tablet 11     colchicine (COLCRYS) 0.6 mg tablet Take 0.6 mg by mouth as needed. Take half tab (0.3 mg) by mouth daily as needed for gout flare. 90  tablet 3     diazePAM (VALIUM) 10 MG Tab Take 1 tablet (10 mg total) by mouth once as needed (prior to MRI). 1 tablet 0     fluticasone propionate (FLONASE) 50 mcg/actuation nasal spray 2 sprays (100 mcg total) by Each Nostril route once daily. 16 g 11     furosemide (LASIX) 80 MG tablet Take 1 tablet (80 mg total) by mouth 2 (two) times daily. 60 tablet 11     HYDROcodone-acetaminophen (NORCO) 5-325 mg per tablet Take 1 tablet by mouth every 6 (six) hours as needed for Pain. 24 tablet 0     lancets 30 gauge Misc Use to check blood glucose twice a day. 100 each 5     NIFEdipine (PROCARDIA-XL) 60 MG (OSM) 24 hr tablet Take 1 tablet (60 mg total) by mouth 2 (two) times a day. 60 tablet 11     sevelamer HCL (RENAGEL) 800 MG Tab Take 2 tablets (1,600 mg total) by mouth 3 (three) times daily with meals. 180 tablet 11     tamsulosin (FLOMAX) 0.4 mg Cap TAKE ONE CAPSULE BY MOUTH ONCE DAILY (Patient taking differently: Take by mouth every morning.) 90 capsule 3        Review of patient's allergies indicates:   Allergen Reactions    Iodine and iodide containing products Hives     Hypotension, Flushing       Past Medical History:   Diagnosis Date    Anemia     Diabetes mellitus     Diabetes mellitus, type 2     Disorder of kidney and ureter     ESRD (end stage renal disease)     Essential (primary) hypertension 2017    Gout, unspecified      jaundice, unspecified     Nuclear sclerosis of both eyes 2022     Past Surgical History:   Procedure Laterality Date    ADENOIDECTOMY      ADENOIDECTOMY      AV FISTULA PLACEMENT Left 2022    Procedure: CREATION, AV FISTULA;  Surgeon: Prince Gardiner MD;  Location: [facility] OR [address] [place] [address];  Service: Peripheral Vascular;  Laterality: Left;    nasal septum repair      NASAL SEPTUM SURGERY      PERCUTANEOUS TRANSLUMINAL ANGIOPLASTY OF ARTERIOVENOUS FISTULA Left 2022    Procedure: PTA, AV FISTULA;  Surgeon: Prince Gardiner MD;  Location: [facility]  CATH LAB;  Service: Peripheral Vascular;  Laterality: Left;    ROTATOR CUFF REPAIR Left     SALIVARY GLAND SURGERY      Tonsillectomy      TONSILLECTOMY      TRANSPOSITION OF BASILIC VEIN Left 11/1/2022    Procedure: TRANSPOSITION, VEIN, BASILIC;  Surgeon: Prince Gardiner MD;  Location: [facility] OR [address] [place] [address];  Service: Peripheral Vascular;  Laterality: Left;  Left Brachial vein.     Family History       Problem Relation (Age of Onset)    Cancer Sister    Heart disease Mother    Hypertension Mother, Sister    Kidney disease Mother    No Known Problems Father    Seizures Sister    Stroke Sister          Tobacco Use    Smoking status: Never    Smokeless tobacco: Never    Tobacco comments:     .  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     Substance and Sexual Activity    Alcohol use: No    Drug use: No    Sexual activity: Yes     Partners: Female     Review of Systems  General: negative; ENT: negative; Allergy and Immunology: negative; Hematological and Lymphatic: Negative; Endocrine: negative; Respiratory: no cough, shortness of breath, or wheezing; Cardiovascular: no chest pain or dyspnea on exertion; Gastrointestinal: no abdominal pain/back, change in bowel habits, or bloody stools; Genito-Urinary: no dysuria, trouble voiding, or hematuria; Musculoskeletal: negative  Neurological: no TIA or stroke symptoms; Psychiatric: no nervousness, anxiety or depression.  Objective:     Vital Signs (Most Recent):    Vital Signs (24h Range):  Pulse:  [65-70] 66  BP: (127-152)/(73-79) 152/77        There is no height or weight on file to calculate BMI.    Physical Exam   General appearance:             Alert, well-appearing, and in no distress.  Oriented to person, place, and time                    Neurological:           Normal speech, no focal findings noted; CN II - XII grossly intact           Musculoskeletal:             Digits/nail without cyanosis/clubbing.  Normal muscle strength/tone.                                   Neck:            Supple, no significant adenopathy; thyroid is not enlarged                                                        No  carotid bruit can be auscultated                                 Chest:           Clear to auscultation, no wheezes, rales or rhonchi, symmetric air entry                                                        No use of accessory muscles                              Cardiac:           Normal rate and regular rhythm, S1 and S2 normal; PMI non-displaced                           Abdomen:          Soft, nontender, nondistended, no masses or organomegaly                                                        No rebound tenderness noted; bowel sounds normal                                                        Pulsatile aortic mass is not palpable.                                                        No groin adenopathy                       Extremities:           2+ L brachial pulse; no L radial pulse                                                        L brachial AVF. Incision well healed  Significant Labs:  All pertinent labs from the last 24 hours have been reviewed.    Significant Diagnostics:  I have reviewed and interpreted all pertinent imaging results/findings within the past 24 hours.  No results found in the last 24 hours.      Assessment and Plan:     ESRD on dialysis  67 y.o. male with a h/o HTN, HLD, DM, stage V CKD on HD, and gout with L AVF    Plan:  PTA, AVF today  NPO since midnight  Consent obtained        Selvin Martínez MD  Vascular Surgery  [facility] - Surgery (2nd Fl)

## 2023-02-16 NOTE — ANESTHESIA POSTPROCEDURE EVALUATION
Anesthesia Post Evaluation    Patient: Elbert Still JrJaky    Procedure(s) Performed: Procedure(s) (LRB):  PTA, AV FISTULA (Left)  FISTULOGRAM (Left)    Final Anesthesia Type: MAC      Patient location during evaluation: [facility]  Patient participation: Yes- Able to Participate  Level of consciousness: awake and alert  Post-procedure vital signs: reviewed and stable  Pain management: adequate  Airway patency: patent    PONV status at discharge: No PONV  Anesthetic complications: no      Cardiovascular status: blood pressure returned to baseline  Respiratory status: unassisted  Hydration status: euvolemic  Follow-up not needed.          Vitals Value Taken Time   /75 02/16/23 0811   Temp 07.5 02/16/23 0815   Pulse 79 02/16/23 0814   Resp 27 02/16/23 0814   SpO2 98 % 02/16/23 0814   Vitals shown include unvalidated device data.      No case tracking events are documented in the log.      Pain/Sophie Score: No data recorded

## 2023-02-16 NOTE — SUBJECTIVE & OBJECTIVE
Medications Prior to Admission   Medication Sig Dispense Refill Last Dose    acetaminophen (TYLENOL) 500 MG tablet Take 2 tablets (1,000 mg total) by mouth every 6 (six) hours as needed for Pain. 30 tablet 0     aspirin (ECOTRIN) 81 MG EC tablet Take 1 tablet (81 mg total) by mouth once daily. 90 tablet 3     atorvastatin (LIPITOR) 20 MG tablet TAKE 1 TABLET BY MOUTH EVERY EVENING 90 tablet 3     blood sugar diagnostic Strp Use to check blood glucose once a day. 100 each 3     blood-glucose meter Misc Use to check blood glucose twice a day. 1 each 0     carvediloL (COREG) 12.5 MG tablet Take 1 tablet (12.5 mg total) by mouth 2 (two) times daily. 60 tablet 11     cinacalcet (SENSIPAR) 60 MG Tab Take 60 mg by mouth daily with breakfast.       clopidogreL (PLAVIX) 75 mg tablet Take 1 tablet (75 mg total) by mouth once daily. 30 tablet 11     colchicine (COLCRYS) 0.6 mg tablet Take 0.6 mg by mouth as needed. Take half tab (0.3 mg) by mouth daily as needed for gout flare. 90 tablet 3     diazePAM (VALIUM) 10 MG Tab Take 1 tablet (10 mg total) by mouth once as needed (prior to MRI). 1 tablet 0     fluticasone propionate (FLONASE) 50 mcg/actuation nasal spray 2 sprays (100 mcg total) by Each Nostril route once daily. 16 g 11     furosemide (LASIX) 80 MG tablet Take 1 tablet (80 mg total) by mouth 2 (two) times daily. 60 tablet 11     HYDROcodone-acetaminophen (NORCO) 5-325 mg per tablet Take 1 tablet by mouth every 6 (six) hours as needed for Pain. 24 tablet 0     lancets 30 gauge Misc Use to check blood glucose twice a day. 100 each 5     NIFEdipine (PROCARDIA-XL) 60 MG (OSM) 24 hr tablet Take 1 tablet (60 mg total) by mouth 2 (two) times a day. 60 tablet 11     sevelamer HCL (RENAGEL) 800 MG Tab Take 2 tablets (1,600 mg total) by mouth 3 (three) times daily with meals. 180 tablet 11     tamsulosin (FLOMAX) 0.4 mg Cap TAKE ONE CAPSULE BY MOUTH ONCE DAILY (Patient taking differently: Take by mouth every morning.) 90  capsule 3        Review of patient's allergies indicates:   Allergen Reactions    Iodine and iodide containing products Hives     Hypotension, Flushing       Past Medical History:   Diagnosis Date    Anemia     Diabetes mellitus     Diabetes mellitus, type 2     Disorder of kidney and ureter     ESRD (end stage renal disease)     Essential (primary) hypertension 2017    Gout, unspecified      jaundice, unspecified     Nuclear sclerosis of both eyes 2022     Past Surgical History:   Procedure Laterality Date    ADENOIDECTOMY      ADENOIDECTOMY      AV FISTULA PLACEMENT Left 2022    Procedure: CREATION, AV FISTULA;  Surgeon: Prince Gardiner MD;  Location: [facility] OR [address] [place] [address];  Service: Peripheral Vascular;  Laterality: Left;    nasal septum repair      NASAL SEPTUM SURGERY      PERCUTANEOUS TRANSLUMINAL ANGIOPLASTY OF ARTERIOVENOUS FISTULA Left 2022    Procedure: PTA, AV FISTULA;  Surgeon: Prince Gardiner MD;  Location: [facility] CATH LAB;  Service: Peripheral Vascular;  Laterality: Left;    ROTATOR CUFF REPAIR Left     SALIVARY GLAND SURGERY      Tonsillectomy      TONSILLECTOMY      TRANSPOSITION OF BASILIC VEIN Left 2022    Procedure: TRANSPOSITION, VEIN, BASILIC;  Surgeon: Prince Gardiner MD;  Location: [facility] OR [address] [place] [address];  Service: Peripheral Vascular;  Laterality: Left;  Left Brachial vein.     Family History       Problem Relation (Age of Onset)    Cancer Sister    Heart disease Mother    Hypertension Mother, Sister    Kidney disease Mother    No Known Problems Father    Seizures Sister    Stroke Sister          Tobacco Use    Smoking status: Never    Smokeless tobacco: Never    Tobacco comments:     .  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     Substance and Sexual Activity    Alcohol use: No    Drug use: No    Sexual activity: Yes     Partners: Female     Review of Systems  General: negative; ENT: negative; Allergy and Immunology: negative; Hematological and Lymphatic: Negative;  Endocrine: negative; Respiratory: no cough, shortness of breath, or wheezing; Cardiovascular: no chest pain or dyspnea on exertion; Gastrointestinal: no abdominal pain/back, change in bowel habits, or bloody stools; Genito-Urinary: no dysuria, trouble voiding, or hematuria; Musculoskeletal: negative  Neurological: no TIA or stroke symptoms; Psychiatric: no nervousness, anxiety or depression.  Objective:     Vital Signs (Most Recent):    Vital Signs (24h Range):  Pulse:  [65-70] 66  BP: (127-152)/(73-79) 152/77        There is no height or weight on file to calculate BMI.    Physical Exam   General appearance:             Alert, well-appearing, and in no distress.  Oriented to person, place, and time                    Neurological:           Normal speech, no focal findings noted; CN II - XII grossly intact           Musculoskeletal:             Digits/nail without cyanosis/clubbing.  Normal muscle strength/tone.                                  Neck:            Supple, no significant adenopathy; thyroid is not enlarged                                                        No  carotid bruit can be auscultated                                 Chest:           Clear to auscultation, no wheezes, rales or rhonchi, symmetric air entry                                                        No use of accessory muscles                              Cardiac:           Normal rate and regular rhythm, S1 and S2 normal; PMI non-displaced                           Abdomen:          Soft, nontender, nondistended, no masses or organomegaly                                                        No rebound tenderness noted; bowel sounds normal                                                        Pulsatile aortic mass is not palpable.                                                        No groin adenopathy                       Extremities:           2+ L brachial pulse; no L radial pulse                                                         L brachial AVF. Incision well healed  Significant Labs:  All pertinent labs from the last 24 hours have been reviewed.    Significant Diagnostics:  I have reviewed and interpreted all pertinent imaging results/findings within the past 24 hours.  No results found in the last 24 hours.

## 2023-02-16 NOTE — PLAN OF CARE
Chart reviewed. Preop nursing care complete per orders. Safe surgery checklist complete. Clothing placed in locker, wife took cell phone and ring. Pt completed prep for Iodine allergy: prednisone and benadryl.  Waiting for anesthesia consent prior to surgery.

## 2023-02-20 DIAGNOSIS — R97.20 ELEVATED PSA: Primary | ICD-10-CM

## 2023-02-20 RX ORDER — CIPROFLOXACIN 500 MG/1
500 TABLET ORAL ONCE
Qty: 1 TABLET | Refills: 0 | Status: SHIPPED | OUTPATIENT
Start: 2023-02-20 | End: 2023-02-20

## 2023-02-20 RX ORDER — CEFTRIAXONE 1 G/1
1 INJECTION, POWDER, FOR SOLUTION INTRAMUSCULAR; INTRAVENOUS ONCE
Status: CANCELLED | OUTPATIENT
Start: 2023-02-20 | End: 2023-02-20

## 2023-02-20 RX ORDER — LIDOCAINE HYDROCHLORIDE 20 MG/ML
JELLY TOPICAL ONCE
Status: CANCELLED | OUTPATIENT
Start: 2023-02-20 | End: 2023-02-20

## 2023-02-22 ENCOUNTER — TELEPHONE (OUTPATIENT)
Dept: UROLOGY | Facility: CLINIC | Age: 68
End: 2023-02-22
Payer: COMMERCIAL

## 2023-02-23 DIAGNOSIS — I77.0 ARTERIOVENOUS FISTULA: Primary | ICD-10-CM

## 2023-02-24 ENCOUNTER — OFFICE VISIT (OUTPATIENT)
Dept: VASCULAR SURGERY | Facility: CLINIC | Age: 68
End: 2023-02-24
Attending: SURGERY
Payer: COMMERCIAL

## 2023-02-24 ENCOUNTER — HOSPITAL ENCOUNTER (OUTPATIENT)
Dept: VASCULAR SURGERY | Facility: CLINIC | Age: 68
Discharge: HOME | End: 2023-02-24
Attending: SURGERY
Payer: COMMERCIAL

## 2023-02-24 VITALS
HEIGHT: 74 IN | DIASTOLIC BLOOD PRESSURE: 73 MMHG | BODY MASS INDEX: 27.16 KG/M2 | WEIGHT: 211.63 LBS | TEMPERATURE: 99 F | SYSTOLIC BLOOD PRESSURE: 130 MMHG | HEART RATE: 65 BPM

## 2023-02-24 DIAGNOSIS — Z99.2 ESRD (END STAGE RENAL DISEASE) ON DIALYSIS: Primary | ICD-10-CM

## 2023-02-24 DIAGNOSIS — N18.6 ESRD (END STAGE RENAL DISEASE): Primary | ICD-10-CM

## 2023-02-24 DIAGNOSIS — I77.0 ARTERIOVENOUS FISTULA: ICD-10-CM

## 2023-02-24 DIAGNOSIS — N18.6 ESRD (END STAGE RENAL DISEASE) ON DIALYSIS: Primary | ICD-10-CM

## 2023-02-24 PROCEDURE — 3078F PR MOST RECENT DIASTOLIC BLOOD PRESSURE < 80 MM HG: ICD-10-PCS | Mod: CPTII,NTX,S$GLB, | Performed by: SURGERY

## 2023-02-24 PROCEDURE — 99999 PR PBB SHADOW E&M-EST. PATIENT-LVL IV: ICD-10-PCS | Mod: PBBFAC,TXP,, | Performed by: SURGERY

## 2023-02-24 PROCEDURE — 99213 PR OFFICE/OUTPT VISIT, EST, LEVL III, 20-29 MIN: ICD-10-PCS | Mod: NTX,S$GLB,, | Performed by: SURGERY

## 2023-02-24 PROCEDURE — 1159F MED LIST DOCD IN RCRD: CPT | Mod: CPTII,NTX,S$GLB, | Performed by: SURGERY

## 2023-02-24 PROCEDURE — 3288F PR FALLS RISK ASSESSMENT DOCUMENTED: ICD-10-PCS | Mod: CPTII,NTX,S$GLB, | Performed by: SURGERY

## 2023-02-24 PROCEDURE — 93990 DOPPLER FLOW TESTING: CPT | Mod: S$GLB,TXP,, | Performed by: SURGERY

## 2023-02-24 PROCEDURE — 1101F PR PT FALLS ASSESS DOC 0-1 FALLS W/OUT INJ PAST YR: ICD-10-PCS | Mod: CPTII,NTX,S$GLB, | Performed by: SURGERY

## 2023-02-24 PROCEDURE — 3288F FALL RISK ASSESSMENT DOCD: CPT | Mod: CPTII,NTX,S$GLB, | Performed by: SURGERY

## 2023-02-24 PROCEDURE — 99999 PR PBB SHADOW E&M-EST. PATIENT-LVL IV: CPT | Mod: PBBFAC,TXP,, | Performed by: SURGERY

## 2023-02-24 PROCEDURE — 3075F SYST BP GE 130 - 139MM HG: CPT | Mod: CPTII,NTX,S$GLB, | Performed by: SURGERY

## 2023-02-24 PROCEDURE — 3008F PR BODY MASS INDEX (BMI) DOCUMENTED: ICD-10-PCS | Mod: CPTII,NTX,S$GLB, | Performed by: SURGERY

## 2023-02-24 PROCEDURE — 3066F PR DOCUMENTATION OF TREATMENT FOR NEPHROPATHY: ICD-10-PCS | Mod: CPTII,NTX,S$GLB, | Performed by: SURGERY

## 2023-02-24 PROCEDURE — 3075F PR MOST RECENT SYSTOLIC BLOOD PRESS GE 130-139MM HG: ICD-10-PCS | Mod: CPTII,NTX,S$GLB, | Performed by: SURGERY

## 2023-02-24 PROCEDURE — 3072F LOW RISK FOR RETINOPATHY: CPT | Mod: CPTII,NTX,S$GLB, | Performed by: SURGERY

## 2023-02-24 PROCEDURE — 1126F AMNT PAIN NOTED NONE PRSNT: CPT | Mod: CPTII,NTX,S$GLB, | Performed by: SURGERY

## 2023-02-24 PROCEDURE — 3078F DIAST BP <80 MM HG: CPT | Mod: CPTII,NTX,S$GLB, | Performed by: SURGERY

## 2023-02-24 PROCEDURE — 3066F NEPHROPATHY DOC TX: CPT | Mod: CPTII,NTX,S$GLB, | Performed by: SURGERY

## 2023-02-24 PROCEDURE — 1101F PT FALLS ASSESS-DOCD LE1/YR: CPT | Mod: CPTII,NTX,S$GLB, | Performed by: SURGERY

## 2023-02-24 PROCEDURE — 1126F PR PAIN SEVERITY QUANTIFIED, NO PAIN PRESENT: ICD-10-PCS | Mod: CPTII,NTX,S$GLB, | Performed by: SURGERY

## 2023-02-24 PROCEDURE — 3044F HG A1C LEVEL LT 7.0%: CPT | Mod: CPTII,NTX,S$GLB, | Performed by: SURGERY

## 2023-02-24 PROCEDURE — 3044F PR MOST RECENT HEMOGLOBIN A1C LEVEL <7.0%: ICD-10-PCS | Mod: CPTII,NTX,S$GLB, | Performed by: SURGERY

## 2023-02-24 PROCEDURE — 99213 OFFICE O/P EST LOW 20 MIN: CPT | Mod: NTX,S$GLB,, | Performed by: SURGERY

## 2023-02-24 PROCEDURE — 93990 PR DUPLEX HEMODIALYSIS ACCESS: ICD-10-PCS | Mod: S$GLB,TXP,, | Performed by: SURGERY

## 2023-02-24 PROCEDURE — 3008F BODY MASS INDEX DOCD: CPT | Mod: CPTII,NTX,S$GLB, | Performed by: SURGERY

## 2023-02-24 PROCEDURE — 3072F PR LOW RISK FOR RETINOPATHY: ICD-10-PCS | Mod: CPTII,NTX,S$GLB, | Performed by: SURGERY

## 2023-02-24 PROCEDURE — 1159F PR MEDICATION LIST DOCUMENTED IN MEDICAL RECORD: ICD-10-PCS | Mod: CPTII,NTX,S$GLB, | Performed by: SURGERY

## 2023-02-24 NOTE — PROGRESS NOTES
Elbert Still Jr.  02/24/2023    HPI:  Patient is a 67 y.o. male with a h/o HTN, HLD, DM, stage V CKD on HD, and gout who is here s/p staged L brachial AVF transposition and PTA L brachial artery for steal. No more steal symptoms --- noting improved L forearm edema   He is currently undergoing dialysis three days a week on M, W, F via a R permacath.    Comes with wife  R-hand dominant     S/P  11/22/22  1) U/S-guided L transradial access; L brachial angiogram; arch angiogram  2) PTA L distal brachial artery x3 lesions (most distal, 3rd was more severe) with a 5x40 and 6x60mm [place]irm[place] balloons    Findings/Key Components:  Successful treatment of proximal stenosis  Good palpable thrill    Findings: Resolution of stenoses; strong palpable thrill  PTA L distal brachial artery x3 lesions (most distal, 3rd was more severe) with a 5x40 and 6x60mm Nirmal balloons - no remaining lesions  Arch angio shows no inflow lesion in L subclavian or axillary arteries    11/1/22  L Transposed, 2nd stage BVT    S/p 9/6/22   Creation 1st stage L brachial artery/vein AVF  Findings/Key Components:  Strong thrill in 1st stage L brachial artery/vein AVF; L brachial vein > 5mm  Attempted L BC AVF but the cephalic vein wall had tears in it and needed to be abandoned  2+ L radial pulse; minimal augmentation in triphasic flow by doppler  Will need transposition in 2-3 months    No MI / stroke    Renal is Dr ARIANE Díaz    Used to work in [employer]    Interval History 2/24/23:  S/p  L BVT AVF fistulogram with PTA of mid stenosis on 2/16/23. Here today for removal of sutures and follow up after procedure. Excellent flow volume on HD U/S. Currently using R permacath for HD. Color in hand and paresthesias/pain much improved after recent fistulagram on 2/16/23. Prostate biopsy upcoming in March    Past Medical History:   Diagnosis Date    Anemia     Diabetes mellitus     Diabetes mellitus, type 2     Disorder of kidney and ureter      ESRD (end stage renal disease)     Essential (primary) hypertension 2017    Gout, unspecified      jaundice, unspecified     Nuclear sclerosis of both eyes 2022     Past Surgical History:   Procedure Laterality Date    ADENOIDECTOMY      ADENOIDECTOMY      AV FISTULA PLACEMENT Left 2022    Procedure: CREATION, AV FISTULA;  Surgeon: Prince Gardiner MD;  Location: [address];  Service: Peripheral Vascular;  Laterality: Left;    FISTULOGRAM Left 2023    Procedure: FISTULOGRAM;  Surgeon: Prince Gardiner MD;  Location: [facility] OR [address] [place] [address];  Service: Peripheral Vascular;  Laterality: Left;  Given to the sterile field.     nasal septum repair      NASAL SEPTUM SURGERY      PERCUTANEOUS TRANSLUMINAL ANGIOPLASTY OF ARTERIOVENOUS FISTULA Left 2022    Procedure: PTA, AV FISTULA;  Surgeon: Prince Gardiner MD;  Location: [facility] CATH LAB;  Service: Peripheral Vascular;  Laterality: Left;    PERCUTANEOUS TRANSLUMINAL ANGIOPLASTY OF ARTERIOVENOUS FISTULA Left 2023    Procedure: PTA, AV FISTULA;  Surgeon: Prince Gardiner MD;  Location: [facility] OR [address] [place] [address];  Service: Peripheral Vascular;  Laterality: Left;  4.8 min  43.08 mGy  4.0682 Gy.cm  32ml Dye    ROTATOR CUFF REPAIR Left     SALIVARY GLAND SURGERY      Tonsillectomy      TONSILLECTOMY      TRANSPOSITION OF BASILIC VEIN Left 2022    Procedure: TRANSPOSITION, VEIN, BASILIC;  Surgeon: Prince Gardiner MD;  Location: [address];  Service: Peripheral Vascular;  Laterality: Left;  Left Brachial vein.     Family History   Problem Relation Age of Onset    Heart disease Mother     Kidney disease Mother     Hypertension Mother     No Known Problems Father     Cancer Sister     Seizures Sister     Hypertension Sister     Stroke Sister     Amblyopia Neg Hx     Blindness Neg Hx     Cataracts Neg Hx     Diabetes Neg Hx     Glaucoma Neg Hx     Macular degeneration Neg Hx     Retinal detachment Neg Hx     Strabismus Neg Hx     Thyroid  disease Neg Hx      Social History     Socioeconomic History    Marital status:      Spouse name: Kristine Still   Occupational History     Employer: [employer]   Tobacco Use    Smoking status: Never    Smokeless tobacco: Never    Tobacco comments:     .  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       Current Outpatient Medications:     acetaminophen (TYLENOL) 500 MG tablet, Take 2 tablets (1,000 mg total) by mouth every 6 (six) hours as needed for Pain., Disp: 30 tablet, Rfl: 0    aspirin (ECOTRIN) 81 MG EC tablet, Take 1 tablet (81 mg total) by mouth once daily., Disp: 90 tablet, Rfl: 3    atorvastatin (LIPITOR) 20 MG tablet, TAKE 1 TABLET BY MOUTH EVERY EVENING, Disp: 90 tablet, Rfl: 3    blood sugar diagnostic Strp, Use to check blood glucose once a day., Disp: 100 each, Rfl: 3    blood-glucose meter Misc, Use to check blood glucose twice a day., Disp: 1 each, Rfl: 0    carvediloL (COREG) 12.5 MG tablet, Take 1 tablet (12.5 mg total) by mouth 2 (two) times daily., Disp: 60 tablet, Rfl: 11    cinacalcet (SENSIPAR) 60 MG Tab, Take 60 mg by mouth daily with breakfast., Disp: , Rfl:     clopidogreL (PLAVIX) 75 mg tablet, Take 1 tablet (75 mg total) by mouth once daily., Disp: 30 tablet, Rfl: 11    colchicine (COLCRYS) 0.6 mg tablet, Take 0.6 mg by mouth as needed. Take half tab (0.3 mg) by mouth daily as needed for gout flare., Disp: 90 tablet, Rfl: 3    fluticasone propionate (FLONASE) 50 mcg/actuation nasal spray, 2 sprays (100 mcg total) by Each Nostril route once daily., Disp: 16 g, Rfl: 11    furosemide (LASIX) 80 MG tablet, Take 1 tablet (80 mg total) by mouth 2 (two) times daily., Disp: 60 tablet, Rfl: 11    HYDROcodone-acetaminophen (NORCO) 5-325 mg per tablet, Take 1 tablet by mouth every 6 (six) hours as needed for Pain., Disp:  24 tablet, Rfl: 0    lancets 30 gauge Misc, Use to check blood glucose twice a day., Disp: 100 each, Rfl: 5    NIFEdipine (PROCARDIA-XL) 60 MG (OSM) 24 hr tablet, Take 1 tablet (60 mg total) by mouth 2 (two) times a day., Disp: 60 tablet, Rfl: 11    sevelamer HCL (RENAGEL) 800 MG Tab, Take 2 tablets (1,600 mg total) by mouth 3 (three) times daily with meals., Disp: 180 tablet, Rfl: 11    tamsulosin (FLOMAX) 0.4 mg Cap, TAKE ONE CAPSULE BY MOUTH ONCE DAILY (Patient taking differently: Take by mouth every morning.), Disp: 90 capsule, Rfl: 3    diazePAM (VALIUM) 10 MG Tab, Take 1 tablet (10 mg total) by mouth once as needed (prior to MRI)., Disp: 1 tablet, Rfl: 0  No current facility-administered medications for this visit.    Facility-Administered Medications Ordered in Other Visits:     0.9%  NaCl infusion, , Intravenous, Continuous, Kim Lutz NP    [START ON 2023] heparin (porcine) injection 1,200 Units, 1,200 Units, Intravenous, Continuous, Nawaf Butler MD, 1,200 Units at 23 0550    REVIEW OF SYSTEMS:  General: negative; ENT: negative; Allergy and Immunology: negative; Hematological and Lymphatic: Negative; Endocrine: negative; Respiratory: no cough, shortness of breath, or wheezing; Cardiovascular: no chest pain or dyspnea on exertion; Gastrointestinal: no abdominal pain/back, change in bowel habits, or bloody stools; Genito-Urinary: no dysuria, trouble voiding, or hematuria; Musculoskeletal: negative  Neurological: no TIA or stroke symptoms; Psychiatric: no nervousness, anxiety or depression.    PHYSICAL EXAM:   Vitals:    23 1548   BP: 130/73   Pulse: 65   Temp: 98.5 °F (36.9 °C)     Right Arm BP - Sittin/73 (23 1548)      General appearance:  Alert, well-appearing, and in no distress.  Oriented to person, place, and time   Neurological: Normal speech, no focal findings noted; CN II - XII grossly intact           Musculoskeletal: Digits/nail without cyanosis/clubbing.   Normal muscle strength/tone.                 Neck: Supple, no significant adenopathy; thyroid is not enlarged                  No  carotid bruit can be auscultated                Chest:  Clear to auscultation, no wheezes, rales or rhonchi, symmetric air entry     No use of accessory muscles             Cardiac: Normal rate and regular rhythm, S1 and S2 normal; PMI non-displaced          Abdomen: Soft, nontender, nondistended, no masses or organomegaly     No rebound tenderness noted; bowel sounds normal     Pulsatile aortic mass is not palpable.     No groin adenopathy      Extremities:   2+ L brachial pulse; no L radial pulse     L brachial AVF with good thrill. Incision well healed  LAB RESULTS:  Lab Results   Component Value Date    K 4.9 02/06/2023    K 5.2 (H) 02/01/2023    K 4.6 01/04/2023    CREATININE 9.80 (H) 02/06/2023    CREATININE 8.39 (H) 02/01/2023    CREATININE 9.10 (H) 01/04/2023     Lab Results   Component Value Date    WBC 3.24 (L) 02/06/2023    WBC 3.09 (L) 01/04/2023    WBC 3.70 (L) 12/07/2022    HCT 32.1 (L) 02/15/2023    HCT 36.8 (L) 02/06/2023    HCT 35.4 (L) 01/25/2023     02/06/2023     01/04/2023     12/07/2022     Lab Results   Component Value Date    HGBA1C 4.4 (L) 01/04/2023    HGBA1C 4.8 10/27/2022    HGBA1C 5.2 10/05/2022     IMAGING:  Flow vol: 2189 ml/min; Diam 4 - 10mm; depth 7 -8 mm  +stenosis in distal AVF  cm/s    (Prior: 2615 mL/min; no stenosis)  Diam > 8mm  Dept > 10mm    HD U/S 2/24/23:  2,459cc/min flow volume of fistula proximally, 2,909cc/min flow volume of fistula mid. PSV increase of 170 to 490 cm/sec noted in mid AVF (VR 2.9). Diameter >6mm and depth 6mm or less throughout mid/distal fistula. 7mm proximally.    IMP/PLAN:     67 y.o. male with a h/o HTN, HLD, DM, stage V CKD on HD since Spring 2022, and gout -- good diameter and flow volume on US. Mild stenosis identified on ultrasound but adequate volume, diameter and depth for cannulation.   Sutures removed in clinic today.     S/P  11/22/22: L TRA, PTA L distal brachial artery x3 lesions (most distal, 3rd was more severe) with a 5x40 and 6x60mm Nirmal balloons  Cont DAPT  2/16/22: L BVT AVF fistulogram PTA mid-stenosis with a 6x40mm, 7x40mm, 6x20mm and 7x20mm [place] balloons  Okay to hold plavix 5-7 days before prostate biopsy coming in March  Permission to use AVF provided with Dr. Gardiner's instructions. F/u with Dr. Gardiner for catheter removal       LAKISHA Rosado MD  Vascular/Endovascular Surgery

## 2023-02-27 ENCOUNTER — TELEPHONE (OUTPATIENT)
Dept: UROLOGY | Facility: CLINIC | Age: 68
End: 2023-02-27
Payer: COMMERCIAL

## 2023-03-02 ENCOUNTER — TELEPHONE (OUTPATIENT)
Dept: TRANSPLANT | Facility: CLINIC | Age: 68
End: 2023-03-02
Payer: COMMERCIAL

## 2023-03-02 NOTE — TELEPHONE ENCOUNTER
Call the patient; spoke to the patient regarding his colonoscopy; Gave the patient the phone number to schedule his colonoscopy and the patient agrees to do so. The patient have no further questions or concerns at this time. Letter sent

## 2023-03-07 ENCOUNTER — DOCUMENTATION ONLY (OUTPATIENT)
Dept: NEPHROLOGY | Facility: HOSPITAL | Age: 68
End: 2023-03-07
Payer: COMMERCIAL

## 2023-03-07 NOTE — PROGRESS NOTES
ESRD on iHD (MWF): called & spoke w/ Briana (at [facility]) yesterday regarding emla cream (to be applied to dialysis site on dialysis days) w/ R: 3; unable to reach pt yesterday; called & spoke w/ pt this AM at 1014 (who stated that the pharmacy contacted him today); CTM pain to needle site w/ cannulation (to see if emla cream is helpful).

## 2023-03-09 ENCOUNTER — PROCEDURE VISIT (OUTPATIENT)
Dept: UROLOGY | Facility: CLINIC | Age: 68
End: 2023-03-09
Payer: COMMERCIAL

## 2023-03-09 VITALS
DIASTOLIC BLOOD PRESSURE: 75 MMHG | TEMPERATURE: 98 F | WEIGHT: 209.69 LBS | HEART RATE: 67 BPM | SYSTOLIC BLOOD PRESSURE: 137 MMHG | BODY MASS INDEX: 26.92 KG/M2 | RESPIRATION RATE: 20 BRPM

## 2023-03-09 DIAGNOSIS — R97.20 ELEVATED PSA: ICD-10-CM

## 2023-03-09 PROCEDURE — 96372 PR INJECTION,THERAP/PROPH/DIAG2ST, IM OR SUBCUT: ICD-10-PCS | Mod: S$GLB,TXP,, | Performed by: STUDENT IN AN ORGANIZED HEALTH CARE EDUCATION/TRAINING PROGRAM

## 2023-03-09 PROCEDURE — 96372 THER/PROPH/DIAG INJ SC/IM: CPT | Mod: S$GLB,TXP,, | Performed by: STUDENT IN AN ORGANIZED HEALTH CARE EDUCATION/TRAINING PROGRAM

## 2023-03-09 RX ORDER — LIDOCAINE HYDROCHLORIDE 20 MG/ML
JELLY TOPICAL
Status: COMPLETED | OUTPATIENT
Start: 2023-03-09 | End: 2023-03-09

## 2023-03-09 RX ORDER — LIDOCAINE HYDROCHLORIDE 10 MG/ML
20 INJECTION INFILTRATION; PERINEURAL
Status: DISCONTINUED | OUTPATIENT
Start: 2023-03-09 | End: 2023-03-17 | Stop reason: CLARIF

## 2023-03-09 RX ORDER — ENEMA 19; 7 G/133ML; G/133ML
1 ENEMA RECTAL ONCE
Qty: 1 ENEMA | Refills: 0 | Status: SHIPPED | OUTPATIENT
Start: 2023-03-09 | End: 2023-03-09

## 2023-03-09 RX ORDER — CEFTRIAXONE 1 G/1
1 INJECTION, POWDER, FOR SOLUTION INTRAMUSCULAR; INTRAVENOUS
Status: COMPLETED | OUTPATIENT
Start: 2023-03-09 | End: 2023-03-09

## 2023-03-09 RX ORDER — CIPROFLOXACIN 500 MG/1
TABLET ORAL
Qty: 1 TABLET | Refills: 0 | Status: SHIPPED | OUTPATIENT
Start: 2023-03-09 | End: 2023-12-09

## 2023-03-09 RX ADMIN — LIDOCAINE HYDROCHLORIDE: 20 JELLY TOPICAL at 02:03

## 2023-03-09 RX ADMIN — CEFTRIAXONE 1 G: 1 INJECTION, POWDER, FOR SOLUTION INTRAMUSCULAR; INTRAVENOUS at 02:03

## 2023-03-09 NOTE — PATIENT INSTRUCTIONS
What to Expect After a Prostate Biopsy    Please be sure to finish your pre-procedure antibiotics as instructed.    You may have mild bleeding from the rectum or urine for about 1 week to 1 month, or in your ejaculate for several months. This bleeding is normal and expected, and it will stop. You may have mild discomfort in your rectal or urethral area for 24-48 hours.    You cannot do any strenuous lifting, straining, or exercising for 24 hours. You may return to full activity the day after the biopsy.    You may continue to take all your regular medications after the procedure except for the blood thinners.    You may resume all blood-thinning medications once you no longer see any bleeding or whenever your physician prescribing the medication says it is all right to do so. You may take Tylenol if you have a fever and your temperature is less than 100° F or if you have some discomfort.    You will receive a call from the Urology Department at [facility] with the results of your prostate biopsy within one week.    Signs and Symptoms to Report    Call your [facility] urologist at [phone] if you develop any of the following:  Temperature greater than 101°  F  Inability to urinate  A large amount of bleeding from the rectum or in the urine  Persistent or severe pain    After hours or on weekends, you may reach a urology resident on call at this number: [phone].

## 2023-03-15 ENCOUNTER — PATIENT MESSAGE (OUTPATIENT)
Dept: UROLOGY | Facility: CLINIC | Age: 68
End: 2023-03-15
Payer: COMMERCIAL

## 2023-03-15 ENCOUNTER — TELEPHONE (OUTPATIENT)
Dept: UROLOGY | Facility: CLINIC | Age: 68
End: 2023-03-15
Payer: COMMERCIAL

## 2023-03-17 RX ORDER — LIDOCAINE AND PRILOCAINE 25; 25 MG/G; MG/G
CREAM TOPICAL
COMMUNITY
Start: 2023-03-06

## 2023-03-17 RX ORDER — PREDNISONE 50 MG/1
TABLET ORAL
COMMUNITY
Start: 2023-01-03 | End: 2023-12-09

## 2023-03-17 RX ORDER — SEVELAMER CARBONATE 800 MG/1
1600 TABLET, FILM COATED ORAL 3 TIMES DAILY
COMMUNITY
Start: 2023-03-02 | End: 2023-11-10

## 2023-03-17 RX ORDER — DIPHENHYDRAMINE HCL 50 MG/1
CAPSULE ORAL
COMMUNITY
Start: 2023-01-03 | End: 2023-12-09

## 2023-03-17 NOTE — ANESTHESIA PAT ROS NOTE
03/17/2023  Elbert Still Jr. is a 67 y.o., male.      Pre-op Assessment          Review of Systems  Anesthesia Hx:  No problems with previous Anesthesia             Denies Family Hx of Anesthesia complications.    Denies Personal Hx of Anesthesia complications.                    Social:  Non-Smoker, Social Alcohol Use       Hematology/Oncology:  Hematology Normal   Oncology Normal                                   EENT/Dental:   Wears glasses for reading          Cardiovascular:  Exercise tolerance: good   Hypertension           hyperlipidemia    Bike riding-45 minutes to an hour-2-3x/wk/mows lawn/Housework/                         Pulmonary:  Pneumonia       As an infant/ snoring -seldom               Renal/:  Chronic Renal Disease   ESRD/Elevated PSA/CKD Stage 4-on dialysis             Hepatic/GI:  Hepatic/GI Normal                 Musculoskeletal:  Musculoskeletal Normal                Neurological:  Neurology Normal                                      Endocrine:  Diabetes, type 2   A.M. blood glucose runs about -160        Dermatological:  Skin Normal    Psych:  Psychiatric Normal                 Radha Frias RN 3/17/23      Anesthesia Assessment: Preoperative EQUATION    Planned Procedure: Procedure(s) (LRB):  BIOPSY, PROSTATE (N/A)  Requested Anesthesia Type:Monitor Anesthesia Care  Surgeon: Frankie Welch MD  Service: Urology  Known or anticipated Date of Surgery:3/24/2023    Surgeon notes: reviewed and elevsted PSA      Previous anesthesia records:No problems and Elevated PSA    Last PCP note: 6-12 months ago , within [facility] , 5/16/22-Angel Luis Boo MD-[facility]-Annual physical exam +14 more-  Subspecialty notes: Cardiology: General and  , Kidney Transplant, Vascular Surgery, Optometry visit    Other important co-morbidities: ESRD,DM2, HTN, and Elevated PSA     Medical  "History    Diagnosis Date Comment Source   Anemia      Diabetes mellitus      Diabetes mellitus, type 2      Disorder of kidney and ureter      ESRD (end stage renal disease)      Essential (primary) hypertension 2017     Gout, unspecified       jaundice, unspecified      Nuclear sclerosis of both eyes 2022       Tests already available:  Results have been reviewed. EKG-22/ CXR-22/Labs-3/1/23-BUN/Ferritin/ALT(SGPT)/TIBC/HGB/PTH, etc//23-glucose/2/15/23-H&H/ 23-/23/ 23-23/23/23        Plan:Phone pending      Testing:  A1C, BMP, and Hematology Profile      Patient  has previously scheduled Medical Appointment:Appointments on 3/20/23 & 3/22/23, prior to the surgery date.    Navigation: Phone Completed                        Tests Scheduled. A1C, BMP, and Hematology Profile done 3/20/23  & reviewed by Dr. Chauhan.          Consults scheduled."Clearance" from Dr.Nichole JOSE Cameron in chart  on 3/19/23-"He has no cardiac contraindication to prostate biopsy ."            Radha Frias RN 3/17/23 & 3/20/23 & 3/21/23            "

## 2023-03-20 ENCOUNTER — LAB VISIT (OUTPATIENT)
Dept: LAB | Facility: HOSPITAL | Age: 68
End: 2023-03-20
Attending: ANESTHESIOLOGY
Payer: COMMERCIAL

## 2023-03-20 DIAGNOSIS — Z01.818 PREOP TESTING: ICD-10-CM

## 2023-03-20 DIAGNOSIS — E08.9: ICD-10-CM

## 2023-03-20 DIAGNOSIS — Z79.4: ICD-10-CM

## 2023-03-20 LAB
ANION GAP SERPL CALC-SCNC: 14 MMOL/L (ref 8–16)
BUN SERPL-MCNC: 36 MG/DL (ref 8–23)
CALCIUM SERPL-MCNC: 8.7 MG/DL (ref 8.7–10.5)
CHLORIDE SERPL-SCNC: 101 MMOL/L (ref 95–110)
CO2 SERPL-SCNC: 28 MMOL/L (ref 23–29)
CREAT SERPL-MCNC: 7 MG/DL (ref 0.5–1.4)
ERYTHROCYTE [DISTWIDTH] IN BLOOD BY AUTOMATED COUNT: 14.3 % (ref 11.5–14.5)
EST. GFR  (NO RACE VARIABLE): 8.1 ML/MIN/1.73 M^2
ESTIMATED AVG GLUCOSE: 85 MG/DL (ref 68–131)
GLUCOSE SERPL-MCNC: 105 MG/DL (ref 70–110)
HBA1C MFR BLD: 4.6 % (ref 4–5.6)
HCT VFR BLD AUTO: 32.4 % (ref 40–54)
HGB BLD-MCNC: 10.5 G/DL (ref 14–18)
MCH RBC QN AUTO: 30 PG (ref 27–31)
MCHC RBC AUTO-ENTMCNC: 32.4 G/DL (ref 32–36)
MCV RBC AUTO: 93 FL (ref 82–98)
PLATELET # BLD AUTO: 169 K/UL (ref 150–450)
PMV BLD AUTO: 10.8 FL (ref 9.2–12.9)
POTASSIUM SERPL-SCNC: 4.5 MMOL/L (ref 3.5–5.1)
RBC # BLD AUTO: 3.5 M/UL (ref 4.6–6.2)
SODIUM SERPL-SCNC: 143 MMOL/L (ref 136–145)
WBC # BLD AUTO: 3.33 K/UL (ref 3.9–12.7)

## 2023-03-20 PROCEDURE — 83036 HEMOGLOBIN GLYCOSYLATED A1C: CPT | Mod: TXP | Performed by: ANESTHESIOLOGY

## 2023-03-20 PROCEDURE — 85027 COMPLETE CBC AUTOMATED: CPT | Mod: TXP | Performed by: ANESTHESIOLOGY

## 2023-03-20 PROCEDURE — 36415 COLL VENOUS BLD VENIPUNCTURE: CPT | Mod: TXP | Performed by: ANESTHESIOLOGY

## 2023-03-20 PROCEDURE — 80048 BASIC METABOLIC PNL TOTAL CA: CPT | Mod: TXP | Performed by: ANESTHESIOLOGY

## 2023-03-24 ENCOUNTER — ANESTHESIA EVENT (OUTPATIENT)
Dept: SURGERY | Facility: HOSPITAL | Age: 68
End: 2023-03-24
Payer: COMMERCIAL

## 2023-03-24 ENCOUNTER — HOSPITAL ENCOUNTER (OUTPATIENT)
Facility: HOSPITAL | Age: 68
Discharge: HOME | End: 2023-03-24
Attending: STUDENT IN AN ORGANIZED HEALTH CARE EDUCATION/TRAINING PROGRAM | Admitting: STUDENT IN AN ORGANIZED HEALTH CARE EDUCATION/TRAINING PROGRAM
Payer: COMMERCIAL

## 2023-03-24 ENCOUNTER — ANESTHESIA (OUTPATIENT)
Dept: SURGERY | Facility: HOSPITAL | Age: 68
End: 2023-03-24
Payer: COMMERCIAL

## 2023-03-24 VITALS
HEART RATE: 67 BPM | DIASTOLIC BLOOD PRESSURE: 58 MMHG | SYSTOLIC BLOOD PRESSURE: 121 MMHG | HEIGHT: 74 IN | RESPIRATION RATE: 20 BRPM | WEIGHT: 210 LBS | OXYGEN SATURATION: 100 % | BODY MASS INDEX: 26.95 KG/M2 | TEMPERATURE: 98 F

## 2023-03-24 DIAGNOSIS — R97.20 ELEVATED PSA: ICD-10-CM

## 2023-03-24 LAB — POCT GLUCOSE: 109 MG/DL (ref 70–110)

## 2023-03-24 PROCEDURE — 25000003 PHARM REV CODE 250: Mod: TXP | Performed by: NURSE ANESTHETIST, CERTIFIED REGISTERED

## 2023-03-24 PROCEDURE — 36000705 HC OR TIME LEV I EA ADD 15 MIN: Mod: NTX | Performed by: STUDENT IN AN ORGANIZED HEALTH CARE EDUCATION/TRAINING PROGRAM

## 2023-03-24 PROCEDURE — 82962 GLUCOSE BLOOD TEST: CPT | Mod: TXP | Performed by: STUDENT IN AN ORGANIZED HEALTH CARE EDUCATION/TRAINING PROGRAM

## 2023-03-24 PROCEDURE — D9220A PRA ANESTHESIA: ICD-10-PCS | Mod: CRNA,TXP,, | Performed by: NURSE ANESTHETIST, CERTIFIED REGISTERED

## 2023-03-24 PROCEDURE — 76942 PR U/S GUIDANCE FOR NEEDLE GUIDANCE: ICD-10-PCS | Mod: 26,,, | Performed by: STUDENT IN AN ORGANIZED HEALTH CARE EDUCATION/TRAINING PROGRAM

## 2023-03-24 PROCEDURE — 88305 TISSUE EXAM BY PATHOLOGIST: CPT | Mod: TXP | Performed by: STUDENT IN AN ORGANIZED HEALTH CARE EDUCATION/TRAINING PROGRAM

## 2023-03-24 PROCEDURE — 36000704 HC OR TIME LEV I 1ST 15 MIN: Mod: NTX | Performed by: STUDENT IN AN ORGANIZED HEALTH CARE EDUCATION/TRAINING PROGRAM

## 2023-03-24 PROCEDURE — 25000003 PHARM REV CODE 250: Mod: NTX | Performed by: STUDENT IN AN ORGANIZED HEALTH CARE EDUCATION/TRAINING PROGRAM

## 2023-03-24 PROCEDURE — 63600175 PHARM REV CODE 636 W HCPCS: Mod: TXP | Performed by: NURSE ANESTHETIST, CERTIFIED REGISTERED

## 2023-03-24 PROCEDURE — D9220A PRA ANESTHESIA: Mod: ANES,TXP,, | Performed by: ANESTHESIOLOGY

## 2023-03-24 PROCEDURE — 71000044 HC DOSC ROUTINE RECOVERY FIRST HOUR: Mod: NTX | Performed by: STUDENT IN AN ORGANIZED HEALTH CARE EDUCATION/TRAINING PROGRAM

## 2023-03-24 PROCEDURE — 63600175 PHARM REV CODE 636 W HCPCS: Mod: TXP | Performed by: STUDENT IN AN ORGANIZED HEALTH CARE EDUCATION/TRAINING PROGRAM

## 2023-03-24 PROCEDURE — 55700 PR BIOPSY OF PROSTATE,NEEDLE/PUNCH: CPT | Mod: TXP,,, | Performed by: STUDENT IN AN ORGANIZED HEALTH CARE EDUCATION/TRAINING PROGRAM

## 2023-03-24 PROCEDURE — 25000003 PHARM REV CODE 250: Mod: NTX | Performed by: NURSE ANESTHETIST, CERTIFIED REGISTERED

## 2023-03-24 PROCEDURE — 37000008 HC ANESTHESIA 1ST 15 MINUTES: Mod: TXP | Performed by: STUDENT IN AN ORGANIZED HEALTH CARE EDUCATION/TRAINING PROGRAM

## 2023-03-24 PROCEDURE — D9220A PRA ANESTHESIA: Mod: CRNA,TXP,, | Performed by: NURSE ANESTHETIST, CERTIFIED REGISTERED

## 2023-03-24 PROCEDURE — 55700 PR BIOPSY OF PROSTATE,NEEDLE/PUNCH: ICD-10-PCS | Mod: TXP,,, | Performed by: STUDENT IN AN ORGANIZED HEALTH CARE EDUCATION/TRAINING PROGRAM

## 2023-03-24 PROCEDURE — 25000003 PHARM REV CODE 250: Mod: TXP | Performed by: STUDENT IN AN ORGANIZED HEALTH CARE EDUCATION/TRAINING PROGRAM

## 2023-03-24 PROCEDURE — 88305 TISSUE EXAM BY PATHOLOGIST: CPT | Mod: 26,TXP,, | Performed by: STUDENT IN AN ORGANIZED HEALTH CARE EDUCATION/TRAINING PROGRAM

## 2023-03-24 PROCEDURE — 88305 TISSUE EXAM BY PATHOLOGIST: ICD-10-PCS | Mod: 26,TXP,, | Performed by: STUDENT IN AN ORGANIZED HEALTH CARE EDUCATION/TRAINING PROGRAM

## 2023-03-24 PROCEDURE — 76942 ECHO GUIDE FOR BIOPSY: CPT | Mod: 26,,, | Performed by: STUDENT IN AN ORGANIZED HEALTH CARE EDUCATION/TRAINING PROGRAM

## 2023-03-24 PROCEDURE — D9220A PRA ANESTHESIA: ICD-10-PCS | Mod: ANES,TXP,, | Performed by: ANESTHESIOLOGY

## 2023-03-24 PROCEDURE — 37000009 HC ANESTHESIA EA ADD 15 MINS: Mod: TXP | Performed by: STUDENT IN AN ORGANIZED HEALTH CARE EDUCATION/TRAINING PROGRAM

## 2023-03-24 PROCEDURE — 71000015 HC POSTOP RECOV 1ST HR: Mod: TXP | Performed by: STUDENT IN AN ORGANIZED HEALTH CARE EDUCATION/TRAINING PROGRAM

## 2023-03-24 RX ORDER — HYDROCODONE BITARTRATE AND ACETAMINOPHEN 5; 325 MG/1; MG/1
1 TABLET ORAL EVERY 4 HOURS PRN
Status: DISCONTINUED | OUTPATIENT
Start: 2023-03-24 | End: 2023-03-24 | Stop reason: HOSPADM

## 2023-03-24 RX ORDER — DEXAMETHASONE SODIUM PHOSPHATE 4 MG/ML
INJECTION, SOLUTION INTRA-ARTICULAR; INTRALESIONAL; INTRAMUSCULAR; INTRAVENOUS; SOFT TISSUE
Status: DISCONTINUED | OUTPATIENT
Start: 2023-03-24 | End: 2023-03-24

## 2023-03-24 RX ORDER — CEFTRIAXONE 1 G/1
1 INJECTION, POWDER, FOR SOLUTION INTRAMUSCULAR; INTRAVENOUS ONCE
Status: DISCONTINUED | OUTPATIENT
Start: 2023-03-24 | End: 2023-12-14 | Stop reason: HOSPADM

## 2023-03-24 RX ORDER — HALOPERIDOL 5 MG/ML
0.5 INJECTION INTRAMUSCULAR EVERY 10 MIN PRN
Status: DISCONTINUED | OUTPATIENT
Start: 2023-03-24 | End: 2023-03-24 | Stop reason: HOSPADM

## 2023-03-24 RX ORDER — LIDOCAINE HYDROCHLORIDE 10 MG/ML
1 INJECTION, SOLUTION EPIDURAL; INFILTRATION; INTRACAUDAL; PERINEURAL ONCE
Status: DISCONTINUED | OUTPATIENT
Start: 2023-03-24 | End: 2023-03-24 | Stop reason: HOSPADM

## 2023-03-24 RX ORDER — TRAMADOL HYDROCHLORIDE 50 MG/1
50 TABLET ORAL ONCE
Status: COMPLETED | OUTPATIENT
Start: 2023-03-24 | End: 2023-03-24

## 2023-03-24 RX ORDER — LIDOCAINE HYDROCHLORIDE 20 MG/ML
INJECTION INTRAVENOUS
Status: DISCONTINUED | OUTPATIENT
Start: 2023-03-24 | End: 2023-03-24

## 2023-03-24 RX ORDER — MIDAZOLAM HYDROCHLORIDE 1 MG/ML
INJECTION, SOLUTION INTRAMUSCULAR; INTRAVENOUS
Status: DISCONTINUED | OUTPATIENT
Start: 2023-03-24 | End: 2023-03-24

## 2023-03-24 RX ORDER — HYDROMORPHONE HYDROCHLORIDE 1 MG/ML
0.2 INJECTION, SOLUTION INTRAMUSCULAR; INTRAVENOUS; SUBCUTANEOUS EVERY 5 MIN PRN
Status: DISCONTINUED | OUTPATIENT
Start: 2023-03-24 | End: 2023-03-24 | Stop reason: HOSPADM

## 2023-03-24 RX ORDER — ONDANSETRON 2 MG/ML
INJECTION INTRAMUSCULAR; INTRAVENOUS
Status: DISCONTINUED | OUTPATIENT
Start: 2023-03-24 | End: 2023-03-24

## 2023-03-24 RX ORDER — PROPOFOL 10 MG/ML
VIAL (ML) INTRAVENOUS
Status: DISCONTINUED | OUTPATIENT
Start: 2023-03-24 | End: 2023-03-24

## 2023-03-24 RX ORDER — TRAMADOL HYDROCHLORIDE 50 MG/1
50 TABLET ORAL EVERY 8 HOURS PRN
Qty: 10 TABLET | Refills: 0 | Status: SHIPPED | OUTPATIENT
Start: 2023-03-24 | End: 2023-12-09

## 2023-03-24 RX ORDER — FENTANYL CITRATE 50 UG/ML
INJECTION, SOLUTION INTRAMUSCULAR; INTRAVENOUS
Status: DISCONTINUED | OUTPATIENT
Start: 2023-03-24 | End: 2023-03-24

## 2023-03-24 RX ORDER — LIDOCAINE HYDROCHLORIDE 10 MG/ML
INJECTION INFILTRATION; PERINEURAL
Status: DISCONTINUED
Start: 2023-03-24 | End: 2023-03-24 | Stop reason: HOSPADM

## 2023-03-24 RX ORDER — LIDOCAINE HYDROCHLORIDE 20 MG/ML
JELLY TOPICAL ONCE
Status: DISCONTINUED | OUTPATIENT
Start: 2023-03-24 | End: 2023-12-14 | Stop reason: HOSPADM

## 2023-03-24 RX ADMIN — LIDOCAINE HYDROCHLORIDE 50 MG: 20 INJECTION INTRAVENOUS at 12:03

## 2023-03-24 RX ADMIN — ONDANSETRON 4 MG: 2 INJECTION INTRAMUSCULAR; INTRAVENOUS at 12:03

## 2023-03-24 RX ADMIN — HYDROCODONE BITARTRATE AND ACETAMINOPHEN 1 TABLET: 5; 325 TABLET ORAL at 02:03

## 2023-03-24 RX ADMIN — SODIUM CHLORIDE: 0.9 INJECTION, SOLUTION INTRAVENOUS at 12:03

## 2023-03-24 RX ADMIN — PROPOFOL 20 MG: 10 INJECTION, EMULSION INTRAVENOUS at 12:03

## 2023-03-24 RX ADMIN — CEFTRIAXONE 1 G: 1 INJECTION, POWDER, FOR SOLUTION INTRAMUSCULAR; INTRAVENOUS at 12:03

## 2023-03-24 RX ADMIN — PROPOFOL 125 MCG/KG/MIN: 10 INJECTION, EMULSION INTRAVENOUS at 12:03

## 2023-03-24 RX ADMIN — FENTANYL CITRATE 25 MCG: 50 INJECTION, SOLUTION INTRAMUSCULAR; INTRAVENOUS at 12:03

## 2023-03-24 RX ADMIN — GLYCOPYRROLATE 0.2 MG: 0.2 INJECTION, SOLUTION INTRAMUSCULAR; INTRAVENOUS at 12:03

## 2023-03-24 RX ADMIN — DEXAMETHASONE SODIUM PHOSPHATE 4 MG: 4 INJECTION, SOLUTION INTRAMUSCULAR; INTRAVENOUS at 12:03

## 2023-03-24 RX ADMIN — TRAMADOL HYDROCHLORIDE 50 MG: 50 TABLET, COATED ORAL at 02:03

## 2023-03-24 RX ADMIN — MIDAZOLAM HYDROCHLORIDE 2 MG: 1 INJECTION, SOLUTION INTRAMUSCULAR; INTRAVENOUS at 12:03

## 2023-03-24 NOTE — ANESTHESIA PREPROCEDURE EVALUATION
03/24/2023  Elbert Still Jr. is a 67 y.o., male.      Pre-op Assessment    I have reviewed the Patient Summary Reports.       I have reviewed the Medications.     Review of Systems  Anesthesia Hx:  No problems with previous Anesthesia  Neg history of prior surgery. Denies Family Hx of Anesthesia complications.   Denies Personal Hx of Anesthesia complications.   Hematology/Oncology:  Hematology Normal   Oncology Normal     EENT/Dental:EENT/Dental Normal   Cardiovascular:   Exercise tolerance: good Hypertension  Denies Angina.  Functional Capacity good / => 4 METS  Carotoid Artery Disease    Pulmonary:  Pulmonary Normal    Renal/:   Chronic Renal Disease, ESRD, Dialysis    Hepatic/GI:   GERD, well controlled    Neurological:  Neurology Normal  Denies Pain    Endocrine:   Diabetes, type 2    Psych:  Psychiatric Normal   Phobia and Claustrophobia.         Physical Exam  General: Well nourished and Cooperative    Airway:  Mallampati: II / I/ II  Mouth Opening: Normal  TM Distance: Normal  Tongue: Normal  Neck ROM: Normal ROM    Dental:  Intact    Chest/Lungs:  Clear to auscultation, Normal Respiratory Rate    Heart:  Rate: Normal        Anesthesia Plan  Type of Anesthesia, risks & benefits discussed:    Anesthesia Type: Gen Natural Airway, Gen ETT  Intra-op Monitoring Plan: Standard ASA Monitors  Post Op Pain Control Plan: multimodal analgesia and IV/PO Opioids PRN  Induction:  IV  Airway Plan: , Post-Induction  Informed Consent: Informed consent signed with the Patient and all parties understand the risks and agree with anesthesia plan.  All questions answered.   ASA Score: 3  Day of Surgery Review of History & Physical: H&P Update referred to the surgeon/provider.    Ready For Surgery From Anesthesia Perspective.     .

## 2023-03-24 NOTE — OP NOTE
"[facility]     Operative Note     DATE OF PROCEDURE:   3/24/2023    PRE-OP DIAGNOSES:   1)  elevated PSA       POST-OP DIAGNOSES AND OPERATIVE FINDINGS:   1)  elevated PSA      PROCEDURES:  1) TRANSRECTAL ULTRASONOGRAPHY OF THE PROSTATE AND  25  TRUS-GUIDED TRANSPERINEAL PROSTATE NEEDLE BIOPSY     SURGEONS:   Surgeon(s) and Role:     * Frankie Welch MD - Primary     * Kamlesh Flores MD - Resident - Assisting    ANESTHESIA:   Anesthesiologist: Jann Santillan MD  CRNA: Natalya Doll CRNA    STAFF:   Circulator: Harvey Tillman RN; Gregoria Scruggs RN    ANESTHESIA TYPE:  Monitor anesthesia care    ESTIMATED BLOOD LOSS: minimal     COMPLICATIONS:   None    ANTIBIOTICS:  Rocephin    INTRAOPERATIVE THROMBOEMBOLISM PROPHYLAXIS:  Pneumatic compression stockings and intraoperative subcutaneous low-dose heparin         NARRATIVE:  Upon entering the surgical suite, a "time out" was called and the correct patient, body site and surgical procedure was verified with the nursing staff, physician and patient according to the [facility] protocol. We then verified that the patient had taken the following antibiotic prophylaxis: Rocephin approximately 0-1 hrs prior to procedure.      After informed consent, the patient was placed in the modified dorsal lithotomy position and the ultrasound probe inserted per rectum in standard fashion.  The perineal skin was anesthetized using 1% lidocaine by raising a 3 cm wheal.  A prostatic nerve block was performed with up to 10cc 1% Lidocaine injected at the prostate-seminal vesicle border and the apex bilaterally.  Realtime ultrasonography and hard copies of the prostate, seminal vesicles, bladder, and posterior urethra were obtained in both transverse and sagittal dimensions.  Findings include:       SEMINAL VESICLES/VASA:  Normal in size, shape, and echotexture.  No mass or cyst seen.  Vasa appear unremarkable       BLADDER:  No intravesical mass was appreciated; no " significant wall thickening noted.  A median lobe was not present.       PROSTATE:  The prostate displayed normal echogenicity,no calcifications,no hypoechoic areas  . Prostate appears symmetrical with distinct margins.        PERIPROSTATIC FAT/PERIRECTAL SPACE: Periprostatic fat appear unremarkable and perirectal space is within normal limits.       URETHRA AND GENITOURINARY SPHINCTER:  Normal echotexture       TRUS-GUIDED TRANSPERINEAL PROSTATE BIOPSY:  TRUS-guided transperineal prostate biopsies were taken from the right : paramedian (1 core apex, 1 core base), lateral (1  core apex, 1 core base), posterior (1 core apex, 1 core base) and transition zone (2 cores each.) This process was repeated on the left. Two cores were taken of the right anterior horn, one core of the left anterior horn  The biopsy specimens were sent to [facility] for histological diagnosis.  The probe was removed from the rectum and the patient brought to PACU.    Disposition:  Dr. Welch will  call the patient with his pathology  results.

## 2023-03-24 NOTE — H&P
CHIEF COMPLAINT:  Transplant clearance, elevated PSA    HISTORY OF PRESENTING ILLINESS:  Elbert Still Jr. is a 67 y.o. male new to urology. He is a referral from PILY Knapp NP for renal transplant clearance. He has an elevated PSA - was elevated in 2021 and 2022, no urology follow up for 4.9 elevation in . R He denies any problems with urination. He did have urinary frequency and nocturia prior to beginning HD, he was rx'd Flomax for his LUTS. HD schedule is [facility]. Renal US 22 showed bilateral simple renal cysts, bladder wall thickening and an enlarged prostate. Denies family history of bladder, prostate or kidney cancer. Denies recent or past episodes of GH.     MRI prostate showed PIRADS3 lesion in left mid posterior PZ,56 cc prostate. Repeat PSA 10/22 was 4.7.      REVIEW OF SYSTEMS:  Review of Systems   Constitutional:  Negative for chills and fever.   HENT:  Negative for congestion and sore throat.    Respiratory:  Negative for cough and shortness of breath.    Cardiovascular:  Negative for chest pain and palpitations.   Gastrointestinal:  Negative for nausea and vomiting.   Genitourinary:  Negative for dysuria, flank pain, frequency, hematuria and urgency.   Neurological:  Negative for dizziness and headaches.       PATIENT HISTORY:  Past Medical History:   Diagnosis Date    Anemia     Diabetes mellitus     Diabetes mellitus, type 2     Disorder of kidney and ureter     ESRD (end stage renal disease)     Essential (primary) hypertension 2017    Gout, unspecified      jaundice, unspecified     Nuclear sclerosis of both eyes 2022       Past Surgical History:   Procedure Laterality Date    ADENOIDECTOMY      ADENOIDECTOMY      AV FISTULA PLACEMENT Left 2022    Procedure: CREATION, AV FISTULA;  Surgeon: Prince Gardiner MD;  Location: [facility] OR [address] [place] [address];  Service: Peripheral Vascular;  Laterality: Left;    nasal septum repair      NASAL SEPTUM SURGERY       PERCUTANEOUS TRANSLUMINAL ANGIOPLASTY OF ARTERIOVENOUS FISTULA Left 11/22/2022    Procedure: PTA, AV FISTULA;  Surgeon: Prince Gardiner MD;  Location: [facility] CATH LAB;  Service: Peripheral Vascular;  Laterality: Left;    ROTATOR CUFF REPAIR Left     SALIVARY GLAND SURGERY      Tonsillectomy      TONSILLECTOMY      TRANSPOSITION OF BASILIC VEIN Left 11/1/2022    Procedure: TRANSPOSITION, VEIN, BASILIC;  Surgeon: Prince Gardiner MD;  Location: [facility] OR [address] [place] [address];  Service: Peripheral Vascular;  Laterality: Left;  Left Brachial vein.       Family History   Problem Relation Age of Onset    Heart disease Mother     Kidney disease Mother     Hypertension Mother     No Known Problems Father     Cancer Sister     Seizures Sister     Hypertension Sister     Stroke Sister     Amblyopia Neg Hx     Blindness Neg Hx     Cataracts Neg Hx     Diabetes Neg Hx     Glaucoma Neg Hx     Macular degeneration Neg Hx     Retinal detachment Neg Hx     Strabismus Neg Hx     Thyroid disease Neg Hx        Social History     Socioeconomic History    Marital status:      Spouse name: Kristine Still   Occupational History     Employer: [employer]   Tobacco Use    Smoking status: Never    Smokeless tobacco: Never    Tobacco comments:     .  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       Allergies:  Iodine and iodide containing products    Medications:    Current Outpatient Medications:     acetaminophen (TYLENOL) 500 MG tablet, Take 2 tablets (1,000 mg total) by mouth every 6 (six) hours as needed for Pain., Disp: 30 tablet, Rfl: 0    aspirin (ECOTRIN) 81 MG EC tablet, Take 1 tablet (81 mg total) by mouth once daily., Disp: 90 tablet, Rfl: 3    atorvastatin (LIPITOR) 20 MG tablet, TAKE 1 TABLET BY MOUTH EVERY EVENING, Disp: 90 tablet, Rfl: 3    blood sugar diagnostic  Strp, Use to check blood glucose once a day., Disp: 100 each, Rfl: 3    blood-glucose meter Misc, Use to check blood glucose twice a day., Disp: 1 each, Rfl: 0    carvediloL (COREG) 12.5 MG tablet, Take 1 tablet (12.5 mg total) by mouth 2 (two) times daily., Disp: 60 tablet, Rfl: 11    cinacalcet (SENSIPAR) 60 MG Tab, Take 60 mg by mouth daily with breakfast., Disp: , Rfl:     clopidogreL (PLAVIX) 75 mg tablet, Take 1 tablet (75 mg total) by mouth once daily., Disp: 30 tablet, Rfl: 11    colchicine (COLCRYS) 0.6 mg tablet, Take 0.6 mg by mouth as needed. Take half tab (0.3 mg) by mouth daily as needed for gout flare., Disp: 90 tablet, Rfl: 3    fluticasone propionate (FLONASE) 50 mcg/actuation nasal spray, 2 sprays (100 mcg total) by Each Nostril route once daily., Disp: 16 g, Rfl: 11    furosemide (LASIX) 80 MG tablet, Take 1 tablet (80 mg total) by mouth 2 (two) times daily., Disp: 60 tablet, Rfl: 11    HYDROcodone-acetaminophen (NORCO) 5-325 mg per tablet, Take 1 tablet by mouth every 6 (six) hours as needed for Pain., Disp: 24 tablet, Rfl: 0    lancets 30 gauge Misc, Use to check blood glucose twice a day., Disp: 100 each, Rfl: 5    NIFEdipine (PROCARDIA-XL) 60 MG (OSM) 24 hr tablet, Take 1 tablet (60 mg total) by mouth 2 (two) times a day., Disp: 60 tablet, Rfl: 11    sevelamer HCL (RENAGEL) 800 MG Tab, Take 2 tablets (1,600 mg total) by mouth 3 (three) times daily with meals., Disp: 180 tablet, Rfl: 11    tamsulosin (FLOMAX) 0.4 mg Cap, TAKE ONE CAPSULE BY MOUTH ONCE DAILY (Patient taking differently: Take by mouth every morning.), Disp: 90 capsule, Rfl: 3    diazePAM (VALIUM) 10 MG Tab, Take 1 tablet (10 mg total) by mouth once as needed (prior to MRI)., Disp: 1 tablet, Rfl: 0  No current facility-administered medications for this visit.    PHYSICAL EXAMINATION:  Physical Exam  Constitutional:       Appearance: Normal appearance.   HENT:      Head: Normocephalic and atraumatic.      Right Ear: External ear  normal.      Left Ear: External ear normal.   Pulmonary:      Effort: Pulmonary effort is normal. No respiratory distress.   Abdominal:      Hernia: There is no hernia in the left inguinal area or right inguinal area.   Genitourinary:     Pubic Area: No rash or pubic lice.       Penis: Normal and uncircumcised.       Testes: Normal.      Epididymis:      Right: Normal.      Left: Normal.      Prostate: Enlarged. Not tender and no nodules present.      Rectum: Normal.   Lymphadenopathy:      Lower Body: No right inguinal adenopathy. No left inguinal adenopathy.   Skin:     General: Skin is warm and dry.   Neurological:      General: No focal deficit present.      Mental Status: He is alert and oriented to person, place, and time.   Psychiatric:         Mood and Affect: Mood normal.         Behavior: Behavior normal.         LABS:      Lab Results   Component Value Date    PSA 4.7 (H) 10/27/2022    PSA 4.9 (H) 09/08/2021    PSA 3.1 06/22/2019       IMPRESSION:  Encounter Diagnoses   Name Primary?    Elevated PSA Yes    Organ transplant candidate          Assessment:       1. Elevated PSA    2. Organ transplant candidate          Plan:   OR today for TRUS with transperineal prostate biopsy. Consents signed, all questions answered.

## 2023-03-24 NOTE — DISCHARGE SUMMARY
[facility]  Discharge Note  Short Stay    Admit Date: 3/24/2023    Discharge Date and Time: 03/24/2023 1:32 PM      Attending Physician: Frankie Welch MD     Discharge Provider: Kamlesh Flores    Diagnoses:  Patient Active Problem List    Diagnosis Date Noted    ESRD (end stage renal disease) 02/24/2023    Arteriovenous fistula 12/27/2022    ESRD (end stage renal disease) on dialysis 12/27/2022    Steal syndrome dialysis vascular access, initial encounter 11/21/2022    BMI 28.0-28.9,adult 10/27/2022    Status post placement of arteriovenous graft 10/04/2022    Nuclear sclerosis of both eyes 08/12/2022    Refractive error 08/12/2022    Cortical age-related cataract 08/12/2022    ESRD on dialysis 07/12/2022    -Dialysis patient - MWF - started 5/2022 05/12/2022    Acute kidney injury superimposed on chronic kidney disease 05/01/2022    Hypoglycemia 04/30/2022    Hyperkalemia 04/30/2022    -HLD associated with type 2 DM 09/08/2021    Anemia of chronic disease 05/06/2020    -Gout 06/24/2019    -CKD (chronic kidney disease) stage 4, GFR 15-29 ml/min 02/08/2019    Mixed hyperlipidemia 04/05/2018    -Essential (primary) hypertension 09/21/2017    Proteinuria 07/05/2013    -Diabetes mellitus due to underlying condition with diabetic nephropathy 04/18/2013           Discharged Condition: good    Hospital Course: Patient was admitted for transrectal ultrasound with transperineal biopsy of the prostate and tolerated the procedure well with no complications. The patient was discharged home in good condition on the same day.       Final Diagnoses: Same as principal problem.    Disposition: Home or Self Care    Follow up/Patient Instructions:    Medications:  Reconciled Home Medications:   Current Discharge Medication List        START taking these medications    Details   traMADoL (ULTRAM) 50 mg tablet Take 1 tablet (50 mg total) by mouth every 8 (eight) hours as needed for Pain.  Qty: 10 tablet, Refills: 0     Comments: Quantity prescribed more than 7 day supply? No           CONTINUE these medications which have NOT CHANGED    Details   acetaminophen (TYLENOL) 500 MG tablet Take 2 tablets (1,000 mg total) by mouth every 6 (six) hours as needed for Pain.  Qty: 30 tablet, Refills: 0      aspirin (ECOTRIN) 81 MG EC tablet Take 1 tablet (81 mg total) by mouth once daily.  Qty: 90 tablet, Refills: 3      atorvastatin (LIPITOR) 20 MG tablet TAKE 1 TABLET BY MOUTH EVERY EVENING  Qty: 90 tablet, Refills: 3    Associated Diagnoses: Mixed hyperlipidemia; Diabetes mellitus due to underlying condition with diabetic nephropathy, without long-term current use of insulin      carvediloL (COREG) 12.5 MG tablet Take 1 tablet (12.5 mg total) by mouth 2 (two) times daily.  Qty: 60 tablet, Refills: 11    Comments: .      cinacalcet (SENSIPAR) 60 MG Tab Take 60 mg by mouth daily with breakfast.      ciprofloxacin HCl (CIPRO) 500 MG tablet 1 pill one hour before prostate biopsy  Qty: 1 tablet, Refills: 0      colchicine (COLCRYS) 0.6 mg tablet Take 0.6 mg by mouth as needed. Take half tab (0.3 mg) by mouth daily as needed for gout flare.  Qty: 90 tablet, Refills: 3    Associated Diagnoses: Gout, unspecified cause, unspecified chronicity, unspecified site      fluticasone propionate (FLONASE) 50 mcg/actuation nasal spray 2 sprays (100 mcg total) by Each Nostril route once daily.  Qty: 16 g, Refills: 11      furosemide (LASIX) 80 MG tablet Take 1 tablet (80 mg total) by mouth 2 (two) times daily.  Qty: 60 tablet, Refills: 11      HYDROcodone-acetaminophen (NORCO) 5-325 mg per tablet Take 1 tablet by mouth every 6 (six) hours as needed for Pain.  Qty: 24 tablet, Refills: 0    Comments: Quantity prescribed more than 7 day supply? No      NIFEdipine (PROCARDIA-XL) 60 MG (OSM) 24 hr tablet Take 1 tablet (60 mg total) by mouth 2 (two) times a day.  Qty: 60 tablet, Refills: 11    Comments: .      sevelamer carbonate (RENVELA) 800 mg Tab Take 1,600 mg  by mouth 3 (three) times daily.      tamsulosin (FLOMAX) 0.4 mg Cap TAKE ONE CAPSULE BY MOUTH ONCE DAILY  Qty: 90 capsule, Refills: 3      BANOPHEN 50 mg capsule SMARTSI Capsule(s) By Mouth      blood sugar diagnostic Strp Use to check blood glucose once a day.  Qty: 100 each, Refills: 3    Associated Diagnoses: Diabetes mellitus due to underlying condition with diabetic nephropathy, without long-term current use of insulin      blood-glucose meter Misc Use to check blood glucose twice a day.  Qty: 1 each, Refills: 0      clopidogreL (PLAVIX) 75 mg tablet Take 1 tablet (75 mg total) by mouth once daily.  Qty: 30 tablet, Refills: 11      lancets 30 gauge Misc Use to check blood glucose twice a day.  Qty: 100 each, Refills: 5      LIDOcaine-prilocaine (EMLA) cream SMARTSIG:sparingly Topical As Directed      predniSONE (DELTASONE) 50 MG Tab Take by mouth.      sevelamer HCL (RENAGEL) 800 MG Tab Take 2 tablets (1,600 mg total) by mouth 3 (three) times daily with meals.  Qty: 180 tablet, Refills: 11    Associated Diagnoses: Hyperphosphatemia           Discharge Procedure Orders   Diet general     Call MD for:  extreme fatigue     Call MD for:  persistent dizziness or light-headedness     Call MD for:  hives     Call MD for:  redness, tenderness, or signs of infection (pain, swelling, redness, odor or green/yellow discharge around incision site)     Call MD for:  difficulty breathing, headache or visual disturbances     Call MD for:  severe uncontrolled pain     Call MD for:  persistent nausea and vomiting     Call MD for:  temperature >100.4

## 2023-03-27 NOTE — ANESTHESIA POSTPROCEDURE EVALUATION
Anesthesia Post Evaluation    Patient: Elbert Still JrJaky    Procedure(s) Performed: Procedure(s) (LRB):  BIOPSY, PROSTATE (N/A)    Final Anesthesia Type: general      Patient location during evaluation: PACU  Patient participation: Yes- Able to Participate  Level of consciousness: awake and alert  Post-procedure vital signs: reviewed and stable  Pain management: adequate  Airway patency: patent    PONV status at discharge: No PONV  Anesthetic complications: no      Cardiovascular status: blood pressure returned to baseline  Respiratory status: unassisted  Hydration status: euvolemic  Follow-up not needed.          Vitals Value Taken Time   /58 03/24/23 1431   Temp 36.4 °C (97.6 °F) 03/24/23 1334   Pulse 66 03/24/23 1454   Resp 20 03/24/23 1400   SpO2 91 % 03/24/23 1454   Vitals shown include unvalidated device data.      No case tracking events are documented in the log.      Pain/Sophie Score: No data recorded

## 2023-03-28 LAB
FINAL PATHOLOGIC DIAGNOSIS: NORMAL
GROSS: NORMAL
Lab: NORMAL

## 2023-03-31 ENCOUNTER — TELEPHONE (OUTPATIENT)
Dept: UROLOGY | Facility: CLINIC | Age: 68
End: 2023-03-31
Payer: COMMERCIAL

## 2023-04-06 ENCOUNTER — OFFICE VISIT (OUTPATIENT)
Dept: UROLOGY | Facility: CLINIC | Age: 68
End: 2023-04-06
Payer: COMMERCIAL

## 2023-04-06 VITALS
WEIGHT: 213 LBS | HEART RATE: 70 BPM | DIASTOLIC BLOOD PRESSURE: 62 MMHG | SYSTOLIC BLOOD PRESSURE: 109 MMHG | HEIGHT: 74 IN | BODY MASS INDEX: 27.34 KG/M2

## 2023-04-06 DIAGNOSIS — C61 PROSTATE CANCER: Primary | ICD-10-CM

## 2023-04-06 PROCEDURE — 3078F DIAST BP <80 MM HG: CPT | Mod: CPTII,NTX,S$GLB, | Performed by: STUDENT IN AN ORGANIZED HEALTH CARE EDUCATION/TRAINING PROGRAM

## 2023-04-06 PROCEDURE — 3072F LOW RISK FOR RETINOPATHY: CPT | Mod: CPTII,NTX,S$GLB, | Performed by: STUDENT IN AN ORGANIZED HEALTH CARE EDUCATION/TRAINING PROGRAM

## 2023-04-06 PROCEDURE — 3288F FALL RISK ASSESSMENT DOCD: CPT | Mod: CPTII,NTX,S$GLB, | Performed by: STUDENT IN AN ORGANIZED HEALTH CARE EDUCATION/TRAINING PROGRAM

## 2023-04-06 PROCEDURE — 3066F NEPHROPATHY DOC TX: CPT | Mod: CPTII,NTX,S$GLB, | Performed by: STUDENT IN AN ORGANIZED HEALTH CARE EDUCATION/TRAINING PROGRAM

## 2023-04-06 PROCEDURE — 1159F MED LIST DOCD IN RCRD: CPT | Mod: CPTII,NTX,S$GLB, | Performed by: STUDENT IN AN ORGANIZED HEALTH CARE EDUCATION/TRAINING PROGRAM

## 2023-04-06 PROCEDURE — 1101F PT FALLS ASSESS-DOCD LE1/YR: CPT | Mod: CPTII,NTX,S$GLB, | Performed by: STUDENT IN AN ORGANIZED HEALTH CARE EDUCATION/TRAINING PROGRAM

## 2023-04-06 PROCEDURE — 99999 PR PBB SHADOW E&M-EST. PATIENT-LVL IV: ICD-10-PCS | Mod: PBBFAC,TXP,, | Performed by: STUDENT IN AN ORGANIZED HEALTH CARE EDUCATION/TRAINING PROGRAM

## 2023-04-06 PROCEDURE — 1126F AMNT PAIN NOTED NONE PRSNT: CPT | Mod: CPTII,NTX,S$GLB, | Performed by: STUDENT IN AN ORGANIZED HEALTH CARE EDUCATION/TRAINING PROGRAM

## 2023-04-06 PROCEDURE — 3044F HG A1C LEVEL LT 7.0%: CPT | Mod: CPTII,NTX,S$GLB, | Performed by: STUDENT IN AN ORGANIZED HEALTH CARE EDUCATION/TRAINING PROGRAM

## 2023-04-06 PROCEDURE — 1101F PR PT FALLS ASSESS DOC 0-1 FALLS W/OUT INJ PAST YR: ICD-10-PCS | Mod: CPTII,NTX,S$GLB, | Performed by: STUDENT IN AN ORGANIZED HEALTH CARE EDUCATION/TRAINING PROGRAM

## 2023-04-06 PROCEDURE — 3288F PR FALLS RISK ASSESSMENT DOCUMENTED: ICD-10-PCS | Mod: CPTII,NTX,S$GLB, | Performed by: STUDENT IN AN ORGANIZED HEALTH CARE EDUCATION/TRAINING PROGRAM

## 2023-04-06 PROCEDURE — 1159F PR MEDICATION LIST DOCUMENTED IN MEDICAL RECORD: ICD-10-PCS | Mod: CPTII,NTX,S$GLB, | Performed by: STUDENT IN AN ORGANIZED HEALTH CARE EDUCATION/TRAINING PROGRAM

## 2023-04-06 PROCEDURE — 3074F PR MOST RECENT SYSTOLIC BLOOD PRESSURE < 130 MM HG: ICD-10-PCS | Mod: CPTII,NTX,S$GLB, | Performed by: STUDENT IN AN ORGANIZED HEALTH CARE EDUCATION/TRAINING PROGRAM

## 2023-04-06 PROCEDURE — 3074F SYST BP LT 130 MM HG: CPT | Mod: CPTII,NTX,S$GLB, | Performed by: STUDENT IN AN ORGANIZED HEALTH CARE EDUCATION/TRAINING PROGRAM

## 2023-04-06 PROCEDURE — 99214 PR OFFICE/OUTPT VISIT, EST, LEVL IV, 30-39 MIN: ICD-10-PCS | Mod: NTX,S$GLB,, | Performed by: STUDENT IN AN ORGANIZED HEALTH CARE EDUCATION/TRAINING PROGRAM

## 2023-04-06 PROCEDURE — 3072F PR LOW RISK FOR RETINOPATHY: ICD-10-PCS | Mod: CPTII,NTX,S$GLB, | Performed by: STUDENT IN AN ORGANIZED HEALTH CARE EDUCATION/TRAINING PROGRAM

## 2023-04-06 PROCEDURE — 99999 PR PBB SHADOW E&M-EST. PATIENT-LVL IV: CPT | Mod: PBBFAC,TXP,, | Performed by: STUDENT IN AN ORGANIZED HEALTH CARE EDUCATION/TRAINING PROGRAM

## 2023-04-06 PROCEDURE — 3066F PR DOCUMENTATION OF TREATMENT FOR NEPHROPATHY: ICD-10-PCS | Mod: CPTII,NTX,S$GLB, | Performed by: STUDENT IN AN ORGANIZED HEALTH CARE EDUCATION/TRAINING PROGRAM

## 2023-04-06 PROCEDURE — 3044F PR MOST RECENT HEMOGLOBIN A1C LEVEL <7.0%: ICD-10-PCS | Mod: CPTII,NTX,S$GLB, | Performed by: STUDENT IN AN ORGANIZED HEALTH CARE EDUCATION/TRAINING PROGRAM

## 2023-04-06 PROCEDURE — 3008F BODY MASS INDEX DOCD: CPT | Mod: CPTII,NTX,S$GLB, | Performed by: STUDENT IN AN ORGANIZED HEALTH CARE EDUCATION/TRAINING PROGRAM

## 2023-04-06 PROCEDURE — 1126F PR PAIN SEVERITY QUANTIFIED, NO PAIN PRESENT: ICD-10-PCS | Mod: CPTII,NTX,S$GLB, | Performed by: STUDENT IN AN ORGANIZED HEALTH CARE EDUCATION/TRAINING PROGRAM

## 2023-04-06 PROCEDURE — 3078F PR MOST RECENT DIASTOLIC BLOOD PRESSURE < 80 MM HG: ICD-10-PCS | Mod: CPTII,NTX,S$GLB, | Performed by: STUDENT IN AN ORGANIZED HEALTH CARE EDUCATION/TRAINING PROGRAM

## 2023-04-06 PROCEDURE — 3008F PR BODY MASS INDEX (BMI) DOCUMENTED: ICD-10-PCS | Mod: CPTII,NTX,S$GLB, | Performed by: STUDENT IN AN ORGANIZED HEALTH CARE EDUCATION/TRAINING PROGRAM

## 2023-04-06 PROCEDURE — 99214 OFFICE O/P EST MOD 30 MIN: CPT | Mod: NTX,S$GLB,, | Performed by: STUDENT IN AN ORGANIZED HEALTH CARE EDUCATION/TRAINING PROGRAM

## 2023-04-06 NOTE — PROGRESS NOTES
[facility]  Urologic Oncology Clinic Note        Date of Service: 4/6/2023      Chief Complaint/Reason for Consultation: Prostate Cancer, Very Low Risk    Requesting Provider:   Angel Luis Boo MD  [address] [facility] [address]      History of Present Illness:   Patient 67 y.o. male presents as a referral from Dr. Knapp for kidney transplant workup. Initially seen by Candice Moore NP.    MRI showing PIRADS 3. Unable to tolerate rectal US in office and underwent transperitoneal biopsy in the OR. Path showed Halbur 3+3 in 1/12 cores.    Denies family history of bladder, prostate or kidney cancer. Denies recent or past episodes of GH.     He is ESRD on HD MWF. Prior to initiation of HD he had urinary frequency and nocturia, given Flomax.       Urologic History:     10/22/22 -- PSA 4.7  5/1/2022 -- Renal US -- showed bilateral simple renal cysts, bladder wall thickening and an enlarged prostate.   2/14/23 -- mpMRI Prostate -- PV 56.8 cc, PSAD 0.08, PSA 4.7 -- ALEXANDR 1 PIRADS 3 lesion LM posterior PZ, no EPE concern  3/24/23 -- TP biopsy --  3+3 Right paramedian apex, 5% of tissue    Patient Active Problem List    Diagnosis Date Noted    ESRD (end stage renal disease) 02/24/2023    Arteriovenous fistula 12/27/2022    ESRD (end stage renal disease) on dialysis 12/27/2022    Steal syndrome dialysis vascular access, initial encounter 11/21/2022    BMI 28.0-28.9,adult 10/27/2022    Status post placement of arteriovenous graft 10/04/2022    Nuclear sclerosis of both eyes 08/12/2022    Refractive error 08/12/2022    Cortical age-related cataract 08/12/2022    ESRD on dialysis 07/12/2022    -Dialysis patient - MWF - started 5/2022 05/12/2022    Acute kidney injury superimposed on chronic kidney disease 05/01/2022    Hypoglycemia 04/30/2022    Hyperkalemia 04/30/2022    -HLD associated with type 2 DM 09/08/2021    Anemia of chronic disease 05/06/2020    -Gout 06/24/2019    -CKD (chronic kidney disease) stage  4, GFR 15-29 ml/min 2019    Mixed hyperlipidemia 2018    -Essential (primary) hypertension 2017    Proteinuria 2013    -Diabetes mellitus due to underlying condition with diabetic nephropathy 2013          Review of patient's allergies indicates:   Allergen Reactions    Iodine and iodide containing products Hives     Hypotension, Flushing        Past Medical History:   Diagnosis Date    Anemia     Diabetes mellitus     Diabetes mellitus, type 2     Disorder of kidney and ureter     ESRD (end stage renal disease)     Essential (primary) hypertension 2017    Gout, unspecified      jaundice, unspecified     Nuclear sclerosis of both eyes 2022      Past Surgical History:   Procedure Laterality Date    ADENOIDECTOMY      ADENOIDECTOMY      AV FISTULA PLACEMENT Left 2022    Procedure: CREATION, AV FISTULA;  Surgeon: Prince Garidner MD;  Location: [facility] OR [address] [place] [address];  Service: Peripheral Vascular;  Laterality: Left;    FISTULOGRAM Left 2023    Procedure: FISTULOGRAM;  Surgeon: Prince Gardiner MD;  Location: [facility] OR [address] [place] [address];  Service: Peripheral Vascular;  Laterality: Left;  Given to the sterile field.     nasal septum repair      NASAL SEPTUM SURGERY      PERCUTANEOUS TRANSLUMINAL ANGIOPLASTY OF ARTERIOVENOUS FISTULA Left 2022    Procedure: PTA, AV FISTULA;  Surgeon: Prince Gardiner MD;  Location: [facility] CATH LAB;  Service: Peripheral Vascular;  Laterality: Left;    PERCUTANEOUS TRANSLUMINAL ANGIOPLASTY OF ARTERIOVENOUS FISTULA Left 2023    Procedure: PTA, AV FISTULA;  Surgeon: Prince Gardiner MD;  [facility];  Service: Peripheral Vascular;  Laterality: Left;  4.8 min  43.08 mGy  4.0682 Gy.cm  32ml Dye    PROSTATE BIOPSY N/A 3/24/2023    Procedure: BIOPSY, PROSTATE;  Surgeon: Frankie Welch MD;  [facility];  Service: Urology;  Laterality: N/A;  transrectal, 30min, uronav needed    ROTATOR CUFF REPAIR Left     SALIVARY GLAND  "SURGERY      Tonsillectomy      TONSILLECTOMY      TRANSPOSITION OF BASILIC VEIN Left 11/1/2022    Procedure: TRANSPOSITION, VEIN, BASILIC;  Surgeon: Prince Gardiner MD;  Location: [facility] OR [address] [place] [address];  Service: Peripheral Vascular;  Laterality: Left;  Left Brachial vein.      Family History   Problem Relation Age of Onset    Heart disease Mother     Kidney disease Mother     Hypertension Mother     No Known Problems Father     Cancer Sister     Seizures Sister     Hypertension Sister     Stroke Sister     Amblyopia Neg Hx     Blindness Neg Hx     Cataracts Neg Hx     Diabetes Neg Hx     Glaucoma Neg Hx     Macular degeneration Neg Hx     Retinal detachment Neg Hx     Strabismus Neg Hx     Thyroid disease Neg Hx     Anesthesia problems Neg Hx       Social History     Tobacco Use    Smoking status: Never    Smokeless tobacco: Never    Tobacco comments:     .  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     Substance Use Topics    Alcohol use: Yes     Alcohol/week: 2.0 standard drinks     Types: 1 Glasses of wine, 1 Cans of beer per week     Comment: Socially        Review of Systems   Constitutional:  Negative for activity change, fatigue, fever and unexpected weight change.   HENT:  Negative for congestion.    Respiratory:  Negative for cough.    Cardiovascular:  Negative for chest pain.   Gastrointestinal:  Negative for abdominal pain.           OBJECTIVE:     Vitals:    04/06/23 1122   BP: 109/62   Pulse: 70   Weight: 96.6 kg (213 lb)   Height: 6' 2" (1.88 m)          General Appearance: Alert, cooperative, no distress  Head: Normocephalic  Eyes: Clear conjunctiva  Neck: No obvious LND or JVD  Lungs: Normal chest excursion, no accessory muscle use  Chest: Regular rate rhythm by palpation, no pedal edema  Abdomen: Soft, non-tender, non-distended  Male Genitalia: MAGI negative  Extremities: Atraumatic   Lymph Nodes: No appreciable lymph adenopathy  Neurologic: No gross gait, motor or sensory deficits            LAB:  "     CMP  Sodium   Date Value Ref Range Status   04/05/2023 136 136 - 145 mEq/L Final     Potassium   Date Value Ref Range Status   04/05/2023 4.5 3.5 - 5.1 mEq/L Final     Chloride   Date Value Ref Range Status   04/05/2023 99 96 - 108 mEq/L Final     CO2   Date Value Ref Range Status   03/20/2023 28 23 - 29 mmol/L Final     Glucose   Date Value Ref Range Status   04/05/2023 148 (H) 70 - 100 mg/dL Final     BUN   Date Value Ref Range Status   03/20/2023 36 (H) 8 - 23 mg/dL Final     Creatinine   Date Value Ref Range Status   04/05/2023 9.31 (H) 0.60 - 1.30 mg/dL Final     Calcium   Date Value Ref Range Status   04/05/2023 8.0 (L) 8.7 - 10.4 mg/dL Final     Comment:     Please note change in reference range.     Total Protein   Date Value Ref Range Status   11/22/2022 8.3 6.0 - 8.4 g/dL Final     Albumin   Date Value Ref Range Status   04/05/2023 4.3 3.5 - 5.2 g/dL Final     Total Bilirubin   Date Value Ref Range Status   11/22/2022 0.3 0.1 - 1.0 mg/dL Final     Comment:     For infants and newborns, interpretation of results should be based  on gestational age, weight and in agreement with clinical  observations.    Premature Infant recommended reference ranges:  Up to 24 hours.............<8.0 mg/dL  Up to 48 hours............<12.0 mg/dL  3-5 days..................<15.0 mg/dL  6-29 days.................<15.0 mg/dL       Alkaline Phosphatase   Date Value Ref Range Status   04/05/2023 118 40 - 129 U/L Final     AST   Date Value Ref Range Status   11/22/2022 17 10 - 40 U/L Final     ALT   Date Value Ref Range Status   04/05/2023 11 7 - 52 U/L Final     Anion Gap   Date Value Ref Range Status   03/20/2023 14 8 - 16 mmol/L Final     eGFR   Date Value Ref Range Status   03/20/2023 8.1 (A) >60 mL/min/1.73 m^2 Final     Lab Results   Component Value Date    WBC 3.93 (L) 04/05/2023    HGB 9.5 (L) 04/05/2023    HCT 27.6 (L) 04/05/2023    MCV 89 04/05/2023     04/05/2023       Lab Results   Component Value Date    PSA  4.7 (H) 10/27/2022    PSA 4.9 (H) 2021    PSA 3.1 2019    PSA 0.99 2013    PSA 0.88 10/31/2009    PSA 0.8 2008    PSA 0.9 2005           IMAGIN/14/2023  MRI PELVIS WITHOUT CONTRAST     CLINICAL HISTORY:  elevated psa;  Elevated prostate specific antigen (PSA)     Additional history: None provided.     TECHNIQUE:  Multiparametric MRI of the prostate/pelvis performed on a 3T scanner with phase pelvic coil. Multiplanar, multisequence images including high resolution, small field-of-view T2-WI; axial diffusion weighted images with multiple B-values and creation of ADC-maps.  No intravenous contrast was administered.     COMPARISON:  Pelvic radiograph 10/27/2022.     FINDINGS:  Examination degraded by patient motion artifact.     Previous biopsy: None.     PSA: 4.7 ng/mL (10/27/2022)     Prior therapy: None     Prostate: 5.8 x 4.3 x 4.1 cm corresponding to a computed volume of 56.8 cc.     Peripheral zone:     Lesion (ALEXANDR) #P-1     Location: Side: left; Region: mid; Zone: posterior peripheral zone     Greatest dimension: 2.0 cm     T2-WI: Heterogeneous signal intensity or non-circumscribed, rounded, moderate hypointensity, score 3.     DWI/ADC: Focal (discrete and different from the background) hypointense on ADC and/or focal hyperintense on high b-value DWI; may be markedly hypointense on ADC or markedly hyperintense on high b-value DWI, but not both, score 3.     DCE: N/a     Extraprostatic extension: Negative     PI-RADS assessment category: 3     Transitional zone: Benign prostatic hyperplasia without focal suspicious abnormality.     Neurovascular bundle: Normal appearance.     Seminal vesicles: Normal appearance.     Adjacent Organ Involvement: No evidence for urinary bladder or rectal invasion.     Lymphadenopathy: None.     Other Findings: Degenerative changes of the pubic symphysis with a subchondral cyst in the right pubic body.  No suspicious focal osseous lesion.      Impression:     2.0 cm PI-RADS 3 lesion in the left peripheral zone.     Overall Assessment: PI-RADS 3 - Intermediate (the presence of clinically significant cancer is equivocal)     Number of targets created for potential MR/US fusion biopsy     Peripheral zone: 1     Transition zone: 0     Electronically signed by resident: Les Mansfield  Date:                                            02/15/2023  Time:                                           10:46     Electronically signed by: James Spaulding  Date:                                            02/15/2023  Time:                                           15:07        ASSESSMENT/PLAN:     68 yo M w/ hx of ESRD on transplant list now diagnosed with very low risk prostate cancer.     Plan:     Today I discussed at length with the patient the natural history of very low risk prostate cancer by NCCN guidelines. We talked at length as well about the preferred management strategy cited by the AUA and NCCN guidelines which is active surveillance. We did talk about the difference between active surveillance and watchful waiting. Finally, we spoke about the reasons for deferring treatment such as radiation or surgery.     Overall patient understands that nearly 50% of men will remain on active surveillance for 10 years with the type of cancer he was diagnosed with. He also understands that there are always limitations to biopsy and MRI which could leave some prostate cancer unfound. That said, he understands that if he continues to come to regular follow ups and complete surveillance strategy that we should fine clinically significant cancer in the future in time for curative intent.     At this point, we will see him back in 6 months to schedule confirmatory biopsy.     I would recommend he proceed with transplant and is a good candidate despite his prostate cancer diagnosis. I do not feel that this prostate cancer needs to be treated or preclude him from obtaining a  transplant.    The total time for the established patient visit was at least 30 minutes in both face-to-face and non-face-to-face activities, which included chart review, interpretation of results, counseling, education, ordering meds/tests/procedures, and/or coordination of care.         Frankie Welch MD  Urologic Oncology  P: [phone]

## 2023-04-20 ENCOUNTER — PATIENT MESSAGE (OUTPATIENT)
Dept: TRANSPLANT | Facility: CLINIC | Age: 68
End: 2023-04-20
Payer: COMMERCIAL

## 2023-05-08 ENCOUNTER — TELEPHONE (OUTPATIENT)
Dept: TRANSPLANT | Facility: CLINIC | Age: 68
End: 2023-05-08
Payer: COMMERCIAL

## 2023-05-08 NOTE — TELEPHONE ENCOUNTER
----- Message from Giovanni Kemp MD sent at 5/8/2023 11:24 AM CDT -----  Yes please  ----- Message -----  From: Mala Pereira RN  Sent: 5/8/2023  11:00 AM CDT  To: Giovanni Kemp MD, Gaby Knapp, LUCILA cueto,   This patient has been in eval since 10/27/22. He's still pending a colonoscopy. 30 day letter was sent and my chart message, still no response. Should we close his chart?   Mala

## 2023-05-12 ENCOUNTER — COMMITTEE REVIEW (OUTPATIENT)
Dept: TRANSPLANT | Facility: CLINIC | Age: 68
End: 2023-05-12
Payer: COMMERCIAL

## 2023-05-12 NOTE — LETTER
May 12, 2023    Elbert Tessy  [address] [place] [id number]    Dear Elbert Still:  MRN: [id number]    It is the duty of the [facility] Selection [facility] to determine which patients are candidates for a transplant. For this reason, our committee has the difficult task of evaluating patients to determine which ones have the greatest chance of having a successful transplant. We are aware of the magnitude of this responsibility, and we approach it with reverence and humility.    It is with regret I inform you that you are not approved as a transplant candidate due to  failure to complete kidney transplant evaluation .  Based on this review, we have determined that at this time, you are not a candidate for a transplant at [facility].  You may be re-referred once you have completed your colonoscopy.     The selection committee carefully considers each patient's transplant candidacy and determines whether it is safe to proceed with transplantation on a case-by-case basis using established selection criteria.  At present, the risk of proceeding with an elective transplant surgery has become too high.                                                                               Although the selection committee believes you are not a suitable transplant candidate, you have the option to be evaluated at other transplant centers who may have different selection criteria.  You may request your [facility] records be sent to any center of your choice by contacting our Medical Records Department at [phone].                                                                               Attached is a letter from the [employer] for Organ Sharing (UNOS).  It describes the services and information offered to patients by UNOS and the Organ Procurement and Transplant Network.    The [facility] sincerely wishes you the best and remains available to answer any questions.  Please do not  hesitate to contact our pre-transplant office if we can assist you in any other way.                                                                               Sincerely,      Dina Dawkins MD  Medical Director, Kidney & Kidney/Pancreas Transplantation      Cc: Nawaf Butler MD         [facility]  Encl: UNOS Letter               The Organ Procurement and Transplantation Network   Toll-free patient services line: Your resource for organ transplant information     If you have a question regarding your own medical care, you always should call your transplant hospital first. However, for general organ transplant-related information, you can call the Organ Procurement and Transplantation Network (OPTN) toll-free patient services line at [phone].     Anyone, including potential transplant candidates, candidates, recipients, family members, friends, living donors, and donor family members, can call this number to:     Talk about organ donation, living donation, the transplant process, the donation process, and transplant policies.   Get a free patient information kit with helpful booklets, waiting list and transplant information, and a list of all transplant hospitals.   Ask questions about the OPTN website (https://optn.transplant.hrsa.gov/), the [employer] for Organ Sharings (UNOS) website (https://unos.org/), or the UNOS website for living donors and transplant recipients. (https://www.transplantliving.org/).   Learn how the OPTN can help you.   Talk about any concerns that you may have with a transplant hospital.     The nations transplant system, the OPTN, is managed under federal contract by the [employer] Organ Sharing (UNOS), which is a non-profit charitable organization. The OPTN helps create and define organ sharing policies that make the best use of donated organs. This process continuously evaluating new advances and discoveries so policies can be adapted to  best serve patients waiting for transplants. To do so, the OPTN works closely with transplant professionals, transplant patients, transplant candidates, donor families, living donors, and the public. All transplant programs and organ procurement organizations throughout the country are OPTN members and are obligated to follow the policies the OPTN creates for allocating organs.     The OPTN also is responsible for:   Providing educational material for patients, the public, and professionals.   Raising awareness of the need for donated organs and tissue.   Coordinating organ procurement, matching, and placement.   Collecting information about every organ transplant and donation that occurs in the [place].     Remember, you should contact your transplant hospital directly if you have questions or concerns about your own medical care including medical records, work-up progress, and test results.     We are not your transplant hospital, and our staff will not be able to answer questions about your case, so please keep your transplant hospitals phone number handy.   However, while you research your transplant needs and learn as much as you can about transplantation and donation, we welcome your call to our toll-free patient services line at [phone].

## 2023-05-12 NOTE — COMMITTEE REVIEW
Native Organ Dx: Diabetes Mellitus - Type II      Not approved for LRD/CAD transplant due to failure to complete kidney transplant evaluation.     FYI: Pending colonoscopy    Note written by Mala Pereira RN    ===============================================    I was present at the meeting and attest to the decision of the committee

## 2023-06-07 ENCOUNTER — SOCIAL WORK (OUTPATIENT)
Dept: TRANSPLANT | Facility: CLINIC | Age: 68
End: 2023-06-07
Payer: COMMERCIAL

## 2023-06-12 ENCOUNTER — OFFICE VISIT (OUTPATIENT)
Dept: FAMILY MEDICINE | Facility: CLINIC | Age: 68
End: 2023-06-12
Payer: COMMERCIAL

## 2023-06-12 VITALS — DIASTOLIC BLOOD PRESSURE: 77 MMHG | SYSTOLIC BLOOD PRESSURE: 120 MMHG

## 2023-06-12 DIAGNOSIS — N18.6 ESRD ON DIALYSIS: ICD-10-CM

## 2023-06-12 DIAGNOSIS — D70.8 OTHER NEUTROPENIA: ICD-10-CM

## 2023-06-12 DIAGNOSIS — Z99.2 ESRD ON DIALYSIS: ICD-10-CM

## 2023-06-12 DIAGNOSIS — E08.21 DIABETES MELLITUS DUE TO UNDERLYING CONDITION WITH DIABETIC NEPHROPATHY, WITHOUT LONG-TERM CURRENT USE OF INSULIN: Chronic | ICD-10-CM

## 2023-06-12 DIAGNOSIS — Z99.2 DIALYSIS PATIENT: ICD-10-CM

## 2023-06-12 DIAGNOSIS — E11.69 HYPERLIPIDEMIA ASSOCIATED WITH TYPE 2 DIABETES MELLITUS: ICD-10-CM

## 2023-06-12 DIAGNOSIS — E78.5 HYPERLIPIDEMIA ASSOCIATED WITH TYPE 2 DIABETES MELLITUS: ICD-10-CM

## 2023-06-12 DIAGNOSIS — I10 ESSENTIAL (PRIMARY) HYPERTENSION: Primary | Chronic | ICD-10-CM

## 2023-06-12 PROBLEM — E21.3 HYPERPARATHYROIDISM: Status: ACTIVE | Noted: 2023-06-12

## 2023-06-12 PROBLEM — E21.3 HYPERPARATHYROIDISM: Status: RESOLVED | Noted: 2023-06-12 | Resolved: 2023-06-12

## 2023-06-12 PROCEDURE — 3066F PR DOCUMENTATION OF TREATMENT FOR NEPHROPATHY: ICD-10-PCS | Mod: CPTII,95,, | Performed by: FAMILY MEDICINE

## 2023-06-12 PROCEDURE — 1159F PR MEDICATION LIST DOCUMENTED IN MEDICAL RECORD: ICD-10-PCS | Mod: CPTII,95,, | Performed by: FAMILY MEDICINE

## 2023-06-12 PROCEDURE — 3074F PR MOST RECENT SYSTOLIC BLOOD PRESSURE < 130 MM HG: ICD-10-PCS | Mod: CPTII,95,, | Performed by: FAMILY MEDICINE

## 2023-06-12 PROCEDURE — 1160F PR REVIEW ALL MEDS BY PRESCRIBER/CLIN PHARMACIST DOCUMENTED: ICD-10-PCS | Mod: CPTII,95,, | Performed by: FAMILY MEDICINE

## 2023-06-12 PROCEDURE — 1159F MED LIST DOCD IN RCRD: CPT | Mod: CPTII,95,, | Performed by: FAMILY MEDICINE

## 2023-06-12 PROCEDURE — 3072F PR LOW RISK FOR RETINOPATHY: ICD-10-PCS | Mod: CPTII,95,, | Performed by: FAMILY MEDICINE

## 2023-06-12 PROCEDURE — 3044F HG A1C LEVEL LT 7.0%: CPT | Mod: CPTII,95,, | Performed by: FAMILY MEDICINE

## 2023-06-12 PROCEDURE — 3044F PR MOST RECENT HEMOGLOBIN A1C LEVEL <7.0%: ICD-10-PCS | Mod: CPTII,95,, | Performed by: FAMILY MEDICINE

## 2023-06-12 PROCEDURE — 1160F RVW MEDS BY RX/DR IN RCRD: CPT | Mod: CPTII,95,, | Performed by: FAMILY MEDICINE

## 2023-06-12 PROCEDURE — 99214 PR OFFICE/OUTPT VISIT, EST, LEVL IV, 30-39 MIN: ICD-10-PCS | Mod: 95,,, | Performed by: FAMILY MEDICINE

## 2023-06-12 PROCEDURE — 3078F PR MOST RECENT DIASTOLIC BLOOD PRESSURE < 80 MM HG: ICD-10-PCS | Mod: CPTII,95,, | Performed by: FAMILY MEDICINE

## 2023-06-12 PROCEDURE — 3074F SYST BP LT 130 MM HG: CPT | Mod: CPTII,95,, | Performed by: FAMILY MEDICINE

## 2023-06-12 PROCEDURE — 99214 OFFICE O/P EST MOD 30 MIN: CPT | Mod: 95,,, | Performed by: FAMILY MEDICINE

## 2023-06-12 PROCEDURE — 3078F DIAST BP <80 MM HG: CPT | Mod: CPTII,95,, | Performed by: FAMILY MEDICINE

## 2023-06-12 PROCEDURE — 3066F NEPHROPATHY DOC TX: CPT | Mod: CPTII,95,, | Performed by: FAMILY MEDICINE

## 2023-06-12 PROCEDURE — 3072F LOW RISK FOR RETINOPATHY: CPT | Mod: CPTII,95,, | Performed by: FAMILY MEDICINE

## 2023-06-12 RX ORDER — NIFEDIPINE 60 MG/1
60 TABLET, EXTENDED RELEASE ORAL 2 TIMES DAILY
Qty: 60 TABLET | Refills: 11 | OUTPATIENT
Start: 2023-06-12 | End: 2024-06-11

## 2023-06-12 RX ORDER — CARVEDILOL 12.5 MG/1
12.5 TABLET ORAL 2 TIMES DAILY
Qty: 60 TABLET | Refills: 11 | Status: SHIPPED | OUTPATIENT
Start: 2023-06-12 | End: 2024-06-11

## 2023-06-12 RX ORDER — NIFEDIPINE 60 MG/1
60 TABLET, EXTENDED RELEASE ORAL 2 TIMES DAILY
Qty: 60 TABLET | Refills: 11 | Status: SHIPPED | OUTPATIENT
Start: 2023-06-12 | End: 2024-06-11

## 2023-06-12 NOTE — TELEPHONE ENCOUNTER
Refill Routing Note   Medication(s) are not appropriate for processing by [facility] for the following reason(s):      Patient seen in ED/Hospital since LOV with PCP  Required vitals abnormal    [facility] action(s):  Route Labs due          Appointments  past 12m or future 3m with PCP    Date Provider   Last Visit   5/16/2022 Angel Luis Boo MD   Next Visit   Visit date not found Angel Luis Boo MD   ED visits in past 90 days: 0        Note composed:9:55 AM 06/12/2023

## 2023-06-12 NOTE — PROGRESS NOTES
Established Patient - Audio Only Telehealth Visit    The patient location is: home  The chief complaint leading to consultation is: Hypertension (Med refills/) and Diabetes    Visit type: Virtual visit with audio only (telephone) - patient verbally consented to an audio visit today prior to this telephone encounter.  The reason for the audio only service rather than synchronous audio and video virtual visit was related to technical difficulties or patient preference/necessity.  Total time spent with patient: 22 minutes  Each patient to whom he or she provides medical services by telemedicine is:  (1) informed of the relationship between the physician and patient and the respective role of any other health care provider with respect to management of the patient; and (2) notified that he or she may decline to receive medical services by telemedicine and may withdraw from such care at any time.           This service was not originating from a related E/M service provided within the previous 7 days nor will  to an E/M service or procedure within the next 24 hours or my soonest available appointment.  Prevailing standard of care was able to be met in this audio-only visit.           Office Visit    Patient Name: Elbert Still Jr.    : 1955  MRN: [id number]      Assessment/Plan:  Elbert Still Jr. is a 67 y.o. male who presents today for :    -Essential (primary) hypertension  -     NIFEdipine (PROCARDIA-XL) 60 MG (OSM) 24 hr tablet; Take 1 tablet (60 mg total) by mouth 2 (two) times a day.  Dispense: 60 tablet; Refill: 11  -     carvediloL (COREG) 12.5 MG tablet; Take 1 tablet (12.5 mg total) by mouth 2 (two) times daily.  Dispense: 60 tablet; Refill: 11  -     CBC Without Differential; Future; Expected date: 2023  -     Comprehensive Metabolic Panel; Future; Expected date: 2023  -Dialysis patient - MWF - started 2022  ESRD on dialysis  -continue current medication regimen  -DASH/renal  diet, regular cardiovascular exercises  -weight loss    -Diabetes mellitus due to underlying condition with diabetic nephropathy - diet controlled  -     Hemoglobin A1C; Future; Expected date: 06/12/2023  -     CBC Without Differential; Future; Expected date: 06/12/2023  -     Comprehensive Metabolic Panel; Future; Expected date: 06/12/2023  -     Microalbumin/Creatinine Ratio, Urine; Future; Expected date: 06/12/2023  -     PTH, Intact; Future; Expected date: 06/12/2023  -HLD associated with type 2 DM  -     Lipid Panel; Future; Expected date: 06/12/2023  -previous A1c reviewed:   Lab Results   Component Value Date    HGBA1C 4.8 04/05/2023     -at goal, reviewed with patient about routine diabetic care.  -weight loss and regular physical excercise    Hx neutropenia  -stable, asymptomatic, continue to monitor            Follow up 3 mo    This note was created by combination of typed  and MModal dictation.  Transcription errors may be present.  If there are any questions, please contact me.      ----------------------------------------------------------------------------------------------------------------------      HPI:  Patient Care Team:  Angel Luis Boo MD as PCP - General (Family Medicine)  Walter Díaz MD as Consulting Physician (Nephrology)    Elbert is a 67 y.o. male with      Patient Active Problem List   Diagnosis    -Diabetes mellitus due to underlying condition with diabetic nephropathy - diet controlled    Proteinuria    -Essential (primary) hypertension    Mixed hyperlipidemia    -CKD (chronic kidney disease) stage 4, GFR 15-29 ml/min    -Gout    Anemia of chronic disease    -HLD associated with type 2 DM    Hyperkalemia    Acute kidney injury superimposed on chronic kidney disease    -Dialysis patient - MWF - started 5/2022    ESRD on dialysis    Nuclear sclerosis of both eyes    Refractive error    Cortical age-related cataract    Status post placement of arteriovenous graft    BMI  28.0-28.9,adult    Steal syndrome dialysis vascular access, initial encounter    Arteriovenous fistula    ESRD (end stage renal disease) on dialysis    ESRD (end stage renal disease)    Other neutropenia       Patient presents today for f/u of:  Hypertension (Med refills/) and Diabetes    His last follow up with me was a year ago. He has been doing well without any new changes in health status. He is doing well with HD MWF - still making some urine.     DM - diet-controlled, latest A1c at goal as below. No polyuria/polydipsia. No foot numbness/tingling/vision changes/hypoglycemia    HTN - patient is compliant with current medication regimen with good BP control at home that are usually in the 120s/70s - no side effects. Denies CP/SOB/leg swelling    Hemoglobin A1C   Date Value Ref Range Status   04/05/2023 4.8 4.8 - 5.9 % Final   03/20/2023 4.6 4.0 - 5.6 % Final     Comment:     ADA Screening Guidelines:  5.7-6.4%  Consistent with prediabetes  >or=6.5%  Consistent with diabetes    High levels of fetal hemoglobin interfere with the HbA1C  assay. Heterozygous hemoglobin variants (HbS, HgC, etc)do  not significantly interfere with this assay.   However, presence of multiple variants may affect accuracy.     01/04/2023 4.4 (L) 4.8 - 5.9 % Final           Additional ROS    Negative review of system  except per HPI               Patient Active Problem List   Diagnosis    -Diabetes mellitus due to underlying condition with diabetic nephropathy - diet controlled    Proteinuria    -Essential (primary) hypertension    Mixed hyperlipidemia    -CKD (chronic kidney disease) stage 4, GFR 15-29 ml/min    -Gout    Anemia of chronic disease    -HLD associated with type 2 DM    Hyperkalemia    Acute kidney injury superimposed on chronic kidney disease    -Dialysis patient - MWF - started 5/2022    ESRD on dialysis    Nuclear sclerosis of both eyes    Refractive error    Cortical age-related cataract    Status post placement of  arteriovenous graft    BMI 28.0-28.9,adult    Steal syndrome dialysis vascular access, initial encounter    Arteriovenous fistula    ESRD (end stage renal disease) on dialysis    ESRD (end stage renal disease)    Other neutropenia       Current Medications  Medications reviewed/updated.     Current Outpatient Medications on File Prior to Visit   Medication Sig Dispense Refill    acetaminophen (TYLENOL) 500 MG tablet Take 2 tablets (1,000 mg total) by mouth every 6 (six) hours as needed for Pain. 30 tablet 0    aspirin (ECOTRIN) 81 MG EC tablet Take 1 tablet (81 mg total) by mouth once daily. 90 tablet 3    atorvastatin (LIPITOR) 20 MG tablet TAKE 1 TABLET BY MOUTH EVERY EVENING 90 tablet 3    BANOPHEN 50 mg capsule SMARTSI Capsule(s) By Mouth      blood sugar diagnostic Strp Use to check blood glucose once a day. 100 each 3    blood-glucose meter Misc Use to check blood glucose twice a day. 1 each 0    cinacalcet (SENSIPAR) 60 MG Tab Take 60 mg by mouth daily with breakfast.      ciprofloxacin HCl (CIPRO) 500 MG tablet 1 pill one hour before prostate biopsy 1 tablet 0    clopidogreL (PLAVIX) 75 mg tablet Take 1 tablet (75 mg total) by mouth once daily. 30 tablet 11    colchicine (COLCRYS) 0.6 mg tablet Take 0.6 mg by mouth as needed. Take half tab (0.3 mg) by mouth daily as needed for gout flare. 90 tablet 3    fluticasone propionate (FLONASE) 50 mcg/actuation nasal spray 2 sprays (100 mcg total) by Each Nostril route once daily. 16 g 11    furosemide (LASIX) 80 MG tablet Take 1 tablet (80 mg total) by mouth 2 (two) times daily. 60 tablet 11    HYDROcodone-acetaminophen (NORCO) 5-325 mg per tablet Take 1 tablet by mouth every 6 (six) hours as needed for Pain. 24 tablet 0    lancets 30 gauge Misc Use to check blood glucose twice a day. 100 each 5    LIDOcaine-prilocaine (EMLA) cream SMARTSIG:sparingly Topical As Directed      predniSONE (DELTASONE) 50 MG Tab Take by mouth.      sevelamer carbonate (RENVELA) 800 mg  Tab Take 1,600 mg by mouth 3 (three) times daily.      sevelamer HCL (RENAGEL) 800 MG Tab Take 2 tablets (1,600 mg total) by mouth 3 (three) times daily with meals. 180 tablet 11    tamsulosin (FLOMAX) 0.4 mg Cap TAKE ONE CAPSULE BY MOUTH ONCE DAILY (Patient taking differently: Take by mouth every morning.) 90 capsule 3    traMADoL (ULTRAM) 50 mg tablet Take 1 tablet (50 mg total) by mouth every 8 (eight) hours as needed for Pain. (Patient not taking: Reported on 4/6/2023) 10 tablet 0    [DISCONTINUED] amLODIPine (NORVASC) 10 MG tablet TAKE 1 TABLET BY MOUTH ONCE DAILY 90 tablet 3    [DISCONTINUED] carvediloL (COREG) 12.5 MG tablet Take 1 tablet (12.5 mg total) by mouth 2 (two) times daily. 60 tablet 11    [DISCONTINUED] glipiZIDE (GLUCOTROL) 10 MG tablet TAKE 1 TABLET BY MOUTH TWICE DAILY WITH MEALS 180 tablet 1    [DISCONTINUED] metoprolol succinate (TOPROL-XL) 25 MG 24 hr tablet Take 1 tablet (25 mg total) by mouth once daily. 90 tablet 3    [DISCONTINUED] NIFEdipine (PROCARDIA-XL) 60 MG (OSM) 24 hr tablet Take 1 tablet (60 mg total) by mouth 2 (two) times a day. 60 tablet 11     Current Facility-Administered Medications on File Prior to Visit   Medication Dose Route Frequency Provider Last Rate Last Admin    0.9%  NaCl infusion   Intravenous Continuous Kim Lutz NP        [COMPLETED] acetaminophen tablet 650 mg  650 mg Oral Q4H PRN Nawaf Butler MD   650 mg at 06/12/23 0645    cefTRIAXone injection 1 g  1 g Intramuscular Once Candice Moore NP        [COMPLETED] heparin (porcine) injection 2,000 Units  2,000 Units Intravenous 1 time in Clinic/HOD Nawaf Butler MD   2,000 Units at 06/12/23 0918    [COMPLETED] heparin (porcine) injection 2,000 Units  2,000 Units Intravenous 1 time in Clinic/HOD Nawaf Butler MD   2,000 Units at 06/12/23 0920    [COMPLETED] heparin (porcine) injection 2,800 Units  2,800 Units Intravenous Once Nawaf Butler MD   2,800 Units at 06/12/23 0534    [COMPLETED]  iron sucrose injection 100 mg  100 mg Intravenous Once in dialysis Nawaf Butler MD   100 mg at 06/12/23 0651    LIDOcaine HCl 2% urojet   Rectal Once Candice Moore NP        [COMPLETED] paricalcitoL injection 2 mcg  2 mcg Intravenous 1 time in Clinic/HOD Nawaf Butler MD   2 mcg at 06/12/23 0647    [DISCONTINUED] heparin (porcine) injection 1,200 Units  1,200 Units Intravenous Continuous Nawaf Butler MD   1,200 Units at 06/12/23 0539           Past Surgical History:   Procedure Laterality Date    ADENOIDECTOMY      ADENOIDECTOMY      AV FISTULA PLACEMENT Left 09/06/2022    Procedure: CREATION, AV FISTULA;  Surgeon: Prince Gardiner MD;  [facility];  Service: Peripheral Vascular;  Laterality: Left;    FISTULOGRAM Left 2/16/2023    Procedure: FISTULOGRAM;  Surgeon: Prince Gardiner MD;  [facility];  Service: Peripheral Vascular;  Laterality: Left;  Given to the sterile field.     nasal septum repair      NASAL SEPTUM SURGERY      PERCUTANEOUS TRANSLUMINAL ANGIOPLASTY OF ARTERIOVENOUS FISTULA Left 11/22/2022    Procedure: PTA, AV FISTULA;  Surgeon: Prince Gardiner MD;  Location: [facility] CATH LAB;  Service: Peripheral Vascular;  Laterality: Left;    PERCUTANEOUS TRANSLUMINAL ANGIOPLASTY OF ARTERIOVENOUS FISTULA Left 2/16/2023    Procedure: PTA, AV FISTULA;  Surgeon: Prince Gardiner MD;  [facility];  Service: Peripheral Vascular;  Laterality: Left;  4.8 min  43.08 mGy  4.0682 Gy.cm  32ml Dye    PROSTATE BIOPSY N/A 3/24/2023    Procedure: BIOPSY, PROSTATE;  Surgeon: Frankie Welch MD;  Location: [facility] OR 1ST FLR;  Service: Urology;  Laterality: N/A;  transrectal, 30min, uronav needed    ROTATOR CUFF REPAIR Left     SALIVARY GLAND SURGERY      Tonsillectomy      TONSILLECTOMY      TRANSPOSITION OF BASILIC VEIN Left 11/1/2022    Procedure: TRANSPOSITION, VEIN, BASILIC;  Surgeon: Prince Gardiner MD;  Location: [facility] OR 2ND FLR;  Service: Peripheral Vascular;   Laterality: Left;  Left Brachial vein.       Family History   Problem Relation Age of Onset    Heart disease Mother     Kidney disease Mother     Hypertension Mother     No Known Problems Father     Cancer Sister     Seizures Sister     Hypertension Sister     Stroke Sister     Amblyopia Neg Hx     Blindness Neg Hx     Cataracts Neg Hx     Diabetes Neg Hx     Glaucoma Neg Hx     Macular degeneration Neg Hx     Retinal detachment Neg Hx     Strabismus Neg Hx     Thyroid disease Neg Hx     Anesthesia problems Neg Hx        Social History     Socioeconomic History    Marital status:      Spouse name: Kristine Still   Occupational History     Employer: [employer]   Tobacco Use    Smoking status: Never    Smokeless tobacco: Never    Tobacco comments:     .  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

## 2023-06-12 NOTE — TELEPHONE ENCOUNTER
Care Due:                  Date            Visit Type   Department     Provider  --------------------------------------------------------------------------------                                             [facility]     MED/ INTERNAL  Last Visit: 05-      FOLLOW UP    MED/ PEDS      Angel Luis Boo  Next Visit: None Scheduled  None         None Found                                                            Last  Test          Frequency    Reason                     Performed    Due Date  --------------------------------------------------------------------------------    Office Visit  12 months..  atorvastatin, furosemide,   05- 05-                             tamsulosin...............    [employer] Embedded Care Due Messages. Reference number: [id number].   6/12/2023 9:38:57 AM CDT

## 2023-06-14 DIAGNOSIS — Z99.2 END STAGE RENAL FAILURE ON DIALYSIS: Primary | ICD-10-CM

## 2023-06-14 DIAGNOSIS — N18.6 END STAGE RENAL FAILURE ON DIALYSIS: Primary | ICD-10-CM

## 2023-06-20 ENCOUNTER — OFFICE VISIT (OUTPATIENT)
Dept: VASCULAR SURGERY | Facility: CLINIC | Age: 68
End: 2023-06-20
Attending: SURGERY
Payer: COMMERCIAL

## 2023-06-20 ENCOUNTER — HOSPITAL ENCOUNTER (OUTPATIENT)
Dept: VASCULAR SURGERY | Facility: CLINIC | Age: 68
Discharge: HOME | End: 2023-06-20
Attending: SURGERY
Payer: COMMERCIAL

## 2023-06-20 VITALS
SYSTOLIC BLOOD PRESSURE: 131 MMHG | HEIGHT: 74 IN | DIASTOLIC BLOOD PRESSURE: 72 MMHG | BODY MASS INDEX: 27.45 KG/M2 | TEMPERATURE: 98 F | HEART RATE: 66 BPM | WEIGHT: 213.88 LBS

## 2023-06-20 DIAGNOSIS — Z99.2 ESRD ON DIALYSIS: ICD-10-CM

## 2023-06-20 DIAGNOSIS — N18.6 END STAGE RENAL FAILURE ON DIALYSIS: ICD-10-CM

## 2023-06-20 DIAGNOSIS — T82.858D STENOSIS OF ARTERIOVENOUS DIALYSIS FISTULA, SUBSEQUENT ENCOUNTER: ICD-10-CM

## 2023-06-20 DIAGNOSIS — N18.6 ESRD ON DIALYSIS: Primary | ICD-10-CM

## 2023-06-20 DIAGNOSIS — N18.6 ESRD ON DIALYSIS: ICD-10-CM

## 2023-06-20 DIAGNOSIS — Z99.2 END STAGE RENAL FAILURE ON DIALYSIS: ICD-10-CM

## 2023-06-20 DIAGNOSIS — Z99.2 ESRD ON DIALYSIS: Primary | ICD-10-CM

## 2023-06-20 PROBLEM — T82.858A STENOSIS OF ARTERIOVENOUS DIALYSIS FISTULA: Status: ACTIVE | Noted: 2023-06-20

## 2023-06-20 PROCEDURE — 3078F PR MOST RECENT DIASTOLIC BLOOD PRESSURE < 80 MM HG: ICD-10-PCS | Mod: CPTII,S$GLB,, | Performed by: SURGERY

## 2023-06-20 PROCEDURE — 1126F AMNT PAIN NOTED NONE PRSNT: CPT | Mod: CPTII,S$GLB,, | Performed by: SURGERY

## 2023-06-20 PROCEDURE — 3008F PR BODY MASS INDEX (BMI) DOCUMENTED: ICD-10-PCS | Mod: CPTII,S$GLB,, | Performed by: SURGERY

## 2023-06-20 PROCEDURE — 93990 DOPPLER FLOW TESTING: CPT | Mod: S$GLB,,, | Performed by: SURGERY

## 2023-06-20 PROCEDURE — 93990 PR DUPLEX HEMODIALYSIS ACCESS: ICD-10-PCS | Mod: S$GLB,,, | Performed by: SURGERY

## 2023-06-20 PROCEDURE — 3066F NEPHROPATHY DOC TX: CPT | Mod: CPTII,S$GLB,, | Performed by: SURGERY

## 2023-06-20 PROCEDURE — 1101F PT FALLS ASSESS-DOCD LE1/YR: CPT | Mod: CPTII,S$GLB,, | Performed by: SURGERY

## 2023-06-20 PROCEDURE — 99999 PR PBB SHADOW E&M-EST. PATIENT-LVL V: ICD-10-PCS | Mod: PBBFAC,,, | Performed by: SURGERY

## 2023-06-20 PROCEDURE — 1159F MED LIST DOCD IN RCRD: CPT | Mod: CPTII,S$GLB,, | Performed by: SURGERY

## 2023-06-20 PROCEDURE — 1101F PR PT FALLS ASSESS DOC 0-1 FALLS W/OUT INJ PAST YR: ICD-10-PCS | Mod: CPTII,S$GLB,, | Performed by: SURGERY

## 2023-06-20 PROCEDURE — 3078F DIAST BP <80 MM HG: CPT | Mod: CPTII,S$GLB,, | Performed by: SURGERY

## 2023-06-20 PROCEDURE — 3288F PR FALLS RISK ASSESSMENT DOCUMENTED: ICD-10-PCS | Mod: CPTII,S$GLB,, | Performed by: SURGERY

## 2023-06-20 PROCEDURE — 99215 OFFICE O/P EST HI 40 MIN: CPT | Mod: S$GLB,,, | Performed by: SURGERY

## 2023-06-20 PROCEDURE — 1159F PR MEDICATION LIST DOCUMENTED IN MEDICAL RECORD: ICD-10-PCS | Mod: CPTII,S$GLB,, | Performed by: SURGERY

## 2023-06-20 PROCEDURE — 3044F HG A1C LEVEL LT 7.0%: CPT | Mod: CPTII,S$GLB,, | Performed by: SURGERY

## 2023-06-20 PROCEDURE — 3288F FALL RISK ASSESSMENT DOCD: CPT | Mod: CPTII,S$GLB,, | Performed by: SURGERY

## 2023-06-20 PROCEDURE — 3072F LOW RISK FOR RETINOPATHY: CPT | Mod: CPTII,S$GLB,, | Performed by: SURGERY

## 2023-06-20 PROCEDURE — 1160F RVW MEDS BY RX/DR IN RCRD: CPT | Mod: CPTII,S$GLB,, | Performed by: SURGERY

## 2023-06-20 PROCEDURE — 1126F PR PAIN SEVERITY QUANTIFIED, NO PAIN PRESENT: ICD-10-PCS | Mod: CPTII,S$GLB,, | Performed by: SURGERY

## 2023-06-20 PROCEDURE — 99999 PR PBB SHADOW E&M-EST. PATIENT-LVL V: CPT | Mod: PBBFAC,,, | Performed by: SURGERY

## 2023-06-20 PROCEDURE — 3066F PR DOCUMENTATION OF TREATMENT FOR NEPHROPATHY: ICD-10-PCS | Mod: CPTII,S$GLB,, | Performed by: SURGERY

## 2023-06-20 PROCEDURE — 99215 PR OFFICE/OUTPT VISIT, EST, LEVL V, 40-54 MIN: ICD-10-PCS | Mod: S$GLB,,, | Performed by: SURGERY

## 2023-06-20 PROCEDURE — 3075F SYST BP GE 130 - 139MM HG: CPT | Mod: CPTII,S$GLB,, | Performed by: SURGERY

## 2023-06-20 PROCEDURE — 3075F PR MOST RECENT SYSTOLIC BLOOD PRESS GE 130-139MM HG: ICD-10-PCS | Mod: CPTII,S$GLB,, | Performed by: SURGERY

## 2023-06-20 PROCEDURE — 1160F PR REVIEW ALL MEDS BY PRESCRIBER/CLIN PHARMACIST DOCUMENTED: ICD-10-PCS | Mod: CPTII,S$GLB,, | Performed by: SURGERY

## 2023-06-20 PROCEDURE — 3044F PR MOST RECENT HEMOGLOBIN A1C LEVEL <7.0%: ICD-10-PCS | Mod: CPTII,S$GLB,, | Performed by: SURGERY

## 2023-06-20 PROCEDURE — 3008F BODY MASS INDEX DOCD: CPT | Mod: CPTII,S$GLB,, | Performed by: SURGERY

## 2023-06-20 PROCEDURE — 3072F PR LOW RISK FOR RETINOPATHY: ICD-10-PCS | Mod: CPTII,S$GLB,, | Performed by: SURGERY

## 2023-06-20 RX ORDER — SODIUM CHLORIDE 9 MG/ML
INJECTION, SOLUTION INTRAVENOUS CONTINUOUS
Status: CANCELLED | OUTPATIENT
Start: 2023-06-20

## 2023-06-20 NOTE — H&P (VIEW-ONLY)
VASCULAR SURGERY SERVICE    HISTORY OF PRESENT ILLNESS: Elbert Still Jr. is a 67 y.o. male with end-stage renal disease dialyzing via a right IJ PermCath, status post:    PTA, mid AV fistula stenosis 2023  Trans radial angioplasty, left distal brachial artery 2022  Transposition, left ratio vein brachial artery AV fistula 2022  Original left brachial artery and vein AV fistula creation 2022    All the above procedures were performed by Dr. Gardiner.  The patient has asked to change vascular surgeons.  The patient has not been able to successfully access the fistula, and when this was last tried 6 weeks ago he pulled clots.    He reports forearm numbness which has been present since his 1st procedure.   initially also had some significant steal symptoms which resolved after the brachial artery intervention in 2022.  He denies any hand pain weakness or pain.    He is frustrated that he has been unable to use this fistula despite several procedures after its initial placement now 9 months ago    Past Medical History:   Diagnosis Date    Anemia     Diabetes mellitus     Diabetes mellitus, type 2     Disorder of kidney and ureter     ESRD (end stage renal disease)     Essential (primary) hypertension 2017    Gout, unspecified      jaundice, unspecified     Nuclear sclerosis of both eyes 2022       Past Surgical History:   Procedure Laterality Date    ADENOIDECTOMY      ADENOIDECTOMY      AV FISTULA PLACEMENT Left 2022    Procedure: CREATION, AV FISTULA;  Surgeon: Prince Gardiner MD;  Location: [facility] OR [address] [place] [address];  Service: Peripheral Vascular;  Laterality: Left;    FISTULOGRAM Left 2023    Procedure: FISTULOGRAM;  Surgeon: Prince Gardiner MD;  Location: [facility] OR [address] [place] [address];  Service: Peripheral Vascular;  Laterality: Left;  Given to the sterile field.     nasal septum repair      NASAL SEPTUM SURGERY      PERCUTANEOUS TRANSLUMINAL ANGIOPLASTY OF ARTERIOVENOUS  FISTULA Left 2022    Procedure: PTA, AV FISTULA;  Surgeon: Prince Gardiner MD;  Location: [facility] CATH LAB;  Service: Peripheral Vascular;  Laterality: Left;    PERCUTANEOUS TRANSLUMINAL ANGIOPLASTY OF ARTERIOVENOUS FISTULA Left 2023    Procedure: PTA, AV FISTULA;  Surgeon: Prince Gardiner MD;  Location: [facility] OR 2ND FLR;  Service: Peripheral Vascular;  Laterality: Left;  4.8 min  43.08 mGy  4.0682 Gy.cm  32ml Dye    PROSTATE BIOPSY N/A 3/24/2023    Procedure: BIOPSY, PROSTATE;  Surgeon: Frankie Welch MD;  Location: [facility] OR 1ST FLR;  Service: Urology;  Laterality: N/A;  transrectal, 30min, uronav needed    ROTATOR CUFF REPAIR Left     SALIVARY GLAND SURGERY      Tonsillectomy      TONSILLECTOMY      TRANSPOSITION OF BASILIC VEIN Left 2022    Procedure: TRANSPOSITION, VEIN, BASILIC;  Surgeon: Prince Gardiner MD;  Location: [facility] OR 2ND FLR;  Service: Peripheral Vascular;  Laterality: Left;  Left Brachial vein.         Current Outpatient Medications:     acetaminophen (TYLENOL) 500 MG tablet, Take 2 tablets (1,000 mg total) by mouth every 6 (six) hours as needed for Pain., Disp: 30 tablet, Rfl: 0    aspirin (ECOTRIN) 81 MG EC tablet, Take 1 tablet (81 mg total) by mouth once daily., Disp: 90 tablet, Rfl: 3    atorvastatin (LIPITOR) 20 MG tablet, TAKE 1 TABLET BY MOUTH EVERY EVENING, Disp: 90 tablet, Rfl: 3    BANOPHEN 50 mg capsule, SMARTSI Capsule(s) By Mouth, Disp: , Rfl:     blood sugar diagnostic Strp, Use to check blood glucose once a day., Disp: 100 each, Rfl: 3    blood-glucose meter Misc, Use to check blood glucose twice a day., Disp: 1 each, Rfl: 0    carvediloL (COREG) 12.5 MG tablet, Take 1 tablet (12.5 mg total) by mouth 2 (two) times daily., Disp: 60 tablet, Rfl: 11    cinacalcet (SENSIPAR) 60 MG Tab, Take 60 mg by mouth daily with breakfast., Disp: , Rfl:     ciprofloxacin HCl (CIPRO) 500 MG tablet, 1 pill one hour before prostate biopsy, Disp: 1 tablet, Rfl: 0    clopidogreL (PLAVIX) 75  mg tablet, Take 1 tablet (75 mg total) by mouth once daily., Disp: 30 tablet, Rfl: 11    colchicine (COLCRYS) 0.6 mg tablet, Take 0.6 mg by mouth as needed. Take half tab (0.3 mg) by mouth daily as needed for gout flare., Disp: 90 tablet, Rfl: 3    fluticasone propionate (FLONASE) 50 mcg/actuation nasal spray, 2 sprays (100 mcg total) by Each Nostril route once daily., Disp: 16 g, Rfl: 11    furosemide (LASIX) 80 MG tablet, Take 1 tablet (80 mg total) by mouth 2 (two) times daily., Disp: 60 tablet, Rfl: 11    HYDROcodone-acetaminophen (NORCO) 5-325 mg per tablet, Take 1 tablet by mouth every 6 (six) hours as needed for Pain., Disp: 24 tablet, Rfl: 0    lancets 30 gauge Misc, Use to check blood glucose twice a day., Disp: 100 each, Rfl: 5    LIDOcaine-prilocaine (EMLA) cream, SMARTSIG:sparingly Topical As Directed, Disp: , Rfl:     NIFEdipine (PROCARDIA-XL) 60 MG (OSM) 24 hr tablet, Take 1 tablet (60 mg total) by mouth 2 (two) times a day., Disp: 60 tablet, Rfl: 11    predniSONE (DELTASONE) 50 MG Tab, Take by mouth., Disp: , Rfl:     sevelamer carbonate (RENVELA) 800 mg Tab, Take 1,600 mg by mouth 3 (three) times daily., Disp: , Rfl:     sevelamer HCL (RENAGEL) 800 MG Tab, Take 2 tablets (1,600 mg total) by mouth 3 (three) times daily with meals., Disp: 180 tablet, Rfl: 11    tamsulosin (FLOMAX) 0.4 mg Cap, TAKE ONE CAPSULE BY MOUTH ONCE DAILY (Patient taking differently: Take by mouth every morning.), Disp: 90 capsule, Rfl: 3    traMADoL (ULTRAM) 50 mg tablet, Take 1 tablet (50 mg total) by mouth every 8 (eight) hours as needed for Pain., Disp: 10 tablet, Rfl: 0    Current Facility-Administered Medications:     cefTRIAXone injection 1 g, 1 g, Intramuscular, Once, Candice Moore NP    LIDOcaine HCl 2% urojet, , Rectal, Once, Candice Moore NP    Facility-Administered Medications Ordered in Other Visits:     0.9%  NaCl infusion, , Intravenous, Continuous, Kim Lutz NP    Review of patient's allergies  "indicates:   Allergen Reactions    Iodine and iodide containing products Hives     Hypotension, Flushing       Family History   Problem Relation Age of Onset    Heart disease Mother     Kidney disease Mother     Hypertension Mother     No Known Problems Father     Cancer Sister     Seizures Sister     Hypertension Sister     Stroke Sister     Amblyopia Neg Hx     Blindness Neg Hx     Cataracts Neg Hx     Diabetes Neg Hx     Glaucoma Neg Hx     Macular degeneration Neg Hx     Retinal detachment Neg Hx     Strabismus Neg Hx     Thyroid disease Neg Hx     Anesthesia problems Neg Hx        PHYSICAL EXAM:   /72 (BP Location: Right arm, Patient Position: Sitting, BP Method: Large (Automatic))   Pulse 66   Temp 98 °F (36.7 °C) (Oral)   Ht 6' 2" (1.88 m)   Wt 97 kg (213 lb 13.5 oz)   BMI 27.46 kg/m²   Constitutional:  Alert,   Well-appearing  In no distress.   Neurological: Normal speech  no focal findings  CN II - XII grossly intact.    Psychiatric: Mood and affect appropriate and symmetric.   HEENT: Normocephalic / atraumatic  PERRLA  Midline trachea  No scars across the neck   Cardiac: Regular rate and rhythm.   Pulmonary: Normal pulmonary effort.   Abdomen: Soft, not distended.     Skin: Warm and well perfused.    Vascular:  2+ radial pulse   Extremities/  Musculoskeletal: No edema.   Left arm demonstrates a transposed left brachial vein brachial artery AV fistula.  There was significant pulsatility the abruptly changes to appear thrill in the upper 3rd of the fistula     IMAGING:  Quantitative duplex of the AV fistula today demonstrates a significant mid fistula stenosis with a velocity of 740.  Flow volume 1.14 L  Diameter 9.4 proximally, 7.9 mid, 12.6 distally  Depth 3.6 proximal, 6.7 mid common 9.0 distally    His from February 2023 was personally reviewed and demonstrated a significant mid distal stenosis    IMPRESSION:  Recurrent left mid distal AV fistula stenosis.    I would be hopeful that successful " treatment of this will allow this fistula to be used    PLAN:  Left fistulogram and intervention 07/06/2023  Hybrid OR 11 case    Consider placement of a stent if we can not get a excellent PTA alone result    I have explained the risks, benefits and alternatives for this procedure in detail.  The patient voices understanding and all questions have be answered, and agrees to proceed with the procedure.     PATRICIA Rayo III, MD, FACS  Professor and Chief, Vascular and Endovascular Surgery

## 2023-06-20 NOTE — PROGRESS NOTES
VASCULAR SURGERY SERVICE    HISTORY OF PRESENT ILLNESS: Elbert Still Jr. is a 67 y.o. male with end-stage renal disease dialyzing via a right IJ PermCath, status post:    PTA, mid AV fistula stenosis 2023  Trans radial angioplasty, left distal brachial artery 2022  Transposition, left ratio vein brachial artery AV fistula 2022  Original left brachial artery and vein AV fistula creation 2022    All the above procedures were performed by Dr. Gardiner.  The patient has asked to change vascular surgeons.  The patient has not been able to successfully access the fistula, and when this was last tried 6 weeks ago he pulled clots.    He reports forearm numbness which has been present since his 1st procedure.   initially also had some significant steal symptoms which resolved after the brachial artery intervention in 2022.  He denies any hand pain weakness or pain.    He is frustrated that he has been unable to use this fistula despite several procedures after its initial placement now 9 months ago    Past Medical History:   Diagnosis Date    Anemia     Diabetes mellitus     Diabetes mellitus, type 2     Disorder of kidney and ureter     ESRD (end stage renal disease)     Essential (primary) hypertension 2017    Gout, unspecified      jaundice, unspecified     Nuclear sclerosis of both eyes 2022       Past Surgical History:   Procedure Laterality Date    ADENOIDECTOMY      ADENOIDECTOMY      AV FISTULA PLACEMENT Left 2022    Procedure: CREATION, AV FISTULA;  Surgeon: Prince Gardiner MD;  Location: [facility] OR [address] [place] [address];  Service: Peripheral Vascular;  Laterality: Left;    FISTULOGRAM Left 2023    Procedure: FISTULOGRAM;  Surgeon: Prince Gardiner MD;  Location: [facility] OR [address] [place] [address];  Service: Peripheral Vascular;  Laterality: Left;  Given to the sterile field.     nasal septum repair      NASAL SEPTUM SURGERY      PERCUTANEOUS TRANSLUMINAL ANGIOPLASTY OF ARTERIOVENOUS  FISTULA Left 2022    Procedure: PTA, AV FISTULA;  Surgeon: Prince Gardiner MD;  Location: [facility] CATH LAB;  Service: Peripheral Vascular;  Laterality: Left;    PERCUTANEOUS TRANSLUMINAL ANGIOPLASTY OF ARTERIOVENOUS FISTULA Left 2023    Procedure: PTA, AV FISTULA;  Surgeon: Prince Gardiner MD;  Location: [facility] OR 2ND FLR;  Service: Peripheral Vascular;  Laterality: Left;  4.8 min  43.08 mGy  4.0682 Gy.cm  32ml Dye    PROSTATE BIOPSY N/A 3/24/2023    Procedure: BIOPSY, PROSTATE;  Surgeon: Frankie Welch MD;  Location: [facility] OR 1ST FLR;  Service: Urology;  Laterality: N/A;  transrectal, 30min, uronav needed    ROTATOR CUFF REPAIR Left     SALIVARY GLAND SURGERY      Tonsillectomy      TONSILLECTOMY      TRANSPOSITION OF BASILIC VEIN Left 2022    Procedure: TRANSPOSITION, VEIN, BASILIC;  Surgeon: Prince Gardiner MD;  Location: [facility] OR 2ND FLR;  Service: Peripheral Vascular;  Laterality: Left;  Left Brachial vein.         Current Outpatient Medications:     acetaminophen (TYLENOL) 500 MG tablet, Take 2 tablets (1,000 mg total) by mouth every 6 (six) hours as needed for Pain., Disp: 30 tablet, Rfl: 0    aspirin (ECOTRIN) 81 MG EC tablet, Take 1 tablet (81 mg total) by mouth once daily., Disp: 90 tablet, Rfl: 3    atorvastatin (LIPITOR) 20 MG tablet, TAKE 1 TABLET BY MOUTH EVERY EVENING, Disp: 90 tablet, Rfl: 3    BANOPHEN 50 mg capsule, SMARTSI Capsule(s) By Mouth, Disp: , Rfl:     blood sugar diagnostic Strp, Use to check blood glucose once a day., Disp: 100 each, Rfl: 3    blood-glucose meter Misc, Use to check blood glucose twice a day., Disp: 1 each, Rfl: 0    carvediloL (COREG) 12.5 MG tablet, Take 1 tablet (12.5 mg total) by mouth 2 (two) times daily., Disp: 60 tablet, Rfl: 11    cinacalcet (SENSIPAR) 60 MG Tab, Take 60 mg by mouth daily with breakfast., Disp: , Rfl:     ciprofloxacin HCl (CIPRO) 500 MG tablet, 1 pill one hour before prostate biopsy, Disp: 1 tablet, Rfl: 0    clopidogreL (PLAVIX) 75  mg tablet, Take 1 tablet (75 mg total) by mouth once daily., Disp: 30 tablet, Rfl: 11    colchicine (COLCRYS) 0.6 mg tablet, Take 0.6 mg by mouth as needed. Take half tab (0.3 mg) by mouth daily as needed for gout flare., Disp: 90 tablet, Rfl: 3    fluticasone propionate (FLONASE) 50 mcg/actuation nasal spray, 2 sprays (100 mcg total) by Each Nostril route once daily., Disp: 16 g, Rfl: 11    furosemide (LASIX) 80 MG tablet, Take 1 tablet (80 mg total) by mouth 2 (two) times daily., Disp: 60 tablet, Rfl: 11    HYDROcodone-acetaminophen (NORCO) 5-325 mg per tablet, Take 1 tablet by mouth every 6 (six) hours as needed for Pain., Disp: 24 tablet, Rfl: 0    lancets 30 gauge Misc, Use to check blood glucose twice a day., Disp: 100 each, Rfl: 5    LIDOcaine-prilocaine (EMLA) cream, SMARTSIG:sparingly Topical As Directed, Disp: , Rfl:     NIFEdipine (PROCARDIA-XL) 60 MG (OSM) 24 hr tablet, Take 1 tablet (60 mg total) by mouth 2 (two) times a day., Disp: 60 tablet, Rfl: 11    predniSONE (DELTASONE) 50 MG Tab, Take by mouth., Disp: , Rfl:     sevelamer carbonate (RENVELA) 800 mg Tab, Take 1,600 mg by mouth 3 (three) times daily., Disp: , Rfl:     sevelamer HCL (RENAGEL) 800 MG Tab, Take 2 tablets (1,600 mg total) by mouth 3 (three) times daily with meals., Disp: 180 tablet, Rfl: 11    tamsulosin (FLOMAX) 0.4 mg Cap, TAKE ONE CAPSULE BY MOUTH ONCE DAILY (Patient taking differently: Take by mouth every morning.), Disp: 90 capsule, Rfl: 3    traMADoL (ULTRAM) 50 mg tablet, Take 1 tablet (50 mg total) by mouth every 8 (eight) hours as needed for Pain., Disp: 10 tablet, Rfl: 0    Current Facility-Administered Medications:     cefTRIAXone injection 1 g, 1 g, Intramuscular, Once, Candice Moore NP    LIDOcaine HCl 2% urojet, , Rectal, Once, Candice Moore NP    Facility-Administered Medications Ordered in Other Visits:     0.9%  NaCl infusion, , Intravenous, Continuous, Kim Lutz NP    Review of patient's allergies  "indicates:   Allergen Reactions    Iodine and iodide containing products Hives     Hypotension, Flushing       Family History   Problem Relation Age of Onset    Heart disease Mother     Kidney disease Mother     Hypertension Mother     No Known Problems Father     Cancer Sister     Seizures Sister     Hypertension Sister     Stroke Sister     Amblyopia Neg Hx     Blindness Neg Hx     Cataracts Neg Hx     Diabetes Neg Hx     Glaucoma Neg Hx     Macular degeneration Neg Hx     Retinal detachment Neg Hx     Strabismus Neg Hx     Thyroid disease Neg Hx     Anesthesia problems Neg Hx        PHYSICAL EXAM:   /72 (BP Location: Right arm, Patient Position: Sitting, BP Method: Large (Automatic))   Pulse 66   Temp 98 °F (36.7 °C) (Oral)   Ht 6' 2" (1.88 m)   Wt 97 kg (213 lb 13.5 oz)   BMI 27.46 kg/m²   Constitutional:  Alert,   Well-appearing  In no distress.   Neurological: Normal speech  no focal findings  CN II - XII grossly intact.    Psychiatric: Mood and affect appropriate and symmetric.   HEENT: Normocephalic / atraumatic  PERRLA  Midline trachea  No scars across the neck   Cardiac: Regular rate and rhythm.   Pulmonary: Normal pulmonary effort.   Abdomen: Soft, not distended.     Skin: Warm and well perfused.    Vascular:  2+ radial pulse   Extremities/  Musculoskeletal: No edema.   Left arm demonstrates a transposed left brachial vein brachial artery AV fistula.  There was significant pulsatility the abruptly changes to appear thrill in the upper 3rd of the fistula     IMAGING:  Quantitative duplex of the AV fistula today demonstrates a significant mid fistula stenosis with a velocity of 740.  Flow volume 1.14 L  Diameter 9.4 proximally, 7.9 mid, 12.6 distally  Depth 3.6 proximal, 6.7 mid common 9.0 distally    His from February 2023 was personally reviewed and demonstrated a significant mid distal stenosis    IMPRESSION:  Recurrent left mid distal AV fistula stenosis.    I would be hopeful that successful " treatment of this will allow this fistula to be used    PLAN:  Left fistulogram and intervention 07/06/2023  Hybrid OR 11 case    Consider placement of a stent if we can not get a excellent PTA alone result    I have explained the risks, benefits and alternatives for this procedure in detail.  The patient voices understanding and all questions have be answered, and agrees to proceed with the procedure.     PATRICIA Rayo III, MD, FACS  Professor and Chief, Vascular and Endovascular Surgery

## 2023-07-05 ENCOUNTER — TELEPHONE (OUTPATIENT)
Dept: VASCULAR SURGERY | Facility: CLINIC | Age: 68
End: 2023-07-05
Payer: COMMERCIAL

## 2023-07-05 NOTE — TELEPHONE ENCOUNTER
Per standing orders for iodine allergy nurse discussed instructions for prednisone prep with patient and gave  instructions with administration times. Pt repeated instructions and verbalized understanding. Rx called in to pt's preferred pharmacy. Fistulagram with Dr. Rayo tomorrow.

## 2023-07-05 NOTE — PRE-PROCEDURE INSTRUCTIONS
PRE-OP INSTRUCTIONS:  Instructed to have nothing by mouth past midnight.  It is ok to take AM medications with a few sips of water.  Instructed to shower the night before surgery as well as the morning of surgery with an antibacterial soap like dial or hibiclens from the neck down.  Encouraged to wear loose fitting, comfortable clothing .  Medication instructions for pm prior to and am of surgery reviewed.  Instructed to avoid taking vitamins,supplements or ibuprofen the am of surgery.    Patient denies any side effects or issues with anesthesia or sedation.

## 2023-07-06 ENCOUNTER — ANESTHESIA (OUTPATIENT)
Dept: SURGERY | Facility: HOSPITAL | Age: 68
End: 2023-07-06
Payer: COMMERCIAL

## 2023-07-06 ENCOUNTER — ANESTHESIA EVENT (OUTPATIENT)
Dept: SURGERY | Facility: HOSPITAL | Age: 68
End: 2023-07-06
Payer: COMMERCIAL

## 2023-07-06 ENCOUNTER — HOSPITAL ENCOUNTER (OUTPATIENT)
Facility: HOSPITAL | Age: 68
Discharge: HOME | End: 2023-07-06
Attending: SURGERY | Admitting: SURGERY
Payer: COMMERCIAL

## 2023-07-06 VITALS
OXYGEN SATURATION: 100 % | TEMPERATURE: 98 F | SYSTOLIC BLOOD PRESSURE: 148 MMHG | HEART RATE: 70 BPM | DIASTOLIC BLOOD PRESSURE: 76 MMHG | RESPIRATION RATE: 18 BRPM

## 2023-07-06 DIAGNOSIS — N18.6 ESRD ON DIALYSIS: Primary | ICD-10-CM

## 2023-07-06 DIAGNOSIS — Z99.2 ESRD ON DIALYSIS: Primary | ICD-10-CM

## 2023-07-06 LAB
POCT GLUCOSE: 173 MG/DL (ref 70–110)
POCT GLUCOSE: 193 MG/DL (ref 70–110)

## 2023-07-06 PROCEDURE — 36902 PR INTRO CATH, DIALYSIS CIRCUIT W/TRANSLML BALLOON ANGIO: ICD-10-PCS | Mod: ,,, | Performed by: SURGERY

## 2023-07-06 PROCEDURE — 36902 INTRO CATH DIALYSIS CIRCUIT: CPT | Mod: ,,, | Performed by: SURGERY

## 2023-07-06 PROCEDURE — D9220A PRA ANESTHESIA: ICD-10-PCS | Mod: CRNA,,, | Performed by: NURSE ANESTHETIST, CERTIFIED REGISTERED

## 2023-07-06 PROCEDURE — C1769 GUIDE WIRE: HCPCS | Performed by: SURGERY

## 2023-07-06 PROCEDURE — C1725 CATH, TRANSLUMIN NON-LASER: HCPCS | Performed by: SURGERY

## 2023-07-06 PROCEDURE — 36000706: Performed by: SURGERY

## 2023-07-06 PROCEDURE — 37000009 HC ANESTHESIA EA ADD 15 MINS: Performed by: SURGERY

## 2023-07-06 PROCEDURE — 71000044 HC DOSC ROUTINE RECOVERY FIRST HOUR: Performed by: SURGERY

## 2023-07-06 PROCEDURE — 25000003 PHARM REV CODE 250: Performed by: NURSE ANESTHETIST, CERTIFIED REGISTERED

## 2023-07-06 PROCEDURE — 37000008 HC ANESTHESIA 1ST 15 MINUTES: Performed by: SURGERY

## 2023-07-06 PROCEDURE — 71000015 HC POSTOP RECOV 1ST HR: Performed by: SURGERY

## 2023-07-06 PROCEDURE — D9220A PRA ANESTHESIA: ICD-10-PCS | Mod: ANES,,, | Performed by: ANESTHESIOLOGY

## 2023-07-06 PROCEDURE — 25500020 PHARM REV CODE 255: Performed by: SURGERY

## 2023-07-06 PROCEDURE — 25000003 PHARM REV CODE 250: Performed by: SURGERY

## 2023-07-06 PROCEDURE — 36000707: Performed by: SURGERY

## 2023-07-06 PROCEDURE — C1894 INTRO/SHEATH, NON-LASER: HCPCS | Performed by: SURGERY

## 2023-07-06 PROCEDURE — D9220A PRA ANESTHESIA: Mod: CRNA,,, | Performed by: NURSE ANESTHETIST, CERTIFIED REGISTERED

## 2023-07-06 PROCEDURE — D9220A PRA ANESTHESIA: Mod: ANES,,, | Performed by: ANESTHESIOLOGY

## 2023-07-06 PROCEDURE — 82962 GLUCOSE BLOOD TEST: CPT | Performed by: SURGERY

## 2023-07-06 PROCEDURE — 63600175 PHARM REV CODE 636 W HCPCS: Performed by: SURGERY

## 2023-07-06 PROCEDURE — 27201423 OPTIME MED/SURG SUP & DEVICES STERILE SUPPLY: Performed by: SURGERY

## 2023-07-06 RX ORDER — LIDOCAINE HYDROCHLORIDE 10 MG/ML
INJECTION INFILTRATION; PERINEURAL
Status: DISCONTINUED | OUTPATIENT
Start: 2023-07-06 | End: 2023-07-06 | Stop reason: HOSPADM

## 2023-07-06 RX ORDER — SODIUM CHLORIDE 9 MG/ML
INJECTION, SOLUTION INTRAVENOUS CONTINUOUS
Status: DISCONTINUED | OUTPATIENT
Start: 2023-07-06 | End: 2023-07-06 | Stop reason: HOSPADM

## 2023-07-06 RX ORDER — SODIUM CHLORIDE 0.9 % (FLUSH) 0.9 %
10 SYRINGE (ML) INJECTION
Status: DISCONTINUED | OUTPATIENT
Start: 2023-07-06 | End: 2023-07-06 | Stop reason: HOSPADM

## 2023-07-06 RX ORDER — HYDROCODONE BITARTRATE AND ACETAMINOPHEN 5; 325 MG/1; MG/1
1 TABLET ORAL EVERY 4 HOURS PRN
Status: DISCONTINUED | OUTPATIENT
Start: 2023-07-06 | End: 2023-07-06 | Stop reason: HOSPADM

## 2023-07-06 RX ORDER — FENTANYL CITRATE 50 UG/ML
INJECTION, SOLUTION INTRAMUSCULAR; INTRAVENOUS
Status: DISCONTINUED | OUTPATIENT
Start: 2023-07-06 | End: 2023-07-06

## 2023-07-06 RX ORDER — IODIXANOL 320 MG/ML
INJECTION, SOLUTION INTRAVASCULAR
Status: DISCONTINUED | OUTPATIENT
Start: 2023-07-06 | End: 2023-07-06 | Stop reason: HOSPADM

## 2023-07-06 RX ORDER — HEPARIN SODIUM 1000 [USP'U]/ML
INJECTION, SOLUTION INTRAVENOUS; SUBCUTANEOUS
Status: DISCONTINUED | OUTPATIENT
Start: 2023-07-06 | End: 2023-07-06 | Stop reason: HOSPADM

## 2023-07-06 RX ORDER — MIDAZOLAM HYDROCHLORIDE 1 MG/ML
INJECTION INTRAMUSCULAR; INTRAVENOUS
Status: DISCONTINUED | OUTPATIENT
Start: 2023-07-06 | End: 2023-07-06

## 2023-07-06 RX ADMIN — FENTANYL CITRATE 25 MCG: 50 INJECTION, SOLUTION INTRAMUSCULAR; INTRAVENOUS at 07:07

## 2023-07-06 RX ADMIN — MIDAZOLAM HYDROCHLORIDE 2 MG: 1 INJECTION INTRAMUSCULAR; INTRAVENOUS at 07:07

## 2023-07-06 RX ADMIN — SODIUM CHLORIDE: 0.9 INJECTION, SOLUTION INTRAVENOUS at 06:07

## 2023-07-06 RX ADMIN — FENTANYL CITRATE 50 MCG: 50 INJECTION, SOLUTION INTRAMUSCULAR; INTRAVENOUS at 07:07

## 2023-07-06 NOTE — ANESTHESIA POSTPROCEDURE EVALUATION
Anesthesia Post Evaluation    Patient: Elbert Still JrJaky    Procedure(s) Performed: Procedure(s) (LRB):  Fistulogram (Left)  PTA, AV FISTULA (Left)    Final Anesthesia Type: MAC      Patient location during evaluation: PACU  Patient participation: Yes- Able to Participate  Level of consciousness: awake and alert and oriented  Post-procedure vital signs: reviewed and stable  Pain management: adequate  Airway patency: patent    PONV status at discharge: No PONV  Anesthetic complications: no      Cardiovascular status: hemodynamically stable  Respiratory status: unassisted  Hydration status: euvolemic  Follow-up not needed.          Vitals Value Taken Time   /76 07/06/23 0828   Temp 36.8 °C (98.2 °F) 07/06/23 0828   Pulse 70 07/06/23 0828   Resp 18 07/06/23 0828   SpO2 100 % 07/06/23 0828         No case tracking events are documented in the log.      Pain/Sophie Score: Sophie Score: 10 (7/6/2023  8:15 AM)

## 2023-07-06 NOTE — PLAN OF CARE
Pt resting comfortably in stretcher. He is alert and oriented with no s/s of acute distress. VSS. Denies pain, denies N/V. Incision is clean dry and intact.

## 2023-07-06 NOTE — ANESTHESIA PREPROCEDURE EVALUATION
07/06/2023  [facility]  Anesthesia Pre-Operative Evaluation         Patient Name: Elbert Still Jr.  YOB: 1955  MRN: [id number]    SUBJECTIVE:     Pre-operative evaluation for Procedure(s) (LRB):  Fistulogram (Left)  PTA, AV FISTULA (Left)     07/06/2023    Elbert Still Jr. is a 67 y.o. male w/ a pertinent PMHx of ESRD (HD MWF), NIDDM2, HTN here for fistulogram. HD yesterday.     Full medical history listed below      LDA: None documented.    Prev airway: None documented.     GTTs: None documented.        Patient Active Problem List   Diagnosis    -Diabetes mellitus due to underlying condition with diabetic nephropathy - diet controlled    Proteinuria    -Essential (primary) hypertension    Mixed hyperlipidemia    -CKD (chronic kidney disease) stage 4, GFR 15-29 ml/min    -Gout    Anemia of chronic disease    -HLD associated with type 2 DM    Hyperkalemia    Acute kidney injury superimposed on chronic kidney disease    -Dialysis patient - MWF - started 5/2022    ESRD on dialysis    Nuclear sclerosis of both eyes    Refractive error    Cortical age-related cataract    Status post placement of arteriovenous graft    BMI 28.0-28.9,adult    Steal syndrome dialysis vascular access, initial encounter    Arteriovenous fistula    ESRD (end stage renal disease) on dialysis    ESRD (end stage renal disease)    Other neutropenia    End stage renal failure on dialysis    Stenosis of arteriovenous dialysis fistula       Review of patient's allergies indicates:   Allergen Reactions    Iodine and iodide containing products Hives     Hypotension, Flushing       Current Inpatient Medications:   cefTRIAXone  1 g Intramuscular Once    LIDOcaine HCl 2%   Rectal Once       Current Facility-Administered Medications on File Prior to Encounter   Medication Dose Route  Frequency Provider Last Rate Last Admin    0.9%  NaCl infusion   Intravenous Continuous Kim Lutz NP        cefTRIAXone injection 1 g  1 g Intramuscular Once Candice Moore NP        LIDOcaine HCl 2% urojet   Rectal Once Candice Moore NP         Current Outpatient Medications on File Prior to Encounter   Medication Sig Dispense Refill    acetaminophen (TYLENOL) 500 MG tablet Take 2 tablets (1,000 mg total) by mouth every 6 (six) hours as needed for Pain. 30 tablet 0    aspirin (ECOTRIN) 81 MG EC tablet Take 1 tablet (81 mg total) by mouth once daily. 90 tablet 3    atorvastatin (LIPITOR) 20 MG tablet TAKE 1 TABLET BY MOUTH EVERY EVENING 90 tablet 3    carvediloL (COREG) 12.5 MG tablet Take 1 tablet (12.5 mg total) by mouth 2 (two) times daily. 60 tablet 11    cinacalcet (SENSIPAR) 60 MG Tab Take 60 mg by mouth daily with breakfast.      clopidogreL (PLAVIX) 75 mg tablet Take 1 tablet (75 mg total) by mouth once daily. 30 tablet 11    fluticasone propionate (FLONASE) 50 mcg/actuation nasal spray 2 sprays (100 mcg total) by Each Nostril route once daily. 16 g 11    furosemide (LASIX) 80 MG tablet Take 1 tablet (80 mg total) by mouth 2 (two) times daily. 60 tablet 11    NIFEdipine (PROCARDIA-XL) 60 MG (OSM) 24 hr tablet Take 1 tablet (60 mg total) by mouth 2 (two) times a day. 60 tablet 11    predniSONE (DELTASONE) 50 MG Tab Take by mouth.      sevelamer carbonate (RENVELA) 800 mg Tab Take 1,600 mg by mouth 3 (three) times daily.      tamsulosin (FLOMAX) 0.4 mg Cap TAKE ONE CAPSULE BY MOUTH ONCE DAILY (Patient taking differently: Take by mouth every morning.) 90 capsule 3    traMADoL (ULTRAM) 50 mg tablet Take 1 tablet (50 mg total) by mouth every 8 (eight) hours as needed for Pain. 10 tablet 0    BANOPHEN 50 mg capsule SMARTSI Capsule(s) By Mouth      blood sugar diagnostic Strp Use to check blood glucose once a day. 100 each 3    blood-glucose meter Misc Use to check blood glucose  twice a day. 1 each 0    ciprofloxacin HCl (CIPRO) 500 MG tablet 1 pill one hour before prostate biopsy 1 tablet 0    colchicine (COLCRYS) 0.6 mg tablet Take 0.6 mg by mouth as needed. Take half tab (0.3 mg) by mouth daily as needed for gout flare. 90 tablet 3    HYDROcodone-acetaminophen (NORCO) 5-325 mg per tablet Take 1 tablet by mouth every 6 (six) hours as needed for Pain. 24 tablet 0    lancets 30 gauge Misc Use to check blood glucose twice a day. 100 each 5    LIDOcaine-prilocaine (EMLA) cream SMARTSIG:sparingly Topical As Directed      sevelamer HCL (RENAGEL) 800 MG Tab Take 2 tablets (1,600 mg total) by mouth 3 (three) times daily with meals. 180 tablet 11    [DISCONTINUED] amLODIPine (NORVASC) 10 MG tablet TAKE 1 TABLET BY MOUTH ONCE DAILY 90 tablet 3    [DISCONTINUED] glipiZIDE (GLUCOTROL) 10 MG tablet TAKE 1 TABLET BY MOUTH TWICE DAILY WITH MEALS 180 tablet 1    [DISCONTINUED] metoprolol succinate (TOPROL-XL) 25 MG 24 hr tablet Take 1 tablet (25 mg total) by mouth once daily. 90 tablet 3       Past Surgical History:   Procedure Laterality Date    ADENOIDECTOMY      ADENOIDECTOMY      AV FISTULA PLACEMENT Left 09/06/2022    Procedure: CREATION, AV FISTULA;  Surgeon: Prince Gardiner MD;  Location: [address];  Service: Peripheral Vascular;  Laterality: Left;    FISTULOGRAM Left 2/16/2023    Procedure: FISTULOGRAM;  Surgeon: Prince Gardiner MD;  Location: [address];  Service: Peripheral Vascular;  Laterality: Left;  Given to the sterile field.     nasal septum repair      NASAL SEPTUM SURGERY      PERCUTANEOUS TRANSLUMINAL ANGIOPLASTY OF ARTERIOVENOUS FISTULA Left 11/22/2022    Procedure: PTA, AV FISTULA;  Surgeon: Prince Gardiner MD;  Location: [facility] CATH LAB;  Service: Peripheral Vascular;  Laterality: Left;    PERCUTANEOUS TRANSLUMINAL ANGIOPLASTY OF ARTERIOVENOUS FISTULA Left 2/16/2023    Procedure: PTA, AV FISTULA;  Surgeon: Prince Gardiner MD;  Location: [facility] OR [address] [place] [address];   Service: Peripheral Vascular;  Laterality: Left;  4.8 min  43.08 mGy  4.0682 Gy.cm  32ml Dye    PROSTATE BIOPSY N/A 3/24/2023    Procedure: BIOPSY, PROSTATE;  Surgeon: Frankie Welch MD;  Location: [facility] OR [address] [place] [address];  Service: Urology;  Laterality: N/A;  transrectal, 30min, uronav needed    ROTATOR CUFF REPAIR Left     SALIVARY GLAND SURGERY      Tonsillectomy      TONSILLECTOMY      TRANSPOSITION OF BASILIC VEIN Left 11/1/2022    Procedure: TRANSPOSITION, VEIN, BASILIC;  Surgeon: Prince Gardiner MD;  Location: [facility] OR [address] [place] [address];  Service: Peripheral Vascular;  Laterality: Left;  Left Brachial vein.       Social History     Socioeconomic History    Marital status:      Spouse name: Kristine Still   Occupational History     Employer: [employer] de[employer]   Tobacco Use    Smoking status: Never    Smokeless tobacco: Never    Tobacco comments:     .  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    CMP: No results for input(s): NA, K, CL, CO2, BUN, CREATININE, GLU, MG, PHOS, CALCIUM, ALBUMIN, PROT, ALKPHOS, ALT, AST, BILITOT in the last 72 hours.    INR:  No results for input(s): PT, INR, PROTIME, APTT in the last 72 hours.    Diagnostic Studies: No relevant studies.    EKG:   Results for orders placed or performed during the hospital encounter of 04/30/22   EKG 12-lead    Collection Time: 04/30/22 12:58 AM    Narrative    Test Reason :     Vent. Rate : 091 BPM      Atrial Rate : 091 BPM     P-R Int : 142 ms          QRS Dur : 072 ms      QT Int : 354 ms       P-R-T Axes : 047 039 054 degrees     QTc Int : 435 ms    Normal sinus rhythm  Nonspecific T wave abnormality  Abnormal ECG  When compared with ECG of 05-MAY-2020 11:54,  No significant change was found  Confirmed by Saulo DIEZ MD ([id number]) on 4/30/2022 10:54:01 AM    Referred By: [email or web address]   SELF           Confirmed By:Saulo DIEZ MD        2D ECHO:   Results for orders placed during the hospital encounter of 11/15/22    Echo    Interpretation Summary  · The left ventricle is normal in size with normal systolic function.  · The estimated ejection fraction is 63%.  · Mild left atrial enlargement.  · Normal left ventricular diastolic function.  · Normal right ventricular size with normal right ventricular systolic function.  · Mild right atrial enlargement.  · Mild tricuspid regurgitation.  · Normal central venous pressure (3 mmHg).  · The estimated PA systolic pressure is 32 mmHg.         ASSESSMENT/PLAN:           Pre-op Assessment    I have reviewed the Patient Summary Reports.     I have reviewed the Nursing Notes. I have reviewed the NPO Status.   I have reviewed the Medications.     Review of Systems  Anesthesia Hx:  History of prior surgery of interest to airway management or planning: Denies Family Hx of Anesthesia complications.   Denies Personal Hx of Anesthesia complications.       Physical Exam  General: Well nourished, Cooperative, Alert and Oriented    Airway:  Mallampati: II   Mouth Opening: Normal  TM Distance: Normal  Tongue: Normal  Neck ROM: Normal ROM    Dental:  Intact    Chest/Lungs:  Clear to auscultation, Normal Respiratory Rate    Heart:  Rate: Normal  Rhythm: Regular Rhythm        Anesthesia Plan  Type of Anesthesia, risks & benefits discussed:    Anesthesia Type: MAC, Gen Natural Airway  Intra-op Monitoring Plan: Standard ASA Monitors  Post Op Pain Control Plan: IV/PO Opioids PRN  Induction:   IV  Informed Consent: Informed consent signed with the Patient and all parties understand the risks and agree with anesthesia plan.  All questions answered.   ASA Score: 3  Day of Surgery Review of History & Physical: H&P Update referred to the surgeon/provider.    Ready For Surgery From Anesthesia Perspective.     .

## 2023-07-06 NOTE — INTERVAL H&P NOTE
The patient has been examined and the H&P has been reviewed:    I concur with the findings and no changes have occurred since H&P was written.    Procedure risks, benefits and alternative options discussed and understood by patient/family.          There are no hospital problems to display for this patient.      Jason Carson MD PGY7  Vascular Surgery Fellow  [phone]

## 2023-07-06 NOTE — OP NOTE
Surgery Date: 7/6/2023      Surgeon(s) and Role:     * LETY Rayo III, MD - Primary     * Jason Carson MD - Fellow     Assisting Surgeon: None     Pre-op Diagnosis:  ESRD on dialysis [N18.6, Z99.2]; stenosis AVF     Post-op Diagnosis:  same     PROCEDURES:     PTA, L AVF (9x40 [place])  L fistulagram     Anesthesia: Local MAC     Operative Findings: >80% stenosis in mid AVF, no residual after     Estimated Blood Loss: <3cc         Specimens:   Specimen (24h ago, onward)        None              Date: 07/06/2023    Pre-op Diagnosis:   T82.858A: Stenosis of vascular prosthetic devices, implants and grafts, initial encounter  T82.868A: Thrombosis of vascular prosthetic devices, implants and grafts, initial encounter   Post-op Diagnosis: Same   Procedure(s):   1) Left fistulogram   2) PTA outflow stenosis with a 9 x 40 mm balloon    Anesthesia: Local MAC   Findings/Key Components:   Successful treatment of > 90% stenosis  Strong palpable thrill   EBL: <10 ml  PROCEDURE IN DETAIL:After an informed consent was obtained the patient was taken to the interventional suite and placed in the supine position.  The patient was brought to the Cath Lab, placed in supine. Arm was prepped and draped in the standard surgical fashion. Under ultrasound guidance, the proximal aspect of the AVF was accessed with a micropuncture needle; ultrasound confirmed vessel patency, followed by placement of 4/3-Nigerian micropuncture dilator. Through this, an 0.035-inch wire was placed in the short 6-Nigerian sheath. Through this, an 0.035-inch Glidewire was placed through the high-grade stenosis, which was demonstrated by the angiogram.   The ultrasound demonstrated vessel patency. A high-grade stenosis in venous outflow was found, which was crossed with a hyrophilic 0.035-in glidewire. This was treated with 9 x 40 mm high-pressure, noncompliant balloon. Resolution of the stenosis was noted. Strong thrill could be felt. The sheath was removed, 3-0  monocryl U-stitch was placed with good hemostasis.    Jason Carson MD PGY7  Vascular Surgery Fellow  [phone]

## 2023-07-06 NOTE — TRANSFER OF CARE
Anesthesia Transfer of Care Note    Patient: Elbert Still Jr.    Procedure(s) Performed: Procedure(s) (LRB):  Fistulogram (Left)  PTA, AV FISTULA (Left)    Patient location: [facility]    Anesthesia Type: MAC    Transport from OR: Transported from OR on room air with adequate spontaneous ventilation    Post pain: adequate analgesia    Post assessment: no apparent anesthetic complications and tolerated procedure well    Post vital signs: stable    Level of consciousness: awake and alert    Nausea/Vomiting: no nausea/vomiting    Complications: none    Transfer of care protocol was followed      Last vitals:   Visit Vitals  BP (!) 165/79 (BP Location: Right arm, Patient Position: Lying)   Pulse 74   Temp 36.7 °C (98.1 °F) (Temporal)   Resp 18   SpO2 98%

## 2023-07-06 NOTE — BRIEF OP NOTE
[facility] - Surgery (2nd Fl)  Brief Operative Note    Surgery Date: 7/6/2023     Surgeon(s) and Role:     * LETY Rayo III, MD - Primary     * Jason Carson MD - Fellow    Assisting Surgeon: None    Pre-op Diagnosis:  ESRD on dialysis [N18.6, Z99.2]; stenosis AVF    Post-op Diagnosis:  same    PROCEDURES:    PTA, L AVF (9x40 [place])  L fistulagram    Anesthesia: Local MAC    Operative Findings: >80% stenosis in mid AVF, no residual after    Estimated Blood Loss: <3cc         Specimens:   Specimen (24h ago, onward)      None              Discharge Note    OUTCOME: successful outpatient procedure     DISPOSITION: home    FINAL DIAGNOSIS:  stenosis AVF    FOLLOWUP: 2 weeks with AVF quantitative duplex    DISCHARGE INSTRUCTIONS:  see below

## 2023-07-07 ENCOUNTER — TELEPHONE (OUTPATIENT)
Dept: VASCULAR SURGERY | Facility: CLINIC | Age: 68
End: 2023-07-07
Payer: COMMERCIAL

## 2023-07-07 DIAGNOSIS — Z99.2 ESRD ON DIALYSIS: Primary | ICD-10-CM

## 2023-07-07 DIAGNOSIS — N18.6 ESRD ON DIALYSIS: Primary | ICD-10-CM

## 2023-07-07 NOTE — TELEPHONE ENCOUNTER
Contacted pt to schedule appts. Appointment scheduled, pt verified.   ----- Message from LETY Rayo III, MD sent at 7/6/2023  7:51 AM CDT -----  F/u 2 weeks with quantitative AVF duplex

## 2023-07-18 ENCOUNTER — OFFICE VISIT (OUTPATIENT)
Dept: VASCULAR SURGERY | Facility: CLINIC | Age: 68
End: 2023-07-18
Payer: COMMERCIAL

## 2023-07-18 ENCOUNTER — HOSPITAL ENCOUNTER (OUTPATIENT)
Dept: VASCULAR SURGERY | Facility: CLINIC | Age: 68
Discharge: HOME | End: 2023-07-18
Attending: SURGERY
Payer: COMMERCIAL

## 2023-07-18 VITALS
HEIGHT: 74 IN | DIASTOLIC BLOOD PRESSURE: 71 MMHG | SYSTOLIC BLOOD PRESSURE: 135 MMHG | WEIGHT: 213.88 LBS | HEART RATE: 62 BPM | TEMPERATURE: 98 F | BODY MASS INDEX: 27.45 KG/M2

## 2023-07-18 DIAGNOSIS — Z99.2 ESRD (END STAGE RENAL DISEASE) ON DIALYSIS: Primary | ICD-10-CM

## 2023-07-18 DIAGNOSIS — Z99.2 ESRD ON DIALYSIS: ICD-10-CM

## 2023-07-18 DIAGNOSIS — N18.6 ESRD ON DIALYSIS: ICD-10-CM

## 2023-07-18 DIAGNOSIS — N18.6 ESRD (END STAGE RENAL DISEASE) ON DIALYSIS: Primary | ICD-10-CM

## 2023-07-18 PROCEDURE — 93990 PR DUPLEX HEMODIALYSIS ACCESS: ICD-10-PCS | Mod: S$GLB,,, | Performed by: SURGERY

## 2023-07-18 PROCEDURE — 3075F PR MOST RECENT SYSTOLIC BLOOD PRESS GE 130-139MM HG: ICD-10-PCS | Mod: CPTII,S$GLB,, | Performed by: SURGERY

## 2023-07-18 PROCEDURE — 3044F PR MOST RECENT HEMOGLOBIN A1C LEVEL <7.0%: ICD-10-PCS | Mod: CPTII,S$GLB,, | Performed by: SURGERY

## 2023-07-18 PROCEDURE — 3008F PR BODY MASS INDEX (BMI) DOCUMENTED: ICD-10-PCS | Mod: CPTII,S$GLB,, | Performed by: SURGERY

## 2023-07-18 PROCEDURE — 3072F PR LOW RISK FOR RETINOPATHY: ICD-10-PCS | Mod: CPTII,S$GLB,, | Performed by: SURGERY

## 2023-07-18 PROCEDURE — 99999 PR PBB SHADOW E&M-EST. PATIENT-LVL V: ICD-10-PCS | Mod: PBBFAC,,, | Performed by: SURGERY

## 2023-07-18 PROCEDURE — 1159F MED LIST DOCD IN RCRD: CPT | Mod: CPTII,S$GLB,, | Performed by: SURGERY

## 2023-07-18 PROCEDURE — 1159F PR MEDICATION LIST DOCUMENTED IN MEDICAL RECORD: ICD-10-PCS | Mod: CPTII,S$GLB,, | Performed by: SURGERY

## 2023-07-18 PROCEDURE — 3066F PR DOCUMENTATION OF TREATMENT FOR NEPHROPATHY: ICD-10-PCS | Mod: CPTII,S$GLB,, | Performed by: SURGERY

## 2023-07-18 PROCEDURE — 3044F HG A1C LEVEL LT 7.0%: CPT | Mod: CPTII,S$GLB,, | Performed by: SURGERY

## 2023-07-18 PROCEDURE — 93990 DOPPLER FLOW TESTING: CPT | Mod: S$GLB,,, | Performed by: SURGERY

## 2023-07-18 PROCEDURE — 99999 PR PBB SHADOW E&M-EST. PATIENT-LVL V: CPT | Mod: PBBFAC,,, | Performed by: SURGERY

## 2023-07-18 PROCEDURE — 1126F PR PAIN SEVERITY QUANTIFIED, NO PAIN PRESENT: ICD-10-PCS | Mod: CPTII,S$GLB,, | Performed by: SURGERY

## 2023-07-18 PROCEDURE — 1160F RVW MEDS BY RX/DR IN RCRD: CPT | Mod: CPTII,S$GLB,, | Performed by: SURGERY

## 2023-07-18 PROCEDURE — 3078F PR MOST RECENT DIASTOLIC BLOOD PRESSURE < 80 MM HG: ICD-10-PCS | Mod: CPTII,S$GLB,, | Performed by: SURGERY

## 2023-07-18 PROCEDURE — 1101F PT FALLS ASSESS-DOCD LE1/YR: CPT | Mod: CPTII,S$GLB,, | Performed by: SURGERY

## 2023-07-18 PROCEDURE — 3075F SYST BP GE 130 - 139MM HG: CPT | Mod: CPTII,S$GLB,, | Performed by: SURGERY

## 2023-07-18 PROCEDURE — 3078F DIAST BP <80 MM HG: CPT | Mod: CPTII,S$GLB,, | Performed by: SURGERY

## 2023-07-18 PROCEDURE — 3066F NEPHROPATHY DOC TX: CPT | Mod: CPTII,S$GLB,, | Performed by: SURGERY

## 2023-07-18 PROCEDURE — 1126F AMNT PAIN NOTED NONE PRSNT: CPT | Mod: CPTII,S$GLB,, | Performed by: SURGERY

## 2023-07-18 PROCEDURE — 1160F PR REVIEW ALL MEDS BY PRESCRIBER/CLIN PHARMACIST DOCUMENTED: ICD-10-PCS | Mod: CPTII,S$GLB,, | Performed by: SURGERY

## 2023-07-18 PROCEDURE — 3008F BODY MASS INDEX DOCD: CPT | Mod: CPTII,S$GLB,, | Performed by: SURGERY

## 2023-07-18 PROCEDURE — 3288F PR FALLS RISK ASSESSMENT DOCUMENTED: ICD-10-PCS | Mod: CPTII,S$GLB,, | Performed by: SURGERY

## 2023-07-18 PROCEDURE — 1101F PR PT FALLS ASSESS DOC 0-1 FALLS W/OUT INJ PAST YR: ICD-10-PCS | Mod: CPTII,S$GLB,, | Performed by: SURGERY

## 2023-07-18 PROCEDURE — 3288F FALL RISK ASSESSMENT DOCD: CPT | Mod: CPTII,S$GLB,, | Performed by: SURGERY

## 2023-07-18 PROCEDURE — 99214 OFFICE O/P EST MOD 30 MIN: CPT | Mod: S$GLB,,, | Performed by: SURGERY

## 2023-07-18 PROCEDURE — 3072F LOW RISK FOR RETINOPATHY: CPT | Mod: CPTII,S$GLB,, | Performed by: SURGERY

## 2023-07-18 PROCEDURE — 99214 PR OFFICE/OUTPT VISIT, EST, LEVL IV, 30-39 MIN: ICD-10-PCS | Mod: S$GLB,,, | Performed by: SURGERY

## 2023-07-18 NOTE — PROGRESS NOTES
VASCULAR SURGERY SERVICE    HISTORY OF PRESENT ILLNESS: Elbert Stlil Jr. is a 67 y.o. male with end-stage renal disease dialyzing via a right IJ PermCath, status post:    PTA, mid AV fistula stenosis 2023  Trans radial angioplasty, left distal brachial artery 2022  Transposition, left ratio vein brachial artery AV fistula 2022  Original left brachial artery and vein AV fistula creation 2022    All the above procedures were performed by Dr. Gardiner.  The patient has asked to change vascular surgeons.  The patient has not been able to successfully access the fistula, and when this was last tried 6 weeks ago he pulled clots.    He reports forearm numbness which has been present since his 1st procedure.   initially also had some significant steal symptoms which resolved after the brachial artery intervention in 2022.  He denies any hand pain weakness or pain.    He is frustrated that he has been unable to use this fistula despite several procedures after its initial placement now 9 months ago    2023:  He now returns after angioplasty, left AV fistula (9 x 40 [place]) 2023.  He is without complaint    Past Medical History:   Diagnosis Date    Anemia     Diabetes mellitus     Diabetes mellitus, type 2     Disorder of kidney and ureter     ESRD (end stage renal disease)     Essential (primary) hypertension 2017    Gout, unspecified      jaundice, unspecified     Nuclear sclerosis of both eyes 2022       Past Surgical History:   Procedure Laterality Date    ADENOIDECTOMY      ADENOIDECTOMY      AV FISTULA PLACEMENT Left 2022    Procedure: CREATION, AV FISTULA;  Surgeon: Prince Gardiner MD;  Location: [facility] OR [address] [place] [address];  Service: Peripheral Vascular;  Laterality: Left;    FISTULOGRAM Left 2023    Procedure: FISTULOGRAM;  Surgeon: Prince Gardiner MD;  Location: [facility] OR [address] [place] [address];  Service: Peripheral Vascular;  Laterality: Left;  Given to the sterile  field.     FISTULOGRAM Left 2023    Procedure: Fistulogram;  Surgeon: LETY Rayo III, MD;  Location: [facility] OR 2ND FLR;  Service: Peripheral Vascular;  Laterality: Left;  1.9 min  22.86 mGy  3.8862 Gy.cm  20ml Dye     nasal septum repair      NASAL SEPTUM SURGERY      PERCUTANEOUS TRANSLUMINAL ANGIOPLASTY OF ARTERIOVENOUS FISTULA Left 2022    Procedure: PTA, AV FISTULA;  Surgeon: Prince Gardiner MD;  Location: [facility] CATH LAB;  Service: Peripheral Vascular;  Laterality: Left;    PERCUTANEOUS TRANSLUMINAL ANGIOPLASTY OF ARTERIOVENOUS FISTULA Left 2023    Procedure: PTA, AV FISTULA;  Surgeon: Prince Gardiner MD;  Location: [facility] OR [facility] FLR;  Service: Peripheral Vascular;  Laterality: Left;  4.8 min  43.08 mGy  4.0682 Gy.cm  32ml Dye    PERCUTANEOUS TRANSLUMINAL ANGIOPLASTY OF ARTERIOVENOUS FISTULA Left 2023    Procedure: PTA, AV FISTULA;  Surgeon: LETY Rayo III, MD;  Location: [facility] OR [facility] FLR;  Service: Peripheral Vascular;  Laterality: Left;    PROSTATE BIOPSY N/A 3/24/2023    Procedure: BIOPSY, PROSTATE;  Surgeon: Frankie Welch MD;  Location: [facility] OR 1ST FLR;  Service: Urology;  Laterality: N/A;  transrectal, 30min, uronav needed    ROTATOR CUFF REPAIR Left     SALIVARY GLAND SURGERY      Tonsillectomy      TONSILLECTOMY      TRANSPOSITION OF BASILIC VEIN Left 2022    Procedure: TRANSPOSITION, VEIN, BASILIC;  Surgeon: Prince Gardiner MD;  [facility];  Service: Peripheral Vascular;  Laterality: Left;  Left Brachial vein.         Current Outpatient Medications:     acetaminophen (TYLENOL) 500 MG tablet, Take 2 tablets (1,000 mg total) by mouth every 6 (six) hours as needed for Pain., Disp: 30 tablet, Rfl: 0    aspirin (ECOTRIN) 81 MG EC tablet, Take 1 tablet (81 mg total) by mouth once daily., Disp: 90 tablet, Rfl: 3    atorvastatin (LIPITOR) 20 MG tablet, TAKE 1 TABLET BY MOUTH EVERY EVENING, Disp: 90 tablet, Rfl: 3    BANOPHEN 50 mg capsule, SMARTSI Capsule(s) By  Mouth, Disp: , Rfl:     blood sugar diagnostic Strp, Use to check blood glucose once a day., Disp: 100 each, Rfl: 3    blood-glucose meter Misc, Use to check blood glucose twice a day., Disp: 1 each, Rfl: 0    carvediloL (COREG) 12.5 MG tablet, Take 1 tablet (12.5 mg total) by mouth 2 (two) times daily., Disp: 60 tablet, Rfl: 11    cinacalcet (SENSIPAR) 60 MG Tab, Take 60 mg by mouth daily with breakfast., Disp: , Rfl:     ciprofloxacin HCl (CIPRO) 500 MG tablet, 1 pill one hour before prostate biopsy, Disp: 1 tablet, Rfl: 0    clopidogreL (PLAVIX) 75 mg tablet, Take 1 tablet (75 mg total) by mouth once daily., Disp: 30 tablet, Rfl: 11    colchicine (COLCRYS) 0.6 mg tablet, Take 0.6 mg by mouth as needed. Take half tab (0.3 mg) by mouth daily as needed for gout flare., Disp: 90 tablet, Rfl: 3    fluticasone propionate (FLONASE) 50 mcg/actuation nasal spray, 2 sprays (100 mcg total) by Each Nostril route once daily., Disp: 16 g, Rfl: 11    furosemide (LASIX) 80 MG tablet, Take 1 tablet (80 mg total) by mouth 2 (two) times daily., Disp: 60 tablet, Rfl: 11    HYDROcodone-acetaminophen (NORCO) 5-325 mg per tablet, Take 1 tablet by mouth every 6 (six) hours as needed for Pain., Disp: 24 tablet, Rfl: 0    lancets 30 gauge Misc, Use to check blood glucose twice a day., Disp: 100 each, Rfl: 5    LIDOcaine-prilocaine (EMLA) cream, SMARTSIG:sparingly Topical As Directed, Disp: , Rfl:     NIFEdipine (PROCARDIA-XL) 60 MG (OSM) 24 hr tablet, Take 1 tablet (60 mg total) by mouth 2 (two) times a day., Disp: 60 tablet, Rfl: 11    predniSONE (DELTASONE) 50 MG Tab, Take by mouth., Disp: , Rfl:     sevelamer carbonate (RENVELA) 800 mg Tab, Take 1,600 mg by mouth 3 (three) times daily., Disp: , Rfl:     sevelamer HCL (RENAGEL) 800 MG Tab, Take 2 tablets (1,600 mg total) by mouth 3 (three) times daily with meals., Disp: 180 tablet, Rfl: 11    tamsulosin (FLOMAX) 0.4 mg Cap, TAKE ONE CAPSULE BY MOUTH ONCE DAILY (Patient taking  "differently: Take by mouth every morning.), Disp: 90 capsule, Rfl: 3    traMADoL (ULTRAM) 50 mg tablet, Take 1 tablet (50 mg total) by mouth every 8 (eight) hours as needed for Pain., Disp: 10 tablet, Rfl: 0    Current Facility-Administered Medications:     cefTRIAXone injection 1 g, 1 g, Intramuscular, Once, Candice Moore NP    LIDOcaine HCl 2% urojet, , Rectal, Once, Candice Moore NP    Facility-Administered Medications Ordered in Other Visits:     0.9%  NaCl infusion, , Intravenous, Continuous, Kim Lutz NP    Review of patient's allergies indicates:   Allergen Reactions    Iodine and iodide containing products Hives     Hypotension, Flushing       Family History   Problem Relation Age of Onset    Heart disease Mother     Kidney disease Mother     Hypertension Mother     No Known Problems Father     Cancer Sister     Seizures Sister     Hypertension Sister     Stroke Sister     Amblyopia Neg Hx     Blindness Neg Hx     Cataracts Neg Hx     Diabetes Neg Hx     Glaucoma Neg Hx     Macular degeneration Neg Hx     Retinal detachment Neg Hx     Strabismus Neg Hx     Thyroid disease Neg Hx     Anesthesia problems Neg Hx        PHYSICAL EXAM:   /71 (BP Location: Right arm, Patient Position: Sitting, BP Method: Large (Automatic))   Pulse 62   Temp 97.9 °F (36.6 °C) (Oral)   Ht 6' 2" (1.88 m)   Wt 97 kg (213 lb 13.5 oz)   BMI 27.46 kg/m²   Constitutional:  Alert,   Well-appearing  In no distress.   Neurological: Normal speech  no focal findings  CN II - XII grossly intact.    Psychiatric: Mood and affect appropriate and symmetric.   HEENT: Normocephalic / atraumatic  PERRLA  Midline trachea  No scars across the neck   Cardiac: Regular rate and rhythm.   Pulmonary: Normal pulmonary effort.   Abdomen: Soft, not distended.     Skin: Warm and well perfused.    Vascular:  2+ radial pulse   Extremities/  Musculoskeletal: No edema.   Left arm demonstrates a transposed left brachial vein brachial artery AV " fistula.  There was significant pulsatility the abruptly changes to appear thrill in the upper 3rd of the fistula     IMAGING:  Duplex of the AV fistula demonstrates some residual velocity shift in the mid fistula velocity of 479.  However flow volume is trip to 3 L, from 1.1 prior  Diameter 9.8 proximal 9.1 mid common 9.3 distal  Depth 6.1 proximal, 4.1 mid, 3.6 distal    His from February 2023 was personally reviewed and demonstrated a significant mid distal stenosis    IMPRESSION:  Tripling of dialysis flow after angioplasty as above    PLAN:  May cannulate  Follow up in 4 weeks for PermCath removal if fistula is being used exclusively     PATRICIA Rayo III, MD, FACS  Professor and Chief, Vascular and Endovascular Surgery

## 2023-07-24 PROBLEM — E83.51 HYPOCALCEMIA: Status: ACTIVE | Noted: 2023-07-24

## 2023-07-26 ENCOUNTER — TELEPHONE (OUTPATIENT)
Dept: VASCULAR SURGERY | Facility: CLINIC | Age: 68
End: 2023-07-26
Payer: COMMERCIAL

## 2023-07-26 DIAGNOSIS — N18.6 ESRD (END STAGE RENAL DISEASE) ON DIALYSIS: Primary | ICD-10-CM

## 2023-07-26 DIAGNOSIS — Z99.2 ESRD (END STAGE RENAL DISEASE) ON DIALYSIS: Primary | ICD-10-CM

## 2023-07-26 NOTE — TELEPHONE ENCOUNTER
Attempted to contact pt to schedule appt with Dr. Rayo with repeat quantitative CFHA due to reports of being unable to dialyze HD access. Voice message left for pt requesting return call.

## 2023-07-27 ENCOUNTER — TELEPHONE (OUTPATIENT)
Dept: VASCULAR SURGERY | Facility: CLINIC | Age: 68
End: 2023-07-27
Payer: COMMERCIAL

## 2023-07-27 NOTE — TELEPHONE ENCOUNTER
Contacted pt in response to message. Appts scheduled for repeat quantitative CFHA and FU with Dr. Rayo, pt confirmed.----- Message from Oriana Starr sent at 7/27/2023 11:15 AM CDT -----  Regarding: returning missed c/b  Contact: pt @[phone]  Pt is returning a missed call from someone in the office and is asking for a return call back soon. Thanks.         Reason for call:Schedule an Appt         Patient's DX:repeat quantitative CFHA due to reports of being unable to dialyze HD access         Patient requesting call back or [facility] msg: n/a

## 2023-07-28 ENCOUNTER — HOSPITAL ENCOUNTER (OUTPATIENT)
Dept: VASCULAR SURGERY | Facility: CLINIC | Age: 68
Discharge: HOME | End: 2023-07-28
Attending: SURGERY
Payer: COMMERCIAL

## 2023-07-28 DIAGNOSIS — N18.6 ESRD (END STAGE RENAL DISEASE) ON DIALYSIS: ICD-10-CM

## 2023-07-28 DIAGNOSIS — Z99.2 ESRD (END STAGE RENAL DISEASE) ON DIALYSIS: ICD-10-CM

## 2023-07-28 PROCEDURE — 93990 DOPPLER FLOW TESTING: CPT | Mod: S$GLB,,, | Performed by: SURGERY

## 2023-07-28 PROCEDURE — 93990 PR DUPLEX HEMODIALYSIS ACCESS: ICD-10-PCS | Mod: S$GLB,,, | Performed by: SURGERY

## 2023-08-01 ENCOUNTER — OFFICE VISIT (OUTPATIENT)
Dept: VASCULAR SURGERY | Facility: CLINIC | Age: 68
End: 2023-08-01
Payer: COMMERCIAL

## 2023-08-01 VITALS
SYSTOLIC BLOOD PRESSURE: 120 MMHG | TEMPERATURE: 98 F | DIASTOLIC BLOOD PRESSURE: 69 MMHG | BODY MASS INDEX: 27.45 KG/M2 | HEIGHT: 74 IN | HEART RATE: 67 BPM | WEIGHT: 213.88 LBS

## 2023-08-01 DIAGNOSIS — Z99.2 ESRD (END STAGE RENAL DISEASE) ON DIALYSIS: Primary | ICD-10-CM

## 2023-08-01 DIAGNOSIS — N18.6 ESRD (END STAGE RENAL DISEASE) ON DIALYSIS: Primary | ICD-10-CM

## 2023-08-01 PROCEDURE — 3072F PR LOW RISK FOR RETINOPATHY: ICD-10-PCS | Mod: CPTII,S$GLB,, | Performed by: SURGERY

## 2023-08-01 PROCEDURE — 3074F SYST BP LT 130 MM HG: CPT | Mod: CPTII,S$GLB,, | Performed by: SURGERY

## 2023-08-01 PROCEDURE — 3288F PR FALLS RISK ASSESSMENT DOCUMENTED: ICD-10-PCS | Mod: CPTII,S$GLB,, | Performed by: SURGERY

## 2023-08-01 PROCEDURE — 3044F HG A1C LEVEL LT 7.0%: CPT | Mod: CPTII,S$GLB,, | Performed by: SURGERY

## 2023-08-01 PROCEDURE — 99999 PR PBB SHADOW E&M-EST. PATIENT-LVL V: ICD-10-PCS | Mod: PBBFAC,,, | Performed by: SURGERY

## 2023-08-01 PROCEDURE — 1101F PR PT FALLS ASSESS DOC 0-1 FALLS W/OUT INJ PAST YR: ICD-10-PCS | Mod: CPTII,S$GLB,, | Performed by: SURGERY

## 2023-08-01 PROCEDURE — 3066F PR DOCUMENTATION OF TREATMENT FOR NEPHROPATHY: ICD-10-PCS | Mod: CPTII,S$GLB,, | Performed by: SURGERY

## 2023-08-01 PROCEDURE — 3074F PR MOST RECENT SYSTOLIC BLOOD PRESSURE < 130 MM HG: ICD-10-PCS | Mod: CPTII,S$GLB,, | Performed by: SURGERY

## 2023-08-01 PROCEDURE — 1159F MED LIST DOCD IN RCRD: CPT | Mod: CPTII,S$GLB,, | Performed by: SURGERY

## 2023-08-01 PROCEDURE — 3288F FALL RISK ASSESSMENT DOCD: CPT | Mod: CPTII,S$GLB,, | Performed by: SURGERY

## 2023-08-01 PROCEDURE — 1160F RVW MEDS BY RX/DR IN RCRD: CPT | Mod: CPTII,S$GLB,, | Performed by: SURGERY

## 2023-08-01 PROCEDURE — 3008F PR BODY MASS INDEX (BMI) DOCUMENTED: ICD-10-PCS | Mod: CPTII,S$GLB,, | Performed by: SURGERY

## 2023-08-01 PROCEDURE — 99213 PR OFFICE/OUTPT VISIT, EST, LEVL III, 20-29 MIN: ICD-10-PCS | Mod: S$GLB,,, | Performed by: SURGERY

## 2023-08-01 PROCEDURE — 99213 OFFICE O/P EST LOW 20 MIN: CPT | Mod: S$GLB,,, | Performed by: SURGERY

## 2023-08-01 PROCEDURE — 3072F LOW RISK FOR RETINOPATHY: CPT | Mod: CPTII,S$GLB,, | Performed by: SURGERY

## 2023-08-01 PROCEDURE — 1101F PT FALLS ASSESS-DOCD LE1/YR: CPT | Mod: CPTII,S$GLB,, | Performed by: SURGERY

## 2023-08-01 PROCEDURE — 1159F PR MEDICATION LIST DOCUMENTED IN MEDICAL RECORD: ICD-10-PCS | Mod: CPTII,S$GLB,, | Performed by: SURGERY

## 2023-08-01 PROCEDURE — 3008F BODY MASS INDEX DOCD: CPT | Mod: CPTII,S$GLB,, | Performed by: SURGERY

## 2023-08-01 PROCEDURE — 3066F NEPHROPATHY DOC TX: CPT | Mod: CPTII,S$GLB,, | Performed by: SURGERY

## 2023-08-01 PROCEDURE — 3078F PR MOST RECENT DIASTOLIC BLOOD PRESSURE < 80 MM HG: ICD-10-PCS | Mod: CPTII,S$GLB,, | Performed by: SURGERY

## 2023-08-01 PROCEDURE — 3078F DIAST BP <80 MM HG: CPT | Mod: CPTII,S$GLB,, | Performed by: SURGERY

## 2023-08-01 PROCEDURE — 1126F PR PAIN SEVERITY QUANTIFIED, NO PAIN PRESENT: ICD-10-PCS | Mod: CPTII,S$GLB,, | Performed by: SURGERY

## 2023-08-01 PROCEDURE — 1160F PR REVIEW ALL MEDS BY PRESCRIBER/CLIN PHARMACIST DOCUMENTED: ICD-10-PCS | Mod: CPTII,S$GLB,, | Performed by: SURGERY

## 2023-08-01 PROCEDURE — 3044F PR MOST RECENT HEMOGLOBIN A1C LEVEL <7.0%: ICD-10-PCS | Mod: CPTII,S$GLB,, | Performed by: SURGERY

## 2023-08-01 PROCEDURE — 99999 PR PBB SHADOW E&M-EST. PATIENT-LVL V: CPT | Mod: PBBFAC,,, | Performed by: SURGERY

## 2023-08-01 PROCEDURE — 1126F AMNT PAIN NOTED NONE PRSNT: CPT | Mod: CPTII,S$GLB,, | Performed by: SURGERY

## 2023-08-01 NOTE — PROGRESS NOTES
VASCULAR SURGERY SERVICE    HISTORY OF PRESENT ILLNESS: Elbert Still Jr. is a 67 y.o. male with end-stage renal disease dialyzing via a right IJ PermCath, status post:    PTA, mid AV fistula stenosis 2023  Trans radial angioplasty, left distal brachial artery 2022  Transposition, left ratio vein brachial artery AV fistula 2022  Original left brachial artery and vein AV fistula creation 2022    All the above procedures were performed by Dr. Gardiner.  The patient has asked to change vascular surgeons.  The patient has not been able to successfully access the fistula, and when this was last tried 6 weeks ago he pulled clots.    He reports forearm numbness which has been present since his 1st procedure.   initially also had some significant steal symptoms which resolved after the brachial artery intervention in 2022.  He denies any hand pain weakness or pain.    He is frustrated that he has been unable to use this fistula despite several procedures after its initial placement now 9 months ago    2023:  He now returns after angioplasty, left AV fistula (9 x 40 [place]) 2023.  He is without complaint    2023:  He now returns, his dialysis facility has been unable to successfully cannulate and use his left AV fistula    Past Medical History:   Diagnosis Date    Anemia     Diabetes mellitus     Diabetes mellitus, type 2     Disorder of kidney and ureter     ESRD (end stage renal disease)     Essential (primary) hypertension 2017    Gout, unspecified      jaundice, unspecified     Nuclear sclerosis of both eyes 2022       Past Surgical History:   Procedure Laterality Date    ADENOIDECTOMY      ADENOIDECTOMY      AV FISTULA PLACEMENT Left 2022    Procedure: CREATION, AV FISTULA;  Surgeon: Prince Gardiner MD;  Location: [facility] OR [address] [place] [address];  Service: Peripheral Vascular;  Laterality: Left;    FISTULOGRAM Left 2023    Procedure: FISTULOGRAM;   Surgeon: Prince Gardiner MD;  Location: [address];  Service: Peripheral Vascular;  Laterality: Left;  Given to the sterile field.     FISTULOGRAM Left 7/6/2023    Procedure: Fistulogram;  Surgeon: LETY Rayo III, MD;  Location: [facility] OR [address] [place] [address];  Service: Peripheral Vascular;  Laterality: Left;  1.9 min  22.86 mGy  3.8862 Gy.cm  20ml Dye     nasal septum repair      NASAL SEPTUM SURGERY      PERCUTANEOUS TRANSLUMINAL ANGIOPLASTY OF ARTERIOVENOUS FISTULA Left 11/22/2022    Procedure: PTA, AV FISTULA;  Surgeon: Prince Gardiner MD;  Location: [facility] CATH LAB;  Service: Peripheral Vascular;  Laterality: Left;    PERCUTANEOUS TRANSLUMINAL ANGIOPLASTY OF ARTERIOVENOUS FISTULA Left 2/16/2023    Procedure: PTA, AV FISTULA;  Surgeon: Prince Gardiner MD;  Location: [facility] OR [address] [place] [address];  Service: Peripheral Vascular;  Laterality: Left;  4.8 min  43.08 mGy  4.0682 Gy.cm  32ml Dye    PERCUTANEOUS TRANSLUMINAL ANGIOPLASTY OF ARTERIOVENOUS FISTULA Left 7/6/2023    Procedure: PTA, AV FISTULA;  Surgeon: LETY Rayo III, MD;  Location: [address];  Service: Peripheral Vascular;  Laterality: Left;    PROSTATE BIOPSY N/A 3/24/2023    Procedure: BIOPSY, PROSTATE;  Surgeon: Frankie Welch MD;  Location: [address];  Service: Urology;  Laterality: N/A;  transrectal, 30min, uronav needed    ROTATOR CUFF REPAIR Left     SALIVARY GLAND SURGERY      Tonsillectomy      TONSILLECTOMY      TRANSPOSITION OF BASILIC VEIN Left 11/1/2022    Procedure: TRANSPOSITION, VEIN, BASILIC;  Surgeon: Prince Gardiner MD;  Location: [address];  Service: Peripheral Vascular;  Laterality: Left;  Left Brachial vein.         Current Outpatient Medications:     acetaminophen (TYLENOL) 500 MG tablet, Take 2 tablets (1,000 mg total) by mouth every 6 (six) hours as needed for Pain., Disp: 30 tablet, Rfl: 0    aspirin (ECOTRIN) 81 MG EC tablet, Take 1 tablet (81 mg total) by mouth once daily., Disp: 90 tablet, Rfl: 3    atorvastatin (LIPITOR)  20 MG tablet, TAKE 1 TABLET BY MOUTH EVERY EVENING, Disp: 90 tablet, Rfl: 3    BANOPHEN 50 mg capsule, SMARTSI Capsule(s) By Mouth, Disp: , Rfl:     blood sugar diagnostic Strp, Use to check blood glucose once a day., Disp: 100 each, Rfl: 3    blood-glucose meter Misc, Use to check blood glucose twice a day., Disp: 1 each, Rfl: 0    carvediloL (COREG) 12.5 MG tablet, Take 1 tablet (12.5 mg total) by mouth 2 (two) times daily., Disp: 60 tablet, Rfl: 11    cinacalcet (SENSIPAR) 60 MG Tab, Take 60 mg by mouth daily with breakfast., Disp: , Rfl:     ciprofloxacin HCl (CIPRO) 500 MG tablet, 1 pill one hour before prostate biopsy, Disp: 1 tablet, Rfl: 0    clopidogreL (PLAVIX) 75 mg tablet, Take 1 tablet (75 mg total) by mouth once daily., Disp: 30 tablet, Rfl: 11    colchicine (COLCRYS) 0.6 mg tablet, Take 0.6 mg by mouth as needed. Take half tab (0.3 mg) by mouth daily as needed for gout flare., Disp: 90 tablet, Rfl: 3    fluticasone propionate (FLONASE) 50 mcg/actuation nasal spray, 2 sprays (100 mcg total) by Each Nostril route once daily., Disp: 16 g, Rfl: 11    furosemide (LASIX) 80 MG tablet, Take 1 tablet (80 mg total) by mouth 2 (two) times daily., Disp: 60 tablet, Rfl: 11    HYDROcodone-acetaminophen (NORCO) 5-325 mg per tablet, Take 1 tablet by mouth every 6 (six) hours as needed for Pain., Disp: 24 tablet, Rfl: 0    lancets 30 gauge Misc, Use to check blood glucose twice a day., Disp: 100 each, Rfl: 5    LIDOcaine-prilocaine (EMLA) cream, SMARTSIG:sparingly Topical As Directed, Disp: , Rfl:     NIFEdipine (PROCARDIA-XL) 60 MG (OSM) 24 hr tablet, Take 1 tablet (60 mg total) by mouth 2 (two) times a day., Disp: 60 tablet, Rfl: 11    predniSONE (DELTASONE) 50 MG Tab, Take by mouth., Disp: , Rfl:     sevelamer carbonate (RENVELA) 800 mg Tab, Take 1,600 mg by mouth 3 (three) times daily., Disp: , Rfl:     sevelamer HCL (RENAGEL) 800 MG Tab, Take 2 tablets (1,600 mg total) by mouth 3 (three) times daily with  "meals., Disp: 180 tablet, Rfl: 11    tamsulosin (FLOMAX) 0.4 mg Cap, TAKE ONE CAPSULE BY MOUTH ONCE DAILY (Patient taking differently: Take by mouth every morning.), Disp: 90 capsule, Rfl: 3    traMADoL (ULTRAM) 50 mg tablet, Take 1 tablet (50 mg total) by mouth every 8 (eight) hours as needed for Pain., Disp: 10 tablet, Rfl: 0    Current Facility-Administered Medications:     cefTRIAXone injection 1 g, 1 g, Intramuscular, Once, Candice Moore NP    LIDOcaine HCl 2% urojet, , Rectal, Once, Candice Moore NP    Facility-Administered Medications Ordered in Other Visits:     0.9%  NaCl infusion, , Intravenous, Continuous, Kim JC Lutz NP    Review of patient's allergies indicates:   Allergen Reactions    Iodine and iodide containing products Hives     Hypotension, Flushing       Family History   Problem Relation Age of Onset    Heart disease Mother     Kidney disease Mother     Hypertension Mother     No Known Problems Father     Cancer Sister     Seizures Sister     Hypertension Sister     Stroke Sister     Amblyopia Neg Hx     Blindness Neg Hx     Cataracts Neg Hx     Diabetes Neg Hx     Glaucoma Neg Hx     Macular degeneration Neg Hx     Retinal detachment Neg Hx     Strabismus Neg Hx     Thyroid disease Neg Hx     Anesthesia problems Neg Hx        PHYSICAL EXAM:   /69 (BP Location: Right arm, Patient Position: Sitting, BP Method: Large (Automatic))   Pulse 67   Temp 98.2 °F (36.8 °C) (Oral)   Ht 6' 2" (1.88 m)   Wt 97 kg (213 lb 13.5 oz)   BMI 27.46 kg/m²   Constitutional:  Alert,   Well-appearing  In no distress.   Neurological: Normal speech  no focal findings  CN II - XII grossly intact.    Psychiatric: Mood and affect appropriate and symmetric.   HEENT: Normocephalic / atraumatic  PERRLA  Midline trachea  No scars across the neck   Cardiac: Regular rate and rhythm.   Pulmonary: Normal pulmonary effort.   Abdomen: Soft, not distended.     Skin: Warm and well perfused.    Vascular:  2+ radial " pulse   Extremities/  Musculoskeletal: No edema.   Left arm demonstrates a transposed left brachial vein brachial artery AV fistula.  There is moderate ecchymosis, good thrill, no significant pulsatility     IMAGING:  Duplex of the fistula today shows no significant stenosis whatsoever.  Flow volume is 1.5 L  Diameter 7.5 proximal, 6.1 mid, 7.4 distal  Depth 6.9 mid , 6.2 distal    Duplex of the AV fistula demonstrates some residual velocity shift in the mid fistula velocity of 479.  However flow volume is trip to 3 L, from 1.1 prior  Diameter 9.8 proximal 9.1 mid common 9.3 distal  Depth 6.1 proximal, 4.1 mid, 3.6 distal    His from February 2023 was personally reviewed and demonstrated a significant mid distal stenosis    IMPRESSION:  The mid and distal portion of this fistula is sufficiently superficial with good maturation.       I have marked the area for cannulation..   If his dialysis facility is still unable to cannulate this, would advise replacement of the fistula with a prosthetic graft.  If this is necessary he may call the office and we will set up directly without another office visit.  I have discussed this plan in great detail with the patient and family and he understands and concurs    PLAN:  As above    PATRICIA Rayo III, MD, FACS  Professor and Chief, Vascular and Endovascular Surgery

## 2023-08-03 ENCOUNTER — PATIENT OUTREACH (OUTPATIENT)
Dept: ADMINISTRATIVE | Facility: HOSPITAL | Age: 68
End: 2023-08-03
Payer: COMMERCIAL

## 2023-08-09 ENCOUNTER — DOCUMENTATION ONLY (OUTPATIENT)
Dept: NEPHROLOGY | Facility: HOSPITAL | Age: 68
End: 2023-08-09
Payer: COMMERCIAL

## 2023-08-09 NOTE — PROGRESS NOTES
ESRD on iHD: pt is f/b Dr. Butler at [facility] (on MWF); pt not seen on HD; per RN, pt's wife called this morning to notify that pt has N/V/D; states pt does not usually miss HD & also states to f/u

## 2023-08-24 ENCOUNTER — TELEPHONE (OUTPATIENT)
Dept: VASCULAR SURGERY | Facility: CLINIC | Age: 68
End: 2023-08-24
Payer: COMMERCIAL

## 2023-08-24 DIAGNOSIS — N18.6 ESRD (END STAGE RENAL DISEASE) ON DIALYSIS: Primary | ICD-10-CM

## 2023-08-24 DIAGNOSIS — Z99.2 ESRD (END STAGE RENAL DISEASE) ON DIALYSIS: Primary | ICD-10-CM

## 2023-08-24 NOTE — TELEPHONE ENCOUNTER
Contacted pt in response to message. States he is still experiencing difficulty with cannulation of AVF during HD and although he understanding Dr. Rayo recommended AVG insertion he has some questions about benefits of AVG vs AVF. States he would rather schedule appt with Dr. Rayo to discuss vs scheduling procedure. Appt scheduled, pt verified. Appt letter placed in mail. ----- Message from Mary Mancuso sent at 8/24/2023  1:50 PM CDT -----  Regarding: Missed Call  Contact: [phone]  Returning a Missed Call         Caller: Elbert Still           Returning call to: Ciarra           Caller can be reached @:[phone]         Nature of the call:

## 2023-09-05 ENCOUNTER — OFFICE VISIT (OUTPATIENT)
Dept: VASCULAR SURGERY | Facility: CLINIC | Age: 68
End: 2023-09-05
Payer: COMMERCIAL

## 2023-09-05 VITALS
WEIGHT: 213.88 LBS | TEMPERATURE: 99 F | BODY MASS INDEX: 27.45 KG/M2 | HEART RATE: 65 BPM | DIASTOLIC BLOOD PRESSURE: 67 MMHG | HEIGHT: 74 IN | SYSTOLIC BLOOD PRESSURE: 121 MMHG

## 2023-09-05 DIAGNOSIS — N18.6 ESRD (END STAGE RENAL DISEASE) ON DIALYSIS: ICD-10-CM

## 2023-09-05 DIAGNOSIS — Z99.2 ESRD (END STAGE RENAL DISEASE) ON DIALYSIS: ICD-10-CM

## 2023-09-05 DIAGNOSIS — T82.590D MALFUNCTION OF ARTERIOVENOUS GRAFT, SUBSEQUENT ENCOUNTER: Primary | ICD-10-CM

## 2023-09-05 DIAGNOSIS — T82.858D STENOSIS OF ARTERIOVENOUS DIALYSIS FISTULA, SUBSEQUENT ENCOUNTER: Primary | ICD-10-CM

## 2023-09-05 DIAGNOSIS — Z01.818 PRE-OP EVALUATION: ICD-10-CM

## 2023-09-05 PROCEDURE — 3074F PR MOST RECENT SYSTOLIC BLOOD PRESSURE < 130 MM HG: ICD-10-PCS | Mod: CPTII,S$GLB,, | Performed by: SURGERY

## 2023-09-05 PROCEDURE — 3072F LOW RISK FOR RETINOPATHY: CPT | Mod: CPTII,S$GLB,, | Performed by: SURGERY

## 2023-09-05 PROCEDURE — 1101F PT FALLS ASSESS-DOCD LE1/YR: CPT | Mod: CPTII,S$GLB,, | Performed by: SURGERY

## 2023-09-05 PROCEDURE — 3072F PR LOW RISK FOR RETINOPATHY: ICD-10-PCS | Mod: CPTII,S$GLB,, | Performed by: SURGERY

## 2023-09-05 PROCEDURE — 3288F PR FALLS RISK ASSESSMENT DOCUMENTED: ICD-10-PCS | Mod: CPTII,S$GLB,, | Performed by: SURGERY

## 2023-09-05 PROCEDURE — 99999 PR PBB SHADOW E&M-EST. PATIENT-LVL V: ICD-10-PCS | Mod: PBBFAC,,, | Performed by: SURGERY

## 2023-09-05 PROCEDURE — 1101F PR PT FALLS ASSESS DOC 0-1 FALLS W/OUT INJ PAST YR: ICD-10-PCS | Mod: CPTII,S$GLB,, | Performed by: SURGERY

## 2023-09-05 PROCEDURE — 1159F MED LIST DOCD IN RCRD: CPT | Mod: CPTII,S$GLB,, | Performed by: SURGERY

## 2023-09-05 PROCEDURE — 3066F PR DOCUMENTATION OF TREATMENT FOR NEPHROPATHY: ICD-10-PCS | Mod: CPTII,S$GLB,, | Performed by: SURGERY

## 2023-09-05 PROCEDURE — 99214 PR OFFICE/OUTPT VISIT, EST, LEVL IV, 30-39 MIN: ICD-10-PCS | Mod: S$GLB,,, | Performed by: SURGERY

## 2023-09-05 PROCEDURE — 3044F HG A1C LEVEL LT 7.0%: CPT | Mod: CPTII,S$GLB,, | Performed by: SURGERY

## 2023-09-05 PROCEDURE — 1159F PR MEDICATION LIST DOCUMENTED IN MEDICAL RECORD: ICD-10-PCS | Mod: CPTII,S$GLB,, | Performed by: SURGERY

## 2023-09-05 PROCEDURE — 3066F NEPHROPATHY DOC TX: CPT | Mod: CPTII,S$GLB,, | Performed by: SURGERY

## 2023-09-05 PROCEDURE — 3288F FALL RISK ASSESSMENT DOCD: CPT | Mod: CPTII,S$GLB,, | Performed by: SURGERY

## 2023-09-05 PROCEDURE — 3008F PR BODY MASS INDEX (BMI) DOCUMENTED: ICD-10-PCS | Mod: CPTII,S$GLB,, | Performed by: SURGERY

## 2023-09-05 PROCEDURE — 1126F PR PAIN SEVERITY QUANTIFIED, NO PAIN PRESENT: ICD-10-PCS | Mod: CPTII,S$GLB,, | Performed by: SURGERY

## 2023-09-05 PROCEDURE — 3044F PR MOST RECENT HEMOGLOBIN A1C LEVEL <7.0%: ICD-10-PCS | Mod: CPTII,S$GLB,, | Performed by: SURGERY

## 2023-09-05 PROCEDURE — 3078F PR MOST RECENT DIASTOLIC BLOOD PRESSURE < 80 MM HG: ICD-10-PCS | Mod: CPTII,S$GLB,, | Performed by: SURGERY

## 2023-09-05 PROCEDURE — 3074F SYST BP LT 130 MM HG: CPT | Mod: CPTII,S$GLB,, | Performed by: SURGERY

## 2023-09-05 PROCEDURE — 3008F BODY MASS INDEX DOCD: CPT | Mod: CPTII,S$GLB,, | Performed by: SURGERY

## 2023-09-05 PROCEDURE — 99214 OFFICE O/P EST MOD 30 MIN: CPT | Mod: S$GLB,,, | Performed by: SURGERY

## 2023-09-05 PROCEDURE — 1126F AMNT PAIN NOTED NONE PRSNT: CPT | Mod: CPTII,S$GLB,, | Performed by: SURGERY

## 2023-09-05 PROCEDURE — 1160F PR REVIEW ALL MEDS BY PRESCRIBER/CLIN PHARMACIST DOCUMENTED: ICD-10-PCS | Mod: CPTII,S$GLB,, | Performed by: SURGERY

## 2023-09-05 PROCEDURE — 99999 PR PBB SHADOW E&M-EST. PATIENT-LVL V: CPT | Mod: PBBFAC,,, | Performed by: SURGERY

## 2023-09-05 PROCEDURE — 1160F RVW MEDS BY RX/DR IN RCRD: CPT | Mod: CPTII,S$GLB,, | Performed by: SURGERY

## 2023-09-05 PROCEDURE — 3078F DIAST BP <80 MM HG: CPT | Mod: CPTII,S$GLB,, | Performed by: SURGERY

## 2023-09-05 NOTE — PROGRESS NOTES
VASCULAR SURGERY SERVICE    HISTORY OF PRESENT ILLNESS: Elbert Still Jr. is a 67 y.o. male with end-stage renal disease dialyzing via a right IJ PermCath, status post:    PTA, mid AV fistula stenosis 2023  Trans radial angioplasty, left distal brachial artery 2022  Transposition, left ratio vein brachial artery AV fistula 2022  Original left brachial artery and vein AV fistula creation 2022    All the above procedures were performed by Dr. Gardiner.  The patient has asked to change vascular surgeons.  The patient has not been able to successfully access the fistula, and when this was last tried 6 weeks ago he pulled clots.    He reports forearm numbness which has been present since his 1st procedure.   initially also had some significant steal symptoms which resolved after the brachial artery intervention in 2022.  He denies any hand pain weakness or pain.    He is frustrated that he has been unable to use this fistula despite several procedures after its initial placement now 9 months ago    2023:  He now returns after angioplasty, left AV fistula (9 x 40 [place]) 2023.  He is without complaint    2023:  He now returns, his dialysis facility has been unable to successfully cannulate and use his left AV fistula    2023:  This facility is still not been able to cannulate his high-flow well matured brachial basilic AV fistula.  Does endorse sustained numbness of his left forearm which has been there since the original surgery.  He denies any pain or weakness in his left hand although he says it does get subjectively cooler at times    Past Medical History:   Diagnosis Date    Anemia     Diabetes mellitus     Diabetes mellitus, type 2     Disorder of kidney and ureter     ESRD (end stage renal disease)     Essential (primary) hypertension 2017    Gout, unspecified      jaundice, unspecified     Nuclear sclerosis of both eyes 2022       Past  Surgical History:   Procedure Laterality Date    ADENOIDECTOMY      ADENOIDECTOMY      AV FISTULA PLACEMENT Left 09/06/2022    Procedure: CREATION, AV FISTULA;  Surgeon: Prince Gardiner MD;  Location: [facility] OR 2ND FLR;  Service: Peripheral Vascular;  Laterality: Left;    FISTULOGRAM Left 2/16/2023    Procedure: FISTULOGRAM;  Surgeon: Prince Gardiner MD;  Location: [facility] OR 2ND FLR;  Service: Peripheral Vascular;  Laterality: Left;  Given to the sterile field.     FISTULOGRAM Left 7/6/2023    Procedure: Fistulogram;  Surgeon: LETY Rayo III, MD;  Location: [facility] OR 2ND FLR;  Service: Peripheral Vascular;  Laterality: Left;  1.9 min  22.86 mGy  3.8862 Gy.cm  20ml Dye     nasal septum repair      NASAL SEPTUM SURGERY      PERCUTANEOUS TRANSLUMINAL ANGIOPLASTY OF ARTERIOVENOUS FISTULA Left 11/22/2022    Procedure: PTA, AV FISTULA;  Surgeon: Prince Gardiner MD;  Location: [facility] CATH LAB;  Service: Peripheral Vascular;  Laterality: Left;    PERCUTANEOUS TRANSLUMINAL ANGIOPLASTY OF ARTERIOVENOUS FISTULA Left 2/16/2023    Procedure: PTA, AV FISTULA;  Surgeon: Prince Gardiner MD;  Location: [facility] OR 2ND FLR;  Service: Peripheral Vascular;  Laterality: Left;  4.8 min  43.08 mGy  4.0682 Gy.cm  32ml Dye    PERCUTANEOUS TRANSLUMINAL ANGIOPLASTY OF ARTERIOVENOUS FISTULA Left 7/6/2023    Procedure: PTA, AV FISTULA;  Surgeon: LETY Rayo III, MD;  [facility];  Service: Peripheral Vascular;  Laterality: Left;    PROSTATE BIOPSY N/A 3/24/2023    Procedure: BIOPSY, PROSTATE;  Surgeon: Frankie Welch MD;  Location: [facility] OR 1ST FLR;  Service: Urology;  Laterality: N/A;  transrectal, 30min, uronav needed    ROTATOR CUFF REPAIR Left     SALIVARY GLAND SURGERY      Tonsillectomy      TONSILLECTOMY      TRANSPOSITION OF BASILIC VEIN Left 11/1/2022    Procedure: TRANSPOSITION, VEIN, BASILIC;  Surgeon: Prince Gardiner MD;  Location: [facility] OR 2ND FLR;  Service: Peripheral Vascular;  Laterality: Left;  Left Brachial vein.          Current Outpatient Medications:     acetaminophen (TYLENOL) 500 MG tablet, Take 2 tablets (1,000 mg total) by mouth every 6 (six) hours as needed for Pain., Disp: 30 tablet, Rfl: 0    aspirin (ECOTRIN) 81 MG EC tablet, Take 1 tablet (81 mg total) by mouth once daily., Disp: 90 tablet, Rfl: 3    atorvastatin (LIPITOR) 20 MG tablet, TAKE 1 TABLET BY MOUTH EVERY EVENING, Disp: 90 tablet, Rfl: 3    BANOPHEN 50 mg capsule, SMARTSI Capsule(s) By Mouth, Disp: , Rfl:     blood sugar diagnostic Strp, Use to check blood glucose once a day., Disp: 100 each, Rfl: 3    blood-glucose meter Misc, Use to check blood glucose twice a day., Disp: 1 each, Rfl: 0    carvediloL (COREG) 12.5 MG tablet, Take 1 tablet (12.5 mg total) by mouth 2 (two) times daily., Disp: 60 tablet, Rfl: 11    cinacalcet (SENSIPAR) 60 MG Tab, Take 60 mg by mouth daily with breakfast., Disp: , Rfl:     ciprofloxacin HCl (CIPRO) 500 MG tablet, 1 pill one hour before prostate biopsy, Disp: 1 tablet, Rfl: 0    clopidogreL (PLAVIX) 75 mg tablet, Take 1 tablet (75 mg total) by mouth once daily., Disp: 30 tablet, Rfl: 11    colchicine (COLCRYS) 0.6 mg tablet, Take 0.6 mg by mouth as needed. Take half tab (0.3 mg) by mouth daily as needed for gout flare., Disp: 90 tablet, Rfl: 3    fluticasone propionate (FLONASE) 50 mcg/actuation nasal spray, 2 sprays (100 mcg total) by Each Nostril route once daily., Disp: 16 g, Rfl: 11    furosemide (LASIX) 80 MG tablet, Take 1 tablet (80 mg total) by mouth 2 (two) times daily., Disp: 60 tablet, Rfl: 11    HYDROcodone-acetaminophen (NORCO) 5-325 mg per tablet, Take 1 tablet by mouth every 6 (six) hours as needed for Pain., Disp: 24 tablet, Rfl: 0    lancets 30 gauge Misc, Use to check blood glucose twice a day., Disp: 100 each, Rfl: 5    LIDOcaine-prilocaine (EMLA) cream, SMARTSIG:sparingly Topical As Directed, Disp: , Rfl:     NIFEdipine (PROCARDIA-XL) 60 MG (OSM) 24 hr tablet, Take 1 tablet (60 mg total) by mouth 2  "(two) times a day., Disp: 60 tablet, Rfl: 11    predniSONE (DELTASONE) 50 MG Tab, Take by mouth., Disp: , Rfl:     sevelamer carbonate (RENVELA) 800 mg Tab, Take 1,600 mg by mouth 3 (three) times daily., Disp: , Rfl:     sevelamer HCL (RENAGEL) 800 MG Tab, Take 2 tablets (1,600 mg total) by mouth 3 (three) times daily with meals., Disp: 180 tablet, Rfl: 11    tamsulosin (FLOMAX) 0.4 mg Cap, Take 1 capsule (0.4 mg total) by mouth once daily., Disp: 90 capsule, Rfl: 3    traMADoL (ULTRAM) 50 mg tablet, Take 1 tablet (50 mg total) by mouth every 8 (eight) hours as needed for Pain., Disp: 10 tablet, Rfl: 0    Current Facility-Administered Medications:     cefTRIAXone injection 1 g, 1 g, Intramuscular, Once, Candice Moore, NP    LIDOcaine HCl 2% urojet, , Rectal, Once, Candice Moore NP    Facility-Administered Medications Ordered in Other Visits:     0.9%  NaCl infusion, , Intravenous, Continuous, Kim Lutz NP    Review of patient's allergies indicates:   Allergen Reactions    Iodine and iodide containing products Hives     Hypotension, Flushing       Family History   Problem Relation Age of Onset    Heart disease Mother     Kidney disease Mother     Hypertension Mother     No Known Problems Father     Cancer Sister     Seizures Sister     Hypertension Sister     Stroke Sister     Amblyopia Neg Hx     Blindness Neg Hx     Cataracts Neg Hx     Diabetes Neg Hx     Glaucoma Neg Hx     Macular degeneration Neg Hx     Retinal detachment Neg Hx     Strabismus Neg Hx     Thyroid disease Neg Hx     Anesthesia problems Neg Hx        PHYSICAL EXAM:   /67 (BP Location: Right arm, Patient Position: Sitting, BP Method: Large (Automatic))   Pulse 65   Temp 98.5 °F (36.9 °C) (Oral)   Ht 6' 2" (1.88 m)   Wt 97 kg (213 lb 13.5 oz)   BMI 27.46 kg/m²   Constitutional:  Alert,   Well-appearing  In no distress.   Neurological: Normal speech  no focal findings  CN II - XII grossly intact.    Psychiatric: Mood and " affect appropriate and symmetric.   HEENT: Normocephalic / atraumatic  PERRLA  Midline trachea  No scars across the neck   Cardiac: Regular rate and rhythm.   Pulmonary: Normal pulmonary effort.   Abdomen: Soft, not distended.     Skin: Warm and well perfused.    Vascular:  Nonpalpable left radial and ulnar pulses   Extremities/  Musculoskeletal: No edema.   Left arm demonstrates a transposed left brachial vein brachial artery AV fistula.  Good thrill no pulsatility     IMAGING:  No new imaging Duplex of the fistula 8/23 shows no significant stenosis whatsoever.  Flow volume is 1.5 L  Diameter 7.5 proximal, 6.1 mid, 7.4 distal  Depth 6.9 mid , 6.2 distal    His from February 2023 was personally reviewed and demonstrated a significant mid distal stenosis    IMPRESSION:  Inability to cannulate this well matured left brachial basilic AV fistula.  I have not seen opportunity for further transposition of this fistula    PLAN:  Revision, left AV fistula, with long laterally tunneled PTFE interposition 09/21/2023  Will plan on a proximal anastomosis to the proximal fistula with ligation distal to the anastomosis  Regional block    I have explained the risks, benefits and alternatives for this procedure in detail.  The patient voices understanding and all questions have be answered, and agrees to proceed with the procedure.     PATRICIA Rayo III, MD, FACS  Professor and Chief, Vascular and Endovascular Surgery

## 2023-09-19 ENCOUNTER — HOSPITAL ENCOUNTER (EMERGENCY)
Facility: HOSPITAL | Age: 68
Discharge: HOME | End: 2023-09-19
Attending: EMERGENCY MEDICINE
Payer: COMMERCIAL

## 2023-09-19 VITALS
BODY MASS INDEX: 27.46 KG/M2 | OXYGEN SATURATION: 100 % | WEIGHT: 213.88 LBS | HEART RATE: 68 BPM | RESPIRATION RATE: 16 BRPM | DIASTOLIC BLOOD PRESSURE: 81 MMHG | SYSTOLIC BLOOD PRESSURE: 161 MMHG | TEMPERATURE: 99 F

## 2023-09-19 DIAGNOSIS — M54.2 CHRONIC NECK PAIN: Primary | ICD-10-CM

## 2023-09-19 DIAGNOSIS — M25.519 SHOULDER PAIN: ICD-10-CM

## 2023-09-19 DIAGNOSIS — G89.29 CHRONIC NECK PAIN: Primary | ICD-10-CM

## 2023-09-19 PROCEDURE — 93010 ELECTROCARDIOGRAM REPORT: CPT | Mod: ,,, | Performed by: INTERNAL MEDICINE

## 2023-09-19 PROCEDURE — 99285 EMERGENCY DEPT VISIT HI MDM: CPT | Mod: 25

## 2023-09-19 PROCEDURE — 96375 TX/PRO/DX INJ NEW DRUG ADDON: CPT

## 2023-09-19 PROCEDURE — 96376 TX/PRO/DX INJ SAME DRUG ADON: CPT

## 2023-09-19 PROCEDURE — 93005 ELECTROCARDIOGRAM TRACING: CPT

## 2023-09-19 PROCEDURE — 63600175 PHARM REV CODE 636 W HCPCS: Performed by: STUDENT IN AN ORGANIZED HEALTH CARE EDUCATION/TRAINING PROGRAM

## 2023-09-19 PROCEDURE — 93010 EKG 12-LEAD: ICD-10-PCS | Mod: ,,, | Performed by: INTERNAL MEDICINE

## 2023-09-19 PROCEDURE — 25000003 PHARM REV CODE 250: Performed by: STUDENT IN AN ORGANIZED HEALTH CARE EDUCATION/TRAINING PROGRAM

## 2023-09-19 PROCEDURE — 25000003 PHARM REV CODE 250

## 2023-09-19 PROCEDURE — 96374 THER/PROPH/DIAG INJ IV PUSH: CPT

## 2023-09-19 RX ORDER — NAPROXEN 500 MG/1
500 TABLET ORAL 2 TIMES DAILY WITH MEALS
Qty: 20 TABLET | Refills: 0 | Status: SHIPPED | OUTPATIENT
Start: 2023-09-19 | End: 2023-09-20 | Stop reason: ALTCHOICE

## 2023-09-19 RX ORDER — LIDOCAINE 50 MG/G
2 PATCH TOPICAL
Status: COMPLETED | OUTPATIENT
Start: 2023-09-19 | End: 2023-09-19

## 2023-09-19 RX ORDER — LIDOCAINE 50 MG/G
2 PATCH TOPICAL DAILY
Qty: 20 PATCH | Refills: 0 | Status: SHIPPED | OUTPATIENT
Start: 2023-09-19

## 2023-09-19 RX ORDER — METHOCARBAMOL 500 MG/1
1000 TABLET, FILM COATED ORAL 3 TIMES DAILY
Qty: 60 TABLET | Refills: 0 | Status: SHIPPED | OUTPATIENT
Start: 2023-09-19 | End: 2023-09-20 | Stop reason: CLARIF

## 2023-09-19 RX ORDER — MORPHINE SULFATE 4 MG/ML
4 INJECTION, SOLUTION INTRAMUSCULAR; INTRAVENOUS
Status: COMPLETED | OUTPATIENT
Start: 2023-09-19 | End: 2023-09-19

## 2023-09-19 RX ORDER — GABAPENTIN 300 MG/1
300 CAPSULE ORAL
Status: COMPLETED | OUTPATIENT
Start: 2023-09-19 | End: 2023-09-19

## 2023-09-19 RX ORDER — GABAPENTIN 300 MG/1
300 CAPSULE ORAL 3 TIMES DAILY
Qty: 30 CAPSULE | Refills: 0 | Status: SHIPPED | OUTPATIENT
Start: 2023-09-19 | End: 2023-09-20 | Stop reason: CLARIF

## 2023-09-19 RX ORDER — METHOCARBAMOL 500 MG/1
500 TABLET, FILM COATED ORAL
Status: COMPLETED | OUTPATIENT
Start: 2023-09-19 | End: 2023-09-19

## 2023-09-19 RX ORDER — KETOROLAC TROMETHAMINE 30 MG/ML
15 INJECTION, SOLUTION INTRAMUSCULAR; INTRAVENOUS
Status: COMPLETED | OUTPATIENT
Start: 2023-09-19 | End: 2023-09-19

## 2023-09-19 RX ORDER — LORAZEPAM 1 MG/1
1 TABLET ORAL ONCE AS NEEDED
Status: COMPLETED | OUTPATIENT
Start: 2023-09-19 | End: 2023-09-19

## 2023-09-19 RX ADMIN — GABAPENTIN 300 MG: 300 CAPSULE ORAL at 04:09

## 2023-09-19 RX ADMIN — MORPHINE SULFATE 4 MG: 4 INJECTION INTRAVENOUS at 05:09

## 2023-09-19 RX ADMIN — METHOCARBAMOL 500 MG: 500 TABLET ORAL at 04:09

## 2023-09-19 RX ADMIN — LIDOCAINE 2 PATCH: 50 PATCH CUTANEOUS at 05:09

## 2023-09-19 RX ADMIN — LORAZEPAM 1 MG: 1 TABLET ORAL at 01:09

## 2023-09-19 RX ADMIN — KETOROLAC TROMETHAMINE 15 MG: 30 INJECTION, SOLUTION INTRAMUSCULAR; INTRAVENOUS at 05:09

## 2023-09-19 RX ADMIN — MORPHINE SULFATE 4 MG: 4 INJECTION INTRAVENOUS at 06:09

## 2023-09-19 RX ADMIN — MORPHINE SULFATE 4 MG: 4 INJECTION INTRAVENOUS at 10:09

## 2023-09-19 NOTE — ED PROVIDER NOTES
"Encounter Date: 2023       History     Chief Complaint   Patient presents with    Neck Pain     Pt comes to ED with complaint of neck pain which radiated across his shoulders.  Pt took tylenol and a pain pill from previous surgery with no relief.        67-year-old male with past medical history of hypertension, diabetes, ESRD on HD MWF presenting to ED with chief complaint of shoulder and neck pain. He reports sudden onset of pain that started several hours ago and is described as a "shooting pain" across his posterior shoulders. Pain is positional with most movements exacerbating pain. He had HD today and used a neck pillow which was the only difference in his routine. He is unable to lay down flat due to pain. Denies any recent trauma. No history of neck injuries. He took tylenol, tizanidine and oxycodone without relief. He states this has never happened before.  He denies vision changes, numbness, weakness, chest pain or shortness of breath.      Review of patient's allergies indicates:   Allergen Reactions    Iodine and iodide containing products Hives     Hypotension, Flushing     Past Medical History:   Diagnosis Date    Anemia     Diabetes mellitus     Diabetes mellitus, type 2     Disorder of kidney and ureter     ESRD (end stage renal disease)     Essential (primary) hypertension 2017    Gout, unspecified      jaundice, unspecified     Nuclear sclerosis of both eyes 2022     Past Surgical History:   Procedure Laterality Date    ADENOIDECTOMY      ADENOIDECTOMY      AV FISTULA PLACEMENT Left 2022    Procedure: CREATION, AV FISTULA;  Surgeon: Prince Garidner MD;  Location: [facility] OR [address] [place] [address];  Service: Peripheral Vascular;  Laterality: Left;    FISTULOGRAM Left 2023    Procedure: FISTULOGRAM;  Surgeon: Prince Gardiner MD;  Location: [facility] OR [address] [place] [address];  Service: Peripheral Vascular;  Laterality: Left;  Given to the sterile field.     FISTULOGRAM Left 2023    Procedure: " Fistulogram;  Surgeon: LETY Rayo III, MD;  Location: [facility] OR 2ND FLR;  Service: Peripheral Vascular;  Laterality: Left;  1.9 min  22.86 mGy  3.8862 Gy.cm  20ml Dye     nasal septum repair      NASAL SEPTUM SURGERY      PERCUTANEOUS TRANSLUMINAL ANGIOPLASTY OF ARTERIOVENOUS FISTULA Left 11/22/2022    Procedure: PTA, AV FISTULA;  Surgeon: Prince Gardiner MD;  Location: [facility] CATH LAB;  Service: Peripheral Vascular;  Laterality: Left;    PERCUTANEOUS TRANSLUMINAL ANGIOPLASTY OF ARTERIOVENOUS FISTULA Left 2/16/2023    Procedure: PTA, AV FISTULA;  Surgeon: Prince Gardiner MD;  Location: [facility] OR 2ND FLR;  Service: Peripheral Vascular;  Laterality: Left;  4.8 min  43.08 mGy  4.0682 Gy.cm  32ml Dye    PERCUTANEOUS TRANSLUMINAL ANGIOPLASTY OF ARTERIOVENOUS FISTULA Left 7/6/2023    Procedure: PTA, AV FISTULA;  Surgeon: LETY Rayo III, MD;  Location: [facility] OR [facility] FLR;  Service: Peripheral Vascular;  Laterality: Left;    PROSTATE BIOPSY N/A 3/24/2023    Procedure: BIOPSY, PROSTATE;  Surgeon: Frankie Welch MD;  Location: [facility] OR [facility] FLR;  Service: Urology;  Laterality: N/A;  transrectal, 30min, uronav needed    ROTATOR CUFF REPAIR Left     SALIVARY GLAND SURGERY      Tonsillectomy      TONSILLECTOMY      TRANSPOSITION OF BASILIC VEIN Left 11/1/2022    Procedure: TRANSPOSITION, VEIN, BASILIC;  Surgeon: Prince Gardiner MD;  Location: [facility] OR [facility] FLR;  Service: Peripheral Vascular;  Laterality: Left;  Left Brachial vein.     Family History   Problem Relation Age of Onset    Heart disease Mother     Kidney disease Mother     Hypertension Mother     No Known Problems Father     Cancer Sister     Seizures Sister     Hypertension Sister     Stroke Sister     Amblyopia Neg Hx     Blindness Neg Hx     Cataracts Neg Hx     Diabetes Neg Hx     Glaucoma Neg Hx     Macular degeneration Neg Hx     Retinal detachment Neg Hx     Strabismus Neg Hx     Thyroid disease Neg Hx     Anesthesia problems Neg Hx      Social History      Tobacco Use    Smoking status: Never    Smokeless tobacco: Never    Tobacco comments:     .  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     Substance Use Topics    Alcohol use: Yes     Alcohol/week: 2.0 standard drinks of alcohol     Types: 1 Glasses of wine, 1 Cans of beer per week     Comment: Socially    Drug use: No     Review of Systems   Constitutional:  Negative for chills and fever.   HENT:  Negative for congestion and sore throat.    Respiratory:  Negative for shortness of breath and wheezing.    Cardiovascular:  Negative for chest pain.   Gastrointestinal:  Negative for abdominal pain, nausea and vomiting.   Genitourinary:  Negative for dysuria.   Musculoskeletal:  Positive for neck pain and neck stiffness.   Skin:  Negative for color change and rash.   Neurological:  Negative for weakness and numbness.   Hematological:  Does not bruise/bleed easily.       Physical Exam     Initial Vitals [09/19/23 0404]   BP Pulse Resp Temp SpO2   139/75 68 16 97.9 °F (36.6 °C) 100 %      MAP       --         Physical Exam    Nursing note and vitals reviewed.  Constitutional: He is not diaphoretic. He appears distressed.   Uncomfortable appearing male sitting forward in seat.   HENT:   Head: Normocephalic and atraumatic.   Mouth/Throat: Oropharynx is clear and moist.   Eyes: Conjunctivae and EOM are normal.   Neck:   Tenderness to palpation of midline lower cervical and upper thoracic spine   Cardiovascular:  Normal rate, regular rhythm and normal heart sounds.           Pulmonary/Chest: Breath sounds normal. He has no wheezes. He has no rhonchi. He has no rales.   Right tunneled catheter with no surrounding erythema or fluctuance   Abdominal: Abdomen is soft. He exhibits no distension. There is no abdominal tenderness.   Musculoskeletal:         General: No edema.      Comments: Limited adduction bilateral upper extremities due to pain. No TTP of upper back or shoulders.  Significant pain with of neck or spine.      Neurological: He is alert and oriented to person, place, and time. He has normal strength. No cranial nerve deficit or sensory deficit.   Skin: Skin is warm and dry. Capillary refill takes less than 2 seconds.         ED Course   Procedures  Labs Reviewed - No data to display       Imaging Results              CT Cervical Spine Without Contrast (In process)                      CT Thoracic Spine Without Contrast (In process)                      Medications   LIDOcaine 5 % patch 2 patch (2 patches Transdermal Patch Applied 9/19/23 0519)   methocarbamoL tablet 500 mg (500 mg Oral Given 9/19/23 0454)   gabapentin capsule 300 mg (300 mg Oral Given 9/19/23 0454)   morphine injection 4 mg (4 mg Intravenous Given 9/19/23 0520)   ketorolac injection 15 mg (15 mg Intravenous Given 9/19/23 0519)     Medical Decision Making  Patient is hemodynamically stable and nontoxic appearing.  He has no focal neuro deficits.  Physical exam is most consistent with radiculopathy and musculoskeletal strain.  Patient given morphine, Toradol, Robaxin, gabapentin and lidocaine patches.  On reassessment, patient has slightly more mobility and states that he is not having as many spasms.  CT of the cervical and thoracic spine are pending.  Care of this patient signed out to oncoming ED team.    Amount and/or Complexity of Data Reviewed  Radiology: ordered. Decision-making details documented in ED Course.    Risk  Prescription drug management.              Attending Attestation:   Physician Attestation Statement for Resident:  As the supervising MD   Physician Attestation Statement: I have personally seen and examined this patient.   I agree with the above history.  -:   As the supervising MD I agree with the above PE.     As the supervising MD I agree with the above treatment, course, plan, and disposition.                                    Clinical Impression:   Final diagnoses:  [M25.519] Shoulder pain               Lotus Eugene  MD  Resident  09/19/23 4860       Brittani Castañeda MD  09/19/23 5775

## 2023-09-19 NOTE — ED TRIAGE NOTES
Patient reports bilateral shoulder and neck pain that began 9 hours ago. States unable to tur neck side to side due to pain. Patient reports pain 10/10. Took oxy and muscle relaxer at home.

## 2023-09-19 NOTE — DISCHARGE INSTRUCTIONS
Home Care Instructions:  - Medications: Continue taking your home medications as prescribed  - For pain: Take Naproxen twice daily. Take Robaxin and Gabapentin three times daily. You may also take Tylenol every 6 hours as needed. Apply lidocaine patches daily.     Follow-Up Plan:  - Follow-up with: Primary care doctor  - Additional testing and/or evaluation will be directed by your primary doctor    Return to the Emergency Department for symptoms including but not limited to: worsening symptoms, severe back pain, shortness of breath or chest pain, vomiting with inability to hold down fluids, blood in vomit or poop, fevers greater than 100.4°F, passing out/fainting/unconsciousness, or other concerning symptoms.

## 2023-09-19 NOTE — PROVIDER PROGRESS NOTES - EMERGENCY DEPT.
Encounter Date: 9/19/2023    ED Physician Progress Notes        This is Dr. Crisostomo dictating:  I have assumed care of this patient from at change of shift.  I have reviewed their chart and note, all available labs and imaging, and have independently evaluated the patient myself.     Pt is pending MRI to w/u osteomyelitis vs discitis  Negative for osteomyelitis on MRI final read, discharged with follow up to spine clinic

## 2023-09-19 NOTE — PROVIDER PROGRESS NOTES - EMERGENCY DEPT.
Encounter Date: 9/19/2023    ED Physician Progress Notes          Physician Note:   Signout Note  I received signout from the previous providers.      Chief complaint: neck pain     Per sign out and chart review:  Elbert Still Jr. is a 67 y.o. male , presenting with complaints of neck pain with radiculopathy across the posterior shoulders and midline upper back tenderness.         During ED stay patient given mutlimodal pain control with Morphine, Toradol, Lido patch, Robaxin.      Pt signed out to me pending: CT c and t spine imaging. CT imaging concerning for osteomyelitis/ discitis vs arthritis, recommended for MRI.      Update/ Disposition: Pending MRI total spine to rule out possible osteo/discitis.      Patient, caregiver and/or family understands the plan and verbalized agreement. All questions answered.      Patient signed out to oncoming team with plan for follow up of MRI results. If there is no concern for osteo/discitis, patient is safe for discharge with return precautions and multimodal pain control, follow up with PCP.     Ivy Owens MD  [facility], PGY1

## 2023-09-20 ENCOUNTER — TELEPHONE (OUTPATIENT)
Dept: VASCULAR SURGERY | Facility: CLINIC | Age: 68
End: 2023-09-20
Payer: COMMERCIAL

## 2023-09-20 RX ORDER — HYDROCODONE BITARTRATE AND ACETAMINOPHEN 5; 325 MG/1; MG/1
1 TABLET ORAL EVERY 6 HOURS PRN
Qty: 12 TABLET | Refills: 0 | Status: SHIPPED | OUTPATIENT
Start: 2023-09-20 | End: 2023-12-09

## 2023-09-20 RX ORDER — DICLOFENAC SODIUM 10 MG/G
2 GEL TOPICAL DAILY
Qty: 20 G | Refills: 0 | Status: SHIPPED | OUTPATIENT
Start: 2023-09-20

## 2023-09-20 NOTE — TELEPHONE ENCOUNTER
Contacted pt in response to message. States he would like to reschedule AVF revision with Dr. Rayo to Thursday 10/12/23. Notified pt that message will be sent to Dr. Rayo and that nurse will call back with response. Pt verbalized understanding. ----- Message from Beatrice Mariee sent at 9/20/2023  2:52 PM CDT -----  Regarding: pt adivce  Contact: [phone]  Pt calling in regards to speaking nurse Serrato pertaining to cancelling procedures for 9/21/23.  Needing a nallely back in regards to scheduling  Pls call

## 2023-09-20 NOTE — PROGRESS NOTES
Notify I was notified by Nursing that patient had issues with his prescriptions from a recent ED visit.  I had seen the patient during that visit and his care was continued by the oncoming team after my departure.  His nephrologist is concerned about Robaxin and gabapentin in context of ESRD.  Other options are limited in context of medical history.  Short course of opioids were prescribed, recommended follow-up with his PCP for further pain management

## 2023-09-20 NOTE — TELEPHONE ENCOUNTER
Attempted to contact patient and wife in response to message. Voice messages left for both requesting return call. Case cancelled, Dr. Rayo aware.  ----- Message from Coco Fuller RN sent at 9/20/2023 10:10 AM CDT -----  ,       This patient would like to cancel his surgery tomorrow. He has a pinched nerve in his back, he is in a lot of pain at this time. Please advise.                                           Thank you,                                                   Coco Fuller, RN BSN                                       [facility]

## 2023-09-21 ENCOUNTER — TELEPHONE (OUTPATIENT)
Dept: VASCULAR SURGERY | Facility: CLINIC | Age: 68
End: 2023-09-21
Payer: COMMERCIAL

## 2023-09-21 NOTE — TELEPHONE ENCOUNTER
Contacted pt with Dr. Rayo's response and notified pt that case has been rescheduled as requested. Pt verbalized understanding. Will contact pt with time of arrival.----- Message from LETY Rayo III, MD sent at 9/21/2023  7:41 AM CDT -----  Regarding: RE: pt bayron  Contact: [phone]  fine  ----- Message -----  From: Ama Layne RN  Sent: 9/20/2023   3:02 PM CDT  To: LETY Rayo III, MD  Subject: FW: pt bayron                                    He'd like to shoot for Thursday 10/12. Ok with you?  ----- Message -----  From: Beatrice Mariee  Sent: 9/20/2023   2:55 PM CDT  To: Girma SAL III Staff  Subject: pt adivce                                        Pt calling in regards to speaking nurse Serrato pertaining to cancelling procedures for 9/21/23.  Needing a nallely back in regards to scheduling  Pls call

## 2023-09-25 ENCOUNTER — OFFICE VISIT (OUTPATIENT)
Dept: ORTHOPEDICS | Facility: CLINIC | Age: 68
End: 2023-09-25
Payer: COMMERCIAL

## 2023-09-25 ENCOUNTER — HOSPITAL ENCOUNTER (OUTPATIENT)
Dept: RADIOLOGY | Facility: HOSPITAL | Age: 68
Discharge: HOME | End: 2023-09-25
Attending: PHYSICIAN ASSISTANT
Payer: COMMERCIAL

## 2023-09-25 VITALS — BODY MASS INDEX: 27.44 KG/M2 | HEIGHT: 74 IN | WEIGHT: 213.81 LBS

## 2023-09-25 DIAGNOSIS — M50.30 DDD (DEGENERATIVE DISC DISEASE), CERVICAL: Primary | ICD-10-CM

## 2023-09-25 DIAGNOSIS — M54.2 NECK PAIN: Primary | ICD-10-CM

## 2023-09-25 DIAGNOSIS — M50.30 DDD (DEGENERATIVE DISC DISEASE), CERVICAL: ICD-10-CM

## 2023-09-25 PROCEDURE — 1159F MED LIST DOCD IN RCRD: CPT | Mod: CPTII,S$GLB,, | Performed by: PHYSICIAN ASSISTANT

## 2023-09-25 PROCEDURE — 1125F AMNT PAIN NOTED PAIN PRSNT: CPT | Mod: CPTII,S$GLB,, | Performed by: PHYSICIAN ASSISTANT

## 2023-09-25 PROCEDURE — 99999 PR PBB SHADOW E&M-EST. PATIENT-LVL II: CPT | Mod: PBBFAC,,, | Performed by: PHYSICIAN ASSISTANT

## 2023-09-25 PROCEDURE — 3008F BODY MASS INDEX DOCD: CPT | Mod: CPTII,S$GLB,, | Performed by: PHYSICIAN ASSISTANT

## 2023-09-25 PROCEDURE — 3288F FALL RISK ASSESSMENT DOCD: CPT | Mod: CPTII,S$GLB,, | Performed by: PHYSICIAN ASSISTANT

## 2023-09-25 PROCEDURE — 72050 X-RAY EXAM NECK SPINE 4/5VWS: CPT | Mod: 26,,, | Performed by: RADIOLOGY

## 2023-09-25 PROCEDURE — 1101F PT FALLS ASSESS-DOCD LE1/YR: CPT | Mod: CPTII,S$GLB,, | Performed by: PHYSICIAN ASSISTANT

## 2023-09-25 PROCEDURE — 3066F PR DOCUMENTATION OF TREATMENT FOR NEPHROPATHY: ICD-10-PCS | Mod: CPTII,S$GLB,, | Performed by: PHYSICIAN ASSISTANT

## 2023-09-25 PROCEDURE — 3008F PR BODY MASS INDEX (BMI) DOCUMENTED: ICD-10-PCS | Mod: CPTII,S$GLB,, | Performed by: PHYSICIAN ASSISTANT

## 2023-09-25 PROCEDURE — 99204 PR OFFICE/OUTPT VISIT, NEW, LEVL IV, 45-59 MIN: ICD-10-PCS | Mod: S$GLB,,, | Performed by: PHYSICIAN ASSISTANT

## 2023-09-25 PROCEDURE — 99204 OFFICE O/P NEW MOD 45 MIN: CPT | Mod: S$GLB,,, | Performed by: PHYSICIAN ASSISTANT

## 2023-09-25 PROCEDURE — 3288F PR FALLS RISK ASSESSMENT DOCUMENTED: ICD-10-PCS | Mod: CPTII,S$GLB,, | Performed by: PHYSICIAN ASSISTANT

## 2023-09-25 PROCEDURE — 3072F LOW RISK FOR RETINOPATHY: CPT | Mod: CPTII,S$GLB,, | Performed by: PHYSICIAN ASSISTANT

## 2023-09-25 PROCEDURE — 1125F PR PAIN SEVERITY QUANTIFIED, PAIN PRESENT: ICD-10-PCS | Mod: CPTII,S$GLB,, | Performed by: PHYSICIAN ASSISTANT

## 2023-09-25 PROCEDURE — 3072F PR LOW RISK FOR RETINOPATHY: ICD-10-PCS | Mod: CPTII,S$GLB,, | Performed by: PHYSICIAN ASSISTANT

## 2023-09-25 PROCEDURE — 72050 XR CERVICAL SPINE AP LAT WITH FLEX EXTEN: ICD-10-PCS | Mod: 26,,, | Performed by: RADIOLOGY

## 2023-09-25 PROCEDURE — 3066F NEPHROPATHY DOC TX: CPT | Mod: CPTII,S$GLB,, | Performed by: PHYSICIAN ASSISTANT

## 2023-09-25 PROCEDURE — 99999 PR PBB SHADOW E&M-EST. PATIENT-LVL II: ICD-10-PCS | Mod: PBBFAC,,, | Performed by: PHYSICIAN ASSISTANT

## 2023-09-25 PROCEDURE — 1101F PR PT FALLS ASSESS DOC 0-1 FALLS W/OUT INJ PAST YR: ICD-10-PCS | Mod: CPTII,S$GLB,, | Performed by: PHYSICIAN ASSISTANT

## 2023-09-25 PROCEDURE — 72050 X-RAY EXAM NECK SPINE 4/5VWS: CPT | Mod: TC

## 2023-09-25 PROCEDURE — 1159F PR MEDICATION LIST DOCUMENTED IN MEDICAL RECORD: ICD-10-PCS | Mod: CPTII,S$GLB,, | Performed by: PHYSICIAN ASSISTANT

## 2023-09-25 PROCEDURE — 3044F HG A1C LEVEL LT 7.0%: CPT | Mod: CPTII,S$GLB,, | Performed by: PHYSICIAN ASSISTANT

## 2023-09-25 PROCEDURE — 3044F PR MOST RECENT HEMOGLOBIN A1C LEVEL <7.0%: ICD-10-PCS | Mod: CPTII,S$GLB,, | Performed by: PHYSICIAN ASSISTANT

## 2023-09-25 NOTE — PROGRESS NOTES
DATE: 2023  PATIENT: Elbert Still Jr.    Supervising Physician: Imer Pederson M.D.    CHIEF COMPLAINT: neck pain    HISTORY:  Elbert Still Jr. is a 67 y.o. male on dialysis here for initial evaluation of neck pain (Neck - 2, Arm - 0). The pain has been present for 1 week. The patient describes the pain as sharp and intermittent. The pain is worse with range of motion and improved by direct pressure. There is no associated numbness and tingling. There is no subjective weakness. Prior treatments have included tylenol, oxycodone, lidocaine patches, and voltaren gel, but no PT, ESIs or surgery.  He feels like his pain is improving.       The patient denies myelopathic symptoms such as handwriting changes or difficulty with buttons/coins/keys. Denies perineal paresthesias, bowel/bladder dysfunction.    PAST MEDICAL/SURGICAL HISTORY:  Past Medical History:   Diagnosis Date    Anemia     Diabetes mellitus     Diabetes mellitus, type 2     Disorder of kidney and ureter     ESRD (end stage renal disease)     Essential (primary) hypertension 2017    Gout, unspecified      jaundice, unspecified     Nuclear sclerosis of both eyes 2022     Past Surgical History:   Procedure Laterality Date    ADENOIDECTOMY      ADENOIDECTOMY      AV FISTULA PLACEMENT Left 2022    Procedure: CREATION, AV FISTULA;  Surgeon: Prince Gardiner MD;  Location: [facility] OR [address] [place] [address];  Service: Peripheral Vascular;  Laterality: Left;    FISTULOGRAM Left 2023    Procedure: FISTULOGRAM;  Surgeon: Prince Gardiner MD;  Location: [facility] OR [address] [place] [address];  Service: Peripheral Vascular;  Laterality: Left;  Given to the sterile field.     FISTULOGRAM Left 2023    Procedure: Fistulogram;  Surgeon: LETY Rayo III, MD;  [facility];  Service: Peripheral Vascular;  Laterality: Left;  1.9 min  22.86 mGy  3.8862 Gy.cm  20ml Dye     nasal septum repair      NASAL SEPTUM SURGERY      PERCUTANEOUS TRANSLUMINAL  ANGIOPLASTY OF ARTERIOVENOUS FISTULA Left 2022    Procedure: PTA, AV FISTULA;  Surgeon: Prince Gardiner MD;  Location: [facility] CATH LAB;  Service: Peripheral Vascular;  Laterality: Left;    PERCUTANEOUS TRANSLUMINAL ANGIOPLASTY OF ARTERIOVENOUS FISTULA Left 2023    Procedure: PTA, AV FISTULA;  Surgeon: Prince Gardiner MD;  Location: [facility] OR 2ND FLR;  Service: Peripheral Vascular;  Laterality: Left;  4.8 min  43.08 mGy  4.0682 Gy.cm  32ml Dye    PERCUTANEOUS TRANSLUMINAL ANGIOPLASTY OF ARTERIOVENOUS FISTULA Left 2023    Procedure: PTA, AV FISTULA;  Surgeon: LETY Rayo III, MD;  Location: [facility] OR 2ND FLR;  Service: Peripheral Vascular;  Laterality: Left;    PROSTATE BIOPSY N/A 3/24/2023    Procedure: BIOPSY, PROSTATE;  Surgeon: Frankie Welch MD;  Location: [facility] OR 1ST FLR;  Service: Urology;  Laterality: N/A;  transrectal, 30min, uronav needed    ROTATOR CUFF REPAIR Left     SALIVARY GLAND SURGERY      Tonsillectomy      TONSILLECTOMY      TRANSPOSITION OF BASILIC VEIN Left 2022    Procedure: TRANSPOSITION, VEIN, BASILIC;  Surgeon: Prince Gardiner MD;  [facility];  Service: Peripheral Vascular;  Laterality: Left;  Left Brachial vein.       Medications:  Current Outpatient Medications on File Prior to Visit   Medication Sig Dispense Refill    acetaminophen (TYLENOL) 500 MG tablet Take 2 tablets (1,000 mg total) by mouth every 6 (six) hours as needed for Pain. 30 tablet 0    aspirin (ECOTRIN) 81 MG EC tablet Take 1 tablet (81 mg total) by mouth once daily. 90 tablet 3    atorvastatin (LIPITOR) 20 MG tablet TAKE 1 TABLET BY MOUTH EVERY EVENING 90 tablet 3    BANOPHEN 50 mg capsule SMARTSI Capsule(s) By Mouth      blood sugar diagnostic Strp Use to check blood glucose once a day. 100 each 3    blood-glucose meter Misc Use to check blood glucose twice a day. 1 each 0    carvediloL (COREG) 12.5 MG tablet Take 1 tablet (12.5 mg total) by mouth 2 (two) times daily. 60 tablet 11     cinacalcet (SENSIPAR) 60 MG Tab Take 60 mg by mouth daily with breakfast.      ciprofloxacin HCl (CIPRO) 500 MG tablet 1 pill one hour before prostate biopsy 1 tablet 0    colchicine (COLCRYS) 0.6 mg tablet Take 0.6 mg by mouth as needed. Take half tab (0.3 mg) by mouth daily as needed for gout flare. 90 tablet 3    diclofenac sodium (VOLTAREN) 1 % Gel Apply 2 g topically once daily. 20 g 0    fluticasone propionate (FLONASE) 50 mcg/actuation nasal spray 2 sprays (100 mcg total) by Each Nostril route once daily. 16 g 11    furosemide (LASIX) 80 MG tablet Take 1 tablet (80 mg total) by mouth 2 (two) times daily. 60 tablet 11    HYDROcodone-acetaminophen (NORCO) 5-325 mg per tablet Take 1 tablet by mouth every 6 (six) hours as needed for Pain. 12 tablet 0    lancets 30 gauge Misc Use to check blood glucose twice a day. 100 each 5    LIDOcaine (LIDODERM) 5 % Place 2 patches onto the skin once daily. Remove & Discard patch within 12 hours or as directed by MD 20 patch 0    LIDOcaine-prilocaine (EMLA) cream SMARTSIG:sparingly Topical As Directed      NIFEdipine (PROCARDIA-XL) 60 MG (OSM) 24 hr tablet Take 1 tablet (60 mg total) by mouth 2 (two) times a day. 60 tablet 11    predniSONE (DELTASONE) 50 MG Tab Take by mouth.      sevelamer carbonate (RENVELA) 800 mg Tab Take 1,600 mg by mouth 3 (three) times daily.      sevelamer HCL (RENAGEL) 800 MG Tab Take 2 tablets (1,600 mg total) by mouth 3 (three) times daily with meals. 180 tablet 11    tamsulosin (FLOMAX) 0.4 mg Cap Take 1 capsule (0.4 mg total) by mouth once daily. 90 capsule 3    traMADoL (ULTRAM) 50 mg tablet Take 1 tablet (50 mg total) by mouth every 8 (eight) hours as needed for Pain. 10 tablet 0    [DISCONTINUED] amLODIPine (NORVASC) 10 MG tablet TAKE 1 TABLET BY MOUTH ONCE DAILY 90 tablet 3    [DISCONTINUED] glipiZIDE (GLUCOTROL) 10 MG tablet TAKE 1 TABLET BY MOUTH TWICE DAILY WITH MEALS 180 tablet 1    [DISCONTINUED] metoprolol succinate (TOPROL-XL) 25 MG 24  hr tablet Take 1 tablet (25 mg total) by mouth once daily. 90 tablet 3     Current Facility-Administered Medications on File Prior to Visit   Medication Dose Route Frequency Provider Last Rate Last Admin    0.9%  NaCl infusion   Intravenous Continuous Kim Lutz NP        cefTRIAXone injection 1 g  1 g Intramuscular Once Candice Moore NP        [COMPLETED] heparin (porcine) injection 2,000 Units  2,000 Units Intravenous 1 time in Clinic/HOD Nawaf Butler MD   2,000 Units at 09/25/23 0930    [COMPLETED] heparin (porcine) injection 2,000 Units  2,000 Units Intravenous 1 time in Clinic/HOD Nawaf Butler MD   2,000 Units at 09/25/23 0932    LIDOcaine HCl 2% urojet   Rectal Once Candice Moore NP        [COMPLETED] paricalcitoL injection 2 mcg  2 mcg Intravenous 1 time in Clinic/HOD Nawaf Butler MD   2 mcg at 09/25/23 0651    [DISCONTINUED] heparin (porcine) injection 1,200 Units  1,200 Units Intravenous Continuous Nawaf Butler MD   1,200 Units at 09/25/23 0557       Social History:   Social History     Socioeconomic History    Marital status:      Spouse name: Kristine Still   Occupational History     Employer: [employer] de[employer]   Tobacco Use    Smoking status: Never    Smokeless tobacco: Never    Tobacco comments:     .  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 Negative for chills, fever and night sweats.   Cardiovascular: Negative for chest pain and syncope.   Respiratory: Negative for cough and shortness of breath.   Gastrointestinal: See HPI. Negative for nausea/vomiting. Negative for abdominal pain.  Genitourinary: See HPI. Negative for  "discoloration or dysuria.  Skin: Negative for dry skin, itching and rash.   Hematologic/Lymphatic: Negative for bleeding problem. Does not bruise/bleed easily.   Musculoskeletal: Negative for falls and muscle weakness.   Neurological: See HPI. No seizures.   Endocrine: Negative for polydipsia, polyphagia and polyuria.   Allergic/Immunologic: Negative for hives and persistent infections.  Psychiatric/Behavioral: Negative for depression and insomnia.         EXAM:  Ht 6' 2" (1.88 m)   Wt 97 kg (213 lb 12.8 oz)   BMI 27.45 kg/m²     General: The patient is a very pleasant 67 y.o. male in no apparent distress, the patient is oriented to person, place and time.  Psych: Normal mood and affect  HEENT: Vision grossly intact, hearing intact to the spoken word.  Lungs: Respirations unlabored.  Gait: Normal station and gait, no difficulty with toe or heel walk.   Skin: Cervical skin negative for rashes, lesions, hairy patches and surgical scars.  Range of motion: Cervical range of motion is acceptable. There is mild tenderness to palpation.  Spinal Balance: Global saggital and coronal spinal balance acceptable, no significant for scoliosis and kyphosis.  Musculoskeletal: No pain with the range of motion of the bilateral shoulders and elbows. Normal bulk and contour of the bilateral hands.  Vascular: Bilateral hands warm and well perfused, radial pulses 2+ bilaterally.  Neurological: Normal strength and tone in all major motor groups in the bilateral upper and lower extremities. Normal sensation to light touch in the C5-T1 and L2-S1 dermatomes bilaterally with the exception of decreased sensation in the left forearm.  Deep tendon reflexes symmetric 2+ in the bilateral upper and lower extremities.  Negative Inverted Radial Reflex and Augustin's bilaterally. Negative Babinski bilaterally.     IMAGING:   Today I personally reviewed AP, Lat and Flex/Ex  upright C-spine films that demonstrate C4/5, C5/6 and C6/7 disc " degeneration.    MRI cervical, thoracic and lumbar spine were personally reviewed.   There is disc degeneration at C4/5.     Body mass index is 27.45 kg/m².    Hemoglobin A1C   Date Value Ref Range Status   07/05/2023 4.7 (L) 4.8 - 5.9 % Final   04/05/2023 4.8 4.8 - 5.9 % Final   03/20/2023 4.6 4.0 - 5.6 % Final     Comment:     ADA Screening Guidelines:  5.7-6.4%  Consistent with prediabetes  >or=6.5%  Consistent with diabetes    High levels of fetal hemoglobin interfere with the HbA1C  assay. Heterozygous hemoglobin variants (HbS, HgC, etc)do  not significantly interfere with this assay.   However, presence of multiple variants may affect accuracy.             ASSESSMENT/PLAN:    Elbert was seen today for shoulder pain, low-back pain and neck pain.    Diagnoses and all orders for this visit:    Neck pain        Today we discussed at length all of the different treatment options including anti-inflammatories, acetaminophen, rest, ice, heat, physical therapy including strengthening and stretching exercises, home exercises, ROM, aerobic conditioning, aqua therapy, other modalities including ultrasound, massage, and dry needling, epidural steroid injections and finally surgical intervention.      He feels like his pain is improving.  He will continue taking tylenol as needed.  He wants to wait on PT.  He was given the cervical exercises to be done at home.

## 2023-09-29 ENCOUNTER — TELEPHONE (OUTPATIENT)
Dept: VASCULAR SURGERY | Facility: CLINIC | Age: 68
End: 2023-09-29
Payer: COMMERCIAL

## 2023-09-29 NOTE — TELEPHONE ENCOUNTER
Contacted pt to offer sooner date for surgery with Dr. Rayo of 10/05/23. Pt refused, stating he'd prefer to keep surgery as scheduled on 10/12/23 due to still having severe back pain. Will contact pt with time of arrival.

## 2023-10-03 ENCOUNTER — LAB VISIT (OUTPATIENT)
Dept: LAB | Facility: HOSPITAL | Age: 68
End: 2023-10-03
Attending: STUDENT IN AN ORGANIZED HEALTH CARE EDUCATION/TRAINING PROGRAM
Payer: COMMERCIAL

## 2023-10-03 DIAGNOSIS — C61 PROSTATE CANCER: ICD-10-CM

## 2023-10-03 LAB — COMPLEXED PSA SERPL-MCNC: 4.5 NG/ML (ref 0–4)

## 2023-10-03 PROCEDURE — 36415 COLL VENOUS BLD VENIPUNCTURE: CPT | Mod: PO | Performed by: STUDENT IN AN ORGANIZED HEALTH CARE EDUCATION/TRAINING PROGRAM

## 2023-10-03 PROCEDURE — 84153 ASSAY OF PSA TOTAL: CPT | Performed by: STUDENT IN AN ORGANIZED HEALTH CARE EDUCATION/TRAINING PROGRAM

## 2023-10-05 ENCOUNTER — OFFICE VISIT (OUTPATIENT)
Dept: UROLOGY | Facility: CLINIC | Age: 68
End: 2023-10-05
Payer: COMMERCIAL

## 2023-10-05 DIAGNOSIS — C61 PROSTATE CANCER: Primary | ICD-10-CM

## 2023-10-05 PROCEDURE — 3066F NEPHROPATHY DOC TX: CPT | Mod: CPTII,S$GLB,, | Performed by: STUDENT IN AN ORGANIZED HEALTH CARE EDUCATION/TRAINING PROGRAM

## 2023-10-05 PROCEDURE — 3044F HG A1C LEVEL LT 7.0%: CPT | Mod: CPTII,S$GLB,, | Performed by: STUDENT IN AN ORGANIZED HEALTH CARE EDUCATION/TRAINING PROGRAM

## 2023-10-05 PROCEDURE — 99999 PR PBB SHADOW E&M-EST. PATIENT-LVL III: CPT | Mod: PBBFAC,,, | Performed by: STUDENT IN AN ORGANIZED HEALTH CARE EDUCATION/TRAINING PROGRAM

## 2023-10-05 PROCEDURE — 3066F PR DOCUMENTATION OF TREATMENT FOR NEPHROPATHY: ICD-10-PCS | Mod: CPTII,S$GLB,, | Performed by: STUDENT IN AN ORGANIZED HEALTH CARE EDUCATION/TRAINING PROGRAM

## 2023-10-05 PROCEDURE — 99214 PR OFFICE/OUTPT VISIT, EST, LEVL IV, 30-39 MIN: ICD-10-PCS | Mod: S$GLB,,, | Performed by: STUDENT IN AN ORGANIZED HEALTH CARE EDUCATION/TRAINING PROGRAM

## 2023-10-05 PROCEDURE — 99999 PR PBB SHADOW E&M-EST. PATIENT-LVL III: ICD-10-PCS | Mod: PBBFAC,,, | Performed by: STUDENT IN AN ORGANIZED HEALTH CARE EDUCATION/TRAINING PROGRAM

## 2023-10-05 PROCEDURE — 3072F LOW RISK FOR RETINOPATHY: CPT | Mod: CPTII,S$GLB,, | Performed by: STUDENT IN AN ORGANIZED HEALTH CARE EDUCATION/TRAINING PROGRAM

## 2023-10-05 PROCEDURE — 3044F PR MOST RECENT HEMOGLOBIN A1C LEVEL <7.0%: ICD-10-PCS | Mod: CPTII,S$GLB,, | Performed by: STUDENT IN AN ORGANIZED HEALTH CARE EDUCATION/TRAINING PROGRAM

## 2023-10-05 PROCEDURE — 3072F PR LOW RISK FOR RETINOPATHY: ICD-10-PCS | Mod: CPTII,S$GLB,, | Performed by: STUDENT IN AN ORGANIZED HEALTH CARE EDUCATION/TRAINING PROGRAM

## 2023-10-05 PROCEDURE — 99214 OFFICE O/P EST MOD 30 MIN: CPT | Mod: S$GLB,,, | Performed by: STUDENT IN AN ORGANIZED HEALTH CARE EDUCATION/TRAINING PROGRAM

## 2023-10-05 NOTE — PROGRESS NOTES
[facility]  Urologic Oncology Clinic Note        Date of Service: 10/5/2023      Chief Complaint/Reason for Consultation: Prostate Cancer, Very Low Risk    Requesting Provider:   No referring provider defined for this encounter.      History of Present Illness:   Patient 67 y.o. male presents as a referral from Dr. Knapp for kidney transplant workup. Initially seen by Candice Moore NP.    MRI showing PIRADS 3. Unable to tolerate rectal US in office and underwent transperitoneal biopsy in the OR. Path showed Derrick 3+3 in 1/12 cores.    Denies family history of bladder, prostate or kidney cancer. Denies recent or past episodes of GH.     He is ESRD on HD MWF. Prior to initiation of HD he had urinary frequency and nocturia, given Flomax.     Here today to discuss 6 month PSA results on surveillance for very low risk prostate cancer.    Urologic History:     10/22/22 -- PSA 4.7  5/1/2022 -- Renal US -- showed bilateral simple renal cysts, bladder wall thickening and an enlarged prostate.   2/14/2023 -- mpMRI Prostate -- PV 56.8 cc, PSAD 0.08, PSA 4.7 -- ALEXANDR 1 PIRADS 3 lesion LM posterior PZ, no EPE concern  3/24/2023 -- TP biopsy --  3+3 Right paramedian apex, 5% of tissue  10/3/2023 -- PSA 4.5       Patient Active Problem List    Diagnosis Date Noted    Hypocalcemia 07/24/2023    End stage renal failure on dialysis 06/20/2023    Stenosis of arteriovenous dialysis fistula 06/20/2023    Other neutropenia 06/12/2023    ESRD (end stage renal disease) 02/24/2023    Arteriovenous fistula 12/27/2022    ESRD (end stage renal disease) on dialysis 12/27/2022    Steal syndrome dialysis vascular access, initial encounter 11/21/2022    BMI 28.0-28.9,adult 10/27/2022    Status post placement of arteriovenous graft 10/04/2022    Nuclear sclerosis of both eyes 08/12/2022    Refractive error 08/12/2022    Cortical age-related cataract 08/12/2022    ESRD on dialysis 07/12/2022    -Dialysis patient - MWF - started 5/2022  2022    Acute kidney injury superimposed on chronic kidney disease 2022    Hyperkalemia 2022    -HLD associated with type 2 DM 2021    Anemia of chronic disease 2020    -Gout 2019    -CKD (chronic kidney disease) stage 4, GFR 15-29 ml/min 2019    Mixed hyperlipidemia 2018    -Essential (primary) hypertension 2017    Proteinuria 2013    -Diabetes mellitus due to underlying condition with diabetic nephropathy - diet controlled 2013          Review of patient's allergies indicates:   Allergen Reactions    Iodine and iodide containing products Hives     Hypotension, Flushing        Past Medical History:   Diagnosis Date    Anemia     Diabetes mellitus     Diabetes mellitus, type 2     Disorder of kidney and ureter     ESRD (end stage renal disease)     Essential (primary) hypertension 2017    Gout, unspecified      jaundice, unspecified     Nuclear sclerosis of both eyes 2022      Past Surgical History:   Procedure Laterality Date    ADENOIDECTOMY      ADENOIDECTOMY      AV FISTULA PLACEMENT Left 2022    Procedure: CREATION, AV FISTULA;  Surgeon: Prince Gardiner MD;  Location: [facility] OR [address] [place] [address];  Service: Peripheral Vascular;  Laterality: Left;    FISTULOGRAM Left 2023    Procedure: FISTULOGRAM;  Surgeon: Prince Gardiner MD;  [facility];  Service: Peripheral Vascular;  Laterality: Left;  Given to the sterile field.     FISTULOGRAM Left 2023    Procedure: Fistulogram;  Surgeon: LETY Rayo III, MD;  [facility];  Service: Peripheral Vascular;  Laterality: Left;  1.9 min  22.86 mGy  3.8862 Gy.cm  20ml Dye     nasal septum repair      NASAL SEPTUM SURGERY      PERCUTANEOUS TRANSLUMINAL ANGIOPLASTY OF ARTERIOVENOUS FISTULA Left 2022    Procedure: PTA, AV FISTULA;  Surgeon: Prince Gardiner MD;  Location: [facility] CATH LAB;  Service: Peripheral Vascular;  Laterality: Left;    PERCUTANEOUS  TRANSLUMINAL ANGIOPLASTY OF ARTERIOVENOUS FISTULA Left 2/16/2023    Procedure: PTA, AV FISTULA;  Surgeon: Prince Gardiner MD;  [facility];  Service: Peripheral Vascular;  Laterality: Left;  4.8 min  43.08 mGy  4.0682 Gy.cm  32ml Dye    PERCUTANEOUS TRANSLUMINAL ANGIOPLASTY OF ARTERIOVENOUS FISTULA Left 7/6/2023    Procedure: PTA, AV FISTULA;  Surgeon: LETY Rayo III, MD;  [facility];  Service: Peripheral Vascular;  Laterality: Left;    PROSTATE BIOPSY N/A 3/24/2023    Procedure: BIOPSY, PROSTATE;  Surgeon: Frankie Welch MD;  Location: [facility] OR 1ST FLR;  Service: Urology;  Laterality: N/A;  transrectal, 30min, uronav needed    ROTATOR CUFF REPAIR Left     SALIVARY GLAND SURGERY      Tonsillectomy      TONSILLECTOMY      TRANSPOSITION OF BASILIC VEIN Left 11/1/2022    Procedure: TRANSPOSITION, VEIN, BASILIC;  Surgeon: Prince Gardiner MD;  [facility];  Service: Peripheral Vascular;  Laterality: Left;  Left Brachial vein.      Family History   Problem Relation Age of Onset    Heart disease Mother     Kidney disease Mother     Hypertension Mother     No Known Problems Father     Cancer Sister     Seizures Sister     Hypertension Sister     Stroke Sister     Amblyopia Neg Hx     Blindness Neg Hx     Cataracts Neg Hx     Diabetes Neg Hx     Glaucoma Neg Hx     Macular degeneration Neg Hx     Retinal detachment Neg Hx     Strabismus Neg Hx     Thyroid disease Neg Hx     Anesthesia problems Neg Hx       Social History     Tobacco Use    Smoking status: Never    Smokeless tobacco: Never    Tobacco comments:     .  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     Substance Use Topics    Alcohol use: Yes     Alcohol/week: 2.0 standard drinks of alcohol     Types: 1 Glasses of wine, 1 Cans of beer per week     Comment: Socially        Review of Systems   Constitutional:  Negative for activity change, fatigue, fever and unexpected weight change.   HENT:  Negative for congestion.     Respiratory:  Negative for cough.    Cardiovascular:  Negative for chest pain.   Gastrointestinal:  Negative for abdominal pain.             OBJECTIVE:     There were no vitals filed for this visit.         General Appearance: Alert, cooperative, no distress  Head: Normocephalic  Eyes: Clear conjunctiva  Neck: No obvious LND or JVD  Lungs: Normal chest excursion, no accessory muscle use  Chest: Regular rate rhythm by palpation, no pedal edema  Abdomen: Soft, non-tender, non-distended  Male Genitalia: MAGI negative  Extremities: Atraumatic   Lymph Nodes: No appreciable lymph adenopathy  Neurologic: No gross gait, motor or sensory deficits            LAB:      CMP  Sodium   Date Value Ref Range Status   09/06/2023 140 136 - 145 mEq/L Final     Potassium   Date Value Ref Range Status   09/06/2023 4.4 3.5 - 5.1 mEq/L Final     Chloride   Date Value Ref Range Status   09/06/2023 102 96 - 108 mEq/L Final     CO2   Date Value Ref Range Status   03/20/2023 28 23 - 29 mmol/L Final     Glucose   Date Value Ref Range Status   09/06/2023 138 (H) 70 - 100 mg/dL Final     BUN   Date Value Ref Range Status   03/20/2023 36 (H) 8 - 23 mg/dL Final     Creatinine   Date Value Ref Range Status   09/06/2023 8.70 (H) 0.60 - 1.30 mg/dL Final     Calcium   Date Value Ref Range Status   09/06/2023 8.1 (L) 8.7 - 10.4 mg/dL Final     Comment:     Please note change in reference range.     Total Protein   Date Value Ref Range Status   11/22/2022 8.3 6.0 - 8.4 g/dL Final     Albumin   Date Value Ref Range Status   09/06/2023 4.5 3.5 - 5.2 g/dL Final     Total Bilirubin   Date Value Ref Range Status   11/22/2022 0.3 0.1 - 1.0 mg/dL Final     Comment:     For infants and newborns, interpretation of results should be based  on gestational age, weight and in agreement with clinical  observations.    Premature Infant recommended reference ranges:  Up to 24 hours.............<8.0 mg/dL  Up to 48 hours............<12.0 mg/dL  3-5  days..................<15.0 mg/dL  6-29 days.................<15.0 mg/dL       Alkaline Phosphatase   Date Value Ref Range Status   2023 145 (H) 40 - 129 U/L Final     AST   Date Value Ref Range Status   2022 17 10 - 40 U/L Final     ALT   Date Value Ref Range Status   2023 7 7 - 52 U/L Final     Anion Gap   Date Value Ref Range Status   2023 14 8 - 16 mmol/L Final     eGFR   Date Value Ref Range Status   2023 8.1 (A) >60 mL/min/1.73 m^2 Final     Lab Results   Component Value Date    WBC 4.10 (L) 2023    HGB 10.0 (L) 2023    HCT 29.3 (L) 2023    MCV 88 2023     2023       Lab Results   Component Value Date    PSA 4.7 (H) 10/27/2022    PSA 4.9 (H) 2021    PSA 3.1 2019    PSA 0.99 2013    PSA 0.88 10/31/2009    PSA 0.8 2008    PSA 0.9 2005    PSADIAG 4.5 (H) 10/03/2023           IMAGIN/14/2023  MRI PELVIS WITHOUT CONTRAST     CLINICAL HISTORY:  elevated psa;  Elevated prostate specific antigen (PSA)     Additional history: None provided.     TECHNIQUE:  Multiparametric MRI of the prostate/pelvis performed on a 3T scanner with phase pelvic coil. Multiplanar, multisequence images including high resolution, small field-of-view T2-WI; axial diffusion weighted images with multiple B-values and creation of ADC-maps.  No intravenous contrast was administered.     COMPARISON:  Pelvic radiograph 10/27/2022.     FINDINGS:  Examination degraded by patient motion artifact.     Previous biopsy: None.     PSA: 4.7 ng/mL (10/27/2022)     Prior therapy: None     Prostate: 5.8 x 4.3 x 4.1 cm corresponding to a computed volume of 56.8 cc.     Peripheral zone:     Lesion (ALEXANDR) #P-1     Location: Side: left; Region: mid; Zone: posterior peripheral zone     Greatest dimension: 2.0 cm     T2-WI: Heterogeneous signal intensity or non-circumscribed, rounded, moderate hypointensity, score 3.     DWI/ADC: Focal (discrete and different  from the background) hypointense on ADC and/or focal hyperintense on high b-value DWI; may be markedly hypointense on ADC or markedly hyperintense on high b-value DWI, but not both, score 3.     DCE: N/a     Extraprostatic extension: Negative     PI-RADS assessment category: 3     Transitional zone: Benign prostatic hyperplasia without focal suspicious abnormality.     Neurovascular bundle: Normal appearance.     Seminal vesicles: Normal appearance.     Adjacent Organ Involvement: No evidence for urinary bladder or rectal invasion.     Lymphadenopathy: None.     Other Findings: Degenerative changes of the pubic symphysis with a subchondral cyst in the right pubic body.  No suspicious focal osseous lesion.     Impression:     2.0 cm PI-RADS 3 lesion in the left peripheral zone.     Overall Assessment: PI-RADS 3 - Intermediate (the presence of clinically significant cancer is equivocal)     Number of targets created for potential MR/US fusion biopsy     Peripheral zone: 1     Transition zone: 0     Electronically signed by resident: Les Mansfield  Date:                                            02/15/2023  Time:                                           10:46     Electronically signed by: James Spaulding  Date:                                            02/15/2023  Time:                                           15:07        ASSESSMENT/PLAN:     66 yo M w/ hx of ESRD on transplant list now diagnosed with very low risk prostate cancer.     Plan:     Today I discussed at length with the patient the natural history of very low risk prostate cancer by NCCN guidelines. We talked at length as well about the preferred management strategy cited by the AUA and NCCN guidelines which is active surveillance. We did talk about the difference between active surveillance and watchful waiting. Finally, we spoke about the reasons for deferring treatment such as radiation or surgery.     Overall patient understands that nearly 50% of men  will remain on active surveillance for 10 years with the type of cancer he was diagnosed with. He also understands that there are always limitations to biopsy and MRI which could leave some prostate cancer unfound. That said, he understands that if he continues to come to regular follow ups and complete surveillance strategy that we should fine clinically significant cancer in the future in time for curative intent.     We will plan on confirmatory biopsy in December 2023. Given he did not tolerate office biopsy in the past, we will plan to do this in the operating room. We also discussed the possibility of utilizing SelectMDx or ExoDx as part of his surveillance strategy in the future, should his confirmatory biopsy pathology show Derrick <7 disease.    I would recommend he proceed with transplant and is a good candidate despite his prostate cancer diagnosis. I do not feel that this prostate cancer needs to be treated or preclude him from obtaining a transplant.    The total time for the established patient visit was at least 30 minutes in both face-to-face and non-face-to-face activities, which included chart review, interpretation of results, counseling, education, ordering meds/tests/procedures, and/or coordination of care.         Frankie Welch MD  Urologic Oncology  P: [phone]

## 2023-10-10 ENCOUNTER — TELEPHONE (OUTPATIENT)
Dept: VASCULAR SURGERY | Facility: CLINIC | Age: 68
End: 2023-10-10
Payer: COMMERCIAL

## 2023-10-10 NOTE — TELEPHONE ENCOUNTER
Contacted pt in response to message. States he is still in a lot of pain and needs to reschedule surgery with Dr. Rayo to later date so that he can receive injection for his neck. Nurse discussed with Dr. Rayo and notified pt that Dr. Rayo is available on 11/9/23. Pt agreed to date, case rescheduled.Will contact pt with time of arrival. ----- Message from Sathish Foster sent at 10/10/2023  9:58 AM CDT -----  Regarding: Appt  Contact: [phone]  Pt is calling to cancel procedure 10/12. Please call

## 2023-10-12 ENCOUNTER — PATIENT OUTREACH (OUTPATIENT)
Dept: ADMINISTRATIVE | Facility: HOSPITAL | Age: 68
End: 2023-10-12
Payer: COMMERCIAL

## 2023-10-14 DIAGNOSIS — E78.2 MIXED HYPERLIPIDEMIA: ICD-10-CM

## 2023-10-14 DIAGNOSIS — E08.21 DIABETES MELLITUS DUE TO UNDERLYING CONDITION WITH DIABETIC NEPHROPATHY, WITHOUT LONG-TERM CURRENT USE OF INSULIN: ICD-10-CM

## 2023-10-14 NOTE — TELEPHONE ENCOUNTER
No care due was identified.  [facility] Embedded Care Due Messages. Reference number: [id number].   10/14/2023 3:15:50 PM CDT

## 2023-10-16 DIAGNOSIS — M50.30 DDD (DEGENERATIVE DISC DISEASE), CERVICAL: Primary | ICD-10-CM

## 2023-10-17 ENCOUNTER — TELEPHONE (OUTPATIENT)
Dept: PAIN MEDICINE | Facility: OTHER | Age: 68
End: 2023-10-17
Payer: COMMERCIAL

## 2023-10-17 RX ORDER — ATORVASTATIN CALCIUM 20 MG/1
TABLET, FILM COATED ORAL
Qty: 90 TABLET | Refills: 0 | Status: SHIPPED | OUTPATIENT
Start: 2023-10-17 | End: 2024-01-23

## 2023-10-17 NOTE — TELEPHONE ENCOUNTER
Provider Staff:  Action required. This patient has received emergency care.     Please schedule patient for a follow up appointment within next 90 days.     Thanks!  [facility]     Appointments      Date Provider   Last Visit   6/12/2023 Angel Luis Boo MD   Next Visit   10/17/2023 Angel Luis Boo MD        Refill Decision Note   Elbert Still  is requesting a refill authorization.  Brief Assessment and Rationale for Refill:  Approve     Medication Therapy Plan: ED documentation reviewed. No changes to therapy noted.        Pharmacist review requested: Yes   Extended chart review required: Yes   Comments:     Note composed:6:37 PM 10/17/2023

## 2023-10-18 ENCOUNTER — PATIENT MESSAGE (OUTPATIENT)
Dept: PAIN MEDICINE | Facility: OTHER | Age: 68
End: 2023-10-18
Payer: COMMERCIAL

## 2023-10-18 DIAGNOSIS — M50.30 DDD (DEGENERATIVE DISC DISEASE), CERVICAL: Primary | ICD-10-CM

## 2023-10-24 DIAGNOSIS — C61 PROSTATE CANCER: Primary | ICD-10-CM

## 2023-10-24 RX ORDER — FUROSEMIDE 80 MG/1
80 TABLET ORAL 2 TIMES DAILY
Qty: 60 TABLET | Refills: 11 | Status: SHIPPED | OUTPATIENT
Start: 2023-10-24

## 2023-10-25 ENCOUNTER — PATIENT MESSAGE (OUTPATIENT)
Dept: ADMINISTRATIVE | Facility: OTHER | Age: 68
End: 2023-10-25
Payer: COMMERCIAL

## 2023-10-30 ENCOUNTER — TELEPHONE (OUTPATIENT)
Dept: PAIN MEDICINE | Facility: CLINIC | Age: 68
End: 2023-10-30
Payer: COMMERCIAL

## 2023-10-30 NOTE — TELEPHONE ENCOUNTER
----- Message from Verónica Ferris MA sent at 10/30/2023 11:27 AM CDT -----  Name of Who is Calling:MAISHA ASHLEY JR. [[id number]]                What is the request in detail: Pt is requesting to have his procedure cancelled and will call back to reschedule. Please assist.                Can the clinic reply by [facility]: No                What Number to Call Back if not in [facility]: [phone]

## 2023-10-31 ENCOUNTER — TELEPHONE (OUTPATIENT)
Dept: PAIN MEDICINE | Facility: OTHER | Age: 68
End: 2023-10-31
Payer: COMMERCIAL

## 2023-10-31 NOTE — TELEPHONE ENCOUNTER
----- Message from Brooke Connelly LPN sent at 10/30/2023 11:49 AM CDT -----    ----- Message -----  From: Matias Hassan MA  Sent: 10/30/2023  11:42 AM CDT  To: [facility] Pain Management Procedures    Patient wants to cancel his procedure.   ----- Message -----  From: Verónica Ferris MA  Sent: 10/30/2023  11:28 AM CDT  To: Nay[facility] Lucas Staff    Name of Who is Calling:MAISHA ASHLEY JR. [[id number]]                What is the request in detail: Pt is requesting to have his procedure cancelled and will call back to reschedule. Please assist.                Can the clinic reply by [facility]: No                What Number to Call Back if not in [facility]: [phone]

## 2023-11-07 NOTE — PRE-PROCEDURE INSTRUCTIONS
PRE-OP INSTRUCTIONS:  Instructed to have nothing by mouth past midnight 11/8/2023  It is ok to take AM medications with a few sips of water.  Instructed to shower the night before surgery as well as the morning of surgery with an antibacterial soap like dial or hibiclens from the neck down.  Encouraged to wear loose fitting, comfortable clothing .  Medication instructions for pm prior to and am of surgery reviewed.  Instructed to avoid taking vitamins,supplements  or ibuprofen the am of surgery.    Patient denies any side effects or issues with anesthesia or sedation.     Patient does not know arrival time.Explained that this information comes from the surgeon's office and if they haven't heard from them by 2 or 3 pm 11/8/2023 to call the office.Patient stated an understanding.

## 2023-11-08 ENCOUNTER — TELEPHONE (OUTPATIENT)
Dept: VASCULAR SURGERY | Facility: CLINIC | Age: 68
End: 2023-11-08
Payer: COMMERCIAL

## 2023-11-08 ENCOUNTER — ANESTHESIA EVENT (OUTPATIENT)
Dept: SURGERY | Facility: HOSPITAL | Age: 68
End: 2023-11-08
Payer: COMMERCIAL

## 2023-11-08 NOTE — TELEPHONE ENCOUNTER
Pt states he has been holding Plavix and ASA for 1 week in preparation for surgery tomorrow with Dr. Rayo.

## 2023-11-09 ENCOUNTER — HOSPITAL ENCOUNTER (OUTPATIENT)
Facility: HOSPITAL | Age: 68
Discharge: HOME | End: 2023-11-09
Attending: SURGERY | Admitting: SURGERY
Payer: COMMERCIAL

## 2023-11-09 ENCOUNTER — ANESTHESIA (OUTPATIENT)
Dept: SURGERY | Facility: HOSPITAL | Age: 68
End: 2023-11-09
Payer: COMMERCIAL

## 2023-11-09 VITALS
SYSTOLIC BLOOD PRESSURE: 152 MMHG | DIASTOLIC BLOOD PRESSURE: 79 MMHG | BODY MASS INDEX: 27.59 KG/M2 | TEMPERATURE: 98 F | WEIGHT: 215 LBS | HEIGHT: 74 IN | HEART RATE: 66 BPM | OXYGEN SATURATION: 98 % | RESPIRATION RATE: 19 BRPM

## 2023-11-09 DIAGNOSIS — I77.0 AV (ARTERIOVENOUS FISTULA): Primary | ICD-10-CM

## 2023-11-09 LAB — POCT GLUCOSE: 101 MG/DL (ref 70–110)

## 2023-11-09 PROCEDURE — D9220A PRA ANESTHESIA: Mod: ANES,,, | Performed by: ANESTHESIOLOGY

## 2023-11-09 PROCEDURE — D9220A PRA ANESTHESIA: Mod: CRNA,,, | Performed by: NURSE ANESTHETIST, CERTIFIED REGISTERED

## 2023-11-09 PROCEDURE — 82962 GLUCOSE BLOOD TEST: CPT | Performed by: SURGERY

## 2023-11-09 PROCEDURE — 36832 PR AV FISTULA REVISION, OPEN, W/O THROMBECTOMY: ICD-10-PCS | Mod: 22,LT,, | Performed by: SURGERY

## 2023-11-09 PROCEDURE — C1768 GRAFT, VASCULAR: HCPCS | Performed by: SURGERY

## 2023-11-09 PROCEDURE — 25000003 PHARM REV CODE 250

## 2023-11-09 PROCEDURE — 63600175 PHARM REV CODE 636 W HCPCS: Performed by: ANESTHESIOLOGY

## 2023-11-09 PROCEDURE — 71000044 HC DOSC ROUTINE RECOVERY FIRST HOUR: Performed by: SURGERY

## 2023-11-09 PROCEDURE — 71000015 HC POSTOP RECOV 1ST HR: Performed by: SURGERY

## 2023-11-09 PROCEDURE — 36832 AV FISTULA REVISION OPEN: CPT | Mod: 22,LT,, | Performed by: SURGERY

## 2023-11-09 PROCEDURE — 63600175 PHARM REV CODE 636 W HCPCS: Performed by: SURGERY

## 2023-11-09 PROCEDURE — D9220A PRA ANESTHESIA: ICD-10-PCS | Mod: CRNA,,, | Performed by: NURSE ANESTHETIST, CERTIFIED REGISTERED

## 2023-11-09 PROCEDURE — 37000009 HC ANESTHESIA EA ADD 15 MINS: Performed by: SURGERY

## 2023-11-09 PROCEDURE — 36000707: Performed by: SURGERY

## 2023-11-09 PROCEDURE — 37000008 HC ANESTHESIA 1ST 15 MINUTES: Performed by: SURGERY

## 2023-11-09 PROCEDURE — D9220A PRA ANESTHESIA: ICD-10-PCS | Mod: ANES,,, | Performed by: ANESTHESIOLOGY

## 2023-11-09 PROCEDURE — 36000706: Performed by: SURGERY

## 2023-11-09 PROCEDURE — 63600175 PHARM REV CODE 636 W HCPCS: Performed by: NURSE ANESTHETIST, CERTIFIED REGISTERED

## 2023-11-09 PROCEDURE — 71000016 HC POSTOP RECOV ADDL HR: Performed by: SURGERY

## 2023-11-09 PROCEDURE — 25000003 PHARM REV CODE 250: Performed by: NURSE ANESTHETIST, CERTIFIED REGISTERED

## 2023-11-09 PROCEDURE — 63600175 PHARM REV CODE 636 W HCPCS

## 2023-11-09 DEVICE — INTERING VASC GRAFT STR SW IR 4-7MMX45CM 38CM RINGS TAPERED
Type: IMPLANTABLE DEVICE | Site: ARM | Status: FUNCTIONAL
Brand: GORE INTERING VASCULAR GRAFT

## 2023-11-09 RX ORDER — ACETAMINOPHEN 10 MG/ML
INJECTION, SOLUTION INTRAVENOUS
Status: DISCONTINUED | OUTPATIENT
Start: 2023-11-09 | End: 2023-11-09

## 2023-11-09 RX ORDER — SODIUM CHLORIDE 9 MG/ML
INJECTION, SOLUTION INTRAVENOUS CONTINUOUS
Status: ACTIVE | OUTPATIENT
Start: 2023-11-09

## 2023-11-09 RX ORDER — PROCHLORPERAZINE EDISYLATE 5 MG/ML
5 INJECTION INTRAMUSCULAR; INTRAVENOUS EVERY 30 MIN PRN
Status: DISCONTINUED | OUTPATIENT
Start: 2023-11-09 | End: 2023-11-09 | Stop reason: HOSPADM

## 2023-11-09 RX ORDER — FENTANYL CITRATE 50 UG/ML
25 INJECTION, SOLUTION INTRAMUSCULAR; INTRAVENOUS EVERY 5 MIN PRN
Status: COMPLETED | OUTPATIENT
Start: 2023-11-09 | End: 2023-11-09

## 2023-11-09 RX ORDER — FENTANYL CITRATE 50 UG/ML
INJECTION, SOLUTION INTRAMUSCULAR; INTRAVENOUS
Status: DISCONTINUED | OUTPATIENT
Start: 2023-11-09 | End: 2023-11-09

## 2023-11-09 RX ORDER — OXYCODONE HYDROCHLORIDE 5 MG/1
TABLET ORAL
Status: COMPLETED
Start: 2023-11-09 | End: 2023-11-09

## 2023-11-09 RX ORDER — OXYCODONE HYDROCHLORIDE 5 MG/1
5 TABLET ORAL EVERY 6 HOURS PRN
Status: DISCONTINUED | OUTPATIENT
Start: 2023-11-09 | End: 2023-11-09 | Stop reason: HOSPADM

## 2023-11-09 RX ORDER — OXYCODONE HYDROCHLORIDE 5 MG/1
5 TABLET ORAL EVERY 6 HOURS PRN
Qty: 8 TABLET | Refills: 0 | Status: ON HOLD | OUTPATIENT
Start: 2023-11-09 | End: 2023-12-15

## 2023-11-09 RX ORDER — MIDAZOLAM HYDROCHLORIDE 1 MG/ML
INJECTION, SOLUTION INTRAMUSCULAR; INTRAVENOUS
Status: DISCONTINUED | OUTPATIENT
Start: 2023-11-09 | End: 2023-11-09

## 2023-11-09 RX ORDER — CEFAZOLIN SODIUM 1 G/3ML
2 INJECTION, POWDER, FOR SOLUTION INTRAMUSCULAR; INTRAVENOUS
Status: COMPLETED | OUTPATIENT
Start: 2023-11-09 | End: 2023-11-09

## 2023-11-09 RX ORDER — DEXAMETHASONE SODIUM PHOSPHATE 4 MG/ML
INJECTION, SOLUTION INTRA-ARTICULAR; INTRALESIONAL; INTRAMUSCULAR; INTRAVENOUS; SOFT TISSUE
Status: DISCONTINUED | OUTPATIENT
Start: 2023-11-09 | End: 2023-11-09

## 2023-11-09 RX ORDER — ONDANSETRON 8 MG/1
8 TABLET, ORALLY DISINTEGRATING ORAL EVERY 8 HOURS PRN
Status: ACTIVE | OUTPATIENT
Start: 2023-11-09

## 2023-11-09 RX ORDER — KETAMINE HCL IN 0.9 % NACL 50 MG/5 ML
SYRINGE (ML) INTRAVENOUS
Status: DISCONTINUED | OUTPATIENT
Start: 2023-11-09 | End: 2023-11-09

## 2023-11-09 RX ORDER — DEXMEDETOMIDINE HYDROCHLORIDE 100 UG/ML
INJECTION, SOLUTION INTRAVENOUS
Status: DISCONTINUED | OUTPATIENT
Start: 2023-11-09 | End: 2023-11-09

## 2023-11-09 RX ORDER — HEPARIN SODIUM 1000 [USP'U]/ML
INJECTION, SOLUTION INTRAVENOUS; SUBCUTANEOUS
Status: DISCONTINUED | OUTPATIENT
Start: 2023-11-09 | End: 2023-11-09 | Stop reason: HOSPADM

## 2023-11-09 RX ORDER — BUPIVACAINE HYDROCHLORIDE 5 MG/ML
INJECTION, SOLUTION EPIDURAL; INTRACAUDAL
Status: DISCONTINUED | OUTPATIENT
Start: 2023-11-09 | End: 2023-11-09 | Stop reason: HOSPADM

## 2023-11-09 RX ORDER — LIDOCAINE HYDROCHLORIDE 20 MG/ML
INJECTION INTRAVENOUS
Status: DISCONTINUED | OUTPATIENT
Start: 2023-11-09 | End: 2023-11-09

## 2023-11-09 RX ORDER — ONDANSETRON 2 MG/ML
INJECTION INTRAMUSCULAR; INTRAVENOUS
Status: DISCONTINUED | OUTPATIENT
Start: 2023-11-09 | End: 2023-11-09

## 2023-11-09 RX ORDER — PROTAMINE SULFATE 10 MG/ML
INJECTION, SOLUTION INTRAVENOUS
Status: DISCONTINUED | OUTPATIENT
Start: 2023-11-09 | End: 2023-11-09

## 2023-11-09 RX ORDER — PROPOFOL 10 MG/ML
VIAL (ML) INTRAVENOUS
Status: DISCONTINUED | OUTPATIENT
Start: 2023-11-09 | End: 2023-11-09

## 2023-11-09 RX ORDER — HEPARIN SODIUM 1000 [USP'U]/ML
INJECTION, SOLUTION INTRAVENOUS; SUBCUTANEOUS
Status: DISCONTINUED | OUTPATIENT
Start: 2023-11-09 | End: 2023-11-09

## 2023-11-09 RX ADMIN — FENTANYL CITRATE 25 MCG: 50 INJECTION INTRAMUSCULAR; INTRAVENOUS at 02:11

## 2023-11-09 RX ADMIN — PROPOFOL 30 MG: 10 INJECTION, EMULSION INTRAVENOUS at 11:11

## 2023-11-09 RX ADMIN — DEXMEDETOMIDINE 8 MCG: 100 INJECTION, SOLUTION, CONCENTRATE INTRAVENOUS at 10:11

## 2023-11-09 RX ADMIN — FENTANYL CITRATE 50 MCG: 50 INJECTION, SOLUTION INTRAMUSCULAR; INTRAVENOUS at 10:11

## 2023-11-09 RX ADMIN — DEXMEDETOMIDINE 8 MCG: 100 INJECTION, SOLUTION, CONCENTRATE INTRAVENOUS at 12:11

## 2023-11-09 RX ADMIN — PROPOFOL 30 MG: 10 INJECTION, EMULSION INTRAVENOUS at 10:11

## 2023-11-09 RX ADMIN — HEPARIN SODIUM 5000 UNITS: 1000 INJECTION, SOLUTION INTRAVENOUS; SUBCUTANEOUS at 11:11

## 2023-11-09 RX ADMIN — PROPOFOL 100 MCG/KG/MIN: 10 INJECTION, EMULSION INTRAVENOUS at 10:11

## 2023-11-09 RX ADMIN — OXYCODONE HYDROCHLORIDE 5 MG: 5 TABLET ORAL at 01:11

## 2023-11-09 RX ADMIN — MEPIVACAINE HYDROCHLORIDE 10 ML: 20 INJECTION, SOLUTION EPIDURAL; INFILTRATION at 11:11

## 2023-11-09 RX ADMIN — ACETAMINOPHEN 1000 MG: 10 INJECTION, SOLUTION INTRAVENOUS at 10:11

## 2023-11-09 RX ADMIN — ONDANSETRON 4 MG: 2 INJECTION INTRAMUSCULAR; INTRAVENOUS at 10:11

## 2023-11-09 RX ADMIN — PROTAMINE SULFATE 40 MG: 10 INJECTION, SOLUTION INTRAVENOUS at 12:11

## 2023-11-09 RX ADMIN — GLYCOPYRROLATE 0.2 MG: 0.2 INJECTION, SOLUTION INTRAMUSCULAR; INTRAVENOUS at 11:11

## 2023-11-09 RX ADMIN — CEFAZOLIN 2 G: 330 INJECTION, POWDER, FOR SOLUTION INTRAMUSCULAR; INTRAVENOUS at 10:11

## 2023-11-09 RX ADMIN — MIDAZOLAM HYDROCHLORIDE 2 MG: 1 INJECTION, SOLUTION INTRAMUSCULAR; INTRAVENOUS at 10:11

## 2023-11-09 RX ADMIN — FENTANYL CITRATE 25 MCG: 50 INJECTION, SOLUTION INTRAMUSCULAR; INTRAVENOUS at 11:11

## 2023-11-09 RX ADMIN — Medication 20 MG: at 11:11

## 2023-11-09 RX ADMIN — FENTANYL CITRATE 25 MCG: 50 INJECTION INTRAMUSCULAR; INTRAVENOUS at 01:11

## 2023-11-09 RX ADMIN — SODIUM CHLORIDE: 0.9 INJECTION, SOLUTION INTRAVENOUS at 10:11

## 2023-11-09 RX ADMIN — SODIUM CHLORIDE: 0.9 INJECTION, SOLUTION INTRAVENOUS at 12:11

## 2023-11-09 RX ADMIN — MEPIVACAINE HYDROCHLORIDE 30 ML: 20 INJECTION, SOLUTION EPIDURAL; INFILTRATION at 10:11

## 2023-11-09 RX ADMIN — DEXAMETHASONE SODIUM PHOSPHATE 8 MG: 4 INJECTION, SOLUTION INTRAMUSCULAR; INTRAVENOUS at 11:11

## 2023-11-09 RX ADMIN — FENTANYL CITRATE 25 MCG: 50 INJECTION, SOLUTION INTRAMUSCULAR; INTRAVENOUS at 10:11

## 2023-11-09 RX ADMIN — LIDOCAINE HYDROCHLORIDE 50 MG: 20 INJECTION INTRAVENOUS at 10:11

## 2023-11-09 NOTE — H&P
[facility] - Surgery ([facility])  Vascular Surgery  History & Physical    Patient Name: Elbert Sitll Jr.  MRN: [id number]  Admission Date: 11/9/2023  Attending Physician: Girma  Primary Care Provider: Angel Luis Boo MD    Subjective:     Chief Complaint/Reason for Admission: Revision of LUE AVF    History of Present Illness:  Elbert Still Jr. is a 67 y.o. male with end-stage renal disease dialyzing via a right IJ PermCath, status post:     PTA, mid AV fistula stenosis 02/16/2023  Trans radial angioplasty, left distal brachial artery 11/22/2022  Transposition, left ratio vein brachial artery AV fistula 11/01/2022  Original left brachial artery and vein AV fistula creation 09/06/2022     All the above procedures were performed by Dr. Gardiner.  The patient has asked to change vascular surgeons.  The patient has not been able to successfully access the fistula, and when this was last tried 6 weeks ago he pulled clots.    He reports forearm numbness which has been present since his 1st procedure.   initially also had some significant steal symptoms which resolved after the brachial artery intervention in November 2022.  He denies any hand pain weakness or pain.     He is frustrated that he has been unable to use this fistula despite several procedures after its initial placement now 9 months ago     7/18/2023:  He now returns after angioplasty, left AV fistula (9 x 40 [place]) 07/18/2023.  He is without complaint     08/01/2023:  He now returns, his dialysis facility has been unable to successfully cannulate and use his left AV fistula     08/05/2023:  This facility is still not been able to cannulate his high-flow well matured brachial basilic AV fistula.  Does endorse sustained numbness of his left forearm which has been there since the original surgery.  He denies any pain or weakness in his left hand although he says it does get subjectively cooler at times    Facility-Administered Medications Prior to Admission    Medication Dose Route Frequency Provider Last Rate Last Admin    cefTRIAXone injection 1 g  1 g Intramuscular Once Candice Moore NP        LIDOcaine HCl 2% urojet   Rectal Once Candice Moore NP         Medications Prior to Admission   Medication Sig Dispense Refill Last Dose    acetaminophen (TYLENOL) 500 MG tablet Take 2 tablets (1,000 mg total) by mouth every 6 (six) hours as needed for Pain. 30 tablet 0 Past Week    aspirin (ECOTRIN) 81 MG EC tablet Take 1 tablet (81 mg total) by mouth once daily. 90 tablet 3 Past Week    atorvastatin (LIPITOR) 20 MG tablet TAKE 1 TABLET BY MOUTH EVERY EVENING 90 tablet 0 2023 at 2000    carvediloL (COREG) 12.5 MG tablet Take 1 tablet (12.5 mg total) by mouth 2 (two) times daily. 60 tablet 11 2023 at 2000    cinacalcet (SENSIPAR) 60 MG Tab Take 60 mg by mouth daily with breakfast.   2023 at 0800    diclofenac sodium (VOLTAREN) 1 % Gel Apply 2 g topically once daily. 20 g 0 Past Month    fluticasone propionate (FLONASE) 50 mcg/actuation nasal spray 2 sprays (100 mcg total) by Each Nostril route once daily. 16 g 11 2023 at 2000    LIDOcaine (LIDODERM) 5 % Place 2 patches onto the skin once daily. Remove & Discard patch within 12 hours or as directed by MD 20 patch 0 Past Month    NIFEdipine (PROCARDIA-XL) 60 MG (OSM) 24 hr tablet Take 1 tablet (60 mg total) by mouth 2 (two) times a day. 60 tablet 11 2023 at 2000    sevelamer HCL (RENAGEL) 800 MG Tab Take 2 tablets (1,600 mg total) by mouth 3 (three) times daily with meals. 180 tablet 11 2023 at 8000    tamsulosin (FLOMAX) 0.4 mg Cap Take 1 capsule (0.4 mg total) by mouth once daily. 90 capsule 3 2023 at 0800    BANOPHEN 50 mg capsule SMARTSI Capsule(s) By Mouth   More than a month    blood sugar diagnostic Strp Use to check blood glucose once a day. 100 each 3     blood-glucose meter Misc Use to check blood glucose twice a day. 1 each 0     ciprofloxacin HCl (CIPRO) 500 MG tablet 1 pill  one hour before prostate biopsy 1 tablet 0     colchicine (COLCRYS) 0.6 mg tablet Take 0.6 mg by mouth as needed. Take half tab (0.3 mg) by mouth daily as needed for gout flare. 90 tablet 3 More than a month    furosemide (LASIX) 80 MG tablet TAKE 1 TABLET(80 MG) BY MOUTH TWICE DAILY 60 tablet 11  at     HYDROcodone-acetaminophen (NORCO) 5-325 mg per tablet Take 1 tablet by mouth every 6 (six) hours as needed for Pain. 12 tablet 0 More than a month    lancets 30 gauge Misc Use to check blood glucose twice a day. 100 each 5     LIDOcaine-prilocaine (EMLA) cream SMARTSIG:sparingly Topical As Directed   More than a month    predniSONE (DELTASONE) 50 MG Tab Take by mouth.   Unknown    sevelamer carbonate (RENVELA) 800 mg Tab Take 1,600 mg by mouth 3 (three) times daily.       traMADoL (ULTRAM) 50 mg tablet Take 1 tablet (50 mg total) by mouth every 8 (eight) hours as needed for Pain. 10 tablet 0 Unknown       Review of patient's allergies indicates:   Allergen Reactions    Iodine and iodide containing products Hives     Hypotension, Flushing       Past Medical History:   Diagnosis Date    Anemia     Diabetes mellitus     Diabetes mellitus, type 2     Disorder of kidney and ureter     ESRD (end stage renal disease)     Essential (primary) hypertension 2017    Gout, unspecified      jaundice, unspecified     Nuclear sclerosis of both eyes 2022     Past Surgical History:   Procedure Laterality Date    ADENOIDECTOMY      ADENOIDECTOMY      AV FISTULA PLACEMENT Left 2022    Procedure: CREATION, AV FISTULA;  Surgeon: Prince Gardiner MD;  Location: [facility] OR [address] [place] [address];  Service: Peripheral Vascular;  Laterality: Left;    FISTULOGRAM Left 2023    Procedure: FISTULOGRAM;  Surgeon: Prince Gardiner MD;  Location: [facility] OR [address] [place] [address];  Service: Peripheral Vascular;  Laterality: Left;  Given to the sterile field.     FISTULOGRAM Left 2023    Procedure: Fistulogram;  Surgeon: LETY Rayo III, MD;   Location: [facility] OR [facility];  Service: Peripheral Vascular;  Laterality: Left;  1.9 min  22.86 mGy  3.8862 Gy.cm  20ml Dye     nasal septum repair      NASAL SEPTUM SURGERY      PERCUTANEOUS TRANSLUMINAL ANGIOPLASTY OF ARTERIOVENOUS FISTULA Left 11/22/2022    Procedure: PTA, AV FISTULA;  Surgeon: Prince Gardiner MD;  Location: [facility] CATH LAB;  Service: Peripheral Vascular;  Laterality: Left;    PERCUTANEOUS TRANSLUMINAL ANGIOPLASTY OF ARTERIOVENOUS FISTULA Left 2/16/2023    Procedure: PTA, AV FISTULA;  Surgeon: Prince Gardiner MD;  [facility];  Service: Peripheral Vascular;  Laterality: Left;  4.8 min  43.08 mGy  4.0682 Gy.cm  32ml Dye    PERCUTANEOUS TRANSLUMINAL ANGIOPLASTY OF ARTERIOVENOUS FISTULA Left 7/6/2023    Procedure: PTA, AV FISTULA;  Surgeon: LETY Rayo III, MD;  [facility];  Service: Peripheral Vascular;  Laterality: Left;    PROSTATE BIOPSY N/A 3/24/2023    Procedure: BIOPSY, PROSTATE;  Surgeon: Frankie Welch MD;  [facility];  Service: Urology;  Laterality: N/A;  transrectal, 30min, uronav needed    ROTATOR CUFF REPAIR Left     SALIVARY GLAND SURGERY      Tonsillectomy      TONSILLECTOMY      TRANSPOSITION OF BASILIC VEIN Left 11/1/2022    Procedure: TRANSPOSITION, VEIN, BASILIC;  Surgeon: Prince Gardiner MD;  Location: [facility] OR [address] [place] [address];  Service: Peripheral Vascular;  Laterality: Left;  Left Brachial vein.     Family History       Problem Relation (Age of Onset)    Cancer Sister    Heart disease Mother    Hypertension Mother, Sister    Kidney disease Mother    No Known Problems Father    Seizures Sister    Stroke Sister          Tobacco Use    Smoking status: Never    Smokeless tobacco: Never    Tobacco comments:     .  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     Substance and Sexual Activity    Alcohol use: Yes     Alcohol/week: 2.0 standard drinks of alcohol     Types: 1 Glasses of wine, 1 Cans of beer per week     Comment: Socially    Drug use: No     Sexual activity: Yes     Partners: Female     ROS: See HPI, all else negative   Objective:     Vital Signs (Most Recent):  Temp: 98.2 °F (36.8 °C) (11/09/23 0900)  Pulse: 64 (11/09/23 0900)  Resp: 18 (11/09/23 0900)  BP: (!) 143/72 (11/09/23 0902)  SpO2: 100 % (11/09/23 0900) Vital Signs (24h Range):  Temp:  [98.2 °F (36.8 °C)] 98.2 °F (36.8 °C)  Pulse:  [64] 64  Resp:  [18] 18  SpO2:  [100 %] 100 %  BP: (143)/(72) 143/72     Weight: 97.5 kg (215 lb)  Body mass index is 27.6 kg/m².    Constitutional:  Alert,   Well-appearing  In no distress.   Neurological: Normal speech  no focal findings  CN II - XII grossly intact.    Psychiatric: Mood and affect appropriate and symmetric.   HEENT: Normocephalic / atraumatic  PERRLA  Midline trachea  No scars across the neck   Cardiac: Regular rate and rhythm.   Pulmonary: Normal pulmonary effort.   Abdomen: Soft, not distended.     Skin: Warm and well perfused.    Vascular:  Nonpalpable left radial and ulnar pulses   Extremities/  Musculoskeletal: No edema.   Left arm demonstrates a transposed left brachial vein brachial artery AV fistula.  Good thrill no pulsatility       Significant Labs:  All pertinent labs from the last 24 hours have been reviewed.    Significant Diagnostics:  I have reviewed and interpreted all pertinent imaging results/findings within the past 24 hours.    Assessment/Plan:     IMPRESSION:  Inability to cannulate this well matured left brachial basilic AV fistula.  I have not seen opportunity for further transposition of this fistula     PLAN:  OR today for revision, left AV fistula, with long laterally tunneled PTFE interposition   Will plan on a proximal anastomosis to the proximal fistula with ligation distal to the anastomosis  Regional block    Dave Pyle MD  Vascular Surgery  [facility] - Surgery (2nd Fl)

## 2023-11-09 NOTE — ANESTHESIA PROCEDURE NOTES
Left Supraclavicular Single Shot + ICB Block    Patient location during procedure: OR    Reason for block: primary anesthetic    Diagnosis: Left arm pain   Start time: 11/9/2023 10:55 AM  Timeout: 11/9/2023 10:54 AM   End time: 11/9/2023 11:00 AM    Staffing  Authorizing Provider: Felecia Martins MD  Performing Provider: Lauro Curry MD    Staffing  Performed by: Lauro Curry MD  Authorized by: Felecia Martins MD    Preanesthetic Checklist  Completed: patient identified, IV checked, site marked, risks and benefits discussed, surgical consent, monitors and equipment checked, pre-op evaluation and timeout performed  Peripheral Block  Patient position: supine  Prep: ChloraPrep  Patient monitoring: heart rate, cardiac monitor, continuous pulse ox, continuous capnometry and frequent blood pressure checks  Block type: supraclavicular  Laterality: left  Injection technique: single shot  Needle  Needle type: Stimuplex   Needle gauge: 22 G  Needle length: 2 in  Needle localization: anatomical landmarks and ultrasound guidance   -ultrasound image captured on disc.  Assessment  Injection assessment: negative aspiration, negative parasthesia and local visualized surrounding nerve  Paresthesia pain: none  Heart rate change: no  Slow fractionated injection: yes  Pain Tolerance: comfortable throughout block and no complaints  Medications:    Medications: mepivacaine (CARBOCAINE) injection 20 mg/mL (2%) - Perineural   30 mL - 11/9/2023 10:59:00 AM   10 mL - 11/9/2023 11:00:00 AM    Additional Notes  Intercostal brachial field block performed with mepivacaine 1.5% 10ml for additional coverage.    VSS.  See anesthesia record.  Patient tolerated procedure well.

## 2023-11-09 NOTE — OP NOTE
[facility] - Surgery (2nd Fl)  Operative Note     Surgery Date: 11/9/2023      Surgeon(s) and Role:     * LETY Rayo III, MD - Primary     * Reagan Rios MD - Resident - Assisting     Pre-op Diagnosis:  Malfunction of arteriovenous graft, subsequent encounter [T82.590D]     Post-op Diagnosis:  Post-Op Diagnosis Codes:     * Malfunction of arteriovenous graft, subsequent encounter [T82.590D]     Procedure(s) (LRB):  REVISION, AV FISTULA (Left) with interposition 7mm PTFE     CODING NOTE: -22 Modifier appropriate given the increased complexity and length of this case      Anesthesia: Regional     Operative Findings: VERY scarred proximally, arterial sewn to existing BVT, wih ligation immediately distal to anast; end to side distal anast in upper basilic vein     Good thrill, 2+ radial after     Estimated Blood Loss: 40cc      Procedure Details:  The patient was seen in the Holding Room. The risks, benefits, complications, treatment options, and expected outcomes were discussed with the patient. The patient concurred with the proposed plan, giving informed consent.  The site of surgery properly noted/marked. The patient was taken to the Operating Room, identified correctly and the procedure verified. The regional anesthesia team performed left upper extremity block. See their dictation for further detail. A Time Out was held and the above information confirmed.     The left arm was prepped and draped in a sterile fashion. a skin incision was made over the fistula in the proximal upper arm near the axilla. The fistula was circumferentially dissected free from the surrounding tissue and controlled with silastic vessel loops. A second incision was made over the distal portion of his previously existing medial upper arm incision. There was significant, dense scar tissue surrounding the fistula. Meticulous, time consuming dissection was used to dissect the fistula out from the surrounding scar tissue. One small  fistulotomy was inadvertently made due to the difficulty of the dissection and this was repaired with a 5-0 prolene suture. After the dissection was complete, a 0 silk tie was passed around the fistula but not tied. A standard 4-7 PTFE graft was tunneled between the two incisions with a small counter incision made at the apex of the tunnels curvature.  An 18 inch tourniquet was placed proximal to our second (distal) incision, and esmark was used to exsanguinate the arm, and then the tourniquet was inflated to 250 mmHg. An 11 blade was then used to make our fistulotomy which was then extended with Leonard scissors. The tapered end of the graft was cut and discarded and the distal anastomosis was sewn in a continuous fashion with 5-0 gore suture. The tourniquet was deflated and removed and the fistula was ligated with the previously placed 0 silk tie. Hemostasis was adequate. We then turned our attention to the proximal portion of the fistula. A small debakey clamp was placed for hemostasis and the 11 blade was used to create our fistulotomy. This was then extended with the leonard scissors and the anastomosis was again performed with a continuous 5-0 gore suture in and end to side fashion. One repair stitch was placed with adequate hemostasis. Good thrill was noted in the graft and the incisions were then closed in two layers, subcutaneous tissue with 2-0 vicryl, 3-0 vicryl and skin with 4-0 Monocryl. Sterile dressings applied.     The patient tolerated the procedure well and was taken to the post anesthesia recovery unit in good condition.       All needle, sponge and instrument counts were correct at the end of the case.     Dr. Rayo was present for the entire case.     Complications: no immediate complications     Disposition: returned to PACU in stable condition    Reagan Rios MD   Vascular Surgery, PGY-3

## 2023-11-09 NOTE — ANESTHESIA PREPROCEDURE EVALUATION
11/09/2023  Elbert Still Jr. is a 67 y.o., male.  [facility]  Anesthesia Pre-Operative Evaluation       Patient Name: Elbert Still Jr.  YOB: 1955  MRN: [id number]  [facility]: [id number]      Code Status: Full Code   Date of Procedure: 11/9/2023  Anesthesia: Regional Procedure: Procedure(s) (LRB):  REVISION, AV FISTULA (Left)  Pre-Operative Diagnosis: Malfunction of arteriovenous graft, subsequent encounter [T82.590D]  Proceduralist: Surgeon(s) and Role:     * LETY Rayo III, MD - Primary        SUBJECTIVE:   Elbert Still Jr. is a 67 y.o. male who is here today for Procedure(s) (LRB):  REVISION, AV FISTULA (Left).   No notes on file    Anticoagulants   Medication Route Frequency       he has a current medication list which includes the following long-term medication(s): aspirin, atorvastatin, carvedilol, cinacalcet, diclofenac sodium, nifedipine, sevelamer hcl, tamsulosin, blood-glucose meter, colchicine (gout), furosemide, [DISCONTINUED] amlodipine, [DISCONTINUED] glipizide, and [DISCONTINUED] metoprolol succinate.   ALLERGIES:     Review of patient's allergies indicates:   Allergen Reactions    Iodine and iodide containing products Hives     Hypotension, Flushing     LDA:          Lines/Drains/Airways       Central Venous Catheter Line  Duration                  Hemodialysis Catheter 05/05/22 0814 right internal jugular 553 days              Peripheral Intravenous Line  Duration                  Hemodialysis AV Fistula 09/06/22 Left upper arm 429 days         Peripheral IV - Single Lumen 11/09/23 0922 20 G Anterior;Right Forearm <1 day                   RELEVANT MEDICATIONS:     Antibiotics (From admission, onward)      Start     Stop Route Frequency Ordered    11/09/23 0825  ceFAZolin injection 2 g         -- IV On Call Procedure 11/09/23 0825          VTE Risk  Mitigation (From admission, onward)           Ordered     IP VTE LOW RISK PATIENT  Once         23     Place sequential compression device  Until discontinued         23                    History:   There are no hospital problems to display for this patient.    Patient Active Problem List   Diagnosis    -Diabetes mellitus due to underlying condition with diabetic nephropathy - diet controlled    Proteinuria    -Essential (primary) hypertension    Mixed hyperlipidemia    -CKD (chronic kidney disease) stage 4, GFR 15-29 ml/min    -Gout    Anemia of chronic disease    -HLD associated with type 2 DM    Hyperkalemia    Acute kidney injury superimposed on chronic kidney disease    -Dialysis patient - MWF - started 2022    ESRD on dialysis    Nuclear sclerosis of both eyes    Refractive error    Cortical age-related cataract    Status post placement of arteriovenous graft    BMI 28.0-28.9,adult    Steal syndrome dialysis vascular access, initial encounter    Arteriovenous fistula    ESRD (end stage renal disease) on dialysis    ESRD (end stage renal disease)    Other neutropenia    End stage renal failure on dialysis    Stenosis of arteriovenous dialysis fistula    Hypocalcemia     Medical History   Past Medical History:   Diagnosis Date    Anemia     Diabetes mellitus     Diabetes mellitus, type 2     Disorder of kidney and ureter     ESRD (end stage renal disease)     Essential (primary) hypertension 2017    Gout, unspecified      jaundice, unspecified     Nuclear sclerosis of both eyes 2022     Surgical History:    has a past surgical history that includes Tonsillectomy; Adenoidectomy; nasal septum repair; Tonsillectomy; AV fistula placement (Left, 2022); Adenoidectomy; Nasal septum surgery; Rotator cuff repair (Left); Salivary gland surgery; Transposition of basilic vein (Left, 2022); Percutaneous transluminal angioplasty of arteriovenous fistula (Left,  "11/22/2022); Percutaneous transluminal angioplasty of arteriovenous fistula (Left, 2/16/2023); Fistulogram (Left, 2/16/2023); Prostate biopsy (N/A, 3/24/2023); Fistulogram (Left, 7/6/2023); and Percutaneous transluminal angioplasty of arteriovenous fistula (Left, 7/6/2023).   Social History:    reports that he has never smoked. He has never used smokeless tobacco. He reports current alcohol use of about 2.0 standard drinks of alcohol per week. He reports that he does not use drugs.     OBJECTIVE:     Vital Signs (Most Recent):  Temp: 36.8 °C (98.2 °F) (11/09/23 0900)  Pulse: 64 (11/09/23 0900)  Resp: 18 (11/09/23 0900)  BP: (!) 143/72 (11/09/23 0902)  SpO2: 100 % (11/09/23 0900) Vital Signs Range (Last 24H):  Temp:  [36.8 °C (98.2 °F)]   Pulse:  [64]   Resp:  [18]   BP: (143)/(72)   SpO2:  [100 %]      Last 3 Vitals:       10/30/2023     5:47 AM 11/1/2023     5:50 AM 11/9/2023     9:00 AM   Vitals - 1 value per visit   SYSTOLIC 139 146 143   DIASTOLIC 75 74 72   Pulse 66 68 64   Temp   36.8 °C (98.2 °F)   Resp   18   SPO2   100 %   Weight (lb)   215   Weight (kg)   97.523   Height   6' 2" (1.88 m)   BMI (Calculated)   27.6     Body mass index is 27.6 kg/m².   Wt Readings from Last 4 Encounters:   11/09/23 97.5 kg (215 lb)   10/05/23 (P) 95.7 kg (211 lb)   09/25/23 97 kg (213 lb 12.8 oz)   09/19/23 97 kg (213 lb 13.5 oz)     Significant Labs:  Lab Results   Component Value Date    WBC 5.19 11/01/2023    HGB 9.7 (L) 11/01/2023    HCT 29.6 (L) 11/01/2023     11/01/2023     11/01/2023    K 4.5 11/01/2023     11/01/2023    CREATININE 7.77 (H) 11/01/2023    BUN 36 (H) 03/20/2023    CO2 28 03/20/2023     (H) 11/01/2023    CALCIUM 8.7 11/01/2023    MG 2.2 01/04/2023    PHOS 5.0 (H) 11/01/2023    ALKPHOS 115 11/01/2023    ALT 8 11/01/2023    AST 17 11/22/2022    ALBUMIN 4.6 11/01/2023    INR 1.1 11/22/2022    APTT 26.8 10/27/2022    HGBA1C 4.4 (L) 10/04/2023     (H) 04/30/2022     No LMP for " male patient.      EKG:   Results for orders placed or performed during the hospital encounter of 09/19/23   EKG 12-lead    Collection Time: 09/19/23  4:10 AM    Narrative    Test Reason : M25.519,    Vent. Rate : 072 BPM     Atrial Rate : 072 BPM     P-R Int : 164 ms          QRS Dur : 070 ms      QT Int : 392 ms       P-R-T Axes : 023 020 060 degrees     QTc Int : 429 ms    Normal sinus rhythm  Normal ECG  No significant change was found  Confirmed by fellow Rocky KLINE-Fellow'sUnofficial Report, Henry ([id number])  on 9/19/2023 11:20:00 AM  Confirmed by MEGHAN KLINE, ANILA ([id number]) on 9/22/2023 3:39:20 PM    Referred By: [email or web address]   SELF           Confirmed By:ANILA CHRISTIANSON MD       TTE:  Results for orders placed or performed during the hospital encounter of 11/15/22   Echo   Result Value Ref Range    Narrative    · The left ventricle is normal in size with normal systolic function.  · The estimated ejection fraction is 63%.  · Mild left atrial enlargement.  · Normal left ventricular diastolic function.  · Normal right ventricular size with normal right ventricular systolic   function.  · Mild right atrial enlargement.  · Mild tricuspid regurgitation.  · Normal central venous pressure (3 mmHg).  · The estimated PA systolic pressure is 32 mmHg.        EF   Date Value Ref Range Status   01/31/2023 55 % Final   11/15/2022 63 % Final      No results found. However, due to the size of the patient record, not all encounters were searched. Please check Results Review for a complete set of results.  SYD:  No results found. However, due to the size of the patient record, not all encounters were searched. Please check Results Review for a complete set of results.  Stress Test:  Results for orders placed during the hospital encounter of 01/24/23    Treadmill Stress Test (Cupid only)    Interpretation Summary    The ECG portion of the study is negative for ischemia.    The patient reported no chest pain during the stress  test.    The blood pressure response to stress was normal.    The exercise capacity was average.    During stress, rare PVCs are noted.     LHC:  Results for orders placed during the hospital encounter of 11/21/22    Cardiac catheterization    Narrative  Procedure performed in the Invasive Lab  - See Procedure Log link below for nursing documentation  - See OpNote on Surgeries Tab for physician findings  - See Imaging Tab for radiologist dictation       ASSESSMENT/PLAN:         Pre-op Assessment    I have reviewed the Patient Summary Reports.     I have reviewed the Nursing Notes. I have reviewed the NPO Status.   I have reviewed the Medications.     Review of Systems  Cardiovascular:     Hypertension                                  Hypertension         Renal/:  Chronic Renal Disease        Kidney Function/Disease             Endocrine:  Diabetes    Diabetes                          Physical Exam  General: Well nourished, Cooperative and Alert    Airway:  Mallampati: III / II  Mouth Opening: Normal  TM Distance: Normal  Tongue: Normal  Neck ROM: Normal ROM    Chest/Lungs:  Normal Respiratory Rate    Heart:  Rate: Normal  Rhythm: Regular Rhythm        Anesthesia Plan  Type of Anesthesia, risks & benefits discussed:    Anesthesia Type: Gen Natural Airway, MAC, Regional  Intra-op Monitoring Plan: Standard ASA Monitors  Post Op Pain Control Plan: IV/PO Opioids PRN  Induction:  IV  Informed Consent: Informed consent signed with the Patient and all parties understand the risks and agree with anesthesia plan.  All questions answered.   ASA Score: 3  Day of Surgery Review of History & Physical: H&P Update referred to the surgeon/provider.    Ready For Surgery From Anesthesia Perspective.     .

## 2023-11-09 NOTE — BRIEF OP NOTE
[facility] - Surgery (2nd Fl)  Brief Operative Note    Surgery Date: 11/9/2023     Surgeon(s) and Role:     * LETY Rayo III, MD - Primary     * Reagan Rios MD - Resident - Assisting    Pre-op Diagnosis:  Malfunction of arteriovenous graft, subsequent encounter [T82.590D]    Post-op Diagnosis:  Post-Op Diagnosis Codes:     * Malfunction of arteriovenous graft, subsequent encounter [T82.590D]    Procedure(s) (LRB):  REVISION, AV FISTULA (Left) with interposition 7mm PTFE    CODING NOTE: -22 Modifier appropriate given the increased complexity and length of this case     Anesthesia: Regional    Operative Findings: VERY scarred proximally, arterial sewn to existing BVT, wih ligation immediately distal to anast; end to side distal anast in upper basilic vein    Good thrill, 2+ radial after    Estimated Blood Loss: 40cc         Specimens:   Specimen (24h ago, onward)      None              Discharge Note    OUTCOME: successful outpatient procedure     DISPOSITION: home    FINAL DIAGNOSIS:  ESRD    FOLLOWUP: 2 weeks with AVG duplex    DISCHARGE INSTRUCTIONS:  see below

## 2023-11-09 NOTE — TRANSFER OF CARE
"Anesthesia Transfer of Care Note    Patient: Elbert Still Jr.    Procedure(s) Performed: Procedure(s) (LRB):  REVISION, AV FISTULA (Left)    Patient location: [facility]    Anesthesia Type: general    Transport from OR: Transported from OR on room air with adequate spontaneous ventilation    Post pain: adequate analgesia    Post assessment: no apparent anesthetic complications and tolerated procedure well    Post vital signs: stable    Level of consciousness: awake, alert and oriented    Nausea/Vomiting: no nausea/vomiting    Complications: none    Transfer of care protocol was followed      Last vitals: Visit Vitals  BP (!) 143/72   Pulse 64   Temp 36.8 °C (98.2 °F) (Temporal)   Resp 18   Ht 6' 2" (1.88 m)   Wt 97.5 kg (215 lb)   SpO2 100%   BMI 27.60 kg/m²     "

## 2023-11-10 ENCOUNTER — TELEPHONE (OUTPATIENT)
Dept: VASCULAR SURGERY | Facility: CLINIC | Age: 68
End: 2023-11-10
Payer: COMMERCIAL

## 2023-11-10 ENCOUNTER — HOSPITAL ENCOUNTER (EMERGENCY)
Facility: HOSPITAL | Age: 68
Discharge: HOME | End: 2023-11-10
Attending: EMERGENCY MEDICINE
Payer: COMMERCIAL

## 2023-11-10 VITALS
RESPIRATION RATE: 20 BRPM | TEMPERATURE: 98 F | SYSTOLIC BLOOD PRESSURE: 163 MMHG | OXYGEN SATURATION: 100 % | BODY MASS INDEX: 27.45 KG/M2 | HEIGHT: 74 IN | DIASTOLIC BLOOD PRESSURE: 81 MMHG | WEIGHT: 213.88 LBS | HEART RATE: 69 BPM

## 2023-11-10 DIAGNOSIS — N19 HYPERPHOSPHATEMIA ASSOCIATED WITH RENAL FAILURE: Primary | ICD-10-CM

## 2023-11-10 DIAGNOSIS — Z78.9 PROBLEM WITH VASCULAR ACCESS: Primary | ICD-10-CM

## 2023-11-10 DIAGNOSIS — E83.39 HYPERPHOSPHATEMIA: ICD-10-CM

## 2023-11-10 DIAGNOSIS — E83.39 HYPERPHOSPHATEMIA ASSOCIATED WITH RENAL FAILURE: Primary | ICD-10-CM

## 2023-11-10 PROCEDURE — 99283 EMERGENCY DEPT VISIT LOW MDM: CPT

## 2023-11-10 PROCEDURE — 25000003 PHARM REV CODE 250: Performed by: EMERGENCY MEDICINE

## 2023-11-10 RX ORDER — OXYCODONE AND ACETAMINOPHEN 5; 325 MG/1; MG/1
1 TABLET ORAL
Status: COMPLETED | OUTPATIENT
Start: 2023-11-10 | End: 2023-11-10

## 2023-11-10 RX ADMIN — OXYCODONE HYDROCHLORIDE AND ACETAMINOPHEN 1 TABLET: 5; 325 TABLET ORAL at 03:11

## 2023-11-10 NOTE — DISCHARGE INSTRUCTIONS
Follow-up for your dialysis as previously scheduled     If you experience any problems with your surgical site, call the vascular surgery clinic.  If you experience significant bleeding or concern for clotting or infection, return to the emergency department immediately     Continue your home medications as prescribed
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
## 2023-11-10 NOTE — ED TRIAGE NOTES
Elbert Still Jr., a 67 y.o. male presents to the ED w/ complaint of bleeding from newly placed vascular access    Triage note:  Chief Complaint   Patient presents with    Vascular Access Problem     Dialysis graft placed yesterday to left upper arm, bleeding from site. Bleeding appears controlled, dressing  over site.      Review of patient's allergies indicates:   Allergen Reactions    Iodine and iodide containing products Hives     Hypotension, Flushing     Past Medical History:   Diagnosis Date    Anemia     Diabetes mellitus     Diabetes mellitus, type 2     Disorder of kidney and ureter     ESRD (end stage renal disease)     Essential (primary) hypertension 2017    Gout, unspecified      jaundice, unspecified     Nuclear sclerosis of both eyes 2022

## 2023-11-10 NOTE — CONSULTS
[facility] - Emergency Dept  Vascular Surgery  Consult Note    Inpatient consult to Vascular Surgery  Consult performed by: Reagan Rios MD  Consult ordered by: Renny Antunez MD        Subjective:     Chief Complaint/Reason for Admission: surgical site bleeding    History of Present Illness: Mr. Still is 67 y.o. male with history of DM, ESRD on HD (MWF). He has a right IJ permacath that he uses for dialysis. He underwent a revision of his LUE AVF with PTFE transposition yesterday  with Dr. Rayo. He presents with bleeding from the small counter incision he has from his surgery yesterday. Vascular surgery was consulted to evaluate this bleeding. Patient reports that after dialysis he noticed the dressing becoming more saturated with blood. He called the office and was told to come to the ED. Patient denies any progressive swelling of his arm. Denies any bleeding from his other incisions.    (Not in a hospital admission)      Review of patient's allergies indicates:   Allergen Reactions    Iodine and iodide containing products Hives     Hypotension, Flushing       Past Medical History:   Diagnosis Date    Anemia     Diabetes mellitus     Diabetes mellitus, type 2     Disorder of kidney and ureter     ESRD (end stage renal disease)     Essential (primary) hypertension 2017    Gout, unspecified      jaundice, unspecified     Nuclear sclerosis of both eyes 2022     Past Surgical History:   Procedure Laterality Date    ADENOIDECTOMY      ADENOIDECTOMY      AV FISTULA PLACEMENT Left 2022    Procedure: CREATION, AV FISTULA;  Surgeon: Prince Gardiner MD;  Location: [address];  Service: Peripheral Vascular;  Laterality: Left;    FISTULOGRAM Left 2023    Procedure: FISTULOGRAM;  Surgeon: Prince Gardiner MD;  Location: [address];  Service: Peripheral Vascular;  Laterality: Left;  Given to the sterile field.     FISTULOGRAM Left 2023    Procedure: Fistulogram;   Surgeon: LETY Rayo III, MD;  Location: [facility];  Service: Peripheral Vascular;  Laterality: Left;  1.9 min  22.86 mGy  3.8862 Gy.cm  20ml Dye     nasal septum repair      NASAL SEPTUM SURGERY      PERCUTANEOUS TRANSLUMINAL ANGIOPLASTY OF ARTERIOVENOUS FISTULA Left 11/22/2022    Procedure: PTA, AV FISTULA;  Surgeon: Prince Gardiner MD;  Location: [facility] CATH LAB;  Service: Peripheral Vascular;  Laterality: Left;    PERCUTANEOUS TRANSLUMINAL ANGIOPLASTY OF ARTERIOVENOUS FISTULA Left 2/16/2023    Procedure: PTA, AV FISTULA;  Surgeon: Prince Gardiner MD;  Location: [facility];  Service: Peripheral Vascular;  Laterality: Left;  4.8 min  43.08 mGy  4.0682 Gy.cm  32ml Dye    PERCUTANEOUS TRANSLUMINAL ANGIOPLASTY OF ARTERIOVENOUS FISTULA Left 7/6/2023    Procedure: PTA, AV FISTULA;  Surgeon: LETY Rayo III, MD;  [facility];  Service: Peripheral Vascular;  Laterality: Left;    PROSTATE BIOPSY N/A 3/24/2023    Procedure: BIOPSY, PROSTATE;  Surgeon: Frankie Welch MD;  Location: [facility];  Service: Urology;  Laterality: N/A;  transrectal, 30min, uronav needed    REVISION OF ARTERIOVENOUS FISTULA Left 11/9/2023    Procedure: REVISION, AV FISTULA;  Surgeon: LETY Rayo III, MD;  [facility];  Service: Vascular;  Laterality: Left;  LUE AVG revision    ROTATOR CUFF REPAIR Left     SALIVARY GLAND SURGERY      Tonsillectomy      TONSILLECTOMY      TRANSPOSITION OF BASILIC VEIN Left 11/1/2022    Procedure: TRANSPOSITION, VEIN, BASILIC;  Surgeon: Prince Gardiner MD;  [facility];  Service: Peripheral Vascular;  Laterality: Left;  Left Brachial vein.     Family History       Problem Relation (Age of Onset)    Cancer Sister    Heart disease Mother    Hypertension Mother, Sister    Kidney disease Mother    No Known Problems Father    Seizures Sister    Stroke Sister          Tobacco Use    Smoking status: Never    Smokeless tobacco: Never    Tobacco comments:      .  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     Substance and Sexual Activity    Alcohol use: Yes     Alcohol/week: 2.0 standard drinks of alcohol     Types: 1 Glasses of wine, 1 Cans of beer per week     Comment: Socially    Drug use: No    Sexual activity: Yes     Partners: Female     Review of Systems   Constitutional:  Negative for chills and fever.   Respiratory:  Negative for shortness of breath.    Cardiovascular:  Negative for chest pain.   Gastrointestinal:  Negative for abdominal pain, nausea and vomiting.   Musculoskeletal:  Negative for joint swelling.   Skin:  Negative for wound.   Neurological:  Negative for numbness.   Hematological:  Does not bruise/bleed easily.     Objective:     Vital Signs (Most Recent):  Temp: 98 °F (36.7 °C) (11/10/23 1304)  Pulse: 75 (11/10/23 1403)  Resp: 18 (11/10/23 1304)  BP: (!) 181/84 (11/10/23 1403)  SpO2: 100 % (11/10/23 1403) Vital Signs (24h Range):  Temp:  [98 °F (36.7 °C)] 98 °F (36.7 °C)  Pulse:  [66-75] 75  Resp:  [18] 18  SpO2:  [98 %-100 %] 100 %  BP: (152-181)/(79-94) 181/84     Weight: 97 kg (213 lb 13.5 oz)  Body mass index is 27.46 kg/m².      Physical Exam  Constitutional:       Appearance: Normal appearance.   HENT:      Head: Normocephalic and atraumatic.   Cardiovascular:      Comments: LUE AVG with palpable thrill. Left radial pulse 2+  Pulmonary:      Effort: Pulmonary effort is normal.   Abdominal:      General: Abdomen is flat.      Palpations: Abdomen is soft.   Musculoskeletal:      Cervical back: Normal range of motion.   Skin:     General: Skin is warm and dry.      Comments: Medial incision in the left upper extremity with dressing in place, CDI.  Lateral counter incision dressing saturated with serosanguinous drainage. No ongoing bleeding when dressing removed. Appropriate tenderness, no hematoma or significant edema   Neurological:      General: No focal deficit present.      Mental Status: He is alert and oriented to person, place, and  time.   Psychiatric:         Mood and Affect: Mood normal.          Significant Labs:  All pertinent labs from the last 24 hours have been reviewed.    Significant Diagnostics:  I have reviewed all pertinent imaging results/findings within the past 24 hours.  Assessment/Plan:     Arteriovenous fistula  67 y.o. male s/p AVF revision 11/9/23 who presents from dialysis clinic with some oozing over his tunnel counter incision. Incisions over proximal and distal fistula look fine with no/minimal strikethrough. There was some minor oozing when dressing initially removed. By the time I saw the patient, this had completely resolved.     Plan:  -Will follow up with Dr. Rayo in 2 weeks with a HD ultrasound  -No further imaging required  - dispo per ED          Thank you for your consult. I will sign off. Please contact us if you have any additional questions.    Reagan Rios MD  Vascular Surgery  [facility] - Emergency Dept

## 2023-11-10 NOTE — PROGRESS NOTES
ESRD on iHD: med request for Sevelamer received; pt taking 1600 mg WM; med ordered in Epic w/ 3 refills.

## 2023-11-10 NOTE — TELEPHONE ENCOUNTER
Contacted pt in response to message. States he began to have bleeding from lower incisional site this morning after arriving home from dialysis and that bleeding is pooling beneath dressing. Pt had AVG revision with Dr. Rayo yesterday 11/09/23. States he left recovery area in a hurry to go home yesterday and forgot that he had not yet received post-op instructions. Post-op instructions given including remove dressing 2 days post-op and shower washing site with soap and water, do not apply creams or ointments to site, call for s/s infection, etc. Instructed pt to report to ED at [facility] for bleeding to surgical site. Pt verbalized understanding and states he will report to ED.   ----- Message from Izabella Mittal sent at 11/10/2023  9:48 AM CST -----  Regarding: pt advice  Contact: pt [phone]  Pt calling to request post op instruction. Pt did not receive instructions at discharge. Pt also states left arm is bleeding right after dialysis.pls call

## 2023-11-10 NOTE — ED PROVIDER NOTES
Encounter Date: 11/10/2023       History     Chief Complaint   Patient presents with    Vascular Access Problem     Dialysis graft placed yesterday to left upper arm, bleeding from site. Bleeding appears controlled, dressing  over site.      67-year-old male history of end-stage renal disease on  dialysis, diabetes, hypertension, multiple AV fistula failure.  Patient had a left upper extremity AV graft surgery yesterday.  He had dialysis through his tunneled catheter in the right chest today.  He experienced some bleeding from 1 incision today.  The bleeding has since resolved.  He denies other bleeding.  He is not having dizziness or lightheadedness.  He denies paresthesias or pain to the affected extremity.      Review of patient's allergies indicates:   Allergen Reactions    Iodine and iodide containing products Hives     Hypotension, Flushing     Past Medical History:   Diagnosis Date    Anemia     Diabetes mellitus     Diabetes mellitus, type 2     Disorder of kidney and ureter     ESRD (end stage renal disease)     Essential (primary) hypertension 2017    Gout, unspecified      jaundice, unspecified     Nuclear sclerosis of both eyes 2022     Past Surgical History:   Procedure Laterality Date    ADENOIDECTOMY      ADENOIDECTOMY      AV FISTULA PLACEMENT Left 2022    Procedure: CREATION, AV FISTULA;  Surgeon: Prince Gardiner MD;  Location: [facility] OR [address] [place] [address];  Service: Peripheral Vascular;  Laterality: Left;    FISTULOGRAM Left 2023    Procedure: FISTULOGRAM;  Surgeon: Prince Gardiner MD;  Location: [facility] OR [address] [place] [address];  Service: Peripheral Vascular;  Laterality: Left;  Given to the sterile field.     FISTULOGRAM Left 2023    Procedure: Fistulogram;  Surgeon: LETY Rayo III, MD;  Location: [facility] OR [address] [place] [address];  Service: Peripheral Vascular;  Laterality: Left;  1.9 min  22.86 mGy  3.8862 Gy.cm  20ml Dye     nasal septum repair      NASAL SEPTUM SURGERY       PERCUTANEOUS TRANSLUMINAL ANGIOPLASTY OF ARTERIOVENOUS FISTULA Left 11/22/2022    Procedure: PTA, AV FISTULA;  Surgeon: Prince Gardienr MD;  Location: [facility] CATH LAB;  Service: Peripheral Vascular;  Laterality: Left;    PERCUTANEOUS TRANSLUMINAL ANGIOPLASTY OF ARTERIOVENOUS FISTULA Left 2/16/2023    Procedure: PTA, AV FISTULA;  Surgeon: Prince Gardiner MD;  Location: [facility] OR 2ND FLR;  Service: Peripheral Vascular;  Laterality: Left;  4.8 min  43.08 mGy  4.0682 Gy.cm  32ml Dye    PERCUTANEOUS TRANSLUMINAL ANGIOPLASTY OF ARTERIOVENOUS FISTULA Left 7/6/2023    Procedure: PTA, AV FISTULA;  Surgeon: LETY Rayo III, MD;  Location: [facility] OR 2ND FLR;  Service: Peripheral Vascular;  Laterality: Left;    PROSTATE BIOPSY N/A 3/24/2023    Procedure: BIOPSY, PROSTATE;  Surgeon: Frankie Welch MD;  [facility];  Service: Urology;  Laterality: N/A;  transrectal, 30min, uronav needed    REVISION OF ARTERIOVENOUS FISTULA Left 11/9/2023    Procedure: REVISION, AV FISTULA;  Surgeon: LETY Rayo III, MD;  [facility];  Service: Vascular;  Laterality: Left;  LUE AVG revision    ROTATOR CUFF REPAIR Left     SALIVARY GLAND SURGERY      Tonsillectomy      TONSILLECTOMY      TRANSPOSITION OF BASILIC VEIN Left 11/1/2022    Procedure: TRANSPOSITION, VEIN, BASILIC;  Surgeon: Prince Gardiner MD;  [facility];  Service: Peripheral Vascular;  Laterality: Left;  Left Brachial vein.     Family History   Problem Relation Age of Onset    Heart disease Mother     Kidney disease Mother     Hypertension Mother     No Known Problems Father     Cancer Sister     Seizures Sister     Hypertension Sister     Stroke Sister     Amblyopia Neg Hx     Blindness Neg Hx     Cataracts Neg Hx     Diabetes Neg Hx     Glaucoma Neg Hx     Macular degeneration Neg Hx     Retinal detachment Neg Hx     Strabismus Neg Hx     Thyroid disease Neg Hx     Anesthesia problems Neg Hx      Social History     Tobacco Use     Smoking status: Never    Smokeless tobacco: Never    Tobacco comments:     .  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     Substance Use Topics    Alcohol use: Yes     Alcohol/week: 2.0 standard drinks of alcohol     Types: 1 Glasses of wine, 1 Cans of beer per week     Comment: Socially    Drug use: No     Review of Systems  All other systems reviewed and negative except as noted in HPI    Physical Exam     Initial Vitals [11/10/23 1304]   BP Pulse Resp Temp SpO2   (!) 166/79 67 18 98 °F (36.7 °C) 100 %      MAP       --         Physical Exam    Nursing note and vitals reviewed.  Constitutional: He appears well-developed and well-nourished.   HENT:   Head: Normocephalic and atraumatic.   Eyes: Conjunctivae and EOM are normal. Pupils are equal, round, and reactive to light.   Neck: Neck supple.   Normal range of motion.  Cardiovascular:  Normal rate and regular rhythm.           Palpable thrill over left upper extremity AV graft site.  Medial incisions clean dry and intact.  Dressing over superolateral incision is saturated with blood.  No active bleeding with incision removal.  Area is warm but not erythematous or indurated or fluctuant.   Pulmonary/Chest: Breath sounds normal. No respiratory distress.   Abdominal: Abdomen is soft. Bowel sounds are normal.   Musculoskeletal:         General: Tenderness (Mild tenderness to palpation in area of left biceps at surgical site) present. No edema. Normal range of motion.      Cervical back: Normal range of motion and neck supple.     Neurological: He is alert and oriented to person, place, and time. He has normal strength. No sensory deficit. GCS score is 15. GCS eye subscore is 4. GCS verbal subscore is 5. GCS motor subscore is 6.   Skin: Skin is warm and dry. Capillary refill takes less than 2 seconds.   Skin changes to surgical site is noted on cardiovascular exam.   Psychiatric: He has a normal mood and affect. Thought content normal.         ED Course    Procedures  Labs Reviewed - No data to display       Imaging Results    None          Medications   oxyCODONE-acetaminophen 5-325 mg per tablet 1 tablet (1 tablet Oral Given 11/10/23 1554)     Medical Decision Making  No active orPulsatile bleeding at this time.  Discussed with vascular surgery on-call who will evaluate patient at bedside.  There is a possibility that there is a hematoma in the area secondary to his operation and heparin use.      Amount and/or Complexity of Data Reviewed  External Data Reviewed: labs, radiology, ECG and notes.     Details: Left upper extremity AV graft surgery yesterday.  Monday Wednesday Friday dialysis due to end-stage renal disease.  700 mL dialysis today without issue other than bleeding from operative site  Discussion of management or test interpretation with external provider(s): Discussed with vascular surgery to guide imaging and disposition.    Risk  Prescription drug management.  Decision regarding hospitalization.               ED Course as of 11/11/23 6259   Fri Nov 10, 2023   0538 Discussed with vascular surgery on-call who was present for the patient's surgery yesterday.  They recommend treatment of his pain.  They did not recommend further imaging. He is safe to continue hemodialysis [DS]      ED Course User Index  [DS] Renny Antunez MD                    Clinical Impression:   Final diagnoses:  [Z78.9] Problem with vascular access (Primary)        ED Disposition Condition    Discharge Stable          ED Prescriptions    None       Follow-up Information    None          Renny Antunez MD  11/11/23 3544

## 2023-11-10 NOTE — HPI
Mr. Still is 67 y.o. male with history of DM, ESRD on HD (MWF). He has a right IJ permacath that he uses for dialysis. He underwent a revision of his LUE AVF with PTFE transposition yesterday 11/9 with Dr. Rayo. He presents with bleeding from the small counter incision he has from his surgery yesterday. Vascular surgery was consulted to evaluate this bleeding. Patient reports that after dialysis he noticed the dressing becoming more saturated with blood. He called the office and was told to come to the ED. Patient denies any progressive swelling of his arm. Denies any bleeding from his other incisions.

## 2023-11-10 NOTE — ED NOTES
Patient identifiers for Elbert Still Jr. 67 y.o. male checked and correct.  Chief Complaint   Patient presents with    Vascular Access Problem     Dialysis graft placed yesterday to left upper arm, bleeding from site. Bleeding appears controlled, dressing  over site.      Past Medical History:   Diagnosis Date    Anemia     Diabetes mellitus     Diabetes mellitus, type 2     Disorder of kidney and ureter     ESRD (end stage renal disease)     Essential (primary) hypertension 2017    Gout, unspecified      jaundice, unspecified     Nuclear sclerosis of both eyes 2022     Allergies reported:   Review of patient's allergies indicates:   Allergen Reactions    Iodine and iodide containing products Hives     Hypotension, Flushing       Appearance: Pt awake, alert & oriented to person, place & time. Pt in no acute distress at present time. Pt is clean and well groomed with clothes appropriately fastened.   Skin: Skin warm, swelling near newly placed dialysis shunt. Covered with dressing saturated with blood. Color consistent with ethnicity. Mucous membranes moist. No breakdown or brusing noted.   Musculoskeletal: Patient moving all extremities well, no obvious swelling or deformities noted.   Respiratory: Respirations spontaneous, even, and non-labored. Visible chest rise noted. Airway is open and patent. No accessory muscle use noted.   Neurologic: Sensation is intact. Speech is clear and appropriate. Eyes open spontaneously, behavior appropriate to situation, follows commands, facial expression symmetrical, bilateral hand grasp equal and even, purposeful motor response noted.  Cardiac: All peripheral pulses present. No Bilateral lower extremity edema. Cap refill is <3 seconds.  Abdomen: Abdomen soft, non distended, non tender to palpation.   : Pt voids independently, denies dysuria, hematuria, frequency.

## 2023-11-20 ENCOUNTER — TELEPHONE (OUTPATIENT)
Dept: VASCULAR SURGERY | Facility: CLINIC | Age: 68
End: 2023-11-20
Payer: COMMERCIAL

## 2023-11-20 NOTE — TELEPHONE ENCOUNTER
Contacted pt to schedule appt. Appointment scheduled, pt verified.   ----- Message from LETY Rayo III, MD sent at 11/9/2023  1:03 PM CST -----  2 wks with AVG duplex

## 2023-11-21 ENCOUNTER — HOSPITAL ENCOUNTER (OUTPATIENT)
Dept: VASCULAR SURGERY | Facility: CLINIC | Age: 68
Discharge: HOME | End: 2023-11-21
Attending: SURGERY
Payer: COMMERCIAL

## 2023-11-21 ENCOUNTER — OFFICE VISIT (OUTPATIENT)
Dept: VASCULAR SURGERY | Facility: CLINIC | Age: 68
End: 2023-11-21
Attending: SURGERY
Payer: MEDICARE

## 2023-11-21 VITALS
HEART RATE: 63 BPM | BODY MASS INDEX: 27.16 KG/M2 | TEMPERATURE: 98 F | HEIGHT: 74 IN | SYSTOLIC BLOOD PRESSURE: 136 MMHG | WEIGHT: 211.63 LBS | DIASTOLIC BLOOD PRESSURE: 69 MMHG

## 2023-11-21 DIAGNOSIS — Z99.2 ESRD (END STAGE RENAL DISEASE) ON DIALYSIS: ICD-10-CM

## 2023-11-21 DIAGNOSIS — N18.6 ESRD (END STAGE RENAL DISEASE) ON DIALYSIS: Primary | ICD-10-CM

## 2023-11-21 DIAGNOSIS — Z99.2 ESRD (END STAGE RENAL DISEASE) ON DIALYSIS: Primary | ICD-10-CM

## 2023-11-21 DIAGNOSIS — N18.6 ESRD (END STAGE RENAL DISEASE) ON DIALYSIS: ICD-10-CM

## 2023-11-21 PROCEDURE — 99024 POSTOP FOLLOW-UP VISIT: CPT | Mod: POP,,, | Performed by: SURGERY

## 2023-11-21 PROCEDURE — 93990 DOPPLER FLOW TESTING: CPT | Mod: PBBFAC | Performed by: SURGERY

## 2023-11-21 PROCEDURE — 99999 PR PBB SHADOW E&M-EST. PATIENT-LVL III: ICD-10-PCS | Mod: PBBFAC,,, | Performed by: SURGERY

## 2023-11-21 PROCEDURE — 99213 OFFICE O/P EST LOW 20 MIN: CPT | Mod: PBBFAC | Performed by: SURGERY

## 2023-11-21 PROCEDURE — 99999 PR PBB SHADOW E&M-EST. PATIENT-LVL III: CPT | Mod: PBBFAC,,, | Performed by: SURGERY

## 2023-11-21 PROCEDURE — 93990 PR DUPLEX HEMODIALYSIS ACCESS: ICD-10-PCS | Mod: S$GLB,,, | Performed by: SURGERY

## 2023-11-21 PROCEDURE — 99024 PR POST-OP FOLLOW-UP VISIT: ICD-10-PCS | Mod: POP,,, | Performed by: SURGERY

## 2023-11-21 PROCEDURE — 93990 DOPPLER FLOW TESTING: CPT | Mod: S$GLB,,, | Performed by: SURGERY

## 2023-11-21 NOTE — PROGRESS NOTES
VASCULAR SURGERY SERVICE    HISTORY OF PRESENT ILLNESS: Elbert Still Jr. is a 68 y.o. male with end-stage renal disease dialyzing via a right IJ PermCath, status post:    1a.  Right AV fistula Revision with long segment PTFE 11/09/2023  PTA, mid AV fistula stenosis 02/16/2023  Trans radial angioplasty, left distal brachial artery 11/22/2022  Transposition, left ratio vein brachial artery AV fistula 11/01/2022  Original left brachial artery and vein AV fistula creation 09/06/2022    All the above procedures were performed by Dr. Gardiner.  The patient has asked to change vascular surgeons.  The patient has not been able to successfully access the fistula, and when this was last tried 6 weeks ago he pulled clots.    He reports forearm numbness which has been present since his 1st procedure.   initially also had some significant steal symptoms which resolved after the brachial artery intervention in November 2022.  He denies any hand pain weakness or pain.    He is frustrated that he has been unable to use this fistula despite several procedures after its initial placement now 9 months ago    7/18/2023:  He now returns after angioplasty, left AV fistula (9 x 40 [place]) 07/18/2023.  He is without complaint    08/01/2023:  He now returns, his dialysis facility has been unable to successfully cannulate and use his left AV fistula    08/05/2023:  This facility is still not been able to cannulate his high-flow well matured brachial basilic AV fistula.  Does endorse sustained numbness of his left forearm which has been there since the original surgery.  He denies any pain or weakness in his left hand although he says it does get subjectively cooler at times    11/21/2023:  Now returns after the above procedure.  Had significant arm swelling which is now mostly resolved.    Past Medical History:   Diagnosis Date    Anemia     Diabetes mellitus     Diabetes mellitus, type 2     Disorder of kidney and ureter     ESRD (end  stage renal disease)     Essential (primary) hypertension 2017    Gout, unspecified      jaundice, unspecified     Nuclear sclerosis of both eyes 2022       Past Surgical History:   Procedure Laterality Date    ADENOIDECTOMY      ADENOIDECTOMY      AV FISTULA PLACEMENT Left 2022    Procedure: CREATION, AV FISTULA;  Surgeon: Prince Gardiner MD;  [facility];  Service: Peripheral Vascular;  Laterality: Left;    FISTULOGRAM Left 2023    Procedure: FISTULOGRAM;  Surgeon: Prince Gardiner MD;  [facility];  Service: Peripheral Vascular;  Laterality: Left;  Given to the sterile field.     FISTULOGRAM Left 2023    Procedure: Fistulogram;  Surgeon: LETY Rayo III, MD;  [facility];  Service: Peripheral Vascular;  Laterality: Left;  1.9 min  22.86 mGy  3.8862 Gy.cm  20ml Dye     nasal septum repair      NASAL SEPTUM SURGERY      PERCUTANEOUS TRANSLUMINAL ANGIOPLASTY OF ARTERIOVENOUS FISTULA Left 2022    Procedure: PTA, AV FISTULA;  Surgeon: Prince Gardiner MD;  Location: [facility] CATH LAB;  Service: Peripheral Vascular;  Laterality: Left;    PERCUTANEOUS TRANSLUMINAL ANGIOPLASTY OF ARTERIOVENOUS FISTULA Left 2023    Procedure: PTA, AV FISTULA;  Surgeon: Prince Garidner MD;  [facility];  Service: Peripheral Vascular;  Laterality: Left;  4.8 min  43.08 mGy  4.0682 Gy.cm  32ml Dye    PERCUTANEOUS TRANSLUMINAL ANGIOPLASTY OF ARTERIOVENOUS FISTULA Left 2023    Procedure: PTA, AV FISTULA;  Surgeon: LETY Rayo III, MD;  Location: [facility] OR [address] [place] [address];  Service: Peripheral Vascular;  Laterality: Left;    PROSTATE BIOPSY N/A 3/24/2023    Procedure: BIOPSY, PROSTATE;  Surgeon: Frankie Welch MD;  Location: [facility] OR [address] [place] [address];  Service: Urology;  Laterality: N/A;  transrectal, 30min, uronav needed    REVISION OF ARTERIOVENOUS FISTULA Left 2023    Procedure: REVISION, AV FISTULA;  Surgeon: LETY Rayo III, MD;  Location:  [facility] OR 2ND FLR;  Service: Vascular;  Laterality: Left;  LUE AVG revision    ROTATOR CUFF REPAIR Left     SALIVARY GLAND SURGERY      Tonsillectomy      TONSILLECTOMY      TRANSPOSITION OF BASILIC VEIN Left 2022    Procedure: TRANSPOSITION, VEIN, BASILIC;  Surgeon: Prince Gardiner MD;  Location: [facility] OR 2ND FLR;  Service: Peripheral Vascular;  Laterality: Left;  Left Brachial vein.         Current Outpatient Medications:     acetaminophen (TYLENOL) 500 MG tablet, Take 2 tablets (1,000 mg total) by mouth every 6 (six) hours as needed for Pain., Disp: 30 tablet, Rfl: 0    aspirin (ECOTRIN) 81 MG EC tablet, Take 1 tablet (81 mg total) by mouth once daily., Disp: 90 tablet, Rfl: 3    atorvastatin (LIPITOR) 20 MG tablet, TAKE 1 TABLET BY MOUTH EVERY EVENING, Disp: 90 tablet, Rfl: 0    BANOPHEN 50 mg capsule, SMARTSI Capsule(s) By Mouth, Disp: , Rfl:     blood sugar diagnostic Strp, Use to check blood glucose once a day., Disp: 100 each, Rfl: 3    blood-glucose meter Misc, Use to check blood glucose twice a day., Disp: 1 each, Rfl: 0    carvediloL (COREG) 12.5 MG tablet, Take 1 tablet (12.5 mg total) by mouth 2 (two) times daily., Disp: 60 tablet, Rfl: 11    cinacalcet (SENSIPAR) 60 MG Tab, Take 30 mg by mouth daily with breakfast., Disp: , Rfl:     ciprofloxacin HCl (CIPRO) 500 MG tablet, 1 pill one hour before prostate biopsy, Disp: 1 tablet, Rfl: 0    colchicine (COLCRYS) 0.6 mg tablet, Take 0.6 mg by mouth as needed. Take half tab (0.3 mg) by mouth daily as needed for gout flare., Disp: 90 tablet, Rfl: 3    diclofenac sodium (VOLTAREN) 1 % Gel, Apply 2 g topically once daily., Disp: 20 g, Rfl: 0    fluticasone propionate (FLONASE) 50 mcg/actuation nasal spray, 2 sprays (100 mcg total) by Each Nostril route once daily., Disp: 16 g, Rfl: 11    furosemide (LASIX) 80 MG tablet, TAKE 1 TABLET(80 MG) BY MOUTH TWICE DAILY, Disp: 60 tablet, Rfl: 11    HYDROcodone-acetaminophen (NORCO) 5-325 mg per tablet, Take 1  tablet by mouth every 6 (six) hours as needed for Pain., Disp: 12 tablet, Rfl: 0    lancets 30 gauge Misc, Use to check blood glucose twice a day., Disp: 100 each, Rfl: 5    LIDOcaine (LIDODERM) 5 %, Place 2 patches onto the skin once daily. Remove & Discard patch within 12 hours or as directed by MD, Disp: 20 patch, Rfl: 0    LIDOcaine-prilocaine (EMLA) cream, SMARTSIG:sparingly Topical As Directed, Disp: , Rfl:     NIFEdipine (PROCARDIA-XL) 60 MG (OSM) 24 hr tablet, Take 1 tablet (60 mg total) by mouth 2 (two) times a day., Disp: 60 tablet, Rfl: 11    oxyCODONE (ROXICODONE) 5 MG immediate release tablet, Take 1 tablet (5 mg total) by mouth every 6 (six) hours as needed for Pain., Disp: 8 tablet, Rfl: 0    predniSONE (DELTASONE) 50 MG Tab, Take by mouth., Disp: , Rfl:     sevelamer carbonate (RENVELA) 800 mg Tab, TAKE 2 TABLETS(1600 MG) BY MOUTH THREE TIMES DAILY WITH MEALS, Disp: 180 tablet, Rfl: 3    tamsulosin (FLOMAX) 0.4 mg Cap, Take 1 capsule (0.4 mg total) by mouth once daily., Disp: 90 capsule, Rfl: 3    traMADoL (ULTRAM) 50 mg tablet, Take 1 tablet (50 mg total) by mouth every 8 (eight) hours as needed for Pain., Disp: 10 tablet, Rfl: 0    Current Facility-Administered Medications:     cefTRIAXone injection 1 g, 1 g, Intramuscular, Once, Candice Moore NP    LIDOcaine HCl 2% urojet, , Rectal, Once, Candice Moore NP    Facility-Administered Medications Ordered in Other Visits:     0.9%  NaCl infusion, , Intravenous, Continuous, Kim Lutz NP    0.9%  NaCl infusion, , Intravenous, Continuous, Herminia Durant MD    ondansetron disintegrating tablet 8 mg, 8 mg, Oral, Q8H PRN, Herminia Durant MD    Review of patient's allergies indicates:   Allergen Reactions    Iodine and iodide containing products Hives     Hypotension, Flushing       Family History   Problem Relation Age of Onset    Heart disease Mother     Kidney disease Mother     Hypertension Mother     No Known Problems Father     Cancer  "Sister     Seizures Sister     Hypertension Sister     Stroke Sister     Amblyopia Neg Hx     Blindness Neg Hx     Cataracts Neg Hx     Diabetes Neg Hx     Glaucoma Neg Hx     Macular degeneration Neg Hx     Retinal detachment Neg Hx     Strabismus Neg Hx     Thyroid disease Neg Hx     Anesthesia problems Neg Hx        PHYSICAL EXAM:   /69 (BP Location: Right arm, Patient Position: Sitting, BP Method: Large (Automatic))   Pulse 63   Temp 98.4 °F (36.9 °C) (Oral)   Ht 6' 2" (1.88 m)   Wt 96 kg (211 lb 10.3 oz)   BMI 27.17 kg/m²   Constitutional:  Alert,   Well-appearing  In no distress.   Neurological: Normal speech  no focal findings  CN II - XII grossly intact.    Psychiatric: Mood and affect appropriate and symmetric.   HEENT: Normocephalic / atraumatic  PERRLA  Midline trachea  No scars across the neck   Cardiac: Regular rate and rhythm.   Pulmonary: Normal pulmonary effort.   Abdomen: Soft, not distended.     Skin: Warm and well perfused.    Vascular:  Nonpalpable left radial and ulnar pulses   Extremities/  Musculoskeletal: No edema.   Left arm demonstrates well-healed upper arm incisions.  There is a soft thrill without pulsatility in the laterally tunneled prosthetic AV graft.  Pulses 1 to 2+     IMAGING:  Duplex of the left positive AV fistula/graft shows no stenosis with a flow volume of 2.5 L        IMPRESSION:  Well-functioning prosthetic revision, left upper arm AV fistula    PLAN:  May begin cannulation 1 week  Follow up in 3 weeks for PermCath removal    PATRICIA Rayo III, MD, FACS  Professor and Chief, Vascular and Endovascular Surgery        "

## 2023-12-08 ENCOUNTER — HOSPITAL ENCOUNTER (INPATIENT)
Facility: HOSPITAL | Age: 68
LOS: 6 days | Discharge: HOME | DRG: 264 | End: 2023-12-15
Attending: EMERGENCY MEDICINE | Admitting: STUDENT IN AN ORGANIZED HEALTH CARE EDUCATION/TRAINING PROGRAM
Payer: COMMERCIAL

## 2023-12-08 ENCOUNTER — TELEPHONE (OUTPATIENT)
Dept: NEPHROLOGY | Facility: CLINIC | Age: 68
End: 2023-12-08
Payer: COMMERCIAL

## 2023-12-08 DIAGNOSIS — R78.81 BACTEREMIA: ICD-10-CM

## 2023-12-08 DIAGNOSIS — R07.9 CHEST PAIN: ICD-10-CM

## 2023-12-08 DIAGNOSIS — N18.6 ESRD (END STAGE RENAL DISEASE): Primary | ICD-10-CM

## 2023-12-08 LAB
ALBUMIN SERPL BCP-MCNC: 3.9 G/DL (ref 3.5–5.2)
ALLENS TEST: NORMAL
ALP SERPL-CCNC: 119 U/L (ref 55–135)
ALT SERPL W/O P-5'-P-CCNC: 7 U/L (ref 10–44)
ANION GAP SERPL CALC-SCNC: 9 MMOL/L (ref 8–16)
AST SERPL-CCNC: 17 U/L (ref 10–40)
BACTERIA #/AREA URNS AUTO: NORMAL /HPF
BASOPHILS # BLD AUTO: 0.02 K/UL (ref 0–0.2)
BASOPHILS NFR BLD: 0.4 % (ref 0–1.9)
BILIRUB SERPL-MCNC: 0.3 MG/DL (ref 0.1–1)
BILIRUB UR QL STRIP: NEGATIVE
BUN SERPL-MCNC: 34 MG/DL (ref 8–23)
CALCIUM SERPL-MCNC: 9.6 MG/DL (ref 8.7–10.5)
CHLORIDE SERPL-SCNC: 102 MMOL/L (ref 95–110)
CLARITY UR REFRACT.AUTO: CLEAR
CO2 SERPL-SCNC: 26 MMOL/L (ref 23–29)
COLOR UR AUTO: YELLOW
CREAT SERPL-MCNC: 6 MG/DL (ref 0.5–1.4)
DIFFERENTIAL METHOD: ABNORMAL
EOSINOPHIL # BLD AUTO: 0.2 K/UL (ref 0–0.5)
EOSINOPHIL NFR BLD: 4.1 % (ref 0–8)
ERYTHROCYTE [DISTWIDTH] IN BLOOD BY AUTOMATED COUNT: 14.3 % (ref 11.5–14.5)
EST. GFR  (NO RACE VARIABLE): 9.7 ML/MIN/1.73 M^2
GLUCOSE SERPL-MCNC: 122 MG/DL (ref 70–110)
GLUCOSE UR QL STRIP: ABNORMAL
HCT VFR BLD AUTO: 32.4 % (ref 40–54)
HGB BLD-MCNC: 11.3 G/DL (ref 14–18)
HGB UR QL STRIP: ABNORMAL
HYALINE CASTS UR QL AUTO: 0 /LPF
IMM GRANULOCYTES # BLD AUTO: 0.01 K/UL (ref 0–0.04)
IMM GRANULOCYTES NFR BLD AUTO: 0.2 % (ref 0–0.5)
KETONES UR QL STRIP: NEGATIVE
LDH SERPL L TO P-CCNC: 1.11 MMOL/L (ref 0.5–2.2)
LEUKOCYTE ESTERASE UR QL STRIP: NEGATIVE
LYMPHOCYTES # BLD AUTO: 1 K/UL (ref 1–4.8)
LYMPHOCYTES NFR BLD: 19.6 % (ref 18–48)
MCH RBC QN AUTO: 29.3 PG (ref 27–31)
MCHC RBC AUTO-ENTMCNC: 34.9 G/DL (ref 32–36)
MCV RBC AUTO: 84 FL (ref 82–98)
MICROSCOPIC COMMENT: NORMAL
MONOCYTES # BLD AUTO: 0.6 K/UL (ref 0.3–1)
MONOCYTES NFR BLD: 12.2 % (ref 4–15)
NEUTROPHILS # BLD AUTO: 3.1 K/UL (ref 1.8–7.7)
NEUTROPHILS NFR BLD: 63.5 % (ref 38–73)
NITRITE UR QL STRIP: NEGATIVE
NRBC BLD-RTO: 0 /100 WBC
PH UR STRIP: 7 [PH] (ref 5–8)
PLATELET # BLD AUTO: 192 K/UL (ref 150–450)
PMV BLD AUTO: 10.6 FL (ref 9.2–12.9)
POTASSIUM SERPL-SCNC: 4.1 MMOL/L (ref 3.5–5.1)
PROT SERPL-MCNC: 8 G/DL (ref 6–8.4)
PROT UR QL STRIP: ABNORMAL
RBC # BLD AUTO: 3.86 M/UL (ref 4.6–6.2)
RBC #/AREA URNS AUTO: 1 /HPF (ref 0–4)
SAMPLE: NORMAL
SITE: NORMAL
SODIUM SERPL-SCNC: 137 MMOL/L (ref 136–145)
SP GR UR STRIP: 1.02 (ref 1–1.03)
SQUAMOUS #/AREA URNS AUTO: 3 /HPF
URN SPEC COLLECT METH UR: ABNORMAL
WBC # BLD AUTO: 4.85 K/UL (ref 3.9–12.7)
WBC #/AREA URNS AUTO: 2 /HPF (ref 0–5)

## 2023-12-08 PROCEDURE — 99900035 HC TECH TIME PER 15 MIN (STAT)

## 2023-12-08 PROCEDURE — 99285 EMERGENCY DEPT VISIT HI MDM: CPT | Mod: 25

## 2023-12-08 PROCEDURE — 25000003 PHARM REV CODE 250: Performed by: EMERGENCY MEDICINE

## 2023-12-08 PROCEDURE — 85025 COMPLETE CBC W/AUTO DIFF WBC: CPT | Performed by: EMERGENCY MEDICINE

## 2023-12-08 PROCEDURE — 80053 COMPREHEN METABOLIC PANEL: CPT | Performed by: EMERGENCY MEDICINE

## 2023-12-08 PROCEDURE — 93010 ELECTROCARDIOGRAM REPORT: CPT | Mod: ,,, | Performed by: INTERNAL MEDICINE

## 2023-12-08 PROCEDURE — 81001 URINALYSIS AUTO W/SCOPE: CPT | Performed by: EMERGENCY MEDICINE

## 2023-12-08 PROCEDURE — 93005 ELECTROCARDIOGRAM TRACING: CPT

## 2023-12-08 PROCEDURE — 63600175 PHARM REV CODE 636 W HCPCS: Performed by: EMERGENCY MEDICINE

## 2023-12-08 PROCEDURE — 93010 EKG 12-LEAD: ICD-10-PCS | Mod: ,,, | Performed by: INTERNAL MEDICINE

## 2023-12-08 PROCEDURE — 96365 THER/PROPH/DIAG IV INF INIT: CPT | Mod: 59

## 2023-12-08 PROCEDURE — 83605 ASSAY OF LACTIC ACID: CPT

## 2023-12-08 RX ADMIN — PIPERACILLIN SODIUM AND TAZOBACTAM SODIUM 4.5 G: 4; .5 INJECTION, POWDER, FOR SOLUTION INTRAVENOUS at 10:12

## 2023-12-08 RX ADMIN — VANCOMYCIN HYDROCHLORIDE 2000 MG: 5 INJECTION, POWDER, LYOPHILIZED, FOR SOLUTION INTRAVENOUS at 11:12

## 2023-12-09 ENCOUNTER — ANESTHESIA EVENT (OUTPATIENT)
Dept: SURGERY | Facility: HOSPITAL | Age: 68
DRG: 264 | End: 2023-12-09
Payer: COMMERCIAL

## 2023-12-09 PROBLEM — N18.6 ESRD ON DIALYSIS: Chronic | Status: ACTIVE | Noted: 2022-07-12

## 2023-12-09 PROBLEM — Z99.2 ESRD ON DIALYSIS: Chronic | Status: ACTIVE | Noted: 2022-07-12

## 2023-12-09 PROBLEM — E11.9 TYPE 2 DIABETES MELLITUS: Chronic | Status: ACTIVE | Noted: 2023-12-09

## 2023-12-09 PROBLEM — R78.81 BACTEREMIA: Status: ACTIVE | Noted: 2023-12-09

## 2023-12-09 LAB
ALBUMIN SERPL BCP-MCNC: 3.7 G/DL (ref 3.5–5.2)
ALP SERPL-CCNC: 115 U/L (ref 55–135)
ALT SERPL W/O P-5'-P-CCNC: 6 U/L (ref 10–44)
ANION GAP SERPL CALC-SCNC: 13 MMOL/L (ref 8–16)
AST SERPL-CCNC: 15 U/L (ref 10–40)
BASOPHILS # BLD AUTO: 0.02 K/UL (ref 0–0.2)
BASOPHILS NFR BLD: 0.5 % (ref 0–1.9)
BILIRUB SERPL-MCNC: 0.4 MG/DL (ref 0.1–1)
BUN SERPL-MCNC: 34 MG/DL (ref 8–23)
CALCIUM SERPL-MCNC: 9.2 MG/DL (ref 8.7–10.5)
CHLORIDE SERPL-SCNC: 100 MMOL/L (ref 95–110)
CO2 SERPL-SCNC: 24 MMOL/L (ref 23–29)
CREAT SERPL-MCNC: 6.7 MG/DL (ref 0.5–1.4)
CRP SERPL-MCNC: 23.5 MG/L (ref 0–8.2)
DIFFERENTIAL METHOD: ABNORMAL
EOSINOPHIL # BLD AUTO: 0.2 K/UL (ref 0–0.5)
EOSINOPHIL NFR BLD: 4.4 % (ref 0–8)
ERYTHROCYTE [DISTWIDTH] IN BLOOD BY AUTOMATED COUNT: 14.1 % (ref 11.5–14.5)
ERYTHROCYTE [SEDIMENTATION RATE] IN BLOOD BY PHOTOMETRIC METHOD: 50 MM/HR (ref 0–23)
EST. GFR  (NO RACE VARIABLE): 8.5 ML/MIN/1.73 M^2
GLUCOSE SERPL-MCNC: 89 MG/DL (ref 70–110)
HCT VFR BLD AUTO: 30.3 % (ref 40–54)
HGB BLD-MCNC: 10.7 G/DL (ref 14–18)
IMM GRANULOCYTES # BLD AUTO: 0.01 K/UL (ref 0–0.04)
IMM GRANULOCYTES NFR BLD AUTO: 0.2 % (ref 0–0.5)
LYMPHOCYTES # BLD AUTO: 1 K/UL (ref 1–4.8)
LYMPHOCYTES NFR BLD: 23 % (ref 18–48)
MAGNESIUM SERPL-MCNC: 1.9 MG/DL (ref 1.6–2.6)
MCH RBC QN AUTO: 29.6 PG (ref 27–31)
MCHC RBC AUTO-ENTMCNC: 35.3 G/DL (ref 32–36)
MCV RBC AUTO: 84 FL (ref 82–98)
MONOCYTES # BLD AUTO: 0.5 K/UL (ref 0.3–1)
MONOCYTES NFR BLD: 11.9 % (ref 4–15)
NEUTROPHILS # BLD AUTO: 2.6 K/UL (ref 1.8–7.7)
NEUTROPHILS NFR BLD: 60 % (ref 38–73)
NRBC BLD-RTO: 0 /100 WBC
PHOSPHATE SERPL-MCNC: 4.1 MG/DL (ref 2.7–4.5)
PLATELET # BLD AUTO: 177 K/UL (ref 150–450)
PMV BLD AUTO: 10.9 FL (ref 9.2–12.9)
POCT GLUCOSE: 157 MG/DL (ref 70–110)
POCT GLUCOSE: 95 MG/DL (ref 70–110)
POTASSIUM SERPL-SCNC: 3.9 MMOL/L (ref 3.5–5.1)
PROCALCITONIN SERPL IA-MCNC: 0.29 NG/ML
PROT SERPL-MCNC: 7.6 G/DL (ref 6–8.4)
RBC # BLD AUTO: 3.61 M/UL (ref 4.6–6.2)
SODIUM SERPL-SCNC: 137 MMOL/L (ref 136–145)
WBC # BLD AUTO: 4.3 K/UL (ref 3.9–12.7)

## 2023-12-09 PROCEDURE — 25000003 PHARM REV CODE 250

## 2023-12-09 PROCEDURE — 99222 PR INITIAL HOSPITAL CARE,LEVL II: ICD-10-PCS | Mod: ,,, | Performed by: NURSE PRACTITIONER

## 2023-12-09 PROCEDURE — 86140 C-REACTIVE PROTEIN: CPT

## 2023-12-09 PROCEDURE — 25000003 PHARM REV CODE 250: Performed by: EMERGENCY MEDICINE

## 2023-12-09 PROCEDURE — 84100 ASSAY OF PHOSPHORUS: CPT

## 2023-12-09 PROCEDURE — 99222 1ST HOSP IP/OBS MODERATE 55: CPT | Mod: ,,, | Performed by: NURSE PRACTITIONER

## 2023-12-09 PROCEDURE — 83735 ASSAY OF MAGNESIUM: CPT

## 2023-12-09 PROCEDURE — 85652 RBC SED RATE AUTOMATED: CPT

## 2023-12-09 PROCEDURE — 85025 COMPLETE CBC W/AUTO DIFF WBC: CPT

## 2023-12-09 PROCEDURE — 63600175 PHARM REV CODE 636 W HCPCS

## 2023-12-09 PROCEDURE — 84145 PROCALCITONIN (PCT): CPT

## 2023-12-09 PROCEDURE — 87040 BLOOD CULTURE FOR BACTERIA: CPT

## 2023-12-09 PROCEDURE — 21400001 HC TELEMETRY ROOM

## 2023-12-09 PROCEDURE — 80053 COMPREHEN METABOLIC PANEL: CPT

## 2023-12-09 RX ORDER — DIPHENHYDRAMINE HYDROCHLORIDE 50 MG/ML
50 INJECTION INTRAMUSCULAR; INTRAVENOUS ONCE
Status: DISCONTINUED | OUTPATIENT
Start: 2023-12-09 | End: 2023-12-09

## 2023-12-09 RX ORDER — NIFEDIPINE 30 MG/1
60 TABLET, EXTENDED RELEASE ORAL 2 TIMES DAILY
Status: DISCONTINUED | OUTPATIENT
Start: 2023-12-09 | End: 2023-12-16 | Stop reason: HOSPADM

## 2023-12-09 RX ORDER — ACETAMINOPHEN 325 MG/1
650 TABLET ORAL EVERY 6 HOURS PRN
Status: DISCONTINUED | OUTPATIENT
Start: 2023-12-09 | End: 2023-12-10

## 2023-12-09 RX ORDER — OXYCODONE AND ACETAMINOPHEN 5; 325 MG/1; MG/1
1 TABLET ORAL
Status: COMPLETED | OUTPATIENT
Start: 2023-12-09 | End: 2023-12-09

## 2023-12-09 RX ORDER — CARVEDILOL 12.5 MG/1
12.5 TABLET ORAL 2 TIMES DAILY
Status: DISCONTINUED | OUTPATIENT
Start: 2023-12-09 | End: 2023-12-11

## 2023-12-09 RX ORDER — INSULIN ASPART 100 [IU]/ML
0-5 INJECTION, SOLUTION INTRAVENOUS; SUBCUTANEOUS EVERY 6 HOURS PRN
Status: DISCONTINUED | OUTPATIENT
Start: 2023-12-09 | End: 2023-12-16 | Stop reason: HOSPADM

## 2023-12-09 RX ORDER — TAMSULOSIN HYDROCHLORIDE 0.4 MG/1
1 CAPSULE ORAL DAILY
Status: DISCONTINUED | OUTPATIENT
Start: 2023-12-09 | End: 2023-12-16 | Stop reason: HOSPADM

## 2023-12-09 RX ORDER — IBUPROFEN 200 MG
16 TABLET ORAL
Status: DISCONTINUED | OUTPATIENT
Start: 2023-12-09 | End: 2023-12-16 | Stop reason: HOSPADM

## 2023-12-09 RX ORDER — IBUPROFEN 200 MG
24 TABLET ORAL
Status: DISCONTINUED | OUTPATIENT
Start: 2023-12-09 | End: 2023-12-16 | Stop reason: HOSPADM

## 2023-12-09 RX ORDER — GLUCAGON 1 MG
1 KIT INJECTION
Status: DISCONTINUED | OUTPATIENT
Start: 2023-12-09 | End: 2023-12-11

## 2023-12-09 RX ORDER — SODIUM CHLORIDE 0.9 % (FLUSH) 0.9 %
10 SYRINGE (ML) INJECTION EVERY 12 HOURS PRN
Status: DISCONTINUED | OUTPATIENT
Start: 2023-12-09 | End: 2023-12-16 | Stop reason: HOSPADM

## 2023-12-09 RX ORDER — GLUCAGON 1 MG
1 KIT INJECTION
Status: DISCONTINUED | OUTPATIENT
Start: 2023-12-09 | End: 2023-12-16 | Stop reason: HOSPADM

## 2023-12-09 RX ORDER — NALOXONE HCL 0.4 MG/ML
0.02 VIAL (ML) INJECTION
Status: DISCONTINUED | OUTPATIENT
Start: 2023-12-09 | End: 2023-12-16 | Stop reason: HOSPADM

## 2023-12-09 RX ADMIN — NIFEDIPINE 60 MG: 30 TABLET, FILM COATED, EXTENDED RELEASE ORAL at 08:12

## 2023-12-09 RX ADMIN — PIPERACILLIN SODIUM AND TAZOBACTAM SODIUM 4.5 G: 4; .5 INJECTION, POWDER, FOR SOLUTION INTRAVENOUS at 10:12

## 2023-12-09 RX ADMIN — OXYCODONE HYDROCHLORIDE AND ACETAMINOPHEN 1 TABLET: 5; 325 TABLET ORAL at 12:12

## 2023-12-09 RX ADMIN — CARVEDILOL 12.5 MG: 12.5 TABLET, FILM COATED ORAL at 08:12

## 2023-12-09 RX ADMIN — TAMSULOSIN HYDROCHLORIDE 0.4 MG: 0.4 CAPSULE ORAL at 08:12

## 2023-12-09 RX ADMIN — ACETAMINOPHEN 650 MG: 325 TABLET ORAL at 10:12

## 2023-12-09 NOTE — HPI
Elbert Still is a 68 y.o. Male with ESRD on HD (MWF), HTN, and T2DM s/p multiple surgeries for AV fistula (most recent 2 weeks ago) who presents for 1 week of pain, swelling, erythema, and warmth to his LUE fistula site. He tells me that today (12/8) his nephrologist got blood culture results that were drawn on 12/6 that were positive for gram positive cocci in clusters and chains in 2/2 bottles, and told him to present to the ED for IV antibiotics. He does not currently get HD through the AV fistula in question, he has a tunneled catheter. He reports he is in the process of being evaluated for kidney transplant.     The patient reports additional symptoms of mild cough/runny nose that he has had for about a week. These symptoms are similar to some his wife had about 2 weeks ago. He denies fevers, chills, SOB, N/V, abdominal pain, headaches, lightheadedness, or urinary symptoms.     In the ED, he was hypertensive on arrival but otherwise stable. CBC with no leukocytosis, HGB 11.3. CMP with BUN/Cr 34/6.0. Lactate 1.11. US HD access with concerns for hematoma vs abscess with soft tissue changes suggestive of inflammation/infection. Vascular surgery was consulted in the ED and recommended admission to Hospital Medicine to continue IV antibiotics and to keep NPO for possible debridement in the morning.

## 2023-12-09 NOTE — ANESTHESIA PREPROCEDURE EVALUATION
[facility]  Anesthesia Pre-Operative Evaluation         Patient Name: Elbert Still Jr.  YOB: 1955  MRN: [id number]    SUBJECTIVE:     Pre-operative evaluation for Procedure(s) (LRB):  REVISION, AV GRAFT, LOWER EXTREMITY, W/O THROMBECTOMY (Left)     12/09/2023    Elbert Still Jr. is a 68 y.o. male w/ a significant PMHx of ESRD on HD (MWF), HTN, and T2DM s/p multiple surgeries for AV fistula (most recent 2 weeks ago) who presents for 1 week of pain, swelling, erythema, and warmth to his LUE fistula site. His nephrologist got blood culture results that were drawn on 12/6 that were positive for gram positive cocci in clusters and chains in 2/2 bottles.    Patient now presents for the above procedure(s).      LDA:        Hemodialysis Catheter 05/05/22 0814 right internal jugular (Active)   Line Necessity Review CRRT/HD 12/08/23 0610   Verification by X-ray No 11/15/23 0603   Site Assessment No drainage;No redness;No swelling;No warmth 12/08/23 0942   Line Securement Device Secured with sutures 11/27/23 0951   Dressing Type Gauze 12/08/23 0942   Dressing Status Intact;Dry;Clean 12/08/23 0942   Dressing Intervention First dressing 12/08/23 0942   Date on Dressing 12/08/23 12/08/23 0942   Dressing Due to be Changed 12/11/23 12/08/23 0942   Venous Patency/Care heparin locked;intermittent infusion cap changed 12/08/23 0942   Arterial Patency/Care heparin locked;intermittent infusion cap changed 12/08/23 0942   Number of days: 583            Hemodialysis AV Graft 11/08/23 1335 Left upper arm (Active)   Site Assessment Pink;Warm to touch;Tender 12/08/23 0942   Patency Present;Bruit 11/22/23 0930   Site Condition Warm to touch 12/08/23 0942   Number of days: 31            Peripheral IV - Single Lumen 12/08/23 2237 20 G Right Antecubital (Active)   Site Assessment Clean;Dry;Intact 12/08/23 2237   Extremity Assessment Distal to IV No abnormal discoloration;No redness;No swelling 12/08/23 2237    Dressing Status Clean;Dry;Intact 12/08/23 2237   Dressing Intervention Integrity maintained 12/08/23 2237   Number of days: 0            Hemodialysis AV Fistula 09/06/22 Left upper arm (Active)   Needle Size 17ga 12/01/23 0611   Site Assessment Warm to touch;Swollen 12/06/23 0936   Patency Present;Bruit 12/06/23 0936   Status Not Accessed 12/06/23 0936   Flows Good 12/04/23 0930   Dressing Intervention First dressing 12/04/23 0930   Dressing Status Clean;Dry;Intact 12/04/23 0930   Site Condition No complications 12/04/23 0930   Dressing Gauze 12/04/23 0930   Number of days: 459       Prev airway: None documented.    Drips: None documented.      Patient Active Problem List   Diagnosis    -Diabetes mellitus due to underlying condition with diabetic nephropathy - diet controlled    Proteinuria    -Essential (primary) hypertension    Mixed hyperlipidemia    -CKD (chronic kidney disease) stage 4, GFR 15-29 ml/min    -Gout    Anemia of chronic disease    -HLD associated with type 2 DM    Hyperkalemia    Acute kidney injury superimposed on chronic kidney disease    -Dialysis patient - MWF - started 5/2022    ESRD on dialysis    Nuclear sclerosis of both eyes    Refractive error    Cortical age-related cataract    Status post placement of arteriovenous graft    BMI 28.0-28.9,adult    Steal syndrome dialysis vascular access, initial encounter    Arteriovenous fistula    ESRD (end stage renal disease) on dialysis    ESRD (end stage renal disease)    Other neutropenia    End stage renal failure on dialysis    Stenosis of arteriovenous dialysis fistula    Hypocalcemia    Bacteremia    Type 2 diabetes mellitus       Review of patient's allergies indicates:   Allergen Reactions    Iodine and iodide containing products Hives     Hypotension, Flushing       Current Inpatient Medications:   carvediloL  12.5 mg Oral BID    NIFEdipine  60 mg Oral BID    piperacillin-tazobactam (Zosyn) IV (PEDS and ADULTS) (extended infusion is not  appropriate)  4.5 g Intravenous Q12H    tamsulosin  1 capsule Oral Daily       Current Facility-Administered Medications on File Prior to Encounter   Medication Dose Route Frequency Provider Last Rate Last Admin    0.9%  NaCl infusion   Intravenous Continuous Kim Lutz NP        0.9%  NaCl infusion   Intravenous Continuous Herminia Durant MD        ondansetron disintegrating tablet 8 mg  8 mg Oral Q8H PRN Herminia Durant MD         Current Outpatient Medications on File Prior to Encounter   Medication Sig Dispense Refill    acetaminophen (TYLENOL) 500 MG tablet Take 2 tablets (1,000 mg total) by mouth every 6 (six) hours as needed for Pain. 30 tablet 0    atorvastatin (LIPITOR) 20 MG tablet TAKE 1 TABLET BY MOUTH EVERY EVENING 90 tablet 0    carvediloL (COREG) 12.5 MG tablet Take 1 tablet (12.5 mg total) by mouth 2 (two) times daily. 60 tablet 11    colchicine (COLCRYS) 0.6 mg tablet Take 0.6 mg by mouth as needed (for gout flare). Take half tab (0.3 mg) by mouth daily as needed for gout flare. 90 tablet 3    diclofenac sodium (VOLTAREN) 1 % Gel Apply 2 g topically once daily. 20 g 0    fluticasone propionate (FLONASE) 50 mcg/actuation nasal spray 2 sprays (100 mcg total) by Each Nostril route once daily. 16 g 11    furosemide (LASIX) 80 MG tablet TAKE 1 TABLET(80 MG) BY MOUTH TWICE DAILY 60 tablet 11    LIDOcaine (LIDODERM) 5 % Place 2 patches onto the skin once daily. Remove & Discard patch within 12 hours or as directed by MD 20 patch 0    NIFEdipine (PROCARDIA-XL) 60 MG (OSM) 24 hr tablet Take 1 tablet (60 mg total) by mouth 2 (two) times a day. 60 tablet 11    oxyCODONE (ROXICODONE) 5 MG immediate release tablet Take 1 tablet (5 mg total) by mouth every 6 (six) hours as needed for Pain. 8 tablet 0    sevelamer carbonate (RENVELA) 800 mg Tab TAKE 2 TABLETS(1600 MG) BY MOUTH THREE TIMES DAILY WITH MEALS 180 tablet 3    tamsulosin (FLOMAX) 0.4 mg Cap Take 1 capsule (0.4 mg total) by mouth once daily. 90  capsule 3    aspirin (ECOTRIN) 81 MG EC tablet Take 1 tablet (81 mg total) by mouth once daily. (Patient not taking: Reported on 12/9/2023) 90 tablet 3    blood sugar diagnostic Strp Use to check blood glucose once a day. 100 each 3    blood-glucose meter Misc Use to check blood glucose twice a day. 1 each 0    lancets 30 gauge Misc Use to check blood glucose twice a day. 100 each 5    LIDOcaine-prilocaine (EMLA) cream SMARTSIG:sparingly Topical As Directed      [DISCONTINUED] amLODIPine (NORVASC) 10 MG tablet TAKE 1 TABLET BY MOUTH ONCE DAILY 90 tablet 3    [DISCONTINUED] glipiZIDE (GLUCOTROL) 10 MG tablet TAKE 1 TABLET BY MOUTH TWICE DAILY WITH MEALS 180 tablet 1    [DISCONTINUED] metoprolol succinate (TOPROL-XL) 25 MG 24 hr tablet Take 1 tablet (25 mg total) by mouth once daily. 90 tablet 3       Past Surgical History:   Procedure Laterality Date    ADENOIDECTOMY      ADENOIDECTOMY      AV FISTULA PLACEMENT Left 09/06/2022    Procedure: CREATION, AV FISTULA;  Surgeon: Prince Gardiner MD;  Location: [facility] OR [address] [place] [address];  Service: Peripheral Vascular;  Laterality: Left;    FISTULOGRAM Left 2/16/2023    Procedure: FISTULOGRAM;  Surgeon: Prince Gardiner MD;  Location: [address];  Service: Peripheral Vascular;  Laterality: Left;  Given to the sterile field.     FISTULOGRAM Left 7/6/2023    Procedure: Fistulogram;  Surgeon: LETY Rayo III, MD;  Location: [facility] OR [address] [place] [address];  Service: Peripheral Vascular;  Laterality: Left;  1.9 min  22.86 mGy  3.8862 Gy.cm  20ml Dye     nasal septum repair      NASAL SEPTUM SURGERY      PERCUTANEOUS TRANSLUMINAL ANGIOPLASTY OF ARTERIOVENOUS FISTULA Left 11/22/2022    Procedure: PTA, AV FISTULA;  Surgeon: Prince Gardiner MD;  Location: [facility] CATH LAB;  Service: Peripheral Vascular;  Laterality: Left;    PERCUTANEOUS TRANSLUMINAL ANGIOPLASTY OF ARTERIOVENOUS FISTULA Left 2/16/2023    Procedure: PTA, AV FISTULA;  Surgeon: Prince Gardiner MD;  Location: [facility] OR [address] [place] [address];  Service:  Peripheral Vascular;  Laterality: Left;  4.8 min  43.08 mGy  4.0682 Gy.cm  32ml Dye    PERCUTANEOUS TRANSLUMINAL ANGIOPLASTY OF ARTERIOVENOUS FISTULA Left 7/6/2023    Procedure: PTA, AV FISTULA;  Surgeon: LETY Rayo III, MD;  Location: [facility] OR 2ND FLR;  Service: Peripheral Vascular;  Laterality: Left;    PROSTATE BIOPSY N/A 3/24/2023    Procedure: BIOPSY, PROSTATE;  Surgeon: Frankie Welch MD;  Location: [facility] OR 1ST FLR;  Service: Urology;  Laterality: N/A;  transrectal, 30min, uronav needed    REVISION OF ARTERIOVENOUS FISTULA Left 11/9/2023    Procedure: REVISION, AV FISTULA;  Surgeon: LETY Rayo III, MD;  Location: [facility] OR 2ND FLR;  Service: Vascular;  Laterality: Left;  LUE AVG revision    ROTATOR CUFF REPAIR Left     SALIVARY GLAND SURGERY      Tonsillectomy      TONSILLECTOMY      TRANSPOSITION OF BASILIC VEIN Left 11/1/2022    Procedure: TRANSPOSITION, VEIN, BASILIC;  Surgeon: Prince Gardiner MD;  Location: [facility] OR 2ND FLR;  Service: Peripheral Vascular;  Laterality: Left;  Left Brachial vein.       Social History     Socioeconomic History    Marital status:      Spouse name: Kristine Still   Occupational History     Employer: [employer]   Tobacco Use    Smoking status: Never    Smokeless tobacco: Never    Tobacco comments:     .  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       OBJECTIVE:     Vital Signs Range (Last 24H):  Temp:  [36.7 °C (98.1 °F)-37.1 °C (98.8 °F)]   Pulse:  [68-79]   Resp:  [14-18]   BP: (141-166)/(73-81)   SpO2:  [96 %-100 %]       Significant Labs:  Lab Results   Component Value Date    WBC 4.30 12/09/2023    HGB 10.7 (L) 12/09/2023    HCT 30.3 (L) 12/09/2023      12/09/2023    CHOL 68 (L) 10/27/2022    TRIG 98 10/27/2022    HDL 29 (L) 10/27/2022    ALT 6 (L) 12/09/2023    AST 15 12/09/2023     12/09/2023    K 3.9 12/09/2023     12/09/2023    CREATININE 6.7 (H) 12/09/2023    BUN 34 (H) 12/09/2023    CO2 24 12/09/2023    TSH 0.879 04/30/2022    PSA 4.7 (H) 10/27/2022    INR 1.1 11/22/2022    HGBA1C 4.4 (L) 10/04/2023       Diagnostic Studies: No relevant studies.    EKG:   Results for orders placed or performed during the hospital encounter of 12/08/23   EKG 12-lead    Collection Time: 12/08/23  9:48 PM    Narrative    Test Reason : R78.81,    Vent. Rate : 076 BPM     Atrial Rate : 076 BPM     P-R Int : 166 ms          QRS Dur : 072 ms      QT Int : 378 ms       P-R-T Axes : 030 -03 016 degrees     QTc Int : 425 ms    Normal sinus rhythm  Nonspecific T wave abnormality  Abnormal ECG  When compared with ECG of 19-SEP-2023 04:10,  Nonspecific T wave abnormality now evident in Inferior leads  Confirmed by Saulo DIEZ MD ([id number]) on 12/8/2023 9:52:57 PM    Referred By: [email or web address]   SELF           Confirmed By:Saulo DIEZ MD       2D ECHO:  TTE:  Results for orders placed or performed during the hospital encounter of 11/15/22   Echo   Result Value Ref Range    Ascending aorta 2.97 cm    STJ 2.82 cm    AV mean gradient 6 mmHg    Ao peak david 1.65 m/s    Ao VTI 37.09 cm    IVRT 79.92 msec    IVS 0.82 0.6 - 1.1 cm    LA size 3.84 cm    Left Atrium Major Axis 5.91 cm    Left Atrium Minor Axis 5.88 cm    LVIDd 4.81 3.5 - 6.0 cm    LVIDs 3.17 2.1 - 4.0 cm    LVOT diameter 2.27 cm    LVOT peak VTI 24.17 cm    Posterior Wall 0.80 0.6 - 1.1 cm    MV Peak A David 0.83 m/s    E wave deceleration time 197.88 msec    MV Peak E David 0.87 m/s    PV Peak D David 0.56 m/s    PV Peak S David 0.57 m/s    RA Major Axis 5.53 cm    RA Width 4.83 cm    RVDD 3.95 cm    Sinus 3.31 cm    TAPSE 2.43 cm    TR Max David 2.70 m/s    TDI LATERAL 0.11 m/s    TDI SEPTAL 0.08 m/s    LA WIDTH 4.44 cm    MV stenosis  "pressure 1/2 time 40.86 ms    LV Diastolic Volume 107.83 mL    LV Systolic Volume 40.18 mL    RV S' 13.90 cm/s    LVOT peak kellie 1.16 m/s    Mr max kellie 0.06 m/s    LA volume (mod) 65.97 cm3    MV "A" wave duration 11.13 msec    MV mean gradient 1 mmHg    MV peak gradient 3 mmHg    MV VTI 20.16 cm    LV LATERAL E/E' RATIO 7.91 m/s    LV SEPTAL E/E' RATIO 10.88 m/s    FS 34 %    LA volume 85.43 cm3    LV mass 129.20 g    Left Ventricle Relative Wall Thickness 0.33 cm    AV valve area 2.64 cm2    AV Velocity Ratio 0.70     AV index (prosthetic) 0.65     MV valve area p 1/2 method 5.38 cm2    MV valve area by continuity eq 4.85 cm2    E/A ratio 1.05     Mean e' 0.10 m/s    Pulm vein S/D ratio 1.02     LVOT area 4.0 cm2    LVOT stroke volume 97.77 cm3    AV peak gradient 11 mmHg    E/E' ratio 9.16 m/s    Triscuspid Valve Regurgitation Peak Gradient 29 mmHg    BSA 2.25 m2    LV Systolic Volume Index 18.1 mL/m2    LV Diastolic Volume Index 48.57 mL/m2    LA Volume Index 38.5 mL/m2    LV Mass Index 58 g/m2    LA Volume Index (Mod) 29.7 mL/m2    Right Atrial Pressure (from IVC) 3 mmHg    EF 63 %    TV resting pulmonary artery pressure 32 mmHg    Narrative    · The left ventricle is normal in size with normal systolic function.  · The estimated ejection fraction is 63%.  · Mild left atrial enlargement.  · Normal left ventricular diastolic function.  · Normal right ventricular size with normal right ventricular systolic   function.  · Mild right atrial enlargement.  · Mild tricuspid regurgitation.  · Normal central venous pressure (3 mmHg).  · The estimated PA systolic pressure is 32 mmHg.          SYD:  No results found. However, due to the size of the patient record, not all encounters were searched. Please check Results Review for a complete set of results.    ASSESSMENT/PLAN:         Pre-op Assessment    I have reviewed the Patient Summary Reports.     I have reviewed the Nursing Notes. I have reviewed the NPO Status.   I " have reviewed the Medications.     Review of Systems  Anesthesia Hx:   History of prior surgery of interest to airway management or planning:  Previous anesthesia: Nerve Block        Denies Family Hx of Anesthesia complications.    Denies Personal Hx of Anesthesia complications.                    Hematology/Oncology:    Oncology Normal    -- Anemia:                                  EENT/Dental:  EENT/Dental Normal           Cardiovascular:     Hypertension          PVD                        Hypertension         Pulmonary:  Pulmonary Normal                       Renal/:  Chronic Renal Disease        Kidney Function/Disease             Hepatic/GI:  Hepatic/GI Normal                 Musculoskeletal:  Arthritis               Neurological:  Neurology Normal                                      Endocrine:  Diabetes    Diabetes                      Psych:  Psychiatric Normal                    Physical Exam  General: Well nourished, Cooperative, Alert and Oriented    Airway:  Mallampati: II / I  Mouth Opening: Normal  TM Distance: Normal  Tongue: Normal  Neck ROM: Normal ROM    Dental:  Periodontal disease        Anesthesia Plan  Type of Anesthesia, risks & benefits discussed:    Anesthesia Type: Gen Natural Airway, MAC, Regional  Intra-op Monitoring Plan: Standard ASA Monitors  Post Op Pain Control Plan: multimodal analgesia, IV/PO Opioids PRN and peripheral nerve block  Induction:  IV  Airway Plan: Direct, Post-Induction  Informed Consent: Informed consent signed with the Patient and all parties understand the risks and agree with anesthesia plan.  All questions answered.   ASA Score: 3  Day of Surgery Review of History & Physical: H&P Update referred to the surgeon/provider.    Ready For Surgery From Anesthesia Perspective.     .

## 2023-12-09 NOTE — SUBJECTIVE & OBJECTIVE
(Not in a hospital admission)      Review of patient's allergies indicates:   Allergen Reactions    Iodine and iodide containing products Hives     Hypotension, Flushing       Past Medical History:   Diagnosis Date    Anemia     Diabetes mellitus     Diabetes mellitus, type 2     Disorder of kidney and ureter     ESRD (end stage renal disease)     Essential (primary) hypertension 2017    Gout, unspecified      jaundice, unspecified     Nuclear sclerosis of both eyes 2022     Past Surgical History:   Procedure Laterality Date    ADENOIDECTOMY      ADENOIDECTOMY      AV FISTULA PLACEMENT Left 2022    Procedure: CREATION, AV FISTULA;  Surgeon: Prince Gardiner MD;  Location: [facility] OR [address] [place] [address];  Service: Peripheral Vascular;  Laterality: Left;    FISTULOGRAM Left 2023    Procedure: FISTULOGRAM;  Surgeon: Prince Gardiner MD;  Location: [address];  Service: Peripheral Vascular;  Laterality: Left;  Given to the sterile field.     FISTULOGRAM Left 2023    Procedure: Fistulogram;  Surgeon: LETY Rayo III, MD;  Location: [address];  Service: Peripheral Vascular;  Laterality: Left;  1.9 min  22.86 mGy  3.8862 Gy.cm  20ml Dye     nasal septum repair      NASAL SEPTUM SURGERY      PERCUTANEOUS TRANSLUMINAL ANGIOPLASTY OF ARTERIOVENOUS FISTULA Left 2022    Procedure: PTA, AV FISTULA;  Surgeon: Prince Gardiner MD;  Location: [facility] CATH LAB;  Service: Peripheral Vascular;  Laterality: Left;    PERCUTANEOUS TRANSLUMINAL ANGIOPLASTY OF ARTERIOVENOUS FISTULA Left 2023    Procedure: PTA, AV FISTULA;  Surgeon: Prince Gardiner MD;  Location: [address];  Service: Peripheral Vascular;  Laterality: Left;  4.8 min  43.08 mGy  4.0682 Gy.cm  32ml Dye    PERCUTANEOUS TRANSLUMINAL ANGIOPLASTY OF ARTERIOVENOUS FISTULA Left 2023    Procedure: PTA, AV FISTULA;  Surgeon: LETY Rayo III, MD;  Location: [address];  Service: Peripheral Vascular;  Laterality: Left;     PROSTATE BIOPSY N/A 3/24/2023    Procedure: BIOPSY, PROSTATE;  Surgeon: Frankie Welch MD;  Location: [facility] OR 1ST FLR;  Service: Urology;  Laterality: N/A;  transrectal, 30min, uronav needed    REVISION OF ARTERIOVENOUS FISTULA Left 11/9/2023    Procedure: REVISION, AV FISTULA;  Surgeon: LETY Rayo III, MD;  Location: [facility] OR 2ND FLR;  Service: Vascular;  Laterality: Left;  LUE AVG revision    ROTATOR CUFF REPAIR Left     SALIVARY GLAND SURGERY      Tonsillectomy      TONSILLECTOMY      TRANSPOSITION OF BASILIC VEIN Left 11/1/2022    Procedure: TRANSPOSITION, VEIN, BASILIC;  Surgeon: Prince Gardiner MD;  Location: [facility] OR 2ND FLR;  Service: Peripheral Vascular;  Laterality: Left;  Left Brachial vein.     Family History       Problem Relation (Age of Onset)    Cancer Sister    Heart disease Mother    Hypertension Mother, Sister    Kidney disease Mother    No Known Problems Father    Seizures Sister    Stroke Sister          Tobacco Use    Smoking status: Never    Smokeless tobacco: Never    Tobacco comments:     .  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     Substance and Sexual Activity    Alcohol use: Yes     Alcohol/week: 2.0 standard drinks of alcohol     Types: 1 Glasses of wine, 1 Cans of beer per week     Comment: Socially    Drug use: No    Sexual activity: Yes     Partners: Female     Review of Systems   Constitutional:  Negative for activity change, appetite change and chills.   HENT:  Negative for congestion, ear pain and hearing loss.    Eyes:  Negative for pain, discharge and itching.   Respiratory:  Negative for choking, chest tightness and shortness of breath.    Cardiovascular:  Negative for chest pain and palpitations.   Gastrointestinal:  Negative for abdominal distention, abdominal pain and anal bleeding.   Genitourinary:  Negative for difficulty urinating and dysuria.   Skin:  Positive for color change.     Objective:     Vital Signs (Most Recent):  Temp: 98.1 °F (36.7 °C) (12/09/23  0816)  Pulse: 73 (12/09/23 0816)  Resp: 17 (12/09/23 0816)  BP: (!) 166/78 (12/09/23 0817)  SpO2: 99 % (12/09/23 0816) Vital Signs (24h Range):  Temp:  [98.1 °F (36.7 °C)-98.8 °F (37.1 °C)] 98.1 °F (36.7 °C)  Pulse:  [63-79] 73  Resp:  [14-18] 17  SpO2:  [96 %-99 %] 99 %  BP: (149-166)/(76-81) 166/78     Weight: 97.5 kg (215 lb)  Body mass index is 27.6 kg/m².      Physical Exam  Constitutional:       Appearance: Normal appearance.   HENT:      Head: Normocephalic and atraumatic.      Nose: Nose normal.      Mouth/Throat:      Mouth: Mucous membranes are moist.      Pharynx: Oropharynx is clear.   Eyes:      Extraocular Movements: Extraocular movements intact.      Conjunctiva/sclera: Conjunctivae normal.   Cardiovascular:      Rate and Rhythm: Normal rate and regular rhythm.      Comments: Permacath in place, no evidence of pus, erythema, induration or tenderness to palpation around the catheter  Pulmonary:      Effort: Pulmonary effort is normal.      Breath sounds: Normal breath sounds.   Abdominal:      General: Abdomen is flat.      Palpations: Abdomen is soft.   Skin:     General: Skin is warm and dry.      Capillary Refill: Capillary refill takes less than 2 seconds.      Comments: RUE incision well healed however erythema, induration, and warmth noted along with tenderness to palpation near/around fistula anastamosis   Neurological:      General: No focal deficit present.      Mental Status: He is alert.          Significant Labs:  All pertinent labs from the last 24 hours have been reviewed.    Significant Diagnostics:  I have reviewed all pertinent imaging results/findings within the past 24 hours.

## 2023-12-09 NOTE — PROGRESS NOTES
"Pharmacokinetic Initial Assessment: IV Vancomycin    Assessment/Plan:  Patient received vancomycin loading dose of 2000 mg in the ED.  Initiate vancomycin pulse dosing; re-dose if vancomycin level < 20 mcg/mL.  Desired empiric serum trough concentration is 15 to 20 mcg/mL.  Draw vancomycin random level on 12/10 with AM labs.  Pharmacy will continue to follow and monitor vancomycin.      Please contact pharmacy at extension [phone] with any questions regarding this assessment.     Thank you for the consult,   Cait Giang       Patient brief summary:  Elbert Still Jr. is a 68 y.o. male initiated on antimicrobial therapy with IV Vancomycin for treatment of suspected bacteremia    Drug Allergies:   Review of patient's allergies indicates:   Allergen Reactions    Iodine and iodide containing products Hives     Hypotension, Flushing       Actual Body Weight:   97.5 kg    Renal Function:   Estimated Creatinine Clearance: 14.1 mL/min (A) (based on SCr of 6 mg/dL (H)).,     Dialysis Method (if applicable):  intermittent HD    CBC (last 72 hours):  Recent Labs   Lab Result Units 12/08/23  2153   WBC K/uL 4.85   Hemoglobin g/dL 11.3*   Hematocrit % 32.4*   Platelets K/uL 192   Gran % % 63.5   Lymph % % 19.6   Mono % % 12.2   Eosinophil % % 4.1   Basophil % % 0.4   Differential Method  Automated       Metabolic Panel (last 72 hours):  Recent Labs   Lab Result Units 12/08/23 2153 12/08/23  2156   Sodium mmol/L 137  --    Potassium mmol/L 4.1  --    Chloride mmol/L 102  --    CO2 mmol/L 26  --    Glucose mg/dL 122*  --    Glucose, UA   --  2+*   BUN mg/dL 34*  --    Creatinine mg/dL 6.0*  --    Albumin g/dL 3.9  --    Total Bilirubin mg/dL 0.3  --    Alkaline Phosphatase U/L 119  --    AST U/L 17  --    ALT U/L 7*  --        Drug levels (last 3 results):  No results for input(s): "VANCOMYCINRA", "VANCORANDOM", "VANCOMYCINPE", "VANCOPEAK", "VANCOMYCINTR", "VANCOTROUGH" in the last 72 hours.    Microbiologic Results:  Microbiology " Results (last 7 days)       Procedure Component Value Units Date/Time    Blood culture [[id number]] Collected: 12/09/23 0738    Order Status: Sent Specimen: Blood from Peripheral, Hand, Right Updated: 12/09/23 0750    Blood culture [[id number]] Collected: 12/09/23 0723    Order Status: Sent Specimen: Blood from Peripheral, Antecubital, Right Updated: 12/09/23 0750

## 2023-12-09 NOTE — PHARMACY MED REC
"Admission Medication History     The home medication history was taken by Angela Lutz.    You may go to "Admission" then "Reconcile Home Medications" tabs to review and/or act upon these items.     The home medication list has been updated by the Pharmacy department.   Please read ALL comments highlighted in yellow.   Please address this information as you see fit.    Feel free to contact us if you have any questions or require assistance.      The medications listed below were removed from the home medication list. Please reorder if appropriate:  Patient reports no longer taking the following medication(s):  AMLODIPINE 10 MG TABLET  BANOPHEN 50 MG CAPSULE  CEFTRIAXONE 1 G INJECTION  CINACALCET 60 MG TABLET  CIPROFLOXACIN 500 MG TABLET  GLIPIZIDE 10 MG TABLET  HYDROCODONE-ACETAMINOPHEN 5-325 MG TABLET  LIDOCAINE 2 % UROJET  METOPROLOL SUCCINATE 25 MG TABLET  PREDNISONE 50 MG TABLET  TRAMADOL 50 MG TABLET      Medications listed below were obtained from: Patient/family and Analytic software- [employer]  Current Outpatient Medications on File Prior to Encounter   Medication Sig    acetaminophen (TYLENOL) 500 MG tablet   Take 2 tablets (1,000 mg total) by mouth every 6 (six) hours as needed for Pain.    atorvastatin (LIPITOR) 20 MG tablet   Take 1 tablet by mouth every evening.    carvediloL (COREG) 12.5 MG tablet   Take 1 tablet (12.5 mg total) by mouth 2 (two) times daily.    colchicine (COLCRYS) 0.6 mg tablet     Take 0.6 mg by mouth as needed for gout flare.     diclofenac sodium (VOLTAREN) 1 % Gel   Apply 2 g topically once daily.    fluticasone propionate (FLONASE) 50 mcg/actuation nasal spray   Instill 2 sprays (100 mcg total) by each nostril route once daily.    furosemide (LASIX) 80 MG tablet   Take 1 tablet by mouth twice daily.    LIDOcaine (LIDODERM) 5 %       Place 2 patches onto the skin once daily. Remove & Discard patch within 12 hours or as directed by MD    NIFEdipine (PROCARDIA-XL) 60 MG (OSM) 24 hr " tablet   Take 1 tablet (60 mg total) by mouth 2 (two) times a day.    oxyCODONE (ROXICODONE) 5 MG immediate release tablet   Take 1 tablet (5 mg total) by mouth every 6 (six) hours as needed for pain.    sevelamer carbonate (RENVELA) 800 mg Tab   Take 2 tablets by mouth three times daily with meals.     tamsulosin (FLOMAX) 0.4 mg Cap   Take 1 capsule (0.4 mg total) by mouth once daily.    aspirin (ECOTRIN) 81 MG EC tablet   Take 1 tablet (81 mg total) by mouth once daily. (Patient not taking: Reported on 12/9/2023)    blood sugar diagnostic Strp   Use to check blood glucose once a day.    blood-glucose meter Misc   Use to check blood glucose twice a day.    lancets 30 gauge Misc   Use to check blood glucose twice a day.    LIDOcaine-prilocaine (EMLA) cream   Use :sparingly topically as directed       ,      Potential issues to be addressed PRIOR TO DISCHARGE  Patient requires education regarding drug therapies     Angela Lutz  EXT [id number]                  .

## 2023-12-09 NOTE — ED NOTES
Telemetry Verification   Patient placed on Telemetry Box  Verified with War Room  Box # [id number]   Monitor Tech Mariela   Rate 68   Rhythm NS

## 2023-12-09 NOTE — SUBJECTIVE & OBJECTIVE
Past Medical History:   Diagnosis Date    Anemia     Diabetes mellitus     Diabetes mellitus, type 2     Disorder of kidney and ureter     ESRD (end stage renal disease)     Essential (primary) hypertension 2017    Gout, unspecified      jaundice, unspecified     Nuclear sclerosis of both eyes 2022       Past Surgical History:   Procedure Laterality Date    ADENOIDECTOMY      ADENOIDECTOMY      AV FISTULA PLACEMENT Left 2022    Procedure: CREATION, AV FISTULA;  Surgeon: Prince Gardiner MD;  [facility];  Service: Peripheral Vascular;  Laterality: Left;    FISTULOGRAM Left 2023    Procedure: FISTULOGRAM;  Surgeon: Prince Gardiner MD;  [facility];  Service: Peripheral Vascular;  Laterality: Left;  Given to the sterile field.     FISTULOGRAM Left 2023    Procedure: Fistulogram;  Surgeon: LETY Rayo III, MD;  Location: [facility] OR [address] [place] [address];  Service: Peripheral Vascular;  Laterality: Left;  1.9 min  22.86 mGy  3.8862 Gy.cm  20ml Dye     nasal septum repair      NASAL SEPTUM SURGERY      PERCUTANEOUS TRANSLUMINAL ANGIOPLASTY OF ARTERIOVENOUS FISTULA Left 2022    Procedure: PTA, AV FISTULA;  Surgeon: Prince Gardiner MD;  Location: [facility] CATH LAB;  Service: Peripheral Vascular;  Laterality: Left;    PERCUTANEOUS TRANSLUMINAL ANGIOPLASTY OF ARTERIOVENOUS FISTULA Left 2023    Procedure: PTA, AV FISTULA;  Surgeon: Prince Gardiner MD;  [facility];  Service: Peripheral Vascular;  Laterality: Left;  4.8 min  43.08 mGy  4.0682 Gy.cm  32ml Dye    PERCUTANEOUS TRANSLUMINAL ANGIOPLASTY OF ARTERIOVENOUS FISTULA Left 2023    Procedure: PTA, AV FISTULA;  Surgeon: LETY Rayo III, MD;  Location: [facility] OR [address] [place] [address];  Service: Peripheral Vascular;  Laterality: Left;    PROSTATE BIOPSY N/A 3/24/2023    Procedure: BIOPSY, PROSTATE;  Surgeon: Frankie Welch MD;  Location: [facility] OR [address] [place] [address];  Service: Urology;  Laterality: N/A;  transrectal, 30min,  uronav needed    REVISION OF ARTERIOVENOUS FISTULA Left 11/9/2023    Procedure: REVISION, AV FISTULA;  Surgeon: LETY Rayo III, MD;  [facility];  Service: Vascular;  Laterality: Left;  LUE AVG revision    ROTATOR CUFF REPAIR Left     SALIVARY GLAND SURGERY      Tonsillectomy      TONSILLECTOMY      TRANSPOSITION OF BASILIC VEIN Left 11/1/2022    Procedure: TRANSPOSITION, VEIN, BASILIC;  Surgeon: Prince Gardiner MD;  [facility];  Service: Peripheral Vascular;  Laterality: Left;  Left Brachial vein.       Review of patient's allergies indicates:   Allergen Reactions    Iodine and iodide containing products Hives     Hypotension, Flushing     Current Facility-Administered Medications   Medication Frequency    acetaminophen tablet 650 mg Q6H PRN    carvediloL tablet 12.5 mg BID    cefTRIAXone injection 1 g Once    dextrose 10% bolus 125 mL 125 mL PRN    dextrose 10% bolus 250 mL 250 mL PRN    glucagon (human recombinant) injection 1 mg PRN    glucagon (human recombinant) injection 1 mg PRN    glucose chewable tablet 16 g PRN    glucose chewable tablet 24 g PRN    insulin aspart U-100 pen 0-5 Units Q6H PRN    LIDOcaine HCl 2% urojet Once    naloxone 0.4 mg/mL injection 0.02 mg PRN    NIFEdipine 24 hr tablet 60 mg BID    piperacillin-tazobactam (ZOSYN) 4.5 g in dextrose 5 % in water (D5W) 100 mL IVPB (MB+) Q12H    sodium chloride 0.9% flush 10 mL Q12H PRN    tamsulosin 24 hr capsule 0.4 mg Daily    vancomycin - pharmacy to dose pharmacy to manage frequency     Current Outpatient Medications   Medication    acetaminophen (TYLENOL) 500 MG tablet    aspirin (ECOTRIN) 81 MG EC tablet    atorvastatin (LIPITOR) 20 MG tablet    BANOPHEN 50 mg capsule    blood sugar diagnostic Strp    blood-glucose meter Misc    carvediloL (COREG) 12.5 MG tablet    cinacalcet (SENSIPAR) 60 MG Tab    ciprofloxacin HCl (CIPRO) 500 MG tablet    colchicine (COLCRYS) 0.6 mg tablet    diclofenac sodium (VOLTAREN) 1  % Gel    fluticasone propionate (FLONASE) 50 mcg/actuation nasal spray    furosemide (LASIX) 80 MG tablet    HYDROcodone-acetaminophen (NORCO) 5-325 mg per tablet    lancets 30 gauge Misc    LIDOcaine (LIDODERM) 5 %    LIDOcaine-prilocaine (EMLA) cream    NIFEdipine (PROCARDIA-XL) 60 MG (OSM) 24 hr tablet    oxyCODONE (ROXICODONE) 5 MG immediate release tablet    predniSONE (DELTASONE) 50 MG Tab    sevelamer carbonate (RENVELA) 800 mg Tab    tamsulosin (FLOMAX) 0.4 mg Cap    traMADoL (ULTRAM) 50 mg tablet     Facility-Administered Medications Ordered in Other Encounters   Medication Frequency    0.9%  NaCl infusion Continuous    0.9%  NaCl infusion Continuous    ondansetron disintegrating tablet 8 mg Q8H PRN     Family History       Problem Relation (Age of Onset)    Cancer Sister    Heart disease Mother    Hypertension Mother, Sister    Kidney disease Mother    No Known Problems Father    Seizures Sister    Stroke Sister          Tobacco Use    Smoking status: Never    Smokeless tobacco: Never    Tobacco comments:     .  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     Substance and Sexual Activity    Alcohol use: Yes     Alcohol/week: 2.0 standard drinks of alcohol     Types: 1 Glasses of wine, 1 Cans of beer per week     Comment: Socially    Drug use: No    Sexual activity: Yes     Partners: Female     Review of Systems   Constitutional:  Negative for activity change, appetite change and chills.   HENT:  Negative for congestion, ear pain and hearing loss.    Eyes:  Negative for pain, discharge and itching.   Respiratory:  Negative for choking, chest tightness and shortness of breath.    Cardiovascular:  Negative for chest pain and palpitations.   Gastrointestinal:  Negative for abdominal distention, abdominal pain and anal bleeding.   Genitourinary:  Negative for difficulty urinating and dysuria.   Skin:  Positive for color change.   Psychiatric/Behavioral:  Negative for agitation, behavioral problems and confusion.       Objective:     Vital Signs (Most Recent):  Temp: 98.2 °F (36.8 °C) (12/09/23 1209)  Pulse: 68 (12/09/23 1209)  Resp: 17 (12/09/23 1209)  BP: (!) 141/73 (12/09/23 1209)  SpO2: 100 % (12/09/23 1209) Vital Signs (24h Range):  Temp:  [98.1 °F (36.7 °C)-98.8 °F (37.1 °C)] 98.2 °F (36.8 °C)  Pulse:  [68-79] 68  Resp:  [14-18] 17  SpO2:  [96 %-100 %] 100 %  BP: (141-166)/(73-81) 141/73     Weight: 97.5 kg (215 lb) (12/08/23 2053)  Body mass index is 27.6 kg/m².  Body surface area is 2.26 meters squared.    I/O last 3 completed shifts:  In: 600 [IV Piggyback:600]  Out: -      Physical Exam  Vitals and nursing note reviewed.   Constitutional:       Appearance: Normal appearance.   HENT:      Head: Normocephalic and atraumatic.      Nose: Nose normal.      Mouth/Throat:      Mouth: Mucous membranes are moist.      Pharynx: Oropharynx is clear.   Eyes:      Extraocular Movements: Extraocular movements intact.      Conjunctiva/sclera: Conjunctivae normal.   Cardiovascular:      Rate and Rhythm: Normal rate and regular rhythm.      Comments: Permacath in place, no evidence of pus, erythema, induration or tenderness to palpation around the catheter  Pulmonary:      Effort: Pulmonary effort is normal.      Breath sounds: Normal breath sounds.   Abdominal:      General: Abdomen is flat.      Palpations: Abdomen is soft.   Skin:     General: Skin is warm and dry.      Capillary Refill: Capillary refill takes less than 2 seconds.      Comments: RUE incision well healed however erythema, induration, and warmth noted along with tenderness to palpation near/around fistula anastamosis   Neurological:      General: No focal deficit present.      Mental Status: He is alert.   Psychiatric:         Mood and Affect: Mood normal.         Behavior: Behavior normal.          Significant Labs:  CBC:   Recent Labs   Lab 12/09/23  0823   WBC 4.30   RBC 3.61*   HGB 10.7*   HCT 30.3*      MCV 84   MCH 29.6   MCHC 35.3     CMP:   Recent  Labs   Lab 12/09/23  0823   GLU 89   CALCIUM 9.2   ALBUMIN 3.7   PROT 7.6      K 3.9   CO2 24      BUN 34*   CREATININE 6.7*   ALKPHOS 115   ALT 6*   AST 15   BILITOT 0.4     All labs within the past 24 hours have been reviewed.

## 2023-12-09 NOTE — HPI
"Mr. Still is a 68M with PMH of HTN, DM2, ESRD on HD MWF, multiple previous LUE AVF surgeries most recently 1 month ago, presents with edema and erythema of the fistula site. Per patient, his nephrologist had obtained blood cultures on 12/6, which resulted on 12/8 with GPCs in chains and clusters, so they told patient to come to the ER. Patient currently receiving HD via R IJ tunneled catheter. Infectious disease consulted for "Bacteremia c/f surgical site infection, abx recs and outpatient abx recs/length of treatment ".     He was vitally stable since admission and has been afebrile. His labs on admission were unremarkale besides slightly elevated CRP 23.5, ESR 50, and slightyl elevated procal. His UA, CXR were negative. 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 Simple focal hematoma or abscess also considered. This was followed by a CT arm without contrast which showed "subcutaneous edema throughout the upper arm that extends past the elbow, the remainder of the lower arm is not imaged. No organized collection to suggest abscess however limited evaluation given noncontrast technique."    On my interview, patient states his swelling and pain in right upper arm around AVG site has been persistent since his most recent revision. Currently pain 5/10, warm to touch, swollen. Denies any systemic symptoms.   "

## 2023-12-09 NOTE — ED TRIAGE NOTES
Elbert ARIANE Still Jr., a 68 y.o. male presents to the ED w/ complaint of infected dialysis fistula, MD told pt he had gram+ bacterial growth on L arm     Triage note:  Chief Complaint   Patient presents with    Post-op Problem     Had graft put in 2 weeks ago and  called and told me come get antibiotic bec it's infected     Review of patient's allergies indicates:   Allergen Reactions    Iodine and iodide containing products Hives     Hypotension, Flushing     Past Medical History:   Diagnosis Date    Anemia     Diabetes mellitus     Diabetes mellitus, type 2     Disorder of kidney and ureter     ESRD (end stage renal disease)     Essential (primary) hypertension 2017    Gout, unspecified      jaundice, unspecified     Nuclear sclerosis of both eyes 2022

## 2023-12-09 NOTE — CONSULTS
[facility] - Emergency Dept  Nephrology  Consult Note    Patient Name: Elbert Still Jr.  MRN: [id number]  Admission Date: 12/8/2023  Hospital Length of Stay: 0 days  Attending Provider: Mookie Acuña, *   Primary Care Physician: Angel Luis Boo MD  Principal Problem:Bacteremia    Inpatient consult to Nephrology  Consult performed by: Jesusita Pereira, DNP, FNP-C  Consult ordered by: Alejandrina Carmichael DO  Reason for consult: ESRD        Subjective:     HPI: The patient is a 68 y.o. Black or  Male with multiple co morbidities including ESRD on HD (MWF), HTN, and T2DM s/p multiple surgeries for AV fistula (most recent 2 weeks ago) who presents to ED on 12/8/2023 with complaints of 1 week of pain, swelling, erythema, and warmth to his LUE fistula site. He was called by Dr. Fierro on yesterday and was instructed to go to the ER for IV antibiotics after being informed that his blood cultures drawn on 12/6 were positive for gram positive cocci in clusters and chains in 2/2 bottles. He does not currently get HD through the AV fistula in question, he has a tunneled catheter. He reports he is in the process of being evaluated for kidney transplant. He denies fevers, chills, SOB, N/V, abdominal pain, headaches, lightheadedness, or urinary symptoms. In the ED, he was hypertensive on arrival but otherwise stable. Lactate 1.11. US HD access with concerns for hematoma vs abscess with soft tissue changes suggestive of inflammation/infection. Vascular surgery was consulted in the ED and recommended admission to Hospital Medicine to continue IV antibiotics and to keep NPO for possible debridement in the morning, 12/10. Nephrology consulted for management of ESRD and HD treatment.    Past Medical History:   Diagnosis Date    Anemia     Diabetes mellitus     Diabetes mellitus, type 2     Disorder of kidney and ureter     ESRD (end stage renal disease)     Essential (primary) hypertension 09/21/2017    Gout,  unspecified      jaundice, unspecified     Nuclear sclerosis of both eyes 2022       Past Surgical History:   Procedure Laterality Date    ADENOIDECTOMY      ADENOIDECTOMY      AV FISTULA PLACEMENT Left 2022    Procedure: CREATION, AV FISTULA;  Surgeon: Prince Gardiner MD;  Location: [facility] OR 2ND FLR;  Service: Peripheral Vascular;  Laterality: Left;    FISTULOGRAM Left 2023    Procedure: FISTULOGRAM;  Surgeon: Prince Gardiner MD;  Location: [facility] OR 2ND FLR;  Service: Peripheral Vascular;  Laterality: Left;  Given to the sterile field.     FISTULOGRAM Left 2023    Procedure: Fistulogram;  Surgeon: LETY Rayo III, MD;  Location: [facility] OR 2ND FLR;  Service: Peripheral Vascular;  Laterality: Left;  1.9 min  22.86 mGy  3.8862 Gy.cm  20ml Dye     nasal septum repair      NASAL SEPTUM SURGERY      PERCUTANEOUS TRANSLUMINAL ANGIOPLASTY OF ARTERIOVENOUS FISTULA Left 2022    Procedure: PTA, AV FISTULA;  Surgeon: Prince Gardiner MD;  Location: [facility] CATH LAB;  Service: Peripheral Vascular;  Laterality: Left;    PERCUTANEOUS TRANSLUMINAL ANGIOPLASTY OF ARTERIOVENOUS FISTULA Left 2023    Procedure: PTA, AV FISTULA;  Surgeon: Prince Gardiner MD;  Location: [facility] OR 2ND FLR;  Service: Peripheral Vascular;  Laterality: Left;  4.8 min  43.08 mGy  4.0682 Gy.cm  32ml Dye    PERCUTANEOUS TRANSLUMINAL ANGIOPLASTY OF ARTERIOVENOUS FISTULA Left 2023    Procedure: PTA, AV FISTULA;  Surgeon: LETY Rayo III, MD;  Location: [facility] OR 2ND FLR;  Service: Peripheral Vascular;  Laterality: Left;    PROSTATE BIOPSY N/A 3/24/2023    Procedure: BIOPSY, PROSTATE;  Surgeon: Frankie Welch MD;  Location: [facility] OR 1ST FLR;  Service: Urology;  Laterality: N/A;  transrectal, 30min, uronav needed    REVISION OF ARTERIOVENOUS FISTULA Left 2023    Procedure: REVISION, AV FISTULA;  Surgeon: LETY Rayo III, MD;  Location: [facility] OR 2ND FLR;  Service: Vascular;  Laterality: Left;  LUE AVG revision     ROTATOR CUFF REPAIR Left     SALIVARY GLAND SURGERY      Tonsillectomy      TONSILLECTOMY      TRANSPOSITION OF BASILIC VEIN Left 11/1/2022    Procedure: TRANSPOSITION, VEIN, BASILIC;  Surgeon: Prince Gardiner MD;  Location: [facility] OR [address] [place] [address];  Service: Peripheral Vascular;  Laterality: Left;  Left Brachial vein.       Review of patient's allergies indicates:   Allergen Reactions    Iodine and iodide containing products Hives     Hypotension, Flushing     Current Facility-Administered Medications   Medication Frequency    acetaminophen tablet 650 mg Q6H PRN    carvediloL tablet 12.5 mg BID    cefTRIAXone injection 1 g Once    dextrose 10% bolus 125 mL 125 mL PRN    dextrose 10% bolus 250 mL 250 mL PRN    glucagon (human recombinant) injection 1 mg PRN    glucagon (human recombinant) injection 1 mg PRN    glucose chewable tablet 16 g PRN    glucose chewable tablet 24 g PRN    insulin aspart U-100 pen 0-5 Units Q6H PRN    LIDOcaine HCl 2% urojet Once    naloxone 0.4 mg/mL injection 0.02 mg PRN    NIFEdipine 24 hr tablet 60 mg BID    piperacillin-tazobactam (ZOSYN) 4.5 g in dextrose 5 % in water (D5W) 100 mL IVPB (MB+) Q12H    sodium chloride 0.9% flush 10 mL Q12H PRN    tamsulosin 24 hr capsule 0.4 mg Daily    vancomycin - pharmacy to dose pharmacy to manage frequency     Current Outpatient Medications   Medication    acetaminophen (TYLENOL) 500 MG tablet    aspirin (ECOTRIN) 81 MG EC tablet    atorvastatin (LIPITOR) 20 MG tablet    BANOPHEN 50 mg capsule    blood sugar diagnostic Strp    blood-glucose meter Misc    carvediloL (COREG) 12.5 MG tablet    cinacalcet (SENSIPAR) 60 MG Tab    ciprofloxacin HCl (CIPRO) 500 MG tablet    colchicine (COLCRYS) 0.6 mg tablet    diclofenac sodium (VOLTAREN) 1 % Gel    fluticasone propionate (FLONASE) 50 mcg/actuation nasal spray    furosemide (LASIX) 80 MG tablet    HYDROcodone-acetaminophen (NORCO) 5-325 mg per tablet    lancets [facility]    LIDOcaine (LIDODERM) 5 %     LIDOcaine-prilocaine (EMLA) cream    NIFEdipine (PROCARDIA-XL) 60 MG (OSM) 24 hr tablet    oxyCODONE (ROXICODONE) 5 MG immediate release tablet    predniSONE (DELTASONE) 50 MG Tab    sevelamer carbonate (RENVELA) 800 mg Tab    tamsulosin (FLOMAX) 0.4 mg Cap    traMADoL (ULTRAM) 50 mg tablet     Facility-Administered Medications Ordered in Other Encounters   Medication Frequency    0.9%  NaCl infusion Continuous    0.9%  NaCl infusion Continuous    ondansetron disintegrating tablet 8 mg Q8H PRN     Family History       Problem Relation (Age of Onset)    Cancer Sister    Heart disease Mother    Hypertension Mother, Sister    Kidney disease Mother    No Known Problems Father    Seizures Sister    Stroke Sister          Tobacco Use    Smoking status: Never    Smokeless tobacco: Never    Tobacco comments:     .  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     Substance and Sexual Activity    Alcohol use: Yes     Alcohol/week: 2.0 standard drinks of alcohol     Types: 1 Glasses of wine, 1 Cans of beer per week     Comment: Socially    Drug use: No    Sexual activity: Yes     Partners: Female     Review of Systems   Constitutional:  Negative for activity change, appetite change and chills.   HENT:  Negative for congestion, ear pain and hearing loss.    Eyes:  Negative for pain, discharge and itching.   Respiratory:  Negative for choking, chest tightness and shortness of breath.    Cardiovascular:  Negative for chest pain and palpitations.   Gastrointestinal:  Negative for abdominal distention, abdominal pain and anal bleeding.   Genitourinary:  Negative for difficulty urinating and dysuria.   Skin:  Positive for color change.   Psychiatric/Behavioral:  Negative for agitation, behavioral problems and confusion.      Objective:     Vital Signs (Most Recent):  Temp: 98.2 °F (36.8 °C) (12/09/23 1209)  Pulse: 68 (12/09/23 1209)  Resp: 17 (12/09/23 1209)  BP: (!) 141/73 (12/09/23 1209)  SpO2: 100 % (12/09/23 1209) Vital Signs (24h  Range):  Temp:  [98.1 °F (36.7 °C)-98.8 °F (37.1 °C)] 98.2 °F (36.8 °C)  Pulse:  [68-79] 68  Resp:  [14-18] 17  SpO2:  [96 %-100 %] 100 %  BP: (141-166)/(73-81) 141/73     Weight: 97.5 kg (215 lb) (12/08/23 2053)  Body mass index is 27.6 kg/m².  Body surface area is 2.26 meters squared.    I/O last 3 completed shifts:  In: 600 [IV Piggyback:600]  Out: -      Physical Exam  Vitals and nursing note reviewed.   Constitutional:       Appearance: Normal appearance.   HENT:      Head: Normocephalic and atraumatic.      Nose: Nose normal.      Mouth/Throat:      Mouth: Mucous membranes are moist.      Pharynx: Oropharynx is clear.   Eyes:      Extraocular Movements: Extraocular movements intact.      Conjunctiva/sclera: Conjunctivae normal.   Cardiovascular:      Rate and Rhythm: Normal rate and regular rhythm.      Comments: Permacath in place, no evidence of pus, erythema, induration or tenderness to palpation around the catheter  Pulmonary:      Effort: Pulmonary effort is normal.      Breath sounds: Normal breath sounds.   Abdominal:      General: Abdomen is flat.      Palpations: Abdomen is soft.   Skin:     General: Skin is warm and dry.      Capillary Refill: Capillary refill takes less than 2 seconds.      Comments: RUE incision well healed however erythema, induration, and warmth noted along with tenderness to palpation near/around fistula anastamosis   Neurological:      General: No focal deficit present.      Mental Status: He is alert.   Psychiatric:         Mood and Affect: Mood normal.         Behavior: Behavior normal.          Significant Labs:  CBC:   Recent Labs   Lab 12/09/23  0823   WBC 4.30   RBC 3.61*   HGB 10.7*   HCT 30.3*      MCV 84   MCH 29.6   MCHC 35.3     CMP:   Recent Labs   Lab 12/09/23  0823   GLU 89   CALCIUM 9.2   ALBUMIN 3.7   PROT 7.6      K 3.9   CO2 24      BUN 34*   CREATININE 6.7*   ALKPHOS 115   ALT 6*   AST 15   BILITOT 0.4     All labs within the past 24 hours  have been reviewed.    Assessment/Plan:     Renal/  ESRD on dialysis  68 y.o. Black or  Male ESRD-HD [facility]Y presents to ED on 12/8/2023 with diagnosis of: Bacteremia [R78.81]   Nephrology consulted for inpatient ESRD-HD management    Outpatient HD Information:  -Dialysis modality: Hemodialysis  -Outpatient HD unit: [facility]  -Nephrologist: Luke KLINE  -HD TX days: Monday/Wednesday/Friday, duration of treatment: 3hrs  -Last HD TX prior to hospital admission: 12/08  -Dialysis access: dialysis catheter R CVC and L AVF (infected)  -Residual urine: Minium  -EDW: 97 kg     Assessment:   - Dialysis for metabolic clearance and volume management will be provided Monday AM.  - Continue to monitor intake and output  - Please avoid gadolinium, fleets, phos-based laxatives, NSAIDs  - Dialysis thrice weekly unless more urgent indications arise. Will evaluate RRT requirements Daily.    Anemia of ESRD   Recent Labs   Lab 12/08/23  2153 12/09/23  0823   WBC 4.85 4.30   HGB 11.3* 10.7*   HCT 32.4* 30.3*    177     Lab Results   Component Value Date    FESATURATED 24 04/30/2022    FERRITIN 865 (H) 11/01/2023       - Goal in ESRD is Hgb of 10-11. Hgb 10.7. At target.   - EPO can be administered and dosed per his OP unit upon discharge.    Mineral Bone Disease in ESRD   Lab Results   Component Value Date     (H) 11/15/2023    CALCIUM 9.2 12/09/2023    ALBUMIN 3.7 12/09/2023    CAION 1.20 08/15/2022    PHOS 4.1 12/09/2023       - F/U PO4, Mg, Calcium. And albumin levels daily.   - Renal diet with protein intake goal 1.5 g/kg/d with 1 L fluid restriction   - Restart home phos binder, phos 4.1    ID  * Bacteremia  - defer to primary and consult teams, plans for AVF debridement on tomorrow with vascular.         Thank you for your consult. I will follow-up with patient. Please contact us if you have any additional questions.    Jesusita Pereira, DWAIN, FNP-C  Nephrology  [facility] - Emergency Dept

## 2023-12-09 NOTE — SUBJECTIVE & OBJECTIVE
Past Medical History:   Diagnosis Date    Anemia     Diabetes mellitus     Diabetes mellitus, type 2     Disorder of kidney and ureter     ESRD (end stage renal disease)     Essential (primary) hypertension 2017    Gout, unspecified      jaundice, unspecified     Nuclear sclerosis of both eyes 2022       Past Surgical History:   Procedure Laterality Date    ADENOIDECTOMY      ADENOIDECTOMY      AV FISTULA PLACEMENT Left 2022    Procedure: CREATION, AV FISTULA;  Surgeon: Prince Gardiner MD;  [facility];  Service: Peripheral Vascular;  Laterality: Left;    FISTULOGRAM Left 2023    Procedure: FISTULOGRAM;  Surgeon: Prince Gardiner MD;  [facility];  Service: Peripheral Vascular;  Laterality: Left;  Given to the sterile field.     FISTULOGRAM Left 2023    Procedure: Fistulogram;  Surgeon: LETY Rayo III, MD;  Location: [facility] OR [address] [place] [address];  Service: Peripheral Vascular;  Laterality: Left;  1.9 min  22.86 mGy  3.8862 Gy.cm  20ml Dye     nasal septum repair      NASAL SEPTUM SURGERY      PERCUTANEOUS TRANSLUMINAL ANGIOPLASTY OF ARTERIOVENOUS FISTULA Left 2022    Procedure: PTA, AV FISTULA;  Surgeon: Prince Gardiner MD;  Location: [facility] CATH LAB;  Service: Peripheral Vascular;  Laterality: Left;    PERCUTANEOUS TRANSLUMINAL ANGIOPLASTY OF ARTERIOVENOUS FISTULA Left 2023    Procedure: PTA, AV FISTULA;  Surgeon: Prince Gardiner MD;  [facility];  Service: Peripheral Vascular;  Laterality: Left;  4.8 min  43.08 mGy  4.0682 Gy.cm  32ml Dye    PERCUTANEOUS TRANSLUMINAL ANGIOPLASTY OF ARTERIOVENOUS FISTULA Left 2023    Procedure: PTA, AV FISTULA;  Surgeon: LETY Rayo III, MD;  Location: [facility] OR [address] [place] [address];  Service: Peripheral Vascular;  Laterality: Left;    PROSTATE BIOPSY N/A 3/24/2023    Procedure: BIOPSY, PROSTATE;  Surgeon: Frankie Welch MD;  Location: [facility] OR [address] [place] [address];  Service: Urology;  Laterality: N/A;  transrectal, 30min,  uronav needed    REVISION OF ARTERIOVENOUS FISTULA Left 2023    Procedure: REVISION, AV FISTULA;  Surgeon: LETY Rayo III, MD;  Location: [facility] OR [address] [place] [address];  Service: Vascular;  Laterality: Left;  LUE AVG revision    ROTATOR CUFF REPAIR Left     SALIVARY GLAND SURGERY      Tonsillectomy      TONSILLECTOMY      TRANSPOSITION OF BASILIC VEIN Left 2022    Procedure: TRANSPOSITION, VEIN, BASILIC;  Surgeon: Prince Gardiner MD;  Location: [facility] OR [address] [place] [address];  Service: Peripheral Vascular;  Laterality: Left;  Left Brachial vein.       Review of patient's allergies indicates:   Allergen Reactions    Iodine and iodide containing products Hives     Hypotension, Flushing       Current Facility-Administered Medications on File Prior to Encounter   Medication    0.9%  NaCl infusion    0.9%  NaCl infusion    cefTRIAXone injection 1 g    LIDOcaine HCl 2% urojet    ondansetron disintegrating tablet 8 mg    [DISCONTINUED] heparin (porcine) injection 1,200 Units    [DISCONTINUED] heparin (porcine) injection 1,200 Units    [DISCONTINUED] heparin (porcine) injection 2,000 Units    [DISCONTINUED] heparin (porcine) injection 2,000 Units     Current Outpatient Medications on File Prior to Encounter   Medication Sig    acetaminophen (TYLENOL) 500 MG tablet Take 2 tablets (1,000 mg total) by mouth every 6 (six) hours as needed for Pain.    aspirin (ECOTRIN) 81 MG EC tablet Take 1 tablet (81 mg total) by mouth once daily.    atorvastatin (LIPITOR) 20 MG tablet TAKE 1 TABLET BY MOUTH EVERY EVENING    BANOPHEN 50 mg capsule SMARTSI Capsule(s) By Mouth    blood sugar diagnostic Strp Use to check blood glucose once a day.    blood-glucose meter Misc Use to check blood glucose twice a day.    carvediloL (COREG) 12.5 MG tablet Take 1 tablet (12.5 mg total) by mouth 2 (two) times daily.    cinacalcet (SENSIPAR) 60 MG Tab Take 30 mg by mouth daily with breakfast.    ciprofloxacin HCl (CIPRO) 500 MG tablet 1 pill one hour before  prostate biopsy    colchicine (COLCRYS) 0.6 mg tablet Take 0.6 mg by mouth as needed. Take half tab (0.3 mg) by mouth daily as needed for gout flare.    diclofenac sodium (VOLTAREN) 1 % Gel Apply 2 g topically once daily.    fluticasone propionate (FLONASE) 50 mcg/actuation nasal spray 2 sprays (100 mcg total) by Each Nostril route once daily.    furosemide (LASIX) 80 MG tablet TAKE 1 TABLET(80 MG) BY MOUTH TWICE DAILY    HYDROcodone-acetaminophen (NORCO) 5-325 mg per tablet Take 1 tablet by mouth every 6 (six) hours as needed for Pain.    lancets 30 gauge Misc Use to check blood glucose twice a day.    LIDOcaine (LIDODERM) 5 % Place 2 patches onto the skin once daily. Remove & Discard patch within 12 hours or as directed by MD    LIDOcaine-prilocaine (EMLA) cream SMARTSIG:sparingly Topical As Directed    NIFEdipine (PROCARDIA-XL) 60 MG (OSM) 24 hr tablet Take 1 tablet (60 mg total) by mouth 2 (two) times a day.    oxyCODONE (ROXICODONE) 5 MG immediate release tablet Take 1 tablet (5 mg total) by mouth every 6 (six) hours as needed for Pain.    predniSONE (DELTASONE) 50 MG Tab Take by mouth.    sevelamer carbonate (RENVELA) 800 mg Tab TAKE 2 TABLETS(1600 MG) BY MOUTH THREE TIMES DAILY WITH MEALS    tamsulosin (FLOMAX) 0.4 mg Cap Take 1 capsule (0.4 mg total) by mouth once daily.    traMADoL (ULTRAM) 50 mg tablet Take 1 tablet (50 mg total) by mouth every 8 (eight) hours as needed for Pain.    [DISCONTINUED] amLODIPine (NORVASC) 10 MG tablet TAKE 1 TABLET BY MOUTH ONCE DAILY    [DISCONTINUED] glipiZIDE (GLUCOTROL) 10 MG tablet TAKE 1 TABLET BY MOUTH TWICE DAILY WITH MEALS    [DISCONTINUED] metoprolol succinate (TOPROL-XL) 25 MG 24 hr tablet Take 1 tablet (25 mg total) by mouth once daily.     Family History       Problem Relation (Age of Onset)    Cancer Sister    Heart disease Mother    Hypertension Mother, Sister    Kidney disease Mother    No Known Problems Father    Seizures Sister    Stroke Sister           Tobacco Use    Smoking status: Never    Smokeless tobacco: Never    Tobacco comments:     .  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     Substance and Sexual Activity    Alcohol use: Yes     Alcohol/week: 2.0 standard drinks of alcohol     Types: 1 Glasses of wine, 1 Cans of beer per week     Comment: Socially    Drug use: No    Sexual activity: Yes     Partners: Female     Review of Systems   Constitutional:  Negative for chills and fever.   HENT:  Positive for rhinorrhea.    Respiratory:  Positive for cough. Negative for shortness of breath.    Cardiovascular:  Negative for chest pain.   Gastrointestinal:  Negative for abdominal pain, nausea and vomiting.   Genitourinary:  Negative for dysuria.   Musculoskeletal:  Negative for arthralgias and myalgias.   Neurological:  Negative for light-headedness and headaches.     Objective:     Vital Signs (Most Recent):  Temp: 98.1 °F (36.7 °C) (12/09/23 0816)  Pulse: 73 (12/09/23 0816)  Resp: 17 (12/09/23 0816)  BP: (!) 166/78 (12/09/23 0817)  SpO2: 99 % (12/09/23 0816) Vital Signs (24h Range):  Temp:  [98.1 °F (36.7 °C)-98.8 °F (37.1 °C)] 98.1 °F (36.7 °C)  Pulse:  [55-79] 73  Resp:  [14-18] 17  SpO2:  [96 %-99 %] 99 %  BP: (149-166)/(76-82) 166/78     Weight: 97.5 kg (215 lb)  Body mass index is 27.6 kg/m².     Physical Exam  Vitals and nursing note reviewed.   Constitutional:       General: He is not in acute distress.     Appearance: He is not ill-appearing, toxic-appearing or diaphoretic.   HENT:      Head: Normocephalic and atraumatic.      Right Ear: External ear normal.      Left Ear: External ear normal.      Mouth/Throat:      Mouth: Mucous membranes are moist.   Eyes:      General: No scleral icterus.        Right eye: No discharge.         Left eye: No discharge.   Cardiovascular:      Rate and Rhythm: Normal rate and regular rhythm.      Heart sounds: Normal heart sounds. No murmur heard.  Pulmonary:      Effort: Pulmonary effort is normal. No respiratory  distress.      Breath sounds: No wheezing or rales.   Abdominal:      General: Bowel sounds are normal. There is no distension.      Palpations: Abdomen is soft.      Tenderness: There is no abdominal tenderness. There is no guarding.   Musculoskeletal:         General: No deformity.      Cervical back: No rigidity.      Right lower leg: No edema.      Left lower leg: No edema.   Skin:     General: Skin is warm.      Coloration: Skin is not jaundiced.      Comments: Tenderness, erythema, edema in antecubital fossa at site inferior to AV graft   Neurological:      Mental Status: He is alert and oriented to person, place, and time. Mental status is at baseline.   Psychiatric:         Mood and Affect: Mood normal.         Behavior: Behavior normal.                Significant Labs: All pertinent labs within the past 24 hours have been reviewed.  CBC:   Recent Labs   Lab 12/08/23 2153 12/09/23  0823   WBC 4.85 4.30   HGB 11.3* 10.7*   HCT 32.4* 30.3*    177     CMP:   Recent Labs   Lab 12/08/23  2153      K 4.1      CO2 26   *   BUN 34*   CREATININE 6.0*   CALCIUM 9.6   PROT 8.0   ALBUMIN 3.9   BILITOT 0.3   ALKPHOS 119   AST 17   ALT 7*   ANIONGAP 9       Significant Imaging: I have reviewed all pertinent imaging results/findings within the past 24 hours.

## 2023-12-09 NOTE — ASSESSMENT & PLAN NOTE
Temp:  [98.1 °F (36.7 °C)-98.8 °F (37.1 °C)]   Pulse:  [63-79]   Resp:  [14-18]   BP: (149-166)/(76-82)   SpO2:  [96 %-99 %] .   Home meds for hypertension were reviewed and noted below.   Hypertension Medications               carvediloL (COREG) 12.5 MG tablet Take 1 tablet (12.5 mg total) by mouth 2 (two) times daily.    furosemide (LASIX) 80 MG tablet TAKE 1 TABLET(80 MG) BY MOUTH TWICE DAILY    NIFEdipine (PROCARDIA-XL) 60 MG (OSM) 24 hr tablet Take 1 tablet (60 mg total) by mouth 2 (two) times a day.          Plan:  - continue coreg, nifedipine  - hold lasix pending possible procedure, resume when appropriate

## 2023-12-09 NOTE — ASSESSMENT & PLAN NOTE
Patient's FSGs are controlled on no home medications  Last A1c reviewed-   Lab Results   Component Value Date    HGBA1C 4.4 (L) 10/04/2023     Most recent fingerstick glucose reviewed-   Recent Labs   Lab 12/09/23  0806   POCTGLUCOSE 95     Current correctional scale  Low  Maintain anti-hyperglycemic dose as follows-   Antihyperglycemics (From admission, onward)      Start     Stop Route Frequency Ordered    12/09/23 0832  insulin aspart U-100 pen 0-5 Units         -- SubQ Every 6 hours PRN 12/09/23 0732          Hold Oral hypoglycemics while patient is in the hospital.

## 2023-12-09 NOTE — HPI
The patient is a 68 y.o. Black or  Male with multiple co morbidities including ESRD on HD (MWF), HTN, and T2DM s/p multiple surgeries for AV fistula (most recent 2 weeks ago) who presents to ED on 12/8/2023 with complaints of 1 week of pain, swelling, erythema, and warmth to his LUE fistula site. He was called by Dr. Fierro on yesterday and was instructed to go to the ER for IV antibiotics after being informed that his blood cultures drawn on 12/6 were positive for gram positive cocci in clusters and chains in 2/2 bottles. He does not currently get HD through the AV fistula in question, he has a tunneled catheter. He reports he is in the process of being evaluated for kidney transplant. He denies fevers, chills, SOB, N/V, abdominal pain, headaches, lightheadedness, or urinary symptoms. In the ED, he was hypertensive on arrival but otherwise stable. Lactate 1.11. US HD access with concerns for hematoma vs abscess with soft tissue changes suggestive of inflammation/infection. Vascular surgery was consulted in the ED and recommended admission to Hospital Medicine to continue IV antibiotics and to keep NPO for possible debridement in the morning, 12/10.   Interventional Nephrology consulted for placement of tunneled line.

## 2023-12-09 NOTE — TELEPHONE ENCOUNTER
I received a phone call from the microbiology lab stating that his blood cultures that were taken on 12/6/23 ar growing Gram positive cocci in clusters and chains in two out of two vials. I called Mr. Still and discussed with him over the phone about these results. He is having pain located around his graft site and [facility]. He states that he understands this information and agrees to go to the emergency room.

## 2023-12-09 NOTE — NURSING
Nurses Note -- 4 Eyes      12/9/2023   4:03 PM      Skin assessed during: Admit      [] No Altered Skin Integrity Present    []Prevention Measures Documented      [] Yes- Altered Skin Integrity Present or Discovered   [x] LDA Added if Not in Epic (Describe Wound)   [] New Altered Skin Integrity was Present on Admit and Documented in LDA   [] Wound Image Taken    Wound Care Consulted? Yes    Attending Nurse:  Sylvester Mann RN/Staff Member:   Tana Loo

## 2023-12-09 NOTE — ASSESSMENT & PLAN NOTE
Creatine stable for now. BMP reviewed- noted Estimated Creatinine Clearance: 14.1 mL/min (A) (based on SCr of 6 mg/dL (H)). according to latest data. Based on current GFR, CKD stage is end stage.  Monitor UOP and serial BMP and adjust therapy as needed. Renally dose meds. Avoid nephrotoxic medications and procedures.    Consider nephrology consult for HD if acute change in daily CMP, or if patient still in the hospital on Monday  NPO for now pending possible procedure, holding phos binders, restart as appropriate

## 2023-12-09 NOTE — ASSESSMENT & PLAN NOTE
68 y.o. Male s/p multiple surgeries for AV fistula (most recent 2 weeks ago) who presents for 1 week of pain, swelling, erythema, and warmth to his LUE fistula site.   12/6 Blood Cultures positive for gram positive cocci in clusters and chains in 2/2 bottles  US HD access with concerns for hematoma vs abscess with soft tissue changes suggestive of inflammation/infection.   Vascular surgery was consulted in the ED and recommended admission to Hospital Medicine to continue IV antibiotics and to keep NPO for possible debridement in the morning.    Plan:  - Vascular surgery consulted, appreciate recs  - ID consulted, appreciate recs  - Vancomycin/Zosyn  - f/u repeat cultures  - f/u procal, ESR, CRP  - NPO pending possible surgery

## 2023-12-09 NOTE — ED PROVIDER NOTES
"Encounter Date: 2023       History     Chief Complaint   Patient presents with    Post-op Problem     Had graft put in 2 weeks ago and dr called and told me come get antibiotic bec it's infected     68-year-old past medical history of ESRD  with recently revised left upper extremity AV fistula 2023 presenting with left upper extremity swelling tenderness to palpation in addition to recently positive blood cultures.  Patient mentions since last week Friday having worsening pain swelling of left upper extremity around fistula site.  Patient has also noted increased redness.  Patient had blood cultures done recently contacted by Nephrology mentioning patient has positive blood cultures of  'Gram positive cocci in clusters and chains in two out of two vials" patient currently denies any fevers, chills, chest pain, nausea vomiting diarrhea.          Review of patient's allergies indicates:   Allergen Reactions    Iodine and iodide containing products Hives     Hypotension, Flushing     Past Medical History:   Diagnosis Date    Anemia     Diabetes mellitus     Diabetes mellitus, type 2     Disorder of kidney and ureter     ESRD (end stage renal disease)     Essential (primary) hypertension 2017    Gout, unspecified      jaundice, unspecified     Nuclear sclerosis of both eyes 2022     Past Surgical History:   Procedure Laterality Date    ADENOIDECTOMY      ADENOIDECTOMY      AV FISTULA PLACEMENT Left 2022    Procedure: CREATION, AV FISTULA;  Surgeon: Prince Gardiner MD;  Location: [facility] OR [address] [place] [address];  Service: Peripheral Vascular;  Laterality: Left;    EXCISION OF ARTERIOVENOUS FISTULA Left 12/10/2023    Procedure: EXCISION, AV FISTULA;  Surgeon: Joaquin Rose MD;  Location: [facility] OR [address] [place] [address];  Service: Vascular;  Laterality: Left;    FISTULOGRAM Left 2023    Procedure: FISTULOGRAM;  Surgeon: Prince Gardiner MD;  Location: [facility] OR [address] [place] [address];  Service: " Peripheral Vascular;  Laterality: Left;  Given to the sterile field.     FISTULOGRAM Left 7/6/2023    Procedure: Fistulogram;  Surgeon: LETY Rayo III, MD;  Location: [facility] OR 2ND FLR;  Service: Peripheral Vascular;  Laterality: Left;  1.9 min  22.86 mGy  3.8862 Gy.cm  20ml Dye     nasal septum repair      NASAL SEPTUM SURGERY      PERCUTANEOUS TRANSLUMINAL ANGIOPLASTY OF ARTERIOVENOUS FISTULA Left 11/22/2022    Procedure: PTA, AV FISTULA;  Surgeon: Prince Gardiner MD;  Location: [facility] CATH LAB;  Service: Peripheral Vascular;  Laterality: Left;    PERCUTANEOUS TRANSLUMINAL ANGIOPLASTY OF ARTERIOVENOUS FISTULA Left 2/16/2023    Procedure: PTA, AV FISTULA;  Surgeon: Prince Gardiner MD;  Location: [facility] OR 2ND FLR;  Service: Peripheral Vascular;  Laterality: Left;  4.8 min  43.08 mGy  4.0682 Gy.cm  32ml Dye    PERCUTANEOUS TRANSLUMINAL ANGIOPLASTY OF ARTERIOVENOUS FISTULA Left 7/6/2023    Procedure: PTA, AV FISTULA;  Surgeon: LETY Rayo III, MD;  Location: [facility] OR 2ND FLR;  Service: Peripheral Vascular;  Laterality: Left;    PROSTATE BIOPSY N/A 3/24/2023    Procedure: BIOPSY, PROSTATE;  Surgeon: Frankie Welch MD;  Location: [facility] OR 1ST FLR;  Service: Urology;  Laterality: N/A;  transrectal, 30min, uronav needed    REVISION OF ARTERIOVENOUS FISTULA Left 11/9/2023    Procedure: REVISION, AV FISTULA;  Surgeon: LETY Rayo III, MD;  [facility];  Service: Vascular;  Laterality: Left;  LUE AVG revision    ROTATOR CUFF REPAIR Left     SALIVARY GLAND SURGERY      Tonsillectomy      TONSILLECTOMY      TRANSPOSITION OF BASILIC VEIN Left 11/1/2022    Procedure: TRANSPOSITION, VEIN, BASILIC;  Surgeon: Prince Gardiner MD;  [facility];  Service: Peripheral Vascular;  Laterality: Left;  Left Brachial vein.    WASHOUT Left 12/10/2023    Procedure: WASHOUT OF AV FISTULA;  Surgeon: Joaquin Rose MD;  Location: [facility] OR 2ND FLR;  Service: Vascular;  Laterality: Left;     Family History    Problem Relation Age of Onset    Heart disease Mother     Kidney disease Mother     Hypertension Mother     No Known Problems Father     Cancer Sister     Seizures Sister     Hypertension Sister     Stroke Sister     Amblyopia Neg Hx     Blindness Neg Hx     Cataracts Neg Hx     Diabetes Neg Hx     Glaucoma Neg Hx     Macular degeneration Neg Hx     Retinal detachment Neg Hx     Strabismus Neg Hx     Thyroid disease Neg Hx     Anesthesia problems Neg Hx      Social History     Tobacco Use    Smoking status: Never    Smokeless tobacco: Never    Tobacco comments:     .  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     Substance Use Topics    Alcohol use: Yes     Alcohol/week: 2.0 standard drinks of alcohol     Types: 1 Glasses of wine, 1 Cans of beer per week     Comment: Socially    Drug use: No     Review of Systems    Physical Exam     Initial Vitals [12/08/23 2053]   BP Pulse Resp Temp SpO2   (!) 165/81 79 18 98.7 °F (37.1 °C) 99 %      MAP       --         Physical Exam    Nursing note and vitals reviewed.  Constitutional: He appears well-developed and well-nourished. He is not diaphoretic. No distress.   HENT:   Head: Normocephalic and atraumatic.   Nose: Nose normal.   Eyes: Conjunctivae and EOM are normal. Pupils are equal, round, and reactive to light. No scleral icterus.   Neck: Neck supple.   Normal range of motion.  Cardiovascular:  Normal rate and regular rhythm.     Exam reveals no gallop and no friction rub.       No murmur heard.  Pulmonary/Chest: Breath sounds normal. No respiratory distress. He has no wheezes. He has no rhonchi. He has no rales.   Abdominal: Abdomen is soft. Bowel sounds are normal. There is no abdominal tenderness. There is no rebound and no guarding.   Musculoskeletal:         General: Tenderness present. No edema. Normal range of motion.      Cervical back: Normal range of motion and neck supple.      Comments: Left upper extremity fistula and graft noted.  Noted tenderness with  erythema and swelling around medial antecubital fossa fistula site.  No abnormal active bleeding or purulent drainage from area.     Neurological: He is alert and oriented to person, place, and time. He has normal strength. No sensory deficit. GCS score is 15. GCS eye subscore is 4. GCS verbal subscore is 5. GCS motor subscore is 6.   Skin: Skin is warm and dry. No rash noted. No erythema. No pallor.   Psychiatric: He has a normal mood and affect. His behavior is normal. Judgment and thought content normal.                   ED Course   Procedures  Labs Reviewed   CBC W/ AUTO DIFFERENTIAL - Abnormal; Notable for the following components:       Result Value    RBC 3.86 (*)     Hemoglobin 11.3 (*)     Hematocrit 32.4 (*)     All other components within normal limits   COMPREHENSIVE METABOLIC PANEL - Abnormal; Notable for the following components:    Glucose 122 (*)     BUN 34 (*)     Creatinine 6.0 (*)     ALT 7 (*)     eGFR 9.7 (*)     All other components within normal limits   URINALYSIS, REFLEX TO URINE CULTURE - Abnormal; Notable for the following components:    Protein, UA 3+ (*)     Glucose, UA 2+ (*)     Occult Blood UA 1+ (*)     All other components within normal limits    Narrative:     Specimen Source->Urine   CBC W/ AUTO DIFFERENTIAL - Abnormal; Notable for the following components:    RBC 3.61 (*)     Hemoglobin 10.7 (*)     Hematocrit 30.3 (*)     All other components within normal limits   COMPREHENSIVE METABOLIC PANEL - Abnormal; Notable for the following components:    BUN 34 (*)     Creatinine 6.7 (*)     ALT 6 (*)     eGFR 8.5 (*)     All other components within normal limits   SEDIMENTATION RATE - Abnormal; Notable for the following components:    Sed Rate 50 (*)     All other components within normal limits   C-REACTIVE PROTEIN - Abnormal; Notable for the following components:    CRP 23.5 (*)     All other components within normal limits   PROCALCITONIN - Abnormal; Notable for the following  components:    Procalcitonin 0.29 (*)     All other components within normal limits   POCT GLUCOSE - Abnormal; Notable for the following components:    POCT Glucose 157 (*)     All other components within normal limits   URINALYSIS MICROSCOPIC    Narrative:     Specimen Source->Urine   MAGNESIUM   PHOSPHORUS   ISTAT LACTATE   POCT GLUCOSE   ISTAT LACTATE   ISTAT LACTATE   POCT GLUCOSE MONITORING CONTINUOUS        ECG Results              EKG 12-lead (Final result)  Result time 12/08/23 21:53:06      Final result by Interface, Lab In [facility] (12/08/23 21:53:06)                   Narrative:    Test Reason : R78.81,    Vent. Rate : 076 BPM     Atrial Rate : 076 BPM     P-R Int : 166 ms          QRS Dur : 072 ms      QT Int : 378 ms       P-R-T Axes : 030 -03 016 degrees     QTc Int : 425 ms    Normal sinus rhythm  Nonspecific T wave abnormality  Abnormal ECG  When compared with ECG of 19-SEP-2023 04:10,  Nonspecific T wave abnormality now evident in Inferior leads  Confirmed by Saulo DIEZ MD ([id number]) on 12/8/2023 9:52:57 PM    Referred By: [email or web address]   SELF           Confirmed By:Saulo DIEZ MD                                  Imaging Results              CT Arm (Humerus) Without Contrast Left (Final result)  Result time 12/09/23 16:11:16      Final result by Vlad Garcias MD (12/09/23 16:11:16)                   Impression:      1. Subcutaneous edema throughout the upper arm that extends past the elbow, the remainder of the lower arm is not imaged.  There is scattered intermuscular edema, no convincing focal organized collection to suggest abscess however limited evaluation given noncontrast technique.  There is focal induration about the distal aspect of the graft at the inner aspect of the lower upper arm.  Correlation is advised.  2. Please see above for additional findings.      Electronically signed by: Vlad Garcias MD  Date:    12/09/2023  Time:    16:11               Narrative:    EXAMINATION:  CT ARM  (HUMERUS) WITHOUT CONTRAST LEFT    CLINICAL HISTORY:  Infected AVG, for surgical case planning, patient cannot received dialysis, assess extent of infection close to brachial artery;    TECHNIQUE:  Axial images of the left humerus were obtained at 1.25 mm intervals without administration of IV contrast.  Coronal and sagittal reformatted images were reviewed.    COMPARISON:  None    FINDINGS:  There is osteopenia.  There are degenerative changes of the left acromioclavicular joint without dislocation.  There are degenerative changes of the left glenohumeral joint noting degenerative changes.  No acute displaced left rib fracture.  No acute displaced fracture or dislocation of the humerus.  The elbow is intact.  There is a left upper extremity dialysis graft in place.  There is induration involving the posteromedial soft tissues of the upper arm extending throughout the extent of the visualized upper arm.  There is mild scattered intermuscular edema, no convincing intramuscular fluid collection to suggest discrete abscess.  No osseous erosive or destructive process.  The visualized portions of the left lung zones are unremarkable..                                        US Hemodialysis Access (Final result)  Result time 12/09/23 01:54:04      Final result by Aissatou Frey MD (12/09/23 01:54:04)                   Impression:      Focal heterogeneous hypoechoic suspected thrombosed pseudoaneurysm adjacent to the distal graft with what appears to be a small possible neck which is patent.  Simple focal hematoma  or an abscess  is also considered however significant hyperemia of the adjacent soft tissues to suggest inflammation/infection was not present noting there was a provided history of positive blood cultures and infected graft.    The new AV graft is patent throughout its course.    Remote thrombosed AV fistula noted.    This report was flagged in Epic as abnormal.      Electronically signed by: Aissatou  "Shante  Date:    12/09/2023  Time:    01:54               Narrative:    EXAMINATION:  US HEMODIALYSIS ACCESS    CLINICAL HISTORY:  recent fistula revised done now w/ new swelling+ redness LUE;    TECHNIQUE:  Grayscale and Doppler evaluation of the left arm swelling was performed supplemented by    COMPARISON:  11/21/2023 vascular lab Doppler assessment of AV fistula/graft.  No report is available    FINDINGS:  There is a heterogeneous hypoechoic complex fluid collection without associated flow adjacent to the left arm AV graft distally measuring 3.6 x 2.4 x 2.3 cm.  There is a 5 mm neck adjacent to the graft and focal fluid collection which may represent pseudoaneurysm which is thrombosed.  AA focal hematoma or even abscess is a consideration however there is no significant surrounding hyperemia to suggest abscess as imaged.    A prior remote AV fistula is seen to be thrombosed adjacent to the patent graft.  The graft is patent throughout its imaged is course.                                       X-Ray Chest AP Portable (Final result)  Result time 12/08/23 22:55:28      Final result by Guille Beyer MD (12/08/23 22:55:28)                   Impression:      No acute cardiopulmonary finding identified on this single view.      Electronically signed by: Guille Beyer MD  Date:    12/08/2023  Time:    22:55               Narrative:    EXAMINATION:  XR CHEST AP PORTABLE    CLINICAL HISTORY:  Provided history is "Sepsis;  ".    TECHNIQUE:  One view of the chest.    COMPARISON:  10/27/2022.    FINDINGS:  Right-sided central venous catheter is present with the tip overlying the SVC.  Cardiac silhouette is stable.  No confluent area of consolidation.  No sizable pleural effusion.  No pneumothorax.                                       Medications   sodium chloride 0.9% flush 10 mL (has no administration in time range)   naloxone 0.4 mg/mL injection 0.02 mg (has no administration in time range)   glucose chewable " tablet 16 g (has no administration in time range)   glucose chewable tablet 24 g (has no administration in time range)   glucagon (human recombinant) injection 1 mg (has no administration in time range)   insulin aspart U-100 pen 0-5 Units (has no administration in time range)   dextrose 10% bolus 125 mL 125 mL (has no administration in time range)   dextrose 10% bolus 250 mL 250 mL (has no administration in time range)   NIFEdipine 24 hr tablet 60 mg (60 mg Oral Given 12/12/23 0838)   tamsulosin 24 hr capsule 0.4 mg (0.4 mg Oral Given 12/12/23 0838)   fentaNYL (SUBLIMAZE) 50 mcg/mL injection (has no administration in time range)   acetaminophen tablet 650 mg (650 mg Oral Given 12/12/23 1705)   oxyCODONE immediate release tablet 5 mg (5 mg Oral Given 12/12/23 0531)   carvediloL tablet 12.5 mg (12.5 mg Oral Given 12/12/23 1705)   heparin (porcine) injection 5,000 Units (5,000 Units Subcutaneous Given 12/12/23 1313)   atorvastatin tablet 20 mg (20 mg Oral Given 12/11/23 2141)   ceFAZolin (ANCEF) 1 g in dextrose 5 % in water (D5W) 50 mL IVPB (MB+) (0 g Intravenous Stopped 12/11/23 2227)   polyethylene glycol packet 17 g (17 g Oral Given 12/12/23 0927)   senna tablet 8.6 mg (has no administration in time range)   sevelamer carbonate tablet 1,600 mg (1,600 mg Oral Given 12/12/23 1705)   furosemide tablet 80 mg (80 mg Oral Given 12/12/23 0927)   sodium bicarbonate tablet 1,300 mg (1,300 mg Oral Given 12/12/23 1313)   sodium chloride 0.9% flush 10 mL (has no administration in time range)   vancomycin 2 g in dextrose 5 % 500 mL IVPB (0 mg Intravenous Stopped 12/9/23 0135)   oxyCODONE-acetaminophen 5-325 mg per tablet 1 tablet (1 tablet Oral Given 12/9/23 0005)   0.9%  NaCl infusion ( Intravenous Stopped 12/11/23 1125)     Medical Decision Making  68-year-old presenting with left upper extremity swelling of positive blood cultures.    DX includes bacteremia, sepsis, postop wound infection,    Plan:  Will obtain CBC, CMP, UA,  chest x-ray, EKG, vascular surgery consultation reassess.    Amount and/or Complexity of Data Reviewed  Labs: ordered.  Radiology: ordered.    Risk  Prescription drug management.  Decision regarding hospitalization.    Signed out to Dr. Wilburn pending US and vascular surgery            ED Course as of 12/12/23 1713   Fri Dec 08, 2023   2311 Spoke with vascular surgery like ultrasound of extremity will continue to reassess. [DC]      ED Course User Index  [DC] Flor Khan Jr., MD                  Critical Care   Date: 12/12/2023  Performed by: Flor Khan   Authorized by: Flor Khan   Total critical care time (exclusive of procedural time) : 38 minutes  Critical care was necessary to treat or prevent imminent or life-threatening deterioration of the following conditions:  IV abx for bacteremia              Clinical Impression:  Final diagnoses:  [R78.81] Bacteremia          ED Disposition Condition    Admit                 Flor Khan Jr., MD  12/12/23 1713

## 2023-12-09 NOTE — CONSULTS
Infectious Disease Note      Chart has been reviewed.  Unable to perform consultation today as patient off floor for procedures/imaging studies. Discussed with wife at  bedside.     Recommendations:  Full consultation to be performed tomorrow.   Can continue empiric Zosyn and vancomycin for now.  Monitor vancomycin trough.    Infectious Diseases will Follow up tomorrow. Please call if questions arise.  Parisa Encarnacion MD, [facility]  Infectious Diseases

## 2023-12-09 NOTE — ASSESSMENT & PLAN NOTE
68 y.o. Black or  Male ESRD-HD M-W-F presents to ED on 12/8/2023 with diagnosis of: Bacteremia [R78.81]   Nephrology consulted for inpatient ESRD-HD management    Outpatient HD Information:  -Dialysis modality: Hemodialysis  -Outpatient HD unit: [facility]  -Nephrologist: Luke KLINE  -HD TX days: Monday/Wednesday/Friday, duration of treatment: 3hrs  -Last HD TX prior to hospital admission: 12/08  -Dialysis access: dialysis catheter R CVC and L AVF (infected)  -Residual urine: Minium  -EDW: 97 kg     Assessment:   - Dialysis for metabolic clearance and volume management will be provided Monday AM.  - Continue to monitor intake and output  - Please avoid gadolinium, fleets, phos-based laxatives, NSAIDs  - Dialysis thrice weekly unless more urgent indications arise. Will evaluate RRT requirements Daily.    Anemia of ESRD   Recent Labs   Lab 12/08/23  2153 12/09/23 0823   WBC 4.85 4.30   HGB 11.3* 10.7*   HCT 32.4* 30.3*    177     Lab Results   Component Value Date    FESATURATED 24 04/30/2022    FERRITIN 865 (H) 11/01/2023       - Goal in ESRD is Hgb of 10-11. Hgb 10.7. At target.   - EPO can be administered and dosed per his OP unit upon discharge.    Mineral Bone Disease in ESRD   Lab Results   Component Value Date     (H) 11/15/2023    CALCIUM 9.2 12/09/2023    ALBUMIN 3.7 12/09/2023    CAION 1.20 08/15/2022    PHOS 4.1 12/09/2023       - F/U PO4, Mg, Calcium. And albumin levels daily.   - Renal diet with protein intake goal 1.5 g/kg/d with 1 L fluid restriction   - Restart home phos binder, phos 4.1

## 2023-12-09 NOTE — CONSULTS
[facility] - Emergency Dept  Vascular Surgery  Consult Note    Inpatient consult to Vascular Surgery  Consult performed by: Shahriar Abrams MD  Consult ordered by: Flor Khan Jr., MD        Subjective:     Chief Complaint/Reason for Admission: Bacteremia    History of Present Illness: Elbert Still is a 68 y.o. Male with ESRD on HD (MWF), HTN, and T2DM s/p multiple surgeries for AV fistula (most recent 2 weeks ago) who presents for 1 week of pain, swelling, erythema, and warmth to his LUE fistula site. He tells me that today (12/8) his nephrologist got blood culture results that were drawn on 12/6 that were positive for gram positive cocci in clusters and chains in 2/2 bottles, and told him to present to the ED for IV antibiotics. He does not currently get HD through the AV fistula in question, he has a tunneled catheter. He reports he is in the process of being evaluated for kidney transplant.     The patient reports additional symptoms of mild cough/runny nose that he has had for about a week. These symptoms are similar to some his wife had about 2 weeks ago. He denies fevers, chills, SOB, N/V, abdominal pain, headaches, lightheadedness, or urinary symptoms.     In the ED, he was hypertensive on arrival but otherwise stable. CBC with no leukocytosis, HGB 11.3. CMP with BUN/Cr 34/6.0. Lactate 1.11. US HD access with concerns for hematoma vs abscess with soft tissue changes suggestive of inflammation/infection. Vascular surgery was consulted in the ED and recommended admission to Hospital Medicine to continue IV antibiotics and to keep NPO for possible debridement in the morning.    (Not in a hospital admission)      Review of patient's allergies indicates:   Allergen Reactions    Iodine and iodide containing products Hives     Hypotension, Flushing       Past Medical History:   Diagnosis Date    Anemia     Diabetes mellitus     Diabetes mellitus, type 2     Disorder of kidney and ureter     ESRD (end stage  renal disease)     Essential (primary) hypertension 2017    Gout, unspecified      jaundice, unspecified     Nuclear sclerosis of both eyes 2022     Past Surgical History:   Procedure Laterality Date    ADENOIDECTOMY      ADENOIDECTOMY      AV FISTULA PLACEMENT Left 2022    Procedure: CREATION, AV FISTULA;  Surgeon: Prince Gardiner MD;  [facility];  Service: Peripheral Vascular;  Laterality: Left;    FISTULOGRAM Left 2023    Procedure: FISTULOGRAM;  Surgeon: Prince Gardiner MD;  [facility];  Service: Peripheral Vascular;  Laterality: Left;  Given to the sterile field.     FISTULOGRAM Left 2023    Procedure: Fistulogram;  Surgeon: LETY Rayo III, MD;  [facility];  Service: Peripheral Vascular;  Laterality: Left;  1.9 min  22.86 mGy  3.8862 Gy.cm  20ml Dye     nasal septum repair      NASAL SEPTUM SURGERY      PERCUTANEOUS TRANSLUMINAL ANGIOPLASTY OF ARTERIOVENOUS FISTULA Left 2022    Procedure: PTA, AV FISTULA;  Surgeon: Prince Gardiner MD;  Location: [facility] CATH LAB;  Service: Peripheral Vascular;  Laterality: Left;    PERCUTANEOUS TRANSLUMINAL ANGIOPLASTY OF ARTERIOVENOUS FISTULA Left 2023    Procedure: PTA, AV FISTULA;  Surgeon: Prince Gardiner MD;  [facility];  Service: Peripheral Vascular;  Laterality: Left;  4.8 min  43.08 mGy  4.0682 Gy.cm  32ml Dye    PERCUTANEOUS TRANSLUMINAL ANGIOPLASTY OF ARTERIOVENOUS FISTULA Left 2023    Procedure: PTA, AV FISTULA;  Surgeon: LETY Rayo III, MD;  [facility];  Service: Peripheral Vascular;  Laterality: Left;    PROSTATE BIOPSY N/A 3/24/2023    Procedure: BIOPSY, PROSTATE;  Surgeon: Frankie Welch MD;  [facility];  Service: Urology;  Laterality: N/A;  transrectal, 30min, uronav needed    REVISION OF ARTERIOVENOUS FISTULA Left 2023    Procedure: REVISION, AV FISTULA;  Surgeon: LETY Rayo III, MD;  Location: [facility] OR  2ND FLR;  Service: Vascular;  Laterality: Left;  EDOUARD AVG revision    ROTATOR CUFF REPAIR Left     SALIVARY GLAND SURGERY      Tonsillectomy      TONSILLECTOMY      TRANSPOSITION OF BASILIC VEIN Left 11/1/2022    Procedure: TRANSPOSITION, VEIN, BASILIC;  Surgeon: Prince Gardiner MD;  Location: [facility] OR 2ND FLR;  Service: Peripheral Vascular;  Laterality: Left;  Left Brachial vein.     Family History       Problem Relation (Age of Onset)    Cancer Sister    Heart disease Mother    Hypertension Mother, Sister    Kidney disease Mother    No Known Problems Father    Seizures Sister    Stroke Sister          Tobacco Use    Smoking status: Never    Smokeless tobacco: Never    Tobacco comments:     .  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     Substance and Sexual Activity    Alcohol use: Yes     Alcohol/week: 2.0 standard drinks of alcohol     Types: 1 Glasses of wine, 1 Cans of beer per week     Comment: Socially    Drug use: No    Sexual activity: Yes     Partners: Female     Review of Systems   Constitutional:  Negative for activity change, appetite change and chills.   HENT:  Negative for congestion, ear pain and hearing loss.    Eyes:  Negative for pain, discharge and itching.   Respiratory:  Negative for choking, chest tightness and shortness of breath.    Cardiovascular:  Negative for chest pain and palpitations.   Gastrointestinal:  Negative for abdominal distention, abdominal pain and anal bleeding.   Genitourinary:  Negative for difficulty urinating and dysuria.   Skin:  Positive for color change.     Objective:     Vital Signs (Most Recent):  Temp: 98.1 °F (36.7 °C) (12/09/23 0816)  Pulse: 73 (12/09/23 0816)  Resp: 17 (12/09/23 0816)  BP: (!) 166/78 (12/09/23 0817)  SpO2: 99 % (12/09/23 0816) Vital Signs (24h Range):  Temp:  [98.1 °F (36.7 °C)-98.8 °F (37.1 °C)] 98.1 °F (36.7 °C)  Pulse:  [63-79] 73  Resp:  [14-18] 17  SpO2:  [96 %-99 %] 99 %  BP: (149-166)/(76-81) 166/78     Weight: 97.5 kg (215 lb)  Body mass  index is 27.6 kg/m².      Physical Exam  Constitutional:       Appearance: Normal appearance.   HENT:      Head: Normocephalic and atraumatic.      Nose: Nose normal.      Mouth/Throat:      Mouth: Mucous membranes are moist.      Pharynx: Oropharynx is clear.   Eyes:      Extraocular Movements: Extraocular movements intact.      Conjunctiva/sclera: Conjunctivae normal.   Cardiovascular:      Rate and Rhythm: Normal rate and regular rhythm.      Comments: Permacath in place, no evidence of pus, erythema, induration or tenderness to palpation around the catheter  Pulmonary:      Effort: Pulmonary effort is normal.      Breath sounds: Normal breath sounds.   Abdominal:      General: Abdomen is flat.      Palpations: Abdomen is soft.   Skin:     General: Skin is warm and dry.      Capillary Refill: Capillary refill takes less than 2 seconds.      Comments: RUE incision well healed however erythema, induration, and warmth noted along with tenderness to palpation near/around fistula anastamosis   Neurological:      General: No focal deficit present.      Mental Status: He is alert.          Significant Labs:  All pertinent labs from the last 24 hours have been reviewed.    Significant Diagnostics:  I have reviewed all pertinent imaging results/findings within the past 24 hours.  Assessment/Plan:     Arteriovenous fistula  68 M with left upper extremity brachiobrachial arteriovenous fistula (2 stage) in 9/2022 s/p multiple PTA then on 11/9/2023 inability to cannulate for fistula gram and ended up having to do a graft interposition now presents with concerns for graft infection w/ positive blood cultures growing gram + Cocci. No concern currently for dialysis catheter infection.    - case request for graft explant  - NPO at midnight  - cont IV abx  - f/u CTA left upper extremity  - dialysis through catheter for now  - will likely need vein mapping for planning of RUE fistula vs graft  - rest of care per primary  team      Thank you for your consult.     Shahriar Abrams MD  Vascular Surgery  [facility] - Emergency Dept

## 2023-12-09 NOTE — ASSESSMENT & PLAN NOTE
68 M with left upper extremity brachiobrachial arteriovenous fistula (2 stage) in 9/2022 s/p multiple PTA then on 11/9/2023 inability to cannulate for fistula gram and ended up having to do a graft interposition now presents with concerns for graft infection w/ positive blood cultures growing gram + Cocci. No concern currently for dialysis catheter infection.    - case request for graft explant  - NPO at midnight  - cont IV abx  - f/u CTA left upper extremity  - dialysis through catheter for now  - will likely need vein mapping for planning of RUE fistula vs graft  - rest of care per primary team

## 2023-12-09 NOTE — ED NOTES
Received report from Seferino @ 0700    Patient identifiers verified and correct for Elbert White  LOC: The patient is awake, alert and aware of environment with an appropriate affect, the patient is oriented x 3 and speaking appropriately.   APPEARANCE: Patient appears comfortable and in no acute distress, patient is clean and well groomed.  SKIN: The skin is warm and dry, color consistent with ethnicity, patient has normal skin turgor and moist mucus membranes, skin intact, no breakdown or bruising noted.   MUSCULOSKELETAL: Patient moving all extremities spontaneously, no swelling noted.pt reports 4/10 discomfort to left arm after surgeons came in and moved it around  RESPIRATORY: Airway is open and patent, respirations are spontaneous, patient has a normal effort and rate, no accessory muscle use noted, O2 sats noted at 100% on room air @0500.  CARDIAC: Patient placed on cardiac monitor, Patient has a normal rate and regular rhythm, no edema noted, capillary refill < 3 seconds.   GASTRO: Soft and non tender to palpation, no distention noted, normoactive bowel sounds present in all four quadrants. Pt states bowel movements have been regular.  : Pt denies any pain or frequency with urination.  NEURO: Pt opens eyes spontaneously, behavior appropriate to situation, follows commands, facial expression symmetrical, bilateral hand grasp equal and even, purposeful motor response noted, normal sensation in all extremities when touched with a finger. Pt reports numbness to left arm that has been present since graft was placed

## 2023-12-09 NOTE — H&P
[facility] - Emergency Dept  Hospital Medicine  History & Physical    Patient Name: Elbert Still Jr.  MRN: [id number]  Patient Class: IP- Inpatient  Admission Date: 12/8/2023  Attending Physician: Mookie Acuña, *   Primary Care Provider: Angel Luis Boo MD         Patient information was obtained from patient, past medical records, and ER records.     Subjective:     Principal Problem:Bacteremia    Chief Complaint:   Chief Complaint   Patient presents with    Post-op Problem     Had graft put in 2 weeks ago and dr called and told me come get antibiotic bec it's infected        HPI: Elbert Still is a 68 y.o. Male with ESRD on HD (MWF), HTN, and T2DM s/p multiple surgeries for AV fistula (most recent 2 weeks ago) who presents for 1 week of pain, swelling, erythema, and warmth to his LUE fistula site. He tells me that today (12/8) his nephrologist got blood culture results that were drawn on 12/6 that were positive for gram positive cocci in clusters and chains in 2/2 bottles, and told him to present to the ED for IV antibiotics. He does not currently get HD through the AV fistula in question, he has a tunneled catheter. He reports he is in the process of being evaluated for kidney transplant.    The patient reports additional symptoms of mild cough/runny nose that he has had for about a week. These symptoms are similar to some his wife had about 2 weeks ago. He denies fevers, chills, SOB, N/V, abdominal pain, headaches, lightheadedness, or urinary symptoms.    In the ED, he was hypertensive on arrival but otherwise stable. CBC with no leukocytosis, HGB 11.3. CMP with BUN/Cr 34/6.0. Lactate 1.11. US HD access with concerns for hematoma vs abscess with soft tissue changes suggestive of inflammation/infection. Vascular surgery was consulted in the ED and recommended admission to Hospital Medicine to continue IV antibiotics and to keep NPO for possible debridement in the morning.    Past Medical History:    Diagnosis Date    Anemia     Diabetes mellitus     Diabetes mellitus, type 2     Disorder of kidney and ureter     ESRD (end stage renal disease)     Essential (primary) hypertension 2017    Gout, unspecified      jaundice, unspecified     Nuclear sclerosis of both eyes 2022       Past Surgical History:   Procedure Laterality Date    ADENOIDECTOMY      ADENOIDECTOMY      AV FISTULA PLACEMENT Left 2022    Procedure: CREATION, AV FISTULA;  Surgeon: Prince Gardiner MD;  [facility];  Service: Peripheral Vascular;  Laterality: Left;    FISTULOGRAM Left 2023    Procedure: FISTULOGRAM;  Surgeon: Prince Gardiner MD;  [facility];  Service: Peripheral Vascular;  Laterality: Left;  Given to the sterile field.     FISTULOGRAM Left 2023    Procedure: Fistulogram;  Surgeon: LETY Rayo III, MD;  [facility];  Service: Peripheral Vascular;  Laterality: Left;  1.9 min  22.86 mGy  3.8862 Gy.cm  20ml Dye     nasal septum repair      NASAL SEPTUM SURGERY      PERCUTANEOUS TRANSLUMINAL ANGIOPLASTY OF ARTERIOVENOUS FISTULA Left 2022    Procedure: PTA, AV FISTULA;  Surgeon: Prince Gardiner MD;  Location: [facility] CATH LAB;  Service: Peripheral Vascular;  Laterality: Left;    PERCUTANEOUS TRANSLUMINAL ANGIOPLASTY OF ARTERIOVENOUS FISTULA Left 2023    Procedure: PTA, AV FISTULA;  Surgeon: Prince Gardiner MD;  [facility];  Service: Peripheral Vascular;  Laterality: Left;  4.8 min  43.08 mGy  4.0682 Gy.cm  32ml Dye    PERCUTANEOUS TRANSLUMINAL ANGIOPLASTY OF ARTERIOVENOUS FISTULA Left 2023    Procedure: PTA, AV FISTULA;  Surgeon: LETY Rayo III, MD;  [facility];  Service: Peripheral Vascular;  Laterality: Left;    PROSTATE BIOPSY N/A 3/24/2023    Procedure: BIOPSY, PROSTATE;  Surgeon: Frankie Welch MD;  [facility];  Service: Urology;  Laterality: N/A;  transrectal, 30min, uronav needed     REVISION OF ARTERIOVENOUS FISTULA Left 2023    Procedure: REVISION, AV FISTULA;  Surgeon: LETY Rayo III, MD;  [facility];  Service: Vascular;  Laterality: Left;  LUE AVG revision    ROTATOR CUFF REPAIR Left     SALIVARY GLAND SURGERY      Tonsillectomy      TONSILLECTOMY      TRANSPOSITION OF BASILIC VEIN Left 2022    Procedure: TRANSPOSITION, VEIN, BASILIC;  Surgeon: Prince Gardiner MD;  [facility];  Service: Peripheral Vascular;  Laterality: Left;  Left Brachial vein.       Review of patient's allergies indicates:   Allergen Reactions    Iodine and iodide containing products Hives     Hypotension, Flushing       Current Facility-Administered Medications on File Prior to Encounter   Medication    0.9%  NaCl infusion    0.9%  NaCl infusion    cefTRIAXone injection 1 g    LIDOcaine HCl 2% urojet    ondansetron disintegrating tablet 8 mg    [DISCONTINUED] heparin (porcine) injection 1,200 Units    [DISCONTINUED] heparin (porcine) injection 1,200 Units    [DISCONTINUED] heparin (porcine) injection 2,000 Units    [DISCONTINUED] heparin (porcine) injection 2,000 Units     Current Outpatient Medications on File Prior to Encounter   Medication Sig    acetaminophen (TYLENOL) 500 MG tablet Take 2 tablets (1,000 mg total) by mouth every 6 (six) hours as needed for Pain.    aspirin (ECOTRIN) 81 MG EC tablet Take 1 tablet (81 mg total) by mouth once daily.    atorvastatin (LIPITOR) 20 MG tablet TAKE 1 TABLET BY MOUTH EVERY EVENING    BANOPHEN 50 mg capsule SMARTSI Capsule(s) By Mouth    blood sugar diagnostic Strp Use to check blood glucose once a day.    blood-glucose meter Misc Use to check blood glucose twice a day.    carvediloL (COREG) 12.5 MG tablet Take 1 tablet (12.5 mg total) by mouth 2 (two) times daily.    cinacalcet (SENSIPAR) 60 MG Tab Take 30 mg by mouth daily with breakfast.    ciprofloxacin HCl (CIPRO) 500 MG tablet 1 pill one hour before prostate biopsy     colchicine (COLCRYS) 0.6 mg tablet Take 0.6 mg by mouth as needed. Take half tab (0.3 mg) by mouth daily as needed for gout flare.    diclofenac sodium (VOLTAREN) 1 % Gel Apply 2 g topically once daily.    fluticasone propionate (FLONASE) 50 mcg/actuation nasal spray 2 sprays (100 mcg total) by Each Nostril route once daily.    furosemide (LASIX) 80 MG tablet TAKE 1 TABLET(80 MG) BY MOUTH TWICE DAILY    HYDROcodone-acetaminophen (NORCO) 5-325 mg per tablet Take 1 tablet by mouth every 6 (six) hours as needed for Pain.    lancets 30 gauge Misc Use to check blood glucose twice a day.    LIDOcaine (LIDODERM) 5 % Place 2 patches onto the skin once daily. Remove & Discard patch within 12 hours or as directed by MD    LIDOcaine-prilocaine (EMLA) cream SMARTSIG:sparingly Topical As Directed    NIFEdipine (PROCARDIA-XL) 60 MG (OSM) 24 hr tablet Take 1 tablet (60 mg total) by mouth 2 (two) times a day.    oxyCODONE (ROXICODONE) 5 MG immediate release tablet Take 1 tablet (5 mg total) by mouth every 6 (six) hours as needed for Pain.    predniSONE (DELTASONE) 50 MG Tab Take by mouth.    sevelamer carbonate (RENVELA) 800 mg Tab TAKE 2 TABLETS(1600 MG) BY MOUTH THREE TIMES DAILY WITH MEALS    tamsulosin (FLOMAX) 0.4 mg Cap Take 1 capsule (0.4 mg total) by mouth once daily.    traMADoL (ULTRAM) 50 mg tablet Take 1 tablet (50 mg total) by mouth every 8 (eight) hours as needed for Pain.    [DISCONTINUED] amLODIPine (NORVASC) 10 MG tablet TAKE 1 TABLET BY MOUTH ONCE DAILY    [DISCONTINUED] glipiZIDE (GLUCOTROL) 10 MG tablet TAKE 1 TABLET BY MOUTH TWICE DAILY WITH MEALS    [DISCONTINUED] metoprolol succinate (TOPROL-XL) 25 MG 24 hr tablet Take 1 tablet (25 mg total) by mouth once daily.     Family History       Problem Relation (Age of Onset)    Cancer Sister    Heart disease Mother    Hypertension Mother, Sister    Kidney disease Mother    No Known Problems Father    Seizures Sister    Stroke Sister          Tobacco Use    Smoking  status: Never    Smokeless tobacco: Never    Tobacco comments:     .  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     Substance and Sexual Activity    Alcohol use: Yes     Alcohol/week: 2.0 standard drinks of alcohol     Types: 1 Glasses of wine, 1 Cans of beer per week     Comment: Socially    Drug use: No    Sexual activity: Yes     Partners: Female     Review of Systems   Constitutional:  Negative for chills and fever.   HENT:  Positive for rhinorrhea.    Respiratory:  Positive for cough. Negative for shortness of breath.    Cardiovascular:  Negative for chest pain.   Gastrointestinal:  Negative for abdominal pain, nausea and vomiting.   Genitourinary:  Negative for dysuria.   Musculoskeletal:  Negative for arthralgias and myalgias.   Neurological:  Negative for light-headedness and headaches.     Objective:     Vital Signs (Most Recent):  Temp: 98.1 °F (36.7 °C) (12/09/23 0816)  Pulse: 73 (12/09/23 0816)  Resp: 17 (12/09/23 0816)  BP: (!) 166/78 (12/09/23 0817)  SpO2: 99 % (12/09/23 0816) Vital Signs (24h Range):  Temp:  [98.1 °F (36.7 °C)-98.8 °F (37.1 °C)] 98.1 °F (36.7 °C)  Pulse:  [55-79] 73  Resp:  [14-18] 17  SpO2:  [96 %-99 %] 99 %  BP: (149-166)/(76-82) 166/78     Weight: 97.5 kg (215 lb)  Body mass index is 27.6 kg/m².     Physical Exam  Vitals and nursing note reviewed.   Constitutional:       General: He is not in acute distress.     Appearance: He is not ill-appearing, toxic-appearing or diaphoretic.   HENT:      Head: Normocephalic and atraumatic.      Right Ear: External ear normal.      Left Ear: External ear normal.      Mouth/Throat:      Mouth: Mucous membranes are moist.   Eyes:      General: No scleral icterus.        Right eye: No discharge.         Left eye: No discharge.   Cardiovascular:      Rate and Rhythm: Normal rate and regular rhythm.      Heart sounds: Normal heart sounds. No murmur heard.  Pulmonary:      Effort: Pulmonary effort is normal. No respiratory distress.      Breath sounds:  No wheezing or rales.   Abdominal:      General: Bowel sounds are normal. There is no distension.      Palpations: Abdomen is soft.      Tenderness: There is no abdominal tenderness. There is no guarding.   Musculoskeletal:         General: No deformity.      Cervical back: No rigidity.      Right lower leg: No edema.      Left lower leg: No edema.   Skin:     General: Skin is warm.      Coloration: Skin is not jaundiced.      Comments: Tenderness, erythema, edema in antecubital fossa at site inferior to AV graft   Neurological:      Mental Status: He is alert and oriented to person, place, and time. Mental status is at baseline.   Psychiatric:         Mood and Affect: Mood normal.         Behavior: Behavior normal.                Significant Labs: All pertinent labs within the past 24 hours have been reviewed.  CBC:   Recent Labs   Lab 12/08/23 2153 12/09/23 0823   WBC 4.85 4.30   HGB 11.3* 10.7*   HCT 32.4* 30.3*    177     CMP:   Recent Labs   Lab 12/08/23 2153      K 4.1      CO2 26   *   BUN 34*   CREATININE 6.0*   CALCIUM 9.6   PROT 8.0   ALBUMIN 3.9   BILITOT 0.3   ALKPHOS 119   AST 17   ALT 7*   ANIONGAP 9       Significant Imaging: I have reviewed all pertinent imaging results/findings within the past 24 hours.  Assessment/Plan:     * Bacteremia  68 y.o. Male s/p multiple surgeries for AV fistula (most recent 2 weeks ago) who presents for 1 week of pain, swelling, erythema, and warmth to his LUE fistula site.   12/6 Blood Cultures positive for gram positive cocci in clusters and chains in 2/2 bottles  US HD access with concerns for hematoma vs abscess with soft tissue changes suggestive of inflammation/infection.   Vascular surgery was consulted in the ED and recommended admission to Hospital Medicine to continue IV antibiotics and to keep NPO for possible debridement in the morning.    Plan:  - Vascular surgery consulted, appreciate recs  - ID consulted, appreciate recs  -  Vancomycin/Zosyn  - f/u repeat cultures  - f/u procal, ESR, CRP  - NPO pending possible surgery      Type 2 diabetes mellitus  Patient's FSGs are controlled on no home medications  Last A1c reviewed-   Lab Results   Component Value Date    HGBA1C 4.4 (L) 10/04/2023     Most recent fingerstick glucose reviewed-   Recent Labs   Lab 12/09/23  0806   POCTGLUCOSE 95     Current correctional scale  Low  Maintain anti-hyperglycemic dose as follows-   Antihyperglycemics (From admission, onward)      Start     Stop Route Frequency Ordered    12/09/23 0832  insulin aspart U-100 pen 0-5 Units         -- SubQ Every 6 hours PRN 12/09/23 0732          Hold Oral hypoglycemics while patient is in the hospital.    ESRD on dialysis  Creatine stable for now. BMP reviewed- noted Estimated Creatinine Clearance: 14.1 mL/min (A) (based on SCr of 6 mg/dL (H)). according to latest data. Based on current GFR, CKD stage is end stage.  Monitor UOP and serial BMP and adjust therapy as needed. Renally dose meds. Avoid nephrotoxic medications and procedures.    Consider nephrology consult for HD if acute change in daily CMP, or if patient still in the hospital on Monday  NPO for now pending possible procedure, holding phos binders, restart as appropriate    -Essential (primary) hypertension  Temp:  [98.1 °F (36.7 °C)-98.8 °F (37.1 °C)]   Pulse:  [63-79]   Resp:  [14-18]   BP: (149-166)/(76-82)   SpO2:  [96 %-99 %] .   Home meds for hypertension were reviewed and noted below.   Hypertension Medications               carvediloL (COREG) 12.5 MG tablet Take 1 tablet (12.5 mg total) by mouth 2 (two) times daily.    furosemide (LASIX) 80 MG tablet TAKE 1 TABLET(80 MG) BY MOUTH TWICE DAILY    NIFEdipine (PROCARDIA-XL) 60 MG (OSM) 24 hr tablet Take 1 tablet (60 mg total) by mouth 2 (two) times a day.          Plan:  - continue coreg, nifedipine  - hold lasix pending possible procedure, resume when appropriate      VTE Risk Mitigation (From admission,  onward)           Ordered     IP VTE HIGH RISK PATIENT  Once         12/09/23 0731     Place sequential compression device  Until discontinued         12/09/23 0731                                 Magno Hardy MD  Department of Hospital Medicine  [facility] - Emergency Dept

## 2023-12-10 ENCOUNTER — ANESTHESIA (OUTPATIENT)
Dept: SURGERY | Facility: HOSPITAL | Age: 68
DRG: 264 | End: 2023-12-10
Payer: COMMERCIAL

## 2023-12-10 LAB
ALBUMIN SERPL BCP-MCNC: 3.5 G/DL (ref 3.5–5.2)
ALP SERPL-CCNC: 106 U/L (ref 55–135)
ALT SERPL W/O P-5'-P-CCNC: 6 U/L (ref 10–44)
ANION GAP SERPL CALC-SCNC: 12 MMOL/L (ref 8–16)
AST SERPL-CCNC: 18 U/L (ref 10–40)
BASOPHILS # BLD AUTO: 0.02 K/UL (ref 0–0.2)
BASOPHILS NFR BLD: 0.5 % (ref 0–1.9)
BILIRUB SERPL-MCNC: 0.3 MG/DL (ref 0.1–1)
BUN SERPL-MCNC: 49 MG/DL (ref 8–23)
CALCIUM SERPL-MCNC: 9 MG/DL (ref 8.7–10.5)
CHLORIDE SERPL-SCNC: 105 MMOL/L (ref 95–110)
CO2 SERPL-SCNC: 20 MMOL/L (ref 23–29)
CREAT SERPL-MCNC: 8.3 MG/DL (ref 0.5–1.4)
DIFFERENTIAL METHOD: ABNORMAL
EOSINOPHIL # BLD AUTO: 0.2 K/UL (ref 0–0.5)
EOSINOPHIL NFR BLD: 4.8 % (ref 0–8)
ERYTHROCYTE [DISTWIDTH] IN BLOOD BY AUTOMATED COUNT: 14.2 % (ref 11.5–14.5)
EST. GFR  (NO RACE VARIABLE): 6.5 ML/MIN/1.73 M^2
GLUCOSE SERPL-MCNC: 101 MG/DL (ref 70–110)
GRAM STN SPEC: NORMAL
HCT VFR BLD AUTO: 32 % (ref 40–54)
HGB BLD-MCNC: 10.6 G/DL (ref 14–18)
IMM GRANULOCYTES # BLD AUTO: 0.02 K/UL (ref 0–0.04)
IMM GRANULOCYTES NFR BLD AUTO: 0.5 % (ref 0–0.5)
LYMPHOCYTES # BLD AUTO: 0.9 K/UL (ref 1–4.8)
LYMPHOCYTES NFR BLD: 23.5 % (ref 18–48)
MAGNESIUM SERPL-MCNC: 2 MG/DL (ref 1.6–2.6)
MCH RBC QN AUTO: 29.5 PG (ref 27–31)
MCHC RBC AUTO-ENTMCNC: 33.1 G/DL (ref 32–36)
MCV RBC AUTO: 89 FL (ref 82–98)
MONOCYTES # BLD AUTO: 0.6 K/UL (ref 0.3–1)
MONOCYTES NFR BLD: 13.9 % (ref 4–15)
NEUTROPHILS # BLD AUTO: 2.2 K/UL (ref 1.8–7.7)
NEUTROPHILS NFR BLD: 56.8 % (ref 38–73)
NRBC BLD-RTO: 0 /100 WBC
PHOSPHATE SERPL-MCNC: 4.8 MG/DL (ref 2.7–4.5)
PLATELET # BLD AUTO: 195 K/UL (ref 150–450)
PMV BLD AUTO: 10.7 FL (ref 9.2–12.9)
POCT GLUCOSE: 114 MG/DL (ref 70–110)
POCT GLUCOSE: 125 MG/DL (ref 70–110)
POTASSIUM SERPL-SCNC: 4.4 MMOL/L (ref 3.5–5.1)
PROT SERPL-MCNC: 7.2 G/DL (ref 6–8.4)
RBC # BLD AUTO: 3.59 M/UL (ref 4.6–6.2)
SODIUM SERPL-SCNC: 137 MMOL/L (ref 136–145)
VANCOMYCIN SERPL-MCNC: 20.2 UG/ML
WBC # BLD AUTO: 3.95 K/UL (ref 3.9–12.7)

## 2023-12-10 PROCEDURE — 25000003 PHARM REV CODE 250: Performed by: NURSE ANESTHETIST, CERTIFIED REGISTERED

## 2023-12-10 PROCEDURE — 71000033 HC RECOVERY, INTIAL HOUR: Performed by: SURGERY

## 2023-12-10 PROCEDURE — 37000008 HC ANESTHESIA 1ST 15 MINUTES: Performed by: SURGERY

## 2023-12-10 PROCEDURE — 85025 COMPLETE CBC W/AUTO DIFF WBC: CPT

## 2023-12-10 PROCEDURE — 80202 ASSAY OF VANCOMYCIN: CPT | Performed by: STUDENT IN AN ORGANIZED HEALTH CARE EDUCATION/TRAINING PROGRAM

## 2023-12-10 PROCEDURE — 25000003 PHARM REV CODE 250: Performed by: STUDENT IN AN ORGANIZED HEALTH CARE EDUCATION/TRAINING PROGRAM

## 2023-12-10 PROCEDURE — 87075 CULTR BACTERIA EXCEPT BLOOD: CPT | Mod: 59 | Performed by: SURGERY

## 2023-12-10 PROCEDURE — D9220A PRA ANESTHESIA: ICD-10-PCS | Mod: ANES,,, | Performed by: ANESTHESIOLOGY

## 2023-12-10 PROCEDURE — 63600175 PHARM REV CODE 636 W HCPCS: Performed by: ANESTHESIOLOGY

## 2023-12-10 PROCEDURE — 37799 PR AV GRAFT REMOVAL W/O THROMBECTOMY: ICD-10-PCS | Mod: 78,LT,, | Performed by: SURGERY

## 2023-12-10 PROCEDURE — 80053 COMPREHEN METABOLIC PANEL: CPT

## 2023-12-10 PROCEDURE — 99223 PR INITIAL HOSPITAL CARE,LEVL III: ICD-10-PCS | Mod: 57,,, | Performed by: SURGERY

## 2023-12-10 PROCEDURE — 37799 UNLISTED PX VASCULAR SURGERY: CPT | Mod: 78,LT,, | Performed by: SURGERY

## 2023-12-10 PROCEDURE — 99223 PR INITIAL HOSPITAL CARE,LEVL III: ICD-10-PCS | Mod: GC,,, | Performed by: INTERNAL MEDICINE

## 2023-12-10 PROCEDURE — 63600175 PHARM REV CODE 636 W HCPCS

## 2023-12-10 PROCEDURE — 36000706: Performed by: SURGERY

## 2023-12-10 PROCEDURE — 82962 GLUCOSE BLOOD TEST: CPT | Performed by: SURGERY

## 2023-12-10 PROCEDURE — 36000707: Performed by: SURGERY

## 2023-12-10 PROCEDURE — 99223 1ST HOSP IP/OBS HIGH 75: CPT | Mod: GC,,, | Performed by: INTERNAL MEDICINE

## 2023-12-10 PROCEDURE — 87070 CULTURE OTHR SPECIMN AEROBIC: CPT | Mod: 59 | Performed by: SURGERY

## 2023-12-10 PROCEDURE — 21400001 HC TELEMETRY ROOM

## 2023-12-10 PROCEDURE — 87102 FUNGUS ISOLATION CULTURE: CPT | Mod: 59 | Performed by: SURGERY

## 2023-12-10 PROCEDURE — 84100 ASSAY OF PHOSPHORUS: CPT

## 2023-12-10 PROCEDURE — 37000009 HC ANESTHESIA EA ADD 15 MINS: Performed by: SURGERY

## 2023-12-10 PROCEDURE — 87206 SMEAR FLUORESCENT/ACID STAI: CPT | Mod: 91 | Performed by: SURGERY

## 2023-12-10 PROCEDURE — D9220A PRA ANESTHESIA: Mod: CRNA,,, | Performed by: NURSE ANESTHETIST, CERTIFIED REGISTERED

## 2023-12-10 PROCEDURE — 71000015 HC POSTOP RECOV 1ST HR: Performed by: SURGERY

## 2023-12-10 PROCEDURE — 87116 MYCOBACTERIA CULTURE: CPT | Mod: 59 | Performed by: SURGERY

## 2023-12-10 PROCEDURE — 25000003 PHARM REV CODE 250

## 2023-12-10 PROCEDURE — 25000003 PHARM REV CODE 250: Performed by: SURGERY

## 2023-12-10 PROCEDURE — 63600175 PHARM REV CODE 636 W HCPCS: Performed by: SURGERY

## 2023-12-10 PROCEDURE — 83735 ASSAY OF MAGNESIUM: CPT

## 2023-12-10 PROCEDURE — 99223 1ST HOSP IP/OBS HIGH 75: CPT | Mod: 57,,, | Performed by: SURGERY

## 2023-12-10 PROCEDURE — 87205 SMEAR GRAM STAIN: CPT | Performed by: SURGERY

## 2023-12-10 PROCEDURE — D9220A PRA ANESTHESIA: Mod: ANES,,, | Performed by: ANESTHESIOLOGY

## 2023-12-10 PROCEDURE — 63600175 PHARM REV CODE 636 W HCPCS: Performed by: NURSE ANESTHETIST, CERTIFIED REGISTERED

## 2023-12-10 PROCEDURE — D9220A PRA ANESTHESIA: ICD-10-PCS | Mod: CRNA,,, | Performed by: NURSE ANESTHETIST, CERTIFIED REGISTERED

## 2023-12-10 PROCEDURE — 27201423 OPTIME MED/SURG SUP & DEVICES STERILE SUPPLY: Performed by: SURGERY

## 2023-12-10 RX ORDER — HALOPERIDOL 5 MG/ML
0.5 INJECTION INTRAMUSCULAR EVERY 10 MIN PRN
Status: DISCONTINUED | OUTPATIENT
Start: 2023-12-10 | End: 2023-12-10 | Stop reason: HOSPADM

## 2023-12-10 RX ORDER — HEPARIN SODIUM 1000 [USP'U]/ML
INJECTION, SOLUTION INTRAVENOUS; SUBCUTANEOUS
Status: DISCONTINUED | OUTPATIENT
Start: 2023-12-10 | End: 2023-12-10

## 2023-12-10 RX ORDER — FENTANYL CITRATE 50 UG/ML
25 INJECTION, SOLUTION INTRAMUSCULAR; INTRAVENOUS EVERY 5 MIN PRN
Status: DISCONTINUED | OUTPATIENT
Start: 2023-12-10 | End: 2023-12-10 | Stop reason: HOSPADM

## 2023-12-10 RX ORDER — PROTAMINE SULFATE 10 MG/ML
INJECTION, SOLUTION INTRAVENOUS
Status: DISCONTINUED | OUTPATIENT
Start: 2023-12-10 | End: 2023-12-10

## 2023-12-10 RX ORDER — LIDOCAINE HYDROCHLORIDE 10 MG/ML
INJECTION, SOLUTION INTRAVENOUS
Status: DISCONTINUED | OUTPATIENT
Start: 2023-12-10 | End: 2023-12-10

## 2023-12-10 RX ORDER — FENTANYL CITRATE 50 UG/ML
INJECTION, SOLUTION INTRAMUSCULAR; INTRAVENOUS
Status: DISCONTINUED | OUTPATIENT
Start: 2023-12-10 | End: 2023-12-10

## 2023-12-10 RX ORDER — SODIUM CHLORIDE 0.9 % (FLUSH) 0.9 %
10 SYRINGE (ML) INJECTION
Status: DISCONTINUED | OUTPATIENT
Start: 2023-12-10 | End: 2023-12-10 | Stop reason: HOSPADM

## 2023-12-10 RX ORDER — FENTANYL CITRATE 50 UG/ML
INJECTION, SOLUTION INTRAMUSCULAR; INTRAVENOUS
Status: DISPENSED
Start: 2023-12-10 | End: 2023-12-11

## 2023-12-10 RX ORDER — MIDAZOLAM HYDROCHLORIDE 1 MG/ML
INJECTION, SOLUTION INTRAMUSCULAR; INTRAVENOUS
Status: DISCONTINUED | OUTPATIENT
Start: 2023-12-10 | End: 2023-12-10

## 2023-12-10 RX ORDER — PROPOFOL 10 MG/ML
VIAL (ML) INTRAVENOUS CONTINUOUS PRN
Status: DISCONTINUED | OUTPATIENT
Start: 2023-12-10 | End: 2023-12-10

## 2023-12-10 RX ORDER — HEPARIN SODIUM 1000 [USP'U]/ML
INJECTION, SOLUTION INTRAVENOUS; SUBCUTANEOUS
Status: DISCONTINUED | OUTPATIENT
Start: 2023-12-10 | End: 2023-12-10 | Stop reason: HOSPADM

## 2023-12-10 RX ORDER — ACETAMINOPHEN 325 MG/1
650 TABLET ORAL EVERY 6 HOURS
Status: DISCONTINUED | OUTPATIENT
Start: 2023-12-10 | End: 2023-12-16 | Stop reason: HOSPADM

## 2023-12-10 RX ORDER — DEXMEDETOMIDINE HYDROCHLORIDE 100 UG/ML
INJECTION, SOLUTION INTRAVENOUS
Status: DISCONTINUED | OUTPATIENT
Start: 2023-12-10 | End: 2023-12-10

## 2023-12-10 RX ORDER — KETAMINE HCL IN 0.9 % NACL 50 MG/5 ML
SYRINGE (ML) INTRAVENOUS
Status: DISCONTINUED | OUTPATIENT
Start: 2023-12-10 | End: 2023-12-10

## 2023-12-10 RX ORDER — LIDOCAINE HYDROCHLORIDE 10 MG/ML
INJECTION INFILTRATION; PERINEURAL
Status: DISCONTINUED | OUTPATIENT
Start: 2023-12-10 | End: 2023-12-10 | Stop reason: HOSPADM

## 2023-12-10 RX ORDER — OXYCODONE HYDROCHLORIDE 5 MG/1
5 TABLET ORAL EVERY 6 HOURS PRN
Status: DISCONTINUED | OUTPATIENT
Start: 2023-12-10 | End: 2023-12-16 | Stop reason: HOSPADM

## 2023-12-10 RX ORDER — OXYCODONE HYDROCHLORIDE 5 MG/1
5 TABLET ORAL EVERY 6 HOURS PRN
Status: DISCONTINUED | OUTPATIENT
Start: 2023-12-10 | End: 2023-12-10

## 2023-12-10 RX ADMIN — PROPOFOL 100 MCG/KG/MIN: 10 INJECTION, EMULSION INTRAVENOUS at 01:12

## 2023-12-10 RX ADMIN — PROTAMINE SULFATE 10 MG: 10 INJECTION, SOLUTION INTRAVENOUS at 02:12

## 2023-12-10 RX ADMIN — PIPERACILLIN SODIUM AND TAZOBACTAM SODIUM 4.5 G: 4; .5 INJECTION, POWDER, FOR SOLUTION INTRAVENOUS at 10:12

## 2023-12-10 RX ADMIN — PROPOFOL 50 MG: 10 INJECTION, EMULSION INTRAVENOUS at 01:12

## 2023-12-10 RX ADMIN — PROTAMINE SULFATE 5 MG: 10 INJECTION, SOLUTION INTRAVENOUS at 02:12

## 2023-12-10 RX ADMIN — OXYCODONE HYDROCHLORIDE 5 MG: 5 TABLET ORAL at 06:12

## 2023-12-10 RX ADMIN — MEPIVACAINE HYDROCHLORIDE 30 ML: 15 INJECTION, SOLUTION EPIDURAL; INFILTRATION at 01:12

## 2023-12-10 RX ADMIN — Medication 25 MG: at 02:12

## 2023-12-10 RX ADMIN — ACETAMINOPHEN 650 MG: 325 TABLET ORAL at 05:12

## 2023-12-10 RX ADMIN — PROTAMINE SULFATE 20 MG: 10 INJECTION, SOLUTION INTRAVENOUS at 02:12

## 2023-12-10 RX ADMIN — MEPIVACAINE HYDROCHLORIDE 10 ML: 15 INJECTION, SOLUTION EPIDURAL; INFILTRATION at 01:12

## 2023-12-10 RX ADMIN — LIDOCAINE HYDROCHLORIDE 50 MG: 10 INJECTION, SOLUTION INTRAVENOUS at 01:12

## 2023-12-10 RX ADMIN — HEPARIN SODIUM 5000 UNITS: 1000 INJECTION, SOLUTION INTRAVENOUS; SUBCUTANEOUS at 01:12

## 2023-12-10 RX ADMIN — FENTANYL CITRATE 25 MCG: 50 INJECTION INTRAMUSCULAR; INTRAVENOUS at 04:12

## 2023-12-10 RX ADMIN — PROPOFOL 100 MG: 10 INJECTION, EMULSION INTRAVENOUS at 01:12

## 2023-12-10 RX ADMIN — CARVEDILOL 12.5 MG: 12.5 TABLET, FILM COATED ORAL at 08:12

## 2023-12-10 RX ADMIN — NIFEDIPINE 60 MG: 30 TABLET, FILM COATED, EXTENDED RELEASE ORAL at 10:12

## 2023-12-10 RX ADMIN — FENTANYL CITRATE 50 MCG: 50 INJECTION, SOLUTION INTRAMUSCULAR; INTRAVENOUS at 01:12

## 2023-12-10 RX ADMIN — FENTANYL CITRATE 50 MCG: 50 INJECTION, SOLUTION INTRAMUSCULAR; INTRAVENOUS at 02:12

## 2023-12-10 RX ADMIN — ACETAMINOPHEN 650 MG: 325 TABLET ORAL at 10:12

## 2023-12-10 RX ADMIN — TAMSULOSIN HYDROCHLORIDE 0.4 MG: 0.4 CAPSULE ORAL at 08:12

## 2023-12-10 RX ADMIN — CARVEDILOL 12.5 MG: 12.5 TABLET, FILM COATED ORAL at 10:12

## 2023-12-10 RX ADMIN — GLYCOPYRROLATE 0.2 MG: 0.2 INJECTION INTRAMUSCULAR; INTRAVENOUS at 01:12

## 2023-12-10 RX ADMIN — NIFEDIPINE 60 MG: 30 TABLET, FILM COATED, EXTENDED RELEASE ORAL at 08:12

## 2023-12-10 RX ADMIN — ACETAMINOPHEN 650 MG: 325 TABLET ORAL at 08:12

## 2023-12-10 RX ADMIN — SODIUM CHLORIDE: 0.9 INJECTION, SOLUTION INTRAVENOUS at 01:12

## 2023-12-10 RX ADMIN — MIDAZOLAM HYDROCHLORIDE 2 MG: 1 INJECTION, SOLUTION INTRAMUSCULAR; INTRAVENOUS at 12:12

## 2023-12-10 RX ADMIN — DEXMEDETOMIDINE 12 MCG: 200 INJECTION, SOLUTION INTRAVENOUS at 02:12

## 2023-12-10 NOTE — ASSESSMENT & PLAN NOTE
Concern for AVF infection. Currently undergoing HD with tunnel cath. Graft excision 12/10/23 w/ vascular surgery.

## 2023-12-10 NOTE — CONSULTS
[facility] ([place] [address])  Infectious Disease  Consult Note    Patient Name: Elbert Still Jr.  MRN: [id number]  Admission Date: 12/8/2023  Hospital Length of Stay: 1 days  Attending Physician: Mookie Acuña, *  Primary Care Provider: Angel Luis Boo MD     Isolation Status: No active isolations    Patient information was obtained from patient and ER records.      Assessment/Plan:     Cardiac/Vascular  Arteriovenous fistula  Concern for AVF infection. Currently undergoing HD with tunnel cath. Graft excision 12/10/23 w/ vascular surgery.      ID  * Bacteremia  Mr. Still is a 68 year old with ESRD MWF, DM2, HTN who presented for concern of AVF infection due to growing GPCs in his blood. He had his AVG placed in 9/2022 and recently went through revision on 11/9/23 due to inability to cannulate. Patient had blood cultures drawn at dialysis on 12/6/23 growing GPCs in pairs, chains, and clusters (only one culture was collected). U/s obtained of his line showing concern for possible thrombosed pseudoaneurysm vs abscess vs hematoma. Non contrasted CT obtained showing edema w/ no specific fluid collection. He was started on empiric vanc/zosyn. He is going for graft excision 12/10/23 with vascular. Currently obtaining dialysis through a HD tunnel cath placed 5/5/22.     Recommendations:  1. We will follow-up bcx speciation and sensitivities  2. Obtain excisional cultures if possible   3. Continue IV vanc/zosyn for now, pending further culture data   4. Consider tunnel line removal w/ line holiday           Thank you for your consult. I will follow-up with patient. Please contact us if you have any additional questions.    Robinson Abraham, DO  Infectious Disease  [facility] ([place] [address])    Subjective:     Principal Problem: Bacteremia    HPI: Mr. Still is a 68M with PMH of HTN, DM2, ESRD on HD MWF, multiple previous LUE AVF surgeries most recently 1 month ago, presents with edema and  "erythema of the fistula site. Per patient, his nephrologist had obtained blood cultures on , which resulted on  with GPCs in chains and clusters, so they told patient to come to the ER. Patient currently receiving HD via R IJ tunneled catheter. Infectious disease consulted for "Bacteremia c/f surgical site infection, abx recs and outpatient abx recs/length of treatment ".     He was vitally stable since admission and has been afebrile. His labs on admission were unremarkale besides slightly elevated CRP 23.5, ESR 50, and slightyl elevated procal. His UA, CXR were negative. 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 Simple focal hematoma or abscess also considered. This was followed by a CT arm without contrast which showed "subcutaneous edema throughout the upper arm that extends past the elbow, the remainder of the lower arm is not imaged. No organized collection to suggest abscess however limited evaluation given noncontrast technique."    On my interview, patient states his swelling and pain in right upper arm around AVG site has been persistent since his most recent revision. Currently pain 5/10, warm to touch, swollen. Denies any systemic symptoms.     Past Medical History:   Diagnosis Date    Anemia     Diabetes mellitus     Diabetes mellitus, type 2     Disorder of kidney and ureter     ESRD (end stage renal disease)     Essential (primary) hypertension 2017    Gout, unspecified      jaundice, unspecified     Nuclear sclerosis of both eyes 2022       Past Surgical History:   Procedure Laterality Date    ADENOIDECTOMY      ADENOIDECTOMY      AV FISTULA PLACEMENT Left 2022    Procedure: CREATION, AV FISTULA;  Surgeon: Prince Gardiner MD;  Location: [facility] OR [address] [place] [address];  Service: Peripheral Vascular;  Laterality: Left;    FISTULOGRAM Left 2023    Procedure: FISTULOGRAM;  Surgeon: Prince BOJORQUEZ" MD Abdifatah;  [facility];  Service: Peripheral Vascular;  Laterality: Left;  Given to the sterile field.     FISTULOGRAM Left 7/6/2023    Procedure: Fistulogram;  Surgeon: LETY Rayo III, MD;  [facility];  Service: Peripheral Vascular;  Laterality: Left;  1.9 min  22.86 mGy  3.8862 Gy.cm  20ml Dye     nasal septum repair      NASAL SEPTUM SURGERY      PERCUTANEOUS TRANSLUMINAL ANGIOPLASTY OF ARTERIOVENOUS FISTULA Left 11/22/2022    Procedure: PTA, AV FISTULA;  Surgeon: Prince Gardiner MD;  Location: [facility] CATH LAB;  Service: Peripheral Vascular;  Laterality: Left;    PERCUTANEOUS TRANSLUMINAL ANGIOPLASTY OF ARTERIOVENOUS FISTULA Left 2/16/2023    Procedure: PTA, AV FISTULA;  Surgeon: Prince Gardiner MD;  Location: [facility] OR [address] [place] [address];  Service: Peripheral Vascular;  Laterality: Left;  4.8 min  43.08 mGy  4.0682 Gy.cm  32ml Dye    PERCUTANEOUS TRANSLUMINAL ANGIOPLASTY OF ARTERIOVENOUS FISTULA Left 7/6/2023    Procedure: PTA, AV FISTULA;  Surgeon: LETY Rayo III, MD;  Location: [facility] OR [address] [place] [address];  Service: Peripheral Vascular;  Laterality: Left;    PROSTATE BIOPSY N/A 3/24/2023    Procedure: BIOPSY, PROSTATE;  Surgeon: Frankie Welch MD;  Location: [address];  Service: Urology;  Laterality: N/A;  transrectal, 30min, uronav needed    REVISION OF ARTERIOVENOUS FISTULA Left 11/9/2023    Procedure: REVISION, AV FISTULA;  Surgeon: LETY Rayo III, MD;  Location: [facility] OR [address] [place] [address];  Service: Vascular;  Laterality: Left;  LUE AVG revision    ROTATOR CUFF REPAIR Left     SALIVARY GLAND SURGERY      Tonsillectomy      TONSILLECTOMY      TRANSPOSITION OF BASILIC VEIN Left 11/1/2022    Procedure: TRANSPOSITION, VEIN, BASILIC;  Surgeon: Prince Gardiner MD;  Location: [facility] OR [address] [place] [address];  Service: Peripheral Vascular;  Laterality: Left;  Left Brachial vein.       Review of patient's allergies indicates:   Allergen Reactions    Iodine and iodide containing products Hives     Hypotension,  Flushing       Medications:  Facility-Administered Medications Prior to Admission   Medication    cefTRIAXone injection 1 g    LIDOcaine HCl 2% urojet     Medications Prior to Admission   Medication Sig    acetaminophen (TYLENOL) 500 MG tablet Take 2 tablets (1,000 mg total) by mouth every 6 (six) hours as needed for Pain.    atorvastatin (LIPITOR) 20 MG tablet TAKE 1 TABLET BY MOUTH EVERY EVENING    carvediloL (COREG) 12.5 MG tablet Take 1 tablet (12.5 mg total) by mouth 2 (two) times daily.    colchicine (COLCRYS) 0.6 mg tablet Take 0.6 mg by mouth as needed (for gout flare). Take half tab (0.3 mg) by mouth daily as needed for gout flare.    diclofenac sodium (VOLTAREN) 1 % Gel Apply 2 g topically once daily.    fluticasone propionate (FLONASE) 50 mcg/actuation nasal spray 2 sprays (100 mcg total) by Each Nostril route once daily.    furosemide (LASIX) 80 MG tablet TAKE 1 TABLET(80 MG) BY MOUTH TWICE DAILY    LIDOcaine (LIDODERM) 5 % Place 2 patches onto the skin once daily. Remove & Discard patch within 12 hours or as directed by MD    NIFEdipine (PROCARDIA-XL) 60 MG (OSM) 24 hr tablet Take 1 tablet (60 mg total) by mouth 2 (two) times a day.    oxyCODONE (ROXICODONE) 5 MG immediate release tablet Take 1 tablet (5 mg total) by mouth every 6 (six) hours as needed for Pain.    sevelamer carbonate (RENVELA) 800 mg Tab TAKE 2 TABLETS(1600 MG) BY MOUTH THREE TIMES DAILY WITH MEALS    tamsulosin (FLOMAX) 0.4 mg Cap Take 1 capsule (0.4 mg total) by mouth once daily.    aspirin (ECOTRIN) 81 MG EC tablet Take 1 tablet (81 mg total) by mouth once daily. (Patient not taking: Reported on 12/9/2023)    blood sugar diagnostic Strp Use to check blood glucose once a day.    blood-glucose meter Misc Use to check blood glucose twice a day.    lancets 30 gauge Misc Use to check blood glucose twice a day.    LIDOcaine-prilocaine (EMLA) cream SMARTSIG:sparingly Topical As Directed     Antibiotics (From admission, onward)      Start      Stop Route Frequency Ordered    12/09/23 1030  piperacillin-tazobactam (ZOSYN) 4.5 g in dextrose 5 % in water (D5W) 100 mL IVPB (MB+)         -- IV Every 12 hours (non-standard times) 12/09/23 0838    12/09/23 0838  vancomycin - pharmacy to dose  (vancomycin IVPB (PEDS and ADULTS))        See [facility] for full Linked Orders Report.    -- IV pharmacy to manage frequency 12/09/23 0838          Antifungals (From admission, onward)      None          Antivirals (From admission, onward)      None             Immunization History   Administered Date(s) Administered    Hepatitis B, Adult 06/13/2022, 07/13/2022, 12/12/2022    Influenza - Quadrivalent - PF *Preferred* (6 months and older) 11/25/2008, 09/29/2023    PPD Test 05/03/2022    Pneumococcal Conjugate - 20 Valent 03/16/2023    Pneumococcal Polysaccharide - 23 Valent 08/06/2019    Td - PF (ADULT) 08/06/2019       Family History       Problem Relation (Age of Onset)    Cancer Sister    Heart disease Mother    Hypertension Mother, Sister    Kidney disease Mother    No Known Problems Father    Seizures Sister    Stroke Sister          Social History     Socioeconomic History    Marital status:      Spouse name: Kristine Still   Occupational History     Employer: [employer]   Tobacco Use    Smoking status: Never    Smokeless tobacco: Never    Tobacco comments:     .  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   HENT:  Negative for congestion and sore throat.    Respiratory:  Negative for cough and shortness of breath.    Cardiovascular:  Negative for chest pain.   Gastrointestinal:   Negative for abdominal pain, constipation, diarrhea, nausea and vomiting.   Genitourinary:  Negative for dysuria and flank pain.   Musculoskeletal:  Negative for joint swelling.   Skin:         Redness, swelling, pain to right upper arm   Neurological:  Negative for dizziness and headaches.   Psychiatric/Behavioral:  Negative for confusion. The patient is not nervous/anxious.      Objective:     Vital Signs (Most Recent):  Temp: 98.7 °F (37.1 °C) (12/10/23 0736)  Pulse: 72 (12/10/23 0736)  Resp: 18 (12/10/23 0736)  BP: (!) 156/74 (12/10/23 0736)  SpO2: 99 % (12/10/23 0736) Vital Signs (24h Range):  Temp:  [98.2 °F (36.8 °C)-98.7 °F (37.1 °C)] 98.7 °F (37.1 °C)  Pulse:  [68-79] 72  Resp:  [17-20] 18  SpO2:  [95 %-100 %] 99 %  BP: (139-156)/(69-74) 156/74     Weight: 97.5 kg (215 lb)  Body mass index is 27.6 kg/m².    Estimated Creatinine Clearance: 10.2 mL/min (A) (based on SCr of 8.3 mg/dL (H)).     Physical Exam  HENT:      Head: Normocephalic.      Nose: Nose normal.      Mouth/Throat:      Mouth: Mucous membranes are moist.   Eyes:      Pupils: Pupils are equal, round, and reactive to light.   Cardiovascular:      Rate and Rhythm: Normal rate.      Comments: Tunnel HD cath right upper chest with no erythema, warmth, tenderness  Pulmonary:      Effort: Pulmonary effort is normal.   Abdominal:      General: Abdomen is flat.   Musculoskeletal:         General: Normal range of motion.   Skin:     General: Skin is warm and dry.      Capillary Refill: Capillary refill takes less than 2 seconds.      Comments: Tender AVG site on right upper arm, slightly swollen, warm to touch compared to surrounding skin   Neurological:      General: No focal deficit present.      Mental Status: He is alert and oriented to person, place, and time.              Significant Labs: All pertinent labs within the past 24 hours have been reviewed.    Significant Imaging: I have reviewed all pertinent imaging results/findings within the past  24 hours.

## 2023-12-10 NOTE — BRIEF OP NOTE
[facility] ([facility])  Brief Operative Note    SUMMARY     Surgery Date: 12/10/2023     Surgeon(s) and Role:     * Joaquin Rose MD - Primary     * Shahriar Abrams MD - Resident - Assisting     * Flo Julian MD        Pre-op Diagnosis:  Bacteremia [R78.81]    Post-op Diagnosis:  Post-Op Diagnosis Codes:     * Bacteremia [R78.81]    Procedure(s) (LRB):  EXCISION, AV FISTULA (Left)  WASHOUT OF AV FISTULA (Left)    Anesthesia: Regional    Implants:  * No implants in log *    Operative Findings: Purulence within proximal portion of graft, culture sent. Complete excision of LUE AVG, no residual. Proximal wound left open and packed.     Estimated Blood Loss: 100cc         Specimens:   Specimen (24h ago, onward)      None            [id number]

## 2023-12-10 NOTE — PLAN OF CARE
Problem: Adult Inpatient Plan of Care  Goal: Plan of Care Review  Outcome: Ongoing, Progressing  Goal: Patient-Specific Goal (Individualized)  Outcome: Ongoing, Progressing  Goal: Absence of Hospital-Acquired Illness or Injury  Outcome: Ongoing, Progressing     Problem: Infection  Goal: Absence of Infection Signs and Symptoms  Outcome: Ongoing, Progressing  Received pt from ED via wheelchair. Pt w/ spontaneous non-labored breathing. Pt AAOx4. Pt on room air. Heart rhythm regular,  abdominal sounds active. Pt hooked to portable telemetry box, Pt denies pain, chest pain, SOB, dizziness and n/v. Pt oriented to room and call light,vitals signs stables fall risk reviewed and comfort measure utilized, pt verbalized understanding. plan of care ongoing.

## 2023-12-10 NOTE — ASSESSMENT & PLAN NOTE
Mr. Still is a 68 year old with ESRD MWF, DM2, HTN who presented for concern of AVF infection due to growing GPCs in his blood. He had his AVG placed in 9/2022 and recently went through revision on 11/9/23 due to inability to cannulate. Patient had blood cultures drawn at dialysis on 12/6/23 growing GPCs in pairs, chains, and clusters (only one culture was collected). U/s obtained of his line showing concern for possible thrombosed pseudoaneurysm vs abscess vs hematoma. Non contrasted CT obtained showing edema w/ no specific fluid collection. He was started on empiric vanc/zosyn. He is going for graft excision 12/10/23 with vascular. Currently obtaining dialysis through a HD tunnel cath placed 5/5/22.     Recommendations:  1. We will follow-up bcx speciation and sensitivities  2. Obtain excisional cultures if possible   3. Continue IV vanc/zosyn for now, pending further culture data   4. Consider tunnel line removal/replacement

## 2023-12-10 NOTE — SUBJECTIVE & OBJECTIVE
Interval History:  No acute events overnight.  Patient underwent removal of AV fistula graft yesterday.  HD today with planned removal of tunneled line after.      Objective:     Vital Signs (Most Recent):  Temp: 97.7 °F (36.5 °C) (12/10/23 1105)  Pulse: 70 (12/10/23 1105)  Resp: 18 (12/10/23 1105)  BP: (!) 171/79 (12/10/23 1105)  SpO2: 100 % (12/10/23 1105) Vital Signs (24h Range):  Temp:  [97.7 °F (36.5 °C)-98.7 °F (37.1 °C)] 97.7 °F (36.5 °C)  Pulse:  [68-79] 70  Resp:  [18-20] 18  SpO2:  [95 %-100 %] 100 %  BP: (139-171)/(69-79) 171/79     Weight: 97.5 kg (215 lb)  Body mass index is 27.6 kg/m².    Intake/Output Summary (Last 24 hours) at 12/10/2023 1443  Last data filed at 12/10/2023 0349  Gross per 24 hour   Intake 222 ml   Output 800 ml   Net -578 ml         Physical Exam  Vitals and nursing note reviewed.   Constitutional:       General: He is not in acute distress.     Appearance: He is not ill-appearing, toxic-appearing or diaphoretic.   HENT:      Head: Normocephalic and atraumatic.      Right Ear: External ear normal.      Left Ear: External ear normal.      Mouth/Throat:      Mouth: Mucous membranes are moist.   Eyes:      General: No scleral icterus.        Right eye: No discharge.         Left eye: No discharge.   Cardiovascular:      Rate and Rhythm: Normal rate and regular rhythm.      Heart sounds: Normal heart sounds. No murmur heard.  Pulmonary:      Effort: Pulmonary effort is normal. No respiratory distress.      Breath sounds: No wheezing or rales.   Abdominal:      General: Bowel sounds are normal. There is no distension.      Palpations: Abdomen is soft.      Tenderness: There is no abdominal tenderness. There is no guarding.   Musculoskeletal:         General: No deformity.      Cervical back: No rigidity.      Right lower leg: No edema.      Left lower leg: No edema.   Skin:     General: Skin is warm.      Coloration: Skin is not jaundiced.      Comments: Tenderness, erythema, edema in  antecubital fossa at site inferior to AV graft   Neurological:      Mental Status: He is alert and oriented to person, place, and time. Mental status is at baseline.   Psychiatric:         Mood and Affect: Mood normal.         Behavior: Behavior normal.             Significant Labs: All pertinent labs within the past 24 hours have been reviewed.    Significant Imaging: I have reviewed all pertinent imaging results/findings within the past 24 hours.

## 2023-12-10 NOTE — ASSESSMENT & PLAN NOTE
68 M with left upper extremity brachiobrachial arteriovenous fistula (2 stage) in 9/2022 s/p multiple PTA then on 11/9/2023 inability to cannulate for fistula gram and ended up having to do a graft interposition now presents with concerns for graft infection w/ positive blood cultures growing gram + Cocci. No concern currently for dialysis catheter infection.    - OR today for graft removal, please keep NPO.   - dialysis through catheter for now  - will likely need vein mapping for planning of RUE fistula vs graft  - rest of care per primary team

## 2023-12-10 NOTE — SUBJECTIVE & OBJECTIVE
Past Medical History:   Diagnosis Date    Anemia     Diabetes mellitus     Diabetes mellitus, type 2     Disorder of kidney and ureter     ESRD (end stage renal disease)     Essential (primary) hypertension 2017    Gout, unspecified      jaundice, unspecified     Nuclear sclerosis of both eyes 2022       Past Surgical History:   Procedure Laterality Date    ADENOIDECTOMY      ADENOIDECTOMY      AV FISTULA PLACEMENT Left 2022    Procedure: CREATION, AV FISTULA;  Surgeon: Prince Gardiner MD;  [facility];  Service: Peripheral Vascular;  Laterality: Left;    FISTULOGRAM Left 2023    Procedure: FISTULOGRAM;  Surgeon: Prince Gardiner MD;  [facility];  Service: Peripheral Vascular;  Laterality: Left;  Given to the sterile field.     FISTULOGRAM Left 2023    Procedure: Fistulogram;  Surgeon: LETY Rayo III, MD;  Location: [facility] OR [address] [place] [address];  Service: Peripheral Vascular;  Laterality: Left;  1.9 min  22.86 mGy  3.8862 Gy.cm  20ml Dye     nasal septum repair      NASAL SEPTUM SURGERY      PERCUTANEOUS TRANSLUMINAL ANGIOPLASTY OF ARTERIOVENOUS FISTULA Left 2022    Procedure: PTA, AV FISTULA;  Surgeon: Prince Gardiner MD;  Location: [facility] CATH LAB;  Service: Peripheral Vascular;  Laterality: Left;    PERCUTANEOUS TRANSLUMINAL ANGIOPLASTY OF ARTERIOVENOUS FISTULA Left 2023    Procedure: PTA, AV FISTULA;  Surgeon: Prince Gardiner MD;  [facility];  Service: Peripheral Vascular;  Laterality: Left;  4.8 min  43.08 mGy  4.0682 Gy.cm  32ml Dye    PERCUTANEOUS TRANSLUMINAL ANGIOPLASTY OF ARTERIOVENOUS FISTULA Left 2023    Procedure: PTA, AV FISTULA;  Surgeon: LETY Rayo III, MD;  Location: [facility] OR [address] [place] [address];  Service: Peripheral Vascular;  Laterality: Left;    PROSTATE BIOPSY N/A 3/24/2023    Procedure: BIOPSY, PROSTATE;  Surgeon: Frankie Welch MD;  Location: [facility] OR [address] [place] [address];  Service: Urology;  Laterality: N/A;  transrectal, 30min,  uronav needed    REVISION OF ARTERIOVENOUS FISTULA Left 11/9/2023    Procedure: REVISION, AV FISTULA;  Surgeon: LETY Rayo III, MD;  Location: [facility] OR [address] [place] [address];  Service: Vascular;  Laterality: Left;  LUE AVG revision    ROTATOR CUFF REPAIR Left     SALIVARY GLAND SURGERY      Tonsillectomy      TONSILLECTOMY      TRANSPOSITION OF BASILIC VEIN Left 11/1/2022    Procedure: TRANSPOSITION, VEIN, BASILIC;  Surgeon: Prince Gardiner MD;  Location: [facility] OR [address] [place] [address];  Service: Peripheral Vascular;  Laterality: Left;  Left Brachial vein.       Review of patient's allergies indicates:   Allergen Reactions    Iodine and iodide containing products Hives     Hypotension, Flushing       Medications:  Facility-Administered Medications Prior to Admission   Medication    cefTRIAXone injection 1 g    LIDOcaine HCl 2% urojet     Medications Prior to Admission   Medication Sig    acetaminophen (TYLENOL) 500 MG tablet Take 2 tablets (1,000 mg total) by mouth every 6 (six) hours as needed for Pain.    atorvastatin (LIPITOR) 20 MG tablet TAKE 1 TABLET BY MOUTH EVERY EVENING    carvediloL (COREG) 12.5 MG tablet Take 1 tablet (12.5 mg total) by mouth 2 (two) times daily.    colchicine (COLCRYS) 0.6 mg tablet Take 0.6 mg by mouth as needed (for gout flare). Take half tab (0.3 mg) by mouth daily as needed for gout flare.    diclofenac sodium (VOLTAREN) 1 % Gel Apply 2 g topically once daily.    fluticasone propionate (FLONASE) 50 mcg/actuation nasal spray 2 sprays (100 mcg total) by Each Nostril route once daily.    furosemide (LASIX) 80 MG tablet TAKE 1 TABLET(80 MG) BY MOUTH TWICE DAILY    LIDOcaine (LIDODERM) 5 % Place 2 patches onto the skin once daily. Remove & Discard patch within 12 hours or as directed by MD    NIFEdipine (PROCARDIA-XL) 60 MG (OSM) 24 hr tablet Take 1 tablet (60 mg total) by mouth 2 (two) times a day.    oxyCODONE (ROXICODONE) 5 MG immediate release tablet Take 1 tablet (5 mg total) by mouth every 6 (six) hours  as needed for Pain.    sevelamer carbonate (RENVELA) 800 mg Tab TAKE 2 TABLETS(1600 MG) BY MOUTH THREE TIMES DAILY WITH MEALS    tamsulosin (FLOMAX) 0.4 mg Cap Take 1 capsule (0.4 mg total) by mouth once daily.    aspirin (ECOTRIN) 81 MG EC tablet Take 1 tablet (81 mg total) by mouth once daily. (Patient not taking: Reported on 12/9/2023)    blood sugar diagnostic Strp Use to check blood glucose once a day.    blood-glucose meter Misc Use to check blood glucose twice a day.    lancets 30 gauge Misc Use to check blood glucose twice a day.    LIDOcaine-prilocaine (EMLA) cream SMARTSIG:sparingly Topical As Directed     Antibiotics (From admission, onward)      Start     Stop Route Frequency Ordered    12/09/23 1030  piperacillin-tazobactam (ZOSYN) 4.5 g in dextrose 5 % in water (D5W) 100 mL IVPB (MB+)         -- IV Every 12 hours (non-standard times) 12/09/23 0838    12/09/23 0838  vancomycin - pharmacy to dose  (vancomycin IVPB (PEDS and ADULTS))        See Hyperspace for full Linked Orders Report.    -- IV pharmacy to manage frequency 12/09/23 0838          Antifungals (From admission, onward)      None          Antivirals (From admission, onward)      None             Immunization History   Administered Date(s) Administered    Hepatitis B, Adult 06/13/2022, 07/13/2022, 12/12/2022    Influenza - Quadrivalent - PF *Preferred* (6 months and older) 11/25/2008, 09/29/2023    PPD Test 05/03/2022    Pneumococcal Conjugate - 20 Valent 03/16/2023    Pneumococcal Polysaccharide - 23 Valent 08/06/2019    Td - PF (ADULT) 08/06/2019       Family History       Problem Relation (Age of Onset)    Cancer Sister    Heart disease Mother    Hypertension Mother, Sister    Kidney disease Mother    No Known Problems Father    Seizures Sister    Stroke Sister          Social History     Socioeconomic History    Marital status:      Spouse name: Kristine Tessy   Occupational History     Employer: [employer]   Tobacco  Use    Smoking status: Never    Smokeless tobacco: Never    Tobacco comments:     .  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   HENT:  Negative for congestion and sore throat.    Respiratory:  Negative for cough and shortness of breath.    Cardiovascular:  Negative for chest pain.   Gastrointestinal:  Negative for abdominal pain, constipation, diarrhea, nausea and vomiting.   Genitourinary:  Negative for dysuria and flank pain.   Musculoskeletal:  Negative for joint swelling.   Skin:         Redness, swelling, pain to right upper arm   Neurological:  Negative for dizziness and headaches.   Psychiatric/Behavioral:  Negative for confusion. The patient is not nervous/anxious.      Objective:     Vital Signs (Most Recent):  Temp: 98.7 °F (37.1 °C) (12/10/23 0736)  Pulse: 72 (12/10/23 0736)  Resp: 18 (12/10/23 0736)  BP: (!) 156/74 (12/10/23 0736)  SpO2: 99 % (12/10/23 0736) Vital Signs (24h Range):  Temp:  [98.2 °F (36.8 °C)-98.7 °F (37.1 °C)] 98.7 °F (37.1 °C)  Pulse:  [68-79] 72  Resp:  [17-20] 18  SpO2:  [95 %-100 %] 99 %  BP: (139-156)/(69-74) 156/74     Weight: 97.5 kg (215 lb)  Body mass index is 27.6 kg/m².    Estimated Creatinine Clearance: 10.2 mL/min (A) (based on SCr of 8.3 mg/dL (H)).     Physical Exam  HENT:      Head: Normocephalic.      Nose: Nose normal.      Mouth/Throat:      Mouth: Mucous membranes are moist.   Eyes:      Pupils: Pupils are equal, round, and reactive to light.   Cardiovascular:      Rate and Rhythm: Normal rate.      Comments: Tunnel HD cath right upper chest with no erythema, warmth, tenderness  Pulmonary:      Effort:  Pulmonary effort is normal.   Abdominal:      General: Abdomen is flat.   Musculoskeletal:         General: Normal range of motion.   Skin:     General: Skin is warm and dry.      Capillary Refill: Capillary refill takes less than 2 seconds.      Comments: Tender AVG site on right upper arm, slightly swollen, warm to touch compared to surrounding skin   Neurological:      General: No focal deficit present.      Mental Status: He is alert and oriented to person, place, and time.              Significant Labs: All pertinent labs within the past 24 hours have been reviewed.    Significant Imaging: I have reviewed all pertinent imaging results/findings within the past 24 hours.

## 2023-12-10 NOTE — OP NOTE
Surgery Date: 12/10/2023      Surgeon(s) and Role:     * Joaquin Rose MD - Primary     * Shahriar Abrams MD - Resident - Assisting     * Flo Julian MD     Pre-op Diagnosis:    AV graft infection  Bacteremia [R78.81]     Post-op Diagnosis:  Post-Op Diagnosis Codes:     * Bacteremia [R78.81]     Procedure(s) (LRB):  EXCISION, AV FISTULA (Left arm)  2. WASHOUT OF LEFT ARM    Lion detail:  Patient was seen by anesthesia and was deemed stable for surgery.  He was brought to the operating room placed supine on his operating room table.  Regional block was performed by anesthesia to the left upper extremity.  Please see separately dictated op report for further details the procedure.  Patient was prepped and draped in sterile fashion a time-out performed.  Patient was heparinized with 5000 units of heparin at tourniquet was inflated.  Incision was made overlying the left arm graft arterial anastomosis with a scalpel and deepened with electrocautery.  Sharp dissection was used to expose the anastomosis.  A profunda clamp was used to clamp the fistula and the graft was removed and all suture.  Two layer mattress and running suture was performed with 5 0 Prolene and hemostasis was achieved.  Counter incision was made with a scalpel overlying the mid graft and deepened with electrocautery.  The graft was exposed and removed proximally.  We then made a longitudinal incision with a scalpel overlying the central aspect of the graft to the venous anastomosis to the previous brachial fistula.  This was deepened with electrocautery and sharp dissection.  The brachial vein was clamped with a profunda clamp and the entire graft and suture was removed.  This was oversewn with 2 layer as detailed above.  Hemostasis was achieved with electrocautery, manual pressure, Surgicel and Arixtra.  Central wound was closed in multiple layers after being washed out.  The mid incision was also closed after being washed out.  The  proximal fistula was covered with closure and the wound was remained open with wet-to-dry dressing placed.  The patient was transferred recovery room in stable condition.       Anesthesia: REGIONAL + MAC     Implants:  * No implants in log *     Operative Findings: entire graft removed     Estimated Blood Loss: * No values recorded between 12/10/2023  1:30 PM and 12/10/2023  3:47 PM *     Estimated Blood Loss has been documented.         Specimens:   Specimen (24h ago, onward)   Left arm AV graft  Cultures left arm

## 2023-12-10 NOTE — NURSING TRANSFER
Nursing Transfer Note      12/10/2023   4:48 PM    Nurse giving handoff:Meagan  Nurse receiving handoff:Sylvester     Reason patient is being transferred: post op    Transfer To: [id number]    Transfer via stretcher    Transfer with cardiac monitoring    Transported by PCT    Transfer Vital Signs: See Flowsheet      Telemetry: Box Number placed on patient   Order for Tele Monitor? Yes    Additional Lines: none    4eyes on Skin: yes    Medicines sent: none    Any special needs or follow-up needed: none    Patient belongings transferred with patient: Yes    Chart send with patient: Yes    Notified: spouse    Patient reassessed at: 12/10/2023 1600  1  Upon arrival to floor: cardiac monitor applied, patient oriented to room, call bell in reach, and bed in lowest position

## 2023-12-10 NOTE — TRANSFER OF CARE
"Anesthesia Transfer of Care Note    Patient: Elbert Still Jr.    Procedure(s) Performed: Procedure(s) (LRB):  EXCISION, AV FISTULA (Left)  WASHOUT OF AV FISTULA (Left)    Patient location: PACU    Anesthesia Type: general    Transport from OR: Transported from OR on 6-10 L/min O2 by face mask with adequate spontaneous ventilation    Post pain: adequate analgesia    Post assessment: no apparent anesthetic complications and tolerated procedure well    Post vital signs: stable    Level of consciousness: lethargic    Nausea/Vomiting: no nausea/vomiting    Complications: none    Transfer of care protocol was followed      Last vitals: Visit Vitals  BP (!) 137/111 (BP Location: Left leg, Patient Position: Lying)   Pulse 66   Temp 36.1 °C (97 °F) (Temporal)   Resp 20   Ht 6' 2" (1.88 m)   Wt 97.5 kg (215 lb)   SpO2 99%   BMI 27.60 kg/m²     "

## 2023-12-10 NOTE — CONSULTS
Radiology Consult    Elbert Still Jr. is a 68 y.o. male with a history of HTN, DM2, ESRD on HD MWF, multiple previous LUE AVF surgeries. Pt presented with bacteremia likely 2/2 graft infection. IR consulted for tunneled line removal and replacement.    Past Medical History:   Diagnosis Date    Anemia     Diabetes mellitus     Diabetes mellitus, type 2     Disorder of kidney and ureter     ESRD (end stage renal disease)     Essential (primary) hypertension 2017    Gout, unspecified      jaundice, unspecified     Nuclear sclerosis of both eyes 2022     Past Surgical History:   Procedure Laterality Date    ADENOIDECTOMY      ADENOIDECTOMY      AV FISTULA PLACEMENT Left 2022    Procedure: CREATION, AV FISTULA;  Surgeon: Prince Gardiner MD;  [facility];  Service: Peripheral Vascular;  Laterality: Left;    FISTULOGRAM Left 2023    Procedure: FISTULOGRAM;  Surgeon: Prince Gardiner MD;  [facility];  Service: Peripheral Vascular;  Laterality: Left;  Given to the sterile field.     FISTULOGRAM Left 2023    Procedure: Fistulogram;  Surgeon: LETY Rayo III, MD;  [facility];  Service: Peripheral Vascular;  Laterality: Left;  1.9 min  22.86 mGy  3.8862 Gy.cm  20ml Dye     nasal septum repair      NASAL SEPTUM SURGERY      PERCUTANEOUS TRANSLUMINAL ANGIOPLASTY OF ARTERIOVENOUS FISTULA Left 2022    Procedure: PTA, AV FISTULA;  Surgeon: Prince Gardiner MD;  Location: [facility] CATH LAB;  Service: Peripheral Vascular;  Laterality: Left;    PERCUTANEOUS TRANSLUMINAL ANGIOPLASTY OF ARTERIOVENOUS FISTULA Left 2023    Procedure: PTA, AV FISTULA;  Surgeon: Prince Gardiner MD;  [facility]: [facility]LR;  Service: Peripheral Vascular;  Laterality: Left;  4.8 min  43.08 mGy  4.0682 Gy.cm  32ml Dye    PERCUTANEOUS TRANSLUMINAL ANGIOPLASTY OF ARTERIOVENOUS FISTULA Left 2023    Procedure: PTA, AV FISTULA;  Surgeon: LETY Rayo III, MD;   "Location: [facility] OR [facility]ND FLR;  Service: Peripheral Vascular;  Laterality: Left;    PROSTATE BIOPSY N/A 3/24/2023    Procedure: BIOPSY, PROSTATE;  Surgeon: Frankie Welch MD;  Location: [facility] OR 1ST FLR;  Service: Urology;  Laterality: N/A;  transrectal, 30min, uronav needed    REVISION OF ARTERIOVENOUS FISTULA Left 11/9/2023    Procedure: REVISION, AV FISTULA;  Surgeon: LETY Rayo III, MD;  Location: [facility] OR 2ND FLR;  Service: Vascular;  Laterality: Left;  LUE AVG revision    ROTATOR CUFF REPAIR Left     SALIVARY GLAND SURGERY      Tonsillectomy      TONSILLECTOMY      TRANSPOSITION OF BASILIC VEIN Left 11/1/2022    Procedure: TRANSPOSITION, VEIN, BASILIC;  Surgeon: Prince Gardiner MD;  Location: [facility] OR 2ND FLR;  Service: Peripheral Vascular;  Laterality: Left;  Left Brachial vein.       Discussed with primary team.    Scheduled Meds:    carvediloL  12.5 mg Oral BID    NIFEdipine  60 mg Oral BID    piperacillin-tazobactam (Zosyn) IV (PEDS and ADULTS) (extended infusion is not appropriate)  4.5 g Intravenous Q12H    tamsulosin  1 capsule Oral Daily     Continuous Infusions:   PRN Meds:acetaminophen, dextrose 10%, dextrose 10%, glucagon (human recombinant), glucagon (human recombinant), glucose, glucose, insulin aspart U-100, naloxone, sodium chloride 0.9%, Pharmacy to dose Vancomycin consult **AND** vancomycin - pharmacy to dose    Allergies:   Review of patient's allergies indicates:   Allergen Reactions    Iodine and iodide containing products Hives     Hypotension, Flushing       Labs:  No results for input(s): "INR", "PT", "PTT" in the last 168 hours.    Recent Labs   Lab 12/10/23  0458   WBC 3.95   HGB 10.6*   HCT 32.0*   MCV 89         Recent Labs   Lab 12/10/23  0458         K 4.4      CO2 20*   BUN 49*   CREATININE 8.3*   CALCIUM 9.0   MG 2.0   ALT 6*   AST 18   ALBUMIN 3.5   BILITOT 0.3         Vitals (Most Recent):  Temp: 97.7 °F (36.5 °C) (12/10/23 1105)  Pulse: 70 (12/10/23 " 1105)  Resp: 18 (12/10/23 1105)  BP: (!) 171/79 (12/10/23 1105)  SpO2: 100 % (12/10/23 1105)    Plan:   1. NPO after midnight.  2. Hold nonvital anticoagulants.  3. Tunneled HD cath removal scheduled for 12/11. Tunneled HC cath replacement scheduled tentatively for 12/13 pending negative cultures.    Thuan Acevedo MD MSCR  PGY-5 Radiology Resident

## 2023-12-10 NOTE — ASSESSMENT & PLAN NOTE
68M with h/o ESRD MWF, DM2, HTN admitted with pain and swelling of AVF, sent to ED by dialysis center when bcx +gpc. had his AVG placed in 9/2022 and recently went through revision on 11/9/23 due to inability to cannulate. Patient had blood cultures drawn at dialysis on 12/6/23 growing GPCs in pairs, chains, and clusters (only one culture was collected). U/s obtained of his line showing concern for possible thrombosed pseudoaneurysm vs abscess vs hematoma. Non contrasted CT obtained showing edema w/ no specific fluid collection. He was started on empiric vanc/zosyn. S/p graft excision 12/10/23 with vascular. Currently obtaining dialysis through a HD tunnel cath placed 5/5/22.     Bcx CoNS, ox susceptible  Repeat bcx clear  S/p AVG removal  Agree with need to exchange permecath (given that it was in place them he was bacteremic  Hasn't had updated 2d echo    Recommendations:  - de-escalate cefazolin. On discharge, plan for 2gm/2gm/3gm 3x/wk with hd ~ 6 weeks  - 2d echo to eval for veg  - agree with permecath removal/exchange. Ideally would have line holiday, but given repeat bcx have cleared, could replace in a different spot same day if needed  - wound care  - please confirm with vascular surgery that AVG was removed in entirely (or if a few mm graft remains at edges    Discussed assessment/plan with hospitalist

## 2023-12-10 NOTE — ANESTHESIA PROCEDURE NOTES
Peripheral Block    Patient location during procedure: OR    Reason for block: primary anesthetic    Diagnosis: End stage renal disease   Start time: 12/10/2023 1:18 PM  Timeout: 12/10/2023 1:15 PM   End time: 12/10/2023 1:22 PM    Staffing  Authorizing Provider: Kamlesh Kelley MD  Performing Provider: Kamlesh Kelley MD    Staffing  Performed by: Kamlesh Kelley MD  Authorized by: Kamlesh Kelley MD    Preanesthetic Checklist  Completed: patient identified, IV checked, site marked, risks and benefits discussed, surgical consent, monitors and equipment checked, pre-op evaluation and timeout performed  Peripheral Block  Patient position: sitting  Prep: ChloraPrep  Patient monitoring: heart rate, cardiac monitor, continuous pulse ox, continuous capnometry and frequent blood pressure checks  Block type: supraclavicular  Laterality: left  Injection technique: single shot  Needle  Needle type: Stimuplex   Needle gauge: 22 G  Needle length: 2 in  Needle localization: ultrasound guidance     Assessment  Injection assessment: negative aspiration, negative parasthesia and local visualized surrounding nerve  Paresthesia pain: none  Heart rate change: no  Slow fractionated injection: yes  Pain Tolerance: comfortable throughout block and no complaints  Medications:    Medications: mepivacaine (CARBOCAINE) injection 15 mg/mL (1.5%) - Perineural   30 mL - 12/10/2023 1:20:00 PM   10 mL - 12/10/2023 1:22:00 PM    Additional Notes  30mL 1.5% mepivacaine for supraclavicular block  10mL 1.5% mepivacaine ICB block

## 2023-12-10 NOTE — SUBJECTIVE & OBJECTIVE
Medications:  Continuous Infusions:  Scheduled Meds:   carvediloL  12.5 mg Oral BID    NIFEdipine  60 mg Oral BID    piperacillin-tazobactam (Zosyn) IV (PEDS and ADULTS) (extended infusion is not appropriate)  4.5 g Intravenous Q12H    tamsulosin  1 capsule Oral Daily     PRN Meds:acetaminophen, dextrose 10%, dextrose 10%, glucagon (human recombinant), glucagon (human recombinant), glucose, glucose, insulin aspart U-100, naloxone, sodium chloride 0.9%, Pharmacy to dose Vancomycin consult **AND** vancomycin - pharmacy to dose     Objective:     Vital Signs (Most Recent):  Temp: 98.7 °F (37.1 °C) (12/10/23 0736)  Pulse: 72 (12/10/23 0736)  Resp: 18 (12/10/23 0736)  BP: (!) 156/74 (12/10/23 0736)  SpO2: 99 % (12/10/23 0736) Vital Signs (24h Range):  Temp:  [98.2 °F (36.8 °C)-98.7 °F (37.1 °C)] 98.7 °F (37.1 °C)  Pulse:  [68-79] 72  Resp:  [17-20] 18  SpO2:  [95 %-100 %] 99 %  BP: (139-156)/(69-74) 156/74          Physical Exam  Constitutional:       Appearance: Normal appearance.   HENT:      Head: Normocephalic and atraumatic.      Nose: Nose normal.      Mouth/Throat:      Mouth: Mucous membranes are moist.      Pharynx: Oropharynx is clear.   Eyes:      Extraocular Movements: Extraocular movements intact.      Conjunctiva/sclera: Conjunctivae normal.   Cardiovascular:      Rate and Rhythm: Normal rate and regular rhythm.      Comments: Permacath in place, no evidence of pus, erythema, induration or tenderness to palpation around the catheter  Pulmonary:      Effort: Pulmonary effort is normal.      Breath sounds: Normal breath sounds.   Abdominal:      General: Abdomen is flat.      Palpations: Abdomen is soft.   Skin:     General: Skin is warm and dry.      Capillary Refill: Capillary refill takes less than 2 seconds.      Comments: RUE incision well healed however erythema, induration, and warmth noted along with tenderness to palpation near/around fistula anastamosis   Neurological:      General: No focal  deficit present.      Mental Status: He is alert.          Significant Labs:  Recent Lab Results         12/10/23  0640   12/10/23  0458   12/09/23  1144        Albumin   3.5         ALP   106         ALT   6         Anion Gap   12         AST   18         Baso #   0.02         Basophil %   0.5         BILIRUBIN TOTAL   0.3  Comment: For infants and newborns, interpretation of results should be based  on gestational age, weight and in agreement with clinical  observations.    Premature Infant recommended reference ranges:  Up to 24 hours.............<8.0 mg/dL  Up to 48 hours............<12.0 mg/dL  3-5 days..................<15.0 mg/dL  6-29 days.................<15.0 mg/dL           BUN   49         Calcium   9.0         Chloride   105         CO2   20         Creatinine   8.3         Differential Method   Automated         eGFR   6.5         Eos #   0.2         Eosinophil %   4.8         Glucose   101         Gran # (ANC)   2.2         Gran %   56.8         Hematocrit   32.0         Hemoglobin   10.6         Immature Grans (Abs)   0.02  Comment: Mild elevation in immature granulocytes is non specific and   can be seen in a variety of conditions including stress response,   acute inflammation, trauma and pregnancy. Correlation with other   laboratory and clinical findings is essential.           Immature Granulocytes   0.5         Lymph #   0.9         Lymph %   23.5         Magnesium    2.0         MCH   29.5         MCHC   33.1         MCV   89         Mono #   0.6         Mono %   13.9         MPV   10.7         nRBC   0         Phosphorus Level   4.8         Platelet Count   195         POCT Glucose 114     157       Potassium   4.4         PROTEIN TOTAL   7.2         RBC   3.59         RDW   14.2         Sodium   137         Vancomycin, Random   20.2         WBC   3.95                 Significant Diagnostics:  I have reviewed all pertinent imaging results/findings within the past 24 hours.

## 2023-12-10 NOTE — BRIEF OP NOTE
[facility] ([facility])  Brief Operative Note    SUMMARY     Surgery Date: 12/10/2023     Surgeon(s) and Role:     * Joaquin Rose MD - Primary     * Shahriar Abrams MD - Resident - Assisting     * Flo Julian MD    Pre-op Diagnosis:  Bacteremia [R78.81]    Post-op Diagnosis:  Post-Op Diagnosis Codes:     * Bacteremia [R78.81]    Procedure(s) (LRB):  EXCISION, AV FISTULA (Left)  WASHOUT OF AV FISTULA (Left)    Anesthesia: * No anesthesia type entered *    Implants:  * No implants in log *    Operative Findings: entire graft removed    Estimated Blood Loss: * No values recorded between 12/10/2023  1:30 PM and 12/10/2023  3:47 PM *    Estimated Blood Loss has been documented.         Specimens:   Specimen (24h ago, onward)      None            [id number]

## 2023-12-10 NOTE — PROGRESS NOTES
[facility] - Intensive Care ([place] [address])  Vascular Surgery  Progress Note    Patient Name: Elbert Still Jr.  MRN: [id number]  Admission Date: 12/8/2023  Primary Care Provider: Angel Luis Boo MD    Subjective:     Interval History:  No acute events overnight.  No fevers, chills.  Awaiting surgery this morning.    Post-Op Info:  Procedure(s) (LRB):  REVISION, AV GRAFT, LOWER EXTREMITY, W/O THROMBECTOMY (Left)         Medications:  Continuous Infusions:  Scheduled Meds:   carvediloL  12.5 mg Oral BID    NIFEdipine  60 mg Oral BID    piperacillin-tazobactam (Zosyn) IV (PEDS and ADULTS) (extended infusion is not appropriate)  4.5 g Intravenous Q12H    tamsulosin  1 capsule Oral Daily     PRN Meds:acetaminophen, dextrose 10%, dextrose 10%, glucagon (human recombinant), glucagon (human recombinant), glucose, glucose, insulin aspart U-100, naloxone, sodium chloride 0.9%, Pharmacy to dose Vancomycin consult **AND** vancomycin - pharmacy to dose     Objective:     Vital Signs (Most Recent):  Temp: 98.7 °F (37.1 °C) (12/10/23 0736)  Pulse: 72 (12/10/23 0736)  Resp: 18 (12/10/23 0736)  BP: (!) 156/74 (12/10/23 0736)  SpO2: 99 % (12/10/23 0736) Vital Signs (24h Range):  Temp:  [98.2 °F (36.8 °C)-98.7 °F (37.1 °C)] 98.7 °F (37.1 °C)  Pulse:  [68-79] 72  Resp:  [17-20] 18  SpO2:  [95 %-100 %] 99 %  BP: (139-156)/(69-74) 156/74          Physical Exam  Constitutional:       Appearance: Normal appearance.   HENT:      Head: Normocephalic and atraumatic.      Nose: Nose normal.      Mouth/Throat:      Mouth: Mucous membranes are moist.      Pharynx: Oropharynx is clear.   Eyes:      Extraocular Movements: Extraocular movements intact.      Conjunctiva/sclera: Conjunctivae normal.   Cardiovascular:      Rate and Rhythm: Normal rate and regular rhythm.      Comments: Permacath in place, no evidence of pus, erythema, induration or tenderness to palpation around the catheter  Pulmonary:      Effort: Pulmonary effort is normal.       Breath sounds: Normal breath sounds.   Abdominal:      General: Abdomen is flat.      Palpations: Abdomen is soft.   Skin:     General: Skin is warm and dry.      Capillary Refill: Capillary refill takes less than 2 seconds.      Comments: RUE incision well healed however erythema, induration, and warmth noted along with tenderness to palpation near/around fistula anastamosis   Neurological:      General: No focal deficit present.      Mental Status: He is alert.          Significant Labs:  Recent Lab Results         12/10/23  0640   12/10/23  0458   12/09/23  1144        Albumin   3.5         ALP   106         ALT   6         Anion Gap   12         AST   18         Baso #   0.02         Basophil %   0.5         BILIRUBIN TOTAL   0.3  Comment: For infants and newborns, interpretation of results should be based  on gestational age, weight and in agreement with clinical  observations.    Premature Infant recommended reference ranges:  Up to 24 hours.............<8.0 mg/dL  Up to 48 hours............<12.0 mg/dL  3-5 days..................<15.0 mg/dL  6-29 days.................<15.0 mg/dL           BUN   49         Calcium   9.0         Chloride   105         CO2   20         Creatinine   8.3         Differential Method   Automated         eGFR   6.5         Eos #   0.2         Eosinophil %   4.8         Glucose   101         Gran # (ANC)   2.2         Gran %   56.8         Hematocrit   32.0         Hemoglobin   10.6         Immature Grans (Abs)   0.02  Comment: Mild elevation in immature granulocytes is non specific and   can be seen in a variety of conditions including stress response,   acute inflammation, trauma and pregnancy. Correlation with other   laboratory and clinical findings is essential.           Immature Granulocytes   0.5         Lymph #   0.9         Lymph %   23.5         Magnesium    2.0         MCH   29.5         MCHC   33.1         MCV   89         Mono #   0.6         Mono %   13.9         MPV    10.7         nRBC   0         Phosphorus Level   4.8         Platelet Count   195         POCT Glucose 114     157       Potassium   4.4         PROTEIN TOTAL   7.2         RBC   3.59         RDW   14.2         Sodium   137         Vancomycin, Random   20.2         WBC   3.95                 Significant Diagnostics:  I have reviewed all pertinent imaging results/findings within the past 24 hours.  Assessment/Plan:     Arteriovenous fistula  68 M with left upper extremity brachiobrachial arteriovenous fistula (2 stage) in 9/2022 s/p multiple PTA then on 11/9/2023 inability to cannulate for fistula gram and ended up having to do a graft interposition now presents with concerns for graft infection w/ positive blood cultures growing gram + Cocci. No concern currently for dialysis catheter infection.    - OR today for graft removal, please keep NPO.   - dialysis through catheter for now  - will likely need vein mapping for planning of RUE fistula vs graft  - rest of care per primary team        Flo Julian MD  Vascular Surgery  [facility] - Int[facility] ([place])

## 2023-12-11 ENCOUNTER — OFFICE VISIT (OUTPATIENT)
Dept: DIALYSIS | Facility: HOSPITAL | Age: 68
DRG: 264 | End: 2023-12-11
Attending: EMERGENCY MEDICINE
Payer: COMMERCIAL

## 2023-12-11 LAB
ALBUMIN SERPL BCP-MCNC: 3.4 G/DL (ref 3.5–5.2)
ALP SERPL-CCNC: 98 U/L (ref 55–135)
ALT SERPL W/O P-5'-P-CCNC: 10 U/L (ref 10–44)
ANION GAP SERPL CALC-SCNC: 13 MMOL/L (ref 8–16)
AST SERPL-CCNC: 22 U/L (ref 10–40)
BASOPHILS # BLD AUTO: 0.04 K/UL (ref 0–0.2)
BASOPHILS NFR BLD: 0.9 % (ref 0–1.9)
BILIRUB SERPL-MCNC: 0.4 MG/DL (ref 0.1–1)
BUN SERPL-MCNC: 60 MG/DL (ref 8–23)
CALCIUM SERPL-MCNC: 9.2 MG/DL (ref 8.7–10.5)
CHLORIDE SERPL-SCNC: 107 MMOL/L (ref 95–110)
CO2 SERPL-SCNC: 18 MMOL/L (ref 23–29)
CREAT SERPL-MCNC: 9.1 MG/DL (ref 0.5–1.4)
DIFFERENTIAL METHOD: ABNORMAL
EOSINOPHIL # BLD AUTO: 0.1 K/UL (ref 0–0.5)
EOSINOPHIL NFR BLD: 2.6 % (ref 0–8)
ERYTHROCYTE [DISTWIDTH] IN BLOOD BY AUTOMATED COUNT: 14.6 % (ref 11.5–14.5)
EST. GFR  (NO RACE VARIABLE): 5.9 ML/MIN/1.73 M^2
GLUCOSE SERPL-MCNC: 83 MG/DL (ref 70–110)
HCT VFR BLD AUTO: 29 % (ref 40–54)
HGB BLD-MCNC: 9.7 G/DL (ref 14–18)
IMM GRANULOCYTES # BLD AUTO: 0.07 K/UL (ref 0–0.04)
IMM GRANULOCYTES NFR BLD AUTO: 1.5 % (ref 0–0.5)
LYMPHOCYTES # BLD AUTO: 0.9 K/UL (ref 1–4.8)
LYMPHOCYTES NFR BLD: 19.9 % (ref 18–48)
MAGNESIUM SERPL-MCNC: 2.1 MG/DL (ref 1.6–2.6)
MCH RBC QN AUTO: 29.6 PG (ref 27–31)
MCHC RBC AUTO-ENTMCNC: 33.4 G/DL (ref 32–36)
MCV RBC AUTO: 88 FL (ref 82–98)
MONOCYTES # BLD AUTO: 0.6 K/UL (ref 0.3–1)
MONOCYTES NFR BLD: 12.8 % (ref 4–15)
NEUTROPHILS # BLD AUTO: 2.8 K/UL (ref 1.8–7.7)
NEUTROPHILS NFR BLD: 62.3 % (ref 38–73)
NRBC BLD-RTO: 0 /100 WBC
PHOSPHATE SERPL-MCNC: 5 MG/DL (ref 2.7–4.5)
PLATELET # BLD AUTO: 193 K/UL (ref 150–450)
PMV BLD AUTO: 11.4 FL (ref 9.2–12.9)
POCT GLUCOSE: 109 MG/DL (ref 70–110)
POCT GLUCOSE: 111 MG/DL (ref 70–110)
POCT GLUCOSE: 142 MG/DL (ref 70–110)
POCT GLUCOSE: 89 MG/DL (ref 70–110)
POTASSIUM SERPL-SCNC: 5 MMOL/L (ref 3.5–5.1)
PROT SERPL-MCNC: 7.1 G/DL (ref 6–8.4)
RBC # BLD AUTO: 3.28 M/UL (ref 4.6–6.2)
SODIUM SERPL-SCNC: 138 MMOL/L (ref 136–145)
WBC # BLD AUTO: 4.53 K/UL (ref 3.9–12.7)

## 2023-12-11 PROCEDURE — 83735 ASSAY OF MAGNESIUM: CPT

## 2023-12-11 PROCEDURE — 63600175 PHARM REV CODE 636 W HCPCS: Performed by: STUDENT IN AN ORGANIZED HEALTH CARE EDUCATION/TRAINING PROGRAM

## 2023-12-11 PROCEDURE — 3044F HG A1C LEVEL LT 7.0%: CPT | Mod: CPTII,,, | Performed by: NURSE PRACTITIONER

## 2023-12-11 PROCEDURE — 3072F LOW RISK FOR RETINOPATHY: CPT | Mod: CPTII,,, | Performed by: NURSE PRACTITIONER

## 2023-12-11 PROCEDURE — 25000003 PHARM REV CODE 250: Performed by: STUDENT IN AN ORGANIZED HEALTH CARE EDUCATION/TRAINING PROGRAM

## 2023-12-11 PROCEDURE — 21400001 HC TELEMETRY ROOM

## 2023-12-11 PROCEDURE — 99233 SBSQ HOSP IP/OBS HIGH 50: CPT | Mod: ,,, | Performed by: INTERNAL MEDICINE

## 2023-12-11 PROCEDURE — 80053 COMPREHEN METABOLIC PANEL: CPT

## 2023-12-11 PROCEDURE — 25000003 PHARM REV CODE 250

## 2023-12-11 PROCEDURE — 99233 PR SUBSEQUENT HOSPITAL CARE,LEVL III: ICD-10-PCS | Mod: ,,, | Performed by: INTERNAL MEDICINE

## 2023-12-11 PROCEDURE — 3072F PR LOW RISK FOR RETINOPATHY: ICD-10-PCS | Mod: CPTII,,, | Performed by: NURSE PRACTITIONER

## 2023-12-11 PROCEDURE — 84100 ASSAY OF PHOSPHORUS: CPT

## 2023-12-11 PROCEDURE — 25000003 PHARM REV CODE 250: Performed by: NURSE PRACTITIONER

## 2023-12-11 PROCEDURE — 80100016 HC MAINTENANCE HEMODIALYSIS

## 2023-12-11 PROCEDURE — 36415 COLL VENOUS BLD VENIPUNCTURE: CPT

## 2023-12-11 PROCEDURE — 63600175 PHARM REV CODE 636 W HCPCS

## 2023-12-11 PROCEDURE — 3066F NEPHROPATHY DOC TX: CPT | Mod: CPTII,,, | Performed by: NURSE PRACTITIONER

## 2023-12-11 PROCEDURE — 85025 COMPLETE CBC W/AUTO DIFF WBC: CPT

## 2023-12-11 PROCEDURE — 90935 HEMODIALYSIS ONE EVALUATION: CPT | Mod: ,,, | Performed by: NURSE PRACTITIONER

## 2023-12-11 PROCEDURE — 90935 PR HEMODIALYSIS, ONE EVALUATION: ICD-10-PCS | Mod: ,,, | Performed by: NURSE PRACTITIONER

## 2023-12-11 PROCEDURE — 3066F PR DOCUMENTATION OF TREATMENT FOR NEPHROPATHY: ICD-10-PCS | Mod: CPTII,,, | Performed by: NURSE PRACTITIONER

## 2023-12-11 PROCEDURE — 3044F PR MOST RECENT HEMOGLOBIN A1C LEVEL <7.0%: ICD-10-PCS | Mod: CPTII,,, | Performed by: NURSE PRACTITIONER

## 2023-12-11 RX ORDER — HEPARIN SODIUM 5000 [USP'U]/ML
5000 INJECTION, SOLUTION INTRAVENOUS; SUBCUTANEOUS EVERY 8 HOURS
Status: DISCONTINUED | OUTPATIENT
Start: 2023-12-11 | End: 2023-12-16 | Stop reason: HOSPADM

## 2023-12-11 RX ORDER — CARVEDILOL 12.5 MG/1
12.5 TABLET ORAL 2 TIMES DAILY WITH MEALS
Status: DISCONTINUED | OUTPATIENT
Start: 2023-12-12 | End: 2023-12-16 | Stop reason: HOSPADM

## 2023-12-11 RX ORDER — ATORVASTATIN CALCIUM 20 MG/1
20 TABLET, FILM COATED ORAL NIGHTLY
Status: DISCONTINUED | OUTPATIENT
Start: 2023-12-11 | End: 2023-12-16 | Stop reason: HOSPADM

## 2023-12-11 RX ORDER — SODIUM CHLORIDE 9 MG/ML
INJECTION, SOLUTION INTRAVENOUS ONCE
Status: COMPLETED | OUTPATIENT
Start: 2023-12-11 | End: 2023-12-11

## 2023-12-11 RX ADMIN — OXYCODONE HYDROCHLORIDE 5 MG: 5 TABLET ORAL at 12:12

## 2023-12-11 RX ADMIN — ACETAMINOPHEN 650 MG: 325 TABLET ORAL at 05:12

## 2023-12-11 RX ADMIN — TAMSULOSIN HYDROCHLORIDE 0.4 MG: 0.4 CAPSULE ORAL at 12:12

## 2023-12-11 RX ADMIN — CARVEDILOL 12.5 MG: 12.5 TABLET, FILM COATED ORAL at 12:12

## 2023-12-11 RX ADMIN — OXYCODONE HYDROCHLORIDE 5 MG: 5 TABLET ORAL at 05:12

## 2023-12-11 RX ADMIN — NIFEDIPINE 60 MG: 30 TABLET, FILM COATED, EXTENDED RELEASE ORAL at 12:12

## 2023-12-11 RX ADMIN — OXYCODONE HYDROCHLORIDE 5 MG: 5 TABLET ORAL at 09:12

## 2023-12-11 RX ADMIN — CEFAZOLIN 1 G: 1 INJECTION, POWDER, FOR SOLUTION INTRAMUSCULAR; INTRAVENOUS at 09:12

## 2023-12-11 RX ADMIN — ATORVASTATIN CALCIUM 20 MG: 20 TABLET, FILM COATED ORAL at 09:12

## 2023-12-11 RX ADMIN — ACETAMINOPHEN 650 MG: 325 TABLET ORAL at 12:12

## 2023-12-11 RX ADMIN — SODIUM CHLORIDE: 9 INJECTION, SOLUTION INTRAVENOUS at 08:12

## 2023-12-11 RX ADMIN — HEPARIN SODIUM 5000 UNITS: 5000 INJECTION INTRAVENOUS; SUBCUTANEOUS at 09:12

## 2023-12-11 RX ADMIN — NIFEDIPINE 60 MG: 30 TABLET, FILM COATED, EXTENDED RELEASE ORAL at 09:12

## 2023-12-11 RX ADMIN — ACETAMINOPHEN 650 MG: 325 TABLET ORAL at 09:12

## 2023-12-11 RX ADMIN — PIPERACILLIN SODIUM AND TAZOBACTAM SODIUM 4.5 G: 4; .5 INJECTION, POWDER, FOR SOLUTION INTRAVENOUS at 12:12

## 2023-12-11 NOTE — HOSPITAL COURSE
68 y.o. Male with ESRD on HD (MWF), HTN, and T2DM s/p multiple surgeries for AV fistula (most recent 2 weeks ago) who presents for 1 week of pain, swelling, erythema, and warmth to his LUE fistula site. Bcx grew gram positive cocci. He was started on empiric vanc/zosyn. He had a graft excision with vascular surgery 12/10. He had his tunneled catheter removed by IR and replaced on 12/13. Repeat bx showed ngtd. ID was following and he was discharged with IV Cefazolin 2gm/2gm/3gm 3x/wk after HD for 6 weeks. A wound vac was placed for post-op wound closure and will follow outpatient in clinic. He will follow with ID outpatient 1/2/24.

## 2023-12-11 NOTE — SUBJECTIVE & OBJECTIVE
Interval history: NAEO. Had HD this am. Denies issues with permecath, but says it will be removed shortly. Tolerating abx. Thinks a needle may have gone through the entire AVG at dialysis around the time symptoms started    Past Surgical History:   Procedure Laterality Date    ADENOIDECTOMY      ADENOIDECTOMY      AV FISTULA PLACEMENT Left 09/06/2022    Procedure: CREATION, AV FISTULA;  Surgeon: Prince Gardiner MD;  [facility];  Service: Peripheral Vascular;  Laterality: Left;    EXCISION OF ARTERIOVENOUS FISTULA Left 12/10/2023    Procedure: EXCISION, AV FISTULA;  Surgeon: Joaquin Rose MD;  [facility];  Service: Vascular;  Laterality: Left;    FISTULOGRAM Left 2/16/2023    Procedure: FISTULOGRAM;  Surgeon: Prince Gardiner MD;  Location: [facility] OR [address] [place] [address];  Service: Peripheral Vascular;  Laterality: Left;  Given to the sterile field.     FISTULOGRAM Left 7/6/2023    Procedure: Fistulogram;  Surgeon: LETY Rayo III, MD;  Location: [facility] OR [address] [place] [address];  Service: Peripheral Vascular;  Laterality: Left;  1.9 min  22.86 mGy  3.8862 Gy.cm  20ml Dye     nasal septum repair      NASAL SEPTUM SURGERY      PERCUTANEOUS TRANSLUMINAL ANGIOPLASTY OF ARTERIOVENOUS FISTULA Left 11/22/2022    Procedure: PTA, AV FISTULA;  Surgeon: Prince Gardiner MD;  Location: [facility] CATH LAB;  Service: Peripheral Vascular;  Laterality: Left;    PERCUTANEOUS TRANSLUMINAL ANGIOPLASTY OF ARTERIOVENOUS FISTULA Left 2/16/2023    Procedure: PTA, AV FISTULA;  Surgeon: Prince Gardiner MD;  Location: [facility] OR [address] [place] [address];  Service: Peripheral Vascular;  Laterality: Left;  4.8 min  43.08 mGy  4.0682 Gy.cm  32ml Dye    PERCUTANEOUS TRANSLUMINAL ANGIOPLASTY OF ARTERIOVENOUS FISTULA Left 7/6/2023    Procedure: PTA, AV FISTULA;  Surgeon: LETY Rayo III, MD;  Location: [facility] OR [address] [place] [address];  Service: Peripheral Vascular;  Laterality: Left;    PROSTATE BIOPSY N/A 3/24/2023    Procedure: BIOPSY, PROSTATE;  Surgeon: Frankie Welch  MD;  Location: [facility] OR 1ST FLR;  Service: Urology;  Laterality: N/A;  transrectal, 30min, uronav needed    REVISION OF ARTERIOVENOUS FISTULA Left 11/9/2023    Procedure: REVISION, AV FISTULA;  Surgeon: LETY Rayo III, MD;  Location: [facility] OR 2ND FLR;  Service: Vascular;  Laterality: Left;  LUE AVG revision    ROTATOR CUFF REPAIR Left     SALIVARY GLAND SURGERY      Tonsillectomy      TONSILLECTOMY      TRANSPOSITION OF BASILIC VEIN Left 11/1/2022    Procedure: TRANSPOSITION, VEIN, BASILIC;  Surgeon: Prince Gardiner MD;  Location: [facility] OR 2ND FLR;  Service: Peripheral Vascular;  Laterality: Left;  Left Brachial vein.    WASHOUT Left 12/10/2023    Procedure: WASHOUT OF AV FISTULA;  Surgeon: Joaquin Rose MD;  [facility];  Service: Vascular;  Laterality: Left;       Review of patient's allergies indicates:   Allergen Reactions    Iodine and iodide containing products Hives     Hypotension, Flushing       Medications:  Facility-Administered Medications Prior to Admission   Medication    cefTRIAXone injection 1 g    LIDOcaine HCl 2% urojet     Medications Prior to Admission   Medication Sig    acetaminophen (TYLENOL) 500 MG tablet Take 2 tablets (1,000 mg total) by mouth every 6 (six) hours as needed for Pain.    atorvastatin (LIPITOR) 20 MG tablet TAKE 1 TABLET BY MOUTH EVERY EVENING    carvediloL (COREG) 12.5 MG tablet Take 1 tablet (12.5 mg total) by mouth 2 (two) times daily.    colchicine (COLCRYS) 0.6 mg tablet Take 0.6 mg by mouth as needed (for gout flare). Take half tab (0.3 mg) by mouth daily as needed for gout flare.    diclofenac sodium (VOLTAREN) 1 % Gel Apply 2 g topically once daily.    fluticasone propionate (FLONASE) 50 mcg/actuation nasal spray 2 sprays (100 mcg total) by Each Nostril route once daily.    furosemide (LASIX) 80 MG tablet TAKE 1 TABLET(80 MG) BY MOUTH TWICE DAILY    LIDOcaine (LIDODERM) 5 % Place 2 patches onto the skin once daily. Remove & Discard patch  within 12 hours or as directed by MD    NIFEdipine (PROCARDIA-XL) 60 MG (OSM) 24 hr tablet Take 1 tablet (60 mg total) by mouth 2 (two) times a day.    oxyCODONE (ROXICODONE) 5 MG immediate release tablet Take 1 tablet (5 mg total) by mouth every 6 (six) hours as needed for Pain.    sevelamer carbonate (RENVELA) 800 mg Tab TAKE 2 TABLETS(1600 MG) BY MOUTH THREE TIMES DAILY WITH MEALS    tamsulosin (FLOMAX) 0.4 mg Cap Take 1 capsule (0.4 mg total) by mouth once daily.    aspirin (ECOTRIN) 81 MG EC tablet Take 1 tablet (81 mg total) by mouth once daily. (Patient not taking: Reported on 12/9/2023)    blood sugar diagnostic Strp Use to check blood glucose once a day.    blood-glucose meter Misc Use to check blood glucose twice a day.    lancets 30 gauge Misc Use to check blood glucose twice a day.    LIDOcaine-prilocaine (EMLA) cream SMARTSIG:sparingly Topical As Directed     Antibiotics (From admission, onward)      Start     Stop Route Frequency Ordered    12/11/23 1600  vancomycin (VANCOCIN) 500 mg in dextrose 5 % in water (D5W) 100 mL IVPB (MB+)         -- IV Once 12/11/23 0836    12/09/23 1030  piperacillin-tazobactam (ZOSYN) 4.5 g in dextrose 5 % in water (D5W) 100 mL IVPB (MB+)         -- IV Every 12 hours (non-standard times) 12/09/23 0838    12/09/23 0838  vancomycin - pharmacy to dose  (vancomycin IVPB (PEDS and ADULTS))        See Hyperspace for full Linked Orders Report.    -- IV pharmacy to manage frequency 12/09/23 0838          Antifungals (From admission, onward)      None          Antivirals (From admission, onward)      None             Immunization History   Administered Date(s) Administered    Hepatitis B, Adult 06/13/2022, 07/13/2022, 12/12/2022    Influenza - Quadrivalent - PF *Preferred* (6 months and older) 11/25/2008, 09/29/2023    PPD Test 05/03/2022    Pneumococcal Conjugate - 20 Valent 03/16/2023    Pneumococcal Polysaccharide - 23 Valent 08/06/2019    Td - PF (ADULT) 08/06/2019       Family  History       Problem Relation (Age of Onset)    Cancer Sister    Heart disease Mother    Hypertension Mother, Sister    Kidney disease Mother    No Known Problems Father    Seizures Sister    Stroke Sister          Social History     Socioeconomic History    Marital status:      Spouse name: Kristine Still   Occupational History     Employer: [employer]   Tobacco Use    Smoking status: Never    Smokeless tobacco: Never    Tobacco comments:     .  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   HENT:  Negative for congestion and sore throat.    Respiratory:  Negative for cough and shortness of breath.    Cardiovascular:  Negative for chest pain.   Gastrointestinal:  Negative for abdominal pain, constipation, diarrhea, nausea and vomiting.   Genitourinary:  Negative for dysuria and flank pain.   Musculoskeletal:  Negative for joint swelling.   Skin:         Redness, swelling, pain to right upper arm   Neurological:  Negative for dizziness and headaches.   Psychiatric/Behavioral:  Negative for confusion. The patient is not nervous/anxious.      Objective:     Vital Signs (Most Recent):  Temp: 98 °F (36.7 °C) (12/11/23 1126)  Pulse: 71 (12/11/23 1126)  Resp: 18 (12/11/23 1245)  BP: (!) 176/90 (12/11/23 1234)  SpO2: 97 % (12/11/23 1449) Vital Signs (24h Range):  Temp:  [97 °F (36.1 °C)-98.8 °F (37.1 °C)] 98 °F (36.7 °C)  Pulse:  [63-80] 71  Resp:  [16-20] 18  SpO2:  [96 %-99 %] 97 %  BP: (137-183)/() 176/90     Weight: 97.5 kg (215 lb)  Body mass index is 27.6 kg/m².    Estimated Creatinine Clearance: 9.3 mL/min (A) (based on SCr of 9.1  mg/dL (H)).     Physical Exam  HENT:      Head: Normocephalic.      Nose: Nose normal.      Mouth/Throat:      Mouth: Mucous membranes are moist.   Eyes:      Pupils: Pupils are equal, round, and reactive to light.   Cardiovascular:      Rate and Rhythm: Normal rate.      Comments: Tunnel HD cath right upper chest with no erythema, warmth, tenderness  Pulmonary:      Effort: Pulmonary effort is normal.   Abdominal:      General: Abdomen is flat.   Musculoskeletal:         General: Normal range of motion.   Skin:     General: Skin is warm and dry.      Capillary Refill: Capillary refill takes less than 2 seconds.      Comments: Tender AVG site on right upper arm, slightly swollen, warm to touch compared to surrounding skin   Neurological:      General: No focal deficit present.      Mental Status: He is alert and oriented to person, place, and time.              Significant Labs: All pertinent labs within the past 24 hours have been reviewed.    Significant Imaging: I have reviewed all pertinent imaging results/findings within the past 24 hours.

## 2023-12-11 NOTE — CONSULTS
[facility] ([place] [address])  Infectious Disease  Consult Note    Patient Name: Elbert Still Jr.  MRN: [id number]  Admission Date: 12/8/2023  Hospital Length of Stay: 2 days  Attending Physician: Mookie Acuña, *  Primary Care Provider: Angel Luis Boo MD     Isolation Status: No active isolations    Patient information was obtained from patient and ER records.      Consults  Assessment/Plan:     Cardiac/Vascular  Arteriovenous fistula  Concern for AVF infection. Currently undergoing HD with tunnel cath. Graft excision 12/10/23 w/ vascular surgery.      ID  * Bacteremia  68M with h/o ESRD MWF, DM2, HTN admitted with pain and swelling of AVF, sent to ED by dialysis center when bcx +gpc. had his AVG placed in 9/2022 and recently went through revision on 11/9/23 due to inability to cannulate. Patient had blood cultures drawn at dialysis on 12/6/23 growing GPCs in pairs, chains, and clusters (only one culture was collected). U/s obtained of his line showing concern for possible thrombosed pseudoaneurysm vs abscess vs hematoma. Non contrasted CT obtained showing edema w/ no specific fluid collection. He was started on empiric vanc/zosyn. S/p graft excision 12/10/23 with vascular. Currently obtaining dialysis through a HD tunnel cath placed 5/5/22.     Bcx CoNS, ox susceptible  Repeat bcx clear  S/p AVG removal  Agree with need to exchange permecath (given that it was in place them he was bacteremic  Hasn't had updated 2d echo    Recommendations:  - de-escalate cefazolin. On discharge, plan for 2gm/2gm/3gm 3x/wk with hd ~ 6 weeks  - 2d echo to eval for veg  - agree with permecath removal/exchange. Ideally would have line holiday, but given repeat bcx have cleared, could replace in a different spot same day if needed  - wound care  - please confirm with vascular surgery that AVG was removed in entirely (or if a few mm graft remains at edges    Discussed assessment/plan with hospitalist          Thank  "you for your consult. I will follow-up with patient. Please contact us if you have any additional questions.    Renee Danielson MD  Infectious Disease  [facility] ([facility]    Subjective:     Principal Problem: Bacteremia    HPI: Mr. Still is a 68M with PMH of HTN, DM2, ESRD on HD MWF, multiple previous LUE AVF surgeries most recently 1 month ago, presents with edema and erythema of the fistula site. Per patient, his nephrologist had obtained blood cultures on 12/6, which resulted on 12/8 with GPCs in chains and clusters, so they told patient to come to the ER. Patient currently receiving HD via R IJ tunneled catheter. Infectious disease consulted for "Bacteremia c/f surgical site infection, abx recs and outpatient abx recs/length of treatment ".     He was vitally stable since admission and has been afebrile. His labs on admission were unremarkale besides slightly elevated CRP 23.5, ESR 50, and slightyl elevated procal. His UA, CXR were negative. 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 Simple focal hematoma or abscess also considered. This was followed by a CT arm without contrast which showed "subcutaneous edema throughout the upper arm that extends past the elbow, the remainder of the lower arm is not imaged. No organized collection to suggest abscess however limited evaluation given noncontrast technique."    On my interview, patient states his swelling and pain in right upper arm around AVG site has been persistent since his most recent revision. Currently pain 5/10, warm to touch, swollen. Denies any systemic symptoms.     Interval history: NAEO. Had HD this am. Denies issues with permecath, but says it will be removed shortly. Tolerating abx. Thinks a needle may have gone through the entire AVG at dialysis around the time symptoms started    Past Surgical History:   Procedure Laterality Date    " ADENOIDECTOMY      ADENOIDECTOMY      AV FISTULA PLACEMENT Left 09/06/2022    Procedure: CREATION, AV FISTULA;  Surgeon: Prince Gardiner MD;  [facility];  Service: Peripheral Vascular;  Laterality: Left;    EXCISION OF ARTERIOVENOUS FISTULA Left 12/10/2023    Procedure: EXCISION, AV FISTULA;  Surgeon: Joaquin Rose MD;  Location: [facility] OR 2ND FLR;  Service: Vascular;  Laterality: Left;    FISTULOGRAM Left 2/16/2023    Procedure: FISTULOGRAM;  Surgeon: Prince Gardiner MD;  [facility];  Service: Peripheral Vascular;  Laterality: Left;  Given to the sterile field.     FISTULOGRAM Left 7/6/2023    Procedure: Fistulogram;  Surgeon: LETY Rayo III, MD;  [facility];  Service: Peripheral Vascular;  Laterality: Left;  1.9 min  22.86 mGy  3.8862 Gy.cm  20ml Dye     nasal septum repair      NASAL SEPTUM SURGERY      PERCUTANEOUS TRANSLUMINAL ANGIOPLASTY OF ARTERIOVENOUS FISTULA Left 11/22/2022    Procedure: PTA, AV FISTULA;  Surgeon: Prince Gardiner MD;  Location: [facility] CATH LAB;  Service: Peripheral Vascular;  Laterality: Left;    PERCUTANEOUS TRANSLUMINAL ANGIOPLASTY OF ARTERIOVENOUS FISTULA Left 2/16/2023    Procedure: PTA, AV FISTULA;  Surgeon: Prince Gardiner MD;  [facility];  Service: Peripheral Vascular;  Laterality: Left;  4.8 min  43.08 mGy  4.0682 Gy.cm  32ml Dye    PERCUTANEOUS TRANSLUMINAL ANGIOPLASTY OF ARTERIOVENOUS FISTULA Left 7/6/2023    Procedure: PTA, AV FISTULA;  Surgeon: LETY Rayo III, MD;  [facility];  Service: Peripheral Vascular;  Laterality: Left;    PROSTATE BIOPSY N/A 3/24/2023    Procedure: BIOPSY, PROSTATE;  Surgeon: Frankie Welch MD;  Location: [facility] OR 1ST FLR;  Service: Urology;  Laterality: N/A;  transrectal, 30min, uronav needed    REVISION OF ARTERIOVENOUS FISTULA Left 11/9/2023    Procedure: REVISION, AV FISTULA;  Surgeon: LETY Rayo III, MD;  Location: [facility] OR 2ND FLR;  Service: Vascular;   Laterality: Left;  LUE AVG revision    ROTATOR CUFF REPAIR Left     SALIVARY GLAND SURGERY      Tonsillectomy      TONSILLECTOMY      TRANSPOSITION OF BASILIC VEIN Left 11/1/2022    Procedure: TRANSPOSITION, VEIN, BASILIC;  Surgeon: Prince Gardiner MD;  Location: [facility] OR [address] [place] [address];  Service: Peripheral Vascular;  Laterality: Left;  Left Brachial vein.    WASHOUT Left 12/10/2023    Procedure: WASHOUT OF AV FISTULA;  Surgeon: Joaquin Rose MD;  Location: [facility] OR [address] [place] [address];  Service: Vascular;  Laterality: Left;       Review of patient's allergies indicates:   Allergen Reactions    Iodine and iodide containing products Hives     Hypotension, Flushing       Medications:  Facility-Administered Medications Prior to Admission   Medication    cefTRIAXone injection 1 g    LIDOcaine HCl 2% urojet     Medications Prior to Admission   Medication Sig    acetaminophen (TYLENOL) 500 MG tablet Take 2 tablets (1,000 mg total) by mouth every 6 (six) hours as needed for Pain.    atorvastatin (LIPITOR) 20 MG tablet TAKE 1 TABLET BY MOUTH EVERY EVENING    carvediloL (COREG) 12.5 MG tablet Take 1 tablet (12.5 mg total) by mouth 2 (two) times daily.    colchicine (COLCRYS) 0.6 mg tablet Take 0.6 mg by mouth as needed (for gout flare). Take half tab (0.3 mg) by mouth daily as needed for gout flare.    diclofenac sodium (VOLTAREN) 1 % Gel Apply 2 g topically once daily.    fluticasone propionate (FLONASE) 50 mcg/actuation nasal spray 2 sprays (100 mcg total) by Each Nostril route once daily.    furosemide (LASIX) 80 MG tablet TAKE 1 TABLET(80 MG) BY MOUTH TWICE DAILY    LIDOcaine (LIDODERM) 5 % Place 2 patches onto the skin once daily. Remove & Discard patch within 12 hours or as directed by MD    NIFEdipine (PROCARDIA-XL) 60 MG (OSM) 24 hr tablet Take 1 tablet (60 mg total) by mouth 2 (two) times a day.    oxyCODONE (ROXICODONE) 5 MG immediate release tablet Take 1 tablet (5 mg total) by mouth every 6 (six) hours as needed for Pain.     sevelamer carbonate (RENVELA) 800 mg Tab TAKE 2 TABLETS(1600 MG) BY MOUTH THREE TIMES DAILY WITH MEALS    tamsulosin (FLOMAX) 0.4 mg Cap Take 1 capsule (0.4 mg total) by mouth once daily.    aspirin (ECOTRIN) 81 MG EC tablet Take 1 tablet (81 mg total) by mouth once daily. (Patient not taking: Reported on 12/9/2023)    blood sugar diagnostic Strp Use to check blood glucose once a day.    blood-glucose meter Misc Use to check blood glucose twice a day.    lancets 30 gauge Misc Use to check blood glucose twice a day.    LIDOcaine-prilocaine (EMLA) cream SMARTSIG:sparingly Topical As Directed     Antibiotics (From admission, onward)      Start     Stop Route Frequency Ordered    12/11/23 1600  vancomycin (VANCOCIN) 500 mg in dextrose 5 % in water (D5W) 100 mL IVPB (MB+)         -- IV Once 12/11/23 0836    12/09/23 1030  piperacillin-tazobactam (ZOSYN) 4.5 g in dextrose 5 % in water (D5W) 100 mL IVPB (MB+)         -- IV Every 12 hours (non-standard times) 12/09/23 0838    12/09/23 0838  vancomycin - pharmacy to dose  (vancomycin IVPB (PEDS and ADULTS))        See Hyperspace for full Linked Orders Report.    -- IV pharmacy to manage frequency 12/09/23 0838          Antifungals (From admission, onward)      None          Antivirals (From admission, onward)      None             Immunization History   Administered Date(s) Administered    Hepatitis B, Adult 06/13/2022, 07/13/2022, 12/12/2022    Influenza - Quadrivalent - PF *Preferred* (6 months and older) 11/25/2008, 09/29/2023    PPD Test 05/03/2022    Pneumococcal Conjugate - 20 Valent 03/16/2023    Pneumococcal Polysaccharide - 23 Valent 08/06/2019    Td - PF (ADULT) 08/06/2019       Family History       Problem Relation (Age of Onset)    Cancer Sister    Heart disease Mother    Hypertension Mother, Sister    Kidney disease Mother    No Known Problems Father    Seizures Sister    Stroke Sister          Social History     Socioeconomic History    Marital status:       Spouse name: Kristine Still   Occupational History     Employer: [employer]   Tobacco Use    Smoking status: Never    Smokeless tobacco: Never    Tobacco comments:     .  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   HENT:  Negative for congestion and sore throat.    Respiratory:  Negative for cough and shortness of breath.    Cardiovascular:  Negative for chest pain.   Gastrointestinal:  Negative for abdominal pain, constipation, diarrhea, nausea and vomiting.   Genitourinary:  Negative for dysuria and flank pain.   Musculoskeletal:  Negative for joint swelling.   Skin:         Redness, swelling, pain to right upper arm   Neurological:  Negative for dizziness and headaches.   Psychiatric/Behavioral:  Negative for confusion. The patient is not nervous/anxious.      Objective:     Vital Signs (Most Recent):  Temp: 98 °F (36.7 °C) (12/11/23 1126)  Pulse: 71 (12/11/23 1126)  Resp: 18 (12/11/23 1245)  BP: (!) 176/90 (12/11/23 1234)  SpO2: 97 % (12/11/23 1449) Vital Signs (24h Range):  Temp:  [97 °F (36.1 °C)-98.8 °F (37.1 °C)] 98 °F (36.7 °C)  Pulse:  [63-80] 71  Resp:  [16-20] 18  SpO2:  [96 %-99 %] 97 %  BP: (137-183)/() 176/90     Weight: 97.5 kg (215 lb)  Body mass index is 27.6 kg/m².    Estimated Creatinine Clearance: 9.3 mL/min (A) (based on SCr of 9.1 mg/dL (H)).     Physical Exam  HENT:      Head: Normocephalic.      Nose: Nose normal.      Mouth/Throat:      Mouth: Mucous membranes are moist.   Eyes:      Pupils: Pupils are equal, round, and reactive to light.   Cardiovascular:      Rate and Rhythm: Normal rate.       Comments: Tunnel HD cath right upper chest with no erythema, warmth, tenderness  Pulmonary:      Effort: Pulmonary effort is normal.   Abdominal:      General: Abdomen is flat.   Musculoskeletal:         General: Normal range of motion.   Skin:     General: Skin is warm and dry.      Capillary Refill: Capillary refill takes less than 2 seconds.      Comments: Tender AVG site on right upper arm, slightly swollen, warm to touch compared to surrounding skin   Neurological:      General: No focal deficit present.      Mental Status: He is alert and oriented to person, place, and time.              Significant Labs: All pertinent labs within the past 24 hours have been reviewed.    Significant Imaging: I have reviewed all pertinent imaging results/findings within the past 24 hours.

## 2023-12-11 NOTE — PROGRESS NOTES
[facility] ([place] [address])  Vascular Surgery  Progress Note    Patient Name: Elbert Still Jr.  MRN: [id number]  Admission Date: 12/8/2023  Primary Care Provider: Angel Luis Boo MD    Subjective:     Interval History: NAEON. Pain is well controlled. Patient generally dialyzes MWF.     Post-Op Info:  Procedure(s) (LRB):  EXCISION, AV FISTULA (Left)  WASHOUT OF AV FISTULA (Left)   1 Day Post-Op     Medications:  Continuous Infusions:  Scheduled Meds:   sodium chloride 0.9%   Intravenous Once    acetaminophen  650 mg Oral Q6H    carvediloL  12.5 mg Oral BID    NIFEdipine  60 mg Oral BID    piperacillin-tazobactam (Zosyn) IV (PEDS and ADULTS) (extended infusion is not appropriate)  4.5 g Intravenous Q12H    tamsulosin  1 capsule Oral Daily     PRN Meds:dextrose 10%, dextrose 10%, glucagon (human recombinant), glucagon (human recombinant), glucose, glucose, insulin aspart U-100, naloxone, oxyCODONE, sodium chloride 0.9%, Pharmacy to dose Vancomycin consult **AND** vancomycin - pharmacy to dose     Objective:     Vital Signs (Most Recent):  Temp: 97.5 °F (36.4 °C) (12/11/23 0310)  Pulse: 71 (12/11/23 0310)  Resp: 16 (12/11/23 0517)  BP: (!) 166/86 (12/11/23 0310)  SpO2: 97 % (12/11/23 0310) Vital Signs (24h Range):  Temp:  [97 °F (36.1 °C)-98.8 °F (37.1 °C)] 97.5 °F (36.4 °C)  Pulse:  [65-80] 71  Resp:  [16-20] 16  SpO2:  [96 %-100 %] 97 %  BP: (137-177)/() 166/86         Physical Exam  Constitutional:       Appearance: Normal appearance.   HENT:      Head: Normocephalic and atraumatic.      Nose: Nose normal.      Mouth/Throat:      Mouth: Mucous membranes are moist.      Pharynx: Oropharynx is clear.   Eyes:      Extraocular Movements: Extraocular movements intact.      Conjunctiva/sclera: Conjunctivae normal.   Cardiovascular:      Rate and Rhythm: Normal rate and regular rhythm.      Comments: Permacath in place, no evidence of pus, erythema, induration or tenderness to palpation around the  catheter  Pulmonary:      Effort: Pulmonary effort is normal.      Breath sounds: Normal breath sounds.   Abdominal:      General: Abdomen is flat.      Palpations: Abdomen is soft.   Skin:     General: Skin is warm and dry.      Capillary Refill: Capillary refill takes less than 2 seconds.      Comments: LUE graft site with an open wound and mildly saturated packing in the wound. Other incisions at the LUE are clean dry and intact   Neurological:      General: No focal deficit present.      Mental Status: He is alert.          Significant Labs:  Recent Lab Results  (Last 5 results in the past 24 hours)        12/11/23  0554   12/11/23  0554   12/10/23  1750   12/10/23  1421   12/10/23  1412        Albumin   3.4             ALP   98             ALT   10             Anion Gap   13             AST   22             BILIRUBIN TOTAL   0.4  Comment: For infants and newborns, interpretation of results should be based  on gestational age, weight and in agreement with clinical  observations.    Premature Infant recommended reference ranges:  Up to 24 hours.............<8.0 mg/dL  Up to 48 hours............<12.0 mg/dL  3-5 days..................<15.0 mg/dL  6-29 days.................<15.0 mg/dL               BUN   60             Calcium   9.2             Chloride   107             CO2   18             Creatinine   9.1             eGFR   5.9             Glucose   83             Gram Stain Result       No WBC's   No WBC's              No organisms seen   No organisms seen                No WBC's                No organisms seen       Magnesium    2.1             Phosphorus Level   5.0             POCT Glucose 89     125           Potassium   5.0             PROTEIN TOTAL   7.1             Sodium   138                                    Significant Diagnostics:  I have reviewed all pertinent imaging results/findings within the past 24 hours.  Assessment/Plan:     Arteriovenous fistula  68 M with left upper extremity  brachiobrachial arteriovenous fistula (2 stage) in 9/2022 s/p multiple PTA then on 11/9/2023 inability to cannulate for fistula gram and ended up having to do a graft interposition now presents with concerns for graft infection w/ positive blood cultures growing gram + Cocci. No concern currently for dialysis catheter infection.    - Irrigation and debridement LUE with graft excision 12/10  - F/u OR cultures   - Dialysis through catheter for now  - will likely need vein mapping for planning of RUE fistula vs graft  - rest of care per primary team        LETY Brown MD  Vascular Surgery  [facility] ([place])

## 2023-12-11 NOTE — PROGRESS NOTES
Pharmacokinetic Assessment Follow Up: IV Vancomycin    Vancomycin serum concentration assessment/plan(s):    -Vancomycin random 20.2 on 12/10@0458 is at goal of 15 - 20 for bacteremia  -Pt with ESRD on HD, plan for HD today   -Re-dose vancomycin 500 mg IVPB x1 post HD  -Repeat random level 12/13 with AM labs, plans for re-dosing with levels < 20    Drug levels (last 3 results):  Recent Labs   Lab Result Units 12/10/23  0458   Vancomycin, Random ug/mL 20.2       Pharmacy will continue to follow and monitor vancomycin.    Please contact pharmacy at extension [phone] for questions regarding this assessment.    Thank you for the consult,   Carol Blanco     Patient brief summary:  Elbert Still Jr. is a 68 y.o. male initiated on antimicrobial therapy with IV Vancomycin for treatment of bacteremia    The patient's current regimen is vancomycin pulse dosing    Drug Allergies:   Review of patient's allergies indicates:   Allergen Reactions    Iodine and iodide containing products Hives     Hypotension, Flushing     Actual Body Weight:   97 kg    Renal Function:   Estimated Creatinine Clearance: 9.3 mL/min (A) (based on SCr of 9.1 mg/dL (H)).,     Dialysis Method (if applicable):  intermittent HD    CBC (last 72 hours):  Recent Labs   Lab Result Units 12/08/23  2153 12/09/23  0823 12/10/23  0458   WBC K/uL 4.85 4.30 3.95   Hemoglobin g/dL 11.3* 10.7* 10.6*   Hematocrit % 32.4* 30.3* 32.0*   Platelets K/uL 192 177 195   Gran % % 63.5 60.0 56.8   Lymph % % 19.6 23.0 23.5   Mono % % 12.2 11.9 13.9   Eosinophil % % 4.1 4.4 4.8   Basophil % % 0.4 0.5 0.5   Differential Method  Automated Automated Automated         Metabolic Panel (last 72 hours):  Recent Labs   Lab Result Units 12/08/23  2153 12/08/23  2156 12/09/23  0823 12/10/23  0458 12/11/23  0554   Sodium mmol/L 137  --  137 137 138   Potassium mmol/L 4.1  --  3.9 4.4 5.0   Chloride mmol/L 102  --  100 105 107   CO2 mmol/L 26  --  24 20* 18*   Glucose mg/dL 122*  --  89  101 83   Glucose, UA   --  2+*  --   --   --    BUN mg/dL 34*  --  34* 49* 60*   Creatinine mg/dL 6.0*  --  6.7* 8.3* 9.1*   Albumin g/dL 3.9  --  3.7 3.5 3.4*   Total Bilirubin mg/dL 0.3  --  0.4 0.3 0.4   Alkaline Phosphatase U/L 119  --  115 106 98   AST U/L 17  --  15 18 22   ALT U/L 7*  --  6* 6* 10   Magnesium mg/dL  --   --  1.9 2.0 2.1   Phosphorus mg/dL  --   --  4.1 4.8* 5.0*         Vancomycin Administrations:  vancomycin given in the last 96 hours                     vancomycin 2 g in dextrose 5 % 500 mL IVPB (mg) 2,000 mg New Bag 12/08/23 2316                    Microbiologic Results:  Microbiology Results (last 7 days)       Procedure Component Value Units Date/Time    Gram stain [[id number]] Collected: 12/10/23 1412    Order Status: Completed Specimen: Arm, Left Updated: 12/10/23 1704     Gram Stain Result No WBC's      No organisms seen    Narrative:      Left arm infection #2    Gram stain [[id number]] Collected: 12/10/23 1412    Order Status: Completed Specimen: Arm, Left Updated: 12/10/23 1702     Gram Stain Result No WBC's      No organisms seen    Narrative:      Left arm infection #1    Gram stain [[id number]] Collected: 12/10/23 1421    Order Status: Completed Specimen: Arm, Left Updated: 12/10/23 1701     Gram Stain Result No WBC's      No organisms seen    Narrative:      Left arm graft    Culture, Anaerobe [[id number]] Collected: 12/10/23 1412    Order Status: Sent Specimen: Arm, Left Updated: 12/10/23 1538    Culture, Anaerobe [[id number]] Collected: 12/10/23 1412    Order Status: Sent Specimen: Arm, Left Updated: 12/10/23 1537    Aerobic culture [[id number]] Collected: 12/10/23 1412    Order Status: Sent Specimen: Arm, Left Updated: 12/10/23 1537    Fungus culture [[id number]] Collected: 12/10/23 1412    Order Status: Sent Specimen: Arm, Left Updated: 12/10/23 1536    AFB Culture & Smear [[id number]] Collected: 12/10/23 1412    Order Status: Sent Specimen: Arm, Left Updated: 12/10/23 1534     AFB Culture & Smear [[id number]] Collected: 12/10/23 1412    Order Status: Sent Specimen: Arm, Left Updated: 12/10/23 1535    Fungus culture [[id number]] Collected: 12/10/23 1412    Order Status: Sent Specimen: Arm, Left Updated: 12/10/23 1535    Aerobic culture [[id number]] Collected: 12/10/23 1412    Order Status: Sent Specimen: Arm, Left Updated: 12/10/23 1535    Fungus culture [[id number]] Collected: 12/10/23 1421    Order Status: Sent Specimen: Arm, Left Updated: 12/10/23 1534    Culture, Anaerobic [[id number]] Collected: 12/10/23 1421    Order Status: Sent Specimen: Arm, Left Updated: 12/10/23 1534    AFB Culture & Smear [[id number]] Collected: 12/10/23 1421    Order Status: Sent Specimen: Arm, Left Updated: 12/10/23 1533    Aerobic culture [[id number]] Collected: 12/10/23 1421    Order Status: Sent Specimen: Arm, Left Updated: 12/10/23 1533    Blood culture [[id number]] Collected: 12/09/23 0738    Order Status: Completed Specimen: Blood from Peripheral, Hand, Right Updated: 12/10/23 1012     Blood Culture, Routine No Growth to date      No Growth to date    Blood culture [[id number]] Collected: 12/09/23 0723    Order Status: Completed Specimen: Blood from Peripheral, Antecubital, Right Updated: 12/10/23 1012     Blood Culture, Routine No Growth to date      No Growth to date

## 2023-12-11 NOTE — PROCEDURES
"  Pre Op Diagnosis: Bacteremia  Post Op Diagnosis: Same    Procedure: Tunneled Catheter removal    Procedure performed by: Edith    Written Informed Consent Obtained: Yes  Specimen Removed: YES entire catheter  Estimated Blood Loss: Minimal    Findings:   Successful removal tunneled HD line. TUnneled line can be placed after 48 hours of negative blood cultures. Please reach out to IR to schedule.    Patient tolerated procedure well.    Matthew Sharma MD ([place])  Interventional Radiology  [phone]      "

## 2023-12-11 NOTE — PROGRESS NOTES
12/11/23 0822        Hemodialysis Catheter 05/05/22 0814 right internal jugular   Placement Date/Time: 05/05/22 0814   Present Prior to Hospital Arrival?: No  Hand Hygiene: Performed  Barrier Precautions: Performed  Skin Antisepsis: ChloraPrep  Hemodialysis Catheter Type: Tunneled catheter  Location: right internal jugular  Cathete...   Dressing Type Gauze   Dressing Status Clean;Dry;Intact   Dressing Intervention   (out patient dressing changed)   Date on Dressing 12/11/23   Dressing Due to be Changed 12/18/23   Venous Patency/Care blood return present;flushed w/o difficulty   Arterial Patency/Care blood return present;flushed w/o difficulty   During Hemodialysis Assessment   Blood Flow Rate (mL/min) 400 mL/min   Dialysate Flow Rate (mL/min) 700 ml/min   Ultrafiltration Rate (mL/Hr) 880 mL/Hr   Blood Pump Running Yes   Arteriovenous Lines Secure Yes   Arterial Pressure (mmHg) -110 mmHg   Venous Pressure (mmHg) 100   Blood Volume Processed (Liters) 0 L   UF Removed (mL) 0 mL   TMP 80   Venous Line in Air Detector Yes   Intake (mL) 250 mL   Intra-Hemodialysis Comments HD started     Patient arrived GEOVAN[facility] by wheelchair. Maintenance HD started via Right subclavian CVC.

## 2023-12-11 NOTE — ASSESSMENT & PLAN NOTE
68 y.o. Male s/p multiple surgeries for AV fistula (most recent 2 weeks ago) who presents for 1 week of pain, swelling, erythema, and warmth to his LUE fistula site.   12/6 Blood Cultures positive for gram positive cocci in clusters and chains in 2/2 bottles  US HD access with concerns for hematoma vs abscess with soft tissue changes suggestive of inflammation/infection.   Vascular surgery was consulted in the ED and recommended admission to Hospital Medicine to continue IV antibiotics and to keep NPO for possible debridement in the morning.    Plan:  - Vascular surgery consulted, appreciate recs  - ID consulted, appreciate recs  - Vancomycin/Zosyn  - f/u repeat cultures  - planned removal of tunneled catheter

## 2023-12-11 NOTE — PROGRESS NOTES
12/11/23 1126   During Hemodialysis Assessment   Blood Flow Rate (mL/min) 400 mL/min   Dialysate Flow Rate (mL/min) 700 ml/min   Ultrafiltration Rate (mL/Hr) 1150 mL/Hr   Blood Pump Running Yes   Arteriovenous Lines Secure Yes   Arterial Pressure (mmHg) -110 mmHg   Venous Pressure (mmHg) 100   UF Removed (mL) 3169 mL   TMP 80   Venous Line in Air Detector Yes   Intra-Hemodialysis Comments HD complete   Post-Hemodialysis Assessment   Rinseback Volume (mL) 250 mL   Blood Volume Processed (Liters) 70.3 L   Dialyzer Clearance Lightly streaked   Duration of Treatment 180 minutes   Total UF (mL) 3169 mL   Net Fluid Removal 2519   Patient Response to Treatment Tolerated well   Post-Treatment Weight 93.7 kg (206 lb 9.1 oz)   Treatment Weight Change -3     Patient left GEOV[facility] by wheelchair NAD.

## 2023-12-11 NOTE — ASSESSMENT & PLAN NOTE
68 M with left upper extremity brachiobrachial arteriovenous fistula (2 stage) in 9/2022 s/p multiple PTA then on 11/9/2023 inability to cannulate for fistula gram and ended up having to do a graft interposition now presents with concerns for graft infection w/ positive blood cultures growing gram + Cocci. No concern currently for dialysis catheter infection.    - Irrigation and debridement LUE with graft excision 12/10  - F/u OR cultures   - Dialysis through catheter for now  - will likely need vein mapping for planning of RUE fistula vs graft  - rest of care per primary team

## 2023-12-11 NOTE — ASSESSMENT & PLAN NOTE
Patient's FSGs are controlled on no home medications  Last A1c reviewed-   Lab Results   Component Value Date    HGBA1C 4.4 (L) 10/04/2023     Most recent fingerstick glucose reviewed-   Recent Labs   Lab 12/10/23  1626 12/10/23  1750 12/11/23  0554   POCTGLUCOSE 111* 125* 89       Current correctional scale  Low  Maintain anti-hyperglycemic dose as follows-   Antihyperglycemics (From admission, onward)    Start     Stop Route Frequency Ordered    12/09/23 0832  insulin aspart U-100 pen 0-5 Units         -- SubQ Every 6 hours PRN 12/09/23 0732        Hold Oral hypoglycemics while patient is in the hospital.

## 2023-12-11 NOTE — PROGRESS NOTES
Pharmacist Renal Dose Adjustment Note    Elbert Still Jr. is a 68 y.o. male being treated with the medication cefazolin.     Patient Data:    Vital Signs (Most Recent):  Temp: 97.9 °F (36.6 °C) (12/11/23 1514)  Pulse: 67 (12/11/23 1514)  Resp: 18 (12/11/23 1514)  BP: (!) 168/86 (12/11/23 1514)  SpO2: 100 % (12/11/23 1514) Vital Signs (72h Range):  Temp:  [97 °F (36.1 °C)-98.8 °F (37.1 °C)]   Pulse:  [63-80]   Resp:  [14-20]   BP: (137-183)/()   SpO2:  [95 %-100 %]      Recent Labs   Lab 12/09/23  0823 12/10/23  0458 12/11/23  0554   CREATININE 6.7* 8.3* 9.1*     Serum creatinine: 9.1 mg/dL (H) 12/11/23 0554  Estimated creatinine clearance: 9.3 mL/min (A)    Cefazolin 2 g Q8H will be changed to cefazolin 1 g Q24H  - ID wants 2 g/2 g/3 g 3x/week after HD on HD days on discharge    Pharmacist's Name: Ena Hyde  Pharmacist's Extension: [phone]

## 2023-12-11 NOTE — PLAN OF CARE
Problem: Adult Inpatient Plan of Care  Goal: Plan of Care Review  Outcome: Ongoing, Progressing  Goal: Patient-Specific Goal (Individualized)  Outcome: Ongoing, Progressing  Goal: Absence of Hospital-Acquired Illness or Injury  Outcome: Ongoing, Progressing  Intervention: Identify and Manage Fall Risk  Flowsheets (Taken 12/11/2023 1007)  Safety Promotion/Fall Prevention:   assistive device/personal item within reach   medications reviewed   side rails raised x 2  Intervention: Prevent Skin Injury  Flowsheets (Taken 12/11/2023 1007)  Body Position: position changed independently  Skin Protection:   adhesive use limited   tubing/devices free from skin contact  Intervention: Prevent and Manage VTE (Venous Thromboembolism) Risk  Flowsheets (Taken 12/11/2023 1007)  Activity Management: Ambulated in room - L4  Range of Motion: active ROM (range of motion) encouraged  Goal: Optimal Comfort and Wellbeing  Outcome: Ongoing, Progressing  Intervention: Monitor Pain and Promote Comfort  Flowsheets (Taken 12/11/2023 1007)  Pain Management Interventions: pain management plan reviewed with patient/caregiver  Goal: Readiness for Transition of Care  Outcome: Ongoing, Progressing     Problem: Diabetes Comorbidity  Goal: Blood Glucose Level Within Targeted Range  Outcome: Ongoing, Progressing  Intervention: Monitor and Manage Glycemia  Flowsheets (Taken 12/11/2023 1007)  Glycemic Management: blood glucose monitored

## 2023-12-11 NOTE — PROGRESS NOTES
[facility] NEPHROLOGY STAFF HEMODIALYSIS NOTE     Patient currently on hemodialysis for removal of uremic toxins and volume.     Patient seen and evaluated on hemodialysis, tolerating treatment, see HD flowsheet for vitals and assessments.    Labs have been reviewed and the dialysate bath has been adjusted.       Assessment/Plan:    -Bacteremia 2/2 AVG infection, S/p AVG excision and washout 12/10  -Plan for TDC removal today and line holiday. Plan for TDC replacement on 12/13 pending negative cultures   -Patient seen on HD, tolerating treatment well, w/o complaints   -UF goal of 2-3 as tolerated   -Renal diet, if not NPO   -Strict I/O's and daily weights  -Daily renal function panels  -Keep MAP >65 while on HD   -Hgb goal 10-11, at goal   -Will continue to follow while inpatient     Candice Juarez DNP-FNP, C  Nephrology  Pager: [phone]

## 2023-12-11 NOTE — PROGRESS NOTES
[facility] - [facility] ([place] [id number])  [facility] Medicine  Progress Note    Patient Name: Elbert Still Jr.  MRN: [id number]  Patient Class: IP- Inpatient   Admission Date: 12/8/2023  Length of Stay: 1 days  Attending Physician: Mookie Acuña, *  Primary Care Provider: Angel Luis Boo MD    Subjective:     Principal Problem:Bacteremia    HPI:  Elbert Still is a 68 y.o. Male with ESRD on HD (MWF), HTN, and T2DM s/p multiple surgeries for AV fistula (most recent 2 weeks ago) who presents for 1 week of pain, swelling, erythema, and warmth to his LUE fistula site. He tells me that today (12/8) his nephrologist got blood culture results that were drawn on 12/6 that were positive for gram positive cocci in clusters and chains in 2/2 bottles, and told him to present to the ED for IV antibiotics. He does not currently get HD through the AV fistula in question, he has a tunneled catheter. He reports he is in the process of being evaluated for kidney transplant.    The patient reports additional symptoms of mild cough/runny nose that he has had for about a week. These symptoms are similar to some his wife had about 2 weeks ago. He denies fevers, chills, SOB, N/V, abdominal pain, headaches, lightheadedness, or urinary symptoms.    In the ED, he was hypertensive on arrival but otherwise stable. CBC with no leukocytosis, HGB 11.3. CMP with BUN/Cr 34/6.0. Lactate 1.11. US HD access with concerns for hematoma vs abscess with soft tissue changes suggestive of inflammation/infection. Vascular surgery was consulted in the ED and recommended admission to Hospital Medicine to continue IV antibiotics and to keep NPO for possible debridement in the morning.    Overview/Hospital Course:  Patient admitted with concerns for AV fistula infection and bacteremia. Undergoing removal of graft per vascular surgery today. Remains on BSA. HD tomorrow    Interval History:  Planned removal of AV graft per vascular surgery today. HD  tomorrow with likely line removal..         Objective:      Vital Signs (Most Recent):  Temp: 97.7 °F (36.5 °C) (12/10/23 1105)  Pulse: 70 (12/10/23 1105)  Resp: 18 (12/10/23 1105)  BP: (!) 171/79 (12/10/23 1105)  SpO2: 100 % (12/10/23 1105) Vital Signs (24h Range):  Temp:  [97.7 °F (36.5 °C)-98.7 °F (37.1 °C)] 97.7 °F (36.5 °C)  Pulse:  [68-79] 70  Resp:  [18-20] 18  SpO2:  [95 %-100 %] 100 %  BP: (139-171)/(69-79) 171/79      Weight: 97.5 kg (215 lb)  Body mass index is 27.6 kg/m².     Intake/Output Summary (Last 24 hours) at 12/10/2023 1443  Last data filed at 12/10/2023 0349      Gross per 24 hour   Intake 222 ml   Output 800 ml   Net -578 ml         Physical Exam  Vitals and nursing note reviewed.   Constitutional:       General: He is not in acute distress.     Appearance: He is not ill-appearing, toxic-appearing or diaphoretic.   HENT:      Head: Normocephalic and atraumatic.      Right Ear: External ear normal.      Left Ear: External ear normal.      Mouth/Throat:      Mouth: Mucous membranes are moist.   Eyes:      General: No scleral icterus.        Right eye: No discharge.         Left eye: No discharge.   Cardiovascular:      Rate and Rhythm: Normal rate and regular rhythm.      Heart sounds: Normal heart sounds. No murmur heard.  Pulmonary:      Effort: Pulmonary effort is normal. No respiratory distress.      Breath sounds: No wheezing or rales.   Abdominal:      General: Bowel sounds are normal. There is no distension.      Palpations: Abdomen is soft.      Tenderness: There is no abdominal tenderness. There is no guarding.   Musculoskeletal:         General: No deformity.      Cervical back: No rigidity.      Right lower leg: No edema.      Left lower leg: No edema.   Skin:     General: Skin is warm.      Coloration: Skin is not jaundiced.      Comments: Tenderness, erythema, edema in antecubital fossa at site inferior to AV graft   Neurological:      Mental Status: He is alert and oriented to  person, place, and time. Mental status is at baseline.   Psychiatric:         Mood and Affect: Mood normal.         Behavior: Behavior normal.         Significant Labs: All pertinent labs within the past 24 hours have been reviewed.     Significant Imaging: I have reviewed all pertinent imaging results/findings within the past 24 hours.  Assessment/Plan:      * Bacteremia  68 y.o. Male s/p multiple surgeries for AV fistula (most recent 2 weeks ago) who presents for 1 week of pain, swelling, erythema, and warmth to his LUE fistula site.   12/6 Blood Cultures positive for gram positive cocci in clusters and chains in 2/2 bottles  US HD access with concerns for hematoma vs abscess with soft tissue changes suggestive of inflammation/infection.   Vascular surgery was consulted in the ED and recommended admission to Hospital Medicine to continue IV antibiotics and to keep NPO for possible debridement in the morning.    Plan:  - Vascular surgery consulted, appreciate recs  - ID consulted, appreciate recs  - Vancomycin/Zosyn  - f/u repeat cultures  - will have planned removal of tunneled catheter per IR       Type 2 diabetes mellitus  Patient's FSGs are controlled on no home medications  Last A1c reviewed-   Lab Results   Component Value Date    HGBA1C 4.4 (L) 10/04/2023     Most recent fingerstick glucose reviewed-   Recent Labs   Lab 12/10/23  1626 12/10/23  1750    POCTGLUCOSE 111* 125*        Current correctional scale  Low  Maintain anti-hyperglycemic dose as follows-   Antihyperglycemics (From admission, onward)      Start     Stop Route Frequency Ordered    12/09/23 0832  insulin aspart U-100 pen 0-5 Units         -- SubQ Every 6 hours PRN 12/09/23 0732          Hold Oral hypoglycemics while patient is in the hospital.    Arteriovenous fistula  undergoing removal of AV fistula graft in the setting of infection      ESRD on dialysis  Creatine stable for now. BMP reviewed- noted Estimated Creatinine Clearance: 9.3  mL/min (A) (based on SCr of 9.1 mg/dL (H)). according to latest data. Based on current GFR, CKD stage is end stage.  Monitor UOP and serial BMP and adjust therapy as needed. Renally dose meds. Avoid nephrotoxic medications and procedures.    Nephrology consulted for HD    Anemia of chronic disease  Patient's anemia is currently controlled. Has not received any PRBCs to date. Etiology likely d/t chronic disease due to Chronic Kidney Disease/ESRD    Monitor serial CBC and transfuse if patient becomes hemodynamically unstable, symptomatic or H/H drops below 7/21.    Mixed hyperlipidemia  - continue home atorvastatin      -Essential (primary) hypertension  Temp:  [97 °F (36.1 °C)-98.8 °F (37.1 °C)]   Pulse:  [63-80]   Resp:  [16-20]   BP: (137-183)/()   SpO2:  [96 %-99 %] .   Home meds for hypertension were reviewed and noted below.   Hypertension Medications               carvediloL (COREG) 12.5 MG tablet Take 1 tablet (12.5 mg total) by mouth 2 (two) times daily.    furosemide (LASIX) 80 MG tablet TAKE 1 TABLET(80 MG) BY MOUTH TWICE DAILY    NIFEdipine (PROCARDIA-XL) 60 MG (OSM) 24 hr tablet Take 1 tablet (60 mg total) by mouth 2 (two) times a day.          Plan:  - continue coreg, nifedipine  - hold lasix pending possible procedure, resume when appropriate      VTE Risk Mitigation (From admission, onward)           Ordered     IP VTE HIGH RISK PATIENT  Once         12/09/23 0731     Place sequential compression device  Until discontinued         12/09/23 0731                    Discharge Planning   HORACIO: 12/12/2023     Code Status: Full Code   Is the patient medically ready for discharge?: No    Reason for patient still in hospital (select all that apply): Treatment           Willard Wright MD  Internal Medicine, PGY-2  [facility]

## 2023-12-11 NOTE — PLAN OF CARE
Problem: Adult Inpatient Plan of Care  Goal: Plan of Care Review  Outcome: Ongoing, Progressing  Flowsheets (Taken 12/10/2023 1840)  Plan of Care Reviewed With:   patient   spouse  Goal: Patient-Specific Goal (Individualized)  Outcome: Ongoing, Progressing  Goal: Absence of Hospital-Acquired Illness or Injury  Outcome: Ongoing, Progressing  Goal: Optimal Comfort and Wellbeing  Outcome: Ongoing, Progressing     Problem: Infection  Goal: Absence of Infection Signs and Symptoms  Outcome: Ongoing, Progressing     Patient AAOX4, no acute distress noted at this time . Safety measures maintained. Dressing to the L. arm is dry ,intact,  no signs of bleeding noted, Prn oxy, and tylenol meds given. Plan of care on going.

## 2023-12-11 NOTE — PROGRESS NOTES
Pharmacokinetic Assessment Follow Up: IV Vancomycin    Vancomycin order has been discontinued. Pharmacy will sign off. Please reconsult as needed! Thank you!    Please contact pharmacy for questions regarding this assessment.    Thank you for the consult,   Ena Hyde

## 2023-12-11 NOTE — CONSULTS
[facility] - [facility] ([place] [address])  Wound Care    Patient Name:  Elbert Still Jr.   MRN:  [id number]  Date: 2023  Diagnosis: Bacteremia    History:     Past Medical History:   Diagnosis Date    Anemia     Diabetes mellitus     Diabetes mellitus, type 2     Disorder of kidney and ureter     ESRD (end stage renal disease)     Essential (primary) hypertension 2017    Gout, unspecified      jaundice, unspecified     Nuclear sclerosis of both eyes 2022     Social History     Socioeconomic History    Marital status:      Spouse name: Kristine Still   Occupational History     Employer: [employer]   Tobacco Use    Smoking status: Never    Smokeless tobacco: Never    Tobacco comments:     .  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   Reviewed chart for this encounter.   See Flow Sheet for findings.    Pt sitting up in bed with wife at bedside. Pt has ACE wrap and kerlix from hand to mid upper bicept.   Removed revealing tegaderm and gauze 4x4, steri strips to upper arm wounds (left in place).   Cleansed wound with Vashe and 4x4 gauze, applied Enluxtra to wound bed, covered with 4x4 gauze, wrapped with cast padding and ACE (mildly compressed).   No s/s of infection.     RECOMMENDATIONS:  WC to assess  and will change dressing.  Wound vac if pt can tolerate    Discussed POC with patient and primary nurse.   See EMR for orders & patient  education.    Discussed nutrition and the role of protein in wound healing with the patient. Instructed patient to optimize protein for wound healing.    Bedside nursing to continue care & monitoring.  Bedside nursing to maintain pressure injury prevention interventions.     12/11/23 1400   WOCN Assessment   WOCN Total Time (mins) 45   Visit Date 12/11/23   Visit Time 1400   Consult Type New   WOCN Speciality Wound   Wound surgical   Intervention assessed;changed;applied;chart review;orders   Teaching on-going        Incision/Site 12/10/23 1542 Left Arm   Date First Assessed/Time First Assessed: 12/10/23 1542   Side: Left  Location: Arm   Wound Image       Dressing Appearance Clean;Dry;Intact   Drainage Amount Scant   Drainage Characteristics/Odor Serosanguineous;No odor   Appearance Pink;Red   Red (%), Wound Tissue Color 100 %   Periwound Area Intact;Dry;Edematous   Wound Edges Defined   Wound Length (cm) 8.2 cm   Wound Width (cm) 4.9 cm   Wound Depth (cm) 1.5 cm   Wound Volume (cm^3) 60.27 cm^3   Wound Surface Area (cm^2) 40.18 cm^2   Care Cleansed with:;Antimicrobial agent   Dressing Applied;Other (comment);Gauze;Cast padding;Elastic bandage  (Enluxtra)   Dressing Change Due 12/12/23

## 2023-12-11 NOTE — ASSESSMENT & PLAN NOTE
Creatine stable for now. BMP reviewed- noted Estimated Creatinine Clearance: 9.3 mL/min (A) (based on SCr of 9.1 mg/dL (H)). according to latest data. Based on current GFR, CKD stage is end stage.  Monitor UOP and serial BMP and adjust therapy as needed. Renally dose meds. Avoid nephrotoxic medications and procedures.    Nephrology consulted for HD

## 2023-12-11 NOTE — ANESTHESIA POSTPROCEDURE EVALUATION
Anesthesia Post Evaluation    Patient: Elbert Still Jr.    Procedure(s) Performed: Procedure(s) (LRB):  EXCISION, AV FISTULA (Left)  WASHOUT OF AV FISTULA (Left)    Final Anesthesia Type: regional      Patient location during evaluation: PACU  Patient participation: Yes- Able to Participate  Level of consciousness: awake and alert  Post-procedure vital signs: reviewed and stable  Pain management: adequate  Airway patency: patent    PONV status at discharge: No PONV  Anesthetic complications: no      Cardiovascular status: blood pressure returned to baseline  Respiratory status: unassisted, spontaneous ventilation and room air  Hydration status: euvolemic                Vitals Value Taken Time   /91 12/11/23 0822   Temp 36.7 °C (98 °F) 12/11/23 0805   Pulse 65 12/11/23 0822   Resp 18 12/11/23 0805   SpO2 97 % 12/11/23 0310         Event Time   Out of Recovery 12/10/2023 16:30:00         Pain/Sophie Score: Pain Rating Prior to Med Admin: 10 (12/11/2023  5:17 AM)  Pain Rating Post Med Admin: 5 (12/10/2023 11:39 PM)  Sophie Score: 10 (12/10/2023  4:45 PM)

## 2023-12-11 NOTE — ASSESSMENT & PLAN NOTE
Temp:  [97 °F (36.1 °C)-98.8 °F (37.1 °C)]   Pulse:  [63-80]   Resp:  [16-20]   BP: (137-183)/()   SpO2:  [96 %-99 %] .   Home meds for hypertension were reviewed and noted below.   Hypertension Medications               carvediloL (COREG) 12.5 MG tablet Take 1 tablet (12.5 mg total) by mouth 2 (two) times daily.    furosemide (LASIX) 80 MG tablet TAKE 1 TABLET(80 MG) BY MOUTH TWICE DAILY    NIFEdipine (PROCARDIA-XL) 60 MG (OSM) 24 hr tablet Take 1 tablet (60 mg total) by mouth 2 (two) times a day.          Plan:  - continue coreg, nifedipine  - hold lasix pending possible procedure, resume when appropriate

## 2023-12-11 NOTE — SUBJECTIVE & OBJECTIVE
Medications:  Continuous Infusions:  Scheduled Meds:   sodium chloride 0.9%   Intravenous Once    acetaminophen  650 mg Oral Q6H    carvediloL  12.5 mg Oral BID    NIFEdipine  60 mg Oral BID    piperacillin-tazobactam (Zosyn) IV (PEDS and ADULTS) (extended infusion is not appropriate)  4.5 g Intravenous Q12H    tamsulosin  1 capsule Oral Daily     PRN Meds:dextrose 10%, dextrose 10%, glucagon (human recombinant), glucagon (human recombinant), glucose, glucose, insulin aspart U-100, naloxone, oxyCODONE, sodium chloride 0.9%, Pharmacy to dose Vancomycin consult **AND** vancomycin - pharmacy to dose     Objective:     Vital Signs (Most Recent):  Temp: 97.5 °F (36.4 °C) (12/11/23 0310)  Pulse: 71 (12/11/23 0310)  Resp: 16 (12/11/23 0517)  BP: (!) 166/86 (12/11/23 0310)  SpO2: 97 % (12/11/23 0310) Vital Signs (24h Range):  Temp:  [97 °F (36.1 °C)-98.8 °F (37.1 °C)] 97.5 °F (36.4 °C)  Pulse:  [65-80] 71  Resp:  [16-20] 16  SpO2:  [96 %-100 %] 97 %  BP: (137-177)/() 166/86          Physical Exam  Constitutional:       Appearance: Normal appearance.   HENT:      Head: Normocephalic and atraumatic.      Nose: Nose normal.      Mouth/Throat:      Mouth: Mucous membranes are moist.      Pharynx: Oropharynx is clear.   Eyes:      Extraocular Movements: Extraocular movements intact.      Conjunctiva/sclera: Conjunctivae normal.   Cardiovascular:      Rate and Rhythm: Normal rate and regular rhythm.      Comments: Permacath in place, no evidence of pus, erythema, induration or tenderness to palpation around the catheter  Pulmonary:      Effort: Pulmonary effort is normal.      Breath sounds: Normal breath sounds.   Abdominal:      General: Abdomen is flat.      Palpations: Abdomen is soft.   Skin:     General: Skin is warm and dry.      Capillary Refill: Capillary refill takes less than 2 seconds.      Comments: LUE graft site with an open wound and mildly saturated packing in the wound. Other incisions at the LUE are  clean dry and intact   Neurological:      General: No focal deficit present.      Mental Status: He is alert.          Significant Labs:  Recent Lab Results  (Last 5 results in the past 24 hours)        12/11/23  0554   12/11/23  0554   12/10/23  1750   12/10/23  1421   12/10/23  1412        Albumin   3.4             ALP   98             ALT   10             Anion Gap   13             AST   22             BILIRUBIN TOTAL   0.4  Comment: For infants and newborns, interpretation of results should be based  on gestational age, weight and in agreement with clinical  observations.    Premature Infant recommended reference ranges:  Up to 24 hours.............<8.0 mg/dL  Up to 48 hours............<12.0 mg/dL  3-5 days..................<15.0 mg/dL  6-29 days.................<15.0 mg/dL               BUN   60             Calcium   9.2             Chloride   107             CO2   18             Creatinine   9.1             eGFR   5.9             Glucose   83             Gram Stain Result       No WBC's   No WBC's              No organisms seen   No organisms seen                No WBC's                No organisms seen       Magnesium    2.1             Phosphorus Level   5.0             POCT Glucose 89     125           Potassium   5.0             PROTEIN TOTAL   7.1             Sodium   138                                    Significant Diagnostics:  I have reviewed all pertinent imaging results/findings within the past 24 hours.

## 2023-12-11 NOTE — PLAN OF CARE
CM went to pt room to do assessment - pt not in room, unable to do assessment at this time.    LEN GalvanN, BS, RN, CCM

## 2023-12-11 NOTE — ASSESSMENT & PLAN NOTE
Patient's anemia is currently controlled. Has not received any PRBCs to date. Etiology likely d/t chronic disease due to Chronic Kidney Disease/ESRD  Current CBC reviewed-   Lab Results   Component Value Date    HGB 9.7 (L) 12/11/2023    HCT 29.0 (L) 12/11/2023     Monitor serial CBC and transfuse if patient becomes hemodynamically unstable, symptomatic or H/H drops below 7/21.

## 2023-12-12 LAB
ALBUMIN SERPL BCP-MCNC: 3.4 G/DL (ref 3.5–5.2)
ANION GAP SERPL CALC-SCNC: 12 MMOL/L (ref 8–16)
ASCENDING AORTA: 3.32 CM
AV INDEX (PROSTH): 0.74
AV MEAN GRADIENT: 6 MMHG
AV PEAK GRADIENT: 13 MMHG
AV VALVE AREA BY VELOCITY RATIO: 2.96 CM²
AV VALVE AREA: 3.11 CM²
AV VELOCITY RATIO: 0.71
BASOPHILS # BLD AUTO: 0.02 K/UL (ref 0–0.2)
BASOPHILS NFR BLD: 0.4 % (ref 0–1.9)
BSA FOR ECHO PROCEDURE: 2.26 M2
BUN SERPL-MCNC: 45 MG/DL (ref 8–23)
CALCIUM SERPL-MCNC: 8.9 MG/DL (ref 8.7–10.5)
CHLORIDE SERPL-SCNC: 105 MMOL/L (ref 95–110)
CO2 SERPL-SCNC: 22 MMOL/L (ref 23–29)
CREAT SERPL-MCNC: 7.8 MG/DL (ref 0.5–1.4)
CV ECHO LV RWT: 0.41 CM
DIFFERENTIAL METHOD: ABNORMAL
DOP CALC AO PEAK VEL: 1.81 M/S
DOP CALC AO VTI: 38.38 CM
DOP CALC LVOT AREA: 4.2 CM2
DOP CALC LVOT DIAMETER: 2.31 CM
DOP CALC LVOT PEAK VEL: 1.28 M/S
DOP CALC LVOT STROKE VOLUME: 119.26 CM3
DOP CALCLVOT PEAK VEL VTI: 28.47 CM
E WAVE DECELERATION TIME: 240.46 MSEC
E/A RATIO: 1.02
E/E' RATIO: 11 M/S
ECHO LV POSTERIOR WALL: 1.07 CM (ref 0.6–1.1)
EOSINOPHIL # BLD AUTO: 0.2 K/UL (ref 0–0.5)
EOSINOPHIL NFR BLD: 2.8 % (ref 0–8)
ERYTHROCYTE [DISTWIDTH] IN BLOOD BY AUTOMATED COUNT: 14.5 % (ref 11.5–14.5)
EST. GFR  (NO RACE VARIABLE): 7.1 ML/MIN/1.73 M^2
FRACTIONAL SHORTENING: 32 % (ref 28–44)
GLUCOSE SERPL-MCNC: 189 MG/DL (ref 70–110)
HCT VFR BLD AUTO: 28.7 % (ref 40–54)
HGB BLD-MCNC: 9.7 G/DL (ref 14–18)
IMM GRANULOCYTES # BLD AUTO: 0.02 K/UL (ref 0–0.04)
IMM GRANULOCYTES NFR BLD AUTO: 0.4 % (ref 0–0.5)
INTERVENTRICULAR SEPTUM: 1.03 CM (ref 0.6–1.1)
LA MAJOR: 6.08 CM
LA MINOR: 5.67 CM
LA WIDTH: 4.94 CM
LEFT ATRIUM SIZE: 4.36 CM
LEFT ATRIUM VOLUME INDEX MOD: 44.8 ML/M2
LEFT ATRIUM VOLUME INDEX: 48 ML/M2
LEFT ATRIUM VOLUME MOD: 100.46 CM3
LEFT ATRIUM VOLUME: 107.43 CM3
LEFT INTERNAL DIMENSION IN SYSTOLE: 3.52 CM (ref 2.1–4)
LEFT VENTRICLE DIASTOLIC VOLUME INDEX: 56.75 ML/M2
LEFT VENTRICLE DIASTOLIC VOLUME: 127.11 ML
LEFT VENTRICLE MASS INDEX: 91 G/M2
LEFT VENTRICLE SYSTOLIC VOLUME INDEX: 23.1 ML/M2
LEFT VENTRICLE SYSTOLIC VOLUME: 51.65 ML
LEFT VENTRICULAR INTERNAL DIMENSION IN DIASTOLE: 5.16 CM (ref 3.5–6)
LEFT VENTRICULAR MASS: 204.66 G
LV LATERAL E/E' RATIO: 8.8 M/S
LV SEPTAL E/E' RATIO: 14.67 M/S
LYMPHOCYTES # BLD AUTO: 0.8 K/UL (ref 1–4.8)
LYMPHOCYTES NFR BLD: 14.9 % (ref 18–48)
MCH RBC QN AUTO: 30 PG (ref 27–31)
MCHC RBC AUTO-ENTMCNC: 33.8 G/DL (ref 32–36)
MCV RBC AUTO: 89 FL (ref 82–98)
MONOCYTES # BLD AUTO: 0.7 K/UL (ref 0.3–1)
MONOCYTES NFR BLD: 11.5 % (ref 4–15)
MV A" WAVE DURATION": 9.9 MSEC
MV PEAK A VEL: 0.86 M/S
MV PEAK E VEL: 0.88 M/S
NEUTROPHILS # BLD AUTO: 4 K/UL (ref 1.8–7.7)
NEUTROPHILS NFR BLD: 70 % (ref 38–73)
NRBC BLD-RTO: 0 /100 WBC
PHOSPHATE SERPL-MCNC: 4.8 MG/DL (ref 2.7–4.5)
PISA TR MAX VEL: 2.69 M/S
PLATELET # BLD AUTO: 234 K/UL (ref 150–450)
PMV BLD AUTO: 10.8 FL (ref 9.2–12.9)
POCT GLUCOSE: 100 MG/DL (ref 70–110)
POCT GLUCOSE: 101 MG/DL (ref 70–110)
POCT GLUCOSE: 152 MG/DL (ref 70–110)
POCT GLUCOSE: 200 MG/DL (ref 70–110)
POTASSIUM SERPL-SCNC: 4.4 MMOL/L (ref 3.5–5.1)
PULM VEIN S/D RATIO: 1.52
PV PEAK D VEL: 0.27 M/S
PV PEAK S VEL: 0.41 M/S
RA MAJOR: 5.01 CM
RA PRESSURE ESTIMATED: 8 MMHG
RA WIDTH: 3.59 CM
RBC # BLD AUTO: 3.23 M/UL (ref 4.6–6.2)
RIGHT VENTRICULAR END-DIASTOLIC DIMENSION: 4.17 CM
RV TB RVSP: 11 MMHG
SINUS: 3.42 CM
SODIUM SERPL-SCNC: 139 MMOL/L (ref 136–145)
STJ: 2.98 CM
TDI LATERAL: 0.1 M/S
TDI SEPTAL: 0.06 M/S
TDI: 0.08 M/S
TR MAX PG: 29 MMHG
TRICUSPID ANNULAR PLANE SYSTOLIC EXCURSION: 2.09 CM
TV REST PULMONARY ARTERY PRESSURE: 37 MMHG
WBC # BLD AUTO: 5.64 K/UL (ref 3.9–12.7)
Z-SCORE OF LEFT VENTRICULAR DIMENSION IN END DIASTOLE: -4.4
Z-SCORE OF LEFT VENTRICULAR DIMENSION IN END SYSTOLE: -2.54

## 2023-12-12 PROCEDURE — 21400001 HC TELEMETRY ROOM

## 2023-12-12 PROCEDURE — 25000003 PHARM REV CODE 250: Performed by: STUDENT IN AN ORGANIZED HEALTH CARE EDUCATION/TRAINING PROGRAM

## 2023-12-12 PROCEDURE — 25000003 PHARM REV CODE 250

## 2023-12-12 PROCEDURE — 80069 RENAL FUNCTION PANEL: CPT | Performed by: STUDENT IN AN ORGANIZED HEALTH CARE EDUCATION/TRAINING PROGRAM

## 2023-12-12 PROCEDURE — 99232 SBSQ HOSP IP/OBS MODERATE 35: CPT | Mod: ,,, | Performed by: INTERNAL MEDICINE

## 2023-12-12 PROCEDURE — 63600175 PHARM REV CODE 636 W HCPCS

## 2023-12-12 PROCEDURE — 99232 PR SUBSEQUENT HOSPITAL CARE,LEVL II: ICD-10-PCS | Mod: ,,, | Performed by: INTERNAL MEDICINE

## 2023-12-12 PROCEDURE — 85025 COMPLETE CBC W/AUTO DIFF WBC: CPT

## 2023-12-12 PROCEDURE — 36415 COLL VENOUS BLD VENIPUNCTURE: CPT

## 2023-12-12 RX ORDER — SODIUM BICARBONATE 650 MG/1
650 TABLET ORAL ONCE
Status: CANCELLED | OUTPATIENT
Start: 2023-12-12 | End: 2023-12-12

## 2023-12-12 RX ORDER — SODIUM CHLORIDE 9 MG/ML
INJECTION, SOLUTION INTRAVENOUS CONTINUOUS
OUTPATIENT
Start: 2023-12-12 | End: 2023-12-12

## 2023-12-12 RX ORDER — SENNOSIDES 8.6 MG/1
8.6 TABLET ORAL DAILY PRN
Status: DISCONTINUED | OUTPATIENT
Start: 2023-12-12 | End: 2023-12-16 | Stop reason: HOSPADM

## 2023-12-12 RX ORDER — SODIUM BICARBONATE 650 MG/1
1300 TABLET ORAL 2 TIMES DAILY
Status: DISCONTINUED | OUTPATIENT
Start: 2023-12-12 | End: 2023-12-15

## 2023-12-12 RX ORDER — SEVELAMER CARBONATE 800 MG/1
1600 TABLET, FILM COATED ORAL
Status: DISCONTINUED | OUTPATIENT
Start: 2023-12-12 | End: 2023-12-16 | Stop reason: HOSPADM

## 2023-12-12 RX ORDER — POLYETHYLENE GLYCOL 3350 17 G/17G
17 POWDER, FOR SOLUTION ORAL DAILY
Status: DISCONTINUED | OUTPATIENT
Start: 2023-12-12 | End: 2023-12-16 | Stop reason: HOSPADM

## 2023-12-12 RX ORDER — FUROSEMIDE 20 MG/1
80 TABLET ORAL 2 TIMES DAILY
Status: DISCONTINUED | OUTPATIENT
Start: 2023-12-12 | End: 2023-12-16 | Stop reason: HOSPADM

## 2023-12-12 RX ORDER — SODIUM CHLORIDE 0.9 % (FLUSH) 0.9 %
10 SYRINGE (ML) INJECTION
Status: DISCONTINUED | OUTPATIENT
Start: 2023-12-12 | End: 2023-12-16 | Stop reason: HOSPADM

## 2023-12-12 RX ADMIN — OXYCODONE HYDROCHLORIDE 5 MG: 5 TABLET ORAL at 05:12

## 2023-12-12 RX ADMIN — HEPARIN SODIUM 5000 UNITS: 5000 INJECTION INTRAVENOUS; SUBCUTANEOUS at 09:12

## 2023-12-12 RX ADMIN — SEVELAMER CARBONATE 1600 MG: 800 TABLET, FILM COATED ORAL at 01:12

## 2023-12-12 RX ADMIN — FUROSEMIDE 80 MG: 20 TABLET ORAL at 09:12

## 2023-12-12 RX ADMIN — POLYETHYLENE GLYCOL 3350 17 G: 17 POWDER, FOR SOLUTION ORAL at 09:12

## 2023-12-12 RX ADMIN — NIFEDIPINE 60 MG: 30 TABLET, FILM COATED, EXTENDED RELEASE ORAL at 08:12

## 2023-12-12 RX ADMIN — ACETAMINOPHEN 650 MG: 325 TABLET ORAL at 05:12

## 2023-12-12 RX ADMIN — ACETAMINOPHEN 650 MG: 325 TABLET ORAL at 11:12

## 2023-12-12 RX ADMIN — SEVELAMER CARBONATE 1600 MG: 800 TABLET, FILM COATED ORAL at 05:12

## 2023-12-12 RX ADMIN — ACETAMINOPHEN 650 MG: 325 TABLET ORAL at 01:12

## 2023-12-12 RX ADMIN — NIFEDIPINE 60 MG: 30 TABLET, FILM COATED, EXTENDED RELEASE ORAL at 09:12

## 2023-12-12 RX ADMIN — ATORVASTATIN CALCIUM 20 MG: 20 TABLET, FILM COATED ORAL at 09:12

## 2023-12-12 RX ADMIN — SODIUM BICARBONATE 650 MG TABLET 1300 MG: at 09:12

## 2023-12-12 RX ADMIN — SODIUM BICARBONATE 650 MG TABLET 1300 MG: at 01:12

## 2023-12-12 RX ADMIN — CARVEDILOL 12.5 MG: 12.5 TABLET, FILM COATED ORAL at 05:12

## 2023-12-12 RX ADMIN — TAMSULOSIN HYDROCHLORIDE 0.4 MG: 0.4 CAPSULE ORAL at 08:12

## 2023-12-12 RX ADMIN — HEPARIN SODIUM 5000 UNITS: 5000 INJECTION INTRAVENOUS; SUBCUTANEOUS at 05:12

## 2023-12-12 RX ADMIN — HEPARIN SODIUM 5000 UNITS: 5000 INJECTION INTRAVENOUS; SUBCUTANEOUS at 01:12

## 2023-12-12 NOTE — PROGRESS NOTES
[facility] ([address] Medicine  Progress Note    Patient Name: Elbert Still Jr.  MRN: [id number]  Patient Class: IP- Inpatient   Admission Date: 12/8/2023  Length of Stay: 3 days  Attending Physician: Mookie Acuña, *  Primary Care Provider: Angel Luis Boo MD        Subjective:     Principal Problem:Bacteremia        HPI:  Elbert Still is a 68 y.o. Male with ESRD on HD (MWF), HTN, and T2DM s/p multiple surgeries for AV fistula (most recent 2 weeks ago) who presents for 1 week of pain, swelling, erythema, and warmth to his LUE fistula site. He tells me that today (12/8) his nephrologist got blood culture results that were drawn on 12/6 that were positive for gram positive cocci in clusters and chains in 2/2 bottles, and told him to present to the ED for IV antibiotics. He does not currently get HD through the AV fistula in question, he has a tunneled catheter. He reports he is in the process of being evaluated for kidney transplant.    The patient reports additional symptoms of mild cough/runny nose that he has had for about a week. These symptoms are similar to some his wife had about 2 weeks ago. He denies fevers, chills, SOB, N/V, abdominal pain, headaches, lightheadedness, or urinary symptoms.    In the ED, he was hypertensive on arrival but otherwise stable. CBC with no leukocytosis, HGB 11.3. CMP with BUN/Cr 34/6.0. Lactate 1.11. US HD access with concerns for hematoma vs abscess with soft tissue changes suggestive of inflammation/infection. Vascular surgery was consulted in the ED and recommended admission to Hospital Medicine to continue IV antibiotics and to keep NPO for possible debridement in the morning.    Overview/Hospital Course:  Patient admitted with concerns for AV fistula infection and bacteremia. Underwent removal of graft per vascular surgery. Currently on vancomycin and Zosyn for bacteremia pending full speciation of cultures    Interval History:    Tunneled catheter removed by IR yesterday. Likely tunneled cath placement with interventional nephrology Thursday. Scheduled for HD MWF. He will go home with HH for wound vac.     Review of Systems  Objective:     Vital Signs (Most Recent):  Temp: 98 °F (36.7 °C) (12/12/23 0835)  Pulse: 72 (12/12/23 1134)  Resp: 18 (12/12/23 1134)  BP: (!) 161/81 (12/12/23 1134)  SpO2: 100 % (12/12/23 1134) Vital Signs (24h Range):  Temp:  [97.9 °F (36.6 °C)-98.2 °F (36.8 °C)] 98 °F (36.7 °C)  Pulse:  [62-78] 72  Resp:  [16-20] 18  SpO2:  [97 %-100 %] 100 %  BP: (143-173)/(72-86) 161/81     Weight: 97.5 kg (215 lb)  Body mass index is 27.6 kg/m².  No intake or output data in the 24 hours ending 12/12/23 1425      Physical Exam  Constitutional:       Appearance: Normal appearance. He is not toxic-appearing.   HENT:      Head: Normocephalic and atraumatic.   Eyes:      Extraocular Movements: Extraocular movements intact.      Pupils: Pupils are equal, round, and reactive to light.   Cardiovascular:      Rate and Rhythm: Normal rate and regular rhythm.   Pulmonary:      Effort: Pulmonary effort is normal. No respiratory distress.      Breath sounds: Normal breath sounds.   Abdominal:      Palpations: Abdomen is soft.      Tenderness: There is no abdominal tenderness.   Musculoskeletal:      Right lower leg: No edema.      Left lower leg: No edema.      Comments: RUE wrapped/bandaged   Neurological:      General: No focal deficit present.      Mental Status: He is alert and oriented to person, place, and time.   Psychiatric:         Mood and Affect: Mood normal.         Behavior: Behavior normal.             Significant Labs: All pertinent labs within the past 24 hours have been reviewed.    Significant Imaging: I have reviewed all pertinent imaging results/findings within the past 24 hours.    Assessment/Plan:      * Bacteremia  68 y.o. Male s/p multiple surgeries for AV fistula (most recent 2 weeks ago) who presents for 1 week of  pain, swelling, erythema, and warmth to his LUE fistula site.   12/6 Blood Cultures positive for gram positive cocci in clusters and chains in 2/2 bottles  US HD access with concerns for hematoma vs abscess with soft tissue changes suggestive of inflammation/infection.   Vascular surgery was consulted in the ED and recommended admission to Hospital Medicine to continue IV antibiotics and to keep NPO for possible debridement in the morning.    Plan:  - Vascular surgery consulted, appreciate recs  - ID consulted, appreciate recs  - Vancomycin/Zosyn  - Repeat cx ngtd  - Removal of tunneled catheter by IR 12/11  - Planning for tunneled placement 12/12 with interventional nephro  - IV cefazolin 2gm/2gm/3gm 3x/wk with HD for 6 weeks (EOC 1/18/23)  - F/u ID outpatient      Type 2 diabetes mellitus  Patient's FSGs are controlled on no home medications  Last A1c reviewed-   Lab Results   Component Value Date    HGBA1C 4.4 (L) 10/04/2023     Most recent fingerstick glucose reviewed-   Recent Labs   Lab 12/11/23  1512 12/11/23  2331 12/12/23  0602 12/12/23  1126   POCTGLUCOSE 142* 109 100 152*       Current correctional scale  Low  Maintain anti-hyperglycemic dose as follows-   Antihyperglycemics (From admission, onward)      Start     Stop Route Frequency Ordered    12/09/23 0832  insulin aspart U-100 pen 0-5 Units         -- SubQ Every 6 hours PRN 12/09/23 0732          Hold Oral hypoglycemics while patient is in the hospital.    Arteriovenous fistula  Status post removal of AV fistula graft 12/10 by vascular in the setting of infection  Afebrile, bcx ngtd      ESRD on dialysis  Creatine stable for now. BMP reviewed- noted Estimated Creatinine Clearance: 9.3 mL/min (A) (based on SCr of 9.1 mg/dL (H)). according to latest data. Based on current GFR, CKD stage is end stage.  Monitor UOP and serial BMP and adjust therapy as needed. Renally dose meds. Avoid nephrotoxic medications and procedures.    Nephrology consulted for  HD    Anemia of chronic disease  Patient's anemia is currently controlled. Has not received any PRBCs to date. Etiology likely d/t chronic disease due to Chronic Kidney Disease/ESRD  Current CBC reviewed-   Lab Results   Component Value Date    HGB 9.7 (L) 12/11/2023    HCT 29.0 (L) 12/11/2023     Monitor serial CBC and transfuse if patient becomes hemodynamically unstable, symptomatic or H/H drops below 7/21.    Mixed hyperlipidemia  - continue home atorvastatin      -Essential (primary) hypertension  Temp:  [97.9 °F (36.6 °C)-98.2 °F (36.8 °C)]   Pulse:  [62-78]   Resp:  [16-20]   BP: (143-173)/(72-86)   SpO2:  [97 %-100 %] .   Home meds for hypertension were reviewed and noted below.   Hypertension Medications               carvediloL (COREG) 12.5 MG tablet Take 1 tablet (12.5 mg total) by mouth 2 (two) times daily.    furosemide (LASIX) 80 MG tablet TAKE 1 TABLET(80 MG) BY MOUTH TWICE DAILY    NIFEdipine (PROCARDIA-XL) 60 MG (OSM) 24 hr tablet Take 1 tablet (60 mg total) by mouth 2 (two) times a day.          Plan:  - continue coreg, nifedipine  - hold lasix pending possible procedure, resume when appropriate  - lasix resumed       VTE Risk Mitigation (From admission, onward)           Ordered     heparin (porcine) injection 5,000 Units  Every 8 hours         12/11/23 1550     IP VTE HIGH RISK PATIENT  Once         12/09/23 0731     Place sequential compression device  Until discontinued         12/09/23 0731                    Discharge Planning   HORACIO: 12/13/2023     Code Status: Full Code   Is the patient medically ready for discharge?: No    Reason for patient still in hospital (select all that apply): Patient trending condition  Discharge Plan A: Home                  Alejandrina Carmichael DO  Department of Hospital Medicine   [facility] - Intensive Care ([address])

## 2023-12-12 NOTE — ASSESSMENT & PLAN NOTE
68 y.o. Black or  Male ESRD-HD M-W-F presents to ED on 12/8/2023 with diagnosis of: bacteremia.      Outpatient HD Information:  -Dialysis modality: Hemodialysis  -Outpatient HD unit: [facility]  -Nephrologist: Luke KLINE  -HD TX days: Monday/Wednesday/Friday, duration of treatment: 3hrs  -Last HD TX prior to hospital admission: 12/08  -Dialysis access: dialysis catheter R CVC and L AVF (infected)  -Residual urine: Minium  -EDW: 97 kg     Plan;  -Tunneled cuff cathetar placement in [facility] on 12/14/23  -Consent already obtained for line placement  -Patient should be NPO from Wednesday midnight (atleast 8 hours).  -Antiplatelts, anticoagulants should be stopped before procedure (aspirin 5 day, heparin usually on procedure night or other anticoagulants 48h before).  -PT/INR, APTT should be performed on the same day before procedure.  -BP should be monitored, routine anti hypertensive on the procedure day.

## 2023-12-12 NOTE — ASSESSMENT & PLAN NOTE
Patient's FSGs are controlled on no home medications  Last A1c reviewed-   Lab Results   Component Value Date    HGBA1C 4.4 (L) 10/04/2023     Most recent fingerstick glucose reviewed-   Recent Labs   Lab 12/11/23  1512 12/11/23  2331 12/12/23  0602 12/12/23  1126   POCTGLUCOSE 142* 109 100 152*       Current correctional scale  Low  Maintain anti-hyperglycemic dose as follows-   Antihyperglycemics (From admission, onward)    Start     Stop Route Frequency Ordered    12/09/23 0832  insulin aspart U-100 pen 0-5 Units         -- SubQ Every 6 hours PRN 12/09/23 0732        Hold Oral hypoglycemics while patient is in the hospital.

## 2023-12-12 NOTE — PROGRESS NOTES
[facility] - [facility] ([place] [id number])  Vascular Surgery  Progress Note    Patient Name: Elbert Still Jr.  MRN: [id number]  Admission Date: 12/8/2023  Primary Care Provider: Angel Luis Boo MD    Subjective:     Interval History: NAEON. Pt doing well this morning. Wound was checked. Mildly hypertensive systolics in the 160-170s otherwise vitals were wnl.     Post-Op Info:  Procedure(s) (LRB):  EXCISION, AV FISTULA (Left)  WASHOUT OF AV FISTULA (Left)   2 Days Post-Op     Medications:  Continuous Infusions:  Scheduled Meds:   acetaminophen  650 mg Oral Q6H    atorvastatin  20 mg Oral QHS    carvediloL  12.5 mg Oral BID WM    ceFAZolin (ANCEF) IVPB  1 g Intravenous Daily    heparin (porcine)  5,000 Units Subcutaneous Q8H    NIFEdipine  60 mg Oral BID    tamsulosin  1 capsule Oral Daily     PRN Meds:dextrose 10%, dextrose 10%, glucagon (human recombinant), glucose, glucose, insulin aspart U-100, naloxone, oxyCODONE, sodium chloride 0.9%     Objective:     Vital Signs (Most Recent):  Temp: 98.1 °F (36.7 °C) (12/12/23 0330)  Pulse: 78 (12/12/23 0330)  Resp: 16 (12/12/23 0531)  BP: (!) 171/79 (12/12/23 0330)  SpO2: 100 % (12/12/23 0330) Vital Signs (24h Range):  Temp:  [97.9 °F (36.6 °C)-98.2 °F (36.8 °C)] 98.1 °F (36.7 °C)  Pulse:  [62-78] 78  Resp:  [16-20] 16  SpO2:  [97 %-100 %] 100 %  BP: (160-183)/() 171/79          Physical Exam  HENT:      Head: Normocephalic.      Nose: Nose normal.      Mouth/Throat:      Mouth: Mucous membranes are moist.   Eyes:      Pupils: Pupils are equal, round, and reactive to light.   Cardiovascular:      Rate and Rhythm: Normal rate.      Comments: Tunnel HD cath right upper chest with no erythema, warmth, tenderness  Pulmonary:      Effort: Pulmonary effort is normal.   Abdominal:      General: Abdomen is flat.   Musculoskeletal:         General: Normal range of motion.   Skin:     General: Skin is warm and dry.      Capillary Refill: Capillary refill takes less than 2  seconds.      Comments: Tender AVG site on right upper arm, slightly swollen, warm to touch compared to surrounding skin   Neurological:      General: No focal deficit present.      Mental Status: He is alert and oriented to person, place, and time.          Significant Labs:  CBC:   Recent Labs   Lab 12/11/23  0554   WBC 4.53   RBC 3.28*   HGB 9.7*   HCT 29.0*      MCV 88   MCH 29.6   MCHC 33.4     CMP:   Recent Labs   Lab 12/11/23  0554   GLU 83   CALCIUM 9.2   ALBUMIN 3.4*   PROT 7.1      K 5.0   CO2 18*      BUN 60*   CREATININE 9.1*   ALKPHOS 98   ALT 10   AST 22   BILITOT 0.4       Significant Diagnostics:  I have reviewed all pertinent imaging results/findings within the past 24 hours.  Assessment/Plan:     Arteriovenous fistula  68 M with left upper extremity brachiobrachial arteriovenous fistula (2 stage) in 9/2022 s/p multiple PTA then on 11/9/2023 inability to cannulate for fistula gram and ended up having to do a graft interposition now presents with concerns for graft infection w/ positive blood cultures growing gram + Cocci. No concern currently for dialysis catheter infection.    - Irrigation and debridement LUE with graft excision 12/10  - Dialysis through catheter for now  - will need follow up appointment with vein mapping in one week, okay to RAFAEL from vascular perspective  - Wound care requesting to place wound vac, ok per vascular   - Will fu op with Dr. Rayo in 1 week for vein mapping   - rest of care per primary team    We will sign off at this time. Please give us a call if there are any other questions.        Karson Islas MD  Vascular Surgery  [facility] - Intensive Care ([facility]

## 2023-12-12 NOTE — PLAN OF CARE
Problem: Diabetes Comorbidity  Goal: Blood Glucose Level Within Targeted Range  Outcome: Ongoing, Progressing     Problem: Renal Function Impairment (Acute Kidney Injury/Impairment)  Goal: Effective Renal Function  Outcome: Ongoing, Progressing   Wound care performed by Wound care nurse on L.arm. Pain meds given per order. Bed locked and in lowest position. Call light in reach.

## 2023-12-12 NOTE — ASSESSMENT & PLAN NOTE
68 y.o. Male s/p multiple surgeries for AV fistula (most recent 2 weeks ago) who presents for 1 week of pain, swelling, erythema, and warmth to his LUE fistula site.   12/6 Blood Cultures positive for gram positive cocci in clusters and chains in 2/2 bottles  US HD access with concerns for hematoma vs abscess with soft tissue changes suggestive of inflammation/infection.   Vascular surgery was consulted in the ED and recommended admission to Hospital Medicine to continue IV antibiotics and to keep NPO for possible debridement in the morning.    Plan:  - Vascular surgery consulted, appreciate recs  - ID consulted, appreciate recs  - Vancomycin/Zosyn  - Repeat cx ngtd  - Removal of tunneled catheter by IR 12/11  - Planning for tunneled placement 12/12 with interventional nephro

## 2023-12-12 NOTE — PLAN OF CARE
[facility] ([place] [address])  Initial Discharge Assessment       Primary Care Provider: Angel Luis Boo MD    Admission Diagnosis: Bacteremia [R78.81]  Chest pain [R07.9]    Admission Date: 12/8/2023  Expected Discharge Date: 12/13/2023    Transition of Care Barriers: (P) None    Payor: MEDICARE / Plan: MEDICARE PART A & B / Product Type: Government /     Extended Emergency Contact Information  Primary Emergency Contact: Kristine Still  Address: [address] [place], [place] [address] [address] [facility]  Home Phone: [phone]  Mobile Phone: [phone]  Relation: Spouse   needed? No    Discharge Plan A: (P) Home  Discharge Plan B: (P) Home with family      [id number] - [place] - [address] AT [facility] [address]  [address]  [place] [address]  Phone: [phone] Fax: [phone]      Initial Assessment (most recent)       Adult Discharge Assessment - 12/12/23 1258          Discharge Assessment    Assessment Type Discharge Planning Assessment (P)      Confirmed/corrected address, phone number and insurance Yes (P)      Confirmed Demographics Correct on Facesheet (P)      Source of Information patient;family (P)      Communicated HORACIO with patient/caregiver Date not available/Unable to determine (P)      Reason For Admission Bacteremia (P)      People in Home spouse (P)      Facility Arrived From: home (P)      Do you expect to return to your current living situation? Yes (P)      Do you have help at home or someone to help you manage your care at home? Yes (P)      Who are your caregiver(s) and their phone number(s)? Kristine Still, spouse, [phone] (P)      Prior to hospitilization cognitive status: Unable to Assess (P)      Current cognitive status: Alert/Oriented (P)      Dressing/Bathing Difficulty no (P)      Home Layout Able to live on 1st floor (P)      Equipment Currently Used at Home none (P)      Readmission within 30 days?  No (P)      Do you currently have service(s) that help you manage your care at home? No (P)      Do you take prescription medications? Yes (P)      Do you have prescription coverage? Yes (P)      Coverage [employer] (P)      Do you have any problems affording any of your prescribed medications? No (P)      Who is going to help you get home at discharge? Kristine Still, spouse, [phone] (P)      How do you get to doctors appointments? car, drives self (P)      Are you on dialysis? Yes (P)      Dialysis Name and Scheduled days [facility] (P)      Do you take coumadin? No (P)      Discharge Plan A Home (P)      Discharge Plan B Home with family (P)      DME Needed Upon Discharge  none (P)      Discharge Plan discussed with: Patient;Spouse/sig other (P)      Name(s) and Number(s) Kristine Still, spouse, [phone] (P)      Transition of Care Barriers None (P)         Physical Activity    On average, how many days per week do you engage in moderate to strenuous exercise (like a brisk walk)? 2 days (P)      On average, how many minutes do you engage in exercise at this level? 120 min (P)         Financial Resource Strain    How hard is it for you to pay for the very basics like food, housing, medical care, and heating? Not hard at all (P)         Housing Stability    In the last 12 months, was there a time when you were not able to pay the mortgage or rent on time? No (P)      In the last 12 months, how many places have you lived? 1 (P)      In the last 12 months, was there a time when you did not have a steady place to sleep or slept in a shelter (including now)? No (P)         Transportation Needs    In the past 12 months, has lack of transportation kept you from medical appointments or from getting medications? No (P)      In the past 12 months, has lack of transportation kept you from meetings, work, or from getting things needed for daily living? No (P)         Food Insecurity    Within the past 12 months, you  worried that your food would run out before you got the money to buy more. Never true (P)      Within the past 12 months, the food you bought just didn't last and you didn't have money to get more. Never true (P)         Social Connections    In a typical week, how many times do you talk on the phone with family, friends, or neighbors? More than three times a week (P)      How often do you get together with friends or relatives? More than three times a week (P)      Do you belong to any clubs or organizations such as Adventism groups, unions, fraternal or athletic groups, or school groups? No (P)      How often do you attend meetings of the clubs or organizations you belong to? Never (P)      Are you , , , , never , or living with a partner?  (P)         Alcohol Use    Q1: How often do you have a drink containing alcohol? Monthly or less (P)      Q2: How many drinks containing alcohol do you have on a typical day when you are drinking? 1 or 2 (P)      Q3: How often do you have six or more drinks on one occasion? Never (P)         OTHER    Name(s) of People in Home Kristine Still, spouse, [phone] (P)                  CM spoke with pt and wife in room.  They live in [address] home, came from home.  Wife will drive home and he drives himself to MD appts.  No 30D readmission.  No HH, DME, coumadin.  [facility]  Indep with ADL's.  Pharmacy: [facility]ela [facility] at [place] and [facility].    2:50 PM  Per MD this pt will need to go home on a wound vac.  SIMRAN prepared [facility] wound vac form and left in room for MD to fill out and return to pt's chart.  Pt states he does not want HH, he wants for wound care to be managed outpatient.  He states he would prefer a location on the [facility].    CM sent referral to [facility] [phone].    SIMRAN called [facility] wound care on the [facility] [phone], LVM requesting call back.    3:57 PM  CM sent referral to [facility] [phone].    Nikkie  LEN SimpsonN, BS, RN, CCM

## 2023-12-12 NOTE — PROGRESS NOTES
[facility] ([place] [address])  Infectious Disease  Progress Note    Patient Name: Elbert Still Jr.  MRN: [id number]  Admission Date: 12/8/2023  Length of Stay: 3 days  Attending Physician: Mookie Acuña, *  Primary Care Provider: Angel Luis Boo MD    Isolation Status: No active isolations  Assessment/Plan:      ID  * Bacteremia  68M with h/o ESRD MWF, DM2, HTN admitted with pain and swelling of AVF, sent to ED by dialysis center when bcx +gpc. had his AVG placed in 9/2022 and recently went through revision on 11/9/23 due to inability to cannulate. Patient had blood cultures drawn at dialysis on 12/6/23 growing GPCs in pairs, chains, and clusters (only one culture was collected). U/s obtained of his line showing concern for possible thrombosed pseudoaneurysm vs abscess vs hematoma. Non contrasted CT obtained showing edema w/ no specific fluid collection. He was started on empiric vanc/zosyn. S/p graft excision 12/10/23 with vascular. Currently obtaining dialysis through a HD tunnel cath placed 5/5/22.     Bcx CoNS, ox susceptible  Repeat bcx clear  S/p AVG removal  Tunnel cath removed 12/11, replacement scheduled 12/13  Hasn't had updated 2d echo    Recommendations:  - de-escalate cefazolin. On discharge, plan for 2gm/2gm/3gm 3x/wk with hd ~ 6 weeks  - outpatient ID clinic follow-up (will schedule)  - 2d echo to eval for veg, pending read  - wound care    Discussed assessment/plan with hospitalist          Anticipated Disposition: Home w/ MWF dialysis    Thank you for your consult. I will sign off. Please contact us if you have any additional questions.    Robinson Abraham, DO  Infectious Disease  [facility] ([place] [address])    Subjective:     Principal Problem:Bacteremia    HPI: Mr. Still is a 68M with PMH of HTN, DM2, ESRD on HD MWF, multiple previous LUE AVF surgeries most recently 1 month ago, presents with edema and erythema of the fistula site. Per patient, his nephrologist had  "obtained blood cultures on 12/6, which resulted on 12/8 with GPCs in chains and clusters, so they told patient to come to the ER. Patient currently receiving HD via R IJ tunneled catheter. Infectious disease consulted for "Bacteremia c/f surgical site infection, abx recs and outpatient abx recs/length of treatment ".     He was vitally stable since admission and has been afebrile. His labs on admission were unremarkale besides slightly elevated CRP 23.5, ESR 50, and slightyl elevated procal. His UA, CXR were negative. 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 Simple focal hematoma or abscess also considered. This was followed by a CT arm without contrast which showed "subcutaneous edema throughout the upper arm that extends past the elbow, the remainder of the lower arm is not imaged. No organized collection to suggest abscess however limited evaluation given noncontrast technique."    On my interview, patient states his swelling and pain in right upper arm around AVG site has been persistent since his most recent revision. Currently pain 5/10, warm to touch, swollen. Denies any systemic symptoms.   Interval History: Now s/p graft excision and HD tunnel cath removal (yesterday). Tolerated procedure well. He is feeling fine. Vitals have been stable. Questions answered regarding antibiotic course.     Review of Systems   Constitutional:  Negative for chills, fatigue and fever.   HENT:  Negative for congestion and sore throat.    Respiratory:  Negative for cough and shortness of breath.    Cardiovascular:  Negative for chest pain.   Gastrointestinal:  Negative for abdominal pain, constipation, diarrhea, nausea and vomiting.   Genitourinary:  Negative for dysuria and flank pain.   Musculoskeletal:  Negative for joint swelling.        Left arm pain   Neurological:  Negative for dizziness and headaches.   Psychiatric/Behavioral:  " Negative for confusion. The patient is not nervous/anxious.      Objective:     Vital Signs (Most Recent):  Temp: 98 °F (36.7 °C) (12/12/23 0835)  Pulse: 67 (12/12/23 0835)  Resp: 18 (12/12/23 0835)  BP: (!) 143/81 (12/12/23 0835)  SpO2: 98 % (12/12/23 0835) Vital Signs (24h Range):  Temp:  [97.9 °F (36.6 °C)-98.2 °F (36.8 °C)] 98 °F (36.7 °C)  Pulse:  [62-78] 67  Resp:  [16-20] 18  SpO2:  [97 %-100 %] 98 %  BP: (143-176)/(72-94) 143/81     Weight: 97.5 kg (215 lb)  Body mass index is 27.6 kg/m².    Estimated Creatinine Clearance: 9.3 mL/min (A) (based on SCr of 9.1 mg/dL (H)).     Physical Exam  HENT:      Head: Normocephalic.      Nose: Nose normal.      Mouth/Throat:      Mouth: Mucous membranes are moist.   Eyes:      Pupils: Pupils are equal, round, and reactive to light.   Cardiovascular:      Rate and Rhythm: Normal rate.      Comments: Tunnel cath site with bandage in place, no bleeding or tenderness  Pulmonary:      Effort: Pulmonary effort is normal.      Breath sounds: No rales.   Abdominal:      General: Abdomen is flat.   Musculoskeletal:         General: Normal range of motion.      Right lower leg: No edema.      Left lower leg: No edema.   Skin:     Comments: Left arm bandaged, no drainage seeping through   Neurological:      General: No focal deficit present.      Mental Status: He is alert and oriented to person, place, and time.   Psychiatric:         Mood and Affect: Mood normal.          Significant Labs: All pertinent labs within the past 24 hours have been reviewed.    Significant Imaging: I have reviewed all pertinent imaging results/findings within the past 24 hours.

## 2023-12-12 NOTE — ASSESSMENT & PLAN NOTE
68 M with left upper extremity brachiobrachial arteriovenous fistula (2 stage) in 9/2022 s/p multiple PTA then on 11/9/2023 inability to cannulate for fistula gram and ended up having to do a graft interposition now presents with concerns for graft infection w/ positive blood cultures growing gram + Cocci. No concern currently for dialysis catheter infection.    - Irrigation and debridement LUE with graft excision 12/10  - Dialysis through catheter for now  - will need follow up appointment with vein mapping in one week, radu to RAFAEL from vascular perspective  - Wound care requesting to place wound vac, ok per vascular   - Will fu op with Dr. Rayo in 1 week for vein mapping   - rest of care per primary team    We will sign off at this time. Please give us a call if there are any other questions.

## 2023-12-12 NOTE — ASSESSMENT & PLAN NOTE
Temp:  [97.9 °F (36.6 °C)-98.2 °F (36.8 °C)]   Pulse:  [62-78]   Resp:  [16-20]   BP: (143-173)/(72-86)   SpO2:  [97 %-100 %] .   Home meds for hypertension were reviewed and noted below.   Hypertension Medications               carvediloL (COREG) 12.5 MG tablet Take 1 tablet (12.5 mg total) by mouth 2 (two) times daily.    furosemide (LASIX) 80 MG tablet TAKE 1 TABLET(80 MG) BY MOUTH TWICE DAILY    NIFEdipine (PROCARDIA-XL) 60 MG (OSM) 24 hr tablet Take 1 tablet (60 mg total) by mouth 2 (two) times a day.          Plan:  - continue coreg, nifedipine  - hold lasix pending possible procedure, resume when appropriate  - lasix resumed

## 2023-12-12 NOTE — PROGRESS NOTES
Patient sitting up in bed with father at bedside. Removed ACE wrap and ABD pad, gauze, 4 x 4 gauze, Enluxtra from wound, flushed with Vashe, re-applied two pieces of Enluxtra to wound bed, covered with gauze 4x4s, cast padding from hand to mid-bicep, secured loosely with ACE wrap.

## 2023-12-12 NOTE — SUBJECTIVE & OBJECTIVE
Medications:  Continuous Infusions:  Scheduled Meds:   acetaminophen  650 mg Oral Q6H    atorvastatin  20 mg Oral QHS    carvediloL  12.5 mg Oral BID WM    ceFAZolin (ANCEF) IVPB  1 g Intravenous Daily    heparin (porcine)  5,000 Units Subcutaneous Q8H    NIFEdipine  60 mg Oral BID    tamsulosin  1 capsule Oral Daily     PRN Meds:dextrose 10%, dextrose 10%, glucagon (human recombinant), glucose, glucose, insulin aspart U-100, naloxone, oxyCODONE, sodium chloride 0.9%     Objective:     Vital Signs (Most Recent):  Temp: 98.1 °F (36.7 °C) (12/12/23 0330)  Pulse: 78 (12/12/23 0330)  Resp: 16 (12/12/23 0531)  BP: (!) 171/79 (12/12/23 0330)  SpO2: 100 % (12/12/23 0330) Vital Signs (24h Range):  Temp:  [97.9 °F (36.6 °C)-98.2 °F (36.8 °C)] 98.1 °F (36.7 °C)  Pulse:  [62-78] 78  Resp:  [16-20] 16  SpO2:  [97 %-100 %] 100 %  BP: (160-183)/() 171/79          Physical Exam  HENT:      Head: Normocephalic.      Nose: Nose normal.      Mouth/Throat:      Mouth: Mucous membranes are moist.   Eyes:      Pupils: Pupils are equal, round, and reactive to light.   Cardiovascular:      Rate and Rhythm: Normal rate.      Comments: Tunnel HD cath right upper chest with no erythema, warmth, tenderness  Pulmonary:      Effort: Pulmonary effort is normal.   Abdominal:      General: Abdomen is flat.   Musculoskeletal:         General: Normal range of motion.   Skin:     General: Skin is warm and dry.      Capillary Refill: Capillary refill takes less than 2 seconds.      Comments: Tender AVG site on right upper arm, slightly swollen, warm to touch compared to surrounding skin   Neurological:      General: No focal deficit present.      Mental Status: He is alert and oriented to person, place, and time.          Significant Labs:  CBC:   Recent Labs   Lab 12/11/23  0554   WBC 4.53   RBC 3.28*   HGB 9.7*   HCT 29.0*      MCV 88   MCH 29.6   MCHC 33.4     CMP:   Recent Labs   Lab 12/11/23  0554   GLU 83   CALCIUM 9.2   ALBUMIN  3.4*   PROT 7.1      K 5.0   CO2 18*      BUN 60*   CREATININE 9.1*   ALKPHOS 98   ALT 10   AST 22   BILITOT 0.4       Significant Diagnostics:  I have reviewed all pertinent imaging results/findings within the past 24 hours.

## 2023-12-12 NOTE — SUBJECTIVE & OBJECTIVE
Interval History: Now s/p graft excision and HD tunnel cath removal (yesterday). Tolerated procedure well. He is feeling fine. Vitals have been stable. Questions answered regarding antibiotic course.     Review of Systems   Constitutional:  Negative for chills, fatigue and fever.   HENT:  Negative for congestion and sore throat.    Respiratory:  Negative for cough and shortness of breath.    Cardiovascular:  Negative for chest pain.   Gastrointestinal:  Negative for abdominal pain, constipation, diarrhea, nausea and vomiting.   Genitourinary:  Negative for dysuria and flank pain.   Musculoskeletal:  Negative for joint swelling.        Left arm pain   Neurological:  Negative for dizziness and headaches.   Psychiatric/Behavioral:  Negative for confusion. The patient is not nervous/anxious.      Objective:     Vital Signs (Most Recent):  Temp: 98 °F (36.7 °C) (12/12/23 0835)  Pulse: 67 (12/12/23 0835)  Resp: 18 (12/12/23 0835)  BP: (!) 143/81 (12/12/23 0835)  SpO2: 98 % (12/12/23 0835) Vital Signs (24h Range):  Temp:  [97.9 °F (36.6 °C)-98.2 °F (36.8 °C)] 98 °F (36.7 °C)  Pulse:  [62-78] 67  Resp:  [16-20] 18  SpO2:  [97 %-100 %] 98 %  BP: (143-176)/(72-94) 143/81     Weight: 97.5 kg (215 lb)  Body mass index is 27.6 kg/m².    Estimated Creatinine Clearance: 9.3 mL/min (A) (based on SCr of 9.1 mg/dL (H)).     Physical Exam  HENT:      Head: Normocephalic.      Nose: Nose normal.      Mouth/Throat:      Mouth: Mucous membranes are moist.   Eyes:      Pupils: Pupils are equal, round, and reactive to light.   Cardiovascular:      Rate and Rhythm: Normal rate.      Comments: Tunnel cath site with bandage in place, no bleeding or tenderness  Pulmonary:      Effort: Pulmonary effort is normal.      Breath sounds: No rales.   Abdominal:      General: Abdomen is flat.   Musculoskeletal:         General: Normal range of motion.      Right lower leg: No edema.      Left lower leg: No edema.   Skin:     Comments: Left arm  bandaged, no drainage seeping through   Neurological:      General: No focal deficit present.      Mental Status: He is alert and oriented to person, place, and time.   Psychiatric:         Mood and Affect: Mood normal.          Significant Labs: All pertinent labs within the past 24 hours have been reviewed.    Significant Imaging: I have reviewed all pertinent imaging results/findings within the past 24 hours.

## 2023-12-12 NOTE — SUBJECTIVE & OBJECTIVE
Interval History:   Tunneled catheter removed by IR yesterday. He will go home with HH for wound vac. Interventional nephrology consulted for tunneled cath placement. Scheduled for HD MWF.     Review of Systems  Objective:     Vital Signs (Most Recent):  Temp: 98 °F (36.7 °C) (12/12/23 0835)  Pulse: 72 (12/12/23 1134)  Resp: 18 (12/12/23 1134)  BP: (!) 161/81 (12/12/23 1134)  SpO2: 100 % (12/12/23 1134) Vital Signs (24h Range):  Temp:  [97.9 °F (36.6 °C)-98.2 °F (36.8 °C)] 98 °F (36.7 °C)  Pulse:  [62-78] 72  Resp:  [16-20] 18  SpO2:  [97 %-100 %] 100 %  BP: (143-173)/(72-86) 161/81     Weight: 97.5 kg (215 lb)  Body mass index is 27.6 kg/m².  No intake or output data in the 24 hours ending 12/12/23 1425      Physical Exam  Constitutional:       Appearance: Normal appearance. He is not toxic-appearing.   HENT:      Head: Normocephalic and atraumatic.   Eyes:      Extraocular Movements: Extraocular movements intact.      Pupils: Pupils are equal, round, and reactive to light.   Cardiovascular:      Rate and Rhythm: Normal rate and regular rhythm.   Pulmonary:      Effort: Pulmonary effort is normal. No respiratory distress.      Breath sounds: Normal breath sounds.   Abdominal:      Palpations: Abdomen is soft.      Tenderness: There is no abdominal tenderness.   Musculoskeletal:      Right lower leg: No edema.      Left lower leg: No edema.      Comments: RUE wrapped/bandaged   Neurological:      General: No focal deficit present.      Mental Status: He is alert and oriented to person, place, and time.   Psychiatric:         Mood and Affect: Mood normal.         Behavior: Behavior normal.             Significant Labs: All pertinent labs within the past 24 hours have been reviewed.    Significant Imaging: I have reviewed all pertinent imaging results/findings within the past 24 hours.

## 2023-12-12 NOTE — ASSESSMENT & PLAN NOTE
Status post removal of AV fistula graft 12/10 by vascular in the setting of infection  Afebrile, bcx ngtd

## 2023-12-12 NOTE — ASSESSMENT & PLAN NOTE
68M with h/o ESRD MWF, DM2, HTN admitted with pain and swelling of AVF, sent to ED by dialysis center when bcx +gpc. had his AVG placed in 9/2022 and recently went through revision on 11/9/23 due to inability to cannulate. Patient had blood cultures drawn at dialysis on 12/6/23 growing GPCs in pairs, chains, and clusters (only one culture was collected). U/s obtained of his line showing concern for possible thrombosed pseudoaneurysm vs abscess vs hematoma. Non contrasted CT obtained showing edema w/ no specific fluid collection. He was started on empiric vanc/zosyn. S/p graft excision 12/10/23 with vascular. Currently obtaining dialysis through a HD tunnel cath placed 5/5/22.     Bcx CoNS, ox susceptible  Repeat bcx clear  S/p AVG removal  Tunnel cath removed 12/11, replacement scheduled 12/13  Hasn't had updated 2d echo    Recommendations:  - de-escalate cefazolin. On discharge, plan for 2gm/2gm/3gm 3x/wk with hd ~ 6 weeks  - outpatient ID clinic follow-up (will schedule)  - 2d echo to eval for veg, pending read  - wound care    Discussed assessment/plan with hospitalist

## 2023-12-12 NOTE — SUBJECTIVE & OBJECTIVE
Past Medical History:   Diagnosis Date    Anemia     Diabetes mellitus     Diabetes mellitus, type 2     Disorder of kidney and ureter     ESRD (end stage renal disease)     Essential (primary) hypertension 2017    Gout, unspecified      jaundice, unspecified     Nuclear sclerosis of both eyes 2022       Past Surgical History:   Procedure Laterality Date    ADENOIDECTOMY      ADENOIDECTOMY      AV FISTULA PLACEMENT Left 2022    Procedure: CREATION, AV FISTULA;  Surgeon: Prince Gardiner MD;  Location: [facility] OR [address] [place] [address];  Service: Peripheral Vascular;  Laterality: Left;    EXCISION OF ARTERIOVENOUS FISTULA Left 12/10/2023    Procedure: EXCISION, AV FISTULA;  Surgeon: Joaquin Rose MD;  [facility];  Service: Vascular;  Laterality: Left;    FISTULOGRAM Left 2023    Procedure: FISTULOGRAM;  Surgeon: Prince Gardiner MD;  Location: [facility] OR [address] [place] [address];  Service: Peripheral Vascular;  Laterality: Left;  Given to the sterile field.     FISTULOGRAM Left 2023    Procedure: Fistulogram;  Surgeon: LETY Rayo III, MD;  Location: [facility] OR [address] [place] [address];  Service: Peripheral Vascular;  Laterality: Left;  1.9 min  22.86 mGy  3.8862 Gy.cm  20ml Dye     nasal septum repair      NASAL SEPTUM SURGERY      PERCUTANEOUS TRANSLUMINAL ANGIOPLASTY OF ARTERIOVENOUS FISTULA Left 2022    Procedure: PTA, AV FISTULA;  Surgeon: Prince Gardiner MD;  Location: [facility] CATH LAB;  Service: Peripheral Vascular;  Laterality: Left;    PERCUTANEOUS TRANSLUMINAL ANGIOPLASTY OF ARTERIOVENOUS FISTULA Left 2023    Procedure: PTA, AV FISTULA;  Surgeon: Prince Gardiner MD;  [facility];  Service: Peripheral Vascular;  Laterality: Left;  4.8 min  43.08 mGy  4.0682 Gy.cm  32ml Dye    PERCUTANEOUS TRANSLUMINAL ANGIOPLASTY OF ARTERIOVENOUS FISTULA Left 2023    Procedure: PTA, AV FISTULA;  Surgeon: LETY Rayo III, MD;  Location: [facility] OR [address] [place] [address];  Service: Peripheral Vascular;   Laterality: Left;    PROSTATE BIOPSY N/A 3/24/2023    Procedure: BIOPSY, PROSTATE;  Surgeon: Frankie Welch MD;  Location: [facility] OR 1ST FLR;  Service: Urology;  Laterality: N/A;  transrectal, 30min, uronav needed    REVISION OF ARTERIOVENOUS FISTULA Left 11/9/2023    Procedure: REVISION, AV FISTULA;  Surgeon: LETY Rayo III, MD;  Location: [facility] OR 2ND FLR;  Service: Vascular;  Laterality: Left;  LUE AVG revision    ROTATOR CUFF REPAIR Left     SALIVARY GLAND SURGERY      Tonsillectomy      TONSILLECTOMY      TRANSPOSITION OF BASILIC VEIN Left 11/1/2022    Procedure: TRANSPOSITION, VEIN, BASILIC;  Surgeon: Prince Gardiner MD;  Location: [facility] OR 2ND FLR;  Service: Peripheral Vascular;  Laterality: Left;  Left Brachial vein.    WASHOUT Left 12/10/2023    Procedure: WASHOUT OF AV FISTULA;  Surgeon: Joaquin Rose MD;  Location: [facility] OR 2ND FLR;  Service: Vascular;  Laterality: Left;       Review of patient's allergies indicates:   Allergen Reactions    Iodine and iodide containing products Hives     Hypotension, Flushing     Current Facility-Administered Medications   Medication Frequency    acetaminophen tablet 650 mg Q6H    atorvastatin tablet 20 mg QHS    carvediloL tablet 12.5 mg BID WM    ceFAZolin (ANCEF) 1 g in dextrose 5 % in water (D5W) 50 mL IVPB (MB+) Daily    dextrose 10% bolus 125 mL 125 mL PRN    dextrose 10% bolus 250 mL 250 mL PRN    furosemide tablet 80 mg BID    glucagon (human recombinant) injection 1 mg PRN    glucose chewable tablet 16 g PRN    glucose chewable tablet 24 g PRN    heparin (porcine) injection 5,000 Units Q8H    insulin aspart U-100 pen 0-5 Units Q6H PRN    naloxone 0.4 mg/mL injection 0.02 mg PRN    NIFEdipine 24 hr tablet 60 mg BID    oxyCODONE immediate release tablet 5 mg Q6H PRN    polyethylene glycol packet 17 g Daily    senna tablet 8.6 mg Daily PRN    sevelamer carbonate tablet 1,600 mg TID WM    sodium bicarbonate tablet 1,300 mg BID    sodium chloride 0.9% flush  10 mL Q12H PRN    sodium chloride 0.9% flush 10 mL PRN    tamsulosin 24 hr capsule 0.4 mg Daily     Facility-Administered Medications Ordered in Other Encounters   Medication Frequency    0.9%  NaCl infusion Continuous    0.9%  NaCl infusion Continuous    ondansetron disintegrating tablet 8 mg Q8H PRN     Family History       Problem Relation (Age of Onset)    Cancer Sister    Heart disease Mother    Hypertension Mother, Sister    Kidney disease Mother    No Known Problems Father    Seizures Sister    Stroke Sister          Tobacco Use    Smoking status: Never    Smokeless tobacco: Never    Tobacco comments:     .  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     Substance and Sexual Activity    Alcohol use: Yes     Alcohol/week: 2.0 standard drinks of alcohol     Types: 1 Glasses of wine, 1 Cans of beer per week     Comment: Socially    Drug use: No    Sexual activity: Yes     Partners: Female     Review of Systems   Constitutional: Negative.    HENT: Negative.     Eyes: Negative.    Respiratory: Negative.     Cardiovascular: Negative.    Genitourinary: Negative.    Neurological: Negative.    Psychiatric/Behavioral: Negative.       Objective:     Vital Signs (Most Recent):  Temp: 98.5 °F (36.9 °C) (12/12/23 1551)  Pulse: 75 (12/12/23 1551)  Resp: 18 (12/12/23 1551)  BP: (!) 153/83 (12/12/23 1551)  SpO2: 100 % (12/12/23 1551) Vital Signs (24h Range):  Temp:  [98 °F (36.7 °C)-98.5 °F (36.9 °C)] 98.5 °F (36.9 °C)  Pulse:  [62-78] 75  Resp:  [16-20] 18  SpO2:  [98 %-100 %] 100 %  BP: (143-173)/(72-83) 153/83     Weight: 97.5 kg (215 lb) (12/12/23 0835)  Body mass index is 27.6 kg/m².  Body surface area is 2.26 meters squared.    I/O last 3 completed shifts:  In: 298.9 [I.V.:298.9]  Out: 3169 [Other:3169]     Physical Exam  Constitutional:       Appearance: Normal appearance.   Cardiovascular:      Rate and Rhythm: Normal rate.   Pulmonary:      Effort: Pulmonary effort is normal.   Abdominal:      Palpations: Abdomen is  "soft.   Musculoskeletal:         General: No swelling.      Right lower leg: No edema.      Left lower leg: No edema.   Neurological:      General: No focal deficit present.      Mental Status: He is alert. Mental status is at baseline.   Psychiatric:         Mood and Affect: Mood normal.         Behavior: Behavior normal.          Significant Labs:  BMP:   Recent Labs   Lab 12/11/23  0554   GLU 83      K 5.0      CO2 18*   BUN 60*   CREATININE 9.1*   CALCIUM 9.2   MG 2.1     CBC:   Recent Labs   Lab 12/11/23  0554   WBC 4.53   RBC 3.28*   HGB 9.7*   HCT 29.0*      MCV 88   MCH 29.6   MCHC 33.4     Coagulation: No results for input(s): "PT", "INR", "APTT" in the last 168 hours.    Significant Imaging:  X-Ray: Reviewed  "

## 2023-12-12 NOTE — PLAN OF CARE
Problem: Adult Inpatient Plan of Care  Goal: Plan of Care Review  Outcome: Ongoing, Progressing  Goal: Patient-Specific Goal (Individualized)  Outcome: Ongoing, Progressing  Goal: Absence of Hospital-Acquired Illness or Injury  Outcome: Ongoing, Progressing  Goal: Optimal Comfort and Wellbeing  Outcome: Ongoing, Progressing  Goal: Readiness for Transition of Care  Outcome: Ongoing, Progressing     Problem: Diabetes Comorbidity  Goal: Blood Glucose Level Within Targeted Range  Outcome: Ongoing, Progressing     Problem: Fluid and Electrolyte Imbalance (Acute Kidney Injury/Impairment)  Goal: Fluid and Electrolyte Balance  Outcome: Ongoing, Progressing     Problem: Oral Intake Inadequate (Acute Kidney Injury/Impairment)  Goal: Optimal Nutrition Intake  Outcome: Ongoing, Progressing     Problem: Renal Function Impairment (Acute Kidney Injury/Impairment)  Goal: Effective Renal Function  Outcome: Ongoing, Progressing     Problem: Infection  Goal: Absence of Infection Signs and Symptoms  Outcome: Ongoing, Progressing     Problem: Device-Related Complication Risk (Hemodialysis)  Goal: Safe, Effective Therapy Delivery  Outcome: Ongoing, Progressing     Problem: Hemodynamic Instability (Hemodialysis)  Goal: Effective Tissue Perfusion  Outcome: Ongoing, Progressing     Problem: Infection (Hemodialysis)  Goal: Absence of Infection Signs and Symptoms  Outcome: Ongoing, Progressing     Problem: Electrolyte Imbalance (Chronic Kidney Disease)  Goal: Electrolyte Balance  Outcome: Ongoing, Progressing     Problem: Fluid Volume Excess (Chronic Kidney Disease)  Goal: Fluid Balance  Outcome: Ongoing, Progressing

## 2023-12-12 NOTE — CONSULTS
[facility] - [facility] ([place] [id number])  Nephrology  Consult Note    Patient Name: Elbert Still Jr.  MRN: [id number]  Admission Date: 12/8/2023  Hospital Length of Stay: 3 days  Attending Provider: Mookie Acuña, *   Primary Care Physician: Angel Luis Boo MD  Principal Problem:Bacteremia    IP consult to Interventional Nephrology  Consult performed by: Kristal Agarwal MD  Consult ordered by: Alejandrina Carmichael DO        Subjective:     HPI: The patient is a 68 y.o. Black or  Male with multiple co morbidities including ESRD on HD (MWF), HTN, and T2DM s/p multiple surgeries for AV fistula (most recent 2 weeks ago) who presents to ED on 12/8/2023 with complaints of 1 week of pain, swelling, erythema, and warmth to his LUE fistula site. He was called by Dr. Fierro on yesterday and was instructed to go to the ER for IV antibiotics after being informed that his blood cultures drawn on 12/6 were positive for gram positive cocci in clusters and chains in 2/2 bottles. He does not currently get HD through the AV fistula in question, he has a tunneled catheter. He reports he is in the process of being evaluated for kidney transplant. He denies fevers, chills, SOB, N/V, abdominal pain, headaches, lightheadedness, or urinary symptoms. In the ED, he was hypertensive on arrival but otherwise stable. Lactate 1.11. US HD access with concerns for hematoma vs abscess with soft tissue changes suggestive of inflammation/infection. Vascular surgery was consulted in the ED and recommended admission to Hospital Medicine to continue IV antibiotics and to keep NPO for possible debridement in the morning, 12/10.   Interventional Nephrology consulted for placement of tunneled line.    Past Medical History:   Diagnosis Date    Anemia     Diabetes mellitus     Diabetes mellitus, type 2     Disorder of kidney and ureter     ESRD (end stage renal disease)     Essential (primary) hypertension 09/21/2017    Gout,  unspecified      jaundice, unspecified     Nuclear sclerosis of both eyes 2022       Past Surgical History:   Procedure Laterality Date    ADENOIDECTOMY      ADENOIDECTOMY      AV FISTULA PLACEMENT Left 2022    Procedure: CREATION, AV FISTULA;  Surgeon: Prince Gardiner MD;  [facility];  Service: Peripheral Vascular;  Laterality: Left;    EXCISION OF ARTERIOVENOUS FISTULA Left 12/10/2023    Procedure: EXCISION, AV FISTULA;  Surgeon: Joaquin Rose MD;  Location: [facility] OR [facility] FLR;  Service: Vascular;  Laterality: Left;    FISTULOGRAM Left 2023    Procedure: FISTULOGRAM;  Surgeon: Prince Gardiner MD;  Location: [facility] OR [facility] FLR;  Service: Peripheral Vascular;  Laterality: Left;  Given to the sterile field.     FISTULOGRAM Left 2023    Procedure: Fistulogram;  Surgeon: LETY Rayo III, MD;  [facility];  Service: Peripheral Vascular;  Laterality: Left;  1.9 min  22.86 mGy  3.8862 Gy.cm  20ml Dye     nasal septum repair      NASAL SEPTUM SURGERY      PERCUTANEOUS TRANSLUMINAL ANGIOPLASTY OF ARTERIOVENOUS FISTULA Left 2022    Procedure: PTA, AV FISTULA;  Surgeon: Prince Gardiner MD;  Location: [facility] CATH LAB;  Service: Peripheral Vascular;  Laterality: Left;    PERCUTANEOUS TRANSLUMINAL ANGIOPLASTY OF ARTERIOVENOUS FISTULA Left 2023    Procedure: PTA, AV FISTULA;  Surgeon: Prince Gardiner MD;  [facility];  Service: Peripheral Vascular;  Laterality: Left;  4.8 min  43.08 mGy  4.0682 Gy.cm  32ml Dye    PERCUTANEOUS TRANSLUMINAL ANGIOPLASTY OF ARTERIOVENOUS FISTULA Left 2023    Procedure: PTA, AV FISTULA;  Surgeon: LETY Rayo III, MD;  [facility];  Service: Peripheral Vascular;  Laterality: Left;    PROSTATE BIOPSY N/A 3/24/2023    Procedure: BIOPSY, PROSTATE;  Surgeon: Frankie Welch MD;  Location: [facility] OR [facility] FLR;  Service: Urology;  Laterality: N/A;  transrectal, 30min, uronav needed    REVISION OF  ARTERIOVENOUS FISTULA Left 11/9/2023    Procedure: REVISION, AV FISTULA;  Surgeon: LETY Rayo III, MD;  Location: [facility] OR 2ND FLR;  Service: Vascular;  Laterality: Left;  LUE AVG revision    ROTATOR CUFF REPAIR Left     SALIVARY GLAND SURGERY      Tonsillectomy      TONSILLECTOMY      TRANSPOSITION OF BASILIC VEIN Left 11/1/2022    Procedure: TRANSPOSITION, VEIN, BASILIC;  Surgeon: Prince Gardiner MD;  Location: [facility] OR 2ND FLR;  Service: Peripheral Vascular;  Laterality: Left;  Left Brachial vein.    WASHOUT Left 12/10/2023    Procedure: WASHOUT OF AV FISTULA;  Surgeon: Joaquin Rose MD;  Location: [facility] OR 2ND FLR;  Service: Vascular;  Laterality: Left;       Review of patient's allergies indicates:   Allergen Reactions    Iodine and iodide containing products Hives     Hypotension, Flushing     Current Facility-Administered Medications   Medication Frequency    acetaminophen tablet 650 mg Q6H    atorvastatin tablet 20 mg QHS    carvediloL tablet 12.5 mg BID WM    ceFAZolin (ANCEF) 1 g in dextrose 5 % in water (D5W) 50 mL IVPB (MB+) Daily    dextrose 10% bolus 125 mL 125 mL PRN    dextrose 10% bolus 250 mL 250 mL PRN    furosemide tablet 80 mg BID    glucagon (human recombinant) injection 1 mg PRN    glucose chewable tablet 16 g PRN    glucose chewable tablet 24 g PRN    heparin (porcine) injection 5,000 Units Q8H    insulin aspart U-100 pen 0-5 Units Q6H PRN    naloxone 0.4 mg/mL injection 0.02 mg PRN    NIFEdipine 24 hr tablet 60 mg BID    oxyCODONE immediate release tablet 5 mg Q6H PRN    polyethylene glycol packet 17 g Daily    senna tablet 8.6 mg Daily PRN    sevelamer carbonate tablet 1,600 mg TID WM    sodium bicarbonate tablet 1,300 mg BID    sodium chloride 0.9% flush 10 mL Q12H PRN    sodium chloride 0.9% flush 10 mL PRN    tamsulosin 24 hr capsule 0.4 mg Daily     Facility-Administered Medications Ordered in Other Encounters   Medication Frequency    0.9%  NaCl infusion Continuous    0.9%   NaCl infusion Continuous    ondansetron disintegrating tablet 8 mg Q8H PRN     Family History       Problem Relation (Age of Onset)    Cancer Sister    Heart disease Mother    Hypertension Mother, Sister    Kidney disease Mother    No Known Problems Father    Seizures Sister    Stroke Sister          Tobacco Use    Smoking status: Never    Smokeless tobacco: Never    Tobacco comments:     .  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     Substance and Sexual Activity    Alcohol use: Yes     Alcohol/week: 2.0 standard drinks of alcohol     Types: 1 Glasses of wine, 1 Cans of beer per week     Comment: Socially    Drug use: No    Sexual activity: Yes     Partners: Female     Review of Systems   Constitutional: Negative.    HENT: Negative.     Eyes: Negative.    Respiratory: Negative.     Cardiovascular: Negative.    Genitourinary: Negative.    Neurological: Negative.    Psychiatric/Behavioral: Negative.       Objective:     Vital Signs (Most Recent):  Temp: 98.5 °F (36.9 °C) (12/12/23 1551)  Pulse: 75 (12/12/23 1551)  Resp: 18 (12/12/23 1551)  BP: (!) 153/83 (12/12/23 1551)  SpO2: 100 % (12/12/23 1551) Vital Signs (24h Range):  Temp:  [98 °F (36.7 °C)-98.5 °F (36.9 °C)] 98.5 °F (36.9 °C)  Pulse:  [62-78] 75  Resp:  [16-20] 18  SpO2:  [98 %-100 %] 100 %  BP: (143-173)/(72-83) 153/83     Weight: 97.5 kg (215 lb) (12/12/23 0835)  Body mass index is 27.6 kg/m².  Body surface area is 2.26 meters squared.    I/O last 3 completed shifts:  In: 298.9 [I.V.:298.9]  Out: 3169 [Other:3169]     Physical Exam  Constitutional:       Appearance: Normal appearance.   Cardiovascular:      Rate and Rhythm: Normal rate.   Pulmonary:      Effort: Pulmonary effort is normal.   Abdominal:      Palpations: Abdomen is soft.   Musculoskeletal:         General: No swelling.      Right lower leg: No edema.      Left lower leg: No edema.   Neurological:      General: No focal deficit present.      Mental Status: He is alert. Mental status is at  "baseline.   Psychiatric:         Mood and Affect: Mood normal.         Behavior: Behavior normal.          Significant Labs:  BMP:   Recent Labs   Lab 12/11/23  0554   GLU 83      K 5.0      CO2 18*   BUN 60*   CREATININE 9.1*   CALCIUM 9.2   MG 2.1     CBC:   Recent Labs   Lab 12/11/23  0554   WBC 4.53   RBC 3.28*   HGB 9.7*   HCT 29.0*      MCV 88   MCH 29.6   MCHC 33.4     Coagulation: No results for input(s): "PT", "INR", "APTT" in the last 168 hours.    Significant Imaging:  X-Ray: Reviewed  Assessment/Plan:     Renal/  ESRD on dialysis  68 y.o. Black or  Male ESRD-HD M-W-F presents to ED on 12/8/2023 with diagnosis of: bacteremia.      Outpatient HD Information:  -Dialysis modality: Hemodialysis  -Outpatient HD unit: [facility]  -Nephrologist: Luke KLINE  -HD TX days: Monday/Wednesday/Friday, duration of treatment: 3hrs  -Last HD TX prior to hospital admission: 12/08  -Dialysis access: dialysis catheter R CVC and L AVF (infected)  -Residual urine: Minium  -EDW: 97 kg     Plan;  -Tunneled cuff cathetar placement in [facility] on 12/14/23  -Consent already obtained for line placement  -Patient should be NPO from Wednesday midnight (atleast 8 hours).  -Antiplatelts, anticoagulants should be stopped before procedure (aspirin 5 day, heparin usually on procedure night or other anticoagulants 48h before).  -PT/INR, APTT should be performed on the same day before procedure.  -BP should be monitored, routine anti hypertensive on the procedure day.            Thank you for your consult. I will follow-up with patient. Please contact us if you have any additional questions.    Kristal Agarwal MD  Nephrology  [facility] - Intensive Care ([place] [id number])  "

## 2023-12-13 DIAGNOSIS — Z99.2 ESRD ON DIALYSIS: Primary | Chronic | ICD-10-CM

## 2023-12-13 DIAGNOSIS — T82.898A STEAL SYNDROME DIALYSIS VASCULAR ACCESS, INITIAL ENCOUNTER: ICD-10-CM

## 2023-12-13 DIAGNOSIS — I77.0 ARTERIOVENOUS FISTULA: ICD-10-CM

## 2023-12-13 DIAGNOSIS — N18.6 ESRD ON DIALYSIS: Primary | Chronic | ICD-10-CM

## 2023-12-13 LAB
ALBUMIN SERPL BCP-MCNC: 3.6 G/DL (ref 3.5–5.2)
ALP SERPL-CCNC: 118 U/L (ref 55–135)
ALT SERPL W/O P-5'-P-CCNC: 13 U/L (ref 10–44)
ANION GAP SERPL CALC-SCNC: 11 MMOL/L (ref 8–16)
AST SERPL-CCNC: 22 U/L (ref 10–40)
BACTERIA SPEC AEROBE CULT: NO GROWTH
BACTERIA SPEC AEROBE CULT: NO GROWTH
BASOPHILS # BLD AUTO: 0.02 K/UL (ref 0–0.2)
BASOPHILS NFR BLD: 0.4 % (ref 0–1.9)
BILIRUB SERPL-MCNC: 0.2 MG/DL (ref 0.1–1)
BUN SERPL-MCNC: 53 MG/DL (ref 8–23)
CALCIUM SERPL-MCNC: 9.5 MG/DL (ref 8.7–10.5)
CHLORIDE SERPL-SCNC: 109 MMOL/L (ref 95–110)
CO2 SERPL-SCNC: 23 MMOL/L (ref 23–29)
CREAT SERPL-MCNC: 8.6 MG/DL (ref 0.5–1.4)
DIFFERENTIAL METHOD: ABNORMAL
EOSINOPHIL # BLD AUTO: 0.1 K/UL (ref 0–0.5)
EOSINOPHIL NFR BLD: 2.6 % (ref 0–8)
ERYTHROCYTE [DISTWIDTH] IN BLOOD BY AUTOMATED COUNT: 14.2 % (ref 11.5–14.5)
EST. GFR  (NO RACE VARIABLE): 6.3 ML/MIN/1.73 M^2
GLUCOSE SERPL-MCNC: 105 MG/DL (ref 70–110)
HCT VFR BLD AUTO: 28.4 % (ref 40–54)
HGB BLD-MCNC: 9.7 G/DL (ref 14–18)
IMM GRANULOCYTES # BLD AUTO: 0.02 K/UL (ref 0–0.04)
IMM GRANULOCYTES NFR BLD AUTO: 0.4 % (ref 0–0.5)
LYMPHOCYTES # BLD AUTO: 1 K/UL (ref 1–4.8)
LYMPHOCYTES NFR BLD: 18.6 % (ref 18–48)
MAGNESIUM SERPL-MCNC: 2.1 MG/DL (ref 1.6–2.6)
MCH RBC QN AUTO: 29.6 PG (ref 27–31)
MCHC RBC AUTO-ENTMCNC: 34.2 G/DL (ref 32–36)
MCV RBC AUTO: 87 FL (ref 82–98)
MONOCYTES # BLD AUTO: 0.6 K/UL (ref 0.3–1)
MONOCYTES NFR BLD: 11.8 % (ref 4–15)
NEUTROPHILS # BLD AUTO: 3.6 K/UL (ref 1.8–7.7)
NEUTROPHILS NFR BLD: 66.2 % (ref 38–73)
NRBC BLD-RTO: 0 /100 WBC
PHOSPHATE SERPL-MCNC: 5.1 MG/DL (ref 2.7–4.5)
PLATELET # BLD AUTO: 243 K/UL (ref 150–450)
PMV BLD AUTO: 10.7 FL (ref 9.2–12.9)
POCT GLUCOSE: 119 MG/DL (ref 70–110)
POCT GLUCOSE: 191 MG/DL (ref 70–110)
POTASSIUM SERPL-SCNC: 4.6 MMOL/L (ref 3.5–5.1)
PROT SERPL-MCNC: 7.6 G/DL (ref 6–8.4)
RBC # BLD AUTO: 3.28 M/UL (ref 4.6–6.2)
SODIUM SERPL-SCNC: 143 MMOL/L (ref 136–145)
WBC # BLD AUTO: 5.43 K/UL (ref 3.9–12.7)

## 2023-12-13 PROCEDURE — 36415 COLL VENOUS BLD VENIPUNCTURE: CPT

## 2023-12-13 PROCEDURE — 99232 PR SUBSEQUENT HOSPITAL CARE,LEVL II: ICD-10-PCS | Mod: ,,, | Performed by: NURSE PRACTITIONER

## 2023-12-13 PROCEDURE — 21400001 HC TELEMETRY ROOM

## 2023-12-13 PROCEDURE — 25000003 PHARM REV CODE 250

## 2023-12-13 PROCEDURE — 80053 COMPREHEN METABOLIC PANEL: CPT

## 2023-12-13 PROCEDURE — 99232 SBSQ HOSP IP/OBS MODERATE 35: CPT | Mod: ,,, | Performed by: NURSE PRACTITIONER

## 2023-12-13 PROCEDURE — 85025 COMPLETE CBC W/AUTO DIFF WBC: CPT

## 2023-12-13 PROCEDURE — 83735 ASSAY OF MAGNESIUM: CPT

## 2023-12-13 PROCEDURE — 25000003 PHARM REV CODE 250: Performed by: STUDENT IN AN ORGANIZED HEALTH CARE EDUCATION/TRAINING PROGRAM

## 2023-12-13 PROCEDURE — 84100 ASSAY OF PHOSPHORUS: CPT

## 2023-12-13 PROCEDURE — 63600175 PHARM REV CODE 636 W HCPCS: Performed by: RADIOLOGY

## 2023-12-13 PROCEDURE — 63600175 PHARM REV CODE 636 W HCPCS

## 2023-12-13 RX ORDER — MIDAZOLAM HYDROCHLORIDE 1 MG/ML
INJECTION INTRAMUSCULAR; INTRAVENOUS
Status: COMPLETED | OUTPATIENT
Start: 2023-12-13 | End: 2023-12-13

## 2023-12-13 RX ORDER — FENTANYL CITRATE 50 UG/ML
INJECTION, SOLUTION INTRAMUSCULAR; INTRAVENOUS
Status: COMPLETED | OUTPATIENT
Start: 2023-12-13 | End: 2023-12-13

## 2023-12-13 RX ORDER — OXYCODONE HYDROCHLORIDE 5 MG/1
5 TABLET ORAL ONCE
Status: COMPLETED | OUTPATIENT
Start: 2023-12-13 | End: 2023-12-13

## 2023-12-13 RX ORDER — OXYCODONE HYDROCHLORIDE 5 MG/1
TABLET ORAL
Status: DISPENSED
Start: 2023-12-13 | End: 2023-12-14

## 2023-12-13 RX ADMIN — OXYCODONE HYDROCHLORIDE 5 MG: 5 TABLET ORAL at 03:12

## 2023-12-13 RX ADMIN — CARVEDILOL 12.5 MG: 12.5 TABLET, FILM COATED ORAL at 05:12

## 2023-12-13 RX ADMIN — CARVEDILOL 12.5 MG: 12.5 TABLET, FILM COATED ORAL at 08:12

## 2023-12-13 RX ADMIN — SEVELAMER CARBONATE 1600 MG: 800 TABLET, FILM COATED ORAL at 08:12

## 2023-12-13 RX ADMIN — FENTANYL CITRATE 50 MCG: 0.05 INJECTION, SOLUTION INTRAMUSCULAR; INTRAVENOUS at 11:12

## 2023-12-13 RX ADMIN — SEVELAMER CARBONATE 1600 MG: 800 TABLET, FILM COATED ORAL at 05:12

## 2023-12-13 RX ADMIN — FENTANYL CITRATE 50 MCG: 0.05 INJECTION, SOLUTION INTRAMUSCULAR; INTRAVENOUS at 12:12

## 2023-12-13 RX ADMIN — MIDAZOLAM HYDROCHLORIDE 1 MG: 2 INJECTION, SOLUTION INTRAMUSCULAR; INTRAVENOUS at 12:12

## 2023-12-13 RX ADMIN — MIDAZOLAM HYDROCHLORIDE 1 MG: 2 INJECTION, SOLUTION INTRAMUSCULAR; INTRAVENOUS at 11:12

## 2023-12-13 RX ADMIN — ACETAMINOPHEN 650 MG: 325 TABLET ORAL at 05:12

## 2023-12-13 RX ADMIN — HEPARIN SODIUM 5000 UNITS: 5000 INJECTION INTRAVENOUS; SUBCUTANEOUS at 09:12

## 2023-12-13 RX ADMIN — FUROSEMIDE 80 MG: 20 TABLET ORAL at 08:12

## 2023-12-13 RX ADMIN — SODIUM BICARBONATE 650 MG TABLET 1300 MG: at 09:12

## 2023-12-13 RX ADMIN — SODIUM BICARBONATE 650 MG TABLET 1300 MG: at 08:12

## 2023-12-13 RX ADMIN — NIFEDIPINE 60 MG: 30 TABLET, FILM COATED, EXTENDED RELEASE ORAL at 08:12

## 2023-12-13 RX ADMIN — POLYETHYLENE GLYCOL 3350 17 G: 17 POWDER, FOR SOLUTION ORAL at 08:12

## 2023-12-13 RX ADMIN — TAMSULOSIN HYDROCHLORIDE 0.4 MG: 0.4 CAPSULE ORAL at 08:12

## 2023-12-13 RX ADMIN — HEPARIN SODIUM 5000 UNITS: 5000 INJECTION INTRAVENOUS; SUBCUTANEOUS at 03:12

## 2023-12-13 RX ADMIN — OXYCODONE HYDROCHLORIDE 5 MG: 5 TABLET ORAL at 12:12

## 2023-12-13 RX ADMIN — NIFEDIPINE 60 MG: 30 TABLET, FILM COATED, EXTENDED RELEASE ORAL at 09:12

## 2023-12-13 RX ADMIN — HEPARIN SODIUM 5000 UNITS: 5000 INJECTION INTRAVENOUS; SUBCUTANEOUS at 05:12

## 2023-12-13 RX ADMIN — FUROSEMIDE 80 MG: 20 TABLET ORAL at 09:12

## 2023-12-13 RX ADMIN — CEFAZOLIN 1 G: 1 INJECTION, POWDER, FOR SOLUTION INTRAMUSCULAR; INTRAVENOUS at 03:12

## 2023-12-13 RX ADMIN — ATORVASTATIN CALCIUM 20 MG: 20 TABLET, FILM COATED ORAL at 09:12

## 2023-12-13 RX ADMIN — SEVELAMER CARBONATE 1600 MG: 800 TABLET, FILM COATED ORAL at 03:12

## 2023-12-13 NOTE — PROGRESS NOTES
"Patient seen for wound care follow-up / wound vac placement.   Reviewed chart for this encounter.   See Flow Sheet for findings.    Pt sitting up in bed with father at bedside. Pt agreed to vac placement. While WC was present the pt home vac arrived.   Removed ACE wrap, cast padding, 4x4 gauze, Enluxtra from wound bed. Cleansed wound with Vashe, Cavilon to periwound, "window pane" periwound, applied two pieces of white foam to wound bed, mushroom cap applied, good seal, no leaks detected, 125mmHg continuous. Plain Mepilex foam applied with slit in to the center to protect pt skin, cast padding from hand to mid-bicept, secure with ACE wrap.  Pt educated that if the vac is not properly functioning for two hours and it cannot be fixed, the vac must be removed and replaced with wet to dry dressing until pt can have vac re-applied, pt and father voiced understanding.     RECOMMENDATIONS:  Change NPWT two time weekly.     Discussed POC with patient and primary nurse.   See EMR for orders & patient education.    Discussed nutrition and the role of protein in wound healing with the patient. Instructed patient to optimize protein for wound healing.    Bedside nursing to continue care & monitoring.  Bedside nursing to maintain pressure injury prevention interventions.       12/13/23 1500        Negative Pressure Wound Therapy  12/13/23 1500 Left anterior;upper   Placement Date/Time: 12/13/23 1500   Side: Left  Orientation: anterior;upper  Location: Arm   NPWT Type Vacuum Therapy   Therapy Setting NPWT Continuous therapy   Pressure Setting NPWT 125 mmHg   Sponges Inserted NPWT 2;White     "

## 2023-12-13 NOTE — PROGRESS NOTES
[facility] ([address] Medicine  Progress Note    Patient Name: Elbert Still Jr.  MRN: [id number]  Patient Class: IP- Inpatient   Admission Date: 12/8/2023  Length of Stay: 4 days  Attending Physician: Mookie Acuña, *  Primary Care Provider: Angel Luis Boo MD        Subjective:     Principal Problem:Bacteremia        HPI:  Elbert Still is a 68 y.o. Male with ESRD on HD (MWF), HTN, and T2DM s/p multiple surgeries for AV fistula (most recent 2 weeks ago) who presents for 1 week of pain, swelling, erythema, and warmth to his LUE fistula site. He tells me that today (12/8) his nephrologist got blood culture results that were drawn on 12/6 that were positive for gram positive cocci in clusters and chains in 2/2 bottles, and told him to present to the ED for IV antibiotics. He does not currently get HD through the AV fistula in question, he has a tunneled catheter. He reports he is in the process of being evaluated for kidney transplant.    The patient reports additional symptoms of mild cough/runny nose that he has had for about a week. These symptoms are similar to some his wife had about 2 weeks ago. He denies fevers, chills, SOB, N/V, abdominal pain, headaches, lightheadedness, or urinary symptoms.    In the ED, he was hypertensive on arrival but otherwise stable. CBC with no leukocytosis, HGB 11.3. CMP with BUN/Cr 34/6.0. Lactate 1.11. US HD access with concerns for hematoma vs abscess with soft tissue changes suggestive of inflammation/infection. Vascular surgery was consulted in the ED and recommended admission to Hospital Medicine to continue IV antibiotics and to keep NPO for possible debridement in the morning.    Overview/Hospital Course:  Patient admitted with concerns for AV fistula infection and bacteremia. Underwent removal of graft per vascular surgery. Currently on vancomycin and Zosyn for bacteremia pending full speciation of cultures    Interval History:    Tunneled catheter with interventional nephrology tomorrow. NPO at midnight. IV Ancef should be given every day. Electrolytes stable, okay to miss Wednesday HD per nephro.     Review of Systems  Objective:     Vital Signs (Most Recent):  Temp: 98 °F (36.7 °C) (12/13/23 0801)  Pulse: 79 (12/13/23 1147)  Resp: 18 (12/13/23 1147)  BP: (!) 192/93 (12/13/23 1147)  SpO2: 100 % (12/13/23 1147) Vital Signs (24h Range):  Temp:  [98 °F (36.7 °C)-98.5 °F (36.9 °C)] 98 °F (36.7 °C)  Pulse:  [69-81] 79  Resp:  [18-19] 18  SpO2:  [96 %-100 %] 100 %  BP: (153-192)/(80-93) 192/93     Weight: 97.5 kg (215 lb)  Body mass index is 27.6 kg/m².    Intake/Output Summary (Last 24 hours) at 12/13/2023 1157  Last data filed at 12/13/2023 0558  Gross per 24 hour   Intake --   Output 2350 ml   Net -2350 ml         Physical Exam  Constitutional:       Appearance: Normal appearance. He is not toxic-appearing.   HENT:      Head: Normocephalic and atraumatic.   Eyes:      Extraocular Movements: Extraocular movements intact.      Pupils: Pupils are equal, round, and reactive to light.   Cardiovascular:      Rate and Rhythm: Normal rate and regular rhythm.   Pulmonary:      Effort: Pulmonary effort is normal. No respiratory distress.      Breath sounds: Normal breath sounds.   Abdominal:      Palpations: Abdomen is soft.      Tenderness: There is no abdominal tenderness.   Musculoskeletal:      Right lower leg: No edema.      Left lower leg: No edema.      Comments: RUE wrapped/bandaged   Neurological:      General: No focal deficit present.      Mental Status: He is alert and oriented to person, place, and time.   Psychiatric:         Mood and Affect: Mood normal.         Behavior: Behavior normal.             Significant Labs: All pertinent labs within the past 24 hours have been reviewed.    Significant Imaging: I have reviewed all pertinent imaging results/findings within the past 24 hours.    Assessment/Plan:      * Bacteremia  68 y.o. Male  s/p multiple surgeries for AV fistula (most recent 2 weeks ago) who presents for 1 week of pain, swelling, erythema, and warmth to his LUE fistula site.   12/6 Blood Cultures positive for gram positive cocci in clusters and chains in 2/2 bottles  US HD access with concerns for hematoma vs abscess with soft tissue changes suggestive of inflammation/infection.   Vascular surgery was consulted in the ED and recommended admission to Hospital Medicine to continue IV antibiotics and to keep NPO for possible debridement in the morning.    Plan:  - Vascular surgery consulted, appreciate recs  - ID consulted, appreciate recs  - Vancomycin/Zosyn  - Repeat cx ngtd  - Removal of tunneled catheter by IR 12/11  - Planning for tunneled placement 12/12 with interventional nephro  - IV Ancef every day, plan after HD on HD days      Type 2 diabetes mellitus  Patient's FSGs are controlled on no home medications  Last A1c reviewed-   Lab Results   Component Value Date    HGBA1C 4.4 (L) 10/04/2023     Most recent fingerstick glucose reviewed-   Recent Labs   Lab 12/12/23  1554 12/12/23  2335   POCTGLUCOSE 101 200*       Current correctional scale  Low  Maintain anti-hyperglycemic dose as follows-   Antihyperglycemics (From admission, onward)      Start     Stop Route Frequency Ordered    12/09/23 0832  insulin aspart U-100 pen 0-5 Units         -- SubQ Every 6 hours PRN 12/09/23 0732          Hold Oral hypoglycemics while patient is in the hospital.    Arteriovenous fistula  Status post removal of AV fistula graft 12/10 by vascular in the setting of infection  Afebrile, bcx ngtd  Wound care, wound vac  HH on discharge    ESRD on dialysis  Creatine stable for now. BMP reviewed- noted Estimated Creatinine Clearance: 9.8 mL/min (A) (based on SCr of 8.6 mg/dL (H)). according to latest data. Based on current GFR, CKD stage is end stage.  Monitor UOP and serial BMP and adjust therapy as needed. Renally dose meds. Avoid nephrotoxic medications  and procedures.    Nephrology consulted for HD    Anemia of chronic disease  Patient's anemia is currently controlled. Has not received any PRBCs to date. Etiology likely d/t chronic disease due to Chronic Kidney Disease/ESRD  Current CBC reviewed-   Lab Results   Component Value Date    HGB 9.7 (L) 12/11/2023    HCT 29.0 (L) 12/11/2023     Monitor serial CBC and transfuse if patient becomes hemodynamically unstable, symptomatic or H/H drops below 7/21.    Mixed hyperlipidemia  - continue home atorvastatin      -Essential (primary) hypertension  Temp:  [98 °F (36.7 °C)-98.5 °F (36.9 °C)]   Pulse:  [69-81]   Resp:  [18-19]   BP: (153-192)/(80-93)   SpO2:  [96 %-100 %] .   Home meds for hypertension were reviewed and noted below.   Hypertension Medications               carvediloL (COREG) 12.5 MG tablet Take 1 tablet (12.5 mg total) by mouth 2 (two) times daily.    furosemide (LASIX) 80 MG tablet TAKE 1 TABLET(80 MG) BY MOUTH TWICE DAILY    NIFEdipine (PROCARDIA-XL) 60 MG (OSM) 24 hr tablet Take 1 tablet (60 mg total) by mouth 2 (two) times a day.          Plan:  - continue coreg, nifedipine  - hold lasix pending possible procedure, resume when appropriate  - lasix resumed       VTE Risk Mitigation (From admission, onward)           Ordered     heparin (porcine) injection 5,000 Units  Every 8 hours         12/11/23 1550     IP VTE HIGH RISK PATIENT  Once         12/09/23 0731     Place sequential compression device  Until discontinued         12/09/23 0731                    Discharge Planning   HORACIO: 12/15/2023     Code Status: Full Code   Is the patient medically ready for discharge?: No    Reason for patient still in hospital (select all that apply): Patient trending condition  Discharge Plan A: Home                  Alejandrina Carmichael DO  Department of Hospital Medicine   [facility] ([place])

## 2023-12-13 NOTE — PROGRESS NOTES
[facility] ([place] [address])  Nephrology  Progress Note    Patient Name: Elbert Still Jr.  MRN: [id number]  Admission Date: 12/8/2023  Hospital Length of Stay: 4 days  Attending Provider: Mookie Acuña, *   Primary Care Physician: Angel Luis Boo MD  Principal Problem:Bacteremia    Subjective:     Interval History: Continues on line holiday. Plan for TDC 12/14. Electrolytes stable. Oxygenating well on RA.     Review of patient's allergies indicates:   Allergen Reactions    Iodine and iodide containing products Hives     Hypotension, Flushing     Current Facility-Administered Medications   Medication Frequency    acetaminophen tablet 650 mg Q6H    atorvastatin tablet 20 mg QHS    carvediloL tablet 12.5 mg BID WM    ceFAZolin (ANCEF) 1 g in dextrose 5 % in water (D5W) 50 mL IVPB (MB+) Daily    dextrose 10% bolus 125 mL 125 mL PRN    dextrose 10% bolus 250 mL 250 mL PRN    furosemide tablet 80 mg BID    glucagon (human recombinant) injection 1 mg PRN    glucose chewable tablet 16 g PRN    glucose chewable tablet 24 g PRN    heparin (porcine) injection 5,000 Units Q8H    insulin aspart U-100 pen 0-5 Units Q6H PRN    naloxone 0.4 mg/mL injection 0.02 mg PRN    NIFEdipine 24 hr tablet 60 mg BID    oxyCODONE immediate release tablet 5 mg Q6H PRN    polyethylene glycol packet 17 g Daily    senna tablet 8.6 mg Daily PRN    sevelamer carbonate tablet 1,600 mg TID WM    sodium bicarbonate tablet 1,300 mg BID    sodium chloride 0.9% flush 10 mL Q12H PRN    sodium chloride 0.9% flush 10 mL PRN    tamsulosin 24 hr capsule 0.4 mg Daily     Facility-Administered Medications Ordered in Other Encounters   Medication Frequency    0.9%  NaCl infusion Continuous    0.9%  NaCl infusion Continuous    ondansetron disintegrating tablet 8 mg Q8H PRN       Objective:     Vital Signs (Most Recent):  Temp: 98 °F (36.7 °C) (12/13/23 0801)  Pulse: 69 (12/13/23 0801)  Resp: 18 (12/13/23 0801)  BP: (!) 166/86 (12/13/23  0801)  SpO2: 96 % (12/13/23 0801) Vital Signs (24h Range):  Temp:  [98 °F (36.7 °C)-98.5 °F (36.9 °C)] 98 °F (36.7 °C)  Pulse:  [64-81] 69  Resp:  [18-19] 18  SpO2:  [96 %-100 %] 96 %  BP: (153-183)/(80-88) 166/86     Weight: 97.5 kg (215 lb) (12/12/23 0835)  Body mass index is 27.6 kg/m².  Body surface area is 2.26 meters squared.    I/O last 3 completed shifts:  In: -   Out: 2350 [Urine:2350]     Physical Exam  Vitals and nursing note reviewed.   Constitutional:       Appearance: Normal appearance.   Cardiovascular:      Rate and Rhythm: Normal rate.   Pulmonary:      Effort: Pulmonary effort is normal.   Abdominal:      Palpations: Abdomen is soft.   Musculoskeletal:         General: No swelling.      Right lower leg: No edema.      Left lower leg: No edema.   Neurological:      General: No focal deficit present.      Mental Status: He is alert. Mental status is at baseline.   Psychiatric:         Mood and Affect: Mood normal.         Behavior: Behavior normal.          Significant Labs:  CBC:   Recent Labs   Lab 12/13/23  0541   WBC 5.43   RBC 3.28*   HGB 9.7*   HCT 28.4*      MCV 87   MCH 29.6   MCHC 34.2     CMP:   Recent Labs   Lab 12/13/23  0541      CALCIUM 9.5   ALBUMIN 3.6   PROT 7.6      K 4.6   CO2 23      BUN 53*   CREATININE 8.6*   ALKPHOS 118   ALT 13   AST 22   BILITOT 0.2     All labs within the past 24 hours have been reviewed.       Assessment/Plan:     Renal/  ESRD on dialysis  68 y.o. Black or  Male ESRD-HD M-W-F presents to ED on 12/8/2023 with diagnosis of: bacteremia.      Outpatient HD Information:  -Dialysis modality: Hemodialysis  -Outpatient HD unit: [facility]  -Nephrologist: Luke KLINE  -HD TX days: Monday/Wednesday/Friday, duration of treatment: 3hrs  -Last HD TX prior to hospital admission: 12/08  -Dialysis access: dialysis catheter R CVC and L AVF (infected)  -Residual urine: Minium  -EDW: 97 kg     Plan:     No acute indication for RRT,  electrolytes and volume status stable   1.5L fluid restriction   Strict I&Os  Plan for HD tomorrow 12/14 following TDC placement   Shift for K >6.0 and notify nephrology on call         ID  * Bacteremia  - defer to primary and consult teams   -Line holiday,plan for TDC 12/14 if cultures neg         Thank you for your consult. I will follow-up with patient. Please contact us if you have any additional questions.    Candice Juarez DNP  Nephrology  [facility] ([place])

## 2023-12-13 NOTE — SUBJECTIVE & OBJECTIVE
Interval History: Continues on line holiday. Plan for TDC 12/14. Electrolytes stable. Oxygenating well on RA.     Review of patient's allergies indicates:   Allergen Reactions    Iodine and iodide containing products Hives     Hypotension, Flushing     Current Facility-Administered Medications   Medication Frequency    acetaminophen tablet 650 mg Q6H    atorvastatin tablet 20 mg QHS    carvediloL tablet 12.5 mg BID WM    ceFAZolin (ANCEF) 1 g in dextrose 5 % in water (D5W) 50 mL IVPB (MB+) Daily    dextrose 10% bolus 125 mL 125 mL PRN    dextrose 10% bolus 250 mL 250 mL PRN    furosemide tablet 80 mg BID    glucagon (human recombinant) injection 1 mg PRN    glucose chewable tablet 16 g PRN    glucose chewable tablet 24 g PRN    heparin (porcine) injection 5,000 Units Q8H    insulin aspart U-100 pen 0-5 Units Q6H PRN    naloxone 0.4 mg/mL injection 0.02 mg PRN    NIFEdipine 24 hr tablet 60 mg BID    oxyCODONE immediate release tablet 5 mg Q6H PRN    polyethylene glycol packet 17 g Daily    senna tablet 8.6 mg Daily PRN    sevelamer carbonate tablet 1,600 mg TID WM    sodium bicarbonate tablet 1,300 mg BID    sodium chloride 0.9% flush 10 mL Q12H PRN    sodium chloride 0.9% flush 10 mL PRN    tamsulosin 24 hr capsule 0.4 mg Daily     Facility-Administered Medications Ordered in Other Encounters   Medication Frequency    0.9%  NaCl infusion Continuous    0.9%  NaCl infusion Continuous    ondansetron disintegrating tablet 8 mg Q8H PRN       Objective:     Vital Signs (Most Recent):  Temp: 98 °F (36.7 °C) (12/13/23 0801)  Pulse: 69 (12/13/23 0801)  Resp: 18 (12/13/23 0801)  BP: (!) 166/86 (12/13/23 0801)  SpO2: 96 % (12/13/23 0801) Vital Signs (24h Range):  Temp:  [98 °F (36.7 °C)-98.5 °F (36.9 °C)] 98 °F (36.7 °C)  Pulse:  [64-81] 69  Resp:  [18-19] 18  SpO2:  [96 %-100 %] 96 %  BP: (153-183)/(80-88) 166/86     Weight: 97.5 kg (215 lb) (12/12/23 0820)  Body mass index is 27.6 kg/m².  Body surface area is 2.26 meters  squared.    I/O last 3 completed shifts:  In: -   Out: 2350 [Urine:2350]     Physical Exam  Vitals and nursing note reviewed.   Constitutional:       Appearance: Normal appearance.   Cardiovascular:      Rate and Rhythm: Normal rate.   Pulmonary:      Effort: Pulmonary effort is normal.   Abdominal:      Palpations: Abdomen is soft.   Musculoskeletal:         General: No swelling.      Right lower leg: No edema.      Left lower leg: No edema.   Neurological:      General: No focal deficit present.      Mental Status: He is alert. Mental status is at baseline.   Psychiatric:         Mood and Affect: Mood normal.         Behavior: Behavior normal.          Significant Labs:  CBC:   Recent Labs   Lab 12/13/23  0541   WBC 5.43   RBC 3.28*   HGB 9.7*   HCT 28.4*      MCV 87   MCH 29.6   MCHC 34.2     CMP:   Recent Labs   Lab 12/13/23  0541      CALCIUM 9.5   ALBUMIN 3.6   PROT 7.6      K 4.6   CO2 23      BUN 53*   CREATININE 8.6*   ALKPHOS 118   ALT 13   AST 22   BILITOT 0.2     All labs within the past 24 hours have been reviewed.

## 2023-12-13 NOTE — ASSESSMENT & PLAN NOTE
Patient's FSGs are controlled on no home medications  Last A1c reviewed-   Lab Results   Component Value Date    HGBA1C 4.4 (L) 10/04/2023     Most recent fingerstick glucose reviewed-   Recent Labs   Lab 12/12/23  1554 12/12/23  2335   POCTGLUCOSE 101 200*       Current correctional scale  Low  Maintain anti-hyperglycemic dose as follows-   Antihyperglycemics (From admission, onward)    Start     Stop Route Frequency Ordered    12/09/23 0832  insulin aspart U-100 pen 0-5 Units         -- SubQ Every 6 hours PRN 12/09/23 0732        Hold Oral hypoglycemics while patient is in the hospital.

## 2023-12-13 NOTE — PLAN OF CARE
Problem: Fluid and Electrolyte Imbalance (Acute Kidney Injury/Impairment)  Goal: Fluid and Electrolyte Balance  Outcome: Ongoing, Progressing     Problem: Renal Function Impairment (Acute Kidney Injury/Impairment)  Goal: Effective Renal Function  Outcome: Ongoing, Progressing  No bleeding noted on L.arm. Dressing CDI. Pt c/o 8/10 pain. MD notified and ordered one time dose oxy 5mg. Ancef given. Wound vac in place. Bed locked and in lowest position. Call light in reach.

## 2023-12-13 NOTE — ASSESSMENT & PLAN NOTE
68 y.o. Black or  Male ESRD-HD M-W-F presents to ED on 12/8/2023 with diagnosis of: bacteremia.      Outpatient HD Information:  -Dialysis modality: Hemodialysis  -Outpatient HD unit: [facility]  -Nephrologist: Luke KLINE  -HD TX days: Monday/Wednesday/Friday, duration of treatment: 3hrs  -Last HD TX prior to hospital admission: 12/08  -Dialysis access: dialysis catheter R CVC and L AVF (infected)  -Residual urine: Minium  -EDW: 97 kg     Plan:     No acute indication for RRT, electrolytes and volume status stable   1.5L fluid restriction   Strict I&Os  Plan for HD tomorrow 12/14 following TDC placement   Shift for K >6.0 and notify nephrology on call

## 2023-12-13 NOTE — ASSESSMENT & PLAN NOTE
68 y.o. Male s/p multiple surgeries for AV fistula (most recent 2 weeks ago) who presents for 1 week of pain, swelling, erythema, and warmth to his LUE fistula site.   12/6 Blood Cultures positive for gram positive cocci in clusters and chains in 2/2 bottles  US HD access with concerns for hematoma vs abscess with soft tissue changes suggestive of inflammation/infection.   Vascular surgery was consulted in the ED and recommended admission to Hospital Medicine to continue IV antibiotics and to keep NPO for possible debridement in the morning.    Plan:  - Vascular surgery consulted, appreciate recs  - ID consulted, appreciate recs  - Vancomycin/Zosyn  - Repeat cx ngtd  - Removal of tunneled catheter by IR 12/11  - Planning for tunneled placement 12/12 with interventional nephro  - IV Ancef every day, plan after HD on HD days

## 2023-12-13 NOTE — ASSESSMENT & PLAN NOTE
Status post removal of AV fistula graft 12/10 by vascular in the setting of infection  Afebrile, bcx ngtd  Wound care, wound vac  HH on discharge

## 2023-12-13 NOTE — ASSESSMENT & PLAN NOTE
Creatine stable for now. BMP reviewed- noted Estimated Creatinine Clearance: 9.8 mL/min (A) (based on SCr of 8.6 mg/dL (H)). according to latest data. Based on current GFR, CKD stage is end stage.  Monitor UOP and serial BMP and adjust therapy as needed. Renally dose meds. Avoid nephrotoxic medications and procedures.    Nephrology consulted for HD

## 2023-12-13 NOTE — PLAN OF CARE
PLAN OF CARE UPDATE     68 M with left upper extremity brachiobrachial arteriovenous fistula (2 stage) in 9/2022 s/p multiple PTA then on 11/9/2023 inability to cannulate for fistula gram and ended up having to do a graft interposition now presents with concerns for graft infection w/ positive blood cultures growing gram + Cocci.    He is now s/p irrigation and debridement LUE with graft excision 12/10.    -Keep arm wrapped with ace and elevated   -Asa, statin  -Plans for TDC [facility] 12/14  -Continue wound care recs   -Continue ID recs for abx (cefazolin w HD)  -Follow up info sent to clinic with Dr. Rayo in one week with vein mapping  -Vascular surgery will continue to follow up peripherally, please reach out with any questions

## 2023-12-13 NOTE — PLAN OF CARE
CM called [facility] [phone], LVM requesting fax number and call back.    11:59 AM  CM called [facility], spoke with Carole; they are not able to accept any new patients at this time d/t they don't have a nurse.    12:04 PM  CM called [facility] [phone], spoke with Clotilde to see if they rec'd fax and if they could see this pt.  They did not - CM refaxed referral to [phone].    12:46 PM  Per Clotilde, they rec'd fax, their director will look at it and contact the pt.    2:39 PM  CM called [facility], spoke with Gabriela, who states they have accepted pt, plan to see him on Friday.    CM sent HI referral to [facility] via CP.    Nikkie Simpson, LENN, BS, RN, CCM

## 2023-12-13 NOTE — ASSESSMENT & PLAN NOTE
Temp:  [98 °F (36.7 °C)-98.5 °F (36.9 °C)]   Pulse:  [69-81]   Resp:  [18-19]   BP: (153-192)/(80-93)   SpO2:  [96 %-100 %] .   Home meds for hypertension were reviewed and noted below.   Hypertension Medications               carvediloL (COREG) 12.5 MG tablet Take 1 tablet (12.5 mg total) by mouth 2 (two) times daily.    furosemide (LASIX) 80 MG tablet TAKE 1 TABLET(80 MG) BY MOUTH TWICE DAILY    NIFEdipine (PROCARDIA-XL) 60 MG (OSM) 24 hr tablet Take 1 tablet (60 mg total) by mouth 2 (two) times a day.          Plan:  - continue coreg, nifedipine  - hold lasix pending possible procedure, resume when appropriate  - lasix resumed

## 2023-12-13 NOTE — SUBJECTIVE & OBJECTIVE
Interval History:   Tunneled catheter with interventional nephrology tomorrow. NPO at midnight. IV Ancef should be given every day. Electrolytes stable, okay to miss Wednesday HD per nephro.     Review of Systems  Objective:     Vital Signs (Most Recent):  Temp: 98 °F (36.7 °C) (12/13/23 0801)  Pulse: 79 (12/13/23 1147)  Resp: 18 (12/13/23 1147)  BP: (!) 192/93 (12/13/23 1147)  SpO2: 100 % (12/13/23 1147) Vital Signs (24h Range):  Temp:  [98 °F (36.7 °C)-98.5 °F (36.9 °C)] 98 °F (36.7 °C)  Pulse:  [69-81] 79  Resp:  [18-19] 18  SpO2:  [96 %-100 %] 100 %  BP: (153-192)/(80-93) 192/93     Weight: 97.5 kg (215 lb)  Body mass index is 27.6 kg/m².    Intake/Output Summary (Last 24 hours) at 12/13/2023 1157  Last data filed at 12/13/2023 0558  Gross per 24 hour   Intake --   Output 2350 ml   Net -2350 ml         Physical Exam  Constitutional:       Appearance: Normal appearance. He is not toxic-appearing.   HENT:      Head: Normocephalic and atraumatic.   Eyes:      Extraocular Movements: Extraocular movements intact.      Pupils: Pupils are equal, round, and reactive to light.   Cardiovascular:      Rate and Rhythm: Normal rate and regular rhythm.   Pulmonary:      Effort: Pulmonary effort is normal. No respiratory distress.      Breath sounds: Normal breath sounds.   Abdominal:      Palpations: Abdomen is soft.      Tenderness: There is no abdominal tenderness.   Musculoskeletal:      Right lower leg: No edema.      Left lower leg: No edema.      Comments: RUE wrapped/bandaged   Neurological:      General: No focal deficit present.      Mental Status: He is alert and oriented to person, place, and time.   Psychiatric:         Mood and Affect: Mood normal.         Behavior: Behavior normal.             Significant Labs: All pertinent labs within the past 24 hours have been reviewed.    Significant Imaging: I have reviewed all pertinent imaging results/findings within the past 24 hours.

## 2023-12-13 NOTE — PROCEDURES
VIR procedure note    Pre Op Diagnosis: ESRD  Post Op Diagnosis: Same    Procedure: Tunneled HD cath placement    Procedure performed by:  Thuan Acevedo MD / Duane Mayo MD    Written Informed Consent Obtained: Yes  Specimen Removed: NO  Estimated Blood Loss: Minimal    Findings:   Successful placement of  23 cm 15.5 Fr tunneled HD catheter with the tip in the right atrium    Patient tolerated procedure well.     Recommendations:    Catheter can be used immediately.    Thuan Acevedo MD  PGY-5 Radiology Resident

## 2023-12-13 NOTE — PLAN OF CARE
Problem: Adult Inpatient Plan of Care  Goal: Plan of Care Review  Outcome: Ongoing, Progressing  Goal: Patient-Specific Goal (Individualized)  Outcome: Ongoing, Progressing  Goal: Absence of Hospital-Acquired Illness or Injury  Outcome: Ongoing, Progressing  Goal: Optimal Comfort and Wellbeing  Outcome: Ongoing, Progressing  Goal: Readiness for Transition of Care  Outcome: Ongoing, Progressing   Pt did  not receive abx overnight due to order stating after HD on HD days only. Please clarify as pt does not have a line yet and abx is scheduled daily for 9pm. Pt urine out pt recorded. Pt hypertensive overnight no symptoms.

## 2023-12-13 NOTE — SEDATION DOCUMENTATION
HD Cath insertion complete. Pt tolerated well with no signs of distress noted. Dressing to site is clean, dry, and intact. Pt will be transported to [facility] for 1 hour recovery before returning to unit.

## 2023-12-14 LAB
ALBUMIN SERPL BCP-MCNC: 3.3 G/DL (ref 3.5–5.2)
ALP SERPL-CCNC: 104 U/L (ref 55–135)
ALT SERPL W/O P-5'-P-CCNC: 7 U/L (ref 10–44)
ANION GAP SERPL CALC-SCNC: 8 MMOL/L (ref 8–16)
AST SERPL-CCNC: 18 U/L (ref 10–40)
BACTERIA BLD CULT: NORMAL
BACTERIA BLD CULT: NORMAL
BACTERIA SPEC AEROBE CULT: NO GROWTH
BASOPHILS # BLD AUTO: 0.02 K/UL (ref 0–0.2)
BASOPHILS NFR BLD: 0.4 % (ref 0–1.9)
BILIRUB SERPL-MCNC: 0.3 MG/DL (ref 0.1–1)
BUN SERPL-MCNC: 58 MG/DL (ref 8–23)
CALCIUM SERPL-MCNC: 9.3 MG/DL (ref 8.7–10.5)
CHLORIDE SERPL-SCNC: 107 MMOL/L (ref 95–110)
CO2 SERPL-SCNC: 23 MMOL/L (ref 23–29)
CREAT SERPL-MCNC: 9.2 MG/DL (ref 0.5–1.4)
DIFFERENTIAL METHOD: ABNORMAL
EOSINOPHIL # BLD AUTO: 0.2 K/UL (ref 0–0.5)
EOSINOPHIL NFR BLD: 4.3 % (ref 0–8)
ERYTHROCYTE [DISTWIDTH] IN BLOOD BY AUTOMATED COUNT: 14.3 % (ref 11.5–14.5)
EST. GFR  (NO RACE VARIABLE): 5.8 ML/MIN/1.73 M^2
GLUCOSE SERPL-MCNC: 95 MG/DL (ref 70–110)
HCT VFR BLD AUTO: 26 % (ref 40–54)
HGB BLD-MCNC: 8.7 G/DL (ref 14–18)
IMM GRANULOCYTES # BLD AUTO: 0.01 K/UL (ref 0–0.04)
IMM GRANULOCYTES NFR BLD AUTO: 0.2 % (ref 0–0.5)
LYMPHOCYTES # BLD AUTO: 1.1 K/UL (ref 1–4.8)
LYMPHOCYTES NFR BLD: 23.4 % (ref 18–48)
MAGNESIUM SERPL-MCNC: 2.1 MG/DL (ref 1.6–2.6)
MCH RBC QN AUTO: 29.1 PG (ref 27–31)
MCHC RBC AUTO-ENTMCNC: 33.5 G/DL (ref 32–36)
MCV RBC AUTO: 87 FL (ref 82–98)
MONOCYTES # BLD AUTO: 0.6 K/UL (ref 0.3–1)
MONOCYTES NFR BLD: 12.1 % (ref 4–15)
NEUTROPHILS # BLD AUTO: 2.9 K/UL (ref 1.8–7.7)
NEUTROPHILS NFR BLD: 59.6 % (ref 38–73)
NRBC BLD-RTO: 0 /100 WBC
PHOSPHATE SERPL-MCNC: 5.4 MG/DL (ref 2.7–4.5)
PLATELET # BLD AUTO: 244 K/UL (ref 150–450)
PMV BLD AUTO: 10.8 FL (ref 9.2–12.9)
POCT GLUCOSE: 136 MG/DL (ref 70–110)
POCT GLUCOSE: 141 MG/DL (ref 70–110)
POCT GLUCOSE: 163 MG/DL (ref 70–110)
POTASSIUM SERPL-SCNC: 5.1 MMOL/L (ref 3.5–5.1)
PROT SERPL-MCNC: 7 G/DL (ref 6–8.4)
RBC # BLD AUTO: 2.99 M/UL (ref 4.6–6.2)
SODIUM SERPL-SCNC: 138 MMOL/L (ref 136–145)
WBC # BLD AUTO: 4.87 K/UL (ref 3.9–12.7)

## 2023-12-14 PROCEDURE — 90935 HEMODIALYSIS ONE EVALUATION: CPT | Mod: ,,, | Performed by: NURSE PRACTITIONER

## 2023-12-14 PROCEDURE — 25000003 PHARM REV CODE 250: Performed by: STUDENT IN AN ORGANIZED HEALTH CARE EDUCATION/TRAINING PROGRAM

## 2023-12-14 PROCEDURE — 25000003 PHARM REV CODE 250

## 2023-12-14 PROCEDURE — 85025 COMPLETE CBC W/AUTO DIFF WBC: CPT

## 2023-12-14 PROCEDURE — 83735 ASSAY OF MAGNESIUM: CPT

## 2023-12-14 PROCEDURE — 36415 COLL VENOUS BLD VENIPUNCTURE: CPT

## 2023-12-14 PROCEDURE — 84100 ASSAY OF PHOSPHORUS: CPT

## 2023-12-14 PROCEDURE — 63600175 PHARM REV CODE 636 W HCPCS: Performed by: NURSE PRACTITIONER

## 2023-12-14 PROCEDURE — 63600175 PHARM REV CODE 636 W HCPCS: Performed by: STUDENT IN AN ORGANIZED HEALTH CARE EDUCATION/TRAINING PROGRAM

## 2023-12-14 PROCEDURE — 80053 COMPREHEN METABOLIC PANEL: CPT

## 2023-12-14 PROCEDURE — 21400001 HC TELEMETRY ROOM

## 2023-12-14 PROCEDURE — 63600175 PHARM REV CODE 636 W HCPCS

## 2023-12-14 PROCEDURE — 90935 PR HEMODIALYSIS, ONE EVALUATION: ICD-10-PCS | Mod: ,,, | Performed by: NURSE PRACTITIONER

## 2023-12-14 PROCEDURE — 90935 HEMODIALYSIS ONE EVALUATION: CPT

## 2023-12-14 RX ORDER — SODIUM CHLORIDE 9 MG/ML
INJECTION, SOLUTION INTRAVENOUS ONCE
Status: DISCONTINUED | OUTPATIENT
Start: 2023-12-14 | End: 2023-12-15

## 2023-12-14 RX ORDER — SODIUM BICARBONATE 650 MG/1
1300 TABLET ORAL 2 TIMES DAILY
Qty: 120 TABLET | Refills: 0 | Status: SHIPPED | OUTPATIENT
Start: 2023-12-14 | End: 2024-01-13

## 2023-12-14 RX ORDER — HEPARIN SODIUM 1000 [USP'U]/ML
1000 INJECTION, SOLUTION INTRAVENOUS; SUBCUTANEOUS ONCE
Status: COMPLETED | OUTPATIENT
Start: 2023-12-14 | End: 2023-12-14

## 2023-12-14 RX ADMIN — ATORVASTATIN CALCIUM 20 MG: 20 TABLET, FILM COATED ORAL at 08:12

## 2023-12-14 RX ADMIN — SODIUM BICARBONATE 650 MG TABLET 1300 MG: at 02:12

## 2023-12-14 RX ADMIN — NIFEDIPINE 60 MG: 30 TABLET, FILM COATED, EXTENDED RELEASE ORAL at 02:12

## 2023-12-14 RX ADMIN — SODIUM BICARBONATE 650 MG TABLET 1300 MG: at 08:12

## 2023-12-14 RX ADMIN — TAMSULOSIN HYDROCHLORIDE 0.4 MG: 0.4 CAPSULE ORAL at 02:12

## 2023-12-14 RX ADMIN — NIFEDIPINE 60 MG: 30 TABLET, FILM COATED, EXTENDED RELEASE ORAL at 08:12

## 2023-12-14 RX ADMIN — ACETAMINOPHEN 650 MG: 325 TABLET ORAL at 12:12

## 2023-12-14 RX ADMIN — FUROSEMIDE 80 MG: 20 TABLET ORAL at 08:12

## 2023-12-14 RX ADMIN — SEVELAMER CARBONATE 1600 MG: 800 TABLET, FILM COATED ORAL at 08:12

## 2023-12-14 RX ADMIN — POLYETHYLENE GLYCOL 3350 17 G: 17 POWDER, FOR SOLUTION ORAL at 02:12

## 2023-12-14 RX ADMIN — FUROSEMIDE 80 MG: 20 TABLET ORAL at 02:12

## 2023-12-14 RX ADMIN — CEFAZOLIN 1 G: 1 INJECTION, POWDER, FOR SOLUTION INTRAMUSCULAR; INTRAVENOUS at 03:12

## 2023-12-14 RX ADMIN — HEPARIN SODIUM 1000 UNITS: 1000 INJECTION, SOLUTION INTRAVENOUS; SUBCUTANEOUS at 12:12

## 2023-12-14 RX ADMIN — ACETAMINOPHEN 650 MG: 325 TABLET ORAL at 05:12

## 2023-12-14 RX ADMIN — ACETAMINOPHEN 650 MG: 325 TABLET ORAL at 04:12

## 2023-12-14 RX ADMIN — SEVELAMER CARBONATE 1600 MG: 800 TABLET, FILM COATED ORAL at 05:12

## 2023-12-14 RX ADMIN — CARVEDILOL 12.5 MG: 12.5 TABLET, FILM COATED ORAL at 02:12

## 2023-12-14 RX ADMIN — OXYCODONE HYDROCHLORIDE 5 MG: 5 TABLET ORAL at 08:12

## 2023-12-14 RX ADMIN — HEPARIN SODIUM 5000 UNITS: 5000 INJECTION INTRAVENOUS; SUBCUTANEOUS at 05:12

## 2023-12-14 RX ADMIN — OXYCODONE HYDROCHLORIDE 5 MG: 5 TABLET ORAL at 05:12

## 2023-12-14 NOTE — PLAN OF CARE
CM called [facility] [phone] [phone], no answer.  LVM for [place] James requesting call back.    CM faxed ID note to [facility] [phone].             11:49 AM    CM called [facility] x2, no answer at nurses station, unable to leave VM.  LVM with clinic administrator Eric James requesting call back.    12:32 PM  CM called [facility] x2, no answer at nurses station unable to leave VM.  LVM with clinic admin Panama James requesting call back.    Nikkie Simpson, LENN, BS, RN, CCM

## 2023-12-14 NOTE — PLAN OF CARE
[facility] ([place] [address])      HOME HEALTH ORDERS  FACE TO FACE ENCOUNTER    Patient Name: Elbert Still Jr.  YOB: 1955    PCP: Angel Luis Boo MD   PCP Address: [address] [facility] / [place] [address]  PCP Phone Number: [phone]  PCP Fax: [phone]    Encounter Date: 12/8/23    Admit to Home Health    Diagnoses:  Active Hospital Problems    Diagnosis  POA    *Bacteremia [R78.81]  Yes    Type 2 diabetes mellitus [E11.9]  Yes     Chronic    Arteriovenous fistula [I77.0]  Yes    ESRD on dialysis [N18.6, Z99.2]  Not Applicable     Chronic    Anemia of chronic disease [D63.8]  Yes     Chronic    Mixed hyperlipidemia [E78.2]  Yes     Chronic    -Essential (primary) hypertension [I10]  Yes     Chronic      Resolved Hospital Problems   No resolved problems to display.       Follow Up Appointments:  Future Appointments   Date Time Provider Department Center   12/15/2023  6:00 AM CHAIR 12, [place] [facility]   12/18/2023  6:00 AM CHAIR 12, [place] [facility]   12/19/2023  2:00 PM VASCULAR, LAB [facility] LIZETH [facility]   12/19/2023  2:30 PM LETY Rayo III, MD [facility] ZACHERY [facility]   12/20/2023  6:00 AM CHAIR 12, [place] [facility]   12/22/2023  6:00 AM CHAIR 12, [place] [facility]   12/24/2023  6:00 AM CHAIR 16, [facility] [facility]   12/27/2023  6:00 AM CHAIR 12, [facility]   12/29/2023  6:00 AM CHAIR 12, [facility]   12/31/2023  6:00 AM CHAIR 16, [place] [facility]   1/1/2024  6:00 AM CHAIR 15, [place] [facility]   1/2/2024 11:00 AM Parisa Talley MD [facility] ID [address]   1/3/2024  6:00 AM [facility]IR 15[facility] [place] [facility]   1/5/2024  [facility]   1/8/2024  6:00  AM CHAIR 15, [facility] [facility]   1/10/2024  6:00 AM CHAIR 15, [facility] [facility]   1/12/2024  6:00 AM CHAIR 15, [place] [facility]   1/15/2024  6:00 AM CHAIR 15, [place] [facility]   1/17/2024  6:00 AM CHAIR 15, [place] [facility]   1/18/2024 10:30 AM Renee Danielson MD [facility] ID [address]   1/19/2024  6:00 AM CHAIR 15, [place] [facility]   1/22/2024  6:00 AM CHAIR 05, [facility] [facility]   1/24/2024  6:00 AM CHAIR 14, [place] [facility]   1/26/2024  6:00 AM CHAIR 14, [facility] [facility]   1/29/2024  6:00 AM CHAIR 15, [place] [facility]   1/31/2024  6:00 AM CHAIR 15, [facility] [facility]   2/2/2024  6:00 AM CHAIR 15, [facility] [facility]   2/5/2024  6:00 AM CHAIR 15, [place] [facility]   2/7/2024  6:00 AM CHAIR 04, [facility] [facility]   2/9/2024  6:00 AM CHAIR 04, [facility]   2/12/2024  6:00 AM CHAIR 04, [facility]   2/14/2024  6:00 AM CHAIR 04, [facility]   2/16/2024  6:00 AM CHAIR 04, [facility]   2/19/2024  6:00 AM CHAIR 04, [facility]   2/21/2024  6:00 AM [facility]IR 04[facility] [place] [facility]   2/23/2024  6:00 [facility]   2/26/2024  6:00 [facility]   2/28/2024  6[facility]00 [facility]       Allergies:  Review of patient's allergies indicates:   Allergen Reactions    Iodine and iodide containing products Hives     Hypotension, Flushing        Medications: Review discharge medications with patient and family and provide education.    Current Facility-Administered Medications   Medication Dose Route Frequency Provider Last Rate Last Admin    0.9%  NaCl infusion   Intravenous Once Candice Juarez, DWAIN        acetaminophen tablet 650 mg  650 mg Oral Q6H Flo Julian MD   650 mg at 12/14/23 0526    atorvastatin tablet 20 mg  20 mg Oral QHS Willard Wright MD   20 mg at 12/13/23 2132    carvediloL tablet 12.5 mg  12.5 mg Oral BID WM Willard Wright MD   12.5 mg at 12/13/23 1744    ceFAZolin (ANCEF) 1 g in dextrose 5 % in water (D5W) 50 mL IVPB (MB+)  1 g Intravenous Daily Felipa Pan,    Stopped at 12/11/23 2227    dextrose 10% bolus 125 mL 125 mL  12.5 g Intravenous PRN Magno Hardy MD        dextrose 10% bolus 250 mL 250 mL  25 g Intravenous PRN Magno Hardy MD        furosemide tablet 80 mg  80 mg Oral BID Alejandrina Carmichael,    80 mg at 12/13/23 2132    glucagon (human recombinant) injection 1 mg  1 mg Intramuscular PRN Magno Hardy MD        glucose chewable tablet 16 g  16 g Oral PRN Magno Hardy MD        glucose chewable tablet 24 g  24 g Oral PRN Magno Hardy MD        heparin (porcine) injection 5,000 Units  5,000 Units Subcutaneous Q8H Willard Wright MD   5,000 Units at 12/14/23 0526    insulin aspart U-100 pen 0-5 Units  0-5 Units Subcutaneous Q6H PRN Magno Hardy MD        naloxone 0.4 mg/mL injection 0.02 mg  0.02 mg Intravenous PRN Magno Hardy MD        NIFEdipine 24 hr tablet 60 mg  60 mg Oral BID Magno Hardy MD   60 mg at 12/13/23 2132    oxyCODONE immediate release tablet 5 mg  5 mg Oral Q6H PRN Flo Julian MD   5 mg at 12/14/23 0530    polyethylene glycol packet 17 g  17 g Oral Daily Alejandrina Carmichael,    17 g at 12/13/23 0817    senna tablet 8.6 mg  8.6 mg Oral Daily PRN Alejandrina Carmichael DO        sevelamer carbonate tablet 1,600 mg  1,600 mg Oral TID  Alejandrina Carmichael DO   1,600 mg at 12/14/23 0850     sodium bicarbonate tablet 1,300 mg  1,300 mg Oral BID Mookie Acuña MD   1,300 mg at 12/13/23 2132    sodium chloride 0.9% flush 10 mL  10 mL Intravenous Q12H PRN Magno Hardy MD        sodium chloride 0.9% flush 10 mL  10 mL Intravenous PRN Umang Sharp MD        tamsulosin 24 hr capsule 0.4 mg  1 capsule Oral Daily Magno Hardy MD   0.4 mg at 12/13/23 0818     Facility-Administered Medications Ordered in Other Encounters   Medication Dose Route Frequency Provider Last Rate Last Admin    0.9%  NaCl infusion   Intravenous Continuous Kim Lutz, NP        0.9%  NaCl infusion   Intravenous Continuous Herminia Durant MD        ondansetron disintegrating tablet 8 mg  8 mg Oral Q8H PRN Herminia Durant MD         Current Discharge Medication List        START taking these medications    Details   dextrose 5 % in water (D5W) PgBk 50 mL with ceFAZolin 1 gram SolR Inject 2 g into the vein every Monday & Wednesday and 3 g every Friday after dialysis. (End 1/18/24)  Refills: 0      sodium bicarbonate 650 MG tablet Take 2 tablets (1,300 mg total) by mouth 2 (two) times daily.  Qty: 120 tablet, Refills: 0           CONTINUE these medications which have NOT CHANGED    Details   acetaminophen (TYLENOL) 500 MG tablet Take 2 tablets (1,000 mg total) by mouth every 6 (six) hours as needed for Pain.  Qty: 30 tablet, Refills: 0      atorvastatin (LIPITOR) 20 MG tablet TAKE 1 TABLET BY MOUTH EVERY EVENING  Qty: 90 tablet, Refills: 0    Associated Diagnoses: Mixed hyperlipidemia; Diabetes mellitus due to underlying condition with diabetic nephropathy, without long-term current use of insulin      carvediloL (COREG) 12.5 MG tablet Take 1 tablet (12.5 mg total) by mouth 2 (two) times daily.  Qty: 60 tablet, Refills: 11    Comments: .  Associated Diagnoses: Essential (primary) hypertension      colchicine (COLCRYS) 0.6 mg tablet Take 0.6 mg by mouth as needed (for gout flare). Take half tab (0.3 mg) by mouth  daily as needed for gout flare.  Qty: 90 tablet, Refills: 3    Associated Diagnoses: Gout, unspecified cause, unspecified chronicity, unspecified site      diclofenac sodium (VOLTAREN) 1 % Gel Apply 2 g topically once daily.  Qty: 20 g, Refills: 0      fluticasone propionate (FLONASE) 50 mcg/actuation nasal spray 2 sprays (100 mcg total) by Each Nostril route once daily.  Qty: 16 g, Refills: 11      furosemide (LASIX) 80 MG tablet TAKE 1 TABLET(80 MG) BY MOUTH TWICE DAILY  Qty: 60 tablet, Refills: 11      LIDOcaine (LIDODERM) 5 % Place 2 patches onto the skin once daily. Remove & Discard patch within 12 hours or as directed by MD  Qty: 20 patch, Refills: 0      NIFEdipine (PROCARDIA-XL) 60 MG (OSM) 24 hr tablet Take 1 tablet (60 mg total) by mouth 2 (two) times a day.  Qty: 60 tablet, Refills: 11    Comments: .  Associated Diagnoses: Essential (primary) hypertension      oxyCODONE (ROXICODONE) 5 MG immediate release tablet Take 1 tablet (5 mg total) by mouth every 6 (six) hours as needed for Pain.  Qty: 8 tablet, Refills: 0    Comments: Quantity prescribed more than 7 day supply? No      sevelamer carbonate (RENVELA) 800 mg Tab TAKE 2 TABLETS(1600 MG) BY MOUTH THREE TIMES DAILY WITH MEALS  Qty: 180 tablet, Refills: 3    Associated Diagnoses: Hyperphosphatemia associated with renal failure      tamsulosin (FLOMAX) 0.4 mg Cap Take 1 capsule (0.4 mg total) by mouth once daily.  Qty: 90 capsule, Refills: 3      aspirin (ECOTRIN) 81 MG EC tablet Take 1 tablet (81 mg total) by mouth once daily.  Qty: 90 tablet, Refills: 3      blood sugar diagnostic Strp Use to check blood glucose once a day.  Qty: 100 each, Refills: 3    Associated Diagnoses: Diabetes mellitus due to underlying condition with diabetic nephropathy, without long-term current use of insulin      blood-glucose meter Misc Use to check blood glucose twice a day.  Qty: 1 each, Refills: 0      lancets 30 gauge Misc Use to check blood glucose twice a day.  Qty:  "100 each, Refills: 5      LIDOcaine-prilocaine (EMLA) cream SMARTSIG:sparingly Topical As Directed               I have seen and examined this patient within the last 30 days. My clinical findings that support the need for the home health skilled services and home bound status are the following:no     Wound care involving wound vac management 2x/week.     Referrals/ Consults  Wound care    Activities:   activity as tolerated    When multiple disciplines ordered:    Start of Care occurs on Sunday - Wednesday schedule remaining discipline evaluations as ordered on separate consecutive days following the start of care.    Thursday SOC -schedule subsequent evaluations Friday and Monday the following week.     Friday - Saturday SOC - schedule subsequent discipline evaluations on consecutive days starting Monday of the following week.    For all post-discharge communication and subsequent orders please contact patient's primary care physician.     Miscellaneous     Administer (drug and dose): Cefazolin 2gm/2gm/3gm 3x/week on HD days (MWF) after HD through peripheral IV.    Last dose given: 1/18/24 (6 weeks)                  Home dose due: 12/15/23        Wound Care Orders  yes:  Surgical Wound:  Location: left arm    Left upper arm incision--Dressing change Monday / Thursday "Window pane" periwound, apply white vac foam to wound bed, black "mushroom cap" to ensure vac bumper does not dig into pt skin. Continuous setting 125mmHga. Ensure that canister is engaged into pump, replace canister weekly or when full b. If leak alarm occurs, apply additional thin film at location of leak c. Alarm troubleshooting contact [facility]. Phone # is [phone], Select option 5. [employer] representative is available 24 hours a day 7 days per week. d. When therapy is discontinued or patient is discharged, please return non disposable NPWT unit per facility guidelines.   a. Ensure that canister is engaged into pump, replace canister weekly or when " full   b. If leak alarm occurs, apply additional thin film at location of leak   c. Alarm troubleshooting contact [facility]. Phone # is [phone], Select option 5. [employer] representative is available 24 hours a day 7 days per week.   d. When therapy is discontinued or patient is discharged, please return non disposable NPWT unit per facility guidelines.        I certify that this patient is confined to his home and needs intermittent skilled nursing care.

## 2023-12-14 NOTE — PROGRESS NOTES
[facility] HEMODIALYSIS NOTE     Patient currently on hemodialysis for removal of uremic toxins and volume.     Patient seen and evaluated on hemodialysis, tolerating treatment, see HD flowsheet for vitals and assessments.    Labs have been reviewed and the dialysate bath has been adjusted.       Assessment/Plan:    -TDC placed 12/13  -Patient seen on HD, tolerating treatment well, w/o complaints   -UF goal of 3L   -Renal diet, if not NPO   -Strict I/O's and daily weights  -Daily renal function panels  -Keep MAP >65 while on HD   -Hgb goal 10-11  -sevelamer 1600 TID WM  -Continue lasix   -Discontinue sodium bicarb, bicarb bath with HD  -Will continue to follow while inpatient     Candice Juarez DNP-FNP, C  Nephrology  Pager: [phone]

## 2023-12-14 NOTE — ASSESSMENT & PLAN NOTE
Patient's FSGs are controlled on no home medications  Last A1c reviewed-   Lab Results   Component Value Date    HGBA1C 4.4 (L) 10/04/2023     Most recent fingerstick glucose reviewed-   Recent Labs   Lab 12/13/23 2036 12/14/23  0056 12/14/23  0809 12/14/23  1557   POCTGLUCOSE 119* 136* 141* 163*       Current correctional scale  Low  Maintain anti-hyperglycemic dose as follows-   Antihyperglycemics (From admission, onward)    Start     Stop Route Frequency Ordered    12/09/23 0832  insulin aspart U-100 pen 0-5 Units         -- SubQ Every 6 hours PRN 12/09/23 0732        Hold Oral hypoglycemics while patient is in the hospital.

## 2023-12-14 NOTE — ASSESSMENT & PLAN NOTE
Creatine stable for now. BMP reviewed- noted Estimated Creatinine Clearance: 9.2 mL/min (A) (based on SCr of 9.2 mg/dL (H)). according to latest data. Based on current GFR, CKD stage is end stage.  Monitor UOP and serial BMP and adjust therapy as needed. Renally dose meds. Avoid nephrotoxic medications and procedures.    Nephrology consulted for HD

## 2023-12-14 NOTE — PROGRESS NOTES
12/14/23 1206   Post-Hemodialysis Assessment   Rinseback Volume (mL) 250 mL   Blood Volume Processed (Liters) 60.1 L   Dialyzer Clearance Lightly streaked   Duration of Treatment 180 minutes   Additional Fluid Intake (mL) 300 mL   Total UF (mL) 3100 mL   Net Fluid Removal 2500   Patient Response to Treatment tolerated well   Post-Hemodialysis Comments see notes     HD TX complete. Pt tolerated well. Pt AAO, VSS, NAD. Net removal 2500 ml. Report given to primary care nurse. Awaiting transport to return pt to room via wheelchair.

## 2023-12-14 NOTE — PLAN OF CARE
Problem: Hemodynamic Instability (Hemodialysis)  Goal: Effective Tissue Perfusion  12/14/2023 1031 by Richard An, RN  Outcome: Ongoing, Progressing  12/14/2023 1031 by Richard An, RN  Outcome: Ongoing, Progressing

## 2023-12-14 NOTE — PROGRESS NOTES
[facility] - Intensive Care ([address] Medicine  Progress Note    Patient Name: Elbert Still Jr.  MRN: [id number]  Patient Class: IP- Inpatient   Admission Date: 12/8/2023  Length of Stay: 5 days  Attending Physician: Eduar Rodriguez MD  Primary Care Provider: Angel Luis Boo MD        Subjective:     Principal Problem:Bacteremia        HPI:  Elbert Still is a 68 y.o. Male with ESRD on HD (MWF), HTN, and T2DM s/p multiple surgeries for AV fistula (most recent 2 weeks ago) who presents for 1 week of pain, swelling, erythema, and warmth to his LUE fistula site. He tells me that today (12/8) his nephrologist got blood culture results that were drawn on 12/6 that were positive for gram positive cocci in clusters and chains in 2/2 bottles, and told him to present to the ED for IV antibiotics. He does not currently get HD through the AV fistula in question, he has a tunneled catheter. He reports he is in the process of being evaluated for kidney transplant.    The patient reports additional symptoms of mild cough/runny nose that he has had for about a week. These symptoms are similar to some his wife had about 2 weeks ago. He denies fevers, chills, SOB, N/V, abdominal pain, headaches, lightheadedness, or urinary symptoms.    In the ED, he was hypertensive on arrival but otherwise stable. CBC with no leukocytosis, HGB 11.3. CMP with BUN/Cr 34/6.0. Lactate 1.11. US HD access with concerns for hematoma vs abscess with soft tissue changes suggestive of inflammation/infection. Vascular surgery was consulted in the ED and recommended admission to Hospital Medicine to continue IV antibiotics and to keep NPO for possible debridement in the morning.    Overview/Hospital Course:  Patient admitted with concerns for AV fistula infection and bacteremia. Underwent removal of graft per vascular surgery. Currently on vancomycin and Zosyn for bacteremia pending full speciation of cultures    Interval History:   HD today.  There were issues with finalizing IV abx after HD. Should discharge tomorrow morning.     Review of Systems  Objective:     Vital Signs (Most Recent):  Temp: 97.8 °F (36.6 °C) (12/14/23 1558)  Pulse: 67 (12/14/23 1558)  Resp: 18 (12/14/23 1558)  BP: (!) 170/83 (12/14/23 1558)  SpO2: 99 % (12/14/23 1558) Vital Signs (24h Range):  Temp:  [97.6 °F (36.4 °C)-98.4 °F (36.9 °C)] 97.8 °F (36.6 °C)  Pulse:  [64-98] 67  Resp:  [18] 18  SpO2:  [95 %-100 %] 99 %  BP: (159-183)/(81-95) 170/83     Weight: 97.5 kg (215 lb)  Body mass index is 27.6 kg/m².    Intake/Output Summary (Last 24 hours) at 12/14/2023 1724  Last data filed at 12/14/2023 1206  Gross per 24 hour   Intake 300 ml   Output 3450 ml   Net -3150 ml         Physical Exam  Constitutional:       Appearance: Normal appearance. He is not toxic-appearing.   HENT:      Head: Normocephalic and atraumatic.   Eyes:      Extraocular Movements: Extraocular movements intact.      Pupils: Pupils are equal, round, and reactive to light.   Cardiovascular:      Rate and Rhythm: Normal rate and regular rhythm.   Pulmonary:      Effort: Pulmonary effort is normal. No respiratory distress.      Breath sounds: Normal breath sounds.   Abdominal:      Palpations: Abdomen is soft.      Tenderness: There is no abdominal tenderness.   Musculoskeletal:      Right lower leg: No edema.      Left lower leg: No edema.      Comments: RUE wrapped/bandaged   Neurological:      General: No focal deficit present.      Mental Status: He is alert and oriented to person, place, and time.   Psychiatric:         Mood and Affect: Mood normal.         Behavior: Behavior normal.             Significant Labs: All pertinent labs within the past 24 hours have been reviewed.    Significant Imaging: I have reviewed all pertinent imaging results/findings within the past 24 hours.    Assessment/Plan:      * Bacteremia  68 y.o. Male s/p multiple surgeries for AV fistula (most recent 2 weeks ago) who presents for 1  week of pain, swelling, erythema, and warmth to his LUE fistula site.   12/6 Blood Cultures positive for gram positive cocci in clusters and chains in 2/2 bottles  US HD access with concerns for hematoma vs abscess with soft tissue changes suggestive of inflammation/infection.   Vascular surgery was consulted in the ED and recommended admission to Hospital Medicine to continue IV antibiotics and to keep NPO for possible debridement in the morning.    Plan:  - Vascular surgery consulted, appreciate recs  - ID consulted, appreciate recs  - Vancomycin/Zosyn  - Repeat cx ngtd  - Removal of tunneled catheter by IR 12/11  - Planning for tunneled placement 12/12 with interventional nephro  - IV Ancef every day, plan after HD on HD days      Type 2 diabetes mellitus  Patient's FSGs are controlled on no home medications  Last A1c reviewed-   Lab Results   Component Value Date    HGBA1C 4.4 (L) 10/04/2023     Most recent fingerstick glucose reviewed-   Recent Labs   Lab 12/13/23  2036 12/14/23  0056 12/14/23  0809 12/14/23  1557   POCTGLUCOSE 119* 136* 141* 163*       Current correctional scale  Low  Maintain anti-hyperglycemic dose as follows-   Antihyperglycemics (From admission, onward)      Start     Stop Route Frequency Ordered    12/09/23 0832  insulin aspart U-100 pen 0-5 Units         -- SubQ Every 6 hours PRN 12/09/23 0732          Hold Oral hypoglycemics while patient is in the hospital.    Arteriovenous fistula  Status post removal of AV fistula graft 12/10 by vascular in the setting of infection  Afebrile, bcx ngtd  Wound care, wound vac  HH on discharge    ESRD on dialysis  Creatine stable for now. BMP reviewed- noted Estimated Creatinine Clearance: 9.2 mL/min (A) (based on SCr of 9.2 mg/dL (H)). according to latest data. Based on current GFR, CKD stage is end stage.  Monitor UOP and serial BMP and adjust therapy as needed. Renally dose meds. Avoid nephrotoxic medications and procedures.    Nephrology consulted  for HD    Anemia of chronic disease  Patient's anemia is currently controlled. Has not received any PRBCs to date. Etiology likely d/t chronic disease due to Chronic Kidney Disease/ESRD  Current CBC reviewed-   Lab Results   Component Value Date    HGB 8.7 (L) 12/14/2023    HCT 26.0 (L) 12/14/2023     Monitor serial CBC and transfuse if patient becomes hemodynamically unstable, symptomatic or H/H drops below 7/21.    Mixed hyperlipidemia  - continue home atorvastatin      -Essential (primary) hypertension  Temp:  [97.6 °F (36.4 °C)-98.4 °F (36.9 °C)]   Pulse:  [64-98]   Resp:  [18]   BP: (159-183)/(81-95)   SpO2:  [95 %-100 %] .   Home meds for hypertension were reviewed and noted below.   Hypertension Medications               carvediloL (COREG) 12.5 MG tablet Take 1 tablet (12.5 mg total) by mouth 2 (two) times daily.    furosemide (LASIX) 80 MG tablet TAKE 1 TABLET(80 MG) BY MOUTH TWICE DAILY    NIFEdipine (PROCARDIA-XL) 60 MG (OSM) 24 hr tablet Take 1 tablet (60 mg total) by mouth 2 (two) times a day.          Plan:  - continue coreg, nifedipine  - hold lasix pending possible procedure, resume when appropriate  - lasix resumed       VTE Risk Mitigation (From admission, onward)           Ordered     heparin (porcine) injection 5,000 Units  Every 8 hours         12/11/23 1550     IP VTE HIGH RISK PATIENT  Once         12/09/23 0731     Place sequential compression device  Until discontinued         12/09/23 0731                    Discharge Planning   HORACIO: 12/14/2023     Code Status: Full Code   Is the patient medically ready for discharge?: No    Reason for patient still in hospital (select all that apply): Patient trending condition  Discharge Plan A: Home                  Alejandrina Carmichael DO  Department of Hospital Medicine   [facility] - Intensive Care ([place]-[facility])

## 2023-12-14 NOTE — ASSESSMENT & PLAN NOTE
Patient's anemia is currently controlled. Has not received any PRBCs to date. Etiology likely d/t chronic disease due to Chronic Kidney Disease/ESRD  Current CBC reviewed-   Lab Results   Component Value Date    HGB 8.7 (L) 12/14/2023    HCT 26.0 (L) 12/14/2023     Monitor serial CBC and transfuse if patient becomes hemodynamically unstable, symptomatic or H/H drops below 7/21.

## 2023-12-14 NOTE — ASSESSMENT & PLAN NOTE
Temp:  [97.6 °F (36.4 °C)-98.4 °F (36.9 °C)]   Pulse:  [64-98]   Resp:  [18]   BP: (159-183)/(81-95)   SpO2:  [95 %-100 %] .   Home meds for hypertension were reviewed and noted below.   Hypertension Medications               carvediloL (COREG) 12.5 MG tablet Take 1 tablet (12.5 mg total) by mouth 2 (two) times daily.    furosemide (LASIX) 80 MG tablet TAKE 1 TABLET(80 MG) BY MOUTH TWICE DAILY    NIFEdipine (PROCARDIA-XL) 60 MG (OSM) 24 hr tablet Take 1 tablet (60 mg total) by mouth 2 (two) times a day.          Plan:  - continue coreg, nifedipine  - hold lasix pending possible procedure, resume when appropriate  - lasix resumed

## 2023-12-14 NOTE — PROGRESS NOTES
Patient arrived in a wheelchair to dialysis unit.   Report received from primary nurse  VS's per dialysis Flowsheet.     Hemodialysis initiated using the following:     Dialysis Access: RIJ     Will Maintain telemetry and blood pressure monitoring throughout treatment.  Refer to dialysis flowsheet and MAR for details.

## 2023-12-14 NOTE — SUBJECTIVE & OBJECTIVE
Interval History:   HD today. There were issues with finalizing IV abx after HD. Should discharge tomorrow morning.     Review of Systems  Objective:     Vital Signs (Most Recent):  Temp: 97.8 °F (36.6 °C) (12/14/23 1558)  Pulse: 67 (12/14/23 1558)  Resp: 18 (12/14/23 1558)  BP: (!) 170/83 (12/14/23 1558)  SpO2: 99 % (12/14/23 1558) Vital Signs (24h Range):  Temp:  [97.6 °F (36.4 °C)-98.4 °F (36.9 °C)] 97.8 °F (36.6 °C)  Pulse:  [64-98] 67  Resp:  [18] 18  SpO2:  [95 %-100 %] 99 %  BP: (159-183)/(81-95) 170/83     Weight: 97.5 kg (215 lb)  Body mass index is 27.6 kg/m².    Intake/Output Summary (Last 24 hours) at 12/14/2023 1724  Last data filed at 12/14/2023 1206  Gross per 24 hour   Intake 300 ml   Output 3450 ml   Net -3150 ml         Physical Exam  Constitutional:       Appearance: Normal appearance. He is not toxic-appearing.   HENT:      Head: Normocephalic and atraumatic.   Eyes:      Extraocular Movements: Extraocular movements intact.      Pupils: Pupils are equal, round, and reactive to light.   Cardiovascular:      Rate and Rhythm: Normal rate and regular rhythm.   Pulmonary:      Effort: Pulmonary effort is normal. No respiratory distress.      Breath sounds: Normal breath sounds.   Abdominal:      Palpations: Abdomen is soft.      Tenderness: There is no abdominal tenderness.   Musculoskeletal:      Right lower leg: No edema.      Left lower leg: No edema.      Comments: RUE wrapped/bandaged   Neurological:      General: No focal deficit present.      Mental Status: He is alert and oriented to person, place, and time.   Psychiatric:         Mood and Affect: Mood normal.         Behavior: Behavior normal.             Significant Labs: All pertinent labs within the past 24 hours have been reviewed.    Significant Imaging: I have reviewed all pertinent imaging results/findings within the past 24 hours.

## 2023-12-15 ENCOUNTER — TELEPHONE (OUTPATIENT)
Dept: FAMILY MEDICINE | Facility: CLINIC | Age: 68
End: 2023-12-15
Payer: COMMERCIAL

## 2023-12-15 VITALS
BODY MASS INDEX: 27.59 KG/M2 | WEIGHT: 215 LBS | OXYGEN SATURATION: 100 % | SYSTOLIC BLOOD PRESSURE: 169 MMHG | TEMPERATURE: 98 F | RESPIRATION RATE: 21 BRPM | HEART RATE: 75 BPM | DIASTOLIC BLOOD PRESSURE: 90 MMHG | HEIGHT: 74 IN

## 2023-12-15 LAB
ALBUMIN SERPL BCP-MCNC: 3.7 G/DL (ref 3.5–5.2)
ALP SERPL-CCNC: 117 U/L (ref 55–135)
ALT SERPL W/O P-5'-P-CCNC: <5 U/L (ref 10–44)
ANION GAP SERPL CALC-SCNC: 10 MMOL/L (ref 8–16)
AST SERPL-CCNC: 19 U/L (ref 10–40)
BASOPHILS # BLD AUTO: 0.02 K/UL (ref 0–0.2)
BASOPHILS NFR BLD: 0.5 % (ref 0–1.9)
BILIRUB SERPL-MCNC: 0.3 MG/DL (ref 0.1–1)
BUN SERPL-MCNC: 41 MG/DL (ref 8–23)
CALCIUM SERPL-MCNC: 9.6 MG/DL (ref 8.7–10.5)
CHLORIDE SERPL-SCNC: 105 MMOL/L (ref 95–110)
CO2 SERPL-SCNC: 22 MMOL/L (ref 23–29)
CREAT SERPL-MCNC: 7.4 MG/DL (ref 0.5–1.4)
DIFFERENTIAL METHOD: ABNORMAL
EOSINOPHIL # BLD AUTO: 0.2 K/UL (ref 0–0.5)
EOSINOPHIL NFR BLD: 6.1 % (ref 0–8)
ERYTHROCYTE [DISTWIDTH] IN BLOOD BY AUTOMATED COUNT: 14.7 % (ref 11.5–14.5)
EST. GFR  (NO RACE VARIABLE): 7.5 ML/MIN/1.73 M^2
GLUCOSE SERPL-MCNC: 127 MG/DL (ref 70–110)
HCT VFR BLD AUTO: 30.5 % (ref 40–54)
HGB BLD-MCNC: 10.2 G/DL (ref 14–18)
IMM GRANULOCYTES # BLD AUTO: 0.01 K/UL (ref 0–0.04)
IMM GRANULOCYTES NFR BLD AUTO: 0.3 % (ref 0–0.5)
LYMPHOCYTES # BLD AUTO: 1 K/UL (ref 1–4.8)
LYMPHOCYTES NFR BLD: 26.1 % (ref 18–48)
MAGNESIUM SERPL-MCNC: 2.1 MG/DL (ref 1.6–2.6)
MCH RBC QN AUTO: 30.1 PG (ref 27–31)
MCHC RBC AUTO-ENTMCNC: 33.4 G/DL (ref 32–36)
MCV RBC AUTO: 90 FL (ref 82–98)
MONOCYTES # BLD AUTO: 0.4 K/UL (ref 0.3–1)
MONOCYTES NFR BLD: 11.2 % (ref 4–15)
NEUTROPHILS # BLD AUTO: 2.2 K/UL (ref 1.8–7.7)
NEUTROPHILS NFR BLD: 55.8 % (ref 38–73)
NRBC BLD-RTO: 0 /100 WBC
PHOSPHATE SERPL-MCNC: 4.8 MG/DL (ref 2.7–4.5)
PLATELET # BLD AUTO: 258 K/UL (ref 150–450)
PMV BLD AUTO: 10.5 FL (ref 9.2–12.9)
POCT GLUCOSE: 119 MG/DL (ref 70–110)
POCT GLUCOSE: 119 MG/DL (ref 70–110)
POTASSIUM SERPL-SCNC: 5 MMOL/L (ref 3.5–5.1)
PROT SERPL-MCNC: 8 G/DL (ref 6–8.4)
RBC # BLD AUTO: 3.39 M/UL (ref 4.6–6.2)
SODIUM SERPL-SCNC: 137 MMOL/L (ref 136–145)
WBC # BLD AUTO: 3.94 K/UL (ref 3.9–12.7)

## 2023-12-15 PROCEDURE — 83735 ASSAY OF MAGNESIUM: CPT

## 2023-12-15 PROCEDURE — 80053 COMPREHEN METABOLIC PANEL: CPT

## 2023-12-15 PROCEDURE — 85025 COMPLETE CBC W/AUTO DIFF WBC: CPT

## 2023-12-15 PROCEDURE — 84100 ASSAY OF PHOSPHORUS: CPT

## 2023-12-15 PROCEDURE — 25000003 PHARM REV CODE 250

## 2023-12-15 PROCEDURE — 63600175 PHARM REV CODE 636 W HCPCS: Performed by: STUDENT IN AN ORGANIZED HEALTH CARE EDUCATION/TRAINING PROGRAM

## 2023-12-15 PROCEDURE — 90935 HEMODIALYSIS ONE EVALUATION: CPT | Mod: ,,, | Performed by: NURSE PRACTITIONER

## 2023-12-15 PROCEDURE — 63600175 PHARM REV CODE 636 W HCPCS: Performed by: NURSE PRACTITIONER

## 2023-12-15 PROCEDURE — 25000003 PHARM REV CODE 250: Performed by: STUDENT IN AN ORGANIZED HEALTH CARE EDUCATION/TRAINING PROGRAM

## 2023-12-15 PROCEDURE — 36415 COLL VENOUS BLD VENIPUNCTURE: CPT

## 2023-12-15 PROCEDURE — 63600175 PHARM REV CODE 636 W HCPCS

## 2023-12-15 PROCEDURE — 25000003 PHARM REV CODE 250: Performed by: NURSE PRACTITIONER

## 2023-12-15 PROCEDURE — 80100016 HC MAINTENANCE HEMODIALYSIS

## 2023-12-15 PROCEDURE — 90935 PR HEMODIALYSIS, ONE EVALUATION: ICD-10-PCS | Mod: ,,, | Performed by: NURSE PRACTITIONER

## 2023-12-15 RX ORDER — OXYCODONE HYDROCHLORIDE 5 MG/1
5 TABLET ORAL EVERY 6 HOURS PRN
Qty: 28 TABLET | Refills: 0 | Status: SHIPPED | OUTPATIENT
Start: 2023-12-15 | End: 2023-12-26

## 2023-12-15 RX ORDER — HEPARIN SODIUM 1000 [USP'U]/ML
1000 INJECTION, SOLUTION INTRAVENOUS; SUBCUTANEOUS
Status: DISCONTINUED | OUTPATIENT
Start: 2023-12-15 | End: 2023-12-16 | Stop reason: HOSPADM

## 2023-12-15 RX ORDER — SODIUM CHLORIDE 9 MG/ML
INJECTION, SOLUTION INTRAVENOUS ONCE
Status: COMPLETED | OUTPATIENT
Start: 2023-12-15 | End: 2023-12-15

## 2023-12-15 RX ADMIN — CARVEDILOL 12.5 MG: 12.5 TABLET, FILM COATED ORAL at 01:12

## 2023-12-15 RX ADMIN — TAMSULOSIN HYDROCHLORIDE 0.4 MG: 0.4 CAPSULE ORAL at 01:12

## 2023-12-15 RX ADMIN — ACETAMINOPHEN 650 MG: 325 TABLET ORAL at 06:12

## 2023-12-15 RX ADMIN — SEVELAMER CARBONATE 1600 MG: 800 TABLET, FILM COATED ORAL at 06:12

## 2023-12-15 RX ADMIN — CEFAZOLIN 1 G: 1 INJECTION, POWDER, FOR SOLUTION INTRAMUSCULAR; INTRAVENOUS at 06:12

## 2023-12-15 RX ADMIN — ACETAMINOPHEN 650 MG: 325 TABLET ORAL at 01:12

## 2023-12-15 RX ADMIN — SEVELAMER CARBONATE 1600 MG: 800 TABLET, FILM COATED ORAL at 01:12

## 2023-12-15 RX ADMIN — NIFEDIPINE 60 MG: 30 TABLET, FILM COATED, EXTENDED RELEASE ORAL at 01:12

## 2023-12-15 RX ADMIN — ACETAMINOPHEN 650 MG: 325 TABLET ORAL at 05:12

## 2023-12-15 RX ADMIN — FUROSEMIDE 80 MG: 20 TABLET ORAL at 01:12

## 2023-12-15 RX ADMIN — SEVELAMER CARBONATE 1600 MG: 800 TABLET, FILM COATED ORAL at 08:12

## 2023-12-15 RX ADMIN — SODIUM CHLORIDE: 9 INJECTION, SOLUTION INTRAVENOUS at 02:12

## 2023-12-15 RX ADMIN — HEPARIN SODIUM 1000 UNITS: 1000 INJECTION, SOLUTION INTRAVENOUS; SUBCUTANEOUS at 03:12

## 2023-12-15 RX ADMIN — HEPARIN SODIUM 5000 UNITS: 5000 INJECTION INTRAVENOUS; SUBCUTANEOUS at 05:12

## 2023-12-15 RX ADMIN — SODIUM BICARBONATE 650 MG TABLET 1300 MG: at 01:12

## 2023-12-15 RX ADMIN — ACETAMINOPHEN 650 MG: 325 TABLET ORAL at 12:12

## 2023-12-15 NOTE — TELEPHONE ENCOUNTER
----- Message from Jeremie Ron sent at 12/15/2023 10:03 AM CST -----  Regarding: Fern villalta jojoamanda [facility] .[phone]  Patient is requesting a sooner appointment.  Patient declined first available appointment listed as well as another facility and provider .  Patient will not accept being placed on the waitlist and is requesting a message be sent to doctor.    Name of Caller:  Fern [facility] case mngment     When is the first available appointment?  01/04    Symptoms:  hospital fu     Would the patient rather a call back or a response via [facility]?  Call back     Best Call Back Number:  [phone]      Additional Information:

## 2023-12-15 NOTE — TELEPHONE ENCOUNTER
[facility]   When is the first available appointment?  01/04

## 2023-12-15 NOTE — PROGRESS NOTES
[facility] STAFF HEMODIALYSIS NOTE     Patient currently on hemodialysis for removal of uremic toxins and volume.     Patient seen and evaluated on hemodialysis, tolerating treatment, see HD flowsheet for vitals and assessments.    Labs have been reviewed and the dialysate bath has been adjusted.       Assessment/Plan:    -Plan to d/c home today following HD   -Resume MWF schedule   -Patient seen on HD, tolerating treatment well, w/o complaints   -UF goal of 0, pt is below EDW  -Renal diet, if not NPO   -Strict I/O's and daily weights  -Daily renal function panels  -Keep MAP >65 while on HD   -Hgb goal 10-11  -sevelamer 1600 TIDWM  Discontinue sodium bicarb , bicarb bath with HD   -Will continue to follow while inpatient     Candice Juarez DNP-FNP, C  Nephrology  Pager: [phone]

## 2023-12-15 NOTE — NURSING
3 hours HD tx completed. No fluid removed.  Patient tolerated well.    Blood returned. Lines flushed. Catheter locked with Heparin.  Clamped and capped.    Report given to PARAM Neff.

## 2023-12-15 NOTE — NURSING
Report received from SAE Khan RN.  Patient arrived to [facility] via wheelchair. AAOx4.     HD tx initiated via Right tunneled cath.

## 2023-12-15 NOTE — PLAN OF CARE
CM called [facility] [phone], spoke with Vangie, who states they rec'd fax with IV abx orders, they can accommodate this.  Informed that pt to be d/c'd today.    1:03 PM  CM spoke with pt in room.  He states his wife will give him a ride home, he has no DME needs.  Instructed that CM spoke with HD this morning, they confirmed they can give his IV abx after HD sessions.  Instructed in d/c process, pt v/u.    2:10 PM  SIMRAN spoke with [facility] [phone].  They state pt's first appointment with them is not until Tuesday at 10 AM.   care [facility] notified.    LEN GalvanN, BS, RN, CCM

## 2023-12-15 NOTE — PLAN OF CARE
Morenita sent message to PCP to get pt. sceduled they will contact the pt.         Fern Norris W  Case Management  [phone]

## 2023-12-15 NOTE — DISCHARGE SUMMARY
[facility] - Intensive Care ([place] [address])  [facility] Medicine  Discharge Summary      Patient Name: Elbert Still Jr.  MRN: [id number]  PAULETTE: [id number]  Patient Class: IP- Inpatient  Admission Date: 12/8/2023  Hospital Length of Stay: 6 days  Discharge Date and Time:  12/15/2023 3:38 PM  Attending Physician: Eduar Rodriguez MD   Discharging Provider: Alejandrina Carmichael DO  Primary Care Provider: Angel Luis Boo MD  Hospital Medicine Team: [facility] 1 Alejandrina Carmichael DO  Primary Care Team: [facility] 1    HPI:   Elbert Still is a 68 y.o. Male with ESRD on HD (MWF), HTN, and T2DM s/p multiple surgeries for AV fistula (most recent 2 weeks ago) who presents for 1 week of pain, swelling, erythema, and warmth to his LUE fistula site. He tells me that today (12/8) his nephrologist got blood culture results that were drawn on 12/6 that were positive for gram positive cocci in clusters and chains in 2/2 bottles, and told him to present to the ED for IV antibiotics. He does not currently get HD through the AV fistula in question, he has a tunneled catheter. He reports he is in the process of being evaluated for kidney transplant.    The patient reports additional symptoms of mild cough/runny nose that he has had for about a week. These symptoms are similar to some his wife had about 2 weeks ago. He denies fevers, chills, SOB, N/V, abdominal pain, headaches, lightheadedness, or urinary symptoms.    In the ED, he was hypertensive on arrival but otherwise stable. CBC with no leukocytosis, HGB 11.3. CMP with BUN/Cr 34/6.0. Lactate 1.11. US HD access with concerns for hematoma vs abscess with soft tissue changes suggestive of inflammation/infection. Vascular surgery was consulted in the ED and recommended admission to Hospital Medicine to continue IV antibiotics and to keep NPO for possible debridement in the morning.    Procedure(s) (LRB):  EXCISION, AV FISTULA (Left)  WASHOUT OF AV FISTULA (Left)      Hospital Course:    68 y.o. Male with ESRD on HD (MWF), HTN, and T2DM s/p multiple surgeries for AV fistula (most recent 2 weeks ago) who presents for 1 week of pain, swelling, erythema, and warmth to his LUE fistula site. Bcx grew gram positive cocci. He was started on empiric vanc/zosyn. He had a graft excision with vascular surgery 12/10. He had his tunneled catheter removed by IR and replaced on 12/13. Repeat bx showed ngtd. ID was following and he was discharged with IV Cefazolin 2gm/2gm/3gm 3x/wk after HD for 6 weeks. A wound vac was placed for post-op wound closure and will follow outpatient in clinic. He will follow with ID outpatient 1/2/24.            Goals of Care Treatment Preferences:  Code Status: Full Code    Physical Exam  Constitutional:       Appearance: Normal appearance. He is not toxic-appearing.   HENT:      Head: Normocephalic and atraumatic.   Eyes:      Extraocular Movements: Extraocular movements intact.      Pupils: Pupils are equal, round, and reactive to light.   Cardiovascular:      Rate and Rhythm: Normal rate and regular rhythm.   Pulmonary:      Effort: Pulmonary effort is normal. No respiratory distress.      Breath sounds: Normal breath sounds.   Abdominal:      Palpations: Abdomen is soft.      Tenderness: There is no abdominal tenderness.   Musculoskeletal:      Right lower leg: No edema.      Left lower leg: No edema.      Comments: RUE wrapped/bandaged   Neurological:      General: No focal deficit present.      Mental Status: He is alert and oriented to person, place, and time.   Psychiatric:         Mood and Affect: Mood normal.         Behavior: Behavior normal.     Consults:   Consults (From admission, onward)          Status Ordering Provider     IP consult to Interventional Nephrology  Once        Provider:  (Not yet assigned)    JOSE D España     Inpatient consult to Interventional Radiology  Once        Provider:  (Not yet assigned)    JOSE D España      Inpatient consult to Nephrology  Once        Provider:  (Not yet assigned)    Completed JOSE D MALHOTRA     Inpatient consult to Infectious Diseases  Once        Provider:  (Not yet assigned)    Completed MARTHA FRANKLIN     Inpatient consult to Vascular Surgery  Once        Provider:  (Not yet assigned)    Completed CARRIE ALONSO JR            No new Assessment & Plan notes have been filed under this hospital service since the last note was generated.  Service: Hospital Medicine    Final Active Diagnoses:    Diagnosis Date Noted POA    PRINCIPAL PROBLEM:  Bacteremia [R78.81] 12/09/2023 Yes    Type 2 diabetes mellitus [E11.9] 12/09/2023 Yes     Chronic    Arteriovenous fistula [I77.0] 12/27/2022 Yes    ESRD on dialysis [N18.6, Z99.2] 07/12/2022 Not Applicable     Chronic    Anemia of chronic disease [D63.8] 05/06/2020 Yes     Chronic    Mixed hyperlipidemia [E78.2] 04/05/2018 Yes     Chronic    -Essential (primary) hypertension [I10] 09/21/2017 Yes     Chronic      Problems Resolved During this Admission:       Discharged Condition: fair    Disposition:     Follow Up:   Follow-up Information       Angel Luis Boo MD Follow up.    Specialty: Family Medicine  Why: F/U appointment   Morenita sent message to PCP to get pt. sceduled they will contact the pt.  Contact information:  [address] [place] [address] [phone]                           Patient Instructions:   No discharge procedures on file.    Significant Diagnostic Studies: N/A    Pending Diagnostic Studies:       Procedure Component Value Units Date/Time    CBC auto differential [[id number]] Collected: 12/12/23 0450    Order Status: Sent Lab Status: In process Updated: 12/12/23 0450    Specimen: Blood     Narrative:      Collection has been rescheduled by SF4 at 12/12/2023 04:25 Reason:   Unable to collect     Comprehensive metabolic panel [[id number]] Collected: 12/12/23 0450    Order Status: Sent Lab Status: In process Updated: 12/12/23 0450     Specimen: Blood     Narrative:      Collection has been rescheduled by SF4 at 12/12/2023 04:25 Reason:   Unable to collect     Magnesium [[id number]] Collected: 12/12/23 0450    Order Status: Sent Lab Status: In process Updated: 12/12/23 0450    Specimen: Blood     Narrative:      Collection has been rescheduled by SF4 at 12/12/2023 04:25 Reason:   Unable to collect     Phosphorus [[id number]] Collected: 12/12/23 0450    Order Status: Sent Lab Status: In process Updated: 12/12/23 0450    Specimen: Blood     Narrative:      Collection has been rescheduled by SF4 at 12/12/2023 04:25 Reason:   Unable to collect            Medications:  Reconciled Home Medications:      Medication List        START taking these medications      dextrose 5 % in water (D5W) PgBk 50 mL with ceFAZolin 1 gram SolR  Inject 2 g into the vein every Monday & Wednesday and 3 g every Friday after dialysis. (End 1/18/24)     sodium bicarbonate 650 MG tablet  Take 2 tablets (1,300 mg total) by mouth 2 (two) times daily.            CONTINUE taking these medications      acetaminophen 500 MG tablet  Commonly known as: TYLENOL  Take 2 tablets (1,000 mg total) by mouth every 6 (six) hours as needed for Pain.     aspirin 81 MG EC tablet  Commonly known as: ECOTRIN  Take 1 tablet (81 mg total) by mouth once daily.     atorvastatin 20 MG tablet  Commonly known as: LIPITOR  TAKE 1 TABLET BY MOUTH EVERY EVENING     blood sugar diagnostic Strp  Use to check blood glucose once a day.     carvediloL 12.5 MG tablet  Commonly known as: COREG  Take 1 tablet (12.5 mg total) by mouth 2 (two) times daily.     colchicine 0.6 mg tablet  Commonly known as: COLCRYS  Take 0.6 mg by mouth as needed (for gout flare). Take half tab (0.3 mg) by mouth daily as needed for gout flare.     diclofenac sodium 1 % Gel  Commonly known as: VOLTAREN  Apply 2 g topically once daily.     fluticasone propionate 50 mcg/actuation nasal spray  Commonly known as: FLONASE  2 sprays (100 mcg  total) by Each Nostril route once daily.     furosemide 80 MG tablet  Commonly known as: LASIX  TAKE 1 TABLET(80 MG) BY MOUTH TWICE DAILY     LIDOcaine 5 %  Commonly known as: LIDODERM  Place 2 patches onto the skin once daily. Remove & Discard patch within 12 hours or as directed by MD     LIDOcaine-prilocaine cream  Commonly known as: EMLA  SMARTSIG:sparingly Topical As Directed     NIFEdipine 60 MG (OSM) 24 hr tablet  Commonly known as: PROCARDIA-XL  Take 1 tablet (60 mg total) by mouth 2 (two) times a day.     ONETOUCH DELICA PLUS LANCET 30 gauge Misc  Generic drug: lancets  Use to check blood glucose twice a day.     ONETOUCH VERIO FLEX METER Misc  Generic drug: blood-glucose meter  Use to check blood glucose twice a day.     oxyCODONE 5 MG immediate release tablet  Commonly known as: ROXICODONE  Take 1 tablet (5 mg total) by mouth every 6 (six) hours as needed for Pain.     sevelamer carbonate 800 mg Tab  Commonly known as: RENVELA  TAKE 2 TABLETS(1600 MG) BY MOUTH THREE TIMES DAILY WITH MEALS     tamsulosin 0.4 mg Cap  Commonly known as: FLOMAX  Take 1 capsule (0.4 mg total) by mouth once daily.              Indwelling Lines/Drains at time of discharge:   Lines/Drains/Airways       Central Venous Catheter Line  Duration                  Hemodialysis AV Graft 11/08/23 1335 Left upper arm 37 days         Hemodialysis Catheter 12/13/23 1210 right internal jugular 2 days              Drain  Duration                  Hemodialysis AV Fistula 09/06/22 Left upper arm 465 days                    Time spent on the discharge of patient: 60 minutes         Alejandrina Carmichael DO  Department of Hospital Medicine  [facility] - Intensive Care ([place]-16)

## 2023-12-16 LAB — BACTERIA SPEC ANAEROBE CULT: NORMAL

## 2023-12-16 NOTE — PROGRESS NOTES
[facility] - [facility] ([place] [id number])  Wound Care    Patient Name:  Elbert Still Jr.   MRN:  [id number]  Date: 12/15/2023  Diagnosis: Bacteremia    History:     Past Medical History:   Diagnosis Date    Anemia     Diabetes mellitus     Diabetes mellitus, type 2     Disorder of kidney and ureter     ESRD (end stage renal disease)     Essential (primary) hypertension 2017    Gout, unspecified      jaundice, unspecified     Nuclear sclerosis of both eyes 2022       Social History     Socioeconomic History    Marital status:      Spouse name: Kristine Still   Occupational History     Employer: [employer]   Tobacco Use    Smoking status: Never    Smokeless tobacco: Never    Tobacco comments:     .  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     Social Determinants of Health     Financial Resource Strain: Low Risk  (2023)    Overall Financial Resource Strain (CARDIA)     Difficulty of Paying Living Expenses: Not hard at all   Food Insecurity: No Food Insecurity (2023)    Hunger Vital Sign     Worried About Running Out of Food in the Last Year: Never true     Ran Out of Food in the Last Year: Never true   Transportation Needs: No Transportation Needs (2023)    PRAPARE - Transportation     Lack of Transportation (Medical): No     Lack of Transportation (Non-Medical): No   Physical Activity: Sufficiently Active (2023)    Exercise Vital Sign     Days of Exercise per Week: 2 days     Minutes of Exercise per Session: 120 min   Social Connections: Unknown (2023)    Social Connection and Isolation Panel [NHANES]     Frequency of Communication with Friends and Family: More than  three times a week     Frequency of Social Gatherings with Friends and Family: More than three times a week     Active Member of Clubs or Organizations: No     Attends Club or Organization Meetings: Never     Marital Status:    Housing Stability: Low Risk  (12/12/2023)    Housing Stability Vital Sign     Unable to Pay for Housing in the Last Year: No     Number of Places Lived in the Last Year: 1     Unstable Housing in the Last Year: No       Precautions:     Allergies as of 12/08/2023 - Reviewed 12/08/2023   Allergen Reaction Noted    Iodine and iodide containing products Hives 12/07/2012       WOC Assessment Details/Treatment   Patient seen for wound care follow up.   Reviewed chart for this encounter.   See Flow Sheet for findings.      Per chart review. Elbert Still is a 68 y.o. Male with ESRD on HD (MWF), HTN, and T2DM s/p multiple surgeries for AV fistula (most recent 2 weeks ago) who presents for 1 week of pain, swelling, erythema, and warmth to his LUE fistula site. Wound care removed old dressing before cleansing wound with Vashe. Wound care then applied Cavilon to the cami wound to protect the underlying skin. 1 piece of white sponge placed to wound before applying two pieces of black on top. Wound care ensured a proper seal was made before leaving. Pt was connected to the home vac. Wound care educated pt and wife about home vac usage and what to do in case of a leak. Pt denied any needs at this time.       RECOMMENDATIONS  Recommendations made to primary team for above plan via secured chat. Wound Care to follow up. Orders placed.     Discussed POC with patient and primary RN.   See EMR for orders & patient education.  Discussed POC with primary team.    Nursing to continue care.  Nursing to maintain pressure injury prevention interventions.  Contact wound care for any further questions.       12/15/23 1800   WOCN Assessment   WOCN Total Time (mins) 45   Visit Date 12/15/23   Visit Time 1800    Consult Type New   WOCN Speciality Wound   WOCN List wound vac   Wound shearing   Procedure wound vac   Intervention assessed;changed;applied;chart review;coordination of care;orders   Teaching on-going        Incision/Site 12/10/23 1542 Left Arm upper   Date First Assessed/Time First Assessed: 12/10/23 1542   Side: Left  Location: Arm  Orientation: upper   Wound Image    Drainage Amount None   Appearance Red;Dry;Adipose   Periwound Area Intact;Dry   Wound Edges Defined   Wound Length (cm) 7 cm   Wound Width (cm) 4.3 cm   Wound Depth (cm) 0.5 cm   Wound Volume (cm^3) 15.05 cm^3   Wound Surface Area (cm^2) 30.1 cm^2   Care Cleansed with:;Antimicrobial agent   Dressing Change Due 12/19/23        Negative Pressure Wound Therapy  12/13/23 1500 Left anterior;upper   Placement Date/Time: 12/13/23 1500   Side: Left  Orientation: anterior;upper  Location: Arm   NPWT Type Vacuum Therapy   Therapy Setting NPWT Continuous therapy   Pressure Setting NPWT 125 mmHg   Sponges Inserted NPWT 1;White;Black;2   Sponges Removed NPWT 2;White;1;Black     12/15/2023

## 2023-12-18 ENCOUNTER — PATIENT MESSAGE (OUTPATIENT)
Dept: ADMINISTRATIVE | Facility: CLINIC | Age: 68
End: 2023-12-18
Payer: COMMERCIAL

## 2023-12-18 ENCOUNTER — PATIENT OUTREACH (OUTPATIENT)
Dept: ADMINISTRATIVE | Facility: CLINIC | Age: 68
End: 2023-12-18
Payer: COMMERCIAL

## 2023-12-18 ENCOUNTER — TELEPHONE (OUTPATIENT)
Dept: VASCULAR SURGERY | Facility: CLINIC | Age: 68
End: 2023-12-18
Payer: COMMERCIAL

## 2023-12-18 LAB
BACTERIA SPEC ANAEROBE CULT: NORMAL
BACTERIA SPEC ANAEROBE CULT: NORMAL

## 2023-12-18 NOTE — TELEPHONE ENCOUNTER
Contacted pt in reference to appt tomorrow with vascular lab and with Dr. Rayo. Pt states he must reschedule due to wound care appt tomorrow. Appointment scheduled, pt verified. Appointment letter placed in mail.

## 2023-12-18 NOTE — PROGRESS NOTES
C3 nurse attempted to contact Elbert Still Jr.  for a TCC post hospital discharge follow up call. No answer. Left voicemail with callback information. The patient has a scheduled [facility] appointment with Angel Luis Boo MD  on 12/19/2023 @ 1120.

## 2023-12-18 NOTE — PLAN OF CARE
[facility] - [facility] ([facility])  Discharge Final Note    Primary Care Provider: Angel Luis Boo MD    Expected Discharge Date: 12/15/2023    Final Discharge Note (most recent)       Final Note - 12/18/23 0835          Final Note    Assessment Type Final Discharge Note (P)      Anticipated Discharge Disposition Home or Self Care (P)         Post-Acute Status    Coverage [place] (P)      Discharge Delays None known at this time (P)                      Important Message from Medicare  Important Message from Medicare regarding Discharge Appeal Rights: Given to patient/caregiver, Explained to patient/caregiver, Signed/date by patient/caregiver     Date IMM was signed: 12/14/23  Time IMM was signed: 1413    Contact Info       Angel Luis Boo MD   Specialty: Family Medicine   Relationship: PCP - General    [address] [place] [address]  J[place] [id number]   Phone: [phone]       Next Steps: Follow up    Instructions: F/U appointment   Morenita sent message to PCP to get pt. sceduled they will contact the pt.        Pt d/c'd with IV abx to be given after HD and wound care to be done by [facility].    LEN GalvanN, BS, RN, CCM

## 2023-12-18 NOTE — PROGRESS NOTES
Also taking Sensipar 30 mg qd     C3 nurse spoke with Elbert Still Jr.  for a TCC post hospital discharge follow up call. The patient has a scheduled [facility] appointment with Angel Luis Boo MD on 12/18/2023 @ 1100.

## 2023-12-19 ENCOUNTER — OFFICE VISIT (OUTPATIENT)
Dept: FAMILY MEDICINE | Facility: CLINIC | Age: 68
End: 2023-12-19
Payer: COMMERCIAL

## 2023-12-19 VITALS
OXYGEN SATURATION: 99 % | HEART RATE: 76 BPM | BODY MASS INDEX: 27.22 KG/M2 | WEIGHT: 212.06 LBS | SYSTOLIC BLOOD PRESSURE: 132 MMHG | DIASTOLIC BLOOD PRESSURE: 72 MMHG | HEIGHT: 74 IN | TEMPERATURE: 98 F

## 2023-12-19 DIAGNOSIS — R78.81 BACTEREMIA: ICD-10-CM

## 2023-12-19 DIAGNOSIS — E08.21 DIABETES MELLITUS DUE TO UNDERLYING CONDITION WITH DIABETIC NEPHROPATHY, WITHOUT LONG-TERM CURRENT USE OF INSULIN: ICD-10-CM

## 2023-12-19 DIAGNOSIS — N18.4 CKD (CHRONIC KIDNEY DISEASE) STAGE 4, GFR 15-29 ML/MIN: Chronic | ICD-10-CM

## 2023-12-19 DIAGNOSIS — I10 ESSENTIAL (PRIMARY) HYPERTENSION: ICD-10-CM

## 2023-12-19 DIAGNOSIS — T82.7XXS INFECTION OF ARTERIOVENOUS FISTULA, SEQUELA: ICD-10-CM

## 2023-12-19 DIAGNOSIS — E11.69 HYPERLIPIDEMIA ASSOCIATED WITH TYPE 2 DIABETES MELLITUS: ICD-10-CM

## 2023-12-19 DIAGNOSIS — E78.5 HYPERLIPIDEMIA ASSOCIATED WITH TYPE 2 DIABETES MELLITUS: ICD-10-CM

## 2023-12-19 DIAGNOSIS — Z99.2 ESRD ON DIALYSIS: ICD-10-CM

## 2023-12-19 DIAGNOSIS — Z99.2 DIALYSIS PATIENT: ICD-10-CM

## 2023-12-19 DIAGNOSIS — I77.0 ARTERIOVENOUS FISTULA: ICD-10-CM

## 2023-12-19 DIAGNOSIS — Z09 HOSPITAL DISCHARGE FOLLOW-UP: Primary | ICD-10-CM

## 2023-12-19 DIAGNOSIS — N18.6 ESRD ON DIALYSIS: ICD-10-CM

## 2023-12-19 PROCEDURE — 3078F PR MOST RECENT DIASTOLIC BLOOD PRESSURE < 80 MM HG: ICD-10-PCS | Mod: CPTII,S$GLB,, | Performed by: FAMILY MEDICINE

## 2023-12-19 PROCEDURE — 1111F DSCHRG MED/CURRENT MED MERGE: CPT | Mod: CPTII,S$GLB,, | Performed by: FAMILY MEDICINE

## 2023-12-19 PROCEDURE — 99214 PR OFFICE/OUTPT VISIT, EST, LEVL IV, 30-39 MIN: ICD-10-PCS | Mod: S$GLB,,, | Performed by: FAMILY MEDICINE

## 2023-12-19 PROCEDURE — 1101F PT FALLS ASSESS-DOCD LE1/YR: CPT | Mod: CPTII,S$GLB,, | Performed by: FAMILY MEDICINE

## 2023-12-19 PROCEDURE — 3044F PR MOST RECENT HEMOGLOBIN A1C LEVEL <7.0%: ICD-10-PCS | Mod: CPTII,S$GLB,, | Performed by: FAMILY MEDICINE

## 2023-12-19 PROCEDURE — 3288F PR FALLS RISK ASSESSMENT DOCUMENTED: ICD-10-PCS | Mod: CPTII,S$GLB,, | Performed by: FAMILY MEDICINE

## 2023-12-19 PROCEDURE — 1159F PR MEDICATION LIST DOCUMENTED IN MEDICAL RECORD: ICD-10-PCS | Mod: CPTII,S$GLB,, | Performed by: FAMILY MEDICINE

## 2023-12-19 PROCEDURE — 1125F PR PAIN SEVERITY QUANTIFIED, PAIN PRESENT: ICD-10-PCS | Mod: CPTII,S$GLB,, | Performed by: FAMILY MEDICINE

## 2023-12-19 PROCEDURE — 1125F AMNT PAIN NOTED PAIN PRSNT: CPT | Mod: CPTII,S$GLB,, | Performed by: FAMILY MEDICINE

## 2023-12-19 PROCEDURE — 3066F NEPHROPATHY DOC TX: CPT | Mod: CPTII,S$GLB,, | Performed by: FAMILY MEDICINE

## 2023-12-19 PROCEDURE — 1101F PR PT FALLS ASSESS DOC 0-1 FALLS W/OUT INJ PAST YR: ICD-10-PCS | Mod: CPTII,S$GLB,, | Performed by: FAMILY MEDICINE

## 2023-12-19 PROCEDURE — 99999 PR PBB SHADOW E&M-EST. PATIENT-LVL IV: CPT | Mod: PBBFAC,,, | Performed by: FAMILY MEDICINE

## 2023-12-19 PROCEDURE — 3044F HG A1C LEVEL LT 7.0%: CPT | Mod: CPTII,S$GLB,, | Performed by: FAMILY MEDICINE

## 2023-12-19 PROCEDURE — 99214 OFFICE O/P EST MOD 30 MIN: CPT | Mod: S$GLB,,, | Performed by: FAMILY MEDICINE

## 2023-12-19 PROCEDURE — 3072F PR LOW RISK FOR RETINOPATHY: ICD-10-PCS | Mod: CPTII,S$GLB,, | Performed by: FAMILY MEDICINE

## 2023-12-19 PROCEDURE — 1160F RVW MEDS BY RX/DR IN RCRD: CPT | Mod: CPTII,S$GLB,, | Performed by: FAMILY MEDICINE

## 2023-12-19 PROCEDURE — 3072F LOW RISK FOR RETINOPATHY: CPT | Mod: CPTII,S$GLB,, | Performed by: FAMILY MEDICINE

## 2023-12-19 PROCEDURE — 3078F DIAST BP <80 MM HG: CPT | Mod: CPTII,S$GLB,, | Performed by: FAMILY MEDICINE

## 2023-12-19 PROCEDURE — 3075F PR MOST RECENT SYSTOLIC BLOOD PRESS GE 130-139MM HG: ICD-10-PCS | Mod: CPTII,S$GLB,, | Performed by: FAMILY MEDICINE

## 2023-12-19 PROCEDURE — 99999 PR PBB SHADOW E&M-EST. PATIENT-LVL IV: ICD-10-PCS | Mod: PBBFAC,,, | Performed by: FAMILY MEDICINE

## 2023-12-19 PROCEDURE — 3075F SYST BP GE 130 - 139MM HG: CPT | Mod: CPTII,S$GLB,, | Performed by: FAMILY MEDICINE

## 2023-12-19 PROCEDURE — 3066F PR DOCUMENTATION OF TREATMENT FOR NEPHROPATHY: ICD-10-PCS | Mod: CPTII,S$GLB,, | Performed by: FAMILY MEDICINE

## 2023-12-19 PROCEDURE — 1159F MED LIST DOCD IN RCRD: CPT | Mod: CPTII,S$GLB,, | Performed by: FAMILY MEDICINE

## 2023-12-19 PROCEDURE — 1160F PR REVIEW ALL MEDS BY PRESCRIBER/CLIN PHARMACIST DOCUMENTED: ICD-10-PCS | Mod: CPTII,S$GLB,, | Performed by: FAMILY MEDICINE

## 2023-12-19 PROCEDURE — 3288F FALL RISK ASSESSMENT DOCD: CPT | Mod: CPTII,S$GLB,, | Performed by: FAMILY MEDICINE

## 2023-12-19 PROCEDURE — 1111F PR DISCHARGE MEDS RECONCILED W/ CURRENT OUTPATIENT MED LIST: ICD-10-PCS | Mod: CPTII,S$GLB,, | Performed by: FAMILY MEDICINE

## 2023-12-19 NOTE — PROGRESS NOTES
"  Physical Exam  /72 (BP Location: Right arm, Patient Position: Sitting, BP Method: Medium (Manual))   Pulse 76   Temp 98.1 °F (36.7 °C) (Oral)   Ht 6' 2" (1.88 m)   Wt 96.2 kg (212 lb 1.3 oz)   SpO2 99%   BMI 27.23 kg/m²      Office Visit    Patient Name: Elbert Still Jr.    : 1955  MRN: [id number]      Assessment/Plan:  Elbert Still Jr. is a 68 y.o. male who presents today for :    Hospital discharge follow-up  Infection of arteriovenous fistula, sequela  Bacteremia  ESRD on dialysis  -afebrile/VSS, doing well post-discharge  -continue IV antibiotics until the course is finished as well as expectant management with limb elevation  -repeat cultures reviewed and negative to date  -follow up with ID/wound Care/Vascular as scheduled  -call clinic back with any concerns      -Diabetes mellitus due to underlying condition with diabetic nephropathy - diet controlled  -Essential (primary) hypertension  -HLD associated with type 2 DM  -CKD (chronic kidney disease) stage 4, GFR 15-29 ml/min  -Dialysis patient - MWF - started 2022  Arteriovenous fistula  -continue current medication regimen  -DASH diet, regular cardiovascular exercises              IMPRESSION:  Status post excision of left upper arm graft 3 weeks ago, which was a long revision from a prior brachial basilic fistula  Forearm and hand swelling     PLAN:    Elevate left arm as possible  Continue with wound care Perri until completely healed  Follow up with me in 6-8 weeks with right arm vein mapping               Follow up for worsening Sx. Urgent care/ED precautions provided.      This note was created by combination of typed  and MModal dictation.  Transcription errors may be present.  If there are any questions, please contact me.        ----------------------------------------------------------------------------------------------------------------------      HPI:  Patient Care Team:  Angel Luis Boo MD as PCP - General " (Family Medicine)  Walter Díaz MD as Consulting Physician (Nephrology)  Derrick Julio MA as Care Coordinator    Elbert is a 68 y.o. male with      Patient Active Problem List   Diagnosis    -Diabetes mellitus due to underlying condition with diabetic nephropathy - diet controlled    Proteinuria    -Essential (primary) hypertension    Mixed hyperlipidemia    -CKD (chronic kidney disease) stage 4, GFR 15-29 ml/min    -Gout    Anemia of chronic disease    -HLD associated with type 2 DM    Hyperkalemia    Acute kidney injury superimposed on chronic kidney disease    -Dialysis patient - MWF - started 5/2022    ESRD on dialysis    Nuclear sclerosis of both eyes    Refractive error    Cortical age-related cataract    Status post placement of arteriovenous graft    BMI 28.0-28.9,adult    Steal syndrome dialysis vascular access, initial encounter    Arteriovenous fistula    ESRD (end stage renal disease) on dialysis    ESRD (end stage renal disease)    Other neutropenia    End stage renal failure on dialysis    Stenosis of arteriovenous dialysis fistula    Hypocalcemia    Bacteremia    Type 2 diabetes mellitus       Elbert presents today for f/u of recent hospital stay for localized infection of his LUE fistula site. He was initially started on Vancomycin/Zosyn empirically. His blood culture grew +cocci. He has had multiple surgeries for his AV fistula with the most recent one being over a week ago. ID was consulted to manage his IV antibiotic, for which he was discharged home on Cefazolin IV x6 weeks. He continues to go HD MWF. He as f/u with ID as outpatient on 1/2/24. His repeat cultures have all come back negative to date.  He has some LUE swelling and forearm pain that comes and goes and is tolerable at this time, but has overall improved since his hospital discharge.  He is getting his dialysis through the new right [facility] without any issues. Otherwise, no other acute issues during this visit. He is  getting regular wound care.        Additional ROS    CONST: no fever, no activity change  EYES: no vision change.   ENT: no sore throat. No dysphagia.   CV: no CP with exertion  RESP: no SOB  GI: no N/V/diarrhea/constipation  : no urinary concerns  MSK: no new myalgias or arthralgias.   SKIN: see HPI  NEURO: no focal deficits.   PSYCH: no new issues.   ENDOCRINE: no polyuria.     [facility] ([address] Medicine  Discharge Summary        Patient Name: Elbert Still Jr.  MRN: [id number]  PAULETTE: [id number]  Patient Class: IP- Inpatient  Admission Date: 12/8/2023  Hospital Length of Stay: 6 days  Discharge Date and Time:  12/15/2023 3:38 PM  Attending Physician: Eduar Rodriguez MD   Discharging Provider: Alejandrina Carmichael DO  Primary Care Provider: Angel Luis Boo MD  [facility] Medicine Team: [facility] 1 Alejandrina Carmichael DO  Primary Care Team: [facility] 1     HPI:   Elbert Still is a 68 y.o. Male with ESRD on HD (MWF), HTN, and T2DM s/p multiple surgeries for AV fistula (most recent 2 weeks ago) who presents for 1 week of pain, swelling, erythema, and warmth to his LUE fistula site. He tells me that today (12/8) his nephrologist got blood culture results that were drawn on 12/6 that were positive for gram positive cocci in clusters and chains in 2/2 bottles, and told him to present to the ED for IV antibiotics. He does not currently get HD through the AV fistula in question, he has a tunneled catheter. He reports he is in the process of being evaluated for kidney transplant.     The patient reports additional symptoms of mild cough/runny nose that he has had for about a week. These symptoms are similar to some his wife had about 2 weeks ago. He denies fevers, chills, SOB, N/V, abdominal pain, headaches, lightheadedness, or urinary symptoms.     In the ED, he was hypertensive on arrival but otherwise stable. CBC with no leukocytosis, HGB 11.3. CMP with BUN/Cr 34/6.0. Lactate 1.11.  US HD access with concerns for hematoma vs abscess with soft tissue changes suggestive of inflammation/infection. Vascular surgery was consulted in the ED and recommended admission to Hospital Medicine to continue IV antibiotics and to keep NPO for possible debridement in the morning.     Procedure(s) (LRB):  EXCISION, AV FISTULA (Left)  WASHOUT OF AV FISTULA (Left)       Hospital Course:   68 y.o. Male with ESRD on HD (MWF), HTN, and T2DM s/p multiple surgeries for AV fistula (most recent 2 weeks ago) who presents for 1 week of pain, swelling, erythema, and warmth to his LUE fistula site. Bcx grew gram positive cocci. He was started on empiric vanc/zosyn. He had a graft excision with vascular surgery 12/10. He had his tunneled catheter removed by IR and replaced on 12/13. Repeat bx showed ngtd. ID was following and he was discharged with IV Cefazolin 2gm/2gm/3gm 3x/wk after HD for 6 weeks. A wound vac was placed for post-op wound closure and will follow outpatient in clinic. He will follow with ID outpatient 1/2/24.            Patient Active Problem List   Diagnosis    -Diabetes mellitus due to underlying condition with diabetic nephropathy - diet controlled    Proteinuria    -Essential (primary) hypertension    Mixed hyperlipidemia    -CKD (chronic kidney disease) stage 4, GFR 15-29 ml/min    -Gout    Anemia of chronic disease    -HLD associated with type 2 DM    Hyperkalemia    Acute kidney injury superimposed on chronic kidney disease    -Dialysis patient - [facility] - started 5/2022    ESRD on dialysis    Nuclear sclerosis of both eyes    Refractive error    Cortical age-related cataract    Status post placement of arteriovenous graft    BMI 28.0-28.9,adult    Steal syndrome dialysis vascular access, initial encounter    Arteriovenous fistula    ESRD (end stage renal disease) on dialysis    ESRD (end stage renal disease)    Other neutropenia    End stage renal failure on dialysis    Stenosis of arteriovenous dialysis  fistula    Hypocalcemia    Bacteremia    Type 2 diabetes mellitus       Current Medications  Medications reviewed and updated.       Current Outpatient Medications:     acetaminophen (TYLENOL) 500 MG tablet, Take 2 tablets (1,000 mg total) by mouth every 6 (six) hours as needed for Pain., Disp: 30 tablet, Rfl: 0    aspirin (ECOTRIN) 81 MG EC tablet, Take 1 tablet (81 mg total) by mouth once daily., Disp: 90 tablet, Rfl: 3    atorvastatin (LIPITOR) 20 MG tablet, TAKE 1 TABLET BY MOUTH EVERY EVENING, Disp: 90 tablet, Rfl: 0    blood sugar diagnostic Strp, Use to check blood glucose once a day., Disp: 100 each, Rfl: 3    blood-glucose meter Misc, Use to check blood glucose twice a day., Disp: 1 each, Rfl: 0    carvediloL (COREG) 12.5 MG tablet, Take 1 tablet (12.5 mg total) by mouth 2 (two) times daily., Disp: 60 tablet, Rfl: 11    colchicine (COLCRYS) 0.6 mg tablet, Take 0.6 mg by mouth as needed (for gout flare). Take half tab (0.3 mg) by mouth daily as needed for gout flare., Disp: 90 tablet, Rfl: 3    dextrose 5 % in water (D5W) PgBk 50 mL with ceFAZolin 1 gram SolR, Inject 2 g into the vein every Monday & Wednesday and 3 g every Friday after dialysis. (End 1/18/24), Disp: , Rfl: 0    diclofenac sodium (VOLTAREN) 1 % Gel, Apply 2 g topically once daily., Disp: 20 g, Rfl: 0    fluticasone propionate (FLONASE) 50 mcg/actuation nasal spray, 2 sprays (100 mcg total) by Each Nostril route once daily., Disp: 16 g, Rfl: 11    furosemide (LASIX) 80 MG tablet, TAKE 1 TABLET(80 MG) BY MOUTH TWICE DAILY, Disp: 60 tablet, Rfl: 11    lancets 30 gauge Misc, Use to check blood glucose twice a day., Disp: 100 each, Rfl: 5    LIDOcaine (LIDODERM) 5 %, Place 2 patches onto the skin once daily. Remove & Discard patch within 12 hours or as directed by MD, Disp: 20 patch, Rfl: 0    LIDOcaine-prilocaine (EMLA) cream, SMARTSIG:sparingly Topical As Directed, Disp: , Rfl:     NIFEdipine (PROCARDIA-XL) 60 MG (OSM) 24 hr tablet, Take 1  tablet (60 mg total) by mouth 2 (two) times a day., Disp: 60 tablet, Rfl: 11    sevelamer carbonate (RENVELA) 800 mg Tab, TAKE 2 TABLETS(1600 MG) BY MOUTH THREE TIMES DAILY WITH MEALS, Disp: 180 tablet, Rfl: 3    sodium bicarbonate 650 MG tablet, Take 2 tablets (1,300 mg total) by mouth 2 (two) times daily., Disp: 120 tablet, Rfl: 0    tamsulosin (FLOMAX) 0.4 mg Cap, Take 1 capsule (0.4 mg total) by mouth once daily., Disp: 90 capsule, Rfl: 3  No current facility-administered medications for this visit.    Facility-Administered Medications Ordered in Other Visits:     0.9%  NaCl infusion, , Intravenous, Continuous, Kim Lutz NP    0.9%  NaCl infusion, , Intravenous, Continuous, Herminia Durant MD    ceFAZolin injection 2 g, 2 g, Intravenous, 1 time in Clinic/HOD, Renee Danielson MD    [START ON 12/28/2023] heparin (porcine) injection 1,200 Units, 1,200 Units, Intravenous, Continuous, Nawaf Butler MD, 1,200 Units at 12/27/23 0554    heparin (porcine) injection 2,000 Units, 2,000 Units, Intravenous, 1 time in Clinic/HOD, Nawaf Butler MD    heparin (porcine) injection 2,000 Units, 2,000 Units, Intravenous, 1 time in Clinic/HOD, Nawaf Butler MD    ondansetron disintegrating tablet 8 mg, 8 mg, Oral, Q8H PRN, Herminia Durant MD    Past Surgical History:   Procedure Laterality Date    ADENOIDECTOMY      ADENOIDECTOMY      AV FISTULA PLACEMENT Left 09/06/2022    Procedure: CREATION, AV FISTULA;  Surgeon: Prince Gardiner MD;  Location: [facility] OR [address] [place] [address];  Service: Peripheral Vascular;  Laterality: Left;    EXCISION OF ARTERIOVENOUS FISTULA Left 12/10/2023    Procedure: EXCISION, AV FISTULA;  Surgeon: Joaquin Rose MD;  Location: [facility] OR [address] [place] [address];  Service: Vascular;  Laterality: Left;    FISTULOGRAM Left 2/16/2023    Procedure: FISTULOGRAM;  Surgeon: Prince Gardiner MD;  Location: [facility] OR 2ND FLR;  Service: Peripheral Vascular;  Laterality: Left;  Given to the sterile field.     FISTULOGRAM  Left 7/6/2023    Procedure: Fistulogram;  Surgeon: LETY Rayo III, MD;  Location: [facility] OR 2ND FLR;  Service: Peripheral Vascular;  Laterality: Left;  1.9 min  22.86 mGy  3.8862 Gy.cm  20ml Dye     nasal septum repair      NASAL SEPTUM SURGERY      PERCUTANEOUS TRANSLUMINAL ANGIOPLASTY OF ARTERIOVENOUS FISTULA Left 11/22/2022    Procedure: PTA, AV FISTULA;  Surgeon: Prince Gardiner MD;  Location: [facility] CATH LAB;  Service: Peripheral Vascular;  Laterality: Left;    PERCUTANEOUS TRANSLUMINAL ANGIOPLASTY OF ARTERIOVENOUS FISTULA Left 2/16/2023    Procedure: PTA, AV FISTULA;  Surgeon: Prince Gardiner MD;  Location: [facility] OR 2ND FLR;  Service: Peripheral Vascular;  Laterality: Left;  4.8 min  43.08 mGy  4.0682 Gy.cm  32ml Dye    PERCUTANEOUS TRANSLUMINAL ANGIOPLASTY OF ARTERIOVENOUS FISTULA Left 7/6/2023    Procedure: PTA, AV FISTULA;  Surgeon: LETY Rayo III, MD;  Location: [facility] OR 2ND FLR;  Service: Peripheral Vascular;  Laterality: Left;    PROSTATE BIOPSY N/A 3/24/2023    Procedure: BIOPSY, PROSTATE;  Surgeon: Frankie Welch MD;  Location: [facility] OR 1ST FLR;  Service: Urology;  Laterality: N/A;  transrectal, 30min, uronav needed    REVISION OF ARTERIOVENOUS FISTULA Left 11/9/2023    Procedure: REVISION, AV FISTULA;  Surgeon: LETY Rayo III, MD;  [facility];  Service: Vascular;  Laterality: Left;  LUE AVG revision    ROTATOR CUFF REPAIR Left     SALIVARY GLAND SURGERY      Tonsillectomy      TONSILLECTOMY      TRANSPOSITION OF BASILIC VEIN Left 11/1/2022    Procedure: TRANSPOSITION, VEIN, BASILIC;  Surgeon: Prince Gardiner MD;  Location: [facility] OR 2ND FLR;  Service: Peripheral Vascular;  Laterality: Left;  Left Brachial vein.    WASHOUT Left 12/10/2023    Procedure: WASHOUT OF AV FISTULA;  Surgeon: Joaquin Rose MD;  Location: [facility] OR 2ND FLR;  Service: Vascular;  Laterality: Left;       Family History   Problem Relation Age of Onset    Heart disease Mother     Kidney disease Mother      Hypertension Mother     No Known Problems Father     Cancer Sister     Seizures Sister     Hypertension Sister     Stroke Sister     Amblyopia Neg Hx     Blindness Neg Hx     Cataracts Neg Hx     Diabetes Neg Hx     Glaucoma Neg Hx     Macular degeneration Neg Hx     Retinal detachment Neg Hx     Strabismus Neg Hx     Thyroid disease Neg Hx     Anesthesia problems Neg Hx        Social History     Socioeconomic History    Marital status:      Spouse name: Kristine Still   Occupational History     Employer: [employer]   Tobacco Use    Smoking status: Never    Smokeless tobacco: Never    Tobacco comments:     .  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     Social Determinants of Health     Financial Resource Strain: Low Risk  (12/12/2023)    Overall Financial Resource Strain (CARDIA)     Difficulty of Paying Living Expenses: Not hard at all   Food Insecurity: No Food Insecurity (12/12/2023)    Hunger Vital Sign     Worried About Running Out of Food in the Last Year: Never true     Ran Out of Food in the Last Year: Never true   Transportation Needs: No Transportation Needs (12/12/2023)    PRAPARE - Transportation     Lack of Transportation (Medical): No     Lack of Transportation (Non-Medical): No   Physical Activity: Sufficiently Active (12/12/2023)    Exercise Vital Sign     Days of Exercise per Week: 2 days     Minutes of Exercise per Session: 120 min   Social Connections: Unknown (12/12/2023)    Social Connection and Isolation Panel [NHANES]     Frequency of Communication with Friends and Family: More than three times a week     Frequency of Social Gatherings with Friends and Family: More than three times a  "week     Active Member of Clubs or Organizations: No     Attends Club or Organization Meetings: Never     Marital Status:    Housing Stability: Low Risk  (12/12/2023)    Housing Stability Vital Sign     Unable to Pay for Housing in the Last Year: No     Number of Places Lived in the Last Year: 1     Unstable Housing in the Last Year: No           Allergies   Review of patient's allergies indicates:   Allergen Reactions    Iodine and iodide containing products Hives     Hypotension, Flushing             Review of Systems  See HPI        [unfilled]  /72 (BP Location: Right arm, Patient Position: Sitting, BP Method: Medium (Manual))   Pulse 76   Temp 98.1 °F (36.7 °C) (Oral)   Ht 6' 2" (1.88 m)   Wt 96.2 kg (212 lb 1.3 oz)   SpO2 99%   BMI 27.23 kg/m²     GEN: NAD, well developed, pleasant, well nourished  HEENT: NCAT, PERRLA, EOMI, sclera clear, anicteric  NECK: normal, supple with midline trachea, no LAD, no thyromegaly  LUNGS: CTAB, no w/r/r, normal effort, no increased work of breathing,  HEART: RRR, normal S1 and S2, no m/r/g, no palpitations, no edema, normal pulses  ABD: s/nt/nd, NABS, no organomegaly, no masses  SKIN: warm and dry with normal turgor, no rashes,  PSYCH: AOx3, appropriate mood and affect.   MSK: extremities warm/well perfused, normal ROM in all 4 extremities, no c/c/e except for mild edema of L forearm/hand with normal distal pulses. Wound dressing in place - deferred exam.  NEURO: normal without focal findings, CN II-XII are intact.  Sensation grossly normal, gait and station normal.         "

## 2023-12-20 ENCOUNTER — TELEPHONE (OUTPATIENT)
Dept: UROLOGY | Facility: CLINIC | Age: 68
End: 2023-12-20
Payer: COMMERCIAL

## 2023-12-21 ENCOUNTER — HOSPITAL ENCOUNTER (OUTPATIENT)
Dept: WOUND CARE | Facility: HOSPITAL | Age: 68
Discharge: HOME | End: 2023-12-21
Attending: SURGERY
Payer: COMMERCIAL

## 2023-12-21 VITALS
BODY MASS INDEX: 27.21 KG/M2 | TEMPERATURE: 98 F | HEIGHT: 74 IN | SYSTOLIC BLOOD PRESSURE: 166 MMHG | DIASTOLIC BLOOD PRESSURE: 79 MMHG | WEIGHT: 212 LBS | HEART RATE: 69 BPM

## 2023-12-21 DIAGNOSIS — T81.89XA NON-HEALING SURGICAL WOUND, INITIAL ENCOUNTER: Primary | ICD-10-CM

## 2023-12-21 PROCEDURE — 99203 OFFICE O/P NEW LOW 30 MIN: CPT

## 2023-12-21 NOTE — PROGRESS NOTES
Wound Care & [facility]    Subjective:       Patient ID: Elbert Still Jr. is a 68 y.o. male.    Chief Complaint: Non-healing Wound    Admit to wound care S/p surgery for Left Upper Arm AV Fistula Excision 12/10/23.  Hx: HTN, DM DIALYSIS(M,W,F). [employer] wound vac in place at 125 mmHg continuous. Receiving Ancef Iv with dialysis.  No signs of infection, pain or drainage from wound.  Wound vac discontinued.  Rx given for Bactroban ointment. Wife will change dressing every other day with Bactroban, xeroform and abd pads. Supply order sent to insurance for review. Return to clinic in one  week.   Review of Systems   Constitutional: Negative.    HENT: Negative.     Eyes: Negative.    Respiratory: Negative.     Cardiovascular: Negative.    Gastrointestinal: Negative.    Genitourinary: Negative.    Musculoskeletal: Negative.    Neurological: Negative.    Psychiatric/Behavioral: Negative.         Objective:     Vitals:    12/21/23 1129   BP: (!) 166/79   Pulse: 69   Temp: 98.1 °F (36.7 °C)         Physical Exam  Constitutional:       Appearance: He is well-developed.   HENT:      Head: Normocephalic.   Eyes:      Conjunctiva/sclera: Conjunctivae normal.      Pupils: Pupils are equal, round, and reactive to light.   Cardiovascular:      Rate and Rhythm: Normal rate and regular rhythm.      Heart sounds: Normal heart sounds.   Pulmonary:      Effort: Pulmonary effort is normal.      Breath sounds: Normal breath sounds.   Abdominal:      General: Bowel sounds are normal.      Palpations: Abdomen is soft.   Musculoskeletal:         General: Normal range of motion.      Cervical back: Normal range of motion and neck supple.   Skin:     General: Skin is warm and dry.   Neurological:      Mental Status: He is alert and oriented to person, place, and time.      Deep Tendon Reflexes: Reflexes are normal and symmetric.        Altered Skin Integrity 12/21/23 1033 Right Clavicle (Active)   12/21/23  1033   Altered Skin Integrity Present on Admission - Did Patient arrive to the hospital with altered skin?:    Side: Right   Orientation:    Location: Clavicle   Wound Number:    Is this injury device related?:    Primary Wound Type:    Description of Altered Skin Integrity:    Ankle-Brachial Index:    Pulses:    Removal Indication and Assessment:    Wound Outcome:    (Retired) Wound Length (cm):    (Retired) Wound Width (cm):    (Retired) Depth (cm):    Wound Description (Comments):    Removal Indications:    Description of Altered Skin Integrity Full thickness tissue loss. Subcutaneous fat may be visible but bone, tendon or muscle are not exposed 12/21/23 1141   Drainage Amount Scant 12/21/23 1141   Drainage Characteristics/Odor Serosanguineous 12/21/23 1141   Appearance Red;Moist 12/21/23 1141   Tissue loss description Full thickness 12/21/23 1141   Red (%), Wound Tissue Color 100 % 12/21/23 1141   Periwound Area Intact;Dry 12/21/23 1141   Wound Edges Open 12/21/23 1141   Wound Length (cm) 0.5 cm 12/21/23 1141   Wound Width (cm) 0.5 cm 12/21/23 1141   Wound Depth (cm) 0.1 cm 12/21/23 1141   Wound Volume (cm^3) 0.025 cm^3 12/21/23 1141   Wound Surface Area (cm^2) 0.25 cm^2 12/21/23 1141   Care Cleansed with:;Sterile normal saline;Applied:;Steri-strips 12/21/23 1141   Dressing Island/border 12/21/23 1141   Periwound Care Absorptive dressing applied;Dry periwound area maintained 12/21/23 1141   Dressing Change Due 12/28/23 12/21/23 1141            Incision/Site 12/10/23 1542 Left Arm upper (Active)   12/10/23 1542   Present Prior to Hospital Arrival?:    Side: Left   Location: Arm   Orientation: upper   Incision Type:    Closure Method:    Additional Comments:    Removal Indication and Assessment:    Wound Outcome:    Removal Indications:    Wound Image   12/21/23 1141   Dressing Appearance Intact;Moist drainage 12/21/23 1141   Drainage Amount Small 12/21/23 1141   Drainage Characteristics/Odor Serosanguineous  12/21/23 1141   Appearance Red;Moist;Yellow 12/21/23 1141   Red (%), Wound Tissue Color 90 % 12/21/23 1141   Yellow (%), Wound Tissue Color 10 % 12/21/23 1141   Periwound Area Intact;Dry;Edematous 12/21/23 1141   Wound Edges Open 12/21/23 1141   Wound Length (cm) 7 cm 12/21/23 1141   Wound Width (cm) 4.2 cm 12/21/23 1141   Wound Depth (cm) 0.5 cm 12/21/23 1141   Wound Volume (cm^3) 14.7 cm^3 12/21/23 1141   Wound Surface Area (cm^2) 29.4 cm^2 12/21/23 1141   Care Cleansed with:;Sterile normal saline 12/21/23 1141   Dressing Applied;Non-adherent;Absorptive Pad;Rolled gauze 12/21/23 1141   Periwound Care Absorptive dressing applied;Dry periwound area maintained 12/21/23 1141   Dressing Change Due 12/23/23 12/21/23 1141       [REMOVED]      Negative Pressure Wound Therapy  12/13/23 1500 Left anterior;upper (Removed)   12/13/23 1500   Side: Left   Orientation: anterior;upper   Location: Arm   Additional Comments:    Removal Indication and Assessment:    Location:    SDO Location:    Removed 12/21/23 1059   NPWT Type Vacuum Therapy 12/21/23 1141   Therapy Setting NPWT Continuous therapy 12/21/23 1141   Pressure Setting NPWT 125 mmHg 12/21/23 1141   Sponges Removed NPWT Black;White;2 12/21/23 1141   General Output (mL) 0 12/21/23 1141         Assessment/Plan:         ICD-10-CM ICD-9-CM   1. Non-healing surgical wound, initial encounter  T81.89XA 998.83           Tissue pathology and/or culture taken:  [] Yes [x] No   Sharp debridement performed:   [] Yes [x] No   Labs ordered this visit:   [] Yes [x] No   Imaging ordered this visit:   [] Yes [x] No           Orders Placed This Encounter   Procedures    Change dressing     Left Arm Incision    Cleanse wound with:Normal Saline  Lidocaine:PRN  Silver nitrate:PRN  Periwound care:Maintain dry cami wound  Primary dressing:Mupirocin Ointment, Xeroform   Secondary dressing:Large ABD Pad, Kerlix, Tape, Flexinet.   Edema control: Elevate RLE as much as possible  Frequency:Every  Other Day and Prn per     Right Clavicle   Cleanse with normal saline, steri stips, large band-aid. Change weekly     Follow-up:  1 week     Other Orders:Continue Iv Ancef with Dialysis  Rx given for Bactroban Ointment.        Follow up in about 1 week (around 12/28/2023) for  .            This includes face to face time and non-face to face time preparing to see the patient (eg, review of tests), obtaining and/or reviewing separately obtained history, documenting clinical information in the electronic or other health record, independently interpreting results and communicating results to the patient/family/caregiver, or care coordinator.

## 2023-12-22 ENCOUNTER — TELEPHONE (OUTPATIENT)
Dept: UROLOGY | Facility: CLINIC | Age: 68
End: 2023-12-22
Payer: COMMERCIAL

## 2023-12-26 ENCOUNTER — HOSPITAL ENCOUNTER (OUTPATIENT)
Dept: VASCULAR SURGERY | Facility: CLINIC | Age: 68
Discharge: HOME | End: 2023-12-26
Payer: COMMERCIAL

## 2023-12-26 ENCOUNTER — OFFICE VISIT (OUTPATIENT)
Dept: VASCULAR SURGERY | Facility: CLINIC | Age: 68
End: 2023-12-26
Payer: COMMERCIAL

## 2023-12-26 VITALS
TEMPERATURE: 98 F | BODY MASS INDEX: 27.45 KG/M2 | WEIGHT: 213.88 LBS | DIASTOLIC BLOOD PRESSURE: 77 MMHG | HEIGHT: 74 IN | SYSTOLIC BLOOD PRESSURE: 150 MMHG | HEART RATE: 63 BPM

## 2023-12-26 DIAGNOSIS — N18.6 ESRD (END STAGE RENAL DISEASE) ON DIALYSIS: Primary | ICD-10-CM

## 2023-12-26 DIAGNOSIS — Z99.2 ESRD (END STAGE RENAL DISEASE) ON DIALYSIS: Primary | ICD-10-CM

## 2023-12-26 DIAGNOSIS — Z99.2 ESRD ON DIALYSIS: Chronic | ICD-10-CM

## 2023-12-26 DIAGNOSIS — N18.6 ESRD ON DIALYSIS: Chronic | ICD-10-CM

## 2023-12-26 DIAGNOSIS — T82.898A STEAL SYNDROME DIALYSIS VASCULAR ACCESS, INITIAL ENCOUNTER: ICD-10-CM

## 2023-12-26 DIAGNOSIS — I77.0 ARTERIOVENOUS FISTULA: ICD-10-CM

## 2023-12-26 PROCEDURE — 1101F PT FALLS ASSESS-DOCD LE1/YR: CPT | Mod: CPTII,S$GLB,, | Performed by: SURGERY

## 2023-12-26 PROCEDURE — 1126F AMNT PAIN NOTED NONE PRSNT: CPT | Mod: CPTII,S$GLB,, | Performed by: SURGERY

## 2023-12-26 PROCEDURE — 99999 PR PBB SHADOW E&M-EST. PATIENT-LVL V: CPT | Mod: PBBFAC,,, | Performed by: SURGERY

## 2023-12-26 PROCEDURE — 3044F HG A1C LEVEL LT 7.0%: CPT | Mod: CPTII,S$GLB,, | Performed by: SURGERY

## 2023-12-26 PROCEDURE — 3078F DIAST BP <80 MM HG: CPT | Mod: CPTII,S$GLB,, | Performed by: SURGERY

## 2023-12-26 PROCEDURE — 3077F SYST BP >= 140 MM HG: CPT | Mod: CPTII,S$GLB,, | Performed by: SURGERY

## 2023-12-26 PROCEDURE — 99999 PR PBB SHADOW E&M-EST. PATIENT-LVL V: ICD-10-PCS | Mod: PBBFAC,,, | Performed by: SURGERY

## 2023-12-26 PROCEDURE — 99024 PR POST-OP FOLLOW-UP VISIT: ICD-10-PCS | Mod: S$GLB,,, | Performed by: SURGERY

## 2023-12-26 PROCEDURE — 3288F FALL RISK ASSESSMENT DOCD: CPT | Mod: CPTII,S$GLB,, | Performed by: SURGERY

## 2023-12-26 PROCEDURE — 3066F PR DOCUMENTATION OF TREATMENT FOR NEPHROPATHY: ICD-10-PCS | Mod: CPTII,S$GLB,, | Performed by: SURGERY

## 2023-12-26 PROCEDURE — 1160F PR REVIEW ALL MEDS BY PRESCRIBER/CLIN PHARMACIST DOCUMENTED: ICD-10-PCS | Mod: CPTII,S$GLB,, | Performed by: SURGERY

## 2023-12-26 PROCEDURE — 1126F PR PAIN SEVERITY QUANTIFIED, NO PAIN PRESENT: ICD-10-PCS | Mod: CPTII,S$GLB,, | Performed by: SURGERY

## 2023-12-26 PROCEDURE — 3288F PR FALLS RISK ASSESSMENT DOCUMENTED: ICD-10-PCS | Mod: CPTII,S$GLB,, | Performed by: SURGERY

## 2023-12-26 PROCEDURE — 3044F PR MOST RECENT HEMOGLOBIN A1C LEVEL <7.0%: ICD-10-PCS | Mod: CPTII,S$GLB,, | Performed by: SURGERY

## 2023-12-26 PROCEDURE — 3072F PR LOW RISK FOR RETINOPATHY: ICD-10-PCS | Mod: CPTII,S$GLB,, | Performed by: SURGERY

## 2023-12-26 PROCEDURE — 3078F PR MOST RECENT DIASTOLIC BLOOD PRESSURE < 80 MM HG: ICD-10-PCS | Mod: CPTII,S$GLB,, | Performed by: SURGERY

## 2023-12-26 PROCEDURE — 1101F PR PT FALLS ASSESS DOC 0-1 FALLS W/OUT INJ PAST YR: ICD-10-PCS | Mod: CPTII,S$GLB,, | Performed by: SURGERY

## 2023-12-26 PROCEDURE — 3077F PR MOST RECENT SYSTOLIC BLOOD PRESSURE >= 140 MM HG: ICD-10-PCS | Mod: CPTII,S$GLB,, | Performed by: SURGERY

## 2023-12-26 PROCEDURE — 1159F PR MEDICATION LIST DOCUMENTED IN MEDICAL RECORD: ICD-10-PCS | Mod: CPTII,S$GLB,, | Performed by: SURGERY

## 2023-12-26 PROCEDURE — 3072F LOW RISK FOR RETINOPATHY: CPT | Mod: CPTII,S$GLB,, | Performed by: SURGERY

## 2023-12-26 PROCEDURE — 99024 POSTOP FOLLOW-UP VISIT: CPT | Mod: S$GLB,,, | Performed by: SURGERY

## 2023-12-26 PROCEDURE — 1159F MED LIST DOCD IN RCRD: CPT | Mod: CPTII,S$GLB,, | Performed by: SURGERY

## 2023-12-26 PROCEDURE — 3066F NEPHROPATHY DOC TX: CPT | Mod: CPTII,S$GLB,, | Performed by: SURGERY

## 2023-12-26 PROCEDURE — 1160F RVW MEDS BY RX/DR IN RCRD: CPT | Mod: CPTII,S$GLB,, | Performed by: SURGERY

## 2023-12-26 NOTE — PROGRESS NOTES
VASCULAR SURGERY SERVICE    HISTORY OF PRESENT ILLNESS: Elbert Still Jr. is a 68 y.o. male with end-stage renal disease dialyzing via a right IJ PermCath, status post:    1aa. Excision, right AV graft for infection 12/10/2023 (Dr. Rose)  1a.  Right AV fistula Revision with long segment PTFE 11/09/2023  PTA, mid AV fistula stenosis 02/16/2023  Trans radial angioplasty, left distal brachial artery 11/22/2022  Transposition, left ratio vein brachial artery AV fistula 11/01/2022  Original left brachial artery and vein AV fistula creation 09/06/2022    All the above procedures were performed by Dr. Gardiner.  The patient has asked to change vascular surgeons.  The patient has not been able to successfully access the fistula, and when this was last tried 6 weeks ago he pulled clots.    He reports forearm numbness which has been present since his 1st procedure.   initially also had some significant steal symptoms which resolved after the brachial artery intervention in November 2022.  He denies any hand pain weakness or pain.    He is frustrated that he has been unable to use this fistula despite several procedures after its initial placement now 9 months ago    7/18/2023:  He now returns after angioplasty, left AV fistula (9 x 40 Elbert) 07/18/2023.  He is without complaint    08/01/2023:  He now returns, his dialysis facility has been unable to successfully cannulate and use his left AV fistula    08/05/2023:  This facility is still not been able to cannulate his high-flow well matured brachial basilic AV fistula.  Does endorse sustained numbness of his left forearm which has been there since the original surgery.  He denies any pain or weakness in his left hand although he says it does get subjectively cooler at times    11/21/2023:  Now returns after the above procedure.  Had significant arm swelling which is now mostly resolved.    12/26/2023:  He now returns after requiring excision of right AV graft revision  for infection.  He is getting wound care at the [facility].  He has had moderate forearm swelling since the surgery which seems to wax and wane.  He is continuing to use new right PermCath for his dialysis.  He is still receiving antibiotics while on dialysis    Past Medical History:   Diagnosis Date    Anemia     Diabetes mellitus     Diabetes mellitus, type 2     Disorder of kidney and ureter     ESRD (end stage renal disease)     Essential (primary) hypertension 2017    Gout, unspecified      jaundice, unspecified     Nuclear sclerosis of both eyes 2022       Past Surgical History:   Procedure Laterality Date    ADENOIDECTOMY      ADENOIDECTOMY      AV FISTULA PLACEMENT Left 2022    Procedure: CREATION, AV FISTULA;  Surgeon: Prince Gardiner MD;  Location: [facility] OR [address] [place] [address];  Service: Peripheral Vascular;  Laterality: Left;    EXCISION OF ARTERIOVENOUS FISTULA Left 12/10/2023    Procedure: EXCISION, AV FISTULA;  Surgeon: Joaquin Rose MD;  Location: [facility] OR [address] [place] [address];  Service: Vascular;  Laterality: Left;    FISTULOGRAM Left 2023    Procedure: FISTULOGRAM;  Surgeon: Prince Gardiner MD;  Location: [address];  Service: Peripheral Vascular;  Laterality: Left;  Given to the sterile field.     FISTULOGRAM Left 2023    Procedure: Fistulogram;  Surgeon: LETY Rayo III, MD;  Location: [facility] OR [address] [place] [address];  Service: Peripheral Vascular;  Laterality: Left;  1.9 min  22.86 mGy  3.8862 Gy.cm  20ml Dye     nasal septum repair      NASAL SEPTUM SURGERY      PERCUTANEOUS TRANSLUMINAL ANGIOPLASTY OF ARTERIOVENOUS FISTULA Left 2022    Procedure: PTA, AV FISTULA;  Surgeon: Prince Gardiner MD;  Location: [facility] CATH LAB;  Service: Peripheral Vascular;  Laterality: Left;    PERCUTANEOUS TRANSLUMINAL ANGIOPLASTY OF ARTERIOVENOUS FISTULA Left 2023    Procedure: PTA, AV FISTULA;  Surgeon: Prince Gardiner MD;  Location: [facility] OR [address] [place] [address];  Service: Peripheral Vascular;   Laterality: Left;  4.8 min  43.08 mGy  4.0682 Gy.cm  32ml Dye    PERCUTANEOUS TRANSLUMINAL ANGIOPLASTY OF ARTERIOVENOUS FISTULA Left 7/6/2023    Procedure: PTA, AV FISTULA;  Surgeon: LETY Rayo III, MD;  Location: [facility] OR 2ND FLR;  Service: Peripheral Vascular;  Laterality: Left;    PROSTATE BIOPSY N/A 3/24/2023    Procedure: BIOPSY, PROSTATE;  Surgeon: Frankie Welch MD;  Location: [facility] OR 1ST FLR;  Service: Urology;  Laterality: N/A;  transrectal, 30min, uronav needed    REVISION OF ARTERIOVENOUS FISTULA Left 11/9/2023    Procedure: REVISION, AV FISTULA;  Surgeon: LETY Rayo III, MD;  Location: [facility] OR 2ND FLR;  Service: Vascular;  Laterality: Left;  LUE AVG revision    ROTATOR CUFF REPAIR Left     SALIVARY GLAND SURGERY      Tonsillectomy      TONSILLECTOMY      TRANSPOSITION OF BASILIC VEIN Left 11/1/2022    Procedure: TRANSPOSITION, VEIN, BASILIC;  Surgeon: Prince Gardiner MD;  Location: [facility] OR 2ND FLR;  Service: Peripheral Vascular;  Laterality: Left;  Left Brachial vein.    WASHOUT Left 12/10/2023    Procedure: WASHOUT OF AV FISTULA;  Surgeon: Joaquin Rose MD;  Location: [facility] OR 2ND FLR;  Service: Vascular;  Laterality: Left;         Current Outpatient Medications:     acetaminophen (TYLENOL) 500 MG tablet, Take 2 tablets (1,000 mg total) by mouth every 6 (six) hours as needed for Pain., Disp: 30 tablet, Rfl: 0    atorvastatin (LIPITOR) 20 MG tablet, TAKE 1 TABLET BY MOUTH EVERY EVENING, Disp: 90 tablet, Rfl: 0    blood sugar diagnostic Strp, Use to check blood glucose once a day., Disp: 100 each, Rfl: 3    blood-glucose meter Misc, Use to check blood glucose twice a day., Disp: 1 each, Rfl: 0    carvediloL (COREG) 12.5 MG tablet, Take 1 tablet (12.5 mg total) by mouth 2 (two) times daily., Disp: 60 tablet, Rfl: 11    colchicine (COLCRYS) 0.6 mg tablet, Take 0.6 mg by mouth as needed (for gout flare). Take half tab (0.3 mg) by mouth daily as needed for gout flare., Disp: 90 tablet, Rfl:  3    dextrose 5 % in water (D5W) PgBk 50 mL with ceFAZolin 1 gram SolR, Inject 2 g into the vein every Monday & Wednesday and 3 g every Friday after dialysis. (End 1/18/24), Disp: , Rfl: 0    diclofenac sodium (VOLTAREN) 1 % Gel, Apply 2 g topically once daily., Disp: 20 g, Rfl: 0    fluticasone propionate (FLONASE) 50 mcg/actuation nasal spray, 2 sprays (100 mcg total) by Each Nostril route once daily., Disp: 16 g, Rfl: 11    furosemide (LASIX) 80 MG tablet, TAKE 1 TABLET(80 MG) BY MOUTH TWICE DAILY, Disp: 60 tablet, Rfl: 11    lancets 30 gauge Misc, Use to check blood glucose twice a day., Disp: 100 each, Rfl: 5    LIDOcaine (LIDODERM) 5 %, Place 2 patches onto the skin once daily. Remove & Discard patch within 12 hours or as directed by MD, Disp: 20 patch, Rfl: 0    LIDOcaine-prilocaine (EMLA) cream, SMARTSIG:sparingly Topical As Directed, Disp: , Rfl:     NIFEdipine (PROCARDIA-XL) 60 MG (OSM) 24 hr tablet, Take 1 tablet (60 mg total) by mouth 2 (two) times a day., Disp: 60 tablet, Rfl: 11    oxyCODONE (ROXICODONE) 5 MG immediate release tablet, Take 1 tablet (5 mg total) by mouth every 6 (six) hours as needed for Pain., Disp: 28 tablet, Rfl: 0    sevelamer carbonate (RENVELA) 800 mg Tab, TAKE 2 TABLETS(1600 MG) BY MOUTH THREE TIMES DAILY WITH MEALS, Disp: 180 tablet, Rfl: 3    sodium bicarbonate 650 MG tablet, Take 2 tablets (1,300 mg total) by mouth 2 (two) times daily., Disp: 120 tablet, Rfl: 0    tamsulosin (FLOMAX) 0.4 mg Cap, Take 1 capsule (0.4 mg total) by mouth once daily., Disp: 90 capsule, Rfl: 3    aspirin (ECOTRIN) 81 MG EC tablet, Take 1 tablet (81 mg total) by mouth once daily., Disp: 90 tablet, Rfl: 3  No current facility-administered medications for this visit.    Facility-Administered Medications Ordered in Other Visits:     0.9%  NaCl infusion, , Intravenous, Continuous, Kim Lutz, NP    0.9%  NaCl infusion, , Intravenous, Continuous, Herminia Durant MD    ondansetron disintegrating  "tablet 8 mg, 8 mg, Oral, Q8H PRN, Herminia Durant MD    Review of patient's allergies indicates:   Allergen Reactions    Iodine and iodide containing products Hives     Hypotension, Flushing       Family History   Problem Relation Age of Onset    Heart disease Mother     Kidney disease Mother     Hypertension Mother     No Known Problems Father     Cancer Sister     Seizures Sister     Hypertension Sister     Stroke Sister     Amblyopia Neg Hx     Blindness Neg Hx     Cataracts Neg Hx     Diabetes Neg Hx     Glaucoma Neg Hx     Macular degeneration Neg Hx     Retinal detachment Neg Hx     Strabismus Neg Hx     Thyroid disease Neg Hx     Anesthesia problems Neg Hx        PHYSICAL EXAM:   BP (!) 150/77 (BP Location: Right arm, Patient Position: Sitting, BP Method: Large (Automatic))   Pulse 63   Temp 97.7 °F (36.5 °C) (Oral)   Ht 6' 2" (1.88 m)   Wt 97 kg (213 lb 13.5 oz)   BMI 27.46 kg/m²   Constitutional:  Alert,   Well-appearing  In no distress.   Neurological: Normal speech  no focal findings  CN II - XII grossly intact.    Psychiatric: Mood and affect appropriate and symmetric.   HEENT: Normocephalic / atraumatic  PERRLA  Midline trachea  No scars across the neck   Cardiac: Regular rate and rhythm.   Pulmonary: Normal pulmonary effort.   Abdomen: Soft, not distended.     Skin: Warm and well perfused.    Vascular:  Nonpalpable left radial and ulnar pulses   Extremities/  Musculoskeletal: No edema.   Left arm demonstrates a circumferential dressing in the upper arm.  Patient asked that I not remove it.  I reviewed pictures from wound care from less than a week ago showed good granulation tissue.    Forearm shows moderate edema as well as edema into the hand.  Hand  is 5/5.    He notes that his left fingers are pinker than prior     IMAGING:  No new imaging        IMPRESSION:  Status post excision of left upper arm graft 3 weeks ago, which was a long revision from a prior brachial basilic fistula  Forearm " and hand swelling    PLAN:    Elevate left arm as possible  Continue with wound care Perri until completely healed  Follow up with me in 6-8 weeks with right arm vein mapping     PATRICIA Rayo III, MD, FACS  Professor and Chief, Vascular and Endovascular Surgery

## 2023-12-28 ENCOUNTER — HOSPITAL ENCOUNTER (OUTPATIENT)
Dept: WOUND CARE | Facility: HOSPITAL | Age: 68
Discharge: HOME | End: 2023-12-28
Attending: SURGERY
Payer: COMMERCIAL

## 2023-12-28 VITALS
TEMPERATURE: 98 F | HEART RATE: 64 BPM | SYSTOLIC BLOOD PRESSURE: 156 MMHG | DIASTOLIC BLOOD PRESSURE: 69 MMHG | HEIGHT: 74 IN | BODY MASS INDEX: 27.34 KG/M2 | WEIGHT: 213 LBS

## 2023-12-28 DIAGNOSIS — T81.89XD NON-HEALING SURGICAL WOUND, SUBSEQUENT ENCOUNTER: Primary | ICD-10-CM

## 2023-12-28 PROCEDURE — 99213 OFFICE O/P EST LOW 20 MIN: CPT

## 2023-12-28 NOTE — PROGRESS NOTES
[facility]    Subjective:       Patient ID: Elbert Still Jr. is a 68 y.o. male.    Chief Complaint: Wound Care    12/28/2023  Follow up with Dr Aguila related to left arm incision and right clavicle wound. No complaints. Right clavicle wound has resolved. Wound improvement noted to left arm incision. Plan of care updated.      Review of Systems   Constitutional: Negative.    HENT: Negative.     Eyes: Negative.    Respiratory: Negative.     Cardiovascular: Negative.    Gastrointestinal: Negative.    Genitourinary: Negative.    Musculoskeletal: Negative.    Neurological: Negative.    Psychiatric/Behavioral: Negative.         Objective:     Vitals:    12/28/23 1025   BP: (!) 156/69   Pulse: 64   Temp: 97.8 °F (36.6 °C)         Physical Exam  Constitutional:       Appearance: He is well-developed.   HENT:      Head: Normocephalic.   Eyes:      Conjunctiva/sclera: Conjunctivae normal.      Pupils: Pupils are equal, round, and reactive to light.   Cardiovascular:      Rate and Rhythm: Normal rate and regular rhythm.      Heart sounds: Normal heart sounds.   Pulmonary:      Effort: Pulmonary effort is normal.      Breath sounds: Normal breath sounds.   Abdominal:      General: Bowel sounds are normal.      Palpations: Abdomen is soft.   Musculoskeletal:         General: Normal range of motion.      Cervical back: Normal range of motion and neck supple.   Skin:     General: Skin is warm and dry.   Neurological:      Mental Status: He is alert and oriented to person, place, and time.      Deep Tendon Reflexes: Reflexes are normal and symmetric.        Altered Skin Integrity 12/21/23 1033 Right Clavicle (Active)   12/21/23 1033   Altered Skin Integrity Present on Admission - Did Patient arrive to the hospital with altered skin?:    Side: Right   Orientation:    Location: Clavicle   Wound Number:    Is this injury device related?:    Primary Wound Type:    Description of  Altered Skin Integrity:    Ankle-Brachial Index:    Pulses:    Removal Indication and Assessment:    Wound Outcome:    (Retired) Wound Length (cm):    (Retired) Wound Width (cm):    (Retired) Depth (cm):    Wound Description (Comments):    Removal Indications:    Wound Image   12/28/23 1008   Dressing Appearance Dry;Intact;Clean 12/28/23 1008   Drainage Amount None 12/28/23 1008   Appearance Black;Dry 12/28/23 1008   Tissue loss description Not applicable 12/28/23 1008   Black (%), Wound Tissue Color 100 % 12/28/23 1008   Periwound Area Intact;Dry 12/28/23 1008   Wound Edges Rolled/closed 12/28/23 1008   Wound Length (cm) 1 cm 12/28/23 1008   Wound Width (cm) 0.1 cm 12/28/23 1008   Wound Depth (cm) 0 cm 12/28/23 1008   Wound Volume (cm^3) 0 cm^3 12/28/23 1008   Wound Surface Area (cm^2) 0.1 cm^2 12/28/23 1008   Care Cleansed with:;Sterile normal saline 12/28/23 1008            Incision/Site 12/10/23 1542 Left Arm upper (Active)   12/10/23 1542   Present Prior to Hospital Arrival?:    Side: Left   Location: Arm   Orientation: upper   Incision Type:    Closure Method:    Additional Comments:    Removal Indication and Assessment:    Wound Outcome:    Removal Indications:    Wound Image   12/28/23 1008   Dressing Appearance Intact;Moist drainage 12/28/23 1008   Drainage Amount Moderate 12/28/23 1008   Drainage Characteristics/Odor Serous 12/28/23 1008   Appearance Red;Moist;Pink;Yellow 12/28/23 1008   Red (%), Wound Tissue Color 95 % 12/28/23 1008   Yellow (%), Wound Tissue Color 5 % 12/28/23 1008   Periwound Area Intact;Dry;Edematous 12/28/23 1008   Wound Edges Open 12/28/23 1008   Wound Length (cm) 6.8 cm 12/28/23 1008   Wound Width (cm) 2 cm 12/28/23 1008   Wound Depth (cm) 0.6 cm 12/28/23 1008   Wound Volume (cm^3) 8.16 cm^3 12/28/23 1008   Wound Surface Area (cm^2) 13.6 cm^2 12/28/23 1008   Care Cleansed with:;Sterile normal saline 12/28/23 1008   Dressing Applied;Non-adherent;Absorptive Pad;Rolled gauze 12/28/23  1008   Periwound Care Absorptive dressing applied;Dry periwound area maintained 12/28/23 1008   Dressing Change Due 12/30/23 12/28/23 1008         Assessment/Plan:         ICD-10-CM ICD-9-CM   1. Non-healing surgical wound, subsequent encounter  T81.89XD V58.89     998.83           Tissue pathology and/or culture taken:  [] Yes [x] No   Sharp debridement performed:   [] Yes [x] No   Labs ordered this visit:   [] Yes [x] No   Imaging ordered this visit:   [] Yes [x] No           Orders Placed This Encounter   Procedures    Change dressing     Left Arm Incision    Cleanse wound with:Normal Saline  Lidocaine:PRN  Silver nitrate:PRN  Periwound care:Maintain dry cami wound  Primary dressing:Mupirocin Ointment, Xeroform  Secondary dressing:Large ABD Pad, Kerlix, Tape, Flexinet.  Edema control: Elevate RLE as much as possible  Frequency:Every Other Day and Prn    Follow-up:  2 weeks     Other Orders:Continue Iv Ancef with Dialysis        Follow up in 2 weeks (on 1/11/2024) for Dr Aguila.            This includes face to face time and non-face to face time preparing to see the patient (eg, review of tests), obtaining and/or reviewing separately obtained history, documenting clinical information in the electronic or other health record, independently interpreting results and communicating results to the patient/family/caregiver, or care coordinator.

## 2023-12-29 ENCOUNTER — PATIENT MESSAGE (OUTPATIENT)
Dept: ADMINISTRATIVE | Facility: OTHER | Age: 68
End: 2023-12-29
Payer: COMMERCIAL

## 2024-01-10 ENCOUNTER — HOSPITAL ENCOUNTER (OUTPATIENT)
Dept: WOUND CARE | Facility: HOSPITAL | Age: 69
Discharge: HOME | End: 2024-01-10
Attending: SURGERY
Payer: COMMERCIAL

## 2024-01-10 VITALS — HEART RATE: 66 BPM | DIASTOLIC BLOOD PRESSURE: 84 MMHG | TEMPERATURE: 98 F | SYSTOLIC BLOOD PRESSURE: 157 MMHG

## 2024-01-10 DIAGNOSIS — T81.89XD NON-HEALING SURGICAL WOUND, SUBSEQUENT ENCOUNTER: Primary | ICD-10-CM

## 2024-01-10 PROBLEM — T81.89XA SURGICAL WOUND, NON HEALING: Status: ACTIVE | Noted: 2024-01-10

## 2024-01-10 PROCEDURE — 17250 CHEM CAUT OF GRANLTJ TISSUE: CPT

## 2024-01-10 NOTE — PROGRESS NOTES
[facility]    Subjective:       Patient ID: Elbert Still Jr. is a 68 y.o. male.    Chief Complaint: Non-healing Wound (Left arm)    Measurement improved. Silver nitrate to hypergranulation issue. Xeroform d/c'd. Continue mupirocin and bordered foam every other day per wife. Patient continues to receive IV Ancef with dialysis.  Review of Systems   Constitutional: Negative.    HENT: Negative.     Eyes: Negative.    Respiratory: Negative.     Cardiovascular: Negative.    Gastrointestinal: Negative.    Genitourinary: Negative.    Musculoskeletal: Negative.    Neurological: Negative.    Psychiatric/Behavioral: Negative.         Objective:     Vitals:    01/10/24 1514   BP: (!) 157/84   Pulse: 66   Temp: 98 °F (36.7 °C)         Physical Exam  Constitutional:       Appearance: He is well-developed.   HENT:      Head: Normocephalic.   Eyes:      Conjunctiva/sclera: Conjunctivae normal.      Pupils: Pupils are equal, round, and reactive to light.   Cardiovascular:      Rate and Rhythm: Normal rate and regular rhythm.      Heart sounds: Normal heart sounds.   Pulmonary:      Effort: Pulmonary effort is normal.      Breath sounds: Normal breath sounds.   Abdominal:      General: Bowel sounds are normal.      Palpations: Abdomen is soft.   Musculoskeletal:         General: Normal range of motion.      Cervical back: Normal range of motion and neck supple.   Skin:     General: Skin is warm and dry.   Neurological:      Mental Status: He is alert and oriented to person, place, and time.      Deep Tendon Reflexes: Reflexes are normal and symmetric.        Incision/Site 12/10/23 1542 Left Arm upper (Active)   12/10/23 1542   Present Prior to Hospital Arrival?:    Side: Left   Location: Arm   Orientation: upper   Incision Type:    Closure Method:    Additional Comments:    Removal Indication and Assessment:    Wound Outcome:    Removal Indications:    Wound Image   01/10/24 1518    Dressing Appearance Moist drainage 01/10/24 1518   Drainage Amount Moderate 01/10/24 1518   Drainage Characteristics/Odor Serosanguineous 01/10/24 1518   Appearance Red;Moist;Hypergranulation 01/10/24 1518   Red (%), Wound Tissue Color 100 % 01/10/24 1518   Periwound Area Dry 01/10/24 1518   Wound Edges Open 01/10/24 1518   Wound Length (cm) 5 cm 01/10/24 1518   Wound Width (cm) 1.4 cm 01/10/24 1518   Wound Depth (cm) 0.1 cm 01/10/24 1518   Wound Volume (cm^3) 0.7 cm^3 01/10/24 1518   Wound Surface Area (cm^2) 7 cm^2 01/10/24 1518   Care Cleansed with:;Sterile normal saline 01/10/24 1518   Dressing Applied;Island/border;Other (comment) 01/10/24 1518   Periwound Care Absorptive dressing applied 01/10/24 1518   Dressing Change Due 01/12/24 01/10/24 1518         Assessment/Plan:         ICD-10-CM ICD-9-CM   1. Non-healing surgical wound, subsequent encounter  T81.89XD V58.89     998.83           Tissue pathology and/or culture taken:  [] Yes [x] No   Sharp debridement performed:   [] Yes [x] No   Labs ordered this visit:   [] Yes [x] No   Imaging ordered this visit:   [] Yes [x] No           Orders Placed This Encounter   Procedures    Change dressing     Left Arm Incision    Cleanse wound with:Normal Saline  Lidocaine:PRN  Silver nitrate:PRN  Periwound care:Maintain dry cami wound  Primary dressing:Mupirocin Ointment  Secondary dressing: 4 x 4 bordered foam  Edema control: Elevate RLE as much as possible  Frequency:Every Other Day and Prn    Follow-up:  2 weeks     Other Orders:Continue Iv Ancef with Dialysis        No follow-ups on file.            This includes face to face time and non-face to face time preparing to see the patient (eg, review of tests), obtaining and/or reviewing separately obtained history, documenting clinical information in the electronic or other health record, independently interpreting results and communicating results to the patient/family/caregiver, or care coordinator.

## 2024-01-15 LAB
FUNGUS SPEC CULT: NORMAL

## 2024-01-17 ENCOUNTER — TELEPHONE (OUTPATIENT)
Dept: INFECTIOUS DISEASES | Facility: CLINIC | Age: 69
End: 2024-01-17
Payer: COMMERCIAL

## 2024-01-17 NOTE — TELEPHONE ENCOUNTER
----- Message from Blake THAKKAR Route sent at 1/17/2024 11:18 AM CST -----  Regarding: Missed Call  Contact: pt.  [phone]  Pt is calling in ref to missed call from [facility] to confirm appt. He says he will be there. Patient Requesting Call Back @  [phone]

## 2024-01-18 ENCOUNTER — TELEPHONE (OUTPATIENT)
Dept: INFECTIOUS DISEASES | Facility: CLINIC | Age: 69
End: 2024-01-18
Payer: COMMERCIAL

## 2024-01-18 ENCOUNTER — OFFICE VISIT (OUTPATIENT)
Dept: INFECTIOUS DISEASES | Facility: CLINIC | Age: 69
End: 2024-01-18
Payer: COMMERCIAL

## 2024-01-18 VITALS
DIASTOLIC BLOOD PRESSURE: 86 MMHG | SYSTOLIC BLOOD PRESSURE: 167 MMHG | HEIGHT: 74 IN | WEIGHT: 216.5 LBS | HEART RATE: 80 BPM | TEMPERATURE: 99 F | BODY MASS INDEX: 27.78 KG/M2

## 2024-01-18 DIAGNOSIS — T82.7XXD ARTERIOVENOUS GRAFT INFECTION, SUBSEQUENT ENCOUNTER: Primary | ICD-10-CM

## 2024-01-18 PROCEDURE — 99213 OFFICE O/P EST LOW 20 MIN: CPT | Mod: S$GLB,,, | Performed by: INTERNAL MEDICINE

## 2024-01-18 PROCEDURE — 99999 PR PBB SHADOW E&M-EST. PATIENT-LVL III: CPT | Mod: PBBFAC,,, | Performed by: INTERNAL MEDICINE

## 2024-01-18 PROCEDURE — 3044F HG A1C LEVEL LT 7.0%: CPT | Mod: CPTII,S$GLB,, | Performed by: INTERNAL MEDICINE

## 2024-01-18 PROCEDURE — 3288F FALL RISK ASSESSMENT DOCD: CPT | Mod: CPTII,S$GLB,, | Performed by: INTERNAL MEDICINE

## 2024-01-18 PROCEDURE — 3008F BODY MASS INDEX DOCD: CPT | Mod: CPTII,S$GLB,, | Performed by: INTERNAL MEDICINE

## 2024-01-18 PROCEDURE — 1101F PT FALLS ASSESS-DOCD LE1/YR: CPT | Mod: CPTII,S$GLB,, | Performed by: INTERNAL MEDICINE

## 2024-01-18 PROCEDURE — 3077F SYST BP >= 140 MM HG: CPT | Mod: CPTII,S$GLB,, | Performed by: INTERNAL MEDICINE

## 2024-01-18 PROCEDURE — 3066F NEPHROPATHY DOC TX: CPT | Mod: CPTII,S$GLB,, | Performed by: INTERNAL MEDICINE

## 2024-01-18 PROCEDURE — 3079F DIAST BP 80-89 MM HG: CPT | Mod: CPTII,S$GLB,, | Performed by: INTERNAL MEDICINE

## 2024-01-18 PROCEDURE — 1126F AMNT PAIN NOTED NONE PRSNT: CPT | Mod: CPTII,S$GLB,, | Performed by: INTERNAL MEDICINE

## 2024-01-18 RX ORDER — MUPIROCIN 20 MG/G
OINTMENT TOPICAL EVERY OTHER DAY
COMMUNITY
Start: 2023-12-21

## 2024-01-18 NOTE — PROGRESS NOTES
INFECTIOUS DISEASE CLINIC  1/18/24     Subjective:      Chief Complaint:   Chief Complaint   Patient presents with    Follow-up       History of Present Illness:    Patient Elbert Still Jr. is a 68 y.o. male with h/o ESRD MWF, DM2, HTN  who presents today for hospital follow up. Was admitted 12/8 with with pain and swelling of AVF, sent to ED by dialysis center when bcx +gpc. had his AVG placed in 9/2022 and recently went through revision on 11/9/23 due to inability to cannulate. Patient had blood cultures drawn at dialysis on 12/6/23 growing GPCs in pairs, chains, and clusters (only one culture was collected). U/s obtained of his line showing concern for possible thrombosed pseudoaneurysm vs abscess vs hematoma. Non contrasted CT obtained showing edema w/ no specific fluid collection. He was started on empiric vanc/zosyn. S/p graft excision 12/10/23 with vascular.  HD tunnel cath placed 5/5/22.      Bcx CoNS, ox susceptible  Repeat bcx clear  S/p AVG removal  Permecath was exchanged  2d echo negative for veg    Was seen by ID and recommended cefazolin x 6 weeks with hd    Seeing Dr Aguila with wound care at [facility] with vascular surgery- vein mapping planned after wound heals    Arm wound healing  Denies issues with abx           Component Ref Range & Units 1 mo ago   CRP 0.0 - 8.2 mg/L 23.5 High             Review of Symptoms:  Constitutional: Denies fevers, chills, or weakness.  ENT: Denies dysphagia, nasal discharge, ear pain or discharge.  Cardiovascular: Denies chest pain, palpitations, orthopnea, or claudication.  Respiratory: Denies shortness of breath, cough, hemoptysis, or wheezing.  GI: Denies nausea/vomitting, hematochezia, melena, abd pain, or changes in appetite.  : Denies dysuria, incontinence, or hematuria.  Musculoskeletal: Denies joint pain or myalgias.  Skin/breast: Denies rashes, lumps, lesions, or discharge.  Neurologic: Denies headache, dizziness, vertigo, or paresthesias.    Past  Medical History:   Diagnosis Date    Anemia     Diabetes mellitus     Diabetes mellitus, type 2     Disorder of kidney and ureter     ESRD (end stage renal disease)     Essential (primary) hypertension 2017    Gout, unspecified      jaundice, unspecified     Nuclear sclerosis of both eyes 2022       Past Surgical History:   Procedure Laterality Date    ADENOIDECTOMY      ADENOIDECTOMY      AV FISTULA PLACEMENT Left 2022    Procedure: CREATION, AV FISTULA;  Surgeon: Prince Gardiner MD;  Location: [facility] OR [address] [place] [address];  Service: Peripheral Vascular;  Laterality: Left;    EXCISION OF ARTERIOVENOUS FISTULA Left 12/10/2023    Procedure: EXCISION, AV FISTULA;  Surgeon: Joaquin Rose MD;  [facility];  Service: Vascular;  Laterality: Left;    FISTULOGRAM Left 2023    Procedure: FISTULOGRAM;  Surgeon: Prince Gardiner MD;  Location: [facility] OR [address] [place] [address];  Service: Peripheral Vascular;  Laterality: Left;  Given to the sterile field.     FISTULOGRAM Left 2023    Procedure: Fistulogram;  Surgeon: LETY Rayo III, MD;  Location: [facility] OR [address] [place] [address];  Service: Peripheral Vascular;  Laterality: Left;  1.9 min  22.86 mGy  3.8862 Gy.cm  20ml Dye     nasal septum repair      NASAL SEPTUM SURGERY      PERCUTANEOUS TRANSLUMINAL ANGIOPLASTY OF ARTERIOVENOUS FISTULA Left 2022    Procedure: PTA, AV FISTULA;  Surgeon: Prince Gardiner MD;  Location: [facility] CATH LAB;  Service: Peripheral Vascular;  Laterality: Left;    PERCUTANEOUS TRANSLUMINAL ANGIOPLASTY OF ARTERIOVENOUS FISTULA Left 2023    Procedure: PTA, AV FISTULA;  Surgeon: Prince Gardiner MD;  [facility];  Service: Peripheral Vascular;  Laterality: Left;  4.8 min  43.08 mGy  4.0682 Gy.cm  32ml Dye    PERCUTANEOUS TRANSLUMINAL ANGIOPLASTY OF ARTERIOVENOUS FISTULA Left 2023    Procedure: PTA, AV FISTULA;  Surgeon: LETY Rayo III, MD;  Location: [facility] OR [address] [place] [address];  Service: Peripheral Vascular;   Laterality: Left;    PROSTATE BIOPSY N/A 3/24/2023    Procedure: BIOPSY, PROSTATE;  Surgeon: Frankie Welch MD;  Location: [facility] OR 1ST FLR;  Service: Urology;  Laterality: N/A;  transrectal, 30min, uronav needed    REVISION OF ARTERIOVENOUS FISTULA Left 11/9/2023    Procedure: REVISION, AV FISTULA;  Surgeon: LETY Rayo III, MD;  Location: [facility] OR 2ND FLR;  Service: Vascular;  Laterality: Left;  LUE AVG revision    ROTATOR CUFF REPAIR Left     SALIVARY GLAND SURGERY      Tonsillectomy      TONSILLECTOMY      TRANSPOSITION OF BASILIC VEIN Left 11/1/2022    Procedure: TRANSPOSITION, VEIN, BASILIC;  Surgeon: Prince Gardiner MD;  Location: [facility] OR 2ND FLR;  Service: Peripheral Vascular;  Laterality: Left;  Left Brachial vein.    WASHOUT Left 12/10/2023    Procedure: WASHOUT OF AV FISTULA;  Surgeon: Joaquin Rose MD;  Location: [facility] OR 2ND FLR;  Service: Vascular;  Laterality: Left;       Family History   Problem Relation Age of Onset    Heart disease Mother     Kidney disease Mother     Hypertension Mother     No Known Problems Father     Cancer Sister     Seizures Sister     Hypertension Sister     Stroke Sister     Amblyopia Neg Hx     Blindness Neg Hx     Cataracts Neg Hx     Diabetes Neg Hx     Glaucoma Neg Hx     Macular degeneration Neg Hx     Retinal detachment Neg Hx     Strabismus Neg Hx     Thyroid disease Neg Hx     Anesthesia problems Neg Hx        Social History     Socioeconomic History    Marital status:      Spouse name: Kristine Still   Occupational History     Employer: [employer]   Tobacco Use    Smoking status: Never    Smokeless tobacco: Never    Tobacco comments:     .  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     Social Determinants of Health     Financial Resource Strain: Low Risk  (12/12/2023)    Overall Financial Resource Strain (CARDIA)     Difficulty of Paying Living Expenses: Not hard at all   Food Insecurity: No Food Insecurity (12/12/2023)    Hunger Vital Sign     Worried About Running Out of Food in the Last Year: Never true     Ran Out of Food in the Last Year: Never true   Transportation Needs: No Transportation Needs (12/12/2023)    PRAPARE - Transportation     Lack of Transportation (Medical): No     Lack of Transportation (Non-Medical): No   Physical Activity: Sufficiently Active (12/12/2023)    Exercise Vital Sign     Days of Exercise per Week: 2 days     Minutes of Exercise per Session: 120 min   Social Connections: Unknown (12/12/2023)    Social Connection and Isolation Panel [NHANES]     Frequency of Communication with Friends and Family: More than three times a week     Frequency of Social Gatherings with Friends and Family: More than three times a week     Active Member of Clubs or Organizations: No     Attends Club or Organization Meetings: Never     Marital Status:    Housing Stability: Low Risk  (12/12/2023)    Housing Stability Vital Sign     Unable to Pay for Housing in the Last Year: No     Number of Places Lived in the Last Year: 1     Unstable Housing in the Last Year: No       Review of patient's allergies indicates:   Allergen Reactions    Iodine and iodide containing products Hives     Hypotension, Flushing         Objective:   BP (!) 167/86 (BP Location: Right arm, Patient Position: Sitting)   Pulse 80   Temp 98.5 °F (36.9 °C) (Oral)   Ht 6' 2" (1.88 m)   Wt 98.2 kg (216 lb 7.9 oz)   BMI 27.80 kg/m²     General: Afebrile, alert, comfortable, no acute distress.   HEENT: CARLIE. EOMI, no scleral icterus.   Pulmonary: Non labored,clear to auscultation A/P/L.   Cardiac: normal S1 & S2 w/o rubs/murmurs/gallops.  Abdominal: Non-tender, " non-distended.  Extremities: Moves all extremities x 4.   Skin: wound filling in. No deep tunneling  Neurological:  Alert and oriented x 4.       Labs:    Glucose   Date Value Ref Range Status   01/03/2024 133 (H) 70 - 100 mg/dL Final   12/15/2023 127 (H) 70 - 110 mg/dL Final   12/14/2023 95 70 - 110 mg/dL Final       Calcium   Date Value Ref Range Status   01/03/2024 8.8 8.7 - 10.4 mg/dL Final     Comment:     Please note change in reference range.   12/18/2023 9.1 8.7 - 10.4 mg/dL Final     Comment:     Please note change in reference range.   12/15/2023 9.6 8.7 - 10.5 mg/dL Final       Albumin   Date Value Ref Range Status   01/03/2024 4.2 3.5 - 5.2 g/dL Final   12/18/2023 4.3 3.5 - 5.2 g/dL Final   12/15/2023 3.7 3.5 - 5.2 g/dL Final   12/14/2023 3.3 (L) 3.5 - 5.2 g/dL Final   12/13/2023 3.6 3.5 - 5.2 g/dL Final   12/08/2023 4.3 3.5 - 5.2 g/dL Final       Total Protein   Date Value Ref Range Status   12/15/2023 8.0 6.0 - 8.4 g/dL Final   12/14/2023 7.0 6.0 - 8.4 g/dL Final   12/13/2023 7.6 6.0 - 8.4 g/dL Final       Sodium   Date Value Ref Range Status   01/03/2024 140 136 - 145 mEq/L Final   12/18/2023 140 136 - 145 mEq/L Final   12/15/2023 137 136 - 145 mmol/L Final       Potassium   Date Value Ref Range Status   01/03/2024 4.7 3.5 - 5.1 mEq/L Final   12/18/2023 4.7 3.5 - 5.1 mEq/L Final   12/15/2023 5.0 3.5 - 5.1 mmol/L Final       CO2   Date Value Ref Range Status   12/15/2023 22 (L) 23 - 29 mmol/L Final   12/14/2023 23 23 - 29 mmol/L Final   12/13/2023 23 23 - 29 mmol/L Final       Chloride   Date Value Ref Range Status   01/03/2024 105 96 - 108 mEq/L Final   12/18/2023 107 96 - 108 mEq/L Final   12/15/2023 105 95 - 110 mmol/L Final   12/14/2023 107 95 - 110 mmol/L Final   12/13/2023 109 95 - 110 mmol/L Final   12/08/2023 104 96 - 108 mEq/L Final       BUN   Date Value Ref Range Status   12/15/2023 41 (H) 8 - 23 mg/dL Final   12/14/2023 58 (H) 8 - 23 mg/dL Final   12/13/2023 53 (H) 8 - 23 mg/dL Final        Creatinine   Date Value Ref Range Status   01/03/2024 9.36 (H) 0.60 - 1.30 mg/dL Final   12/18/2023 8.42 (H) 0.60 - 1.30 mg/dL Final   12/15/2023 7.4 (H) 0.5 - 1.4 mg/dL Final       Alkaline Phosphatase   Date Value Ref Range Status   01/03/2024 118 40 - 129 U/L Final   12/18/2023 113 40 - 129 U/L Final   12/15/2023 117 55 - 135 U/L Final       ALT   Date Value Ref Range Status   01/03/2024 <7 (L) 7 - 52 U/L Final     Comment:     Verified by repeat analysis.   12/18/2023 <7 (L) 7 - 52 U/L Final     Comment:     Verified by repeat analysis.   12/15/2023 <5 (L) 10 - 44 U/L Final       AST   Date Value Ref Range Status   12/15/2023 19 10 - 40 U/L Final   12/14/2023 18 10 - 40 U/L Final   12/13/2023 22 10 - 40 U/L Final       Total Bilirubin   Date Value Ref Range Status   12/15/2023 0.3 0.1 - 1.0 mg/dL Final     Comment:     For infants and newborns, interpretation of results should be based  on gestational age, weight and in agreement with clinical  observations.    Premature Infant recommended reference ranges:  Up to 24 hours.............<8.0 mg/dL  Up to 48 hours............<12.0 mg/dL  3-5 days..................<15.0 mg/dL  6-29 days.................<15.0 mg/dL     12/14/2023 0.3 0.1 - 1.0 mg/dL Final     Comment:     For infants and newborns, interpretation of results should be based  on gestational age, weight and in agreement with clinical  observations.    Premature Infant recommended reference ranges:  Up to 24 hours.............<8.0 mg/dL  Up to 48 hours............<12.0 mg/dL  3-5 days..................<15.0 mg/dL  6-29 days.................<15.0 mg/dL     12/13/2023 0.2 0.1 - 1.0 mg/dL Final     Comment:     For infants and newborns, interpretation of results should be based  on gestational age, weight and in agreement with clinical  observations.    Premature Infant recommended reference ranges:  Up to 24 hours.............<8.0 mg/dL  Up to 48 hours............<12.0 mg/dL  3-5  "days..................<15.0 mg/dL  6-29 days.................<15.0 mg/dL         WBC   Date Value Ref Range Status   01/03/2024 4.05 (L) 4.80 - 10.80 1000/mcL Final   12/18/2023 3.61 (L) 4.80 - 10.80 1000/mcL Final   12/15/2023 3.94 3.90 - 12.70 K/uL Final       Hemoglobin   Date Value Ref Range Status   01/03/2024 10.2 (L) 14.0 - 18.0 g/dL Final   12/18/2023 9.3 (L) 14.0 - 18.0 g/dL Final   12/15/2023 10.2 (L) 14.0 - 18.0 g/dL Final       Hematocrit   Date Value Ref Range Status   01/03/2024 30.5 (L) 42.0 - 52.0 % Final   12/18/2023 29.4 (L) 42.0 - 52.0 % Final   12/15/2023 30.5 (L) 40.0 - 54.0 % Final       MCV   Date Value Ref Range Status   01/03/2024 92 80 - 100 fl Final   12/18/2023 92 80 - 100 fl Final   12/15/2023 90 82 - 98 fL Final       Platelets   Date Value Ref Range Status   01/03/2024 172 130 - 400 1000/mcL Final   12/18/2023 252 130 - 400 1000/mcL Final   12/15/2023 258 150 - 450 K/uL Final       Lab Results   Component Value Date    CHOL 68 (L) 10/27/2022    CHOL 91 05/11/2022    CHOL 85 (L) 09/08/2021       Lab Results   Component Value Date    HDL 29 (L) 10/27/2022    HDL 34 05/11/2022    HDL 34 (L) 09/08/2021       Lab Results   Component Value Date    LDLCALC 19.4 (L) 10/27/2022    LDLCALC 27 05/11/2022    LDLCALC 25.0 (L) 09/08/2021       Lab Results   Component Value Date    TRIG 98 10/27/2022    TRIG 151 (H) 05/11/2022    TRIG 130 09/08/2021       Lab Results   Component Value Date    CHOLHDL 42.6 10/27/2022    CHOLHDL 2.7 05/11/2022    CHOLHDL 40.0 09/08/2021       RPR   Date Value Ref Range Status   10/27/2022 Non-reactive Non-reactive Final     No results found for: "QUANTIFERON"    Medications:  Current Outpatient Medications on File Prior to Visit   Medication Sig Dispense Refill    acetaminophen (TYLENOL) 500 MG tablet Take 2 tablets (1,000 mg total) by mouth every 6 (six) hours as needed for Pain. 30 tablet 0    mupirocin (BACTROBAN) 2 % ointment Apply topically every other day.      " aspirin (ECOTRIN) 81 MG EC tablet Take 1 tablet (81 mg total) by mouth once daily. 90 tablet 3    atorvastatin (LIPITOR) 20 MG tablet TAKE 1 TABLET BY MOUTH EVERY EVENING 90 tablet 0    blood sugar diagnostic Strp Use to check blood glucose once a day. 100 each 3    blood-glucose meter Misc Use to check blood glucose twice a day. 1 each 0    carvediloL (COREG) 12.5 MG tablet Take 1 tablet (12.5 mg total) by mouth 2 (two) times daily. 60 tablet 11    colchicine (COLCRYS) 0.6 mg tablet Take 0.6 mg by mouth as needed (for gout flare). Take half tab (0.3 mg) by mouth daily as needed for gout flare. 90 tablet 3    dextrose 5 % in water (D5W) PgBk 50 mL with ceFAZolin 1 gram SolR Inject 2 g into the vein every Monday & Wednesday and 3 g every Friday after dialysis. (End 1/18/24)  0    diclofenac sodium (VOLTAREN) 1 % Gel Apply 2 g topically once daily. 20 g 0    fluticasone propionate (FLONASE) 50 mcg/actuation nasal spray 2 sprays (100 mcg total) by Each Nostril route once daily. 16 g 11    furosemide (LASIX) 80 MG tablet TAKE 1 TABLET(80 MG) BY MOUTH TWICE DAILY 60 tablet 11    lancets 30 gauge Misc Use to check blood glucose twice a day. 100 each 5    LIDOcaine (LIDODERM) 5 % Place 2 patches onto the skin once daily. Remove & Discard patch within 12 hours or as directed by MD 20 patch 0    LIDOcaine-prilocaine (EMLA) cream SMARTSIG:sparingly Topical As Directed      NIFEdipine (PROCARDIA-XL) 60 MG (OSM) 24 hr tablet Take 1 tablet (60 mg total) by mouth 2 (two) times a day. 60 tablet 11    sevelamer carbonate (RENVELA) 800 mg Tab TAKE 2 TABLETS(1600 MG) BY MOUTH THREE TIMES DAILY WITH MEALS 180 tablet 3    sodium bicarbonate 650 MG tablet Take 2 tablets (1,300 mg total) by mouth 2 (two) times daily. 120 tablet 0    tamsulosin (FLOMAX) 0.4 mg Cap Take 1 capsule (0.4 mg total) by mouth once daily. 90 capsule 3    [DISCONTINUED] amLODIPine (NORVASC) 10 MG tablet TAKE 1 TABLET BY MOUTH ONCE DAILY 90 tablet 3     "[DISCONTINUED] glipiZIDE (GLUCOTROL) 10 MG tablet TAKE 1 TABLET BY MOUTH TWICE DAILY WITH MEALS 180 tablet 1    [DISCONTINUED] metoprolol succinate (TOPROL-XL) 25 MG 24 hr tablet Take 1 tablet (25 mg total) by mouth once daily. 90 tablet 3     Current Facility-Administered Medications on File Prior to Visit   Medication Dose Route Frequency Provider Last Rate Last Admin    0.9%  NaCl infusion   Intravenous Continuous Kim Lutz NP        0.9%  NaCl infusion   Intravenous Continuous Herminia Durant MD        ondansetron disintegrating tablet 8 mg  8 mg Oral Q8H PRN Herminia Durant MD        [DISCONTINUED] heparin (porcine) injection 1,200 Units  1,200 Units Intravenous Continuous Nawaf Butler MD   1,200 Units at 01/17/24 0630       Antibiotics:   Antibiotics (From admission, onward)      None            HIV: No components found for: "HIV 1/2 AG/AB"  Hepatitis C IgG: No components found for: "HEPATITIS C"  Syphilis:   RPR   Date Value Ref Range Status   10/27/2022 Non-reactive Non-reactive Final       Hepatitis A IgG: No components found for: "HEPATITIS A IGG"  Hepatitis Bc IgG: No components found for: "HEPATITIS B CORE IGG"  Hepatitis Bs IgG:  Quantiferon: No results found for: "QUANTIFERON"  VZV IgG: No components found for: "VARICELLA IGG"    No components found for: "SEDIMENTATION RATE"  No components found for: "C-REACTIVE PROTEIN"      Microbiology x 7d:   Microbiology Results (last 7 days)       ** No results found for the last 168 hours. **            Immunization History   Administered Date(s) Administered    Hepatitis B, Adult 06/13/2022, 07/13/2022, 12/12/2022    Influenza - Quadrivalent - PF *Preferred* (6 months and older) 11/25/2008, 09/29/2023    PPD Test 05/03/2022    Pneumococcal Conjugate - 20 Valent 03/16/2023    Pneumococcal Polysaccharide - 23 Valent 08/06/2019    Td - PF (ADULT) 08/06/2019         Reviewed records today as well as relevant labs, cultures, and imaging    Assessment: "       AVG infection and bacteremia s/p AVG removal and approaching 6 weeks cefazolin. Clinically improved    No toxicities from antimicrobials  Extremely medically complex    Plan:     Agree with stopping antibiotics       The total time for evaluation and management services performed on 1/18/24 was greater than 20 minutes.  This includes face to face time and non-face to face time preparing to see the patient (eg, review of tests), obtaining and/or reviewing separately obtained history, documenting clinical information in the electronic or other health record, independently interpreting results, and communicating results to the patient/family/caregiver, or care coordination.             Renee Danielson MD, MPH  Infectious Disease

## 2024-01-21 DIAGNOSIS — E08.21 DIABETES MELLITUS DUE TO UNDERLYING CONDITION WITH DIABETIC NEPHROPATHY, WITHOUT LONG-TERM CURRENT USE OF INSULIN: ICD-10-CM

## 2024-01-21 DIAGNOSIS — E78.2 MIXED HYPERLIPIDEMIA: ICD-10-CM

## 2024-01-21 NOTE — TELEPHONE ENCOUNTER
Care Due:                  Date            Visit Type   Department     Provider  --------------------------------------------------------------------------------                                             [facility]     MED/ INTERNAL  Last Visit: 12-      FOLLOW UP    MED/ PEDS      Angel Luis Boo  Next Visit: None Scheduled  None         None Found                                                            Last  Test          Frequency    Reason                     Performed    Due Date  --------------------------------------------------------------------------------    Lipid Panel.  12 months..  atorvastatin.............  10-   10-    [facility] Embedded Care Due Messages. Reference number: [id number].   1/21/2024 11:10:05 AM CST

## 2024-01-22 NOTE — TELEPHONE ENCOUNTER
Refill Routing Note   Medication(s) are not appropriate for processing by [facility] for the following reason(s):        Required labs outdated    [facility] action(s):  Defer     Requires labs : Yes             Appointments  past 12m or future 3m with PCP    Date Provider   Last Visit   12/19/2023 Angel Luis Boo MD   Next Visit   Visit date not found Angel Luis Boo MD   ED visits in past 90 days: 1        Note composed:11:49 AM 01/22/2024

## 2024-01-23 RX ORDER — ATORVASTATIN CALCIUM 20 MG/1
TABLET, FILM COATED ORAL
Qty: 90 TABLET | Refills: 3 | Status: SHIPPED | OUTPATIENT
Start: 2024-01-23

## 2024-01-24 ENCOUNTER — HOSPITAL ENCOUNTER (OUTPATIENT)
Dept: WOUND CARE | Facility: HOSPITAL | Age: 69
Discharge: HOME | End: 2024-01-24
Attending: SURGERY
Payer: COMMERCIAL

## 2024-01-24 VITALS
HEIGHT: 74 IN | TEMPERATURE: 99 F | HEART RATE: 75 BPM | WEIGHT: 216 LBS | RESPIRATION RATE: 18 BRPM | BODY MASS INDEX: 27.72 KG/M2 | DIASTOLIC BLOOD PRESSURE: 89 MMHG | SYSTOLIC BLOOD PRESSURE: 169 MMHG

## 2024-01-24 DIAGNOSIS — T81.89XD NON-HEALING SURGICAL WOUND, SUBSEQUENT ENCOUNTER: Primary | ICD-10-CM

## 2024-01-24 PROCEDURE — 99213 OFFICE O/P EST LOW 20 MIN: CPT

## 2024-01-24 NOTE — PROGRESS NOTES
Wound Care & [facility] Medicine Clinic    Subjective:       Patient ID: Elbert Still Jr. is a 68 y.o. male.    Chief Complaint: Wound Dehiscence    Wound care follow up for left upper arm surgical wound. Area has healed, no signs and symptoms of infection. Will discharge from clinic at this time.  Review of Systems   Constitutional: Negative.    HENT: Negative.     Eyes: Negative.    Respiratory: Negative.     Cardiovascular: Negative.    Gastrointestinal: Negative.    Genitourinary: Negative.    Musculoskeletal: Negative.    Neurological: Negative.    Psychiatric/Behavioral: Negative.         Objective:     Vitals:    01/24/24 1502   BP: (!) 169/89   Pulse: 75   Resp: 18   Temp: 98.5 °F (36.9 °C)         Physical Exam  Constitutional:       Appearance: He is well-developed.   HENT:      Head: Normocephalic.   Eyes:      Conjunctiva/sclera: Conjunctivae normal.      Pupils: Pupils are equal, round, and reactive to light.   Cardiovascular:      Rate and Rhythm: Normal rate and regular rhythm.      Heart sounds: Normal heart sounds.   Pulmonary:      Effort: Pulmonary effort is normal.      Breath sounds: Normal breath sounds.   Abdominal:      General: Bowel sounds are normal.      Palpations: Abdomen is soft.   Musculoskeletal:         General: Normal range of motion.      Cervical back: Normal range of motion and neck supple.   Skin:     General: Skin is warm and dry.   Neurological:      Mental Status: He is alert and oriented to person, place, and time.      Deep Tendon Reflexes: Reflexes are normal and symmetric.        Incision/Site 12/10/23 1542 Left Arm upper (Active)   12/10/23 1542   Present Prior to Hospital Arrival?:    Side: Left   Location: Arm   Orientation: upper   Incision Type:    Closure Method:    Additional Comments:    Removal Indication and Assessment:    Wound Outcome:    Removal Indications:    Wound Image   01/24/24 1502   Dressing Appearance Intact 01/24/24 1502    Drainage Amount None 01/24/24 1502   Appearance Epithelialization 01/24/24 1502   Periwound Area Intact 01/24/24 1502   Wound Edges Rolled/closed 01/24/24 1502   Wound Length (cm) 0 cm 01/24/24 1502   Wound Width (cm) 0 cm 01/24/24 1502   Wound Depth (cm) 0 cm 01/24/24 1502   Wound Volume (cm^3) 0 cm^3 01/24/24 1502   Wound Surface Area (cm^2) 0 cm^2 01/24/24 1502         Assessment/Plan:         ICD-10-CM ICD-9-CM   1. Non-healing surgical wound, subsequent encounter  T81.89XD V58.89     998.83           Tissue pathology and/or culture taken:  [] Yes [x] No   Sharp debridement performed:   [] Yes [x] No   Labs ordered this visit:   [] Yes [x] No   Imaging ordered this visit:   [] Yes [x] No           Orders Placed This Encounter   Procedures    Change dressing     Left upper arm  - resolved  Healed Wound Instructions:Your wound is healed, it is extremely fragile at this stage; protect it from friction, wash it gently and pat dry.  If the wound should re-open, please call [phone] for further instructions.        Follow up for discharge .            This includes face to face time and non-face to face time preparing to see the patient (eg, review of tests), obtaining and/or reviewing separately obtained history, documenting clinical information in the electronic or other health record, independently interpreting results and communicating results to the patient/family/caregiver, or care coordinator.

## 2024-01-28 LAB
ACID FAST MOD KINY STN SPEC: NORMAL
MYCOBACTERIUM SPEC QL CULT: NORMAL

## 2024-02-02 ENCOUNTER — PATIENT MESSAGE (OUTPATIENT)
Dept: ADMINISTRATIVE | Facility: OTHER | Age: 69
End: 2024-02-02
Payer: COMMERCIAL

## 2024-02-27 DIAGNOSIS — Z00.00 ENCOUNTER FOR MEDICARE ANNUAL WELLNESS EXAM: ICD-10-CM

## 2024-03-28 ENCOUNTER — TELEPHONE (OUTPATIENT)
Dept: FAMILY MEDICINE | Facility: CLINIC | Age: 69
End: 2024-03-28
Payer: COMMERCIAL

## 2024-04-18 ENCOUNTER — PATIENT OUTREACH (OUTPATIENT)
Dept: ADMINISTRATIVE | Facility: HOSPITAL | Age: 69
End: 2024-04-18
Payer: COMMERCIAL

## 2024-04-26 DIAGNOSIS — I10 ESSENTIAL (PRIMARY) HYPERTENSION: Chronic | ICD-10-CM

## 2024-04-27 NOTE — TELEPHONE ENCOUNTER
Care Due:                  Date            Visit Type   Department     Provider  --------------------------------------------------------------------------------                                             [facility]     MED/ INTERNAL  Last Visit: 12-      FOLLOW UP    MED/ PEDS      Angel Luis Boo  Next Visit: None Scheduled  None         None Found                                                            Last  Test          Frequency    Reason                     Performed    Due Date  --------------------------------------------------------------------------------    Lipid Panel.  12 months..  atorvastatin.............  10-   10-    Health [facility] Embedded Care Due Messages. Reference number: [id number].   4/26/2024 9:51:37 PM CDT

## 2024-04-30 NOTE — TELEPHONE ENCOUNTER
Refill Routing Note   Medication(s) are not appropriate for processing by [facility] for the following reason(s):        Outside of protocol    [facility] action(s):  Route             Appointments  past 12m or future 3m with PCP    Date Provider   Last Visit   12/19/2023 Angel Luis Boo MD   Next Visit   Visit date not found Angel Luis Boo MD   ED visits in past 90 days: 0        Note composed:8:18 AM 04/30/2024

## 2024-05-02 RX ORDER — NIFEDIPINE 60 MG/1
60 TABLET, EXTENDED RELEASE ORAL 2 TIMES DAILY
Qty: 180 TABLET | Refills: 0 | Status: SHIPPED | OUTPATIENT
Start: 2024-05-02 | End: 2025-05-02

## 2024-06-02 DIAGNOSIS — I10 ESSENTIAL (PRIMARY) HYPERTENSION: Chronic | ICD-10-CM

## 2024-06-02 NOTE — TELEPHONE ENCOUNTER
No care due was identified.  Health [facility] Embedded Care Due Messages. Reference number: [id number].   6/02/2024 8:11:31 AM CDT

## 2024-06-03 RX ORDER — CARVEDILOL 12.5 MG/1
12.5 TABLET ORAL 2 TIMES DAILY
Qty: 180 TABLET | Refills: 0 | Status: SHIPPED | OUTPATIENT
Start: 2024-06-03

## 2024-06-03 NOTE — TELEPHONE ENCOUNTER
INTERVENTIONAL RADIOLOGY BRIEF-OPERATIVE NOTE    Procedure: Celiac angiogram, splenic artery embolization    Pre-Op Diagnosis: Splenic laceration with hemoperitoneum    Post-Op Diagnosis: Same    Attending: Elliot Landau  Resident: None    Anesthesia (type):  [ ] General Anesthesia  [x] Sedation  [ ] Spinal Anesthesia  [x] Local/Regional    Contrast: 40 cc Optiray 320     Estimated Blood Loss: < 5 cc    Condition:   [ ] Critical  [ ] Serious  [x] Fair   [ ] Good    Findings/Follow up Plan of Care: Coil embolization of splenic artery performed without complication. Patient tolerated procedure well without complication. Please continue IV antibiotics for 5 days to cover for post embolization syndrome.    Specimens Removed: None    Implants: Multiple coils    Complications: None    Disposition: ICU      Please call Interventional Radiology [phone] with any questions, concerns, or issues. Refill Routing Note   Medication(s) are not appropriate for processing by [facility] for the following reason(s):        Required vitals abnormal    [facility] action(s):  Defer             Appointments  past 12m or future 3m with PCP    Date Provider   Last Visit   12/19/2023 Angel Luis Boo MD   Next Visit   Visit date not found Angel Luis Boo MD   ED visits in past 90 days: 0        Note composed:10:06 PM 06/02/2024

## 2024-06-10 ENCOUNTER — OFFICE VISIT (OUTPATIENT)
Dept: OPTOMETRY | Facility: CLINIC | Age: 69
End: 2024-06-10
Payer: COMMERCIAL

## 2024-06-10 DIAGNOSIS — H52.7 REFRACTIVE ERROR: ICD-10-CM

## 2024-06-10 DIAGNOSIS — H25.13 NUCLEAR SCLEROSIS OF BOTH EYES: ICD-10-CM

## 2024-06-10 DIAGNOSIS — H25.019 CORTICAL AGE-RELATED CATARACT, UNSPECIFIED LATERALITY: ICD-10-CM

## 2024-06-10 DIAGNOSIS — H04.123 DRY EYE SYNDROME, BILATERAL: ICD-10-CM

## 2024-06-10 DIAGNOSIS — E11.36 TYPE 2 DIABETES MELLITUS WITH DIABETIC CATARACT, WITHOUT LONG-TERM CURRENT USE OF INSULIN: Primary | ICD-10-CM

## 2024-06-10 PROCEDURE — 99999 PR PBB SHADOW E&M-EST. PATIENT-LVL III: CPT | Mod: PBBFAC,,, | Performed by: OPTOMETRIST

## 2024-06-10 PROCEDURE — 92014 COMPRE OPH EXAM EST PT 1/>: CPT | Mod: S$PBB,,, | Performed by: OPTOMETRIST

## 2024-06-10 PROCEDURE — 99213 OFFICE O/P EST LOW 20 MIN: CPT | Mod: PBBFAC,PO | Performed by: OPTOMETRIST

## 2024-06-10 PROCEDURE — 92015 DETERMINE REFRACTIVE STATE: CPT | Mod: ,,, | Performed by: OPTOMETRIST

## 2024-06-10 NOTE — PROGRESS NOTES
Subjective:       Patient ID: Elbert Still Jr. is a 68 y.o. male      Chief Complaint   Patient presents with    Concerns About Ocular Health    Diabetic Eye Exam     History of Present Illness  Dls: 8/12/22 Dr. Krishna     67 y/o male presents today for diabetic eye exam  Pt c/o blurry vision at distance and near ou. Pt wears otc readerss.   Pt c/o problems with glare ou    LBS ?     + ou  tearing  + ou off/on itching  No burning  No pain  No ha's  + od off/on floaters  No flashes    Eye meds  Clear eyes ou prn     Pohx:   None    Fohx:  None    Hemoglobin A1C       Date                     Value               Ref Range             Status                04/03/2024               4.9                 4.8 - 5.9 %           Final                 01/03/2024               4.7 (L)             4.8 - 5.9 %           Final                 10/04/2023               4.4 (L)             4.8 - 5.9 %           Final            ----------    Assessment/Plan:     1. Type 2 diabetes mellitus with diabetic cataract, without long-term current use of insulin  No diabetic retinopathy. Discussed with pt the effects of diabetes on vision, importance of good blood sugar control, compliance with meds, and follow up care with PCP. Return in 1 year for dilated eye exam, sooner PRN.    2. Nuclear sclerosis of both eyes  3. Cortical age-related cataract, unspecified laterality  NVS per pt. Educated pt on presence of cataracts and effects on vision. No surgery at this time. Recheck in one year, sooner PRN.    4. Dry eye syndrome, bilateral  Recommend artificial tears (Systane/Refresh) 1 drop 2-4x per day and PRN. Discussed different drop options - AT/PFAT/gel/ointment. Chronicity of disease and treatment discussed. Avoid Visine and Clear Eyes.     5. Refractive error  Minimal change in VA with MRx. Can use readers if preferred.    Eyeglass Final Rx       Eyeglass Final Rx         Sphere Cylinder Add    Right +0.25 Sphere +2.50    Left +0.50 Sphere  +2.50      Expiration Date: 6/10/2025                      Follow up in about 1 year (around 6/10/2025) for Diabetic Eye Exam, Cataract check.

## 2024-06-12 RX ORDER — TAMSULOSIN HYDROCHLORIDE 0.4 MG/1
0.4 CAPSULE ORAL
Qty: 90 CAPSULE | Refills: 1 | Status: SHIPPED | OUTPATIENT
Start: 2024-06-12

## 2024-06-12 NOTE — TELEPHONE ENCOUNTER
No care due was identified.  Health [facility] Embedded Care Due Messages. Reference number: [id number].   6/12/2024 8:11:58 AM CDT

## 2024-06-12 NOTE — TELEPHONE ENCOUNTER
Refill Decision Note   Elbert Still  is requesting a refill authorization.  Brief Assessment and Rationale for Refill:  Approve     Medication Therapy Plan:         Comments:     Note composed:2:28 PM 06/12/2024

## 2024-07-02 ENCOUNTER — OFFICE VISIT (OUTPATIENT)
Dept: FAMILY MEDICINE | Facility: CLINIC | Age: 69
End: 2024-07-02
Payer: COMMERCIAL

## 2024-07-02 VITALS
TEMPERATURE: 98 F | BODY MASS INDEX: 27.54 KG/M2 | DIASTOLIC BLOOD PRESSURE: 82 MMHG | OXYGEN SATURATION: 98 % | HEIGHT: 74 IN | WEIGHT: 214.63 LBS | SYSTOLIC BLOOD PRESSURE: 190 MMHG | HEART RATE: 60 BPM

## 2024-07-02 DIAGNOSIS — Z00.00 ENCOUNTER FOR MEDICARE ANNUAL WELLNESS EXAM: Primary | ICD-10-CM

## 2024-07-02 DIAGNOSIS — I10 ESSENTIAL (PRIMARY) HYPERTENSION: Chronic | ICD-10-CM

## 2024-07-02 DIAGNOSIS — I77.0 ARTERIOVENOUS FISTULA: ICD-10-CM

## 2024-07-02 DIAGNOSIS — E11.59 TYPE 2 DIABETES MELLITUS WITH OTHER CIRCULATORY COMPLICATION, WITHOUT LONG-TERM CURRENT USE OF INSULIN: Chronic | ICD-10-CM

## 2024-07-02 DIAGNOSIS — N18.6 ESRD (END STAGE RENAL DISEASE) ON DIALYSIS: ICD-10-CM

## 2024-07-02 DIAGNOSIS — Z99.2 ESRD (END STAGE RENAL DISEASE) ON DIALYSIS: ICD-10-CM

## 2024-07-02 DIAGNOSIS — C61 PROSTATE CANCER: ICD-10-CM

## 2024-07-02 DIAGNOSIS — Z99.2 DIALYSIS PATIENT: ICD-10-CM

## 2024-07-02 DIAGNOSIS — Z00.00 ENCOUNTER FOR PREVENTIVE HEALTH EXAMINATION: ICD-10-CM

## 2024-07-02 DIAGNOSIS — N18.4 CKD (CHRONIC KIDNEY DISEASE) STAGE 4, GFR 15-29 ML/MIN: Chronic | ICD-10-CM

## 2024-07-02 DIAGNOSIS — E08.21 DIABETES MELLITUS DUE TO UNDERLYING CONDITION WITH DIABETIC NEPHROPATHY, WITHOUT LONG-TERM CURRENT USE OF INSULIN: Chronic | ICD-10-CM

## 2024-07-02 DIAGNOSIS — E11.69 HYPERLIPIDEMIA ASSOCIATED WITH TYPE 2 DIABETES MELLITUS: ICD-10-CM

## 2024-07-02 DIAGNOSIS — E21.3 HYPERPARATHYROIDISM: ICD-10-CM

## 2024-07-02 DIAGNOSIS — E78.2 MIXED HYPERLIPIDEMIA: Chronic | ICD-10-CM

## 2024-07-02 DIAGNOSIS — E66.3 OVERWEIGHT (BMI 25.0-29.9): ICD-10-CM

## 2024-07-02 DIAGNOSIS — M10.9 GOUT, UNSPECIFIED CAUSE, UNSPECIFIED CHRONICITY, UNSPECIFIED SITE: Chronic | ICD-10-CM

## 2024-07-02 DIAGNOSIS — Z00.00 ANNUAL PHYSICAL EXAM: Primary | ICD-10-CM

## 2024-07-02 DIAGNOSIS — E78.5 HYPERLIPIDEMIA ASSOCIATED WITH TYPE 2 DIABETES MELLITUS: ICD-10-CM

## 2024-07-02 DIAGNOSIS — D70.8 OTHER NEUTROPENIA: ICD-10-CM

## 2024-07-02 DIAGNOSIS — Z71.89 ADVANCED DIRECTIVES, COUNSELING/DISCUSSION: ICD-10-CM

## 2024-07-02 DIAGNOSIS — G47.8 POOR SLEEP PATTERN: ICD-10-CM

## 2024-07-02 PROCEDURE — 99999 PR PBB SHADOW E&M-EST. PATIENT-LVL V: CPT | Mod: PBBFAC,,, | Performed by: FAMILY MEDICINE

## 2024-07-02 PROCEDURE — 3288F FALL RISK ASSESSMENT DOCD: CPT | Mod: CPTII,S$GLB,,

## 2024-07-02 PROCEDURE — 3044F HG A1C LEVEL LT 7.0%: CPT | Mod: CPTII,S$GLB,,

## 2024-07-02 PROCEDURE — 3066F NEPHROPATHY DOC TX: CPT | Mod: CPTII,S$GLB,,

## 2024-07-02 PROCEDURE — 3077F SYST BP >= 140 MM HG: CPT | Mod: CPTII,S$GLB,,

## 2024-07-02 PROCEDURE — G0439 PPPS, SUBSEQ VISIT: HCPCS | Mod: S$GLB,,,

## 2024-07-02 PROCEDURE — 1170F FXNL STATUS ASSESSED: CPT | Mod: CPTII,S$GLB,,

## 2024-07-02 PROCEDURE — 3079F DIAST BP 80-89 MM HG: CPT | Mod: CPTII,S$GLB,,

## 2024-07-02 PROCEDURE — 99999 PR PBB SHADOW E&M-EST. PATIENT-LVL V: CPT | Mod: PBBFAC,,,

## 2024-07-02 PROCEDURE — 1101F PT FALLS ASSESS-DOCD LE1/YR: CPT | Mod: CPTII,S$GLB,,

## 2024-07-02 PROCEDURE — 1158F ADVNC CARE PLAN TLK DOCD: CPT | Mod: CPTII,S$GLB,,

## 2024-07-02 RX ORDER — NIFEDIPINE 90 MG/1
90 TABLET, EXTENDED RELEASE ORAL DAILY
Qty: 90 TABLET | Refills: 3 | Status: SHIPPED | OUTPATIENT
Start: 2024-07-02

## 2024-07-02 RX ORDER — FERRIC CITRATE 210 MG/1
2 TABLET, COATED ORAL 3 TIMES DAILY
COMMUNITY
Start: 2024-06-18

## 2024-07-02 NOTE — ASSESSMENT & PLAN NOTE
The current medical regimen is effective;  continue present plan and medications.    Failed fistula to left upper arm. External port to right chest wall.

## 2024-07-02 NOTE — PROGRESS NOTES
"  Physical Exam  /82   Pulse 60   Temp 98.1 °F (36.7 °C) (Oral)   Resp 14   Ht 6' 2" (1.88 m)   Wt 97.3 kg (214 lb 8.1 oz)   SpO2 98%   BMI 27.54 kg/m²      Office Visit    Patient Name: Elbert Still Jr.    : 1955  MRN: [id number]      Assessment/Plan:  Elbert Still Jr. is a 68 y.o. male who presents today for :    Annual physical exam  -     Prostate Specific Antigen, Diagnostic; Future; Expected date: 2024  -     Hemoglobin A1C; Future; Expected date: 2024  -     CBC Without Differential; Future; Expected date: 2024  -     Comprehensive Metabolic Panel; Future; Expected date: 2024  -     Lipid Panel; Future; Expected date: 2024  -     Vitamin D; Future; Expected date: 2024  -anticipatory guidance provided with age appropriate preventative services discussed, -continue healthy diet and regular physical exercise    Diabetes mellitus due to underlying condition with diabetic nephropathy, without long-term current use of insulin  -     Hemoglobin A1C; Future; Expected date: 2024  -     Microalbumin/Creatinine Ratio, Urine; Future; Expected date: 2024  -HLD associated with type 2 DM  -     Lipid Panel; Future; Expected date: 2024  -previous A1c reviewed:   Lab Results   Component Value Date    HGBA1C 4.9 2024     -diet-controlled, continue statin  -reviewed with patient about routine diabetic care  -weight loss and regular physical excercise      -Essential (primary) hypertension  -     NIFEdipine (PROCARDIA-XL) 90 MG (OSM) 24 hr tablet; Take 1 tablet (90 mg total) by mouth once daily.   -     CBC Without Differential; Future; Expected date: 2024  -     Comprehensive Metabolic Panel; Future; Expected date: 2024  Overweight (BMI 25.0-29.9)  -continue current medication regimen  -DASH diet, regular cardiovascular exercises  -weight loss    -Dialysis patient - MWF - started 2022  -CKD (chronic kidney disease) stage 4, GFR " 15-29 ml/min  Arteriovenous fistula  Hyperparathyroidism  -     PTH, Intact; Future; Expected date: 07/02/2024  -     Vitamin D; Future; Expected date: 07/02/2024  -chronic and stable, asymptomatic, continue to monitor        -Prostate cancer - follows Dr. Welch (urology)                  Follow up PRN      This note was created by combination of typed  and MModal dictation.  Transcription errors may be present.  If there are any questions, please contact me.        ----------------------------------------------------------------------------------------------------------------------      HPI:  Patient Care Team:  Angel Luis Boo MD as PCP - General (Family Medicine)  Walter Díaz MD as Consulting Physician (Nephrology)  Derrick Julio MA as Care Coordinator    Elbert is a 68 y.o. male with      Patient Active Problem List   Diagnosis    -Diabetes mellitus due to underlying condition with diabetic nephropathy - diet controlled    Proteinuria    -Essential (primary) hypertension    Mixed hyperlipidemia    -CKD (chronic kidney disease) stage 4, GFR 15-29 ml/min    -Gout    Anemia of chronic disease    -HLD associated with type 2 DM    Hyperkalemia    Acute kidney injury superimposed on chronic kidney disease    -Dialysis patient - MWF - started 5/2022    ESRD on dialysis    Nuclear sclerosis of both eyes    Refractive error    Cortical age-related cataract    Status post placement of arteriovenous graft    Overweight (BMI 25.0-29.9)    Steal syndrome dialysis vascular access, initial encounter    Arteriovenous fistula    ESRD (end stage renal disease) on dialysis    ESRD (end stage renal disease)    Other neutropenia    End stage renal failure on dialysis    Stenosis of arteriovenous dialysis fistula    Hypocalcemia    Bacteremia    Type 2 diabetes mellitus    Surgical wound, non healing    Prostate cancer       Elbert presents today for:  Annual Exam        His last Annual checkup with me  was over a year ago. He is doing well overall and has no major complaints today. He just finished his AWV with our NP earlier this morning.  Health maintenance-wise, he is due for routine labs. He continues to go to dialysis MW. He is still able to produce some urine daily. CP/SOB/ALLEN/palpitations/claudication/leg swelling. She denies any cardiovascular or neurologic complaints today        Wt Readings from Last 4 Encounters:   07/02/24 97.3 kg (214 lb 8.1 oz)   07/02/24 97.3 kg (214 lb 9.9 oz)   01/24/24 98 kg (216 lb)   01/18/24 98.2 kg (216 lb 7.9 oz)           Additional ROS    CONST: no fever, no activity change, weight stable.   EYES: no vision change.   ENT: no sore throat. No dysphagia.   CV: no CP with exertion  RESP: no SOB  GI: no N/V/diarrhea/constipation  : no urinary concerns  MSK: stable chronic joint pain, but no new myalgias or arthralgias.   SKIN: no new rashes  NEURO: no focal deficits.   PSYCH: no new issues.   ENDOCRINE: no polyuria.           Current Medications  Medications reviewed and updated.       Current Outpatient Medications:     acetaminophen (TYLENOL) 500 MG tablet, Take 2 tablets (1,000 mg total) by mouth every 6 (six) hours as needed for Pain., Disp: 30 tablet, Rfl: 0    aspirin (ECOTRIN) 81 MG EC tablet, Take 1 tablet (81 mg total) by mouth once daily., Disp: 90 tablet, Rfl: 3    atorvastatin (LIPITOR) 20 MG tablet, TAKE 1 TABLET BY MOUTH EVERY EVENING, Disp: 90 tablet, Rfl: 3    AURYXIA 210 mg iron Tab, Take 2 tablets by mouth 3 (three) times daily., Disp: , Rfl:     carvediloL (COREG) 12.5 MG tablet, TAKE 1 TABLET(12.5 MG) BY MOUTH TWICE DAILY, Disp: 180 tablet, Rfl: 0    cinacalcet (SENSIPAR) 30 MG Tab, Take 30 mg by mouth daily with breakfast., Disp: , Rfl:     diclofenac sodium (VOLTAREN) 1 % Gel, Apply 2 g topically once daily., Disp: 20 g, Rfl: 0    FERRIC CITRATE ORAL, Take by mouth 3 (three) times daily with meals., Disp: , Rfl:     fluticasone propionate (FLONASE) 50  mcg/actuation nasal spray, 2 sprays (100 mcg total) by Each Nostril route once daily., Disp: 16 g, Rfl: 11    furosemide (LASIX) 80 MG tablet, TAKE 1 TABLET(80 MG) BY MOUTH TWICE DAILY, Disp: 60 tablet, Rfl: 11    LIDOcaine (LIDODERM) 5 %, Place 2 patches onto the skin once daily. Remove & Discard patch within 12 hours or as directed by MD, Disp: 20 patch, Rfl: 0    tamsulosin (FLOMAX) 0.4 mg Cap, TAKE 1 CAPSULE(0.4 MG) BY MOUTH EVERY DAY, Disp: 90 capsule, Rfl: 1    blood sugar diagnostic Strp, Use to check blood glucose once a day., Disp: 100 each, Rfl: 3    blood-glucose meter Misc, Use to check blood glucose twice a day., Disp: 1 each, Rfl: 0    colchicine (COLCRYS) 0.6 mg tablet, Take 0.6 mg by mouth as needed (for gout flare). Take half tab (0.3 mg) by mouth daily as needed for gout flare. (Patient not taking: Reported on 7/2/2024), Disp: 90 tablet, Rfl: 3    lancets 30 gauge Misc, Use to check blood glucose twice a day., Disp: 100 each, Rfl: 5    LIDOcaine-prilocaine (EMLA) cream, SMARTSIG:sparingly Topical As Directed (Patient not taking: Reported on 7/2/2024), Disp: , Rfl:     mupirocin (BACTROBAN) 2 % ointment, Apply topically every other day. (Patient not taking: Reported on 7/2/2024), Disp: , Rfl:     NIFEdipine (PROCARDIA-XL) 90 MG (OSM) 24 hr tablet, Take 1 tablet (90 mg total) by mouth once daily. Followup Dr.T Boo JUNE 2025 for more refills, call to schedule/get labs 1wk before doctor's appointment (ordered)., Disp: 90 tablet, Rfl: 3    sodium bicarbonate 650 MG tablet, Take 2 tablets (1,300 mg total) by mouth 2 (two) times daily., Disp: 120 tablet, Rfl: 0  No current facility-administered medications for this visit.    Facility-Administered Medications Ordered in Other Visits:     0.9%  NaCl infusion, , Intravenous, Continuous, Kim Lutz, NP    0.9%  NaCl infusion, , Intravenous, Continuous, Herminia Durant MD    [START ON 7/6/2024] heparin (porcine) injection 1,200 Units, 1,200 Units,  Intravenous, Continuous, Nawaf Butler MD, 1,200 Units at 07/05/24 0526    heparin (porcine) injection 2,000 Units, 2,000 Units, Intravenous, 1 time in Clinic/HOD, Nawaf Butler MD    heparin (porcine) injection 2,000 Units, 2,000 Units, Intravenous, 1 time in Clinic/HOD, Nawaf Butler MD    ondansetron disintegrating tablet 8 mg, 8 mg, Oral, Q8H PRN, Herminia Durant MD    Past Surgical History:   Procedure Laterality Date    ADENOIDECTOMY      ADENOIDECTOMY      AV FISTULA PLACEMENT Left 09/06/2022    Procedure: CREATION, AV FISTULA;  Surgeon: Prince Gardiner MD;  Location: [facility] OR [address] [place] [address];  Service: Peripheral Vascular;  Laterality: Left;    EXCISION OF ARTERIOVENOUS FISTULA Left 12/10/2023    Procedure: EXCISION, AV FISTULA;  Surgeon: Joaquin Rose MD;  [facility];  Service: Vascular;  Laterality: Left;    FISTULOGRAM Left 2/16/2023    Procedure: FISTULOGRAM;  Surgeon: Prince Gardiner MD;  Location: [facility] OR [address] [place] [address];  Service: Peripheral Vascular;  Laterality: Left;  Given to the sterile field.     FISTULOGRAM Left 7/6/2023    Procedure: Fistulogram;  Surgeon: LETY Rayo III, MD;  [facility];  Service: Peripheral Vascular;  Laterality: Left;  1.9 min  22.86 mGy  3.8862 Gy.cm  20ml Dye     nasal septum repair      NASAL SEPTUM SURGERY      PERCUTANEOUS TRANSLUMINAL ANGIOPLASTY OF ARTERIOVENOUS FISTULA Left 11/22/2022    Procedure: PTA, AV FISTULA;  Surgeon: Prince Gardiner MD;  Location: [facility] CATH LAB;  Service: Peripheral Vascular;  Laterality: Left;    PERCUTANEOUS TRANSLUMINAL ANGIOPLASTY OF ARTERIOVENOUS FISTULA Left 2/16/2023    Procedure: PTA, AV FISTULA;  Surgeon: Prince Gardiner MD;  [facility];  Service: Peripheral Vascular;  Laterality: Left;  4.8 min  43.08 mGy  4.0682 Gy.cm  32ml Dye    PERCUTANEOUS TRANSLUMINAL ANGIOPLASTY OF ARTERIOVENOUS FISTULA Left 7/6/2023    Procedure: PTA, AV FISTULA;  Surgeon: LETY Rayo III, MD;   Location: [facility] OR 2ND FLR;  Service: Peripheral Vascular;  Laterality: Left;    PROSTATE BIOPSY N/A 3/24/2023    Procedure: BIOPSY, PROSTATE;  Surgeon: Frankie Welch MD;  Location: [facility] OR 1ST FLR;  Service: Urology;  Laterality: N/A;  transrectal, 30min, uronav needed    REVISION OF ARTERIOVENOUS FISTULA Left 11/9/2023    Procedure: REVISION, AV FISTULA;  Surgeon: LETY Rayo III, MD;  Location: [facility] OR 2ND FLR;  Service: Vascular;  Laterality: Left;  LUE AVG revision    ROTATOR CUFF REPAIR Left     SALIVARY GLAND SURGERY      Tonsillectomy      TONSILLECTOMY      TRANSPOSITION OF BASILIC VEIN Left 11/1/2022    Procedure: TRANSPOSITION, VEIN, BASILIC;  Surgeon: Prince Gardiner MD;  [facility];  Service: Peripheral Vascular;  Laterality: Left;  Left Brachial vein.    WASHOUT Left 12/10/2023    Procedure: WASHOUT OF AV FISTULA;  Surgeon: Joaquin Rose MD;  [facility];  Service: Vascular;  Laterality: Left;       Family History   Problem Relation Name Age of Onset    Heart disease Mother      Kidney disease Mother      Hypertension Mother      No Known Problems Father      Cancer Sister      Seizures Sister      Hypertension Sister      Stroke Sister      Amblyopia Neg Hx      Blindness Neg Hx      Cataracts Neg Hx      Diabetes Neg Hx      Glaucoma Neg Hx      Macular degeneration Neg Hx      Retinal detachment Neg Hx      Strabismus Neg Hx      Thyroid disease Neg Hx      Anesthesia problems Neg Hx         Social History     Socioeconomic History    Marital status:      Spouse name: Kristine Still   Occupational History     Employer: [employer] de[employer]   Tobacco Use    Smoking status: Never    Smokeless tobacco: Never    Tobacco comments:     .  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     Social Determinants of Health     Financial Resource Strain: Low Risk  (7/2/2024)    Overall Financial Resource Strain (CARDIA)     Difficulty of Paying Living Expenses: Not hard at all   Food Insecurity: No Food Insecurity (7/2/2024)    Hunger Vital Sign     Worried About Running Out of Food in the Last Year: Never true     Ran Out of Food in the Last Year: Never true   Transportation Needs: No Transportation Needs (7/2/2024)    PRAPARE - Transportation     Lack of Transportation (Medical): No     Lack of Transportation (Non-Medical): No   Physical Activity: Insufficiently Active (7/2/2024)    Exercise Vital Sign     Days of Exercise per Week: 3 days     Minutes of Exercise per Session: 40 min   Stress: No Stress Concern Present (7/2/2024)    [employer] [place] [employer] - Occupational Stress Questionnaire     Feeling of Stress : Only a little   Housing Stability: Low Risk  (7/2/2024)    Housing Stability Vital Sign     Unable to Pay for Housing in the Last Year: No     Homeless in the Last Year: No           Allergies   Review of patient's allergies indicates:   Allergen Reactions    Iodine and iodide containing products Hives     Hypotension, Flushing             Review of Systems  See HPI      [unfilled]  /82   Pulse 60   Temp 98.1 °F (36.7 °C) (Oral)   Resp 14   Ht 6' 2" (1.88 m)   Wt 97.3 kg (214 lb 8.1 oz)   SpO2 98%   BMI 27.54 kg/m²     GEN: NAD, well developed, pleasant, well nourished  HEENT: NCAT, PERRLA, EOMI, sclera clear, anicteric, bilateral ear exam wnl, O/P clear, MMM with no lesions  NECK: normal, supple with midline trachea, no LAD, no thyromegaly  LUNGS: CTAB, no w/r/r, no increased work of breathing   HEART: RRR, normal S1 and S2, no m/r/g, no edema  ABD: s/nt/nd, NABS  SKIN: normal turgor, no rashes  PSYCH: AOx3, appropriate mood and affect  MSK: " warm/well perfused, normal ROM in all extremities, no c/c/e.  NEURO: normal without focal findings, CN II-XII are grossly intact.    FOOT:  Protective Sensation (w/ 10 gram monofilament):  Right: Intact  Left: Intact    Visual Inspection:  Normal -  Bilateral    Pedal Pulses:   Right: Present  Left: Present    Posterior Tibialis Pulses:   Right:Present  Left: Present

## 2024-07-02 NOTE — PROGRESS NOTES
HPI     Chief Complaint:  AWV    Elbert Still Jr. is a 68 y.o. male with multiple medical diagnoses as listed in the medical history and problem list that presented for a Medicare AWV and comprehensive Health Risk Assessment today.  Pt is new to me but is known to this clinic with his last appointment being Visit date not found.      The following components were reviewed and updated:    Medical history  Family History  Social history  Allergies and Current Medications  Health Risk Assessment  Health Maintenance  Care Team     HPI      Assessment & Plan     (all problems are new to me)    Diagnoses and health risks identified today and associated recommendations/orders:    Problem List Items Addressed This Visit          Cardiac/Vascular    -Essential (primary) hypertension (Chronic)    Current Assessment & Plan     Not at goal. Continue dialysis and further mgmt by nephrology. F/u with PCP         Mixed hyperlipidemia (Chronic)  Lab Results   Component Value Date    CHOL 68 (L) 10/27/2022    CHOL 91 05/11/2022    CHOL 85 (L) 09/08/2021     Lab Results   Component Value Date    HDL 29 (L) 10/27/2022    HDL 34 05/11/2022    HDL 34 (L) 09/08/2021     Lab Results   Component Value Date    LDLCALC 19.4 (L) 10/27/2022    LDLCALC 27 05/11/2022    LDLCALC 25.0 (L) 09/08/2021     Lab Results   Component Value Date    TRIG 98 10/27/2022    TRIG 151 (H) 05/11/2022    TRIG 130 09/08/2021       Lab Results   Component Value Date    CHOLHDL 42.6 10/27/2022    CHOLHDL 2.7 05/11/2022    CHOLHDL 40.0 09/08/2021     Due for fasting labs and visit with PCP      Arteriovenous fistula  Failed to left upper arm, closed by vascular.        Renal/    ESRD (end stage renal disease) on dialysis    Current Assessment & Plan     The current medical regimen is effective;  continue present plan and medications.    Failed fistula to left upper arm. External port to right chest wall.               Oncology    Other neutropenia       Prostate cancer   Last visit with Dr. Welch (urology) 10/2023. See visit note below.     68 yo M w/ hx of ESRD on transplant list now diagnosed with very low risk prostate cancer.      Plan:      Today I discussed at length with the patient the natural history of very low risk prostate cancer by NCCN guidelines. We talked at length as well about the preferred management strategy cited by the AUA and NCCN guidelines which is active surveillance. We did talk about the difference between active surveillance and watchful waiting. Finally, we spoke about the reasons for deferring treatment such as radiation or surgery.      Overall patient understands that nearly 50% of men will remain on active surveillance for 10 years with the type of cancer he was diagnosed with. He also understands that there are always limitations to biopsy and MRI which could leave some prostate cancer unfound. That said, he understands that if he continues to come to regular follow ups and complete surveillance strategy that we should fine clinically significant cancer in the future in time for curative intent.      We will plan on confirmatory biopsy in December 2023. Given he did not tolerate office biopsy in the past, we will plan to do this in the operating room. We also discussed the possibility of utilizing SelectMDx or ExoDx as part of his surveillance strategy in the future, should his confirmatory biopsy pathology show [facility] <7 disease.     I would recommend he proceed with transplant and is a good candidate despite his prostate cancer diagnosis. I do not feel that this prostate cancer needs to be treated or preclude him from obtaining a transplant.     The total time for the established patient visit was at least 30 minutes in both face-to-face and non-face-to-face activities, which included chart review, interpretation of results, counseling, education, ordering meds/tests/procedures, and/or coordination of care.            Frankie  MD Rebekah  Urologic Oncology  P: [phone]       Endocrine    -Diabetes mellitus due to underlying condition with diabetic nephropathy - diet controlled (Chronic)    Type 2 diabetes mellitus (Chronic)    Current Assessment & Plan     Lab Results   Component Value Date    HGBA1C 4.9 04/03/2024     Diet controlled. The current medical regimen is effective;  continue present plan and medications.            Other Visit Diagnoses       Encounter for Medicare annual wellness exam      Pt was seen today for an Annual Wellness visit. Healthcare maintenance and screening recommendations were discussed and updated as indicated. Return in one year for AWV.    Hyperparathyroidism      Noted in chart.     Poor sleep pattern      Admits poor sleep but doesn't wish to pursue medication at this time. Able to fall asleep but will wake up after a few hours.     Advanced directives, counseling/discussion      I offered to discuss advanced care planning, including how to pick a person who would make decisions for you if you were unable to make them for yourself, called a health care power of , and what kind of decisions you might make such as use of life sustaining treatments such as ventilators and tube feeding when faced with a life limiting illness recorded on a living will that they will need to know. (How you want to be cared for as you near the end of your natural life)     X Patient is interested in learning more about how to make advanced directives.  I provided them paperwork and offered to discuss this with them.      Encounter for preventive health examination      Counseled on age appropriate medical preventative services including age appropriate cancer screenings, age appropriate eye and dental exams, over all nutritional health, need for a consistent exercise regimen, and an over all push towards maintaining a vigorous and active lifestyle.  Counseled on age appropriate vaccines and discussed upcoming health  "care needs based on age/gender. Discussed good sleep hygiene and stress management.                --------------------------------------------    ** See Completed Assessments for Annual Wellness Visit within the encounter summary.**    The following assessments were completed:  Living Situation  CAGE  Depression Screening  Timed Get Up and Go  Whisper Test  Cognitive Function Screening  Nutrition Screening  ADL Screening  PAQ Screening      Provided Elbert Still Jr.  with a 5-10 year written screening schedule and personal prevention plan. Recommendations were developed using the USPSTF age appropriate recommendations. Education, counseling, and referrals were provided as needed. After Visit Summary printed and given to patient which includes a list of additional screenings\tests needed.    Review for Opioid Screening: Pt does not have Rx for Opioids     Review for Substance Use Disorders: Patient does not abuse use substances    Exam     Review of Systems:  (as noted above)  Review of Systems    Physical Exam:   Physical Exam  Constitutional:       General: He is not in acute distress.     Appearance: Normal appearance. He is not toxic-appearing.   HENT:      Head: Normocephalic and atraumatic.   Pulmonary:      Effort: Pulmonary effort is normal.   Skin:     Capillary Refill: Capillary refill takes less than 2 seconds.   Neurological:      General: No focal deficit present.      Mental Status: He is alert and oriented to person, place, and time.   Psychiatric:         Mood and Affect: Mood normal.       Vitals:    07/02/24 0845   BP: (!) 190/82   Pulse: 60   Temp: 98.1 °F (36.7 °C)   TempSrc: Oral   SpO2: 98%   Weight: 97.3 kg (214 lb 9.9 oz)   Height: 6' 2" (1.88 m)      Body mass index is 27.56 kg/m².    Clock:              Health Maintenance:  Health Maintenance         Date Due Completion Date    COVID-19 Vaccine (1) Never done ---    RSV Vaccine (Age 60+ and Pregnant patients) (1 - 1-dose 60+ series) " Never done ---    Foot Exam 2023    Override on 2015: Done    Lipid Panel 10/27/2023 10/27/2022    Hemoglobin A1c 10/03/2024 4/3/2024    Eye Exam 06/10/2025 6/10/2024    TETANUS VACCINE 2029            Health maintenance reviewed      Follow Up:  Follow up in about 3 months (around 10/2/2024) for appt with PCP.    History     Past Medical History:  Past Medical History:   Diagnosis Date    Anemia     Diabetes mellitus     Diabetes mellitus, type 2     Disorder of kidney and ureter     ESRD (end stage renal disease)     Essential (primary) hypertension 2017    Gout, unspecified      jaundice, unspecified     Nuclear sclerosis of both eyes 2022       Past Surgical History:  Past Surgical History:   Procedure Laterality Date    ADENOIDECTOMY      ADENOIDECTOMY      AV FISTULA PLACEMENT Left 2022    Procedure: CREATION, AV FISTULA;  Surgeon: Prince Gardiner MD;  Location: [address];  Service: Peripheral Vascular;  Laterality: Left;    EXCISION OF ARTERIOVENOUS FISTULA Left 12/10/2023    Procedure: EXCISION, AV FISTULA;  Surgeon: Joaquin Rose MD;  Location: [facility] OR [address] [place] [address];  Service: Vascular;  Laterality: Left;    FISTULOGRAM Left 2023    Procedure: FISTULOGRAM;  Surgeon: Prince Gardiner MD;  Location: [address];  Service: Peripheral Vascular;  Laterality: Left;  Given to the sterile field.     FISTULOGRAM Left 2023    Procedure: Fistulogram;  Surgeon: LETY Rayo III, MD;  Location: [address];  Service: Peripheral Vascular;  Laterality: Left;  1.9 min  22.86 mGy  3.8862 Gy.cm  20ml Dye     nasal septum repair      NASAL SEPTUM SURGERY      PERCUTANEOUS TRANSLUMINAL ANGIOPLASTY OF ARTERIOVENOUS FISTULA Left 2022    Procedure: PTA, AV FISTULA;  Surgeon: Prince Gardiner MD;  Location: [facility] CATH LAB;  Service: Peripheral Vascular;  Laterality: Left;    PERCUTANEOUS TRANSLUMINAL ANGIOPLASTY OF ARTERIOVENOUS FISTULA  Left 2/16/2023    Procedure: PTA, AV FISTULA;  Surgeon: Prince Gardiner MD;  Location: [facility] OR 2ND FLR;  Service: Peripheral Vascular;  Laterality: Left;  4.8 min  43.08 mGy  4.0682 Gy.cm  32ml Dye    PERCUTANEOUS TRANSLUMINAL ANGIOPLASTY OF ARTERIOVENOUS FISTULA Left 7/6/2023    Procedure: PTA, AV FISTULA;  Surgeon: LETY Rayo III, MD;  Location: [facility] OR 2ND FLR;  Service: Peripheral Vascular;  Laterality: Left;    PROSTATE BIOPSY N/A 3/24/2023    Procedure: BIOPSY, PROSTATE;  Surgeon: Frankie Welch MD;  Location: [facility] OR 1ST FLR;  Service: Urology;  Laterality: N/A;  transrectal, 30min, uronav needed    REVISION OF ARTERIOVENOUS FISTULA Left 11/9/2023    Procedure: REVISION, AV FISTULA;  Surgeon: LETY Rayo III, MD;  Location: [facility] OR 2ND FLR;  Service: Vascular;  Laterality: Left;  LUE AVG revision    ROTATOR CUFF REPAIR Left     SALIVARY GLAND SURGERY      Tonsillectomy      TONSILLECTOMY      TRANSPOSITION OF BASILIC VEIN Left 11/1/2022    Procedure: TRANSPOSITION, VEIN, BASILIC;  Surgeon: Prince Gardiner MD;  Location: [facility] OR 2ND FLR;  Service: Peripheral Vascular;  Laterality: Left;  Left Brachial vein.    WASHOUT Left 12/10/2023    Procedure: WASHOUT OF AV FISTULA;  Surgeon: Joaquin Rose MD;  [facility];  Service: Vascular;  Laterality: Left;       Social History:  Social History     Socioeconomic History    Marital status:      Spouse name: Kristine Still   Occupational History     Employer: [employer] dept   Tobacco Use    Smoking status: Never    Smokeless tobacco: Never    Tobacco comments:     .  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     Social Determinants of  Health     Financial Resource Strain: Low Risk  (7/2/2024)    Overall Financial Resource Strain (CARDIA)     Difficulty of Paying Living Expenses: Not hard at all   Food Insecurity: No Food Insecurity (7/2/2024)    Hunger Vital Sign     Worried About Running Out of Food in the Last Year: Never true     Ran Out of Food in the Last Year: Never true   Transportation Needs: No Transportation Needs (7/2/2024)    PRAPARE - Transportation     Lack of Transportation (Medical): No     Lack of Transportation (Non-Medical): No   Physical Activity: Insufficiently Active (7/2/2024)    Exercise Vital Sign     Days of Exercise per Week: 3 days     Minutes of Exercise per Session: 40 min   Stress: No Stress Concern Present (7/2/2024)    [place] [employer] - Occupational Stress Questionnaire     Feeling of Stress : Only a little   Housing Stability: Low Risk  (7/2/2024)    Housing Stability Vital Sign     Unable to Pay for Housing in the Last Year: No     Homeless in the Last Year: No       Family History:  Family History   Problem Relation Name Age of Onset    Heart disease Mother      Kidney disease Mother      Hypertension Mother      No Known Problems Father      Cancer Sister      Seizures Sister      Hypertension Sister      Stroke Sister      Amblyopia Neg Hx      Blindness Neg Hx      Cataracts Neg Hx      Diabetes Neg Hx      Glaucoma Neg Hx      Macular degeneration Neg Hx      Retinal detachment Neg Hx      Strabismus Neg Hx      Thyroid disease Neg Hx      Anesthesia problems Neg Hx         Allergies and Medications: (updated and reviewed)  Review of patient's allergies indicates:   Allergen Reactions    Iodine and iodide containing products Hives     Hypotension, Flushing     Current Outpatient Medications   Medication Sig Dispense Refill    acetaminophen (TYLENOL) 500 MG tablet Take 2 tablets (1,000 mg total) by mouth every 6 (six) hours as needed for Pain. 30 tablet 0    aspirin (ECOTRIN) 81 MG  EC tablet Take 1 tablet (81 mg total) by mouth once daily. 90 tablet 3    atorvastatin (LIPITOR) 20 MG tablet TAKE 1 TABLET BY MOUTH EVERY EVENING 90 tablet 3    blood sugar diagnostic Strp Use to check blood glucose once a day. 100 each 3    blood-glucose meter Misc Use to check blood glucose twice a day. 1 each 0    carvediloL (COREG) 12.5 MG tablet TAKE 1 TABLET(12.5 MG) BY MOUTH TWICE DAILY 180 tablet 0    cinacalcet (SENSIPAR) 30 MG Tab Take 30 mg by mouth daily with breakfast.      colchicine (COLCRYS) 0.6 mg tablet Take 0.6 mg by mouth as needed (for gout flare). Take half tab (0.3 mg) by mouth daily as needed for gout flare. (Patient not taking: Reported on 7/2/2024) 90 tablet 3    diclofenac sodium (VOLTAREN) 1 % Gel Apply 2 g topically once daily. 20 g 0    FERRIC CITRATE ORAL Take by mouth 3 (three) times daily with meals.      fluticasone propionate (FLONASE) 50 mcg/actuation nasal spray 2 sprays (100 mcg total) by Each Nostril route once daily. 16 g 11    furosemide (LASIX) 80 MG tablet TAKE 1 TABLET(80 MG) BY MOUTH TWICE DAILY 60 tablet 11    tamsulosin (FLOMAX) 0.4 mg Cap TAKE 1 CAPSULE(0.4 MG) BY MOUTH EVERY DAY 90 capsule 1    AURYXIA 210 mg iron Tab Take 2 tablets by mouth 3 (three) times daily.      lancets 30 gauge Misc Use to check blood glucose twice a day. 100 each 5    LIDOcaine (LIDODERM) 5 % Place 2 patches onto the skin once daily. Remove & Discard patch within 12 hours or as directed by MD 20 patch 0    LIDOcaine-prilocaine (EMLA) cream SMARTSIG:sparingly Topical As Directed (Patient not taking: Reported on 7/2/2024)      mupirocin (BACTROBAN) 2 % ointment Apply topically every other day. (Patient not taking: Reported on 7/2/2024)      NIFEdipine (PROCARDIA-XL) 90 MG (OSM) 24 hr tablet Take 1 tablet (90 mg total) by mouth once daily. Followup Dr.T Boo JUNE 2025 for more refills, call to schedule/get labs 1wk before doctor's appointment (ordered). 90 tablet 3    sodium bicarbonate 650  MG tablet Take 2 tablets (1,300 mg total) by mouth 2 (two) times daily. 120 tablet 0     No current facility-administered medications for this visit.     Facility-Administered Medications Ordered in Other Visits   Medication Dose Route Frequency Provider Last Rate Last Admin    0.9%  NaCl infusion   Intravenous Continuous Kim Lutz NP        0.9%  NaCl infusion   Intravenous Continuous Herminia Durant MD        ondansetron disintegrating tablet 8 mg  8 mg Oral Q8H PRN Herminia Durant MD           Patient Care Team:  Angel Luis Boo MD as PCP - General (Family Medicine)  Walter Díaz MD as Consulting Physician (Nephrology)  Derrick Julio MA as Care Coordinator         - The patient is given an After Visit Summary that lists all medications with directions, allergies, education, orders placed during this encounter and follow-up instructions.      - I have reviewed the patient's medical information including past medical, family, and social history sections including the medications and allergies.      - We discussed the patient's current medications.     This note was created by combination of typed  and MModal dictation.  Transcription errors may be present.  If there are any questions, please contact me.         I offered to discuss advanced care planning, including how to pick a person who would make decisions for you if you were unable to make them for yourself, called a health care power of , and what kind of decisions you might make such as use of life sustaining treatments such as ventilators and tube feeding when faced with a life limiting illness recorded on a living will that they will need to know. (How you want to be cared for as you near the end of your natural life)     X Patient is interested in learning more about how to make advanced directives.  I provided them paperwork and offered to discuss this with them.

## 2024-07-02 NOTE — ASSESSMENT & PLAN NOTE
Lab Results   Component Value Date    HGBA1C 4.9 04/03/2024     Diet controlled. The current medical regimen is effective;  continue present plan and medications.

## 2024-07-02 NOTE — PATIENT INSTRUCTIONS
Counseling and Referral of Other Preventative  (Italic type indicates deductible and co-insurance are waived)    Patient Name: Elbert Still  Today's Date: 7/2/2024    Health Maintenance         Date Due Completion Date    COVID-19 Vaccine (1) Never done ---    RSV Vaccine (Age 60+ and Pregnant patients) (1 - 1-dose 60+ series) Never done ---    Foot Exam 05/16/2023 5/16/2022    Override on 7/8/2015: Done    Lipid Panel 10/27/2023 10/27/2022    Hemoglobin A1c 10/03/2024 4/3/2024    Eye Exam 06/10/2025 6/10/2024    TETANUS VACCINE 08/06/2029 8/6/2019          No orders of the defined types were placed in this encounter.      The following information is provided to all patients.  This information is to help you find resources for any of the problems found today that may be affecting your health:                  Living healthy guide: www.[facility].[place].[facility]      Understanding Diabetes: www.diabetes.org      Eating healthy: www.cdc.gov/healthyweight      [facility] home safety checklist: www.cdc.gov/steadi/patient.html      Agency on Aging: www.goea.[place].[facility]      Alcoholics anonymous (AA): www.aa.org      Physical Activity: www.nomi.nih.gov/fr6qwki      Tobacco use: www.quitwithusla.org       Sleep Hygiene:     1. Avoid watching TV, eating, and discussing emotional issues in bed. The bed should be used for sleep and sex only. If not, we can associate the bed with other activities and it often becomes difficult to fall asleep.  2. Minimize noise, light, and temperature extremes during sleep with ear plugs, window blinds, or an electric blanket or air conditioner. Even the slightest nighttime noises or luminescent lights can disrupt the quality of your sleep. Try to keep your bedroom at a comfortable temperature -- not too hot (above 75 degrees) or too cold (below 54 degrees).  3. Try not to drink fluids after 8 p.m. This may reduce awakenings due to urination.  4. Avoid naps, but if you do nap, make it no more than about 25  minutes about eight hours after you awake. But if you have problems falling asleep, then no naps for you.  5. Do not expose your self to bright light if you need to get up at night. Use a small night-light instead.  6. Nicotine is a stimulant and should be avoided particularly near bedtime and upon night awakenings. Having a smoke before bed, although it may feel relaxing, is actually putting a stimulant into your bloodstream.  7. Caffeine is also a stimulant and is present in coffee (100-200 mg), soda (50-75 mg), tea (50-75 mg), and various over-the-counter medications. Caffeine should be discontinued at least four to six hours before bedtime. If you consume large amounts of caffeine and you cut your self off too quickly, beware; you may get headaches that could keep you awake.  8. Although alcohol is a depressant and may help you fall asleep, the subsequent metabolism that clears it from your body when you are sleeping causes a withdrawal syndrome. This withdrawal causes awakenings and is often associated with nightmares and sweats.  9. A light snack may be sleep-inducing, but a heavy meal too close to bedtime interferes with sleep. Stay away from protein and stick to carbohydrates or dairy products. Milk contains the amino acid L-tryptophan, which has been shown in research to help people go to sleep. So milk and cookies or crackers (without chocolate) may be useful and taste good as well.

## 2024-07-05 VITALS
TEMPERATURE: 98 F | OXYGEN SATURATION: 98 % | BODY MASS INDEX: 27.53 KG/M2 | SYSTOLIC BLOOD PRESSURE: 138 MMHG | HEART RATE: 60 BPM | HEIGHT: 74 IN | RESPIRATION RATE: 14 BRPM | WEIGHT: 214.5 LBS | DIASTOLIC BLOOD PRESSURE: 82 MMHG

## 2024-07-05 PROBLEM — E66.3 OVERWEIGHT (BMI 25.0-29.9): Status: ACTIVE | Noted: 2022-10-27

## 2024-08-22 ENCOUNTER — TELEPHONE (OUTPATIENT)
Dept: FAMILY MEDICINE | Facility: CLINIC | Age: 69
End: 2024-08-22
Payer: COMMERCIAL

## 2024-08-22 DIAGNOSIS — I10 ESSENTIAL (PRIMARY) HYPERTENSION: Chronic | ICD-10-CM

## 2024-08-22 NOTE — TELEPHONE ENCOUNTER
----- Message from Kenneth Wilcox sent at 8/22/2024  2:42 PM CDT -----  Type: RX Refill Request     Who Called:   Pt  Have you contacted your pharmacy:     Refill     RX Name and Strength:   NIFEdipine (PROCARDIA-XL) 90 MG (OSM) 24 hr tablet  Preferred Pharmacy with phone number:     [id number] [address] [facility]  [address]  [place] [address]  Phone: [phone] Fax: [phone]     Local or Mail Order:local     Would the patient rather a call back or a response via My [facility]?     Best Call Back Number:   Telephone Information:  Mobile          [phone]      Additional Information:   Pt stated the wrong dosage was prescribed. It's supposed to be 2x a day but he has 1x a day  Thank you.

## 2024-08-22 NOTE — TELEPHONE ENCOUNTER
Call was returned to patient and notified that the dosage of procardia had changed, increase in strength and directions were to take 1 tab daily. Patient stated they continued taking 2 tabs BID daily not realizing the change per MD due to their BP reading Sys 150-170s and Dys. 80s with home readings and results at dialysis visits. When trying to obtain refill the patient was notified per pharmcay it was too soon. Patient is in need of the medication due to the 2 tabs description. When asked, patient is taking coreg correctly. Assured patient MD would be notified. Verbalized understanding and thank you.

## 2024-08-23 RX ORDER — HYDRALAZINE HYDROCHLORIDE 10 MG/1
10 TABLET, FILM COATED ORAL 3 TIMES DAILY
Qty: 90 TABLET | Refills: 11 | Status: SHIPPED | OUTPATIENT
Start: 2024-08-23 | End: 2025-08-23

## 2024-08-23 RX ORDER — CARVEDILOL 12.5 MG/1
12.5 TABLET ORAL 2 TIMES DAILY
Qty: 180 TABLET | Refills: 3 | Status: SHIPPED | OUTPATIENT
Start: 2024-08-23

## 2024-08-23 RX ORDER — NIFEDIPINE 90 MG/1
90 TABLET, EXTENDED RELEASE ORAL DAILY
Qty: 90 TABLET | Refills: 3 | Status: SHIPPED | OUTPATIENT
Start: 2024-08-23

## 2024-08-23 NOTE — TELEPHONE ENCOUNTER
Call was placed to patient and notified of MD medication changes and refills e-sent to pharmacy. Patient advised to contact pharmacy for confirmation of  time. Also scheduled for follow up BP check via nurse visit and to keep log of BP readings for visit. Verbalized understanding and thank you.

## 2024-08-23 NOTE — TELEPHONE ENCOUNTER
Please call patient to update him on his antihypertensive medications.    Continue Coreg as is.  Take Procardia XL 90mg ONCE daily  Will add new medication[ Hydralazine] to be taken THREE TIMEs daily.    Please have him monitor BP daily, and get a nurse visit in 2 weeks to monitor BP and adjustment medication dosing as needed.      Thanks.        -Essential (primary) hypertension  -     NIFEdipine (PROCARDIA-XL) 90 MG (OSM) 24 hr tablet; Take 1 tablet (90 mg total) by mouth once daily.   -     carvediloL (COREG) 12.5 MG tablet; Take 1 tablet (12.5 mg total) by mouth 2 (two) times daily.   -   ADD  hydrALAZINE (APRESOLINE) 10 MG tablet; Take 1 tablet (10 mg total) by mouth 3 (three) times daily.

## 2024-09-06 ENCOUNTER — CLINICAL SUPPORT (OUTPATIENT)
Dept: FAMILY MEDICINE | Facility: CLINIC | Age: 69
End: 2024-09-06
Payer: MEDICARE

## 2024-09-06 VITALS — SYSTOLIC BLOOD PRESSURE: 158 MMHG | HEART RATE: 61 BPM | DIASTOLIC BLOOD PRESSURE: 68 MMHG

## 2024-09-06 DIAGNOSIS — I10 ESSENTIAL (PRIMARY) HYPERTENSION: Primary | ICD-10-CM

## 2024-09-06 PROCEDURE — 99212 OFFICE O/P EST SF 10 MIN: CPT | Mod: PBBFAC,PO

## 2024-09-06 PROCEDURE — 99999 PR PBB SHADOW E&M-EST. PATIENT-LVL II: CPT | Mod: PBBFAC,,,

## 2024-09-06 NOTE — PROGRESS NOTES
Elbert Still Jr. 68 y.o. male is here today for Blood Pressure check.   History of HTN yes.    Review of patient's allergies indicates:   Allergen Reactions    Iodine and iodide containing products Hives     Hypotension, Flushing     Creatinine   Date Value Ref Range Status   09/04/2024 12.82 (H) 0.60 - 1.30 mg/dL Final     Sodium   Date Value Ref Range Status   09/04/2024 141 136 - 145 mEq/L Final     Potassium   Date Value Ref Range Status   09/04/2024 4.3 3.5 - 5.1 mEq/L Final   ]  Patient verifies taking blood pressure medications on a regular basis at the same time of the day.     Current Outpatient Medications:     acetaminophen (TYLENOL) 500 MG tablet, Take 2 tablets (1,000 mg total) by mouth every 6 (six) hours as needed for Pain., Disp: 30 tablet, Rfl: 0    aspirin (ECOTRIN) 81 MG EC tablet, Take 1 tablet (81 mg total) by mouth once daily., Disp: 90 tablet, Rfl: 3    atorvastatin (LIPITOR) 20 MG tablet, TAKE 1 TABLET BY MOUTH EVERY EVENING, Disp: 90 tablet, Rfl: 3    AURYXIA 210 mg iron Tab, Take 2 tablets by mouth 3 (three) times daily., Disp: , Rfl:     blood sugar diagnostic Strp, Use to check blood glucose once a day., Disp: 100 each, Rfl: 3    blood-glucose meter Misc, Use to check blood glucose twice a day., Disp: 1 each, Rfl: 0    carvediloL (COREG) 12.5 MG tablet, Take 1 tablet (12.5 mg total) by mouth 2 (two) times daily. Followup Dr.T Boo JUNE 2025 for more refills, call to schedule/get labs 1wk before doctor's appointment (ordered)., Disp: 180 tablet, Rfl: 3    cinacalcet (SENSIPAR) 30 MG Tab, Take 30 mg by mouth daily with breakfast., Disp: , Rfl:     colchicine (COLCRYS) 0.6 mg tablet, Take 0.6 mg by mouth as needed (for gout flare). Take half tab (0.3 mg) by mouth daily as needed for gout flare. (Patient not taking: Reported on 7/2/2024), Disp: 90 tablet, Rfl: 3    diclofenac sodium (VOLTAREN) 1 % Gel, Apply 2 g topically once daily., Disp: 20 g, Rfl: 0    FERRIC CITRATE ORAL, Take by  mouth 3 (three) times daily with meals., Disp: , Rfl:     fluticasone propionate (FLONASE) 50 mcg/actuation nasal spray, 2 sprays (100 mcg total) by Each Nostril route once daily., Disp: 16 g, Rfl: 11    furosemide (LASIX) 80 MG tablet, TAKE 1 TABLET(80 MG) BY MOUTH TWICE DAILY, Disp: 60 tablet, Rfl: 11    hydrALAZINE (APRESOLINE) 10 MG tablet, Take 1 tablet (10 mg total) by mouth 3 (three) times daily. Followup Dr.T Boo JUNE 2025 for more refills, call to schedule/get labs 1wk before doctor's appointment (ordered)., Disp: 90 tablet, Rfl: 11    lancets 30 gauge Misc, Use to check blood glucose twice a day., Disp: 100 each, Rfl: 5    LIDOcaine (LIDODERM) 5 %, Place 2 patches onto the skin once daily. Remove & Discard patch within 12 hours or as directed by MD, Disp: 20 patch, Rfl: 0    LIDOcaine-prilocaine (EMLA) cream, SMARTSIG:sparingly Topical As Directed (Patient not taking: Reported on 7/2/2024), Disp: , Rfl:     mupirocin (BACTROBAN) 2 % ointment, Apply topically every other day. (Patient not taking: Reported on 7/2/2024), Disp: , Rfl:     NIFEdipine (PROCARDIA-XL) 90 MG (OSM) 24 hr tablet, Take 1 tablet (90 mg total) by mouth once daily. Followup Dr.T Boo JUNE 2025 for more refills, call to schedule/get labs 1wk before doctor's appointment (ordered)., Disp: 90 tablet, Rfl: 3    sodium bicarbonate 650 MG tablet, Take 2 tablets (1,300 mg total) by mouth 2 (two) times daily., Disp: 120 tablet, Rfl: 0    tamsulosin (FLOMAX) 0.4 mg Cap, TAKE 1 CAPSULE(0.4 MG) BY MOUTH EVERY DAY, Disp: 90 capsule, Rfl: 1  No current facility-administered medications for this visit.    Facility-Administered Medications Ordered in Other Visits:     0.9%  NaCl infusion, , Intravenous, Continuous, Kim Lutz NP    0.9%  NaCl infusion, , Intravenous, Continuous, Herminia Durant MD    [START ON 9/7/2024] heparin (porcine) injection 1,200 Units, 1,200 Units, Intravenous, Continuous, Nawaf Butler MD, 1,200 Units at  09/06/24 0535    heparin (porcine) injection 2,000 Units, 2,000 Units, Intravenous, 1 time in Clinic/HOD, Nawaf Butler MD    heparin (porcine) injection 2,000 Units, 2,000 Units, Intravenous, 1 time in Clinic/HOD, Nawaf Butler MD    ondansetron disintegrating tablet 8 mg, 8 mg, Oral, Q8H PRN, Herminia Durant MD  Does patient have record of home blood pressure readings no.   Last dose of blood pressure medication was taken at 5 am.  Patient is asymptomatic.     BP: (!) 172/68 , Pulse: 61 .    Blood pressure reading after 15 minutes was 158/68.  Dr. Boo notified.

## 2024-10-10 ENCOUNTER — TELEPHONE (OUTPATIENT)
Dept: FAMILY MEDICINE | Facility: CLINIC | Age: 69
End: 2024-10-10
Payer: COMMERCIAL

## 2024-10-10 NOTE — TELEPHONE ENCOUNTER
----- Message from Rona sent at 10/10/2024  8:38 AM CDT -----  Regarding: Advised pt to call 911  pt transferred from scheduling as a red priority/911 dispo pt accepted  for symptom: leg swelling/ breathing trouble    Per symptom screener, pt was advised to call 911 based on symptom listed above. Please follow up with pt with any further directives.

## 2024-10-20 NOTE — TELEPHONE ENCOUNTER
Care Due:                  Date            Visit Type   Department     Provider  --------------------------------------------------------------------------------                                [facility]                              PRIMARY      MED/ INTERNAL  Last Visit: 07-      CARE ([facility])   MED/ PEDS      Angel Luis Boo                              [facility]                              PRIMARY      MED/ INTERNAL  Next Visit: 10-      CARE ([facility])   MED/ PEDS      Angel Luis Boo                                                            Last  Test          Frequency    Reason                     Performed    Due Date  --------------------------------------------------------------------------------    CMP.........  12 months..  atorvastatin.............  12-   12-    Lipid Panel.  12 months..  atorvastatin.............  10-   10-    [facility] Embedded Care Due Messages. Reference number: [id number].   10/20/2024 4:23:49 PM CDT

## 2024-10-21 RX ORDER — FUROSEMIDE 80 MG/1
80 TABLET ORAL 2 TIMES DAILY
Qty: 60 TABLET | Refills: 11 | Status: SHIPPED | OUTPATIENT
Start: 2024-10-21

## 2024-10-21 NOTE — TELEPHONE ENCOUNTER
Refill Routing Note   Medication(s) are not appropriate for processing by [facility] for the following reason(s):        Required vitals abnormal  Required labs abnormal    [facility] action(s):  Defer   Requires labs : Yes             Appointments  past 12m or future 3m with PCP    Date Provider   Last Visit   7/2/2024 Angel Luis Boo MD   Next Visit   10/24/2024 Angel Luis Boo MD   ED visits in past 90 days: 0        Note composed:2:29 AM 10/21/2024

## 2024-10-24 ENCOUNTER — LAB VISIT (OUTPATIENT)
Dept: LAB | Facility: HOSPITAL | Age: 69
End: 2024-10-24
Attending: FAMILY MEDICINE
Payer: COMMERCIAL

## 2024-10-24 ENCOUNTER — OFFICE VISIT (OUTPATIENT)
Dept: FAMILY MEDICINE | Facility: CLINIC | Age: 69
End: 2024-10-24
Payer: COMMERCIAL

## 2024-10-24 VITALS
RESPIRATION RATE: 18 BRPM | WEIGHT: 213.88 LBS | HEIGHT: 74 IN | DIASTOLIC BLOOD PRESSURE: 82 MMHG | OXYGEN SATURATION: 99 % | BODY MASS INDEX: 27.45 KG/M2 | SYSTOLIC BLOOD PRESSURE: 136 MMHG | TEMPERATURE: 98 F | HEART RATE: 70 BPM

## 2024-10-24 DIAGNOSIS — E08.21 DIABETES MELLITUS DUE TO UNDERLYING CONDITION WITH DIABETIC NEPHROPATHY, WITHOUT LONG-TERM CURRENT USE OF INSULIN: ICD-10-CM

## 2024-10-24 DIAGNOSIS — E78.5 HYPERLIPIDEMIA ASSOCIATED WITH TYPE 2 DIABETES MELLITUS: ICD-10-CM

## 2024-10-24 DIAGNOSIS — R60.0 PERIPHERAL EDEMA: ICD-10-CM

## 2024-10-24 DIAGNOSIS — E11.69 HYPERLIPIDEMIA ASSOCIATED WITH TYPE 2 DIABETES MELLITUS: ICD-10-CM

## 2024-10-24 DIAGNOSIS — N18.4 CKD (CHRONIC KIDNEY DISEASE) STAGE 4, GFR 15-29 ML/MIN: ICD-10-CM

## 2024-10-24 DIAGNOSIS — I10 ESSENTIAL (PRIMARY) HYPERTENSION: ICD-10-CM

## 2024-10-24 LAB
CHOLEST SERPL-MCNC: 67 MG/DL (ref 120–199)
CHOLEST/HDLC SERPL: 1.8 {RATIO} (ref 2–5)
HDLC SERPL-MCNC: 37 MG/DL (ref 40–75)
HDLC SERPL: 55.2 % (ref 20–50)
LDLC SERPL CALC-MCNC: 18.8 MG/DL (ref 63–159)
NONHDLC SERPL-MCNC: 30 MG/DL
TRIGL SERPL-MCNC: 56 MG/DL (ref 30–150)

## 2024-10-24 PROCEDURE — 80061 LIPID PANEL: CPT | Performed by: FAMILY MEDICINE

## 2024-10-24 PROCEDURE — 99999 PR PBB SHADOW E&M-EST. PATIENT-LVL V: CPT | Mod: PBBFAC,,, | Performed by: FAMILY MEDICINE

## 2024-10-24 PROCEDURE — 36415 COLL VENOUS BLD VENIPUNCTURE: CPT | Mod: PO | Performed by: FAMILY MEDICINE

## 2024-10-24 NOTE — PROGRESS NOTES
"  Physical Exam  /82 (BP Location: Right forearm, Patient Position: Sitting)   Pulse 70   Temp 98.3 °F (36.8 °C) (Oral)   Resp 18   Ht 6' 2" (1.88 m)   Wt 97 kg (213 lb 13.5 oz)   SpO2 99%   BMI 27.46 kg/m²      Office Visit    Patient Name: Elbert Still Jr.    : 1955  MRN: [id number]      Assessment/Plan:  Elbert Still Jr. is a 68 y.o. male who presents today for :    -Essential (primary) hypertension  Peripheral edema  -CKD (chronic kidney disease) stage 4, GFR 15-29 ml/min - on HD MWF  -mild ankle edema, BP well controlled  -continue with DASH diet  -advised keeping legs elevated, also to avoid prolonged period of standing/sitting, as well as avoiding use of NSAIDs  -pt encouraged to wear compression stockings, may take additional dose of Lasix as needed    Diabetes mellitus due to underlying condition with diabetic nephropathy, without long-term current use of insulin  -HLD associated with type 2 DM  -     Lipid Panel; Future; Expected date: 10/24/2024  -previous A1c reviewed:   Lab Results   Component Value Date    HGBA1C 5.3 10/02/2024     -at goal, continue current medication regimen  -reviewed with patient about routine diabetic care  -weight loss and regular physical excercise        Follow up 6 months        This note was created by combination of typed  and MModal dictation.  Transcription errors may be present.  If there are any questions, please contact me.      ----------------------------------------------------------------------------------------------------------------------      HPI:  Patient Care Team:  Angel Luis Boo MD as PCP - General (Family Medicine)  Walter Díaz MD as Consulting Physician (Nephrology)  Derrick Julio MA as Care Coordinator    Elbert is a 68 y.o. male with      Patient Active Problem List   Diagnosis    -Diabetes mellitus due to underlying condition with diabetic nephropathy - diet controlled    Proteinuria    -Essential " (primary) hypertension    Mixed hyperlipidemia    -CKD (chronic kidney disease) stage 4, GFR 15-29 ml/min - on HD MWF    -Gout    Anemia of chronic disease    -HLD associated with type 2 DM    Hyperkalemia    Acute kidney injury superimposed on chronic kidney disease    -Dialysis patient - MWF - started 5/2022    -ESRD on dialysis - on HD MWF    Nuclear sclerosis of both eyes    Refractive error    Cortical age-related cataract    Status post placement of arteriovenous graft    Overweight (BMI 25.0-29.9)    Steal syndrome dialysis vascular access, initial encounter    Arteriovenous fistula    ESRD (end stage renal disease) on dialysis    ESRD (end stage renal disease)    Other neutropenia    End stage renal failure on dialysis    Stenosis of arteriovenous dialysis fistula    Hypocalcemia    Bacteremia    Type 2 diabetes mellitus    Surgical wound, non healing    Prostate cancer       Elbert presents today for:  follow up leg swelling      He initially made this appointment over 2 weeks ago for leg swelling and SOB, which has resolved. His BP has been controlled at home. He reports that he may have consumed extra salt two weeks ago that may have contributed to his leg swelling. He denies any CP/ALLEN/palpitations/claudication.  He has taken Lasix twice daily with good relief. He is on HD MWF, and continues making urine regularly. Otherwise, no other acute issues during this visit.      Wt Readings from Last 4 Encounters:   10/24/24 97 kg (213 lb 13.5 oz)   07/02/24 97.3 kg (214 lb 8.1 oz)   07/02/24 97.3 kg (214 lb 9.9 oz)   01/24/24 98 kg (216 lb)           Additional ROS      CONST: no fever, no activity change, weight stable.   EYES: no vision change.   ENT: no sore throat. No dysphagia.   CV: no CP with exertion  RESP: no SOB  GI: no N/V/diarrhea/constipation  : no urinary concerns  MSK: no new myalgias or arthralgias.   SKIN: no new rashes  NEURO: no focal deficits.   PSYCH: no new issues.   ENDOCRINE: no  polyuria.             Patient Active Problem List   Diagnosis    -Diabetes mellitus due to underlying condition with diabetic nephropathy - diet controlled    Proteinuria    -Essential (primary) hypertension    Mixed hyperlipidemia    -CKD (chronic kidney disease) stage 4, GFR 15-29 ml/min - on HD MWF    -Gout    Anemia of chronic disease    -HLD associated with type 2 DM    Hyperkalemia    Acute kidney injury superimposed on chronic kidney disease    -Dialysis patient - MWF - started 5/2022    -ESRD on dialysis - on HD MWF    Nuclear sclerosis of both eyes    Refractive error    Cortical age-related cataract    Status post placement of arteriovenous graft    Overweight (BMI 25.0-29.9)    Steal syndrome dialysis vascular access, initial encounter    Arteriovenous fistula    ESRD (end stage renal disease) on dialysis    ESRD (end stage renal disease)    Other neutropenia    End stage renal failure on dialysis    Stenosis of arteriovenous dialysis fistula    Hypocalcemia    Bacteremia    Type 2 diabetes mellitus    Surgical wound, non healing    Prostate cancer       Current Medications  Medications reviewed/updated.     Current Outpatient Medications on File Prior to Visit   Medication Sig Dispense Refill    acetaminophen (TYLENOL) 500 MG tablet Take 2 tablets (1,000 mg total) by mouth every 6 (six) hours as needed for Pain. 30 tablet 0    aspirin (ECOTRIN) 81 MG EC tablet Take 1 tablet (81 mg total) by mouth once daily. 90 tablet 3    atorvastatin (LIPITOR) 20 MG tablet TAKE 1 TABLET BY MOUTH EVERY EVENING 90 tablet 3    AURYXIA 210 mg iron Tab Take 2 tablets by mouth 3 (three) times daily. (Patient not taking: Reported on 9/10/2024)      blood sugar diagnostic Strp Use to check blood glucose once a day. 100 each 3    blood-glucose meter Misc Use to check blood glucose twice a day. 1 each 0    carvediloL (COREG) 12.5 MG tablet Take 1 tablet (12.5 mg total) by mouth 2 (two) times daily. Followup Dr.T Boo JUNE 2025  for more refills, call to schedule/get labs 1wk before doctor's appointment (ordered). 180 tablet 3    cinacalcet (SENSIPAR) 30 MG Tab Take 30 mg by mouth daily with breakfast.      colchicine (COLCRYS) 0.6 mg tablet Take 0.6 mg by mouth as needed (for gout flare). Take half tab (0.3 mg) by mouth daily as needed for gout flare. (Patient not taking: Reported on 7/2/2024) 90 tablet 3    diclofenac sodium (VOLTAREN) 1 % Gel Apply 2 g topically once daily. 20 g 0    FERRIC CITRATE ORAL Take by mouth 3 (three) times daily with meals.      fluticasone propionate (FLONASE) 50 mcg/actuation nasal spray 2 sprays (100 mcg total) by Each Nostril route once daily. 16 g 11    furosemide (LASIX) 80 MG tablet TAKE 1 TABLET(80 MG) BY MOUTH TWICE DAILY 60 tablet 11    hydrALAZINE (APRESOLINE) 10 MG tablet Take 1 tablet (10 mg total) by mouth 3 (three) times daily. Followup Dr.T Boo JUNE 2025 for more refills, call to schedule/get labs 1wk before doctor's appointment (ordered). 90 tablet 11    lancets 30 gauge Misc Use to check blood glucose twice a day. 100 each 5    LIDOcaine (LIDODERM) 5 % Place 2 patches onto the skin once daily. Remove & Discard patch within 12 hours or as directed by MD 20 patch 0    LIDOcaine-prilocaine (EMLA) cream SMARTSIG:sparingly Topical As Directed (Patient not taking: Reported on 7/2/2024)      mupirocin (BACTROBAN) 2 % ointment Apply topically every other day. (Patient not taking: Reported on 7/2/2024)      NIFEdipine (PROCARDIA-XL) 90 MG (OSM) 24 hr tablet Take 1 tablet (90 mg total) by mouth once daily. Followup Dr.T Boo JUNE 2025 for more refills, call to schedule/get labs 1wk before doctor's appointment (ordered). 90 tablet 3    sevelamer carbonate (RENVELA) 800 mg Tab Take 1,600 mg by mouth 3 (three) times daily with meals.      sodium bicarbonate 650 MG tablet Take 2 tablets (1,300 mg total) by mouth 2 (two) times daily. 120 tablet 0    tamsulosin (FLOMAX) 0.4 mg Cap TAKE 1 CAPSULE(0.4 MG)  BY MOUTH EVERY DAY 90 capsule 1    [DISCONTINUED] amLODIPine (NORVASC) 10 MG tablet TAKE 1 TABLET BY MOUTH ONCE DAILY 90 tablet 3    [DISCONTINUED] glipiZIDE (GLUCOTROL) 10 MG tablet TAKE 1 TABLET BY MOUTH TWICE DAILY WITH MEALS 180 tablet 1    [DISCONTINUED] metoprolol succinate (TOPROL-XL) 25 MG 24 hr tablet Take 1 tablet (25 mg total) by mouth once daily. 90 tablet 3     Current Facility-Administered Medications on File Prior to Visit   Medication Dose Route Frequency Provider Last Rate Last Admin    0.9%  NaCl infusion   Intravenous Continuous Kim Lutz NP        0.9%  NaCl infusion   Intravenous Continuous Herminia Durant MD        ondansetron disintegrating tablet 8 mg  8 mg Oral Q8H PRN Herminia Durant MD        [DISCONTINUED] heparin (porcine) injection 1,200 Units  1,200 Units Intravenous Continuous Nawaf Butler MD   1,200 Units at 10/23/24 0548           Past Surgical History:   Procedure Laterality Date    ADENOIDECTOMY      ADENOIDECTOMY      AV FISTULA PLACEMENT Left 09/06/2022    Procedure: CREATION, AV FISTULA;  Surgeon: Prince Gardiner MD;  [facility];  Service: Peripheral Vascular;  Laterality: Left;    EXCISION OF ARTERIOVENOUS FISTULA Left 12/10/2023    Procedure: EXCISION, AV FISTULA;  Surgeon: Joaquin Rose MD;  [facility];  Service: Vascular;  Laterality: Left;    FISTULOGRAM Left 2/16/2023    Procedure: FISTULOGRAM;  Surgeon: Prince Gardiner MD;  [facility];  Service: Peripheral Vascular;  Laterality: Left;  Given to the sterile field.     FISTULOGRAM Left 7/6/2023    Procedure: Fistulogram;  Surgeon: LETY Rayo III, MD;  Location: [facility] OR [facility];  Service: Peripheral Vascular;  Laterality: Left;  1.9 min  22.86 mGy  3.8862 Gy.cm  20ml Dye     nasal septum repair      NASAL SEPTUM SURGERY      PERCUTANEOUS TRANSLUMINAL ANGIOPLASTY OF ARTERIOVENOUS FISTULA Left 11/22/2022    Procedure: PTA, AV FISTULA;  Surgeon: Prince  MARYELLEN Gardiner MD;  Location: [facility] CATH LAB;  Service: Peripheral Vascular;  Laterality: Left;    PERCUTANEOUS TRANSLUMINAL ANGIOPLASTY OF ARTERIOVENOUS FISTULA Left 2/16/2023    Procedure: PTA, AV FISTULA;  Surgeon: Prince Gardiner MD;  [facility];  Service: Peripheral Vascular;  Laterality: Left;  4.8 min  43.08 mGy  4.0682 Gy.cm  32ml Dye    PERCUTANEOUS TRANSLUMINAL ANGIOPLASTY OF ARTERIOVENOUS FISTULA Left 7/6/2023    Procedure: PTA, AV FISTULA;  Surgeon: LETY Rayo III, MD;  [facility];  Service: Peripheral Vascular;  Laterality: Left;    PROSTATE BIOPSY N/A 3/24/2023    Procedure: BIOPSY, PROSTATE;  Surgeon: Frankie Welch MD;  Location: [facility] OR [address] [place] [address];  Service: Urology;  Laterality: N/A;  transrectal, 30min, uronav needed    REVISION OF ARTERIOVENOUS FISTULA Left 11/9/2023    Procedure: REVISION, AV FISTULA;  Surgeon: LETY Rayo III, MD;  Location: [facility] OR [address] [place] [address];  Service: Vascular;  Laterality: Left;  LUE AVG revision    ROTATOR CUFF REPAIR Left     SALIVARY GLAND SURGERY      Tonsillectomy      TONSILLECTOMY      TRANSPOSITION OF BASILIC VEIN Left 11/1/2022    Procedure: TRANSPOSITION, VEIN, BASILIC;  Surgeon: Prince Gardiner MD;  Location: [facility] OR [address] [place] [address];  Service: Peripheral Vascular;  Laterality: Left;  Left Brachial vein.    WASHOUT Left 12/10/2023    Procedure: WASHOUT OF AV FISTULA;  Surgeon: Joaquin Rose MD;  Location: [facility] OR [address] [place] [address];  Service: Vascular;  Laterality: Left;       Family History   Problem Relation Name Age of Onset    Heart disease Mother      Kidney disease Mother      Hypertension Mother      No Known Problems Father      Cancer Sister      Seizures Sister      Hypertension Sister      Stroke Sister      Amblyopia Neg Hx      Blindness Neg Hx      Cataracts Neg Hx      Diabetes Neg Hx      Glaucoma Neg Hx      Macular degeneration Neg Hx      Retinal detachment Neg Hx      Strabismus Neg Hx      Thyroid disease Neg Hx       Anesthesia problems Neg Hx         Social History     Socioeconomic History    Marital status:      Spouse name: Kristine Still   Occupational History     Employer: [employer]   Tobacco Use    Smoking status: Never    Smokeless tobacco: Never    Tobacco comments:     .  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     Social Drivers of Health     Financial Resource Strain: Low Risk  (7/2/2024)    Overall Financial Resource Strain (CARDIA)     Difficulty of Paying Living Expenses: Not hard at all   Food Insecurity: No Food Insecurity (7/2/2024)    Hunger Vital Sign     Worried About Running Out of Food in the Last Year: Never true     Ran Out of Food in the Last Year: Never true   Transportation Needs: No Transportation Needs (7/2/2024)    PRAPARE - Transportation     Lack of Transportation (Medical): No     Lack of Transportation (Non-Medical): No   Physical Activity: Insufficiently Active (7/2/2024)    Exercise Vital Sign     Days of Exercise per Week: 3 days     Minutes of Exercise per Session: 40 min   Stress: No Stress Concern Present (7/2/2024)    [employer] [place] [employer] - Occupational Stress Questionnaire     Feeling of Stress : Only a little   Housing Stability: Low Risk  (7/2/2024)    Housing Stability Vital Sign     Unable to Pay for Housing in the Last Year: No     Homeless in the Last Year: No             Allergies   Review of patient's allergies indicates:   Allergen Reactions    Iodine and iodide containing products Hives     Hypotension, Flushing             Review of Systems  See HPI      [unfilled]  /82 (BP Location: Right forearm, Patient Position: Sitting)   Pulse 70   Temp  "98.3 °F (36.8 °C) (Oral)   Resp 18   Ht 6' 2" (1.88 m)   Wt 97 kg (213 lb 13.5 oz)   SpO2 99%   BMI 27.46 kg/m²       GEN: NAD, well developed, pleasant, well nourished  HEENT: NCAT, PERRLA, EOMI, sclera clear, anicteric  NECK: normal, supple with midline trachea, no LAD, no thyromegaly  LUNGS: CTAB, no w/r/r, normal respiratory effort  HEART: RRR, normal S1 and S2, no m/r/g, no palpitations, trace ankle edema bilaterally  ABD: s/nt/nd, NABS, no organomegaly  SKIN: warm and dry with normal turgor, no rashes, no other lesions.   PSYCH: AOx3, appropriate mood and affect.   MSK: extremities warm/well perfused, normal ROM in all 4 extremities, no c/c   NEURO: normal without focal findings, CN II-XII are intact.  Sensation grossly normal, gait and station normal.   "

## 2024-10-31 NOTE — TELEPHONE ENCOUNTER
No care due was identified.  [facility] Embedded Care Due Messages. Reference number: [id number].   10/31/2024 3:21:18 PM CDT

## 2024-10-31 NOTE — TELEPHONE ENCOUNTER
Refill Routing Note   Medication(s) are not appropriate for processing by [facility] for the following reason(s):        No active prescription written by provider: Expiration Date: 12/08/23     [facility] action(s):  Defer               Appointments  past 12m or future 3m with PCP    Date Provider   Last Visit   10/24/2024 Angel Luis Boo MD   Next Visit   Visit date not found Angel Luis Boo MD   ED visits in past 90 days: 0        Note composed:4:04 PM 10/31/2024

## 2024-11-04 RX ORDER — FLUTICASONE PROPIONATE 50 MCG
SPRAY, SUSPENSION (ML) NASAL
Qty: 48 G | Refills: 11 | Status: SHIPPED | OUTPATIENT
Start: 2024-11-04

## 2024-12-07 NOTE — TELEPHONE ENCOUNTER
No care due was identified.  [facility] Embedded Care Due Messages. Reference number: [id number].   12/07/2024 8:11:20 AM CST

## 2024-12-08 NOTE — TELEPHONE ENCOUNTER
Refill Routing Note   Medication(s) are not appropriate for processing by [facility] for the following reason(s):        Other:  Prostate Cancer is not on problem list     [facility] action(s):  Defer             Appointments  past 12m or future 3m with PCP    Date Provider   Last Visit   10/24/2024 Angel Luis Boo MD   Next Visit   Visit date not found Angel Luis Boo MD   ED visits in past 90 days: 0        Note composed:4:02 PM 12/08/2024

## 2024-12-11 ENCOUNTER — HOSPITAL ENCOUNTER (INPATIENT)
Facility: HOSPITAL | Age: 69
LOS: 1 days | Discharge: HOME | DRG: 291 | End: 2024-12-12
Attending: EMERGENCY MEDICINE | Admitting: HOSPITALIST
Payer: MEDICARE

## 2024-12-11 DIAGNOSIS — I50.33 ACUTE ON CHRONIC DIASTOLIC CHF (CONGESTIVE HEART FAILURE): ICD-10-CM

## 2024-12-11 DIAGNOSIS — Z99.2 ESRD ON DIALYSIS: Chronic | ICD-10-CM

## 2024-12-11 DIAGNOSIS — N18.6 ESRD ON DIALYSIS: Chronic | ICD-10-CM

## 2024-12-11 DIAGNOSIS — J96.01 ACUTE HYPOXEMIC RESPIRATORY FAILURE: Primary | ICD-10-CM

## 2024-12-11 DIAGNOSIS — R06.02 SHORTNESS OF BREATH: ICD-10-CM

## 2024-12-11 DIAGNOSIS — J81.0 ACUTE PULMONARY EDEMA: ICD-10-CM

## 2024-12-11 DIAGNOSIS — J81.1 PULMONARY EDEMA: ICD-10-CM

## 2024-12-11 PROBLEM — D64.9 ANEMIA: Status: ACTIVE | Noted: 2024-12-11

## 2024-12-11 PROBLEM — E11.29 TYPE 2 DIABETES MELLITUS WITH KIDNEY COMPLICATION, WITHOUT LONG-TERM CURRENT USE OF INSULIN: Status: ACTIVE | Noted: 2023-12-09

## 2024-12-11 LAB
ALBUMIN SERPL BCP-MCNC: 4.1 G/DL (ref 3.5–5.2)
ALLENS TEST: ABNORMAL
ALP SERPL-CCNC: 87 U/L (ref 40–150)
ALT SERPL W/O P-5'-P-CCNC: <5 U/L (ref 10–44)
ANION GAP SERPL CALC-SCNC: 14 MMOL/L (ref 8–16)
AST SERPL-CCNC: 13 U/L (ref 10–40)
BASOPHILS # BLD AUTO: 0.02 K/UL (ref 0–0.2)
BASOPHILS NFR BLD: 0.3 % (ref 0–1.9)
BILIRUB SERPL-MCNC: 0.6 MG/DL (ref 0.1–1)
BNP SERPL-MCNC: 2624 PG/ML (ref 0–99)
BUN SERPL-MCNC: 51 MG/DL (ref 6–30)
BUN SERPL-MCNC: 58 MG/DL (ref 8–23)
CALCIUM SERPL-MCNC: 9.6 MG/DL (ref 8.7–10.5)
CHLORIDE SERPL-SCNC: 110 MMOL/L (ref 95–110)
CHLORIDE SERPL-SCNC: 111 MMOL/L (ref 95–110)
CO2 SERPL-SCNC: 15 MMOL/L (ref 23–29)
CREAT SERPL-MCNC: 11.7 MG/DL (ref 0.5–1.4)
CREAT SERPL-MCNC: 12.7 MG/DL (ref 0.5–1.4)
DELSYS: ABNORMAL
DIFFERENTIAL METHOD BLD: ABNORMAL
EOSINOPHIL # BLD AUTO: 0.2 K/UL (ref 0–0.5)
EOSINOPHIL NFR BLD: 2.5 % (ref 0–8)
ERYTHROCYTE [DISTWIDTH] IN BLOOD BY AUTOMATED COUNT: 15.2 % (ref 11.5–14.5)
EST. GFR  (NO RACE VARIABLE): 4.3 ML/MIN/1.73 M^2
GLUCOSE SERPL-MCNC: 134 MG/DL (ref 70–110)
GLUCOSE SERPL-MCNC: 134 MG/DL (ref 70–110)
HBV SURFACE AG SERPL QL IA: NORMAL
HCO3 UR-SCNC: 16.9 MMOL/L (ref 24–28)
HCT VFR BLD AUTO: 32.2 % (ref 40–54)
HCT VFR BLD CALC: 34 %PCV (ref 36–54)
HCT VFR BLD CALC: 34 %PCV (ref 36–54)
HGB BLD-MCNC: 10.2 G/DL (ref 14–18)
HIV 1+2 AB+HIV1 P24 AG SERPL QL IA: NORMAL
IMM GRANULOCYTES # BLD AUTO: 0.02 K/UL (ref 0–0.04)
IMM GRANULOCYTES NFR BLD AUTO: 0.3 % (ref 0–0.5)
LYMPHOCYTES # BLD AUTO: 0.4 K/UL (ref 1–4.8)
LYMPHOCYTES NFR BLD: 7.3 % (ref 18–48)
MAGNESIUM SERPL-MCNC: 2.4 MG/DL (ref 1.6–2.6)
MCH RBC QN AUTO: 28.9 PG (ref 27–31)
MCHC RBC AUTO-ENTMCNC: 31.7 G/DL (ref 32–36)
MCV RBC AUTO: 91 FL (ref 82–98)
MONOCYTES # BLD AUTO: 0.5 K/UL (ref 0.3–1)
MONOCYTES NFR BLD: 7.6 % (ref 4–15)
NEUTROPHILS # BLD AUTO: 4.8 K/UL (ref 1.8–7.7)
NEUTROPHILS NFR BLD: 82 % (ref 38–73)
NRBC BLD-RTO: 0 /100 WBC
OHS QRS DURATION: 78 MS
OHS QTC CALCULATION: 444 MS
PCO2 BLDA: 26.7 MMHG (ref 35–45)
PH SMN: 7.41 [PH] (ref 7.35–7.45)
PLATELET # BLD AUTO: 166 K/UL (ref 150–450)
PMV BLD AUTO: 12.2 FL (ref 9.2–12.9)
PO2 BLDA: 41 MMHG (ref 40–60)
POC BE: -8 MMOL/L
POC IONIZED CALCIUM: 1.19 MMOL/L (ref 1.06–1.42)
POC IONIZED CALCIUM: 1.2 MMOL/L (ref 1.06–1.42)
POC SATURATED O2: 78 % (ref 95–100)
POC TCO2 (MEASURED): 17 MMOL/L (ref 23–29)
POC TCO2: 18 MMOL/L (ref 24–29)
POTASSIUM BLD-SCNC: 4.5 MMOL/L (ref 3.5–5.1)
POTASSIUM BLD-SCNC: 4.6 MMOL/L (ref 3.5–5.1)
POTASSIUM SERPL-SCNC: 4.8 MMOL/L (ref 3.5–5.1)
PROT SERPL-MCNC: 8 G/DL (ref 6–8.4)
RBC # BLD AUTO: 3.53 M/UL (ref 4.6–6.2)
SAMPLE: ABNORMAL
SAMPLE: ABNORMAL
SITE: ABNORMAL
SODIUM BLD-SCNC: 140 MMOL/L (ref 136–145)
SODIUM BLD-SCNC: 140 MMOL/L (ref 136–145)
SODIUM SERPL-SCNC: 139 MMOL/L (ref 136–145)
TROPONIN I SERPL DL<=0.01 NG/ML-MCNC: 23 NG/L (ref 0–35)
WBC # BLD AUTO: 5.9 K/UL (ref 3.9–12.7)

## 2024-12-11 PROCEDURE — 80053 COMPREHEN METABOLIC PANEL: CPT | Performed by: EMERGENCY MEDICINE

## 2024-12-11 PROCEDURE — 83735 ASSAY OF MAGNESIUM: CPT | Performed by: EMERGENCY MEDICINE

## 2024-12-11 PROCEDURE — 83880 ASSAY OF NATRIURETIC PEPTIDE: CPT | Performed by: EMERGENCY MEDICINE

## 2024-12-11 PROCEDURE — 63600175 PHARM REV CODE 636 W HCPCS: Performed by: HOSPITALIST

## 2024-12-11 PROCEDURE — 84484 ASSAY OF TROPONIN QUANT: CPT | Performed by: EMERGENCY MEDICINE

## 2024-12-11 PROCEDURE — 87389 HIV-1 AG W/HIV-1&-2 AB AG IA: CPT | Performed by: EMERGENCY MEDICINE

## 2024-12-11 PROCEDURE — 99285 EMERGENCY DEPT VISIT HI MDM: CPT | Mod: 25

## 2024-12-11 PROCEDURE — 63600175 PHARM REV CODE 636 W HCPCS: Performed by: EMERGENCY MEDICINE

## 2024-12-11 PROCEDURE — 27000190 HC CPAP FULL FACE MASK W/VALVE

## 2024-12-11 PROCEDURE — 93005 ELECTROCARDIOGRAM TRACING: CPT

## 2024-12-11 PROCEDURE — 94660 CPAP INITIATION&MGMT: CPT

## 2024-12-11 PROCEDURE — 87340 HEPATITIS B SURFACE AG IA: CPT | Performed by: NURSE PRACTITIONER

## 2024-12-11 PROCEDURE — 25000003 PHARM REV CODE 250: Performed by: EMERGENCY MEDICINE

## 2024-12-11 PROCEDURE — 99900035 HC TECH TIME PER 15 MIN (STAT)

## 2024-12-11 PROCEDURE — 96375 TX/PRO/DX INJ NEW DRUG ADDON: CPT

## 2024-12-11 PROCEDURE — 94761 N-INVAS EAR/PLS OXIMETRY MLT: CPT

## 2024-12-11 PROCEDURE — 80100016 HC MAINTENANCE HEMODIALYSIS

## 2024-12-11 PROCEDURE — 96365 THER/PROPH/DIAG IV INF INIT: CPT

## 2024-12-11 PROCEDURE — 90935 HEMODIALYSIS ONE EVALUATION: CPT | Mod: ,,, | Performed by: NURSE PRACTITIONER

## 2024-12-11 PROCEDURE — 25000003 PHARM REV CODE 250: Performed by: HOSPITALIST

## 2024-12-11 PROCEDURE — 93010 ELECTROCARDIOGRAM REPORT: CPT | Mod: ,,, | Performed by: INTERNAL MEDICINE

## 2024-12-11 PROCEDURE — 85025 COMPLETE CBC W/AUTO DIFF WBC: CPT | Performed by: EMERGENCY MEDICINE

## 2024-12-11 PROCEDURE — 5A09357 ASSISTANCE WITH RESPIRATORY VENTILATION, LESS THAN 24 CONSECUTIVE HOURS, CONTINUOUS POSITIVE AIRWAY PRESSURE: ICD-10-PCS | Performed by: FAMILY MEDICINE

## 2024-12-11 PROCEDURE — 5A1D70Z PERFORMANCE OF URINARY FILTRATION, INTERMITTENT, LESS THAN 6 HOURS PER DAY: ICD-10-PCS | Performed by: NURSE PRACTITIONER

## 2024-12-11 PROCEDURE — 27100171 HC OXYGEN HIGH FLOW UP TO 24 HOURS

## 2024-12-11 PROCEDURE — 63600175 PHARM REV CODE 636 W HCPCS: Performed by: NURSE PRACTITIONER

## 2024-12-11 PROCEDURE — 11000001 HC ACUTE MED/SURG PRIVATE ROOM

## 2024-12-11 PROCEDURE — 99222 1ST HOSP IP/OBS MODERATE 55: CPT | Mod: 25,,, | Performed by: NURSE PRACTITIONER

## 2024-12-11 RX ORDER — SEVELAMER CARBONATE 800 MG/1
1600 TABLET, FILM COATED ORAL
Status: DISCONTINUED | OUTPATIENT
Start: 2024-12-11 | End: 2024-12-12 | Stop reason: HOSPADM

## 2024-12-11 RX ORDER — SODIUM CHLORIDE 9 MG/ML
INJECTION, SOLUTION INTRAVENOUS ONCE
Status: CANCELLED | OUTPATIENT
Start: 2024-12-11 | End: 2024-12-11

## 2024-12-11 RX ORDER — FUROSEMIDE 80 MG/1
80 TABLET ORAL 2 TIMES DAILY
Status: DISCONTINUED | OUTPATIENT
Start: 2024-12-11 | End: 2024-12-12 | Stop reason: HOSPADM

## 2024-12-11 RX ORDER — TAMSULOSIN HYDROCHLORIDE 0.4 MG/1
0.4 CAPSULE ORAL DAILY
Status: DISCONTINUED | OUTPATIENT
Start: 2024-12-11 | End: 2024-12-12 | Stop reason: HOSPADM

## 2024-12-11 RX ORDER — CARVEDILOL 25 MG/1
25 TABLET ORAL 2 TIMES DAILY
Status: DISCONTINUED | OUTPATIENT
Start: 2024-12-11 | End: 2024-12-12 | Stop reason: HOSPADM

## 2024-12-11 RX ORDER — HEPARIN SODIUM 1000 [USP'U]/ML
1000 INJECTION, SOLUTION INTRAVENOUS; SUBCUTANEOUS
Status: DISCONTINUED | OUTPATIENT
Start: 2024-12-11 | End: 2024-12-12 | Stop reason: HOSPADM

## 2024-12-11 RX ORDER — ASPIRIN 81 MG/1
81 TABLET ORAL DAILY
Status: DISCONTINUED | OUTPATIENT
Start: 2024-12-11 | End: 2024-12-12 | Stop reason: HOSPADM

## 2024-12-11 RX ORDER — ACETAMINOPHEN 325 MG/1
650 TABLET ORAL EVERY 4 HOURS PRN
Status: DISCONTINUED | OUTPATIENT
Start: 2024-12-11 | End: 2024-12-12 | Stop reason: HOSPADM

## 2024-12-11 RX ORDER — HYDRALAZINE HYDROCHLORIDE 10 MG/1
10 TABLET, FILM COATED ORAL 3 TIMES DAILY
Status: DISCONTINUED | OUTPATIENT
Start: 2024-12-11 | End: 2024-12-11

## 2024-12-11 RX ORDER — ATORVASTATIN CALCIUM 20 MG/1
20 TABLET, FILM COATED ORAL NIGHTLY
Status: DISCONTINUED | OUTPATIENT
Start: 2024-12-11 | End: 2024-12-12 | Stop reason: HOSPADM

## 2024-12-11 RX ORDER — HYDRALAZINE HYDROCHLORIDE 25 MG/1
25 TABLET, FILM COATED ORAL 3 TIMES DAILY
Status: DISCONTINUED | OUTPATIENT
Start: 2024-12-11 | End: 2024-12-12

## 2024-12-11 RX ORDER — FUROSEMIDE 40 MG/1
80 TABLET ORAL 2 TIMES DAILY
Status: DISCONTINUED | OUTPATIENT
Start: 2024-12-11 | End: 2024-12-11

## 2024-12-11 RX ORDER — HEPARIN SODIUM 5000 [USP'U]/ML
5000 INJECTION, SOLUTION INTRAVENOUS; SUBCUTANEOUS EVERY 8 HOURS
Status: DISCONTINUED | OUTPATIENT
Start: 2024-12-11 | End: 2024-12-12 | Stop reason: HOSPADM

## 2024-12-11 RX ORDER — CARVEDILOL 12.5 MG/1
12.5 TABLET ORAL 2 TIMES DAILY
Status: DISCONTINUED | OUTPATIENT
Start: 2024-12-11 | End: 2024-12-11

## 2024-12-11 RX ORDER — LABETALOL HYDROCHLORIDE 5 MG/ML
20 INJECTION, SOLUTION INTRAVENOUS
Status: COMPLETED | OUTPATIENT
Start: 2024-12-11 | End: 2024-12-11

## 2024-12-11 RX ORDER — SODIUM BICARBONATE 325 MG/1
1300 TABLET ORAL 2 TIMES DAILY
Status: DISCONTINUED | OUTPATIENT
Start: 2024-12-11 | End: 2024-12-12 | Stop reason: HOSPADM

## 2024-12-11 RX ORDER — CINACALCET 30 MG/1
30 TABLET, FILM COATED ORAL
Status: DISCONTINUED | OUTPATIENT
Start: 2024-12-11 | End: 2024-12-12 | Stop reason: HOSPADM

## 2024-12-11 RX ORDER — SODIUM CHLORIDE 0.9 % (FLUSH) 0.9 %
3 SYRINGE (ML) INJECTION EVERY 6 HOURS PRN
Status: DISCONTINUED | OUTPATIENT
Start: 2024-12-11 | End: 2024-12-12 | Stop reason: HOSPADM

## 2024-12-11 RX ADMIN — LABETALOL HYDROCHLORIDE 20 MG: 5 INJECTION INTRAVENOUS at 05:12

## 2024-12-11 RX ADMIN — HEPARIN SODIUM 1000 UNITS: 1000 INJECTION, SOLUTION INTRAVENOUS; SUBCUTANEOUS at 12:12

## 2024-12-11 RX ADMIN — HYDRALAZINE HYDROCHLORIDE 10 MG: 10 TABLET ORAL at 08:12

## 2024-12-11 RX ADMIN — NIFEDIPINE 90 MG: 30 TABLET, FILM COATED, EXTENDED RELEASE ORAL at 08:12

## 2024-12-11 RX ADMIN — HYDRALAZINE HYDROCHLORIDE 10 MG: 10 TABLET ORAL at 03:12

## 2024-12-11 RX ADMIN — CINACALCET HYDROCHLORIDE 30 MG: 30 TABLET, FILM COATED ORAL at 01:12

## 2024-12-11 RX ADMIN — SEVELAMER CARBONATE 1600 MG: 800 TABLET, FILM COATED ORAL at 05:12

## 2024-12-11 RX ADMIN — ASPIRIN 81 MG: 81 TABLET, COATED ORAL at 08:12

## 2024-12-11 RX ADMIN — SODIUM BICARBONATE 650 MG TABLET 1300 MG: at 08:12

## 2024-12-11 RX ADMIN — HEPARIN SODIUM 5000 UNITS: 5000 INJECTION INTRAVENOUS; SUBCUTANEOUS at 01:12

## 2024-12-11 RX ADMIN — CARVEDILOL 25 MG: 25 TABLET, FILM COATED ORAL at 08:12

## 2024-12-11 RX ADMIN — CARVEDILOL 12.5 MG: 12.5 TABLET, FILM COATED ORAL at 08:12

## 2024-12-11 RX ADMIN — HYDRALAZINE HYDROCHLORIDE 25 MG: 25 TABLET ORAL at 08:12

## 2024-12-11 RX ADMIN — HEPARIN SODIUM 5000 UNITS: 5000 INJECTION INTRAVENOUS; SUBCUTANEOUS at 09:12

## 2024-12-11 RX ADMIN — ATORVASTATIN CALCIUM 20 MG: 20 TABLET, FILM COATED ORAL at 08:12

## 2024-12-11 RX ADMIN — SEVELAMER CARBONATE 1600 MG: 800 TABLET, FILM COATED ORAL at 01:12

## 2024-12-11 RX ADMIN — FUROSEMIDE 160 MG: 10 INJECTION, SOLUTION INTRAMUSCULAR; INTRAVENOUS at 05:12

## 2024-12-11 RX ADMIN — TAMSULOSIN HYDROCHLORIDE 0.4 MG: 0.4 CAPSULE ORAL at 08:12

## 2024-12-11 RX ADMIN — FUROSEMIDE 80 MG: 80 TABLET ORAL at 06:12

## 2024-12-11 NOTE — ASSESSMENT & PLAN NOTE
69 y.o. Black or  Male ESRD-HD M-W-F presents to ED on 12/11/2024 with diagnosis of: Acute hypoxemic respiratory failure [J96.01]   Nephrology consulted for inpatient ESRD-HD management    Outpatient HD Information:  -Dialysis modality: Hemodialysis  -Outpatient HD unit: [facility]   -Nephrologist: Isabela   -HD TX days: Monday/Wednesday/Friday, duration of treatment: 3.5 hrs  -Last HD TX prior to hospital admission: 12/09/24  -Dialysis access: dialysis catheter   -Residual urine: Minium  -EDW: 96 kg     Assessment:   - Will provide dialysis today (12/11/2024) with UF - 2.5L as BP tolerates for metabolic clearance and volume management   - Labs reviewed and dialysate to be adjusted to current labs.   - Pre HD weight 95.9 kg (EDW 96 kg); appears hypervolemic on exam. Will attempt to challenge EDW. Discussed with patient's OP RADHA and Nephrologist regarding possible need to adjust EDW. Will likely leave here today at 93.4 kg.  - Seen on HD. No complaints.   - Continue to monitor intake and output  - Please avoid gadolinium, fleets, phos-based laxatives, NSAIDs  - Dialysis thrice weekly unless more urgent indications arise. Will evaluate RRT requirements Daily.    Anemia of ESRD   Recent Labs   Lab 12/11/24  0458 12/11/24  0500 12/11/24  0505   WBC 5.90  --   --    HGB 10.2*  --   --    HCT 32.2* 34* 34*     --   --      Lab Results   Component Value Date    FESATURATED 24 04/30/2022    FERRITIN 459 (H) 12/04/2024       - Goal in ESRD is Hgb of 10-11. Hgb 10.2. At goal.   - EPO can be administered and dosed per his OP unit upon discharge.  - if patient is on iron infusions please D/C when active infection, cautiously use EPO when hx of malignancy, high BP (SBP usually > 170mmhg).    Mineral Bone Disease in ESRD   Lab Results   Component Value Date     (H) 12/04/2024    CALCIUM 9.6 12/11/2024    ALBUMIN 4.1 12/11/2024    CAION 1.20 08/15/2022    PHOS 5.8 (H) 12/04/2024       - F/U PO4, Mg,  Calcium. And albumin levels daily.   - Renal diet with protein intake goal 1.5 g/kg/d with 1 L fluid restriction   - Continue or restart home phos binder   - Continue Cinacalcet

## 2024-12-11 NOTE — ASSESSMENT & PLAN NOTE
His blood pressure is chronically elevated:    BP Readings from Last 9 Encounters:   12/11/24 (!) 175/84   12/09/24 (!) 156/90   12/06/24 (!) 189/91   12/04/24 (!) 156/99   12/02/24 (!) 167/83   11/29/24 (!) 183/97   11/26/24 (!) 157/88   11/24/24 (!) 151/80   11/22/24 (!) 159/81         Patient's blood pressure range in the last 24 hours was: BP  Min: 162/82  Max: 208/98.The patient's inpatient anti-hypertensive regimen is listed below:  Current Antihypertensives  NIFEdipine 24 hr tablet 90 mg, Daily, Oral  furosemide tablet 80 mg, 2 times daily, Oral  carvediloL tablet 25 mg, 2 times daily, Oral  hydrALAZINE tablet 25 mg, 3 times daily, Oral    Plan  - will increase coreg to 25.  Will monitor HR  -- will increase hydralazine to 25 tid.   ACEi/ARB would be better but he does have some hyperkalemia sometimes.

## 2024-12-11 NOTE — ASSESSMENT & PLAN NOTE
Remission  10/24 A1c was 5.3.  he is not on any meds at home.  -- diabetic diet, check gluc with am labs

## 2024-12-11 NOTE — PHARMACY MED REC
"  Admission Medication History     The home medication history was taken by Layla Beltran.    You may go to "Admission" then "Reconcile Home Medications" tabs to review and/or act upon these items.     The home medication list has been updated by the Pharmacy department.   Please read ALL comments highlighted in yellow.   Please address this information as you see fit.    Feel free to contact us if you have any questions or require assistance.      The medications listed below were removed from the home medication list. Please reorder if appropriate:  Patient reports no longer taking the following medication(s):  DICLOFENAC SODIUM 1 % GEL  FERRIC CITRATE ORAL  LIDOCAINE 5 % PATCH  SODIUM BICARBONATE 650 MG    Medications listed below were obtained from: Patient/family and Analytic software- [employer]  Current Outpatient Medications on File Prior to Encounter   Medication Sig    acetaminophen (TYLENOL) 500 MG tablet Take 2 tablets (1,000 mg total) by mouth every 14 (fourteen) days. As needed for pain.    aspirin (ECOTRIN) 81 MG EC tablet Take 1 tablet (81 mg total) by mouth once daily.    atorvastatin (LIPITOR) 20 MG tablet Take 1 tablet by mouth every evening    calcium carbonate 470 mg calcium (1,177 mg) Chew Take 2 tablets by mouth daily as needed (heartburn).    carvediloL (COREG) 12.5 MG tablet Take 1 tablet (12.5 mg total) by mouth 2 (two) times daily. Followup Dr.T Boo JUNE 2025 for more refills, call to schedule/get labs 1wk before doctor's appointment (ordered).    cinacalcet (SENSIPAR) 30 MG Tab Take 30 mg by mouth daily with breakfast.    ergocalciferol, vitamin D2, (VITAMIN D ORAL) Take 1 tablet by mouth once daily.    furosemide (LASIX) 80 MG tablet Take 1 tablet(80 mg) by mouth twice daily    hydrALAZINE (APRESOLINE) 10 MG tablet Take 1 tablet (10 mg total) by mouth 3 (three) times daily. Followup Dr.T Boo JUNE 2025 for more refills, call to schedule/get labs 1wk before doctor's appointment " (ordered).    mometasone furoate (NASONEX NASL) 2 sprays by Nasal route twice a week. As needed for congestion    NIFEdipine (PROCARDIA-XL) 90 MG (OSM) 24 hr tablet Take 1 tablet (90 mg total) by mouth once daily. Followup Dr.T Boo JUNE 2025 for more refills, call to schedule/get labs 1wk before doctor's appointment (ordered).    sevelamer carbonate (RENVELA) 800 mg Tab Take 1,600 mg by mouth 3 (three) times daily with meals.    tamsulosin (FLOMAX) 0.4 mg Cap Take 1 capsule(0.4 mg) by mouth every day    blood sugar diagnostic Strp Use to check blood glucose once a day.    blood-glucose meter Misc Use to check blood glucose twice a day.    lancets 30 gauge Misc Use to check blood glucose twice a day.             Layla Beltran  EXT [id number]                .

## 2024-12-11 NOTE — CONSULTS
[facility] - Emergency Dept  Nephrology  Consult Note    Patient Name: Elbert Still Jr.  MRN: [id number]  Admission Date: 2024  Hospital Length of Stay: 0 days  Attending Provider: Anival Ramirez MD   Primary Care Physician: Angel Luis Boo MD  Principal Problem:<principal problem not specified>    Inpatient consult to Nephrology  Consult performed by: Jesusita Pereira, DWAIN, FNP-C  Consult ordered by: Princess Trevino MD  Reason for consult: ESRD        Subjective:     HPI: The patient is a 69 y.o. Black or  Male with multiple co morbidities including type 2 diabetes, gout, anemia, ESRD on dialysis , and hypertension who presents to ED on 2024 with complaints of SOB. He states that he developed worsening shortness of breath Tuesday evening prompting his visit to the ED. He did receive has a full session of dialysis on Monday. Of note, his EDW was recently lowered to 96 kg due to concerns for volume overload. SIDDIQI in the ED notable for CXR with pulmonary edema and BNP of 2624. IV Lasix given with no charted output. CMP with evidence of ESRD baseline. Nephrology consulted for management of ESRD and HD treatment.    Past Medical History:   Diagnosis Date    Anemia     Diabetes mellitus, type 2     ESRD (end stage renal disease)     Essential (primary) hypertension 2017    Gout, unspecified      jaundice, unspecified     Nuclear sclerosis of both eyes 2022       Past Surgical History:   Procedure Laterality Date    ADENOIDECTOMY      ADENOIDECTOMY      AV FISTULA PLACEMENT Left 2022    Procedure: CREATION, AV FISTULA;  Surgeon: Prince Gardiner MD;  Location: [facility] OR [address] [place] [address];  Service: Peripheral Vascular;  Laterality: Left;    EXCISION OF ARTERIOVENOUS FISTULA Left 12/10/2023    Procedure: EXCISION, AV FISTULA;  Surgeon: Joaquin Rose MD;  Location: [facility] OR [address] [place] [address];  Service: Vascular;  Laterality: Left;    FISTULOGRAM Left  2/16/2023    Procedure: FISTULOGRAM;  Surgeon: Prince Gardiner MD;  Location: [facility] OR 2ND FLR;  Service: Peripheral Vascular;  Laterality: Left;  Given to the sterile field.     FISTULOGRAM Left 7/6/2023    Procedure: Fistulogram;  Surgeon: LETY Rayo III, MD;  Location: [facility] OR 2ND FLR;  Service: Peripheral Vascular;  Laterality: Left;  1.9 min  22.86 mGy  3.8862 Gy.cm  20ml Dye     nasal septum repair      NASAL SEPTUM SURGERY      PERCUTANEOUS TRANSLUMINAL ANGIOPLASTY OF ARTERIOVENOUS FISTULA Left 11/22/2022    Procedure: PTA, AV FISTULA;  Surgeon: Prince Gardiner MD;  Location: [facility] CATH LAB;  Service: Peripheral Vascular;  Laterality: Left;    PERCUTANEOUS TRANSLUMINAL ANGIOPLASTY OF ARTERIOVENOUS FISTULA Left 2/16/2023    Procedure: PTA, AV FISTULA;  Surgeon: Prince Gardiner MD;  Location: [facility] OR 2ND FLR;  Service: Peripheral Vascular;  Laterality: Left;  4.8 min  43.08 mGy  4.0682 Gy.cm  32ml Dye    PERCUTANEOUS TRANSLUMINAL ANGIOPLASTY OF ARTERIOVENOUS FISTULA Left 7/6/2023    Procedure: PTA, AV FISTULA;  Surgeon: LETY Rayo III, MD;  Location: [facility] OR 2ND FLR;  Service: Peripheral Vascular;  Laterality: Left;    PROSTATE BIOPSY N/A 3/24/2023    Procedure: BIOPSY, PROSTATE;  Surgeon: Frankie Welch MD;  Location: [facility] OR 1ST FLR;  Service: Urology;  Laterality: N/A;  transrectal, 30min, uronav needed    REVISION OF ARTERIOVENOUS FISTULA Left 11/9/2023    Procedure: REVISION, AV FISTULA;  Surgeon: LETY Rayo III, MD;  Location: [facility] OR 2ND FLR;  Service: Vascular;  Laterality: Left;  LUE AVG revision    ROTATOR CUFF REPAIR Left     SALIVARY GLAND SURGERY      Tonsillectomy      TONSILLECTOMY      TRANSPOSITION OF BASILIC VEIN Left 11/1/2022    Procedure: TRANSPOSITION, VEIN, BASILIC;  Surgeon: Prince Gardiner MD;  Location: [facility] OR 2ND FLR;  Service: Peripheral Vascular;  Laterality: Left;  Left Brachial vein.    WASHOUT Left 12/10/2023    Procedure: WASHOUT OF AV FISTULA;  Surgeon:  Joaquin Rose MD;  Location: [facility] OR [address] [place] [address];  Service: Vascular;  Laterality: Left;       Review of patient's allergies indicates:   Allergen Reactions    Iodine and iodide containing products Hives     Hypotension, Flushing     Current Facility-Administered Medications   Medication Frequency    acetaminophen tablet 650 mg Q4H PRN    aspirin EC tablet 81 mg Daily    atorvastatin tablet 20 mg QHS    carvediloL tablet 12.5 mg BID    cinacalcet tablet 30 mg Daily with breakfast    furosemide tablet 80 mg BID    heparin (porcine) injection 1,000 Units PRN    heparin (porcine) injection 5,000 Units Q8H    hydrALAZINE tablet 10 mg TID    NIFEdipine 24 hr tablet 90 mg Daily    sevelamer carbonate tablet 1,600 mg TID WM    sodium bicarbonate tablet 1,300 mg BID    sodium chloride 0.9% flush 3 mL Q6H PRN    tamsulosin 24 hr capsule 0.4 mg Daily     Current Outpatient Medications   Medication    acetaminophen (TYLENOL) 500 MG tablet    aspirin (ECOTRIN) 81 MG EC tablet    atorvastatin (LIPITOR) 20 MG tablet    blood sugar diagnostic Strp    blood-glucose meter Misc    carvediloL (COREG) 12.5 MG tablet    cinacalcet (SENSIPAR) 30 MG Tab    diclofenac sodium (VOLTAREN) 1 % Gel    FERRIC CITRATE ORAL    fluticasone propionate (FLONASE) 50 mcg/actuation nasal spray    furosemide (LASIX) 80 MG tablet    hydrALAZINE (APRESOLINE) 10 MG tablet    lancets 30 gauge Misc    LIDOcaine (LIDODERM) 5 %    NIFEdipine (PROCARDIA-XL) 90 MG (OSM) 24 hr tablet    sevelamer carbonate (RENVELA) 800 mg Tab    sodium bicarbonate 650 MG tablet    tamsulosin (FLOMAX) 0.4 mg Cap     Facility-Administered Medications Ordered in Other Encounters   Medication Frequency    0.9%  NaCl infusion Continuous    0.9%  NaCl infusion Continuous    ondansetron disintegrating tablet 8 mg Q8H PRN     Family History       Problem Relation (Age of Onset)    Cancer Sister    Heart disease Mother    Hypertension Mother, Sister    Kidney disease Mother    No  Known Problems Father    Seizures Sister    Stroke Sister          Tobacco Use    Smoking status: Never    Smokeless tobacco: Never    Tobacco comments:     .  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     Substance and Sexual Activity    Alcohol use: Yes     Alcohol/week: 2.0 standard drinks of alcohol     Types: 1 Glasses of wine, 1 Cans of beer per week     Comment: Socially    Drug use: No    Sexual activity: Yes     Partners: Female     Review of Systems   Constitutional:  Positive for activity change. Negative for appetite change.   Eyes:  Negative for visual disturbance.   Respiratory:  Positive for shortness of breath. Negative for chest tightness.    Cardiovascular:  Positive for leg swelling. Negative for chest pain.   Gastrointestinal:  Negative for diarrhea and nausea.   Genitourinary:  Positive for decreased urine volume.   Musculoskeletal:  Negative for gait problem.   Skin:  Negative for wound.   Neurological:  Negative for weakness.   Psychiatric/Behavioral:  Negative for agitation and confusion.      Objective:     Vital Signs (Most Recent):  Temp: 98 °F (36.7 °C) (12/11/24 0903)  Pulse: 72 (12/11/24 1015)  Resp: (!) 24 (12/11/24 0630)  BP: (!) 178/86 (12/11/24 1015)  SpO2: (!) 94 % (12/11/24 1015) Vital Signs (24h Range):  Temp:  [97.6 °F (36.4 °C)-98 °F (36.7 °C)] 98 °F (36.7 °C)  Pulse:  [65-79] 72  Resp:  [21-34] 24  SpO2:  [87 %-100 %] 94 %  BP: (162-208)/(81-98) 178/86     Weight: 97 kg (213 lb 13.5 oz) (12/11/24 0433)  Body mass index is 27.46 kg/m².  Body surface area is 2.25 meters squared.    No intake/output data recorded.     Physical Exam  Vitals and nursing note reviewed.   Constitutional:       General: He is not in acute distress.     Appearance: He is well-developed.   HENT:      Head: Normocephalic and atraumatic.      Right Ear: Hearing and external ear normal.      Left Ear: Hearing and external ear normal.      Mouth/Throat:      Mouth: Mucous membranes are moist.   Eyes:       General: No scleral icterus.     Conjunctiva/sclera: Conjunctivae normal.   Cardiovascular:      Rate and Rhythm: Normal rate and regular rhythm.      Pulses: Normal pulses.      Heart sounds: Normal heart sounds.      Comments: R CVC  Pulmonary:      Effort: Pulmonary effort is normal. No respiratory distress.      Breath sounds: Normal breath sounds.   Abdominal:      General: Bowel sounds are normal. There is no distension.      Palpations: Abdomen is soft.   Musculoskeletal:         General: No swelling. Normal range of motion.      Cervical back: Normal range of motion.      Right lower leg: No edema.      Left lower leg: No edema.   Skin:     General: Skin is warm and dry.   Neurological:      Mental Status: He is alert and oriented to person, place, and time.   Psychiatric:         Mood and Affect: Mood normal.         Behavior: Behavior normal. Behavior is cooperative.         Thought Content: Thought content normal.          Significant Labs:  CBC:   Recent Labs   Lab 12/11/24  0458 12/11/24  0500 12/11/24  0505   WBC 5.90  --   --    RBC 3.53*  --   --    HGB 10.2*  --   --    HCT 32.2*   < > 34*     --   --    MCV 91  --   --    MCH 28.9  --   --    MCHC 31.7*  --   --     < > = values in this interval not displayed.     CMP:   Recent Labs   Lab 12/11/24  0458   *   CALCIUM 9.6   ALBUMIN 4.1   PROT 8.0      K 4.8   CO2 15*      BUN 58*   CREATININE 11.7*   ALKPHOS 87   ALT <5*   AST 13   BILITOT 0.6     All labs within the past 24 hours have been reviewed.    Assessment/Plan:     Renal/  -ESRD on dialysis - on HD MWF  69 y.o. Black or  Male ESRD-HD M-W-F presents to ED on 12/11/2024 with diagnosis of: Acute hypoxemic respiratory failure [J96.01]   Nephrology consulted for inpatient ESRD-HD management    Outpatient HD Information:  -Dialysis modality: Hemodialysis  -Outpatient HD unit: [facility]   -Nephrologist: Isabela   -ELISHA TX days: Monday/Wednesday/Friday,  duration of treatment: 3.5 hrs  -Last HD TX prior to hospital admission: 12/09/24  -Dialysis access: dialysis catheter   -Residual urine: Minium  -EDW: 96 kg     Assessment:   - Will provide dialysis today (12/11/2024) with UF - 2.5L as BP tolerates for metabolic clearance and volume management   - Labs reviewed and dialysate to be adjusted to current labs.   - Pre HD weight 95.9 kg (EDW 96 kg); appears hypervolemic on exam. Will attempt to challenge EDW. Discussed with patient's OP RADHA and Nephrologist regarding possible need to adjust EDW. Will likely leave here today at 93.4 kg.  - Seen on HD. No complaints.   - Continue to monitor intake and output  - Please avoid gadolinium, fleets, phos-based laxatives, NSAIDs  - Dialysis thrice weekly unless more urgent indications arise. Will evaluate RRT requirements Daily.    Anemia of ESRD   Recent Labs   Lab 12/11/24  0458 12/11/24  0500 12/11/24  0505   WBC 5.90  --   --    HGB 10.2*  --   --    HCT 32.2* 34* 34*     --   --      Lab Results   Component Value Date    FESATURATED 24 04/30/2022    FERRITIN 459 (H) 12/04/2024       - Goal in ESRD is Hgb of 10-11. Hgb 10.2. At goal.   - EPO can be administered and dosed per his OP unit upon discharge.  - if patient is on iron infusions please D/C when active infection, cautiously use EPO when hx of malignancy, high BP (SBP usually > 170mmhg).    Mineral Bone Disease in ESRD   Lab Results   Component Value Date     (H) 12/04/2024    CALCIUM 9.6 12/11/2024    ALBUMIN 4.1 12/11/2024    CAION 1.20 08/15/2022    PHOS 5.8 (H) 12/04/2024       - F/U PO4, Mg, Calcium. And albumin levels daily.   - Renal diet with protein intake goal 1.5 g/kg/d with 1 L fluid restriction   - Continue or restart home phos binder   - Continue Cinacalcet         Thank you for your consult. I will follow-up with patient. Please contact us if you have any additional questions.    Jesusita Pereira DNP, FNP-C  Nephrology  [facility] -  Emergency Dept

## 2024-12-11 NOTE — SUBJECTIVE & OBJECTIVE
Past Medical History:   Diagnosis Date    Anemia     Bacteremia 2023    Diabetes mellitus, type 2     ESRD (end stage renal disease)     HD  started 2022    Essential (primary) hypertension 2017    Gout, unspecified      jaundice, unspecified     Nuclear sclerosis of both eyes 2022    Prostate cancer 2024       Past Surgical History:   Procedure Laterality Date    ADENOIDECTOMY      ADENOIDECTOMY      AV FISTULA PLACEMENT Left 2022    Procedure: CREATION, AV FISTULA;  Surgeon: Prince Gardiner MD;  [facility];  Service: Peripheral Vascular;  Laterality: Left;    EXCISION OF ARTERIOVENOUS FISTULA Left 12/10/2023    Procedure: EXCISION, AV FISTULA;  Surgeon: Joaquin Rose MD;  [facility];  Service: Vascular;  Laterality: Left;    FISTULOGRAM Left 2023    Procedure: FISTULOGRAM;  Surgeon: Prince Gardiner MD;  [facility];  Service: Peripheral Vascular;  Laterality: Left;  Given to the sterile field.     FISTULOGRAM Left 2023    Procedure: Fistulogram;  Surgeon: LETY Rayo III, MD;  [facility];  Service: Peripheral Vascular;  Laterality: Left;  1.9 min  22.86 mGy  3.8862 Gy.cm  20ml Dye     nasal septum repair      NASAL SEPTUM SURGERY      PERCUTANEOUS TRANSLUMINAL ANGIOPLASTY OF ARTERIOVENOUS FISTULA Left 2022    Procedure: PTA, AV FISTULA;  Surgeon: Prince Gardiner MD;  Location: [facility] CATH LAB;  Service: Peripheral Vascular;  Laterality: Left;    PERCUTANEOUS TRANSLUMINAL ANGIOPLASTY OF ARTERIOVENOUS FISTULA Left 2023    Procedure: PTA, AV FISTULA;  Surgeon: Prince Gardiner MD;  [facility];  Service: Peripheral Vascular;  Laterality: Left;  4.8 min  43.08 mGy  4.0682 Gy.cm  32ml Dye    PERCUTANEOUS TRANSLUMINAL ANGIOPLASTY OF ARTERIOVENOUS FISTULA Left 2023    Procedure: PTA, AV FISTULA;  Surgeon: LETY Rayo III, MD;  Location: [facility] OR [address] [place] [address];  Service:  Peripheral Vascular;  Laterality: Left;    PROSTATE BIOPSY N/A 3/24/2023    Procedure: BIOPSY, PROSTATE;  Surgeon: Frankie Welch MD;  Location: [facility] OR 1ST FLR;  Service: Urology;  Laterality: N/A;  transrectal, 30min, uronav needed    REVISION OF ARTERIOVENOUS FISTULA Left 11/9/2023    Procedure: REVISION, AV FISTULA;  Surgeon: LETY Rayo III, MD;  Location: [facility] OR 2ND FLR;  Service: Vascular;  Laterality: Left;  LUE AVG revision    ROTATOR CUFF REPAIR Left     SALIVARY GLAND SURGERY      Tonsillectomy      TONSILLECTOMY      TRANSPOSITION OF BASILIC VEIN Left 11/1/2022    Procedure: TRANSPOSITION, VEIN, BASILIC;  Surgeon: Prince Gardiner MD;  Location: [facility] OR 2ND FLR;  Service: Peripheral Vascular;  Laterality: Left;  Left Brachial vein.    WASHOUT Left 12/10/2023    Procedure: WASHOUT OF AV FISTULA;  Surgeon: Joaquin Rose MD;  [facility];  Service: Vascular;  Laterality: Left;       Review of patient's allergies indicates:   Allergen Reactions    Iodine and iodide containing products Hives     Hypotension, Flushing       Current Facility-Administered Medications on File Prior to Encounter   Medication    0.9%  NaCl infusion    0.9%  NaCl infusion    ondansetron disintegrating tablet 8 mg     Current Outpatient Medications on File Prior to Encounter   Medication Sig    acetaminophen (TYLENOL) 500 MG tablet Take 2 tablets (1,000 mg total) by mouth every 6 (six) hours as needed for Pain.    aspirin (ECOTRIN) 81 MG EC tablet Take 1 tablet (81 mg total) by mouth once daily.    atorvastatin (LIPITOR) 20 MG tablet TAKE 1 TABLET BY MOUTH EVERY EVENING    blood sugar diagnostic Strp Use to check blood glucose once a day.    blood-glucose meter Misc Use to check blood glucose twice a day.    carvediloL (COREG) 12.5 MG tablet Take 1 tablet (12.5 mg total) by mouth 2 (two) times daily. Followup Dr.T Boo JUNE 2025 for more refills, call to schedule/get labs 1wk before doctor's appointment  (ordered).    cinacalcet (SENSIPAR) 30 MG Tab Take 30 mg by mouth daily with breakfast.    diclofenac sodium (VOLTAREN) 1 % Gel Apply 2 g topically once daily.    FERRIC CITRATE ORAL Take by mouth 3 (three) times daily with meals.    fluticasone propionate (FLONASE) 50 mcg/actuation nasal spray SHAKE LIQUID AND USE 2 SPRAYS(100 MCG) IN EACH NOSTRIL ONCE DAILY.    furosemide (LASIX) 80 MG tablet TAKE 1 TABLET(80 MG) BY MOUTH TWICE DAILY    hydrALAZINE (APRESOLINE) 10 MG tablet Take 1 tablet (10 mg total) by mouth 3 (three) times daily. Followup Dr.T Boo JUNE 2025 for more refills, call to schedule/get labs 1wk before doctor's appointment (ordered).    lancets 30 gauge Misc Use to check blood glucose twice a day.    LIDOcaine (LIDODERM) 5 % Place 2 patches onto the skin once daily. Remove & Discard patch within 12 hours or as directed by MD    NIFEdipine (PROCARDIA-XL) 90 MG (OSM) 24 hr tablet Take 1 tablet (90 mg total) by mouth once daily. Followup Dr.T Boo JUNE 2025 for more refills, call to schedule/get labs 1wk before doctor's appointment (ordered).    sevelamer carbonate (RENVELA) 800 mg Tab Take 1,600 mg by mouth 3 (three) times daily with meals.    sodium bicarbonate 650 MG tablet Take 2 tablets (1,300 mg total) by mouth 2 (two) times daily.    tamsulosin (FLOMAX) 0.4 mg Cap TAKE 1 CAPSULE(0.4 MG) BY MOUTH EVERY DAY    [DISCONTINUED] amLODIPine (NORVASC) 10 MG tablet TAKE 1 TABLET BY MOUTH ONCE DAILY    [DISCONTINUED] AURYXIA 210 mg iron Tab Take 2 tablets by mouth 3 (three) times daily. (Patient not taking: Reported on 9/10/2024)    [DISCONTINUED] colchicine (COLCRYS) 0.6 mg tablet Take 0.6 mg by mouth as needed (for gout flare). Take half tab (0.3 mg) by mouth daily as needed for gout flare. (Patient not taking: Reported on 7/2/2024)    [DISCONTINUED] glipiZIDE (GLUCOTROL) 10 MG tablet TAKE 1 TABLET BY MOUTH TWICE DAILY WITH MEALS    [DISCONTINUED] LIDOcaine-prilocaine (EMLA) cream SMARTSIG:sparingly  Topical As Directed (Patient not taking: Reported on 7/2/2024)    [DISCONTINUED] metoprolol succinate (TOPROL-XL) 25 MG 24 hr tablet Take 1 tablet (25 mg total) by mouth once daily.    [DISCONTINUED] mupirocin (BACTROBAN) 2 % ointment Apply topically every other day. (Patient not taking: Reported on 7/2/2024)     Family History       Problem Relation (Age of Onset)    Cancer Sister    Heart disease Mother    Hypertension Mother, Sister    Kidney disease Mother    No Known Problems Father    Seizures Sister    Stroke Sister          Tobacco Use    Smoking status: Never    Smokeless tobacco: Never    Tobacco comments:     .  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     Substance and Sexual Activity    Alcohol use: Yes     Alcohol/week: 2.0 standard drinks of alcohol     Types: 1 Glasses of wine, 1 Cans of beer per week     Comment: Socially    Drug use: No    Sexual activity: Yes     Partners: Female     Review of Systems   Constitutional:  Negative for fever.   HENT:  Negative for congestion.    Respiratory:  Positive for shortness of breath. Negative for cough.    Cardiovascular:  Positive for leg swelling. Negative for chest pain and palpitations.   Gastrointestinal:  Negative for abdominal pain.   Genitourinary:  Negative for difficulty urinating.   Musculoskeletal:  Negative for arthralgias.   Allergic/Immunologic: Negative for immunocompromised state.   Neurological:  Negative for dizziness.   Psychiatric/Behavioral:  Negative for behavioral problems.      Objective:     Vital Signs (Most Recent):  Temp: 98 °F (36.7 °C) (12/11/24 1240)  Pulse: 75 (12/11/24 1240)  Resp: (!) 22 (12/11/24 1240)  BP: (!) 175/84 (12/11/24 1240)  SpO2: (!) 94 % (12/11/24 1240) Vital Signs (24h Range):  Temp:  [97.6 °F (36.4 °C)-98 °F (36.7 °C)] 98 °F (36.7 °C)  Pulse:  [65-79] 75  Resp:  [21-34] 22  SpO2:  [87 %-100 %] 94 %  BP: (162-208)/(81-98) 175/84     Weight: 97 kg (213 lb 13.5 oz)  Body mass index is 27.46 kg/m².     Physical  Exam  Constitutional:       General: He is not in acute distress.  HENT:      Mouth/Throat:      Mouth: Mucous membranes are moist.   Eyes:      Pupils: Pupils are equal, round, and reactive to light.   Cardiovascular:      Rate and Rhythm: Normal rate and regular rhythm.   Pulmonary:      Effort: Pulmonary effort is normal.      Breath sounds: Rales (3/4 up bilaterally) present. No wheezing.      Comments: On NC O2  Abdominal:      General: There is no distension.      Tenderness: There is no abdominal tenderness.   Musculoskeletal:      Right lower leg: Edema (2+) present.      Left lower leg: Edema (2+) present.   Neurological:      Mental Status: He is alert and oriented to person, place, and time.   Psychiatric:         Behavior: Behavior normal.              CRANIAL NERVES     CN III, IV, VI   Pupils are equal, round, and reactive to light.       Significant Labs: All pertinent labs within the past 24 hours have been reviewed.    Significant Imaging: I have reviewed all pertinent imaging results/findings within the past 24 hours.

## 2024-12-11 NOTE — ASSESSMENT & PLAN NOTE
Per ER staff, he was in respiratory distress, speaking in very short 1-2 word sentences, tripodding, hypoxic to the 80s. He also has peripheral edema.  Due to extreme work of breathing, they have placed him on BiPAP which has improved his symptoms. BIPAP was later stopped and pt went to HD for volume removal.    Patient with Hypoxic Respiratory failure which is Acute.  he is not on home oxygen. Supplemental oxygen was provided and noted- Oxygen Concentration (%):  [28] 28    .   Signs/symptoms of respiratory failure include- increased work of breathing and respiratory distress. Contributing diagnoses includes - CHF Labs and images were reviewed. Patient Has not had a recent ABG. Will treat underlying causes and adjust management of respiratory failure as follows- wean O2 as tolerated

## 2024-12-11 NOTE — ASSESSMENT & PLAN NOTE
Patient has Diastolic (HFpEF) heart failure that is Acute on chronic. On presentation their CHF was decompensated. Evidence of decompensated CHF on presentation includes: edema, elevated JVD, crackles on lung auscultation, weight gain, orthopnea, dyspnea on exertion (ALLEN), and shortness of breath. The etiology of their decompensation is likely HD and uncontrolled HTN . Tn is negative .  Most recent BNP and echo results are listed below.  Recent Labs     12/11/24  0458   BNP 2,624*       Latest ECHO  Results for orders placed during the hospital encounter of 12/08/23    Echo    Interpretation Summary    Left Ventricle: The left ventricle is normal in size. Normal wall thickness. Normal wall motion. There is normal systolic function with a visually estimated ejection fraction of 55 - 60%. There is indeterminate diastolic function.    Right Ventricle: Normal right ventricular cavity size. Wall thickness is normal. Right ventricle wall motion  is normal. Systolic function is normal.    Left Atrium: Left atrium is moderately dilated.    Pulmonary Artery: The estimated pulmonary artery systolic pressure is 37 mmHg.    IVC/SVC: Intermediate venous pressure at 8 mmHg.    Current Heart Failure Medications  furosemide tablet 80 mg, 2 times daily, Oral  carvediloL tablet 25 mg, 2 times daily, Oral  hydrALAZINE tablet 25 mg, 3 times daily, Oral    Plan  - Monitor strict I&Os and daily weights.    - Place on telemetry  - Low sodium diet  - Place on fluid restriction of 1.5 L.   -- still overloaded after HD, titrate BP meds and needs more agressive HD to find new dry weight.

## 2024-12-11 NOTE — SUBJECTIVE & OBJECTIVE
Past Medical History:   Diagnosis Date    Anemia     Diabetes mellitus, type 2     ESRD (end stage renal disease)     Essential (primary) hypertension 2017    Gout, unspecified      jaundice, unspecified     Nuclear sclerosis of both eyes 2022       Past Surgical History:   Procedure Laterality Date    ADENOIDECTOMY      ADENOIDECTOMY      AV FISTULA PLACEMENT Left 2022    Procedure: CREATION, AV FISTULA;  Surgeon: Prince Gardiner MD;  Location: [facility] OR [address] [place] [address];  Service: Peripheral Vascular;  Laterality: Left;    EXCISION OF ARTERIOVENOUS FISTULA Left 12/10/2023    Procedure: EXCISION, AV FISTULA;  Surgeon: Joaquin Rose MD;  [facility];  Service: Vascular;  Laterality: Left;    FISTULOGRAM Left 2023    Procedure: FISTULOGRAM;  Surgeon: Prince Gardiner MD;  [facility];  Service: Peripheral Vascular;  Laterality: Left;  Given to the sterile field.     FISTULOGRAM Left 2023    Procedure: Fistulogram;  Surgeon: LETY Rayo III, MD;  Location: [facility] OR [address] [place] [address];  Service: Peripheral Vascular;  Laterality: Left;  1.9 min  22.86 mGy  3.8862 Gy.cm  20ml Dye     nasal septum repair      NASAL SEPTUM SURGERY      PERCUTANEOUS TRANSLUMINAL ANGIOPLASTY OF ARTERIOVENOUS FISTULA Left 2022    Procedure: PTA, AV FISTULA;  Surgeon: Prince Gardiner MD;  Location: [facility] CATH LAB;  Service: Peripheral Vascular;  Laterality: Left;    PERCUTANEOUS TRANSLUMINAL ANGIOPLASTY OF ARTERIOVENOUS FISTULA Left 2023    Procedure: PTA, AV FISTULA;  Surgeon: Prince Gardiner MD;  Location: [address];  Service: Peripheral Vascular;  Laterality: Left;  4.8 min  43.08 mGy  4.0682 Gy.cm  32ml Dye    PERCUTANEOUS TRANSLUMINAL ANGIOPLASTY OF ARTERIOVENOUS FISTULA Left 2023    Procedure: PTA, AV FISTULA;  Surgeon: LETY Rayo III, MD;  Location: [address];  Service: Peripheral Vascular;  Laterality: Left;    PROSTATE BIOPSY N/A 3/24/2023     Procedure: BIOPSY, PROSTATE;  Surgeon: Frankie Welch MD;  Location: [facility] OR 1ST FLR;  Service: Urology;  Laterality: N/A;  transrectal, 30min, uronav needed    REVISION OF ARTERIOVENOUS FISTULA Left 11/9/2023    Procedure: REVISION, AV FISTULA;  Surgeon: LETY Rayo III, MD;  Location: [facility] OR 2ND FLR;  Service: Vascular;  Laterality: Left;  LUE AVG revision    ROTATOR CUFF REPAIR Left     SALIVARY GLAND SURGERY      Tonsillectomy      TONSILLECTOMY      TRANSPOSITION OF BASILIC VEIN Left 11/1/2022    Procedure: TRANSPOSITION, VEIN, BASILIC;  Surgeon: Prince Gardiner MD;  Location: [facility] OR 2ND FLR;  Service: Peripheral Vascular;  Laterality: Left;  Left Brachial vein.    WASHOUT Left 12/10/2023    Procedure: WASHOUT OF AV FISTULA;  Surgeon: Joaquin Rose MD;  Location: [facility] OR 2ND FLR;  Service: Vascular;  Laterality: Left;       Review of patient's allergies indicates:   Allergen Reactions    Iodine and iodide containing products Hives     Hypotension, Flushing     Current Facility-Administered Medications   Medication Frequency    acetaminophen tablet 650 mg Q4H PRN    aspirin EC tablet 81 mg Daily    atorvastatin tablet 20 mg QHS    carvediloL tablet 12.5 mg BID    cinacalcet tablet 30 mg Daily with breakfast    furosemide tablet 80 mg BID    heparin (porcine) injection 1,000 Units PRN    heparin (porcine) injection 5,000 Units Q8H    hydrALAZINE tablet 10 mg TID    NIFEdipine 24 hr tablet 90 mg Daily    sevelamer carbonate tablet 1,600 mg TID WM    sodium bicarbonate tablet 1,300 mg BID    sodium chloride 0.9% flush 3 mL Q6H PRN    tamsulosin 24 hr capsule 0.4 mg Daily     Current Outpatient Medications   Medication    acetaminophen (TYLENOL) 500 MG tablet    aspirin (ECOTRIN) 81 MG EC tablet    atorvastatin (LIPITOR) 20 MG tablet    blood sugar diagnostic Strp    blood-glucose meter Misc    carvediloL (COREG) 12.5 MG tablet    cinacalcet (SENSIPAR) 30 MG Tab    diclofenac sodium (VOLTAREN) 1 % Gel     FERRIC CITRATE ORAL    fluticasone propionate (FLONASE) 50 mcg/actuation nasal spray    furosemide (LASIX) 80 MG tablet    hydrALAZINE (APRESOLINE) 10 MG tablet    lancets 30 gauge Misc    LIDOcaine (LIDODERM) 5 %    NIFEdipine (PROCARDIA-XL) 90 MG (OSM) 24 hr tablet    sevelamer carbonate (RENVELA) 800 mg Tab    sodium bicarbonate 650 MG tablet    tamsulosin (FLOMAX) 0.4 mg Cap     Facility-Administered Medications Ordered in Other Encounters   Medication Frequency    0.9%  NaCl infusion Continuous    0.9%  NaCl infusion Continuous    ondansetron disintegrating tablet 8 mg Q8H PRN     Family History       Problem Relation (Age of Onset)    Cancer Sister    Heart disease Mother    Hypertension Mother, Sister    Kidney disease Mother    No Known Problems Father    Seizures Sister    Stroke Sister          Tobacco Use    Smoking status: Never    Smokeless tobacco: Never    Tobacco comments:     .  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     Substance and Sexual Activity    Alcohol use: Yes     Alcohol/week: 2.0 standard drinks of alcohol     Types: 1 Glasses of wine, 1 Cans of beer per week     Comment: Socially    Drug use: No    Sexual activity: Yes     Partners: Female     Review of Systems   Constitutional:  Positive for activity change. Negative for appetite change.   Eyes:  Negative for visual disturbance.   Respiratory:  Positive for shortness of breath. Negative for chest tightness.    Cardiovascular:  Positive for leg swelling. Negative for chest pain.   Gastrointestinal:  Negative for diarrhea and nausea.   Genitourinary:  Positive for decreased urine volume.   Musculoskeletal:  Negative for gait problem.   Skin:  Negative for wound.   Neurological:  Negative for weakness.   Psychiatric/Behavioral:  Negative for agitation and confusion.      Objective:     Vital Signs (Most Recent):  Temp: 98 °F (36.7 °C) (12/11/24 0903)  Pulse: 72 (12/11/24 1015)  Resp: (!) 24 (12/11/24 0630)  BP: (!) 178/86 (12/11/24  1015)  SpO2: (!) 94 % (12/11/24 1015) Vital Signs (24h Range):  Temp:  [97.6 °F (36.4 °C)-98 °F (36.7 °C)] 98 °F (36.7 °C)  Pulse:  [65-79] 72  Resp:  [21-34] 24  SpO2:  [87 %-100 %] 94 %  BP: (162-208)/(81-98) 178/86     Weight: 97 kg (213 lb 13.5 oz) (12/11/24 0433)  Body mass index is 27.46 kg/m².  Body surface area is 2.25 meters squared.    No intake/output data recorded.     Physical Exam  Vitals and nursing note reviewed.   Constitutional:       General: He is not in acute distress.     Appearance: He is well-developed.   HENT:      Head: Normocephalic and atraumatic.      Right Ear: Hearing and external ear normal.      Left Ear: Hearing and external ear normal.      Mouth/Throat:      Mouth: Mucous membranes are moist.   Eyes:      General: No scleral icterus.     Conjunctiva/sclera: Conjunctivae normal.   Cardiovascular:      Rate and Rhythm: Normal rate and regular rhythm.      Pulses: Normal pulses.      Heart sounds: Normal heart sounds.      Comments: R CVC  Pulmonary:      Effort: Pulmonary effort is normal. No respiratory distress.      Breath sounds: Normal breath sounds.   Abdominal:      General: Bowel sounds are normal. There is no distension.      Palpations: Abdomen is soft.   Musculoskeletal:         General: No swelling. Normal range of motion.      Cervical back: Normal range of motion.      Right lower leg: No edema.      Left lower leg: No edema.   Skin:     General: Skin is warm and dry.   Neurological:      Mental Status: He is alert and oriented to person, place, and time.   Psychiatric:         Mood and Affect: Mood normal.         Behavior: Behavior normal. Behavior is cooperative.         Thought Content: Thought content normal.          Significant Labs:  CBC:   Recent Labs   Lab 12/11/24  0458 12/11/24  0500 12/11/24  0505   WBC 5.90  --   --    RBC 3.53*  --   --    HGB 10.2*  --   --    HCT 32.2*   < > 34*     --   --    MCV 91  --   --    MCH 28.9  --   --    MCHC 31.7*   --   --     < > = values in this interval not displayed.     CMP:   Recent Labs   Lab 12/11/24  0458   *   CALCIUM 9.6   ALBUMIN 4.1   PROT 8.0      K 4.8   CO2 15*      BUN 58*   CREATININE 11.7*   ALKPHOS 87   ALT <5*   AST 13   BILITOT 0.6     All labs within the past 24 hours have been reviewed.

## 2024-12-11 NOTE — ASSESSMENT & PLAN NOTE
His blood pressure is chronically elevated:    BP Readings from Last 9 Encounters:   12/11/24 (!) 175/84   12/09/24 (!) 156/90   12/06/24 (!) 189/91   12/04/24 (!) 156/99   12/02/24 (!) 167/83   11/29/24 (!) 183/97   11/26/24 (!) 157/88   11/24/24 (!) 151/80   11/22/24 (!) 159/81         Patient's blood pressure range in the last 24 hours was: BP  Min: 162/82  Max: 208/98.The patient's inpatient anti-hypertensive regimen is listed below:  Current Antihypertensives  carvediloL tablet 12.5 mg, 2 times daily, Oral  hydrALAZINE tablet 10 mg, 3 times daily, Oral  NIFEdipine 24 hr tablet 90 mg, Daily, Oral  furosemide tablet 80 mg, 2 times daily, Oral    Plan  - will increase coreg to 25.  Will monitor HR  -- consider changing hydralazine to bildil.  ACEi/ARB would be better but he does have some hyperkalemia sometimes.           risk factors

## 2024-12-11 NOTE — HPI
Per admitting physician (12/11):  69-year-old male with a past medical history of type 2 diabetes, gout, anemia, ESRD on dialysis Monday Wednesday Friday, and hypertension who presents with shortness of breath. He did receive has a full session of dialysis on Monday, but they have been trying to find a dew dry weight for HD goal. Overnight he became increasingly short of breath. Feels like he can not taken any air. Whenever he lays flat he feels like he is drowning. Does report increased bilateral lower extremity swelling. Compliant with the Lasix and blood pressure meds. No fevers or chills. No coughing. No chest pain or abdominal pain though does feel slightly distended abdomen.     Per ER staff, he was in respiratory distress, speaking in very short 1-2 word sentences, tripodding, hypoxic to the 80s. He also has peripheral edema.  Due to extreme work of breathing, they have placed him on BiPAP which has improved his symptoms. BIPAP was later stopped and pt went to HD for volume removal.    I met patient after HD.  He still needs O2 but states his SOB is better.

## 2024-12-11 NOTE — H&P
[facility] Dept  [facility] Medicine  History & Physical    Patient Name: Elbert Still Jr.  MRN: [id number]  Patient Class: IP- Inpatient  Admission Date: 2024  Attending Physician: Anival Ramirez MD   Primary Care Provider: Angel Luis Boo MD         Patient information was obtained from patient, past medical records, and ER records.     Subjective:     Principal Problem:Acute on chronic diastolic CHF (congestive heart failure)    Chief Complaint:   Chief Complaint   Patient presents with    Shortness of Breath     For a few days but worse on today; dialysis pt due today;    Bloated     Distended  noticed this am        HPI: 69-year-old male with a past medical history of type 2 diabetes, gout, anemia, ESRD on dialysis , and hypertension who presents with shortness of breath. He did receive has a full session of dialysis on Monday, but they have been trying to find a dew dry weight for HD goal. Overnight he became increasingly short of breath. Feels like he can not taken any air. Whenever he lays flat he feels like he is drowning. Does report increased bilateral lower extremity swelling. Compliant with the Lasix and blood pressure meds. No fevers or chills. No coughing. No chest pain or abdominal pain though does feel slightly distended abdomen.     Per ER staff, he was in respiratory distress, speaking in very short 1-2 word sentences, tripodding, hypoxic to the 80s. He also has peripheral edema.  Due to extreme work of breathing, they have placed him on BiPAP which has improved his symptoms. BIPAP was later stopped and pt went to HD for volume removal.    I met patient after HD.  He still needs O2 but states his SOB is better.    Past Medical History:   Diagnosis Date    Anemia     Bacteremia 2023    Diabetes mellitus, type 2     ESRD (end stage renal disease)     HD  started 2022    Essential (primary) hypertension 2017    Gout, unspecified       jaundice, unspecified     Nuclear sclerosis of both eyes 08/12/2022    Prostate cancer 07/02/2024       Past Surgical History:   Procedure Laterality Date    ADENOIDECTOMY      ADENOIDECTOMY      AV FISTULA PLACEMENT Left 09/06/2022    Procedure: CREATION, AV FISTULA;  Surgeon: Prince Gardiner MD;  [facility];  Service: Peripheral Vascular;  Laterality: Left;    EXCISION OF ARTERIOVENOUS FISTULA Left 12/10/2023    Procedure: EXCISION, AV FISTULA;  Surgeon: Joaquin Rose MD;  Location: [facility] OR [facility] FLR;  Service: Vascular;  Laterality: Left;    FISTULOGRAM Left 2/16/2023    Procedure: FISTULOGRAM;  Surgeon: Prince Gardiner MD;  [facility];  Service: Peripheral Vascular;  Laterality: Left;  Given to the sterile field.     FISTULOGRAM Left 7/6/2023    Procedure: Fistulogram;  Surgeon: LETY Rayo III, MD;  [facility];  Service: Peripheral Vascular;  Laterality: Left;  1.9 min  22.86 mGy  3.8862 Gy.cm  20ml Dye     nasal septum repair      NASAL SEPTUM SURGERY      PERCUTANEOUS TRANSLUMINAL ANGIOPLASTY OF ARTERIOVENOUS FISTULA Left 11/22/2022    Procedure: PTA, AV FISTULA;  Surgeon: Prince Gardiner MD;  Location: [facility] CATH LAB;  Service: Peripheral Vascular;  Laterality: Left;    PERCUTANEOUS TRANSLUMINAL ANGIOPLASTY OF ARTERIOVENOUS FISTULA Left 2/16/2023    Procedure: PTA, AV FISTULA;  Surgeon: Prince Gardiner MD;  [facility];  Service: Peripheral Vascular;  Laterality: Left;  4.8 min  43.08 mGy  4.0682 Gy.cm  32ml Dye    PERCUTANEOUS TRANSLUMINAL ANGIOPLASTY OF ARTERIOVENOUS FISTULA Left 7/6/2023    Procedure: PTA, AV FISTULA;  Surgeon: LETY Rayo III, MD;  Location: [facility] OR [facility] F[facility];  Service: Peripheral Vascular;  Laterality: Left;    PROSTATE BIOPSY N/A 3/24/2023    Procedure: BIOPSY, PROSTATE;  Surgeon: Frankie Welch MD;  Location: [facility] OR 1ST FLR;  Service: Urology;  Laterality: N/A;  transrectal, 30min, uronav needed    REVISION OF  ARTERIOVENOUS FISTULA Left 11/9/2023    Procedure: REVISION, AV FISTULA;  Surgeon: LETY Rayo III, MD;  [facility];  Service: Vascular;  Laterality: Left;  LUE AVG revision    ROTATOR CUFF REPAIR Left     SALIVARY GLAND SURGERY      Tonsillectomy      TONSILLECTOMY      TRANSPOSITION OF BASILIC VEIN Left 11/1/2022    Procedure: TRANSPOSITION, VEIN, BASILIC;  Surgeon: Prince Gardiner MD;  Location: [facility] OR 2ND FLR;  Service: Peripheral Vascular;  Laterality: Left;  Left Brachial vein.    WASHOUT Left 12/10/2023    Procedure: WASHOUT OF AV FISTULA;  Surgeon: Joaquin Rose MD;  [facility];  Service: Vascular;  Laterality: Left;       Review of patient's allergies indicates:   Allergen Reactions    Iodine and iodide containing products Hives     Hypotension, Flushing       Current Facility-Administered Medications on File Prior to Encounter   Medication    0.9%  NaCl infusion    0.9%  NaCl infusion    ondansetron disintegrating tablet 8 mg     Current Outpatient Medications on File Prior to Encounter   Medication Sig    acetaminophen (TYLENOL) 500 MG tablet Take 2 tablets (1,000 mg total) by mouth every 6 (six) hours as needed for Pain.    aspirin (ECOTRIN) 81 MG EC tablet Take 1 tablet (81 mg total) by mouth once daily.    atorvastatin (LIPITOR) 20 MG tablet TAKE 1 TABLET BY MOUTH EVERY EVENING    blood sugar diagnostic Strp Use to check blood glucose once a day.    blood-glucose meter Misc Use to check blood glucose twice a day.    carvediloL (COREG) 12.5 MG tablet Take 1 tablet (12.5 mg total) by mouth 2 (two) times daily. Followup Dr.T Boo JUNE 2025 for more refills, call to schedule/get labs 1wk before doctor's appointment (ordered).    cinacalcet (SENSIPAR) 30 MG Tab Take 30 mg by mouth daily with breakfast.    diclofenac sodium (VOLTAREN) 1 % Gel Apply 2 g topically once daily.    FERRIC CITRATE ORAL Take by mouth 3 (three) times daily with meals.    fluticasone  propionate (FLONASE) 50 mcg/actuation nasal spray SHAKE LIQUID AND USE 2 SPRAYS(100 MCG) IN EACH NOSTRIL ONCE DAILY.    furosemide (LASIX) 80 MG tablet TAKE 1 TABLET(80 MG) BY MOUTH TWICE DAILY    hydrALAZINE (APRESOLINE) 10 MG tablet Take 1 tablet (10 mg total) by mouth 3 (three) times daily. Followup Dr.T Boo JUNE 2025 for more refills, call to schedule/get labs 1wk before doctor's appointment (ordered).    lancets 30 gauge Misc Use to check blood glucose twice a day.    LIDOcaine (LIDODERM) 5 % Place 2 patches onto the skin once daily. Remove & Discard patch within 12 hours or as directed by MD    NIFEdipine (PROCARDIA-XL) 90 MG (OSM) 24 hr tablet Take 1 tablet (90 mg total) by mouth once daily. Followup Dr.T Boo JUNE 2025 for more refills, call to schedule/get labs 1wk before doctor's appointment (ordered).    sevelamer carbonate (RENVELA) 800 mg Tab Take 1,600 mg by mouth 3 (three) times daily with meals.    sodium bicarbonate 650 MG tablet Take 2 tablets (1,300 mg total) by mouth 2 (two) times daily.    tamsulosin (FLOMAX) 0.4 mg Cap TAKE 1 CAPSULE(0.4 MG) BY MOUTH EVERY DAY    [DISCONTINUED] amLODIPine (NORVASC) 10 MG tablet TAKE 1 TABLET BY MOUTH ONCE DAILY    [DISCONTINUED] AURYXIA 210 mg iron Tab Take 2 tablets by mouth 3 (three) times daily. (Patient not taking: Reported on 9/10/2024)    [DISCONTINUED] colchicine (COLCRYS) 0.6 mg tablet Take 0.6 mg by mouth as needed (for gout flare). Take half tab (0.3 mg) by mouth daily as needed for gout flare. (Patient not taking: Reported on 7/2/2024)    [DISCONTINUED] glipiZIDE (GLUCOTROL) 10 MG tablet TAKE 1 TABLET BY MOUTH TWICE DAILY WITH MEALS    [DISCONTINUED] LIDOcaine-prilocaine (EMLA) cream SMARTSIG:sparingly Topical As Directed (Patient not taking: Reported on 7/2/2024)    [DISCONTINUED] metoprolol succinate (TOPROL-XL) 25 MG 24 hr tablet Take 1 tablet (25 mg total) by mouth once daily.    [DISCONTINUED] mupirocin (BACTROBAN) 2 % ointment Apply  topically every other day. (Patient not taking: Reported on 7/2/2024)     Family History       Problem Relation (Age of Onset)    Cancer Sister    Heart disease Mother    Hypertension Mother, Sister    Kidney disease Mother    No Known Problems Father    Seizures Sister    Stroke Sister          Tobacco Use    Smoking status: Never    Smokeless tobacco: Never    Tobacco comments:     .  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     Substance and Sexual Activity    Alcohol use: Yes     Alcohol/week: 2.0 standard drinks of alcohol     Types: 1 Glasses of wine, 1 Cans of beer per week     Comment: Socially    Drug use: No    Sexual activity: Yes     Partners: Female     Review of Systems   Constitutional:  Negative for fever.   HENT:  Negative for congestion.    Respiratory:  Positive for shortness of breath. Negative for cough.    Cardiovascular:  Positive for leg swelling. Negative for chest pain and palpitations.   Gastrointestinal:  Negative for abdominal pain.   Genitourinary:  Negative for difficulty urinating.   Musculoskeletal:  Negative for arthralgias.   Allergic/Immunologic: Negative for immunocompromised state.   Neurological:  Negative for dizziness.   Psychiatric/Behavioral:  Negative for behavioral problems.      Objective:     Vital Signs (Most Recent):  Temp: 98 °F (36.7 °C) (12/11/24 1240)  Pulse: 75 (12/11/24 1240)  Resp: (!) 22 (12/11/24 1240)  BP: (!) 175/84 (12/11/24 1240)  SpO2: (!) 94 % (12/11/24 1240) Vital Signs (24h Range):  Temp:  [97.6 °F (36.4 °C)-98 °F (36.7 °C)] 98 °F (36.7 °C)  Pulse:  [65-79] 75  Resp:  [21-34] 22  SpO2:  [87 %-100 %] 94 %  BP: (162-208)/(81-98) 175/84     Weight: 97 kg (213 lb 13.5 oz)  Body mass index is 27.46 kg/m².     Physical Exam  Constitutional:       General: He is not in acute distress.  HENT:      Mouth/Throat:      Mouth: Mucous membranes are moist.   Eyes:      Pupils: Pupils are equal, round, and reactive to light.   Cardiovascular:      Rate and Rhythm:  Normal rate and regular rhythm.   Pulmonary:      Effort: Pulmonary effort is normal.      Breath sounds: Rales (3/4 up bilaterally) present. No wheezing.      Comments: On NC O2  Abdominal:      General: There is no distension.      Tenderness: There is no abdominal tenderness.   Musculoskeletal:      Right lower leg: Edema (2+) present.      Left lower leg: Edema (2+) present.   Neurological:      Mental Status: He is alert and oriented to person, place, and time.   Psychiatric:         Behavior: Behavior normal.              CRANIAL NERVES     CN III, IV, VI   Pupils are equal, round, and reactive to light.       Significant Labs: All pertinent labs within the past 24 hours have been reviewed.    Significant Imaging: I have reviewed all pertinent imaging results/findings within the past 24 hours.  Assessment/Plan:     * Acute on chronic diastolic CHF (congestive heart failure)  Patient has Diastolic (HFpEF) heart failure that is Acute on chronic. On presentation their CHF was decompensated. Evidence of decompensated CHF on presentation includes: edema, elevated JVD, crackles on lung auscultation, weight gain, orthopnea, dyspnea on exertion (ALLEN), and shortness of breath. The etiology of their decompensation is likely HD and uncontrolled HTN . Tn is negative .  Most recent BNP and echo results are listed below.  Recent Labs     12/11/24  0458   BNP 2,624*       Latest ECHO  Results for orders placed during the hospital encounter of 12/08/23    Echo    Interpretation Summary    Left Ventricle: The left ventricle is normal in size. Normal wall thickness. Normal wall motion. There is normal systolic function with a visually estimated ejection fraction of 55 - 60%. There is indeterminate diastolic function.    Right Ventricle: Normal right ventricular cavity size. Wall thickness is normal. Right ventricle wall motion  is normal. Systolic function is normal.    Left Atrium: Left atrium is moderately dilated.     Pulmonary Artery: The estimated pulmonary artery systolic pressure is 37 mmHg.    IVC/SVC: Intermediate venous pressure at 8 mmHg.    Current Heart Failure Medications  furosemide tablet 80 mg, 2 times daily, Oral  carvediloL tablet 25 mg, 2 times daily, Oral  hydrALAZINE tablet 25 mg, 3 times daily, Oral    Plan  - Monitor strict I&Os and daily weights.    - Place on telemetry  - Low sodium diet  - Place on fluid restriction of 1.5 L.   -- still overloaded after HD, titrate BP meds and needs more agressive HD to find new dry weight.    Acute hypoxemic respiratory failure  Per ER staff, he was in respiratory distress, speaking in very short 1-2 word sentences, tripodding, hypoxic to the 80s. He also has peripheral edema.  Due to extreme work of breathing, they have placed him on BiPAP which has improved his symptoms. BIPAP was later stopped and pt went to HD for volume removal.    Patient with Hypoxic Respiratory failure which is Acute.  he is not on home oxygen. Supplemental oxygen was provided and noted- Oxygen Concentration (%):  [28] 28    .   Signs/symptoms of respiratory failure include- increased work of breathing and respiratory distress. Contributing diagnoses includes - CHF Labs and images were reviewed. Patient Has not had a recent ABG. Will treat underlying causes and adjust management of respiratory failure as follows- wean O2 as tolerated    Type 2 diabetes mellitus with kidney complication, without long-term current use of insulin  Remission  10/24 A1c was 5.3.  he is not on any meds at home.  -- diabetic diet, check gluc with am labs    ESRD on dialysis  Creatine stable for now. BMP reviewed- noted Estimated Creatinine Clearance: 7.1 mL/min (A) (based on SCr of 11.7 mg/dL (H)). according to latest data. Based on current GFR, CKD stage is end stage.  Monitor UOP and serial BMP and adjust therapy as needed. Renally dose meds. Avoid nephrotoxic medications and procedures.  -- still overloaded after HD  today, renal to do HD prn    Anemia of chronic disease  Anemia is likely due to chronic disease due to ESRD. Most recent hemoglobin and hematocrit are listed below.  Recent Labs     12/11/24  0458 12/11/24  0500 12/11/24  0505   HGB 10.2*  --   --    HCT 32.2* 34* 34*     Plan  - Monitor serial CBC: Daily  - RDW elevated, will check b12, folate    Essential (primary) hypertension  His blood pressure is chronically elevated:    BP Readings from Last 9 Encounters:   12/11/24 (!) 175/84   12/09/24 (!) 156/90   12/06/24 (!) 189/91   12/04/24 (!) 156/99   12/02/24 (!) 167/83   11/29/24 (!) 183/97   11/26/24 (!) 157/88   11/24/24 (!) 151/80   11/22/24 (!) 159/81         Patient's blood pressure range in the last 24 hours was: BP  Min: 162/82  Max: 208/98.The patient's inpatient anti-hypertensive regimen is listed below:  Current Antihypertensives  NIFEdipine 24 hr tablet 90 mg, Daily, Oral  furosemide tablet 80 mg, 2 times daily, Oral  carvediloL tablet 25 mg, 2 times daily, Oral  hydrALAZINE tablet 25 mg, 3 times daily, Oral    Plan  - will increase coreg to 25.  Will monitor HR  -- will increase hydralazine to 25 tid.   ACEi/ARB would be better but he does have some hyperkalemia sometimes.              VTE Risk Mitigation (From admission, onward)           Ordered     heparin (porcine) injection 5,000 Units  Every 8 hours         12/11/24 0808     heparin (porcine) injection 1,000 Units  As needed (PRN)         12/11/24 0929     IP VTE HIGH RISK PATIENT  Once         12/11/24 0808     Place sequential compression device  Until discontinued         12/11/24 0808                                  12/11/24 1240        Hemodialysis Catheter 12/13/23 1210 right internal jugular   Placement Date/Time: 12/13/23 1210   Present Prior to Hospital Arrival?: No  Barrier Precautions: Performed  Skin Antisepsis: ChloraPrep  Hemodialysis Catheter Type: Tunneled catheter  Location: right internal jugular  Catheter Size (Fr): 14.5 Fr   Man...   Site Assessment No drainage   Line Securement Device Secured with sutureless device   Dressing Type CHG impregnated dressing/sponge   Dressing Status Clean;Dry;Intact   Dressing Intervention Integrity maintained   Date on Dressing 12/11/24   Dressing Due to be Changed 12/18/24   Venous Patency/Care deaccessed;flushed w/o difficulty;heparin locked   Arterial Patency/Care deaccessed;flushed w/o difficulty;heparin locked   During Hemodialysis Assessment   Blood Flow Rate (mL/min) 400 mL/min   Dialysate Flow Rate (mL/min) 700 ml/min   Ultrafiltration Rate (mL/Hr) 900 mL/Hr   Arteriovenous Lines Secure Yes   Arterial Pressure (mmHg) -180 mmHg   Venous Pressure (mmHg) 130   Blood Volume Processed (Liters) 79 L   UF Removed (mL) 3150 mL   TMP 50   Venous Line in Air Detector Yes   Intake (mL) 250 mL   Intra-Hemodialysis Comments HD completed   Post-Hemodialysis Assessment   Rinseback Volume (mL) 250 mL   Blood Volume Processed (Liters) 79 L   Dialyzer Clearance Moderately streaked   Duration of Treatment 210 minutes   Total UF (mL) 3150 mL   Net Fluid Removal 2500   Patient Response to Treatment Debbie. well   Post-Treatment Weight 93.1 kg (205 lb 4 oz)   Treatment Weight Change -2.8     HD completed. Patient left [facility] by wheelchair in [facility].       Anival Ramirez MD  Department of Hospital Medicine  [facility] - Emergency Dept

## 2024-12-11 NOTE — PROGRESS NOTES
12/11/24 1240        Hemodialysis Catheter 12/13/23 1210 right internal jugular   Placement Date/Time: 12/13/23 1210   Present Prior to Hospital Arrival?: No  Barrier Precautions: Performed  Skin Antisepsis: ChloraPrep  Hemodialysis Catheter Type: Tunneled catheter  Location: right internal jugular  Catheter Size (Fr): 14.5 Fr  Man...   Site Assessment No drainage   Line Securement Device Secured with sutureless device   Dressing Type CHG impregnated dressing/sponge   Dressing Status Clean;Dry;Intact   Dressing Intervention Integrity maintained   Date on Dressing 12/11/24   Dressing Due to be Changed 12/18/24   Venous Patency/Care deaccessed;flushed w/o difficulty;heparin locked   Arterial Patency/Care deaccessed;flushed w/o difficulty;heparin locked   During Hemodialysis Assessment   Blood Flow Rate (mL/min) 400 mL/min   Dialysate Flow Rate (mL/min) 700 ml/min   Ultrafiltration Rate (mL/Hr) 900 mL/Hr   Arteriovenous Lines Secure Yes   Arterial Pressure (mmHg) -180 mmHg   Venous Pressure (mmHg) 130   Blood Volume Processed (Liters) 79 L   UF Removed (mL) 3150 mL   TMP 50   Venous Line in Air Detector Yes   Intake (mL) 250 mL   Intra-Hemodialysis Comments HD completed   Post-Hemodialysis Assessment   Rinseback Volume (mL) 250 mL   Blood Volume Processed (Liters) 79 L   Dialyzer Clearance Moderately streaked   Duration of Treatment 210 minutes   Total UF (mL) 3150 mL   Net Fluid Removal 2500   Patient Response to Treatment Debbie. well   Post-Treatment Weight 93.1 kg (205 lb 4 oz)   Treatment Weight Change -2.8     HD completed. Patient left [facility] by wheelchair in [facility].

## 2024-12-11 NOTE — ED PROVIDER NOTES
Encounter Date: 2024       History     Chief Complaint   Patient presents with    Shortness of Breath     For a few days but worse on today; dialysis pt due today;    Bloated     Distended  noticed this am     HPI  69-year-old male with a past medical history of type 2 diabetes, gout, anemia, ESRD on dialysis , and hypertension who presents with shortness of breath.  He did receive has a full session of dialysis on Monday.  Overnight he became increasingly short of breath.  Feels like he can not taken any air.  Whenever he lays flat he feels like he is drowning.  Does report increased bilateral lower extremity swelling.  Compliant with the Lasix.  No fevers or chills.  No coughing.  No chest pain or abdominal pain though does feel slightly distended abdomen.  No other complaints at this time.  Review of patient's allergies indicates:   Allergen Reactions    Iodine and iodide containing products Hives     Hypotension, Flushing     Past Medical History:   Diagnosis Date    Anemia     Diabetes mellitus     Diabetes mellitus, type 2     Disorder of kidney and ureter     ESRD (end stage renal disease)     Essential (primary) hypertension 2017    Gout, unspecified      jaundice, unspecified     Nuclear sclerosis of both eyes 2022     Past Surgical History:   Procedure Laterality Date    ADENOIDECTOMY      ADENOIDECTOMY      AV FISTULA PLACEMENT Left 2022    Procedure: CREATION, AV FISTULA;  Surgeon: Prince Gardiner MD;  Location: [facility] OR [address] [place] [address];  Service: Peripheral Vascular;  Laterality: Left;    EXCISION OF ARTERIOVENOUS FISTULA Left 12/10/2023    Procedure: EXCISION, AV FISTULA;  Surgeon: Joaquin Rose MD;  Location: [facility] OR [address] [place] [address];  Service: Vascular;  Laterality: Left;    FISTULOGRAM Left 2023    Procedure: FISTULOGRAM;  Surgeon: Prince Gardiner MD;  Location: [facility] OR [address] [place] [address];  Service: Peripheral Vascular;  Laterality: Left;  Given to the  sterile field.     FISTULOGRAM Left 7/6/2023    Procedure: Fistulogram;  Surgeon: LETY Rayo III, MD;  Location: [facility] OR 2ND FLR;  Service: Peripheral Vascular;  Laterality: Left;  1.9 min  22.86 mGy  3.8862 Gy.cm  20ml Dye     nasal septum repair      NASAL SEPTUM SURGERY      PERCUTANEOUS TRANSLUMINAL ANGIOPLASTY OF ARTERIOVENOUS FISTULA Left 11/22/2022    Procedure: PTA, AV FISTULA;  Surgeon: Prince Gardiner MD;  Location: [facility] CATH LAB;  Service: Peripheral Vascular;  Laterality: Left;    PERCUTANEOUS TRANSLUMINAL ANGIOPLASTY OF ARTERIOVENOUS FISTULA Left 2/16/2023    Procedure: PTA, AV FISTULA;  Surgeon: Prince Gardiner MD;  Location: [facility] OR 2ND FLR;  Service: Peripheral Vascular;  Laterality: Left;  4.8 min  43.08 mGy  4.0682 Gy.cm  32ml Dye    PERCUTANEOUS TRANSLUMINAL ANGIOPLASTY OF ARTERIOVENOUS FISTULA Left 7/6/2023    Procedure: PTA, AV FISTULA;  Surgeon: LETY Rayo III, MD;  Location: [facility] OR 2ND FLR;  Service: Peripheral Vascular;  Laterality: Left;    PROSTATE BIOPSY N/A 3/24/2023    Procedure: BIOPSY, PROSTATE;  Surgeon: Frankie Welch MD;  Location: [facility] OR 1ST FLR;  Service: Urology;  Laterality: N/A;  transrectal, 30min, uronav needed    REVISION OF ARTERIOVENOUS FISTULA Left 11/9/2023    Procedure: REVISION, AV FISTULA;  Surgeon: LETY Rayo III, MD;  [facility];  Service: Vascular;  Laterality: Left;  LUE AVG revision    ROTATOR CUFF REPAIR Left     SALIVARY GLAND SURGERY      Tonsillectomy      TONSILLECTOMY      TRANSPOSITION OF BASILIC VEIN Left 11/1/2022    Procedure: TRANSPOSITION, VEIN, BASILIC;  Surgeon: Prince Gardiner MD;  [facility];  Service: Peripheral Vascular;  Laterality: Left;  Left Brachial vein.    WASHOUT Left 12/10/2023    Procedure: WASHOUT OF AV FISTULA;  Surgeon: Joaquin Rose MD;  Location: [facility] OR 2ND FLR;  Service: Vascular;  Laterality: Left;     Family History   Problem Relation Name Age of Onset    Heart disease  Mother      Kidney disease Mother      Hypertension Mother      No Known Problems Father      Cancer Sister      Seizures Sister      Hypertension Sister      Stroke Sister      Amblyopia Neg Hx      Blindness Neg Hx      Cataracts Neg Hx      Diabetes Neg Hx      Glaucoma Neg Hx      Macular degeneration Neg Hx      Retinal detachment Neg Hx      Strabismus Neg Hx      Thyroid disease Neg Hx      Anesthesia problems Neg Hx       Social History     Tobacco Use    Smoking status: Never    Smokeless tobacco: Never    Tobacco comments:     .  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     Substance Use Topics    Alcohol use: Yes     Alcohol/week: 2.0 standard drinks of alcohol     Types: 1 Glasses of wine, 1 Cans of beer per week     Comment: Socially    Drug use: No     Review of Systems    Physical Exam     Initial Vitals [12/11/24 0433]   BP Pulse Resp Temp SpO2   (!) 200/95 76 (!) 22 97.6 °F (36.4 °C) (!) 90 %      MAP       --         Physical Exam    Nursing note and vitals reviewed.  Constitutional: He appears well-developed and well-nourished.   Tripoding, in respiratory distress   HENT:   Head: Normocephalic and atraumatic.   Nose: Nose normal.   Eyes: Conjunctivae and EOM are normal. Pupils are equal, round, and reactive to light.   Neck:   Normal range of motion.  Cardiovascular:  Normal rate, regular rhythm and normal heart sounds.     Exam reveals no gallop and no friction rub.       No murmur heard.  Pulmonary/Chest:   Crackles heard in bilateral lung fields  Speaking in 1-2 word sentences  Tripod  In respiratory distress   Musculoskeletal:         General: Edema present. No tenderness. Normal range of motion.      Cervical back: Normal range of motion.      Comments: 2+ pitting edema bilateral lower extremities     Neurological: He is alert and oriented to person, place, and time. He has normal strength.   Skin: Skin is warm and dry. No pallor.   Psychiatric: He has a normal mood and affect.         ED  Course   Procedures  Labs Reviewed   B-TYPE NATRIURETIC PEPTIDE - Abnormal       Result Value    BNP 2,624 (*)     Narrative:     Release to patient->Immediate   CBC W/ AUTO DIFFERENTIAL - Abnormal    WBC 5.90      RBC 3.53 (*)     Hemoglobin 10.2 (*)     Hematocrit 32.2 (*)     MCV 91      MCH 28.9      MCHC 31.7 (*)     RDW 15.2 (*)     Platelets 166      MPV 12.2      Immature Granulocytes 0.3      Gran # (ANC) 4.8      Immature Grans (Abs) 0.02      Lymph # 0.4 (*)     Mono # 0.5      Eos # 0.2      Baso # 0.02      nRBC 0      Gran % 82.0 (*)     Lymph % 7.3 (*)     Mono % 7.6      Eosinophil % 2.5      Basophil % 0.3      Differential Method Automated      Narrative:     Release to patient->Immediate   COMPREHENSIVE METABOLIC PANEL - Abnormal    Sodium 139      Potassium 4.8      Chloride 110      CO2 15 (*)     Glucose 134 (*)     BUN 58 (*)     Creatinine 11.7 (*)     Calcium 9.6      Total Protein 8.0      Albumin 4.1      Total Bilirubin 0.6      Alkaline Phosphatase 87      AST 13      ALT <5 (*)     eGFR 4.3 (*)     Anion Gap 14      Narrative:     Release to patient->Immediate   ISTAT PROCEDURE - Abnormal    POC PH 7.410      POC PCO2 26.7 (*)     POC PO2 41      POC HCO3 16.9 (*)     POC BE -8 (*)     POC SATURATED O2 78      POC Sodium 140      POC Potassium 4.5      POC TCO2 18 (*)     POC Ionized Calcium 1.20      POC Hematocrit 34 (*)     Sample VENOUS      Site Other      Allens Test N/A      DelSys Room Air     ISTAT PROCEDURE - Abnormal    POC Glucose 134 (*)     POC BUN 51 (*)     POC Creatinine 12.7 (*)     POC Sodium 140      POC Potassium 4.6      POC Chloride 111 (*)     POC TCO2 (MEASURED) 17 (*)     POC Ionized Calcium 1.19      POC Hematocrit 34 (*)     Sample FRANCA     MAGNESIUM    Magnesium 2.4      Narrative:     Release to patient->Immediate   HIV 1 / 2 ANTIBODY    HIV 1/2 Ag/Ab Non-reactive      Narrative:     Release to patient->Immediate   ISTAT CHEM8          Imaging Results               X-Ray Chest AP Portable (Final result)  Result time 12/11/24 06:04:56      Final result by Vaibhav Morillo MD (12/11/24 06:04:56)                   Impression:      Interstitial and alveolar opacities and small bilateral pleural effusions are new since 12/08/2023 examination, possibly reflecting edema.  Infectious and/or noninfectious inflammatory etiology could be considered.      Electronically signed by: Vaibhav Morillo  Date:    12/11/2024  Time:    06:04               Narrative:    EXAMINATION:  XR CHEST AP PORTABLE    CLINICAL HISTORY:  shortness of breath;    TECHNIQUE:  Single frontal view of the chest was performed.    COMPARISON:  Chest radiograph performed 12/08/2023, 22:42 hours.    FINDINGS:  Monitoring leads over the chest.  Unchanged position of dual lumen right internal jugular approach central venous catheter.  Grossly unchanged cardiomediastinal contours, again noting enlargement cardiac silhouette.    Patchy interstitial and alveolar opacities, new since 12/08/2023, 22:14 hours examination.  Suspect small pleural effusions.    No definite pneumothorax.    No acute findings in the visualized abdomen.    No acute osseous soft tissue findings.                                       Medications   furosemide (Lasix) 160 mg in 0.9% NaCl 100 mL IVPB (160 mg Intravenous New Bag 12/11/24 0555)   labetaloL injection 20 mg (20 mg Intravenous Given 12/11/24 0519)     Medical Decision Making  69-year-old male who presents with respiratory distress.  He is on dialysis Monday/Wednesday/Friday and has not missed any sessions.  However, unfortunately overnight he became more short of breath.  On arrival, he is in respiratory distress, speaking in very short 1-2 word sentences, tripodding, hypoxic to the 80s.  He also has peripheral edema.  Denies any chest pain.  Denies any obvious reason for this though is quite hypertensive so could be hypertensive emergency.  Due to extreme work of breathing, I have  placed him on BiPAP which has improved his symptoms.  We will give IV labetalol, IV Lasix as he does make some urine, get a chest x-ray and labs, and discuss need for emergent dialysis with nephrology.    Labs returned in his potassium is normal.  However labs and imaging consistent with fluid overload.  He was looking better so BiPAP removed in still mildly short of breath but much better than earlier.  Discuss with nephrology who has scheduled him for dialysis today.  We will need admission to hospital medicine.  Discussed with Hospital Medicine team.  Signed out pending full admission.    Amount and/or Complexity of Data Reviewed  Labs: ordered.  Radiology: ordered and independent interpretation performed.     Details: Pulmonary edema    Risk  Prescription drug management.                                EKG sinus, frequent PVCs, nonspecific ST changes in lead 3, otherwise no ischemia.      Clinical Impression:  Final diagnoses:  [R06.02] Shortness of breath         Critical care time spent on the evaluation and treatment of severe organ dysfunction, review of pertinent labs and imaging studies, discussions with consulting providers and discussions with patient/family: 35 minutes.          Princess Trevino MD  12/11/24 0660

## 2024-12-11 NOTE — ED NOTES
Pt started to feel extremely SOB over night. Pt goes to dialysis MWF. Pt denies missing Friday session. Pt also denies chest pain, nausea, and vomiting.

## 2024-12-11 NOTE — ASSESSMENT & PLAN NOTE
Anemia is likely due to chronic disease due to ESRD. Most recent hemoglobin and hematocrit are listed below.  Recent Labs     12/11/24  0458 12/11/24  0500 12/11/24  0505   HGB 10.2*  --   --    HCT 32.2* 34* 34*     Plan  - Monitor serial CBC: Daily  - RDW elevated, will check b12, folate

## 2024-12-11 NOTE — ASSESSMENT & PLAN NOTE
Creatine stable for now. BMP reviewed- noted Estimated Creatinine Clearance: 7.1 mL/min (A) (based on SCr of 11.7 mg/dL (H)). according to latest data. Based on current GFR, CKD stage is end stage.  Monitor UOP and serial BMP and adjust therapy as needed. Renally dose meds. Avoid nephrotoxic medications and procedures.  -- still overloaded after HD today, renal to do HD prn

## 2024-12-11 NOTE — PROGRESS NOTES
12/11/24 0905 12/11/24 0910        Hemodialysis Catheter 12/13/23 1210 right internal jugular   Placement Date/Time: 12/13/23 1210   Present Prior to Hospital Arrival?: No  Barrier Precautions: Performed  Skin Antisepsis: ChloraPrep  Hemodialysis Catheter Type: Tunneled catheter  Location: right internal jugular  Catheter Size (Fr): 14.5 Fr  Man...   Dressing Type Other (Comment)  (outside facility dressing)  --    Dressing Status Clean;Dry;Intact  --    Dressing Intervention Sterile dressing change  --    Date on Dressing 12/11/24  --    Dressing Due to be Changed 12/18/24  --    Venous Patency/Care accessed;blood return present;flushed w/o difficulty  --    Arterial Patency/Care accessed;blood return present;flushed w/o difficulty  --    During Hemodialysis Assessment   Blood Flow Rate (mL/min)  --  400 mL/min   Dialysate Flow Rate (mL/min)  --  700 ml/min   Ultrafiltration Rate (mL/Hr)  --  900 mL/Hr   Arteriovenous Lines Secure  --  Yes   Arterial Pressure (mmHg)  --  -180 mmHg   Venous Pressure (mmHg)  --  130   Blood Volume Processed (Liters)  --  0 L   UF Removed (mL)  --  0 mL   TMP  --  50   Venous Line in Air Detector  --  Yes   Intake (mL)  --  250 mL   Intra-Hemodialysis Comments  --  HD started     Patient arrived GEOVANNY by wheelchair. Maintenance HD started.

## 2024-12-11 NOTE — ASSESSMENT & PLAN NOTE
Patient has Diastolic (HFpEF) heart failure that is Acute on chronic. On presentation their CHF was decompensated. Evidence of decompensated CHF on presentation includes: edema, elevated JVD, crackles on lung auscultation, weight gain, orthopnea, dyspnea on exertion (ALLEN), and shortness of breath. The etiology of their decompensation is likely HD and uncontrolled HTN . Most recent BNP and echo results are listed below.  Recent Labs     12/11/24  0458   BNP 2,624*     Latest ECHO  Results for orders placed during the hospital encounter of 12/08/23    Echo    Interpretation Summary    Left Ventricle: The left ventricle is normal in size. Normal wall thickness. Normal wall motion. There is normal systolic function with a visually estimated ejection fraction of 55 - 60%. There is indeterminate diastolic function.    Right Ventricle: Normal right ventricular cavity size. Wall thickness is normal. Right ventricle wall motion  is normal. Systolic function is normal.    Left Atrium: Left atrium is moderately dilated.    Pulmonary Artery: The estimated pulmonary artery systolic pressure is 37 mmHg.    IVC/SVC: Intermediate venous pressure at 8 mmHg.    Current Heart Failure Medications  hydrALAZINE tablet 10 mg, 3 times daily, Oral  furosemide tablet 80 mg, 2 times daily, Oral  carvediloL tablet 25 mg, 2 times daily, Oral    Plan  - Monitor strict I&Os and daily weights.    - Place on telemetry  - Low sodium diet  - Place on fluid restriction of 1.5 L.   -- still overloaded after HD, titrate BP meds and needs more agressive HD to find new dry weight.

## 2024-12-11 NOTE — HPI
The patient is a 69 y.o. Black or  Male with multiple co morbidities including type 2 diabetes, gout, anemia, ESRD on dialysis Monday Wednesday Friday, and hypertension who presents to ED on 12/11/2024 with complaints of SOB. He states that he developed worsening shortness of breath Tuesday evening prompting his visit to the ED. He did receive has a full session of dialysis on Monday. Of note, his EDW was recently lowered to 96 kg due to concerns for volume overload. SIDDIQI in the ED notable for CXR with pulmonary edema and BNP of 2624. IV Lasix given with no charted output. CMP with evidence of ESRD baseline. Nephrology consulted for management of ESRD and HD treatment.

## 2024-12-11 NOTE — ED NOTES
I-STAT Chem-8+ Results:   Value Reference Range   Sodium 140 136-145 mmol/L   Potassium  4.6 3.5-5.1 mmol/L   Chloride 111  mmol/L   Ionized Calcium 1.19 1.06-1.42 mmol/L   CO2 (measured) 17 23-29 mmol/L   Glucose 134  mg/dL   BUN 51 6-30 mg/dL   Creatinine 12.7 0.5-1.4 mg/dL   Hematocrit 34 36-54%

## 2024-12-12 ENCOUNTER — EPISODE CHANGES (OUTPATIENT)
Dept: CARDIOLOGY | Facility: CLINIC | Age: 69
End: 2024-12-12

## 2024-12-12 VITALS
SYSTOLIC BLOOD PRESSURE: 168 MMHG | DIASTOLIC BLOOD PRESSURE: 94 MMHG | WEIGHT: 213 LBS | OXYGEN SATURATION: 97 % | BODY MASS INDEX: 27.34 KG/M2 | TEMPERATURE: 97 F | HEART RATE: 68 BPM | RESPIRATION RATE: 18 BRPM | HEIGHT: 74 IN

## 2024-12-12 LAB
ALBUMIN SERPL BCP-MCNC: 3.9 G/DL (ref 3.5–5.2)
ANION GAP SERPL CALC-SCNC: 14 MMOL/L (ref 8–16)
AV AREA BY CONTINUOUS VTI: 2.4 CM2
AV INDEX (PROSTH): 0.77
AV LVOT MEAN GRADIENT: 3 MMHG
AV LVOT PEAK GRADIENT: 6 MMHG
AV MEAN GRADIENT: 5.7 MMHG
AV PEAK GRADIENT: 10.2 MMHG
AV VALVE AREA BY VELOCITY RATIO: 2.4 CM²
AV VALVE AREA: 2.4 CM2
AV VELOCITY RATIO: 0.75
BASOPHILS # BLD AUTO: 0.03 K/UL (ref 0–0.2)
BASOPHILS NFR BLD: 0.8 % (ref 0–1.9)
BSA FOR ECHO PROCEDURE: 2.25 M2
BUN SERPL-MCNC: 41 MG/DL (ref 8–23)
CALCIUM SERPL-MCNC: 9.6 MG/DL (ref 8.7–10.5)
CHLORIDE SERPL-SCNC: 102 MMOL/L (ref 95–110)
CO2 SERPL-SCNC: 22 MMOL/L (ref 23–29)
CREAT SERPL-MCNC: 9.5 MG/DL (ref 0.5–1.4)
CV ECHO LV RWT: 0.54 CM
DIFFERENTIAL METHOD BLD: ABNORMAL
DOP CALC AO PEAK VEL: 1.6 M/S
DOP CALC AO VTI: 33 CM
DOP CALC LVOT AREA: 3.1 CM2
DOP CALC LVOT DIAMETER: 2 CM
DOP CALC LVOT PEAK VEL: 1.2 M/S
DOP CALC LVOT STROKE VOLUME: 79.8 CM3
DOP CALCLVOT PEAK VEL VTI: 25.4 CM
E WAVE DECELERATION TIME: 220.22 MS
E/A RATIO: 1.33
E/E' RATIO: 13.56 M/S
ECHO EF ESTIMATED: 60 %
ECHO LV POSTERIOR WALL: 1.4 CM (ref 0.6–1.1)
EOSINOPHIL # BLD AUTO: 0.2 K/UL (ref 0–0.5)
EOSINOPHIL NFR BLD: 5.5 % (ref 0–8)
ERYTHROCYTE [DISTWIDTH] IN BLOOD BY AUTOMATED COUNT: 14.9 % (ref 11.5–14.5)
EST. GFR  (NO RACE VARIABLE): 5.5 ML/MIN/1.73 M^2
FOLATE SERPL-MCNC: 4.9 NG/ML (ref 4–24)
FRACTIONAL SHORTENING: 32.7 % (ref 28–44)
GLUCOSE SERPL-MCNC: 101 MG/DL (ref 70–110)
HCT VFR BLD AUTO: 33.5 % (ref 40–54)
HGB BLD-MCNC: 11 G/DL (ref 14–18)
IMM GRANULOCYTES # BLD AUTO: 0.01 K/UL (ref 0–0.04)
IMM GRANULOCYTES NFR BLD AUTO: 0.3 % (ref 0–0.5)
INTERVENTRICULAR SEPTUM: 1.4 CM (ref 0.6–1.1)
LA MAJOR: 6.08 CM
LA MINOR: 5.73 CM
LA WIDTH: 4.63 CM
LEFT ATRIUM SIZE: 4.62 CM
LEFT ATRIUM VOLUME INDEX: 48.1 ML/M2
LEFT ATRIUM VOLUME: 107.27 CM3
LEFT INTERNAL DIMENSION IN SYSTOLE: 3.5 CM (ref 2.1–4)
LEFT VENTRICLE DIASTOLIC VOLUME INDEX: 56.94 ML/M2
LEFT VENTRICLE DIASTOLIC VOLUME: 126.98 ML
LEFT VENTRICLE MASS INDEX: 138.8 G/M2
LEFT VENTRICLE SYSTOLIC VOLUME INDEX: 22.9 ML/M2
LEFT VENTRICLE SYSTOLIC VOLUME: 51.16 ML
LEFT VENTRICULAR INTERNAL DIMENSION IN DIASTOLE: 5.2 CM (ref 3.5–6)
LEFT VENTRICULAR MASS: 309.6 G
LV LATERAL E/E' RATIO: 11.09 M/S
LV SEPTAL E/E' RATIO: 17.43 M/S
LYMPHOCYTES # BLD AUTO: 0.7 K/UL (ref 1–4.8)
LYMPHOCYTES NFR BLD: 19 % (ref 18–48)
MCH RBC QN AUTO: 29.3 PG (ref 27–31)
MCHC RBC AUTO-ENTMCNC: 32.8 G/DL (ref 32–36)
MCV RBC AUTO: 89 FL (ref 82–98)
MONOCYTES # BLD AUTO: 0.5 K/UL (ref 0.3–1)
MONOCYTES NFR BLD: 14 % (ref 4–15)
MV PEAK A VEL: 0.92 M/S
MV PEAK E VEL: 1.22 M/S
NEUTROPHILS # BLD AUTO: 2.2 K/UL (ref 1.8–7.7)
NEUTROPHILS NFR BLD: 60.4 % (ref 38–73)
NRBC BLD-RTO: 0 /100 WBC
OHS CV RV/LV RATIO: 0.67 CM
PHOSPHATE SERPL-MCNC: 3.5 MG/DL (ref 2.7–4.5)
PISA TR MAX VEL: 3.4 M/S
PLATELET # BLD AUTO: 176 K/UL (ref 150–450)
PMV BLD AUTO: 11.4 FL (ref 9.2–12.9)
POTASSIUM SERPL-SCNC: 3.9 MMOL/L (ref 3.5–5.1)
RA MAJOR: 5.4 CM
RA PRESSURE ESTIMATED: 8 MMHG
RA WIDTH: 4.26 CM
RBC # BLD AUTO: 3.76 M/UL (ref 4.6–6.2)
RIGHT VENTRICLE DIASTOLIC BASEL DIMENSION: 3.5 CM
RV TB RVSP: 11 MMHG
SINUS: 3.31 CM
SODIUM SERPL-SCNC: 138 MMOL/L (ref 136–145)
STJ: 2.5 CM
TDI LATERAL: 0.11 M/S
TDI SEPTAL: 0.07 M/S
TDI: 0.09 M/S
TR MAX PG: 46 MMHG
TRICUSPID ANNULAR PLANE SYSTOLIC EXCURSION: 2.06 CM
TV PEAK GRADIENT: 56 MMHG
TV REST PULMONARY ARTERY PRESSURE: 54 MMHG
VIT B12 SERPL-MCNC: 390 PG/ML (ref 210–950)
WBC # BLD AUTO: 3.64 K/UL (ref 3.9–12.7)
Z-SCORE OF LEFT VENTRICULAR DIMENSION IN END DIASTOLE: -4.16
Z-SCORE OF LEFT VENTRICULAR DIMENSION IN END SYSTOLE: -2.46

## 2024-12-12 PROCEDURE — 82746 ASSAY OF FOLIC ACID SERUM: CPT | Performed by: HOSPITALIST

## 2024-12-12 PROCEDURE — 36415 COLL VENOUS BLD VENIPUNCTURE: CPT | Performed by: HOSPITALIST

## 2024-12-12 PROCEDURE — 25000003 PHARM REV CODE 250: Performed by: FAMILY MEDICINE

## 2024-12-12 PROCEDURE — 85025 COMPLETE CBC W/AUTO DIFF WBC: CPT | Performed by: HOSPITALIST

## 2024-12-12 PROCEDURE — 80069 RENAL FUNCTION PANEL: CPT | Performed by: HOSPITALIST

## 2024-12-12 PROCEDURE — 25000003 PHARM REV CODE 250: Performed by: HOSPITALIST

## 2024-12-12 PROCEDURE — 82607 VITAMIN B-12: CPT | Performed by: HOSPITALIST

## 2024-12-12 PROCEDURE — 63600175 PHARM REV CODE 636 W HCPCS: Performed by: HOSPITALIST

## 2024-12-12 PROCEDURE — 99232 SBSQ HOSP IP/OBS MODERATE 35: CPT | Mod: ,,, | Performed by: NURSE PRACTITIONER

## 2024-12-12 RX ORDER — TAMSULOSIN HYDROCHLORIDE 0.4 MG/1
0.4 CAPSULE ORAL DAILY
Qty: 90 CAPSULE | Refills: 3 | Status: SHIPPED | OUTPATIENT
Start: 2024-12-12

## 2024-12-12 RX ORDER — NIFEDIPINE 60 MG/1
60 TABLET, EXTENDED RELEASE ORAL 2 TIMES DAILY
Qty: 60 TABLET | Refills: 2 | Status: SHIPPED | OUTPATIENT
Start: 2024-12-12 | End: 2025-03-12

## 2024-12-12 RX ORDER — HYDRALAZINE HYDROCHLORIDE 50 MG/1
50 TABLET, FILM COATED ORAL 3 TIMES DAILY
Qty: 90 TABLET | Refills: 2 | Status: SHIPPED | OUTPATIENT
Start: 2024-12-12 | End: 2025-03-12

## 2024-12-12 RX ORDER — ACETAMINOPHEN 500 MG
500 TABLET ORAL EVERY 6 HOURS PRN
Qty: 30 TABLET | Refills: 0 | Status: SHIPPED | OUTPATIENT
Start: 2024-12-12

## 2024-12-12 RX ORDER — CARVEDILOL 25 MG/1
25 TABLET ORAL 2 TIMES DAILY
Qty: 180 TABLET | Refills: 0 | Status: SHIPPED | OUTPATIENT
Start: 2024-12-12 | End: 2025-03-12

## 2024-12-12 RX ORDER — HYDRALAZINE HYDROCHLORIDE 50 MG/1
50 TABLET, FILM COATED ORAL 3 TIMES DAILY
Status: DISCONTINUED | OUTPATIENT
Start: 2024-12-12 | End: 2024-12-12 | Stop reason: HOSPADM

## 2024-12-12 RX ORDER — NIFEDIPINE 60 MG/1
60 TABLET, EXTENDED RELEASE ORAL 2 TIMES DAILY
Status: DISCONTINUED | OUTPATIENT
Start: 2024-12-12 | End: 2024-12-12 | Stop reason: HOSPADM

## 2024-12-12 RX ORDER — SODIUM BICARBONATE 650 MG/1
1300 TABLET ORAL 2 TIMES DAILY
Qty: 120 TABLET | Refills: 0 | Status: SHIPPED | OUTPATIENT
Start: 2024-12-12 | End: 2025-01-11

## 2024-12-12 RX ADMIN — HYDRALAZINE HYDROCHLORIDE 25 MG: 50 TABLET ORAL at 10:12

## 2024-12-12 RX ADMIN — HYDRALAZINE HYDROCHLORIDE 50 MG: 50 TABLET ORAL at 02:12

## 2024-12-12 RX ADMIN — TAMSULOSIN HYDROCHLORIDE 0.4 MG: 0.4 CAPSULE ORAL at 09:12

## 2024-12-12 RX ADMIN — HEPARIN SODIUM 5000 UNITS: 5000 INJECTION INTRAVENOUS; SUBCUTANEOUS at 05:12

## 2024-12-12 RX ADMIN — CINACALCET HYDROCHLORIDE 30 MG: 30 TABLET, FILM COATED ORAL at 07:12

## 2024-12-12 RX ADMIN — SEVELAMER CARBONATE 1600 MG: 800 TABLET, FILM COATED ORAL at 07:12

## 2024-12-12 RX ADMIN — SODIUM BICARBONATE 650 MG TABLET 1300 MG: at 09:12

## 2024-12-12 RX ADMIN — FUROSEMIDE 80 MG: 80 TABLET ORAL at 09:12

## 2024-12-12 RX ADMIN — SEVELAMER CARBONATE 1600 MG: 800 TABLET, FILM COATED ORAL at 11:12

## 2024-12-12 RX ADMIN — ASPIRIN 81 MG: 81 TABLET, COATED ORAL at 09:12

## 2024-12-12 RX ADMIN — NIFEDIPINE 60 MG: 60 TABLET, FILM COATED, EXTENDED RELEASE ORAL at 09:12

## 2024-12-12 RX ADMIN — CARVEDILOL 25 MG: 25 TABLET, FILM COATED ORAL at 09:12

## 2024-12-12 RX ADMIN — HYDRALAZINE HYDROCHLORIDE 25 MG: 50 TABLET ORAL at 09:12

## 2024-12-12 NOTE — PLAN OF CARE
Problem: Adult Inpatient Plan of Care  Goal: Plan of Care Review  Outcome: Adequate for Care Transition  Goal: Patient-Specific Goal (Individualized)  Outcome: Adequate for Care Transition  Goal: Absence of Hospital-Acquired Illness or Injury  Outcome: Adequate for Care Transition  Goal: Optimal Comfort and Wellbeing  Outcome: Adequate for Care Transition  Goal: Readiness for Transition of Care  Outcome: Adequate for Care Transition     Problem: Hemodialysis  Goal: Safe, Effective Therapy Delivery  Outcome: Adequate for Care Transition  Goal: Effective Tissue Perfusion  Outcome: Adequate for Care Transition  Goal: Absence of Infection Signs and Symptoms  Outcome: Adequate for Care Transition     Problem: Chronic Kidney Disease  Goal: Electrolyte Balance  Outcome: Adequate for Care Transition  Goal: Fluid Balance  Outcome: Adequate for Care Transition     Problem: Infection  Goal: Absence of Infection Signs and Symptoms  Outcome: Adequate for Care Transition     Problem: Acute Kidney Injury/Impairment  Goal: Fluid and Electrolyte Balance  Outcome: Adequate for Care Transition  Goal: Improved Oral Intake  Outcome: Adequate for Care Transition  Goal: Effective Renal Function  Outcome: Adequate for Care Transition     Problem: Diabetes Comorbidity  Goal: Blood Glucose Level Within Targeted Range  Outcome: Adequate for Care Transition

## 2024-12-12 NOTE — PLAN OF CARE
[facility] - Med Surg  Initial Discharge Assessment       Primary Care Provider: Angel Luis Boo MD    Admission Diagnosis: Shortness of breath [R06.02]  Acute pulmonary edema [J81.0]  ESRD on dialysis [N18.6, Z99.2]  Acute hypoxemic respiratory failure [J96.01]    Admission Date: 12/11/2024  Expected Discharge Date: 12/12/2024    Transition of Care Barriers: (P) None    Payor: MEDICARE / Plan: MEDICARE PART A & B / Product Type: Government /     Extended Emergency Contact Information  Primary Emergency Contact: Kristine Still  [address] [place] [place] [address] [facility]  Home Phone: [phone]  Mobile Phone: [phone]  Relation: Spouse   needed? No    Discharge Plan A: (P) Home  Discharge Plan B: (P) Home      [id number] - [place] - [address] AT [facility] [address]  [address]  [facility] [address]  Phone: [phone] Fax: [phone]      Initial Assessment (most recent)       Adult Discharge Assessment - 12/12/24 0958          Discharge Assessment    Assessment Type Discharge Planning Assessment (P)      Confirmed/corrected address, phone number and insurance Yes (P)      Confirmed Demographics Correct on Facesheet (P)      Source of Information patient (P)      When was your last doctors appointment? 10/01/24 (P)      Reason For Admission Shortness of breath (P)      People in Home spouse (P)      Do you expect to return to your current living situation? Yes (P)      Do you have help at home or someone to help you manage your care at home? Yes (P)      Who are your caregiver(s) and their phone number(s)? Kristine Still (spouse) [phone] (P)      Prior to hospitilization cognitive status: Alert/Oriented (P)      Current cognitive status: Alert/Oriented (P)      Walking or Climbing Stairs Difficulty no (P)      Dressing/Bathing Difficulty no (P)      Home Accessibility stairs to enter home (P)      Number of Stairs, Main  Entrance three (P)      Equipment Currently Used at Home none (P)      Readmission within 30 days? No (P)      Patient currently being followed by outpatient case management? No (P)      Do you currently have service(s) that help you manage your care at home? No (P)      Do you take prescription medications? Yes (P)      Do you have prescription coverage? Yes (P)      Coverage Medicare A&B Blue [employer] Blue Shield (secondary) (P)      Do you have any problems affording any of your prescribed medications? No (P)      Is the patient taking medications as prescribed? yes (P)      Who is going to help you get home at discharge? spouse (P)      How do you get to doctors appointments? car, drives self (P)      Are you on dialysis? Yes (P)      Dialysis Name and Scheduled days [facility] MWF (P)      Do you take coumadin? No (P)      Discharge Plan A Home (P)      Discharge Plan B Home (P)      DME Needed Upon Discharge  none (P)      Discharge Plan discussed with: Patient (P)      Transition of Care Barriers None (P)         Physical Activity    On average, how many days per week do you engage in moderate to strenuous exercise (like a brisk walk)? 0 days (P)      On average, how many minutes do you engage in exercise at this level? 40 min (P)         Financial Resource Strain    How hard is it for you to pay for the very basics like food, housing, medical care, and heating? Not hard at all (P)         Housing Stability    In the last 12 months, was there a time when you were not able to pay the mortgage or rent on time? No (P)      At any time in the past 12 months, were you homeless or living in a shelter (including now)? No (P)         Transportation Needs    Has the lack of transportation kept you from medical appointments, meetings, work or from getting things needed for daily living? No (P)         Food Insecurity    Within the past 12 months, you worried that your food would run out before you got the money  to buy more. Never true (P)      Within the past 12 months, the food you bought just didn't last and you didn't have money to get more. Never true (P)         Stress    Do you feel stress - tense, restless, nervous, or anxious, or unable to sleep at night because your mind is troubled all the time - these days? Not at all (P)         Social Isolation    How often do you feel lonely or isolated from those around you?  Never (P)         Alcohol Use    Q1: How often do you have a drink containing alcohol? 2-4 times a month (P)      Q2: How many drinks containing alcohol do you have on a typical day when you are drinking? 1 or 2 (P)      Q3: How often do you have six or more drinks on one occasion? Never (P)         QUIQ    In the past 12 months has the electric, gas, oil, or water company threatened to shut off services in your home? No (P)         Health Literacy    How often do you need to have someone help you when you read instructions, pamphlets, or other written material from your doctor or pharmacy? Never (P)         OTHER    Name(s) of People in Home Kristine lane Spouse [phone] (P)                       CM to bedside with pt and spouse to complete discharge assessment. Pt states he lives with his wife in a one story home with 3 steps to enter the home. Pt denies difficulty with stairs and use of any DME equipment. He is on dialysis receiving treatment at [facility] on [address], MWF @0530.  Last visit with PCP was 2 months ago and he drives himself. NO needs identified or voiced at this time.    Sosa Herron RN Case Manager  [phone]

## 2024-12-12 NOTE — NURSING
Home Oxygen Evaluation    Date Performed: 2024    1) Patient's Home O2 Sat on room air, while at rest: 96        If O2 sats on room air at rest are 88% or below, patient qualifies. No additional testing needed. Document N/A in steps 2 and 3. If 89% or above, complete steps 2.      2) Patient's O2 Sat on room air while exercisin        If O2 sats on room air while exercising remain 89% or above patient does not qualify, no further testing needed Document N/A in step 3. If O2 sats on room air while exercising are 88% or below, continue to step 3.      3) Patient's O2 Sat while exercising on O2: 93 at 2 LPM         (Must show improvement from #2 for patients to qualify)    If O2 sats improve on oxygen, patient qualifies for portable oxygen. If not, the patient does not qualify.

## 2024-12-12 NOTE — PLAN OF CARE
Problem: Adult Inpatient Plan of Care  Goal: Plan of Care Review  Outcome: Progressing  Goal: Patient-Specific Goal (Individualized)  Outcome: Progressing  Goal: Absence of Hospital-Acquired Illness or Injury  Outcome: Progressing  Goal: Optimal Comfort and Wellbeing  Outcome: Progressing  Goal: Readiness for Transition of Care  Outcome: Progressing     Problem: Hemodialysis  Goal: Safe, Effective Therapy Delivery  Outcome: Progressing  Goal: Effective Tissue Perfusion  Outcome: Progressing  Goal: Absence of Infection Signs and Symptoms  Outcome: Progressing     Problem: Chronic Kidney Disease  Goal: Electrolyte Balance  Outcome: Progressing  Goal: Fluid Balance  Outcome: Progressing     Problem: Infection  Goal: Absence of Infection Signs and Symptoms  Outcome: Progressing     Problem: Acute Kidney Injury/Impairment  Goal: Fluid and Electrolyte Balance  Outcome: Progressing  Goal: Improved Oral Intake  Outcome: Progressing  Goal: Effective Renal Function  Outcome: Progressing     Problem: Diabetes Comorbidity  Goal: Blood Glucose Level Within Targeted Range  Outcome: Progressing

## 2024-12-12 NOTE — PLAN OF CARE
Problem: Adult Inpatient Plan of Care  Goal: Plan of Care Review  Outcome: Progressing  Goal: Patient-Specific Goal (Individualized)  Outcome: Progressing  Goal: Absence of Hospital-Acquired Illness or Injury  Outcome: Progressing  Goal: Optimal Comfort and Wellbeing  Outcome: Progressing  Goal: Readiness for Transition of Care  Outcome: Progressing     Problem: Hemodialysis  Goal: Safe, Effective Therapy Delivery  Outcome: Progressing  Goal: Effective Tissue Perfusion  Outcome: Progressing  Goal: Absence of Infection Signs and Symptoms  Outcome: Progressing     Problem: Chronic Kidney Disease  Goal: Electrolyte Balance  Outcome: Progressing  Goal: Fluid Balance  Outcome: Progressing     Problem: Infection  Goal: Absence of Infection Signs and Symptoms  Outcome: Progressing     Problem: Acute Kidney Injury/Impairment  Goal: Fluid and Electrolyte Balance  Outcome: Progressing  Goal: Improved Oral Intake  Outcome: Progressing  Goal: Effective Renal Function  Outcome: Progressing     Problem: Diabetes Comorbidity  Goal: Blood Glucose Level Within Targeted Range  Outcome: Progressing   Patient IV removed. Catheter tip intact. Discharged instruction explained. Patient/family verbalized understanding. Patient picking up medication from pharmacy. Patient discharged via wheelchair with all belongings.

## 2024-12-12 NOTE — DISCHARGE INSTRUCTIONS
Seek medical attention for return of shortness of breath or other developing symptoms such as chest pain, palpitations, abdominal pain, nausea/vomiting, fevers/chills.    Avoid tobacco products, alcohol, illicit substances.    Obtain a pulse oximeter. If oxygen saturation remains continuously less than 90%, seek medical attention.    Blood pressure elevated throughout admission. Meds were adjusted to Coreg 25mg BID, Hydralazine 50mg TID, and Nifedipine 60mg BID and prescriptions provided. Check your blood pressure twice daily. Write down blood pressure in a journal. Bring this journal to primary care physician for further medication adjustments.    If BP consistently high or low, I urge you to reach out to PCP for recommendations. However, if unable to contact PCP, here are some recommendations in the meantime:    If SBP consistently < 120, decrease Hydralazine to 25mg TID.    If BP consistently >140/90, can increase Hydralazine to 100mg TID.     Follow-up with PCP within 1-2 weeks. It is important to note that each of these medications that I have prescribed typically will not have refills (I provided 90 days for your BP meds). This is so your primary care physician or other specialists in the outpatient setting can review your progress and determine if these medications are to be continued. If they determine the medications need to be continued, make sure that your remind them about refills at your follow-up appointments.    Make sure to keep outpt hemodialysis and nephrology appointments.

## 2024-12-12 NOTE — HOSPITAL COURSE
Admitted to hospital medicine for acute hypoxic respiratory failure d/t AoC HFpEF in the setting of ESRD.  Pt was hypoxic to the 80s in the ED requiring BiPAP which was weaned to 2 L. Nephrology consulted and pt was taken for volume removal and hemodialysis.  Following dialysis, symptoms significantly improved and pt was on room air at rest morning of 12/12.  Pt was ambulating without ALLEN, however, on ambulatory saturation test, pt did qualify for home O2 which was provided prior to discharge.  Pt reported uncontrolled hypertension prior to admission which may have been contributing to volume overload.  Adjustments made to medications, see med rec and pt instructions on AVS.  Pt stable for discharge after a much better than expected response to treatment.  Pt confirmed outpatient HD 12/13.    The patient's chronic medical conditions were managed appropriately. Discharge plan of care was discussed at length with patient including patient's need for close outpatient follow-up. Medication counseling also took place with the patient being educated on potential side effects/adverse events. Patient also counseled about avoiding driving, operating machinery, or participating in any activities that may pose harm to self or others if they are taking medications that cause drowsiness or altered mental status. Patient also counseled to take medicines as prescribed and do not drink alcohol, use tobacco products, or use illicit substances. Patient counseled to return to the hospital or seek medical care if baseline status should suddenly change, return of symptoms occurs, or for any new or concerning symptoms that arise. Patient was in agreement with plan of care going forward and was then discharged.  Recommended close follow up to PCP, HD Clinic, and Nephrology.    Physical Exam  Constitutional:  well-developed.   Cardiovascular: Normal rate and regular rhythm.   Pulmonary: Pulmonary effort is normal. Normal breath  sounds  Abdominal: No distension, soft and nontender  Skin: warm and dry.   Neurological: alert and oriented to person, place, and time.   Psychiatric:    Behavior normal.

## 2024-12-12 NOTE — SUBJECTIVE & OBJECTIVE
Interval History:   HD completed yesterday with 2.5 L removed. No distress today. Post HD weight 93.1 kg ( EDW 96 kg) - OP unit updated regarding weight. Electrolytes non emergent.       Review of patient's allergies indicates:   Allergen Reactions    Iodine and iodide containing products Hives     Hypotension, Flushing     Current Facility-Administered Medications   Medication Frequency    acetaminophen tablet 650 mg Q4H PRN    aspirin EC tablet 81 mg Daily    atorvastatin tablet 20 mg QHS    carvediloL tablet 25 mg BID    cinacalcet tablet 30 mg Daily with breakfast    furosemide tablet 80 mg BID    heparin (porcine) injection 1,000 Units PRN    heparin (porcine) injection 5,000 Units Q8H    hydrALAZINE tablet 50 mg TID    NIFEdipine 24 hr tablet 60 mg BID    sevelamer carbonate tablet 1,600 mg TID WM    sodium bicarbonate tablet 1,300 mg BID    sodium chloride 0.9% flush 3 mL Q6H PRN    tamsulosin 24 hr capsule 0.4 mg Daily     Facility-Administered Medications Ordered in Other Encounters   Medication Frequency    0.9%  NaCl infusion Continuous    0.9%  NaCl infusion Continuous    ondansetron disintegrating tablet 8 mg Q8H PRN       Objective:     Vital Signs (Most Recent):  Temp: 98.2 °F (36.8 °C) (12/12/24 0749)  Pulse: 74 (12/12/24 0749)  Resp: 18 (12/12/24 0749)  BP: (!) 185/95 (12/12/24 0922)  SpO2: 99 % (12/12/24 0749) Vital Signs (24h Range):  Temp:  [97.8 °F (36.6 °C)-98.2 °F (36.8 °C)] 98.2 °F (36.8 °C)  Pulse:  [68-89] 74  Resp:  [18-22] 18  SpO2:  [91 %-99 %] 99 %  BP: (157-186)/(80-95) 185/95     Weight: 96.6 kg (213 lb) (12/12/24 0749)  Body mass index is 27.35 kg/m².  Body surface area is 2.25 meters squared.    I/O last 3 completed shifts:  In: -   Out: 3150 [Other:3150]     Physical Exam  Vitals and nursing note reviewed.   Constitutional:       General: He is not in acute distress.     Appearance: He is well-developed.   HENT:      Head: Normocephalic and atraumatic.      Right Ear: Hearing and  external ear normal.      Left Ear: Hearing and external ear normal.      Mouth/Throat:      Mouth: Mucous membranes are moist.   Eyes:      General: No scleral icterus.     Conjunctiva/sclera: Conjunctivae normal.   Cardiovascular:      Rate and Rhythm: Normal rate and regular rhythm.      Pulses: Normal pulses.      Heart sounds: Normal heart sounds.      Comments: R CVC  Pulmonary:      Effort: Pulmonary effort is normal. No respiratory distress.      Breath sounds: Normal breath sounds.   Abdominal:      General: Bowel sounds are normal. There is no distension.      Palpations: Abdomen is soft.   Musculoskeletal:         General: No swelling. Normal range of motion.      Cervical back: Normal range of motion.      Right lower leg: No edema.      Left lower leg: No edema.   Skin:     General: Skin is warm and dry.   Neurological:      Mental Status: He is alert and oriented to person, place, and time.   Psychiatric:         Mood and Affect: Mood normal.         Behavior: Behavior normal. Behavior is cooperative.         Thought Content: Thought content normal.          Significant Labs:  CBC:   Recent Labs   Lab 12/12/24  0802   WBC 3.64*   RBC 3.76*   HGB 11.0*   HCT 33.5*      MCV 89   MCH 29.3   MCHC 32.8     CMP:   Recent Labs   Lab 12/11/24  0458 12/12/24  0802   * 101   CALCIUM 9.6 9.6   ALBUMIN 4.1 3.9   PROT 8.0  --     138   K 4.8 3.9   CO2 15* 22*    102   BUN 58* 41*   CREATININE 11.7* 9.5*   ALKPHOS 87  --    ALT <5*  --    AST 13  --    BILITOT 0.6  --      All labs within the past 24 hours have been reviewed.

## 2024-12-12 NOTE — PROGRESS NOTES
[facility]  Nephrology  Progress Note    Patient Name: Elbert Still Jr.  MRN: [id number]  Admission Date: 12/11/2024  Hospital Length of Stay: 1 days  Attending Provider: Jun Rondon III*   Primary Care Physician: Angel Luis Boo MD  Principal Problem:Acute on chronic diastolic CHF (congestive heart failure)    Subjective:     Interval History:   HD completed yesterday with 2.5 L removed. No distress today. Post HD weight 93.1 kg ( EDW 96 kg) - OP unit updated regarding weight. Electrolytes non emergent.       Review of patient's allergies indicates:   Allergen Reactions    Iodine and iodide containing products Hives     Hypotension, Flushing     Current Facility-Administered Medications   Medication Frequency    acetaminophen tablet 650 mg Q4H PRN    aspirin EC tablet 81 mg Daily    atorvastatin tablet 20 mg QHS    carvediloL tablet 25 mg BID    cinacalcet tablet 30 mg Daily with breakfast    furosemide tablet 80 mg BID    heparin (porcine) injection 1,000 Units PRN    heparin (porcine) injection 5,000 Units Q8H    hydrALAZINE tablet 50 mg TID    NIFEdipine 24 hr tablet 60 mg BID    sevelamer carbonate tablet 1,600 mg TID WM    sodium bicarbonate tablet 1,300 mg BID    sodium chloride 0.9% flush 3 mL Q6H PRN    tamsulosin 24 hr capsule 0.4 mg Daily     Facility-Administered Medications Ordered in Other Encounters   Medication Frequency    0.9%  NaCl infusion Continuous    0.9%  NaCl infusion Continuous    ondansetron disintegrating tablet 8 mg Q8H PRN       Objective:     Vital Signs (Most Recent):  Temp: 98.2 °F (36.8 °C) (12/12/24 0749)  Pulse: 74 (12/12/24 0749)  Resp: 18 (12/12/24 0749)  BP: (!) 185/95 (12/12/24 0922)  SpO2: 99 % (12/12/24 0749) Vital Signs (24h Range):  Temp:  [97.8 °F (36.6 °C)-98.2 °F (36.8 °C)] 98.2 °F (36.8 °C)  Pulse:  [68-89] 74  Resp:  [18-22] 18  SpO2:  [91 %-99 %] 99 %  BP: (157-186)/(80-95) 185/95     Weight: 96.6 kg (213 lb) (12/12/24 9017)  Body mass index is  27.35 kg/m².  Body surface area is 2.25 meters squared.    I/O last 3 completed shifts:  In: -   Out: 3150 [Other:3150]     Physical Exam  Vitals and nursing note reviewed.   Constitutional:       General: He is not in acute distress.     Appearance: He is well-developed.   HENT:      Head: Normocephalic and atraumatic.      Right Ear: Hearing and external ear normal.      Left Ear: Hearing and external ear normal.      Mouth/Throat:      Mouth: Mucous membranes are moist.   Eyes:      General: No scleral icterus.     Conjunctiva/sclera: Conjunctivae normal.   Cardiovascular:      Rate and Rhythm: Normal rate and regular rhythm.      Pulses: Normal pulses.      Heart sounds: Normal heart sounds.      Comments: R CVC  Pulmonary:      Effort: Pulmonary effort is normal. No respiratory distress.      Breath sounds: Normal breath sounds.   Abdominal:      General: Bowel sounds are normal. There is no distension.      Palpations: Abdomen is soft.   Musculoskeletal:         General: No swelling. Normal range of motion.      Cervical back: Normal range of motion.      Right lower leg: No edema.      Left lower leg: No edema.   Skin:     General: Skin is warm and dry.   Neurological:      Mental Status: He is alert and oriented to person, place, and time.   Psychiatric:         Mood and Affect: Mood normal.         Behavior: Behavior normal. Behavior is cooperative.         Thought Content: Thought content normal.          Significant Labs:  CBC:   Recent Labs   Lab 12/12/24  0802   WBC 3.64*   RBC 3.76*   HGB 11.0*   HCT 33.5*      MCV 89   MCH 29.3   MCHC 32.8     CMP:   Recent Labs   Lab 12/11/24  0458 12/12/24  0802   * 101   CALCIUM 9.6 9.6   ALBUMIN 4.1 3.9   PROT 8.0  --     138   K 4.8 3.9   CO2 15* 22*    102   BUN 58* 41*   CREATININE 11.7* 9.5*   ALKPHOS 87  --    ALT <5*  --    AST 13  --    BILITOT 0.6  --      All labs within the past 24 hours have been reviewed.     Assessment/Plan:      Cardiac/Vascular  * Acute on chronic diastolic CHF (congestive heart failure)  - defer to primary team     Renal/  ESRD on dialysis  69 y.o. Black or  Male ESRD-HD M-W-F presents to ED on 12/11/2024 with diagnosis of: Shortness of breath [R06.02];Acute pulmonary edema [J81.0];ESRD on dialysis [N18.6, Z99.2];Acute hypoxemic respiratory failure [J96.01]   Nephrology consulted for inpatient ESRD-HD management    Outpatient HD Information:  -Dialysis modality: Hemodialysis  -Outpatient HD unit: [facility]   -Nephrologist: Isabela   -HD TX days: Monday/Wednesday/Friday, duration of treatment: 3.5 hrs  -Last HD TX prior to hospital admission: 12/09/24  -Dialysis access: dialysis catheter   -Residual urine: Minium  -EDW: 96 kg     Assessment:   - HD tomorrow outpatient or inpatient.   - Continue to monitor intake and output  - Please avoid gadolinium, fleets, phos-based laxatives, NSAIDs  - Dialysis thrice weekly unless more urgent indications arise. Will evaluate RRT requirements Daily.    Anemia of ESRD   Recent Labs   Lab 12/11/24  0458 12/11/24  0500 12/11/24  0505 12/12/24  0802   WBC 5.90  --   --  3.64*   HGB 10.2*  --   --  11.0*   HCT 32.2* 34* 34* 33.5*     --   --  176       Lab Results   Component Value Date    FESATURATED 24 04/30/2022    FERRITIN 459 (H) 12/04/2024       - Goal in ESRD is Hgb of 10-11. Hgb 11.0. At goal.   - EPO can be administered and dosed per his OP unit upon discharge.  - if patient is on iron infusions please D/C when active infection, cautiously use EPO when hx of malignancy, high BP (SBP usually > 170mmhg).    Mineral Bone Disease in ESRD   Lab Results   Component Value Date     (H) 12/04/2024    CALCIUM 9.6 12/12/2024    ALBUMIN 3.9 12/12/2024    CAION 1.20 08/15/2022    PHOS 3.5 12/12/2024       - F/U PO4, Mg, Calcium. And albumin levels daily.   - Renal diet with protein intake goal 1.5 g/kg/d with 1 L fluid restriction   - Continue phos binder    - Continue Cinacalcet         Thank you for your consult. I will follow-up with patient. Please contact us if you have any additional questions.    Jesusita Pereira DNP, FNP-C  Nephrology  [facility]

## 2024-12-12 NOTE — ASSESSMENT & PLAN NOTE
69 y.o. Black or  Male ESRD-HD M-W-F presents to ED on 12/11/2024 with diagnosis of: Shortness of breath [R06.02];Acute pulmonary edema [J81.0];ESRD on dialysis [N18.6, Z99.2];Acute hypoxemic respiratory failure [J96.01]   Nephrology consulted for inpatient ESRD-HD management    Outpatient HD Information:  -Dialysis modality: Hemodialysis  -Outpatient HD unit: [facility]   -Nephrologist: Isabela   -HD TX days: Monday/Wednesday/Friday, duration of treatment: 3.5 hrs  -Last HD TX prior to hospital admission: 12/09/24  -Dialysis access: dialysis catheter   -Residual urine: Minium  -EDW: 96 kg     Assessment:   - HD tomorrow outpatient or inpatient.   - Continue to monitor intake and output  - Please avoid gadolinium, fleets, phos-based laxatives, NSAIDs  - Dialysis thrice weekly unless more urgent indications arise. Will evaluate RRT requirements Daily.    Anemia of ESRD   Recent Labs   Lab 12/11/24  0458 12/11/24  0500 12/11/24  0505 12/12/24  0802   WBC 5.90  --   --  3.64*   HGB 10.2*  --   --  11.0*   HCT 32.2* 34* 34* 33.5*     --   --  176       Lab Results   Component Value Date    FESATURATED 24 04/30/2022    FERRITIN 459 (H) 12/04/2024       - Goal in ESRD is Hgb of 10-11. Hgb 11.0. At goal.   - EPO can be administered and dosed per his OP unit upon discharge.  - if patient is on iron infusions please D/C when active infection, cautiously use EPO when hx of malignancy, high BP (SBP usually > 170mmhg).    Mineral Bone Disease in ESRD   Lab Results   Component Value Date     (H) 12/04/2024    CALCIUM 9.6 12/12/2024    ALBUMIN 3.9 12/12/2024    CAION 1.20 08/15/2022    PHOS 3.5 12/12/2024       - F/U PO4, Mg, Calcium. And albumin levels daily.   - Renal diet with protein intake goal 1.5 g/kg/d with 1 L fluid restriction   - Continue phos binder   - Continue Cinacalcet

## 2024-12-13 ENCOUNTER — PATIENT MESSAGE (OUTPATIENT)
Dept: ADMINISTRATIVE | Facility: CLINIC | Age: 69
End: 2024-12-13
Payer: COMMERCIAL

## 2024-12-13 ENCOUNTER — PATIENT OUTREACH (OUTPATIENT)
Dept: ADMINISTRATIVE | Facility: CLINIC | Age: 69
End: 2024-12-13
Payer: COMMERCIAL

## 2024-12-13 NOTE — PROGRESS NOTES
C3 nurse attempted to contact Elbert Still Jr.  for a TCC post hospital discharge follow up call. No answer. Left voicemail with callback information. The patient has a scheduled \[facility] [place] [facility]\"" appointment with Angel Luis Boo MD  on 12/19/2024 @ 1100.     MESSAGE SENT TO PCP STAFF.

## 2024-12-13 NOTE — PLAN OF CARE
[facility]  Discharge Final Note    Primary Care Provider: Angel Luis Boo MD    Expected Discharge Date: 12/12/2024    Final Discharge Note (most recent)       Final Note - 12/13/24 0840          Final Note    Assessment Type Final Discharge Note (P)      Anticipated Discharge Disposition Home or Self Care (P)         Post-Acute Status    Post-Acute Authorization Other (P)      Coverage Medicare A&B [employer] (P)      Discharge Delays None known at this time (P)                      Important Message from Medicare         Pt discharged home with spouse with new DME Home O2 delivered to [facility]. Follow up appts completed.  Pt will continue dialysis treatment at [facility] [address], MWF @3293. No further needs identified or expressed.    Sosa Herron, RN Case Manager  [phone]

## 2024-12-15 NOTE — DISCHARGE SUMMARY
[facility]  Discharge Summary      Patient Name: Elbert Still Jr.  MRN: [id number]  PAULETTE: [id number]  Patient Class: IP- Inpatient  Admission Date: 12/11/2024  Hospital Length of Stay: 1 days  Discharge Date and Time: 12/12/2024  5:02 PM  Attending Physician:   Jun Rondon III, MD  Discharging Provider: Jun Rondon III, MD  Primary Care Provider: Angel Luis Boo MD  [facility] Medicine Team[place] [facility] [place] [facility] [place] Jun Rondon III, [place]  Primary Care Team: [facility]    HPI:   Per admitting physician (12/11):  69-year-old male with a past medical history of type 2 diabetes, gout, anemia, ESRD on dialysis Monday Wednesday Friday, and hypertension who presents with shortness of breath. He did receive has a full session of dialysis on Monday, but they have been trying to find a dew dry weight for HD goal. Overnight he became increasingly short of breath. Feels like he can not taken any air. Whenever he lays flat he feels like he is drowning. Does report increased bilateral lower extremity swelling. Compliant with the Lasix and blood pressure meds. No fevers or chills. No coughing. No chest pain or abdominal pain though does feel slightly distended abdomen.     Per ER staff, he was in respiratory distress, speaking in very short 1-2 word sentences, tripodding, hypoxic to the 80s. He also has peripheral edema.  Due to extreme work of breathing, they have placed him on BiPAP which has improved his symptoms. BIPAP was later stopped and pt went to HD for volume removal.    I met patient after HD.  He still needs O2 but states his SOB is better.    * No surgery found *      Hospital Course:   Admitted to hospital medicine for acute hypoxic respiratory failure d/t AoC HFpEF in the setting of ESRD.  Pt was hypoxic to the 80s in the ED requiring BiPAP which was weaned to 2 L. Nephrology consulted and pt was taken for volume removal and hemodialysis.  Following dialysis, symptoms  significantly improved and pt was on room air at rest morning of 12/12.  Pt was ambulating without ALLEN, however, on ambulatory saturation test, pt did qualify for home O2 which was provided prior to discharge.  Pt reported uncontrolled hypertension prior to admission which may have been contributing to volume overload.  Adjustments made to medications, see med rec and pt instructions on AVS.  Pt stable for discharge after a much better than expected response to treatment.  Pt confirmed outpatient HD 12/13.    The patient's chronic medical conditions were managed appropriately. Discharge plan of care was discussed at length with patient including patient's need for close outpatient follow-up. Medication counseling also took place with the patient being educated on potential side effects/adverse events. Patient also counseled about avoiding driving, operating machinery, or participating in any activities that may pose harm to self or others if they are taking medications that cause drowsiness or altered mental status. Patient also counseled to take medicines as prescribed and do not drink alcohol, use tobacco products, or use illicit substances. Patient counseled to return to the hospital or seek medical care if baseline status should suddenly change, return of symptoms occurs, or for any new or concerning symptoms that arise. Patient was in agreement with plan of care going forward and was then discharged.  Recommended close follow up to PCP, HD Clinic, and Nephrology.    Physical Exam  Constitutional:  well-developed.   Cardiovascular: Normal rate and regular rhythm.   Pulmonary: Pulmonary effort is normal. Normal breath sounds  Abdominal: No distension, soft and nontender  Skin: warm and dry.   Neurological: alert and oriented to person, place, and time.   Psychiatric:    Behavior normal.           Goals of Care Treatment Preferences:  Code Status: Full Code      SDOH Screening:  The patient was screened for  "utility difficulties, food insecurity, transport difficulties, housing insecurity, and interpersonal safety and there were no concerns identified this admission.     Consults:   Consults (From admission, onward)          Status Ordering Provider     Inpatient consult to Nephrology  Once        Provider:  (Not yet assigned)    HARI Salmon            Final Active Diagnoses:    Diagnosis Date Noted POA    PRINCIPAL PROBLEM:  Acute on chronic diastolic CHF (congestive heart failure) [I50.33] 12/11/2024 Yes    Acute hypoxemic respiratory failure [J96.01] 12/11/2024 Yes    Type 2 diabetes mellitus with kidney complication, without long-term current use of insulin [E11.29] 12/09/2023 Yes    ESRD on dialysis [N18.6, Z99.2] 07/12/2022 Not Applicable     Chronic    Anemia of chronic disease [D63.8] 05/06/2020 Yes     Chronic    Essential (primary) hypertension [I10] 09/21/2017 Yes     Chronic      Problems Resolved During this Admission:       Discharged Condition: stable    Disposition: Home or Self Care    Follow Up:    Patient Instructions:      OXYGEN FOR HOME USE     Order Specific Question Answer Comments   Liter Flow 2    Duration With activity    Qualifying Test Performed at: Activity    Oxygen saturation at rest 96    Oxygen saturation with activity 88    Oxygen saturation with activity on oxygen 93    Portable mode: continuous    Route nasal cannula    Device: home concentrator with portable tanks    Length of need (in months): 12 mos    Patient condition with qualifying saturation CHF    Height: 6' 2" (1.88 m)    Weight: 96.6 kg (213 lb)    Alternative treatment measures have been tried or considered and deemed clinically ineffective. Yes      Diet renal     Diet diabetic     Activity as tolerated       Significant Diagnostic Studies: N/A    Pending Diagnostic Studies:       None           Medications:  Reconciled Home Medications:      Medication List        CHANGE how you take these medications  "     acetaminophen 500 MG tablet  Commonly known as: TYLENOL  Take 1 tablet (500 mg total) by mouth every 6 (six) hours as needed for Pain.  What changed: how much to take     carvediloL 25 MG tablet  Commonly known as: COREG  Take 1 tablet (25 mg total) by mouth 2 (two) times daily.  What changed:   medication strength  how much to take  additional instructions     hydrALAZINE 50 MG tablet  Commonly known as: APRESOLINE  Take 1 tablet (50 mg total) by mouth 3 (three) times daily.  What changed:   medication strength  how much to take  additional instructions     NIFEdipine 60 MG (OSM) 24 hr tablet  Commonly known as: PROCARDIA-XL  Take 1 tablet (60 mg total) by mouth 2 (two) times a day.  What changed:   medication strength  how much to take  when to take this  additional instructions     tamsulosin 0.4 mg Cap  Commonly known as: FLOMAX  Take 1 capsule (0.4 mg total) by mouth once daily.  What changed: when to take this            CONTINUE taking these medications      aspirin 81 MG EC tablet  Commonly known as: ECOTRIN  Take 1 tablet (81 mg total) by mouth once daily.     atorvastatin 20 MG tablet  Commonly known as: LIPITOR  TAKE 1 TABLET BY MOUTH EVERY EVENING     blood sugar diagnostic Strp  Use to check blood glucose once a day.     calcium carbonate 470 mg calcium (1,177 mg) Chew  Take 2 tablets by mouth daily as needed (heartburn).     cinacalcet 30 MG Tab  Commonly known as: SENSIPAR  Take 30 mg by mouth daily with breakfast.     furosemide 80 MG tablet  Commonly known as: LASIX  TAKE 1 TABLET(80 MG) BY MOUTH TWICE DAILY     NASONEX NASL  2 sprays by Nasal route twice a week. As needed for congestion     ONETOUCH DELICA PLUS LANCET 30 gauge Misc  Generic drug: lancets  Use to check blood glucose twice a day.     ONETOUCH VERIO FLEX METER Misc  Generic drug: blood-glucose meter  Use to check blood glucose twice a day.     sevelamer carbonate 800 mg Tab  Commonly known as: RENVELA  Take 1,600 mg by mouth 3  (three) times daily with meals.     sodium bicarbonate 650 MG tablet  Take 2 tablets (1,300 mg total) by mouth 2 (two) times daily.     VITAMIN D ORAL  Take 1 tablet by mouth once daily.              Indwelling Lines/Drains at time of discharge:   Lines/Drains/Airways       Central Venous Catheter Line  Duration                  Hemodialysis Catheter 12/13/23 1210 right internal jugular 367 days                    Time spent on the discharge of patient: 65 minutes         Jun Rondon III, MD  Department of Hospital Medicine  [facility]

## 2024-12-19 ENCOUNTER — OFFICE VISIT (OUTPATIENT)
Dept: FAMILY MEDICINE | Facility: CLINIC | Age: 69
End: 2024-12-19
Payer: MEDICARE

## 2024-12-19 VITALS
DIASTOLIC BLOOD PRESSURE: 68 MMHG | TEMPERATURE: 98 F | HEIGHT: 74 IN | SYSTOLIC BLOOD PRESSURE: 124 MMHG | WEIGHT: 205.94 LBS | OXYGEN SATURATION: 97 % | HEART RATE: 58 BPM | BODY MASS INDEX: 26.43 KG/M2

## 2024-12-19 DIAGNOSIS — D63.8 ANEMIA OF CHRONIC DISEASE: ICD-10-CM

## 2024-12-19 DIAGNOSIS — Z09 HOSPITAL DISCHARGE FOLLOW-UP: Primary | ICD-10-CM

## 2024-12-19 DIAGNOSIS — J30.89 NON-SEASONAL ALLERGIC RHINITIS DUE TO OTHER ALLERGIC TRIGGER: ICD-10-CM

## 2024-12-19 DIAGNOSIS — Z99.2 ESRD ON DIALYSIS: ICD-10-CM

## 2024-12-19 DIAGNOSIS — J96.01 ACUTE HYPOXEMIC RESPIRATORY FAILURE: ICD-10-CM

## 2024-12-19 DIAGNOSIS — E11.29 TYPE 2 DIABETES MELLITUS WITH OTHER DIABETIC KIDNEY COMPLICATION, WITHOUT LONG-TERM CURRENT USE OF INSULIN: ICD-10-CM

## 2024-12-19 DIAGNOSIS — N18.6 ESRD ON DIALYSIS: ICD-10-CM

## 2024-12-19 DIAGNOSIS — I50.33 ACUTE ON CHRONIC DIASTOLIC CHF (CONGESTIVE HEART FAILURE): ICD-10-CM

## 2024-12-19 DIAGNOSIS — I10 ESSENTIAL (PRIMARY) HYPERTENSION: ICD-10-CM

## 2024-12-19 PROCEDURE — 99999 PR PBB SHADOW E&M-EST. PATIENT-LVL IV: CPT | Mod: PBBFAC,,, | Performed by: FAMILY MEDICINE

## 2024-12-19 PROCEDURE — 99214 OFFICE O/P EST MOD 30 MIN: CPT | Mod: PBBFAC,PO | Performed by: FAMILY MEDICINE

## 2024-12-19 RX ORDER — FLUTICASONE PROPIONATE 50 MCG
1 SPRAY, SUSPENSION (ML) NASAL 2 TIMES DAILY PRN
Qty: 16 ML | Refills: 2 | Status: SHIPPED | OUTPATIENT
Start: 2024-12-19

## 2024-12-19 RX ORDER — SEVELAMER HYDROCHLORIDE 800 MG/1
2400 TABLET, FILM COATED ORAL 3 TIMES DAILY
COMMUNITY
Start: 2024-12-04

## 2024-12-19 RX ORDER — CHOLECALCIFEROL (VITAMIN D3) 25 MCG
1000 TABLET ORAL
COMMUNITY

## 2024-12-19 RX ORDER — LORATADINE 10 MG/1
10 TABLET ORAL DAILY
Qty: 60 TABLET | Refills: 2 | Status: SHIPPED | OUTPATIENT
Start: 2024-12-19

## 2024-12-19 NOTE — PROGRESS NOTES
"  Physical Exam  /68 (BP Location: Left arm, Patient Position: Sitting)   Pulse (!) 58   Temp 97.7 °F (36.5 °C) (Oral)   Ht 6' 2" (1.88 m)   Wt 93.4 kg (205 lb 14.6 oz)   SpO2 97%   BMI 26.44 kg/m²      Office Visit    Patient Name: Elbert Still Jr.    : 1955  MRN: [id number]      Assessment/Plan:  Elbert Still Jr. is a 69 y.o. male who presents today for :    Hospital discharge follow-up  Acute on chronic diastolic CHF (congestive heart failure)  - resolved  Acute hypoxemic respiratory failure  - resolved  -doing well post-discharge, good O2 sat on room air  -continue current medications  -call clinic back with any concerns    Type 2 diabetes mellitus with other diabetic kidney complication, without long-term current use of insulin  ESRD on dialysis  Anemia of chronic disease  -stable, continue current therapy    Essential (primary) hypertension  -BP well controlled after dose adjustment in hospital      Non-seasonal allergic rhinitis due to other allergic trigger  -     loratadine (CLARITIN) 10 mg tablet; Take 1 tablet (10 mg total) by mouth once daily.  Dispense: 60 tablet; Refill: 2  -     fluticasone propionate (FLONASE) 50 mcg/actuation nasal spray; 1 spray (50 mcg total) by Each Nostril route 2 (two) times daily as needed for Rhinitis.  Dispense: 16 mL; Refill: 2        Follow up for worsening Sx. Urgent care/ED precautions provided.      This note was created by combination of typed  and MModal dictation.  Transcription errors may be present.  If there are any questions, please contact me.        ----------------------------------------------------------------------------------------------------------------------      HPI:  Patient Care Team:  Angel Luis Boo MD as PCP - General (Family Medicine)  Walter Díaz MD as Consulting Physician (Nephrology)    Elbert is a 69 y.o. male with      Patient Active Problem List   Diagnosis    -Diabetes mellitus due to underlying " condition with diabetic nephropathy - diet controlled    Proteinuria    Essential (primary) hypertension    Mixed hyperlipidemia    -CKD (chronic kidney disease) stage 4, GFR 15-29 ml/min - on HD MWF    -Gout    Anemia of chronic disease    -HLD associated with type 2 DM    Hyperkalemia    Acute kidney injury superimposed on chronic kidney disease    ESRD on dialysis    Nuclear sclerosis of both eyes    Refractive error    Cortical age-related cataract    Status post placement of arteriovenous graft    Overweight (BMI 25.0-29.9)    Steal syndrome dialysis vascular access, initial encounter    Arteriovenous fistula    Other neutropenia    Stenosis of arteriovenous dialysis fistula    Hypocalcemia    Bacteremia    Type 2 diabetes mellitus with kidney complication, without long-term current use of insulin    Surgical wound, non healing    Prostate cancer    Acute on chronic diastolic CHF (congestive heart failure)    Acute hypoxemic respiratory failure       Elbert presents today for f/u of recent hospital stay over a week ago for acute hypoxic respiratory failure d/t AoC HFpEF in the setting of ESRD.  His symptoms improved after he was placed on BiPAP as well as received hemodialysis.  His home medication dosage was also adjusted with Coreg and Hydralazine dosages increased. His condition continued to improve the past week. He still occasionally gets mild ALLEN, but no CP/leg swelling. He reports normal appetite as well as normal bowel routine.    Wt Readings from Last 4 Encounters:   12/19/24 93.4 kg (205 lb 14.6 oz)   12/12/24 96.6 kg (213 lb)   10/24/24 97 kg (213 lb 13.5 oz)   07/02/24 97.3 kg (214 lb 8.1 oz)           Additional ROS    CONST: no fever, no activity change  EYES: no vision change.   ENT: no sore throat. No dysphagia.   CV: no CP with exertion  RESP: mild  GI: no N/V/diarrhea/constipation  : no urinary concerns  MSK: no new myalgias or arthralgias.   SKIN: no new rashes  NEURO: no focal deficits.    PSYCH: no new issues.   ENDOCRINE: no polyuria.       [facility] Medicine  Discharge Summary        Patient Name: Elbert Still Jr.  MRN: [id number]  [facility]: [id number]  Patient Class: IP- Inpatient  Admission Date: 12/11/2024  Hospital Length of Stay: 1 days  Discharge Date and Time: 12/12/2024  5:02 PM  Attending Physician:   Jun Rondon III, MD  Discharging Provider: Jun Rondon III, MD  Primary Care Provider: Angel Luis Boo MD  [facility] Medicine Team: [facility] Jun Rondon III, MD  Primary Care Team: [facility] MED S     HPI:   Per admitting physician (12/11):  69-year-old male with a past medical history of type 2 diabetes, gout, anemia, ESRD on dialysis Monday Wednesday Friday, and hypertension who presents with shortness of breath. He did receive has a full session of dialysis on Monday, but they have been trying to find a dew dry weight for HD goal. Overnight he became increasingly short of breath. Feels like he can not taken any air. Whenever he lays flat he feels like he is drowning. Does report increased bilateral lower extremity swelling. Compliant with the Lasix and blood pressure meds. No fevers or chills. No coughing. No chest pain or abdominal pain though does feel slightly distended abdomen.      Per ER staff, he was in respiratory distress, speaking in very short 1-2 word sentences, tripodding, hypoxic to the 80s. He also has peripheral edema.  Due to extreme work of breathing, they have placed him on BiPAP which has improved his symptoms. BIPAP was later stopped and pt went to HD for volume removal.     I met patient after HD.  He still needs O2 but states his SOB is better.     * No surgery found *       Hospital Course:   Admitted to hospital medicine for acute hypoxic respiratory failure d/t AoC HFpEF in the setting of ESRD.  Pt was hypoxic to the 80s in the ED requiring BiPAP which was weaned to 2 L. Nephrology consulted and pt was taken for volume  removal and hemodialysis.  Following dialysis, symptoms significantly improved and pt was on room air at rest morning of 12/12.  Pt was ambulating without ALLEN, however, on ambulatory saturation test, pt did qualify for home O2 which was provided prior to discharge.  Pt reported uncontrolled hypertension prior to admission which may have been contributing to volume overload.  Adjustments made to medications, see med rec and pt instructions on AVS.  Pt stable for discharge after a much better than expected response to treatment.  Pt confirmed outpatient HD 12/13.     The patient's chronic medical conditions were managed appropriately. Discharge plan of care was discussed at length with patient including patient's need for close outpatient follow-up. Medication counseling also took place with the patient being educated on potential side effects/adverse events. Patient also counseled about avoiding driving, operating machinery, or participating in any activities that may pose harm to self or others if they are taking medications that cause drowsiness or altered mental status. Patient also counseled to take medicines as prescribed and do not drink alcohol, use tobacco products, or use illicit substances. Patient counseled to return to the hospital or seek medical care if baseline status should suddenly change, return of symptoms occurs, or for any new or concerning symptoms that arise. Patient was in agreement with plan of care going forward and was then discharged.  Recommended close follow up to PCP, HD Clinic, and Nephrology.                   Patient Active Problem List   Diagnosis    -Diabetes mellitus due to underlying condition with diabetic nephropathy - diet controlled    Proteinuria    Essential (primary) hypertension    Mixed hyperlipidemia    -CKD (chronic kidney disease) stage 4, GFR 15-29 ml/min - on HD MWF    -Gout    Anemia of chronic disease    -HLD associated with type 2 DM    Hyperkalemia    Acute  kidney injury superimposed on chronic kidney disease    ESRD on dialysis    Nuclear sclerosis of both eyes    Refractive error    Cortical age-related cataract    Status post placement of arteriovenous graft    Overweight (BMI 25.0-29.9)    Steal syndrome dialysis vascular access, initial encounter    Arteriovenous fistula    Other neutropenia    Stenosis of arteriovenous dialysis fistula    Hypocalcemia    Bacteremia    Type 2 diabetes mellitus with kidney complication, without long-term current use of insulin    Surgical wound, non healing    Prostate cancer    Acute on chronic diastolic CHF (congestive heart failure)    Acute hypoxemic respiratory failure       Current Medications  Medications reviewed and updated.       Current Outpatient Medications:     acetaminophen (TYLENOL) 500 MG tablet, Take 1 tablet (500 mg total) by mouth every 6 (six) hours as needed for Pain., Disp: 30 tablet, Rfl: 0    atorvastatin (LIPITOR) 20 MG tablet, TAKE 1 TABLET BY MOUTH EVERY EVENING, Disp: 90 tablet, Rfl: 3    blood sugar diagnostic Strp, Use to check blood glucose once a day., Disp: 100 each, Rfl: 3    blood-glucose meter Misc, Use to check blood glucose twice a day., Disp: 1 each, Rfl: 0    calcium carbonate 470 mg calcium (1,177 mg) Chew, Take 2 tablets by mouth daily as needed (heartburn)., Disp: , Rfl:     carvediloL (COREG) 25 MG tablet, Take 1 tablet (25 mg total) by mouth 2 (two) times daily., Disp: 180 tablet, Rfl: 0    cinacalcet (SENSIPAR) 30 MG Tab, Take 30 mg by mouth daily with breakfast., Disp: , Rfl:     ergocalciferol, vitamin D2, (VITAMIN D ORAL), Take 1 tablet by mouth once daily., Disp: , Rfl:     furosemide (LASIX) 80 MG tablet, TAKE 1 TABLET(80 MG) BY MOUTH TWICE DAILY, Disp: 60 tablet, Rfl: 11    hydrALAZINE (APRESOLINE) 50 MG tablet, Take 1 tablet (50 mg total) by mouth 3 (three) times daily., Disp: 90 tablet, Rfl: 2    lancets 30 gauge Misc, Use to check blood glucose twice a day., Disp: 100 each,  Rfl: 5    mometasone furoate (NASONEX NASL), 2 sprays by Nasal route twice a week. As needed for congestion, Disp: , Rfl:     NIFEdipine (PROCARDIA-XL) 60 MG (OSM) 24 hr tablet, Take 1 tablet (60 mg total) by mouth 2 (two) times a day., Disp: 60 tablet, Rfl: 2    sevelamer carbonate (RENVELA) 800 mg Tab, Take 1,600 mg by mouth 3 (three) times daily with meals., Disp: , Rfl:     sevelamer HCL (RENAGEL) 800 MG Tab, Take 2,400 mg by mouth 3 (three) times daily., Disp: , Rfl:     sodium bicarbonate 650 MG tablet, Take 2 tablets (1,300 mg total) by mouth 2 (two) times daily., Disp: 120 tablet, Rfl: 0    tamsulosin (FLOMAX) 0.4 mg Cap, Take 1 capsule (0.4 mg total) by mouth once daily., Disp: 90 capsule, Rfl: 3    vitamin D (VITAMIN D3) 1000 units Tab, Take 1,000 Units by mouth., Disp: , Rfl:     aspirin (ECOTRIN) 81 MG EC tablet, Take 1 tablet (81 mg total) by mouth once daily., Disp: 90 tablet, Rfl: 3    fluticasone propionate (FLONASE) 50 mcg/actuation nasal spray, 1 spray (50 mcg total) by Each Nostril route 2 (two) times daily as needed for Rhinitis., Disp: 16 mL, Rfl: 2    loratadine (CLARITIN) 10 mg tablet, Take 1 tablet (10 mg total) by mouth once daily., Disp: 60 tablet, Rfl: 2  No current facility-administered medications for this visit.    Facility-Administered Medications Ordered in Other Visits:     0.9%  NaCl infusion, , Intravenous, Continuous, Kim Lutz NP    0.9%  NaCl infusion, , Intravenous, Continuous, Herminia Durant MD    heparin (porcine) injection 1,200 Units, 1,200 Units, Intravenous, Continuous, Nawaf Butler MD, 1,200 Units at 12/18/24 0551    ondansetron disintegrating tablet 8 mg, 8 mg, Oral, Q8H PRN, Herminia Durant MD    Past Surgical History:   Procedure Laterality Date    ADENOIDECTOMY      ADENOIDECTOMY      AV FISTULA PLACEMENT Left 09/06/2022    Procedure: CREATION, AV FISTULA;  Surgeon: Prince Gardiner MD;  Location: [facility] OR [address] [place] [address];  Service: Peripheral Vascular;   Laterality: Left;    EXCISION OF ARTERIOVENOUS FISTULA Left 12/10/2023    Procedure: EXCISION, AV FISTULA;  Surgeon: Joaquin Rose MD;  Location: [facility] OR 2ND FLR;  Service: Vascular;  Laterality: Left;    FISTULOGRAM Left 2/16/2023    Procedure: FISTULOGRAM;  Surgeon: Prince Gardiner MD;  Location: [facility] OR 2ND FLR;  Service: Peripheral Vascular;  Laterality: Left;  Given to the sterile field.     FISTULOGRAM Left 7/6/2023    Procedure: Fistulogram;  Surgeon: LETY Rayo III, MD;  Location: [facility] OR 2ND FLR;  Service: Peripheral Vascular;  Laterality: Left;  1.9 min  22.86 mGy  3.8862 Gy.cm  20ml Dye     nasal septum repair      NASAL SEPTUM SURGERY      PERCUTANEOUS TRANSLUMINAL ANGIOPLASTY OF ARTERIOVENOUS FISTULA Left 11/22/2022    Procedure: PTA, AV FISTULA;  Surgeon: Prince Gardnier MD;  Location: [facility] CATH LAB;  Service: Peripheral Vascular;  Laterality: Left;    PERCUTANEOUS TRANSLUMINAL ANGIOPLASTY OF ARTERIOVENOUS FISTULA Left 2/16/2023    Procedure: PTA, AV FISTULA;  Surgeon: Prince Gardiner MD;  [facility];  Service: Peripheral Vascular;  Laterality: Left;  4.8 min  43.08 mGy  4.0682 Gy.cm  32ml Dye    PERCUTANEOUS TRANSLUMINAL ANGIOPLASTY OF ARTERIOVENOUS FISTULA Left 7/6/2023    Procedure: PTA, AV FISTULA;  Surgeon: LETY Rayo III, MD;  [facility];  Service: Peripheral Vascular;  Laterality: Left;    PROSTATE BIOPSY N/A 3/24/2023    Procedure: BIOPSY, PROSTATE;  Surgeon: Frankie Welch MD;  Location: [facility] OR 1ST FLR;  Service: Urology;  Laterality: N/A;  transrectal, 30min, uronav needed    REVISION OF ARTERIOVENOUS FISTULA Left 11/9/2023    Procedure: REVISION, AV FISTULA;  Surgeon: LETY Rayo III, MD;  Location: [facility] OR 2ND FLR;  Service: Vascular;  Laterality: Left;  LUE AVG revision    ROTATOR CUFF REPAIR Left     SALIVARY GLAND SURGERY      Tonsillectomy      TONSILLECTOMY      TRANSPOSITION OF BASILIC VEIN Left 11/1/2022    Procedure:  TRANSPOSITION, VEIN, BASILIC;  Surgeon: Prince Gardiner MD;  [facility];  Service: Peripheral Vascular;  Laterality: Left;  Left Brachial vein.    WASHOUT Left 12/10/2023    Procedure: WASHOUT OF AV FISTULA;  Surgeon: Joaquin Rose MD;  [facility];  Service: Vascular;  Laterality: Left;       Family History   Problem Relation Name Age of Onset    Heart disease Mother      Kidney disease Mother      Hypertension Mother      No Known Problems Father      Cancer Sister      Seizures Sister      Hypertension Sister      Stroke Sister      Amblyopia Neg Hx      Blindness Neg Hx      Cataracts Neg Hx      Diabetes Neg Hx      Glaucoma Neg Hx      Macular degeneration Neg Hx      Retinal detachment Neg Hx      Strabismus Neg Hx      Thyroid disease Neg Hx      Anesthesia problems Neg Hx         Social History     Socioeconomic History    Marital status:      Spouse name: Kristine Still   Occupational History     Employer: [employer]   Tobacco Use    Smoking status: Never    Smokeless tobacco: Never    Tobacco comments:     .  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    Overall Financial Resource Strain (CARDIA)     Difficulty of Paying Living Expenses: Patient declined   Food Insecurity: No Food Insecurity (12/16/2024)    Received from [facility]    Hunger Vital Sign     Worried About Running Out of Food in the Last Year: Never true     Ran Out of Food in the Last Year: Never true   Transportation Needs: No  "Transportation Needs (12/16/2024)    Received from [facility]    PRAPARE - Transportation     Lack of Transportation (Medical): No     Lack of Transportation (Non-Medical): No   Physical Activity: Inactive (12/12/2024)    Exercise Vital Sign     Days of Exercise per Week: 0 days     Minutes of Exercise per Session: 40 min   Stress: No Stress Concern Present (12/12/2024)    [facility] [place] [facility] [employer] [facility] - Occupational Stress Questionnaire     Feeling of Stress : Not at all   Housing Stability: Unknown (12/16/2024)    Received from [facility]    Housing Stability Vital Sign     Unable to Pay for Housing in the Last Year: No     Homeless in the Last Year: No           Allergies   Review of patient's allergies indicates:   Allergen Reactions    Iodine and iodide containing products Hives     Hypotension, Flushing           Review of Systems  See HPI        [unfilled]  /68 (BP Location: Left arm, Patient Position: Sitting)   Pulse (!) 58   Temp 97.7 °F (36.5 °C) (Oral)   Ht 6' 2" (1.88 m)   Wt 93.4 kg (205 lb 14.6 oz)   SpO2 97%   BMI 26.44 kg/m²     GEN: NAD, well developed, pleasant, well nourished  HEENT: NCAT, PERRLA, EOMI, sclera clear, anicteric  NECK: normal, supple with midline trachea, no LAD, no thyromegaly  LUNGS: CTAB, no w/r/r, normal effort, no increased work of breathing,  HEART: RRR, normal S1 and S2, no m/r/g, no palpitations, trace ankle edema bilaterally, normal pulses  ABD: s/nt/nd, NABS, no organomegaly, no masses  SKIN: warm and dry with normal turgor, no rashes,  PSYCH: AOx3, appropriate mood and affect.   MSK: extremities warm/well perfused, normal ROM in all 4 extremities, no c/c/e.  NEURO: normal without focal findings, CN II-XII are intact.  Sensation grossly normal, gait and station normal.                 "

## 2025-01-23 DIAGNOSIS — E78.2 MIXED HYPERLIPIDEMIA: ICD-10-CM

## 2025-01-23 DIAGNOSIS — E08.21 DIABETES MELLITUS DUE TO UNDERLYING CONDITION WITH DIABETIC NEPHROPATHY, WITHOUT LONG-TERM CURRENT USE OF INSULIN: ICD-10-CM

## 2025-01-23 RX ORDER — ATORVASTATIN CALCIUM 20 MG/1
TABLET, FILM COATED ORAL
Qty: 90 TABLET | Refills: 2 | Status: SHIPPED | OUTPATIENT
Start: 2025-01-23

## 2025-01-23 NOTE — TELEPHONE ENCOUNTER
Refill Routing Note   Medication(s) are not appropriate for processing by [facility] for the following reason(s):        Drug-disease interactionatorvastatin and Acute kidney injury superimposed on chronic kidney disease     [facility] action(s):  Defer             Pharmacist review requested: Yes     Appointments  past 12m or future 3m with PCP    Date Provider   Last Visit   12/19/2024 Angel Luis Boo MD   Next Visit   Visit date not found Angel Luis Boo MD   ED visits in past 90 days: 0        Note composed:12:58 PM 01/23/2025

## 2025-01-23 NOTE — TELEPHONE ENCOUNTER
No care due was identified.  [facility] Embedded Care Due Messages. Reference number: [id number].   1/23/2025 10:55:31 AM CST

## 2025-01-23 NOTE — TELEPHONE ENCOUNTER
Refill Decision Note   Elbert Still  is requesting a refill authorization.  Brief Assessment and Rationale for Refill:  Approve     Medication Therapy Plan:        Alert overridden per protocol: Yes   Pharmacist review requested: Yes   Comments:     Note composed:2:50 PM 01/23/2025

## 2025-03-18 ENCOUNTER — OFFICE VISIT (OUTPATIENT)
Dept: FAMILY MEDICINE | Facility: CLINIC | Age: 70
End: 2025-03-18
Payer: COMMERCIAL

## 2025-03-18 VITALS
TEMPERATURE: 98 F | WEIGHT: 205.13 LBS | HEIGHT: 74 IN | OXYGEN SATURATION: 97 % | BODY MASS INDEX: 26.33 KG/M2 | SYSTOLIC BLOOD PRESSURE: 139 MMHG | HEART RATE: 60 BPM | DIASTOLIC BLOOD PRESSURE: 65 MMHG

## 2025-03-18 DIAGNOSIS — I77.0 ARTERIOVENOUS FISTULA: ICD-10-CM

## 2025-03-18 DIAGNOSIS — E78.5 HYPERLIPIDEMIA ASSOCIATED WITH TYPE 2 DIABETES MELLITUS: ICD-10-CM

## 2025-03-18 DIAGNOSIS — I50.33 ACUTE ON CHRONIC DIASTOLIC CHF (CONGESTIVE HEART FAILURE): ICD-10-CM

## 2025-03-18 DIAGNOSIS — E11.29 TYPE 2 DIABETES MELLITUS WITH OTHER DIABETIC KIDNEY COMPLICATION, WITHOUT LONG-TERM CURRENT USE OF INSULIN: ICD-10-CM

## 2025-03-18 DIAGNOSIS — C61 PROSTATE CANCER: ICD-10-CM

## 2025-03-18 DIAGNOSIS — E08.21 DIABETES MELLITUS DUE TO UNDERLYING CONDITION WITH DIABETIC NEPHROPATHY, WITHOUT LONG-TERM CURRENT USE OF INSULIN: Chronic | ICD-10-CM

## 2025-03-18 DIAGNOSIS — E11.69 HYPERLIPIDEMIA ASSOCIATED WITH TYPE 2 DIABETES MELLITUS: ICD-10-CM

## 2025-03-18 DIAGNOSIS — E21.3 HYPERPARATHYROIDISM: ICD-10-CM

## 2025-03-18 DIAGNOSIS — I10 ESSENTIAL (PRIMARY) HYPERTENSION: Chronic | ICD-10-CM

## 2025-03-18 DIAGNOSIS — D69.6 THROMBOCYTOPENIA, UNSPECIFIED: ICD-10-CM

## 2025-03-18 DIAGNOSIS — D70.8 OTHER NEUTROPENIA: ICD-10-CM

## 2025-03-18 DIAGNOSIS — M10.9 GOUT, UNSPECIFIED CAUSE, UNSPECIFIED CHRONICITY, UNSPECIFIED SITE: Chronic | ICD-10-CM

## 2025-03-18 DIAGNOSIS — Z00.00 ANNUAL PHYSICAL EXAM: Primary | ICD-10-CM

## 2025-03-18 DIAGNOSIS — E66.3 OVERWEIGHT (BMI 25.0-29.9): ICD-10-CM

## 2025-03-18 DIAGNOSIS — N18.4 CKD (CHRONIC KIDNEY DISEASE) STAGE 4, GFR 15-29 ML/MIN: ICD-10-CM

## 2025-03-18 DIAGNOSIS — N40.0 BENIGN PROSTATIC HYPERPLASIA WITHOUT LOWER URINARY TRACT SYMPTOMS: ICD-10-CM

## 2025-03-18 DIAGNOSIS — E78.2 MIXED HYPERLIPIDEMIA: Chronic | ICD-10-CM

## 2025-03-18 PROCEDURE — 99999 PR PBB SHADOW E&M-EST. PATIENT-LVL V: CPT | Mod: PBBFAC,,, | Performed by: FAMILY MEDICINE

## 2025-03-18 RX ORDER — HYDRALAZINE HYDROCHLORIDE 50 MG/1
50 TABLET, FILM COATED ORAL 3 TIMES DAILY
Qty: 90 TABLET | Refills: 11 | Status: SHIPPED | OUTPATIENT
Start: 2025-03-18

## 2025-03-18 RX ORDER — NIFEDIPINE 60 MG/1
60 TABLET, EXTENDED RELEASE ORAL 2 TIMES DAILY
Qty: 60 TABLET | Refills: 11 | Status: SHIPPED | OUTPATIENT
Start: 2025-03-18

## 2025-03-18 RX ORDER — TAMSULOSIN HYDROCHLORIDE 0.4 MG/1
0.4 CAPSULE ORAL DAILY
Qty: 90 CAPSULE | Refills: 3 | Status: SHIPPED | OUTPATIENT
Start: 2025-03-18

## 2025-03-18 NOTE — PROGRESS NOTES
"Physical Exam  /65 (BP Location: Right arm, Patient Position: Sitting)   Pulse 60   Temp 97.9 °F (36.6 °C) (Oral)   Ht 6' 2" (1.88 m)   Wt 93.1 kg (205 lb 2.2 oz)   SpO2 97%   BMI 26.34 kg/m²      Office Visit    Patient Name: Elbert Still Jr.    : 1955  MRN: [id number]      Assessment/Plan:  Elbert Still Jr. is a 69 y.o. male who presents today for :    Annual physical exam  -     Hemoglobin A1C; Future; Expected date: 2025  -     CBC Without Differential; Future; Expected date: 2025  -     Comprehensive Metabolic Panel; Future; Expected date: 2025  -     Lipid Panel; Future; Expected date: 2025  -     Prostate Specific Antigen, Diagnostic; Future; Expected date: 2025  -     PTH, Intact; Future; Expected date: 2025  -     Vitamin D; Future; Expected date: 2025  -anticipatory guidance provided with age appropriate preventative services discussed  -continue healthy diet with active lifestyle      Type 2 diabetes mellitus with other diabetic kidney complication, without long-term current use of insulin  -     Hemoglobin A1C; Future; Expected date: 2025  -     CBC Without Differential; Future; Expected date: 2025  -     Comprehensive Metabolic Panel; Future; Expected date: 2025  -HLD associated with type 2 diabetes mellitus  -     Lipid Panel; Future; Expected date: 2025  -previous A1c reviewed:   Lab Results   Component Value Date    HGBA1C 4.5 (L) 2025   -stable, diet controlled  -reviewed with patient about routine diabetic care. Due for repeat eye exam in 3 months.      Chronic diastolic CHF (congestive heart failure)  -stable, continue current therapy    Arteriovenous fistula    Essential (primary) hypertension  -     NIFEdipine (PROCARDIA-XL) 60 MG (OSM) 24 hr tablet; Take 1 tablet (60 mg total) by mouth 2 (two) times a day. Followup Dr.T Boo 2026 for more refills  Dispense: 60 tablet; Refill: 11  -     " hydrALAZINE (APRESOLINE) 50 MG tablet; Take 1 tablet (50 mg total) by mouth 3 (three) times daily. Followup Dr.T Boo JAN 2026 for more refills  Dispense: 90 tablet; Refill: 11  -CKD (chronic kidney disease) stage 4, GFR 15-29 ml/min - on HD MWF  -     Vitamin D; Future; Expected date: 03/18/2025  Overweight (BMI 25.0-29.9)  -continue current medication regimen  -DASH diet, regular cardiovascular exercises    Gout  -     Uric Acid; Future; Expected date: 03/18/2025  -stable    Hx low risk Prostate cancer  -no new sx, continue surveillance with Urology           Follow up PRN      This note was created by combination of typed  and MModal dictation.  Transcription errors may be present.  If there are any questions, please contact me.        ----------------------------------------------------------------------------------------------------------------------      HPI:  Patient Care Team:  Angel Luis Boo MD as PCP - General (Family Medicine)  Walter Díaz MD as Consulting Physician (Nephrology)    Elbert is a 69 y.o. male with    Problem List[1]    Elbert presents today for an Annual checkup      His last follow up with me was 3 months ago. He has been doing well without any new changes in health status. He continues to go to HD on MWF, still able to make a small amount of urine. His diabetes is well controlled on diet alone. His BP is within acceptable limits today - he is low on his antihypertensive medications and requests for refills today. He denies any CP/SOB/ALLEN/palpitations/claudication/leg swelling. Otherwise, no other acute issues during this visit.          Additional ROS    CONST: no fever, no activity change  EYES: no vision change.   ENT: no sore throat. No dysphagia.   CV: no CP with exertion  RESP: no SOB  GI: no N/V/diarrhea/constipation  : no urinary concerns  MSK: no new myalgias or arthralgias.   SKIN: no new rashes  NEURO: no focal deficits.   PSYCH: no new issues.    ENDOCRINE: no polyuria.         Current Medications  Medications reviewed and updated.     Current Medications[2]    Past Surgical History:   Procedure Laterality Date    ADENOIDECTOMY      ADENOIDECTOMY      AV FISTULA PLACEMENT Left 09/06/2022    Procedure: CREATION, AV FISTULA;  Surgeon: Prince Gardiner MD;  [facility];  Service: Peripheral Vascular;  Laterality: Left;    EXCISION OF ARTERIOVENOUS FISTULA Left 12/10/2023    Procedure: EXCISION, AV FISTULA;  Surgeon: Joaquin Rose MD;  Location: [facility] OR [facility] FLR;  Service: Vascular;  Laterality: Left;    FISTULOGRAM Left 2/16/2023    Procedure: FISTULOGRAM;  Surgeon: Prince Gardiner MD;  [facility];  Service: Peripheral Vascular;  Laterality: Left;  Given to the sterile field.     FISTULOGRAM Left 7/6/2023    Procedure: Fistulogram;  Surgeon: LETY Rayo III, MD;  [facility];  Service: Peripheral Vascular;  Laterality: Left;  1.9 min  22.86 mGy  3.8862 Gy.cm  20ml Dye     nasal septum repair      NASAL SEPTUM SURGERY      PERCUTANEOUS TRANSLUMINAL ANGIOPLASTY OF ARTERIOVENOUS FISTULA Left 11/22/2022    Procedure: PTA, AV FISTULA;  Surgeon: Prince Gardiner MD;  Location: [facility] CATH LAB;  Service: Peripheral Vascular;  Laterality: Left;    PERCUTANEOUS TRANSLUMINAL ANGIOPLASTY OF ARTERIOVENOUS FISTULA Left 2/16/2023    Procedure: PTA, AV FISTULA;  Surgeon: Prince Gardiner MD;  [facility];  Service: Peripheral Vascular;  Laterality: Left;  4.8 min  43.08 mGy  4.0682 Gy.cm  32ml Dye    PERCUTANEOUS TRANSLUMINAL ANGIOPLASTY OF ARTERIOVENOUS FISTULA Left 7/6/2023    Procedure: PTA, AV FISTULA;  Surgeon: LETY Rayo III, MD;  [facility];  Service: Peripheral Vascular;  Laterality: Left;    PROSTATE BIOPSY N/A 3/24/2023    Procedure: BIOPSY, PROSTATE;  Surgeon: Frankie Welch MD;  Location: [facility] OR [facility]LR;  Service: Urology;  Laterality: N/A;  transrectal, 30min, uronav needed  "   REVISION OF ARTERIOVENOUS FISTULA Left 11/9/2023    Procedure: REVISION, AV FISTULA;  Surgeon: LETY Rayo III, MD;  Location: [facility] OR 2ND FLR;  Service: Vascular;  Laterality: Left;  LUE AVG revision    ROTATOR CUFF REPAIR Left     SALIVARY GLAND SURGERY      Tonsillectomy      TONSILLECTOMY      TRANSPOSITION OF BASILIC VEIN Left 11/1/2022    Procedure: TRANSPOSITION, VEIN, BASILIC;  Surgeon: Prince Gardiner MD;  Location: [facility] OR [facility] FLR;  Service: Peripheral Vascular;  Laterality: Left;  Left Brachial vein.    WASHOUT Left 12/10/2023    Procedure: WASHOUT OF AV FISTULA;  Surgeon: Joaquin Rose MD;  Location: [facility] OR [facility];  Service: Vascular;  Laterality: Left;       Family History   Problem Relation Name Age of Onset    Heart disease Mother      Kidney disease Mother      Hypertension Mother      No Known Problems Father      Cancer Sister      Seizures Sister      Hypertension Sister      Stroke Sister      Amblyopia Neg Hx      Blindness Neg Hx      Cataracts Neg Hx      Diabetes Neg Hx      Glaucoma Neg Hx      Macular degeneration Neg Hx      Retinal detachment Neg Hx      Strabismus Neg Hx      Thyroid disease Neg Hx      Anesthesia problems Neg Hx         Social History[3]        Allergies   Review of patient's allergies indicates:   Allergen Reactions    Iodine and iodide containing products Hives     Hypotension, Flushing             Review of Systems  See HPI      [unfilled]  /65 (BP Location: Right arm, Patient Position: Sitting)   Pulse 60   Temp 97.9 °F (36.6 °C) (Oral)   Ht 6' 2" (1.88 m)   Wt 93.1 kg (205 lb 2.2 oz)   SpO2 97%   BMI 26.34 kg/m²     GEN: NAD, well developed, pleasant, well nourished  HEENT: NCAT, PERRLA, EOMI, sclera clear, anicteric, bilateral ear exam wnl, O/P clear, MMM with no lesions  NECK: normal, supple with midline trachea, no LAD, no thyromegaly  LUNGS: CTAB, no w/r/r, no increased work of breathing   HEART: RRR, normal S1 and S2, no m/r/g, " no edema  ABD: s/nt/nd, NABS  SKIN: normal turgor, no rashes  PSYCH: AOx3, appropriate mood and affect  MSK: warm/well perfused, normal ROM in all extremities, no c/c/e.  NEURO: normal without focal findings, CN II-XII are grossly intact.  Sensation/strength grossly normal, gait and station normal.         Labs  Lab Results   Component Value Date    HGBA1C 4.5 (L) 01/03/2025     Lab Results   Component Value Date     03/05/2025    K 4.6 03/05/2025     03/05/2025    CO2 22 (L) 12/12/2024    BUN 41 (H) 12/12/2024    CREATININE 12.12 (H) 03/05/2025    CALCIUM 9.9 03/05/2025    ANIONGAP 14 12/12/2024    ESTGFRAFRICA 10.9 (A) 05/10/2022    EGFRNONAA 9.5 (A) 05/10/2022     Lab Results   Component Value Date    CHOL 67 (L) 10/24/2024    CHOL 68 (L) 10/27/2022    CHOL 91 05/11/2022     Lab Results   Component Value Date    HDL 37 (L) 10/24/2024    HDL 29 (L) 10/27/2022    HDL 34 05/11/2022     Lab Results   Component Value Date    LDLCALC 18.8 (L) 10/24/2024    LDLCALC 19.4 (L) 10/27/2022    LDLCALC 27 05/11/2022     Lab Results   Component Value Date    TRIG 56 10/24/2024    TRIG 98 10/27/2022    TRIG 151 (H) 05/11/2022     Lab Results   Component Value Date    CHOLHDL 55.2 (H) 10/24/2024    CHOLHDL 42.6 10/27/2022    CHOLHDL 2.7 05/11/2022     Last set of blood work has been reviewed as noted above.                       [1]   Patient Active Problem List  Diagnosis    -Diabetes mellitus due to underlying condition with diabetic nephropathy - diet controlled    Proteinuria    Essential (primary) hypertension    Mixed hyperlipidemia    -CKD (chronic kidney disease) stage 4, GFR 15-29 ml/min - on HD MWF    -Gout    Anemia of chronic disease    -HLD associated with type 2 DM    Hyperkalemia    Acute kidney injury superimposed on chronic kidney disease    ESRD on dialysis    Nuclear sclerosis of both eyes    Refractive error    Cortical age-related cataract    Status post placement of arteriovenous graft     Overweight (BMI 25.0-29.9)    Steal syndrome dialysis vascular access, initial encounter    Arteriovenous fistula    Other neutropenia    Stenosis of arteriovenous dialysis fistula    Hypocalcemia    Bacteremia    Type 2 diabetes mellitus with kidney complication, without long-term current use of insulin    Surgical wound, non healing    -Hx Prostate cancer - follows Urology for active surveillance    Acute on chronic diastolic CHF (congestive heart failure)    Acute hypoxemic respiratory failure    Hyperparathyroidism    Thrombocytopenia, unspecified   [2]   Current Outpatient Medications:     acetaminophen (TYLENOL) 500 MG tablet, Take 1 tablet (500 mg total) by mouth every 6 (six) hours as needed for Pain., Disp: 30 tablet, Rfl: 0    aspirin (ECOTRIN) 81 MG EC tablet, Take 1 tablet (81 mg total) by mouth once daily., Disp: 90 tablet, Rfl: 3    atorvastatin (LIPITOR) 20 MG tablet, TAKE 1 TABLET BY MOUTH EVERY EVENING, Disp: 90 tablet, Rfl: 2    blood sugar diagnostic Strp, Use to check blood glucose once a day., Disp: 100 each, Rfl: 3    blood-glucose meter Misc, Use to check blood glucose twice a day., Disp: 1 each, Rfl: 0    carvediloL (COREG) 25 MG tablet, Take 1 tablet (25 mg total) by mouth 2 (two) times daily., Disp: 180 tablet, Rfl: 0    fluticasone propionate (FLONASE) 50 mcg/actuation nasal spray, 1 spray (50 mcg total) by Each Nostril route 2 (two) times daily as needed for Rhinitis., Disp: 16 mL, Rfl: 2    furosemide (LASIX) 80 MG tablet, TAKE 1 TABLET(80 MG) BY MOUTH TWICE DAILY, Disp: 60 tablet, Rfl: 11    lancets 30 gauge Misc, Use to check blood glucose twice a day., Disp: 100 each, Rfl: 5    loratadine (CLARITIN) 10 mg tablet, Take 1 tablet (10 mg total) by mouth once daily., Disp: 60 tablet, Rfl: 2    sevelamer HCL (RENAGEL) 800 MG Tab, Take 2,400 mg by mouth 3 (three) times daily., Disp: , Rfl:     vitamin D (VITAMIN D3) 1000 units Tab, Take 1,000 Units by mouth., Disp: , Rfl:     calcium  carbonate 470 mg calcium (1,177 mg) Chew, Take 2 tablets by mouth daily as needed (heartburn). (Patient not taking: Reported on 3/18/2025), Disp: , Rfl:     cinacalcet (SENSIPAR) 30 MG Tab, Take 30 mg by mouth daily with breakfast. (Patient not taking: Reported on 3/18/2025), Disp: , Rfl:     ergocalciferol, vitamin D2, (VITAMIN D ORAL), Take 1 tablet by mouth once daily. (Patient not taking: Reported on 3/18/2025), Disp: , Rfl:     hydrALAZINE (APRESOLINE) 50 MG tablet, Take 1 tablet (50 mg total) by mouth 3 (three) times daily. Followup Dr.T Boo JAN 2026 for more refills, Disp: 90 tablet, Rfl: 11    mometasone furoate (NASONEX NASL), 2 sprays by Nasal route twice a week. As needed for congestion (Patient not taking: Reported on 3/18/2025), Disp: , Rfl:     NIFEdipine (PROCARDIA-XL) 60 MG (OSM) 24 hr tablet, Take 1 tablet (60 mg total) by mouth 2 (two) times a day. Followup Dr.T Boo JAN 2026 for more refills, Disp: 60 tablet, Rfl: 11    tamsulosin (FLOMAX) 0.4 mg Cap, Take 1 capsule (0.4 mg total) by mouth once daily. Followup Dr.T Boo JAN 2026 for more refills, Disp: 90 capsule, Rfl: 3  No current facility-administered medications for this visit.    Facility-Administered Medications Ordered in Other Visits:     0.9%  NaCl infusion, , Intravenous, Continuous, Kim Lutz NP    0.9%  NaCl infusion, , Intravenous, Continuous, Herminia Durant MD    ondansetron disintegrating tablet 8 mg, 8 mg, Oral, Q8H PRN, Herminia Durant MD  [3]   Social History  Socioeconomic History    Marital status:      Spouse name: Kristine Still   Occupational History     Employer: [employer] de[employer]   Tobacco Use    Smoking status: Never    Smokeless tobacco: Never    Tobacco comments:     .  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     Social Drivers of Health     Financial Resource Strain: Patient Declined (12/16/2024)    Received from [facility]    Overall Financial Resource Strain (CARDIA)     Difficulty of Paying Living Expenses: Patient declined   Food Insecurity: No Food Insecurity (12/16/2024)    Received from [facility]    Hunger Vital Sign     Worried About Running Out of Food in the Last Year: Never true     Ran Out of Food in the Last Year: Never true   Transportation Needs: No Transportation Needs (12/16/2024)    Received from [facility]    PRAPARE - Transportation     Lack of Transportation (Medical): No     Lack of Transportation (Non-Medical): No   Physical Activity: Inactive (12/12/2024)    Exercise Vital Sign     Days of Exercise per Week: 0 days     Minutes of Exercise per Session: 40 min   Stress: No Stress Concern Present (12/12/2024)    [place] [employer] - Occupational Stress Questionnaire     Feeling of Stress : Not at all   Housing Stability: Unknown (12/16/2024)    Received from [facility]    Housing Stability Vital Sign     Unable to Pay for Housing in the Last Year: No     Homeless in the Last Year: No

## 2025-03-19 ENCOUNTER — TELEPHONE (OUTPATIENT)
Dept: FAMILY MEDICINE | Facility: CLINIC | Age: 70
End: 2025-03-19
Payer: COMMERCIAL

## 2025-03-19 NOTE — TELEPHONE ENCOUNTER
Patient at visit 3-18-25 and requests that MD approve DC of O2 concentrator patient has at home due to non-use and is receiving a bill per DME for use. Notified patient of phone call to [facility] this day that order to DC was faxed to DME and that they would contact patient to arrange . Understanding and thank you verbalized.

## 2025-03-30 NOTE — TELEPHONE ENCOUNTER
Refill Routing Note   Medication(s) are not appropriate for processing by [facility] for the following reason(s):        Required vitals abnormal    [facility] action(s):  Defer             Appointments  past 12m or future 3m with PCP    Date Provider   Last Visit   3/18/2025 Angel Luis Boo MD   Next Visit   Visit date not found Angel Luis Boo MD   ED visits in past 90 days: 0        Note composed:4:28 PM 03/30/2025

## 2025-03-30 NOTE — TELEPHONE ENCOUNTER
No care due was identified.  [facility] Embedded Care Due Messages. Reference number: [id number].   3/30/2025 4:26:11 PM CDT

## 2025-03-31 RX ORDER — CARVEDILOL 25 MG/1
25 TABLET ORAL 2 TIMES DAILY
Qty: 180 TABLET | Refills: 3 | Status: SHIPPED | OUTPATIENT
Start: 2025-03-31

## 2025-04-28 ENCOUNTER — OFFICE VISIT (OUTPATIENT)
Dept: FAMILY MEDICINE | Facility: CLINIC | Age: 70
End: 2025-04-28
Payer: MEDICARE

## 2025-04-28 VITALS
HEIGHT: 74 IN | BODY MASS INDEX: 27.1 KG/M2 | DIASTOLIC BLOOD PRESSURE: 78 MMHG | OXYGEN SATURATION: 98 % | HEART RATE: 65 BPM | SYSTOLIC BLOOD PRESSURE: 156 MMHG | WEIGHT: 211.19 LBS

## 2025-04-28 DIAGNOSIS — Z99.2 ESRD ON DIALYSIS: Chronic | ICD-10-CM

## 2025-04-28 DIAGNOSIS — K05.10 GINGIVITIS: ICD-10-CM

## 2025-04-28 DIAGNOSIS — N18.6 ESRD ON DIALYSIS: Chronic | ICD-10-CM

## 2025-04-28 DIAGNOSIS — I10 ESSENTIAL (PRIMARY) HYPERTENSION: Primary | Chronic | ICD-10-CM

## 2025-04-28 DIAGNOSIS — E78.2 MIXED HYPERLIPIDEMIA: Chronic | ICD-10-CM

## 2025-04-28 DIAGNOSIS — K08.9 POOR DENTITION: ICD-10-CM

## 2025-04-28 DIAGNOSIS — D69.6 THROMBOCYTOPENIA, UNSPECIFIED: ICD-10-CM

## 2025-04-28 DIAGNOSIS — E66.3 OVERWEIGHT (BMI 25.0-29.9): ICD-10-CM

## 2025-04-28 DIAGNOSIS — E08.21 DIABETES MELLITUS DUE TO UNDERLYING CONDITION WITH DIABETIC NEPHROPATHY, WITHOUT LONG-TERM CURRENT USE OF INSULIN: Chronic | ICD-10-CM

## 2025-04-28 PROCEDURE — 99215 OFFICE O/P EST HI 40 MIN: CPT | Mod: PBBFAC,PO | Performed by: FAMILY MEDICINE

## 2025-04-28 PROCEDURE — G2211 COMPLEX E/M VISIT ADD ON: HCPCS | Mod: S$PBB,,, | Performed by: FAMILY MEDICINE

## 2025-04-28 PROCEDURE — 99999 PR PBB SHADOW E&M-EST. PATIENT-LVL V: CPT | Mod: PBBFAC,,, | Performed by: FAMILY MEDICINE

## 2025-04-28 PROCEDURE — 99214 OFFICE O/P EST MOD 30 MIN: CPT | Mod: S$PBB,,, | Performed by: FAMILY MEDICINE

## 2025-04-28 RX ORDER — CARVEDILOL 12.5 MG/1
25 TABLET ORAL
COMMUNITY
Start: 2024-08-23

## 2025-04-28 RX ORDER — HYDRALAZINE HYDROCHLORIDE 10 MG/1
50 TABLET, FILM COATED ORAL
COMMUNITY
Start: 2024-08-23 | End: 2025-08-23

## 2025-04-28 RX ORDER — SEVELAMER CARBONATE 800 MG/1
3 TABLET, FILM COATED ORAL
COMMUNITY
Start: 2024-08-07

## 2025-04-28 RX ORDER — NIFEDIPINE 90 MG/1
60 TABLET, EXTENDED RELEASE ORAL
COMMUNITY
Start: 2024-08-23

## 2025-04-28 NOTE — PROGRESS NOTES
Routine Office Visit     Patient Name: Elbert Still Jr.    : 1955  MRN: [id number]    Subjective     History of Present Illness    CHIEF COMPLAINT:  Patient presents with gum bleeding and swelling that started overnight.    HPI:  Patient reports waking up at 1:30 AM with bleeding from his gums. He noticed oral tackiness and expectorated a dark red blood clot from beneath his teeth, between the gum and tooth. The bleeding episode lasted approximately 5 minutes before cessation. A second similar episode occurred around 3:00-3:30 AM, also producing a small clot. Patient describes the clots as being approximately the size of a small mint. He used mouthwash to rinse, which aided in stopping the bleeding. Patient reports no prior history of such bleeding episodes.    Patient has noticed gradually worsening gum swelling for approximately the last 6 months. He denies pain or soreness in the gums, describing them as asymptomatic. He has not had this evaluated by a healthcare provider yet.    Patient has been receiving dialysis for close to 2 years, typically on , , and , with his last session being the previous Friday. He receives heparin shots during dialysis. Due to the bleeding episode, his dialysis was cancelled today.    Patient reports feeling generally well, though he notes some swelling in his feet over the weekend. He attributes this to possibly increased fluid intake and mentions that his last dialysis session may not have removed sufficient fluid.    Patient has been diagnosed with diabetes, which led to his need for dialysis. He also has hypertension, for which he takes medication including hydralazine, added 3-4 months ago. His blood pressure readings have been elevated, with a recent home reading of 154/71 and a reading of 180s/156 during this visit.    Patient reports good appetite, sometimes consuming 3 meals a day, though occasionally only eating twice if fatigued. He has  decreased from 245 lbs before starting dialysis to around 200 lbs currently.    Patient was evaluated by a dentist 3 weeks ago, who noticed his protruding gums and recommended cleaning. Patient admits to irregular flossing habits.    Patient denies fever, cough, achiness, blood in stool, hematemesis, or easy bruising. He denies any recent illnesses, colds, or exposure to sick individuals. Patient denies any history of blood clots, liver issues, or chronic infections such as hepatitis B or C.    MEDICATIONS:  Patient is on Aspirin, but did not take it on the morning of the visit. He is also on Vitamin D. For blood pressure management, he started Hydralazine 3-4 months ago, which was recently increased to 3 times daily. He is also on Amlodipine for blood pressure. During dialysis, he receives Heparin.    MEDICAL HISTORY:  Patient has a history of diabetes, hypertension, end-stage renal disease, gingivitis/gingival disease, and thrombocytopenia.    FAMILY HISTORY:  Family history is significant for father, who is 87 years old and recently had COVID-19. He does not have diabetes or high blood pressure. Patient's mother had COVID-19 and is .    SURGICAL HISTORY:  Patient h  as undergone multiple fistula surgeries for dialysis access. These include an initial placement, followed by a readjustment, and then placement in another location. One of his fistulas became infected and had to be removed.    TEST RESULTS:  Patient's platelet count was 104 on , which is low (normal is over 150). His hemoglobin level is also low.    ROS:  General: no fever, no chills, no fatigue, no weight gain, no weight loss  Eyes: no vision changes, no redness, no discharge  ENT: no ear pain, no nasal congestion, no sore throat, +bleeding gums  Cardiovascular: no chest pain, no palpitations, +lower extremity edema  Respiratory: no cough, no shortness of breath  Gastrointestinal: no abdominal pain, no nausea, no vomiting, no  "diarrhea, no constipation, no blood in stool  Genitourinary: no dysuria, no hematuria, no frequency  Musculoskeletal: no joint pain, no muscle pain, +limb swelling  Skin: no rash, no lesion  Neurological: no headache, no dizziness, no numbness, no tingling  Psychiatric: no anxiety, no depression, no sleep difficulty           Objective     BP (!) 156/78 (BP Location: Left arm, Patient Position: Sitting)   Pulse 65   Ht 6' 2" (1.88 m)   Wt 95.8 kg (211 lb 3.2 oz)   SpO2 98%   BMI 27.12 kg/m²   Physical Exam  Constitutional:       Appearance: He is well-developed.   HENT:      Head: Normocephalic and atraumatic.   Eyes:      Conjunctiva/sclera: Conjunctivae normal.      Pupils: Pupils are equal, round, and reactive to light.   Neck:      Thyroid: No thyromegaly.      Vascular: No JVD.   Cardiovascular:      Rate and Rhythm: Normal rate and regular rhythm.      Heart sounds: Normal heart sounds.   Pulmonary:      Effort: Pulmonary effort is normal.      Breath sounds: Normal breath sounds. No wheezing.   Abdominal:      General: Bowel sounds are normal. There is no distension.      Palpations: Abdomen is soft.      Tenderness: There is no abdominal tenderness. There is no guarding.   Musculoskeletal:         General: Normal range of motion.      Cervical back: Normal range of motion and neck supple.   Lymphadenopathy:      Cervical: No cervical adenopathy.   Skin:     General: Skin is warm and dry.   Neurological:      Mental Status: He is alert and oriented to person, place, and time.   Psychiatric:         Behavior: Behavior normal.      Observe significant gum swelling during exam.      Assessment     Assessment & Plan            Problem List Items Addressed This Visit          Cardiac/Vascular    Essential (primary) hypertension - Primary (Chronic)   Monitor blood pressure, noting recent measurements of 154/71 around 1:00 PM and 180s in the morning.   Acknowledge that high blood pressure is normal for this " patient.   Continue current blood pressure medications, including hydralazine, which was added 3-4 months ago and recently increased to 3 times daily.      Mixed hyperlipidemia (Chronic)  The current medical regimen is effective;  continue present plan and medications.          Renal/    ESRD on dialysis (Chronic)    Overview   - on HD MWF    Monitor the patient's dialysis schedule, which is currently Monday, Wednesday, and Friday, for approximately 2 years.   Note the patient's report of foot swelling over weekends, likely due to fluid retention.   Review recent lab work from early April, which shows steady results with no significant changes.   Acknowledge the patient's complex medical situation, requiring coordination between dialysis team, dental team, and primary care for any dental procedures.   Administer heparin shots during dialysis, which were temporarily withheld due to recent bleeding.   Coordinate dental evaluation and treatment with dialysis schedule due to anticoagulant use.   Follow up with dialysis team to discuss treatment plan for managing bleeding risk, aligning with dialysis schedule.   Note that heparin use during dialysis may contribute to gum swelling and bleeding.   Assess foot swelling between dialysis sessions, possibly due to fluid retention.   Acknowledge the need for coordination between dialysis team and other medical procedures, such as dental work.          Hematology    Thrombocytopenia, unspecified   Monitor low platelet count, noting 104 on April 2nd, which is below the normal range of over 150.   Assess that current platelet count is not low enough to cause spontaneous bleeding.   Observe no signs of systemic bleeding disorder.   Coordinate dental evaluation and treatment with consideration of low platelet count.   Follow up with primary care physician to discuss treatment plan for managing bleeding risk.   Continue monitoring platelet levels, noting recent dips in lab  results.   Consider possibility of enlarged spleen trapping platelets, but find no supporting evidence in patient's history.         Endocrine    -Diabetes mellitus due to underlying condition with diabetic nephropathy - diet controlled (Chronic)   Acknowledge patient's history of diabetes leading to kidney disease and subsequent need for dialysis.   Note that metformin was previously prescribed for diabetes management but discontinued due to concerns about kidney function.      Overweight (BMI 25.0-29.9)     Other Visit Diagnoses         Poor dentition          Gingivitis       Evaluate recent gum bleeding, likely due to gingivitis.   Educate the patient on the importance of good oral hygiene, including regular flossing, to prevent gingivitis.   Refer to dentist for evaluation and treatment of gingivitis.   Schedule follow-up dental appointment.   Note patient's report of bleeding from gums, with dark red blood and clots, occurring twice during the night.   Observe swollen gums and significant plaque buildup during exam.   Assess that the patient has gingivitis due to poor dental hygiene, including lack of flossing and plaque buildup.   Recommend dental evaluation and treatment, emphasizing coordination with the dialysis team due to the patient's complex medical situation.   Consider that one of medications, ACE inhibitors, can cause gum swelling.   Recommend dental evaluation and potential treatment, noting that swollen gums may require treatment before any dental procedures.   Consider that amlodipine medication may contribute to gum swelling and bleeding.   Discuss potential impact of medications like amlodipine on gum health with the patient.   Patient to improve oral hygiene practices, including regular flossing.            Total time over 25 minutes with over 50% counseling.       This note was generated with the assistance of ambient listening technology. Verbal consent was obtained by the patient and  accompanying visitor(s) for the recording of patient appointment to facilitate this note. I attest to having reviewed and edited the generated note for accuracy, though some syntax or spelling errors may persist. Please contact the author of this note for any clarification.

## 2025-05-05 DIAGNOSIS — J30.89 NON-SEASONAL ALLERGIC RHINITIS DUE TO OTHER ALLERGIC TRIGGER: ICD-10-CM

## 2025-05-05 NOTE — TELEPHONE ENCOUNTER
No care due was identified.  [facility] Embedded Care Due Messages. Reference number: [id number].   5/05/2025 5:09:23 PM CDT

## 2025-05-06 RX ORDER — FLUTICASONE PROPIONATE 50 MCG
SPRAY, SUSPENSION (ML) NASAL
Qty: 48 G | Refills: 3 | Status: SHIPPED | OUTPATIENT
Start: 2025-05-06

## 2025-05-06 NOTE — TELEPHONE ENCOUNTER
Refill Decision Note   Elbert Still  is requesting a refill authorization.  Brief Assessment and Rationale for Refill:  Approve     Medication Therapy Plan:         Comments:     Note composed:5:47 AM 05/06/2025

## 2025-05-26 RX ORDER — SEVELAMER CARBONATE 800 MG/1
2400 TABLET, FILM COATED ORAL
Status: CANCELLED | OUTPATIENT
Start: 2025-05-26

## 2025-06-04 DIAGNOSIS — J30.89 NON-SEASONAL ALLERGIC RHINITIS DUE TO OTHER ALLERGIC TRIGGER: ICD-10-CM

## 2025-06-04 RX ORDER — LORATADINE 10 MG/1
10 TABLET ORAL
Qty: 90 TABLET | Refills: 3 | Status: SHIPPED | OUTPATIENT
Start: 2025-06-04

## 2025-06-05 ENCOUNTER — OFFICE VISIT (OUTPATIENT)
Dept: FAMILY MEDICINE | Facility: CLINIC | Age: 70
End: 2025-06-05
Payer: MEDICARE

## 2025-06-05 VITALS
BODY MASS INDEX: 26.6 KG/M2 | WEIGHT: 207.31 LBS | OXYGEN SATURATION: 96 % | HEART RATE: 59 BPM | HEIGHT: 74 IN | DIASTOLIC BLOOD PRESSURE: 80 MMHG | SYSTOLIC BLOOD PRESSURE: 138 MMHG | RESPIRATION RATE: 16 BRPM | TEMPERATURE: 98 F

## 2025-06-05 DIAGNOSIS — N18.6 ESRD ON DIALYSIS: Chronic | ICD-10-CM

## 2025-06-05 DIAGNOSIS — Z99.2 ESRD ON DIALYSIS: Chronic | ICD-10-CM

## 2025-06-05 DIAGNOSIS — N63.0 BREAST MASS IN MALE: Primary | ICD-10-CM

## 2025-06-05 PROCEDURE — 99999 PR PBB SHADOW E&M-EST. PATIENT-LVL IV: CPT | Mod: PBBFAC,,,

## 2025-06-05 PROCEDURE — 99214 OFFICE O/P EST MOD 30 MIN: CPT | Mod: PBBFAC,PO

## 2025-06-23 DIAGNOSIS — Z00.00 ENCOUNTER FOR MEDICARE ANNUAL WELLNESS EXAM: ICD-10-CM

## 2025-06-25 ENCOUNTER — HOSPITAL ENCOUNTER (OUTPATIENT)
Dept: RADIOLOGY | Facility: HOSPITAL | Age: 70
Discharge: HOME | End: 2025-06-25
Payer: COMMERCIAL

## 2025-06-25 DIAGNOSIS — N63.0 BREAST MASS IN MALE: ICD-10-CM

## 2025-06-25 PROCEDURE — 77062 BREAST TOMOSYNTHESIS BI: CPT | Mod: 26,,, | Performed by: RADIOLOGY

## 2025-06-25 PROCEDURE — 77066 DX MAMMO INCL CAD BI: CPT | Mod: 26,,, | Performed by: RADIOLOGY

## 2025-06-25 PROCEDURE — 77066 DX MAMMO INCL CAD BI: CPT | Mod: TC

## 2025-06-26 ENCOUNTER — RESULTS FOLLOW-UP (OUTPATIENT)
Dept: FAMILY MEDICINE | Facility: CLINIC | Age: 70
End: 2025-06-26

## 2025-07-02 ENCOUNTER — PATIENT MESSAGE (OUTPATIENT)
Dept: OPTOMETRY | Facility: CLINIC | Age: 70
End: 2025-07-02
Payer: COMMERCIAL

## 2025-07-03 ENCOUNTER — PATIENT MESSAGE (OUTPATIENT)
Dept: OPTOMETRY | Facility: CLINIC | Age: 70
End: 2025-07-03
Payer: COMMERCIAL

## 2025-07-10 ENCOUNTER — OFFICE VISIT (OUTPATIENT)
Dept: OPTOMETRY | Facility: CLINIC | Age: 70
End: 2025-07-10
Payer: COMMERCIAL

## 2025-07-10 DIAGNOSIS — H25.013 CORTICAL AGE-RELATED CATARACT OF BOTH EYES: ICD-10-CM

## 2025-07-10 DIAGNOSIS — H25.13 NUCLEAR SCLEROSIS OF BOTH EYES: ICD-10-CM

## 2025-07-10 DIAGNOSIS — E11.36 TYPE 2 DIABETES MELLITUS WITH DIABETIC CATARACT, WITHOUT LONG-TERM CURRENT USE OF INSULIN: Primary | ICD-10-CM

## 2025-07-10 PROCEDURE — 3066F NEPHROPATHY DOC TX: CPT | Mod: CPTII,S$GLB,, | Performed by: OPTOMETRIST

## 2025-07-10 PROCEDURE — 99999 PR PBB SHADOW E&M-EST. PATIENT-LVL III: CPT | Mod: PBBFAC,,, | Performed by: OPTOMETRIST

## 2025-07-10 PROCEDURE — 1126F AMNT PAIN NOTED NONE PRSNT: CPT | Mod: CPTII,S$GLB,, | Performed by: OPTOMETRIST

## 2025-07-10 PROCEDURE — 1101F PT FALLS ASSESS-DOCD LE1/YR: CPT | Mod: CPTII,S$GLB,, | Performed by: OPTOMETRIST

## 2025-07-10 PROCEDURE — 3288F FALL RISK ASSESSMENT DOCD: CPT | Mod: CPTII,S$GLB,, | Performed by: OPTOMETRIST

## 2025-07-10 PROCEDURE — 1159F MED LIST DOCD IN RCRD: CPT | Mod: CPTII,S$GLB,, | Performed by: OPTOMETRIST

## 2025-07-10 PROCEDURE — 2023F DILAT RTA XM W/O RTNOPTHY: CPT | Mod: CPTII,S$GLB,, | Performed by: OPTOMETRIST

## 2025-07-10 PROCEDURE — 92014 COMPRE OPH EXAM EST PT 1/>: CPT | Mod: S$GLB,,, | Performed by: OPTOMETRIST

## 2025-07-10 PROCEDURE — 3044F HG A1C LEVEL LT 7.0%: CPT | Mod: CPTII,S$GLB,, | Performed by: OPTOMETRIST

## 2025-07-10 PROCEDURE — 1160F RVW MEDS BY RX/DR IN RCRD: CPT | Mod: CPTII,S$GLB,, | Performed by: OPTOMETRIST

## 2025-07-10 NOTE — PROGRESS NOTES
Subjective:       Patient ID: Elbert Still Jr. is a 69 y.o. male      Chief Complaint   Patient presents with    Concerns About Ocular Health     History of Present Illness  Dls: 6/10/24 Dr. Krishna     68 y/o male presents today for diabetic eye exam .   Pt c/o problems with glare ou . Pt c/o foggy vision ou.   Pt wears readers.     LBS ?     + ou tearing  + ou off/on itching  No burning  No pain  + ha's  + ou off/on floaters  No flashes    Eye meds  Otc gtts ou prn     Pohx:   None    Fohx:   None    Hemoglobin A1C       Date                     Value               Ref Range             Status                07/02/2025               4.3 (L)             4.8 - 5.9 %           Final                 04/02/2025               5.1                 4.8 - 5.9 %           Final                 01/03/2025               4.5 (L)             4.8 - 5.9 %           Final            ----------       Assessment/Plan:     1. Type 2 diabetes mellitus with diabetic cataract, without long-term current use of insulin (Primary)  No diabetic retinopathy. Discussed with pt the effects of diabetes on vision, importance of good blood sugar control, compliance with meds, and follow up care with PCP. Return in 1 year for dilated eye exam, sooner PRN.      2. Nuclear sclerosis of both eyes  3. Cortical age-related cataract of both eyes  NVS per pt. Educated pt on presence of cataracts and effects on vision. No surgery at this time. Recheck in one year, sooner PRN.      Follow up in about 1 year (around 7/10/2026), or if symptoms worsen or fail to improve, for Cataract check, Diabetic Eye Exam.

## 2025-07-29 ENCOUNTER — TELEPHONE (OUTPATIENT)
Dept: VASCULAR SURGERY | Facility: CLINIC | Age: 70
End: 2025-07-29
Payer: COMMERCIAL

## 2025-07-29 DIAGNOSIS — Z01.818 PRE-OP EVALUATION: Primary | ICD-10-CM

## 2025-07-29 DIAGNOSIS — M79.89 LEFT ARM SWELLING: ICD-10-CM

## 2025-07-29 NOTE — TELEPHONE ENCOUNTER
Contacted pt to schedule appt from referral by Dr. Mccracken. Pt states he did not follow up with Dr. Rayo after his last visit in December '23 as he decided not to proceed with new HD access creation and that he has decided to dialyze via permcath only. States he was referred back to Dr. Rayo for abnormal swelling in left arm. Appointment scheduled, pt verified.

## 2025-08-12 ENCOUNTER — OFFICE VISIT (OUTPATIENT)
Dept: VASCULAR SURGERY | Facility: CLINIC | Age: 70
End: 2025-08-12
Attending: SURGERY
Payer: MEDICARE

## 2025-08-12 ENCOUNTER — HOSPITAL ENCOUNTER (OUTPATIENT)
Dept: VASCULAR SURGERY | Facility: CLINIC | Age: 70
Discharge: HOME | End: 2025-08-12
Attending: SURGERY
Payer: MEDICARE

## 2025-08-12 VITALS
BODY MASS INDEX: 25.75 KG/M2 | TEMPERATURE: 98 F | WEIGHT: 200.63 LBS | DIASTOLIC BLOOD PRESSURE: 82 MMHG | SYSTOLIC BLOOD PRESSURE: 159 MMHG | HEIGHT: 74 IN | HEART RATE: 68 BPM

## 2025-08-12 DIAGNOSIS — N18.6 ESRD ON DIALYSIS: Primary | Chronic | ICD-10-CM

## 2025-08-12 DIAGNOSIS — Z99.2 ESRD ON DIALYSIS: Primary | Chronic | ICD-10-CM

## 2025-08-12 DIAGNOSIS — M79.89 LEFT ARM SWELLING: ICD-10-CM

## 2025-08-12 DIAGNOSIS — N18.6 ESRD (END STAGE RENAL DISEASE): ICD-10-CM

## 2025-08-12 PROCEDURE — 99999 PR PBB SHADOW E&M-EST. PATIENT-LVL V: CPT | Mod: PBBFAC,,, | Performed by: SURGERY

## 2025-08-12 PROCEDURE — 93971 EXTREMITY STUDY: CPT | Mod: PBBFAC | Performed by: SURGERY

## 2025-08-12 PROCEDURE — 99215 OFFICE O/P EST HI 40 MIN: CPT | Mod: PBBFAC | Performed by: SURGERY

## 2025-08-19 RX ORDER — SEVELAMER HYDROCHLORIDE 800 MG/1
TABLET, FILM COATED ORAL
Qty: 270 TABLET | Refills: 3 | Status: SHIPPED | OUTPATIENT
Start: 2025-08-19

## 2025-09-04 ENCOUNTER — NURSE TRIAGE (OUTPATIENT)
Dept: ADMINISTRATIVE | Facility: CLINIC | Age: 70
End: 2025-09-04
Payer: COMMERCIAL

## 2025-09-04 ENCOUNTER — OFFICE VISIT (OUTPATIENT)
Dept: FAMILY MEDICINE | Facility: CLINIC | Age: 70
End: 2025-09-04
Payer: MEDICARE

## 2025-09-04 ENCOUNTER — HOSPITAL ENCOUNTER (OUTPATIENT)
Dept: RADIOLOGY | Facility: HOSPITAL | Age: 70
Discharge: HOME | End: 2025-09-04
Payer: MEDICARE

## 2025-09-04 VITALS
OXYGEN SATURATION: 99 % | HEART RATE: 72 BPM | BODY MASS INDEX: 25.49 KG/M2 | TEMPERATURE: 98 F | SYSTOLIC BLOOD PRESSURE: 120 MMHG | RESPIRATION RATE: 18 BRPM | DIASTOLIC BLOOD PRESSURE: 62 MMHG | HEIGHT: 74 IN | WEIGHT: 198.63 LBS

## 2025-09-04 DIAGNOSIS — N18.6 ESRD ON DIALYSIS: Chronic | ICD-10-CM

## 2025-09-04 DIAGNOSIS — J90 PLEURAL EFFUSION, RIGHT: ICD-10-CM

## 2025-09-04 DIAGNOSIS — R06.02 SOB (SHORTNESS OF BREATH): Primary | ICD-10-CM

## 2025-09-04 DIAGNOSIS — J34.89 RHINORRHEA: ICD-10-CM

## 2025-09-04 DIAGNOSIS — Z99.2 ESRD ON DIALYSIS: Chronic | ICD-10-CM

## 2025-09-04 DIAGNOSIS — R06.02 SOB (SHORTNESS OF BREATH): ICD-10-CM

## 2025-09-04 DIAGNOSIS — J20.9 ACUTE BRONCHITIS, UNSPECIFIED ORGANISM: ICD-10-CM

## 2025-09-04 PROCEDURE — 99215 OFFICE O/P EST HI 40 MIN: CPT | Mod: PBBFAC,25,PO

## 2025-09-04 PROCEDURE — 71046 X-RAY EXAM CHEST 2 VIEWS: CPT | Mod: TC,PO

## 2025-09-04 PROCEDURE — 71046 X-RAY EXAM CHEST 2 VIEWS: CPT | Mod: 26,,, | Performed by: RADIOLOGY

## 2025-09-04 PROCEDURE — 99999 PR PBB SHADOW E&M-EST. PATIENT-LVL V: CPT | Mod: PBBFAC,,,

## 2025-09-04 RX ORDER — AZELASTINE 1 MG/ML
1 SPRAY, METERED NASAL 2 TIMES DAILY
Qty: 30 ML | Refills: 11 | Status: SHIPPED | OUTPATIENT
Start: 2025-09-04 | End: 2026-09-04

## 2025-09-04 RX ORDER — AZITHROMYCIN 250 MG/1
TABLET, FILM COATED ORAL
Qty: 6 TABLET | Refills: 0 | Status: SHIPPED | OUTPATIENT
Start: 2025-09-04 | End: 2025-09-09

## 2025-09-05 ENCOUNTER — TELEPHONE (OUTPATIENT)
Dept: FAMILY MEDICINE | Facility: CLINIC | Age: 70
End: 2025-09-05
Payer: COMMERCIAL

## (undated) DEVICE — DRESSING TRANS 4X4 TEGADERM

## (undated) DEVICE — PRESTO INFLATION DEVICE

## (undated) DEVICE — SUT LIGACLIP SMALL XTRA

## (undated) DEVICE — INFLATOR ENCORE

## (undated) DEVICE — SET DECANTER MEDICHOICE

## (undated) DEVICE — GOWN SMART IMP BREATHABLE XXLG

## (undated) DEVICE — SUT SILK 2-0 SH 18IN BLACK

## (undated) DEVICE — PAD ABD 8X10 STERILE

## (undated) DEVICE — CANNULA VESSEL

## (undated) DEVICE — GAUZE SPONGE 4X4 12PLY

## (undated) DEVICE — SUT 2-0 12-18IN SILK

## (undated) DEVICE — GLIDESHEATH SLENDER SS 5FR10CM

## (undated) DEVICE — Device

## (undated) DEVICE — DRESSING TRANS 2X2 TEGADERM

## (undated) DEVICE — SUT MCRYL PLUS 4-0 PS2 27IN

## (undated) DEVICE — STAPLER SKIN PROXIMATE WIDE

## (undated) DEVICE — BOOT SUTURE AID

## (undated) DEVICE — CLIP SPRING 6MM

## (undated) DEVICE — APPLICATOR CHLORAPREP ORN 26ML

## (undated) DEVICE — SOL NS 1000CC

## (undated) DEVICE — SUT 2-0 VICRYL / SH (J417)

## (undated) DEVICE — SET MICROPUNCT 5FRMPIS-501

## (undated) DEVICE — COVER LIGHT HANDLE 80/CA

## (undated) DEVICE — DRAPE HAND STERILE

## (undated) DEVICE — SHEATH PINNACLE 7FR HIFLO

## (undated) DEVICE — CATH STERLING MR 7X40X135

## (undated) DEVICE — SYR ONLY LUER LOCK 20CC

## (undated) DEVICE — KIT COPILOT VALVE HEMO TOOL

## (undated) DEVICE — SUT VICRYL 3-0 27 SH

## (undated) DEVICE — HEMOSTAT SURGICEL PWD 3G

## (undated) DEVICE — SEE MEDLINE ITEM 153688

## (undated) DEVICE — PACK AV VASCULAR ACCESS OMC

## (undated) DEVICE — SUT 4-0 12-18IN SILK BLACK

## (undated) DEVICE — BAND TR WITH INFLATOR

## (undated) DEVICE — CLIP MED TICALL

## (undated) DEVICE — COVER INSTR ELASTIC BAND 40X20

## (undated) DEVICE — KIT INTRO MICRO NIT VSI 4FR

## (undated) DEVICE — BLADE SURG CARBON STEEL SZ11

## (undated) DEVICE — GLIDE CATH ANGLED 4FR 65CM

## (undated) DEVICE — NDL HYPO A BEVEL 30X1/2

## (undated) DEVICE — LOOP VESSEL BLUE MINI 2/CARD

## (undated) DEVICE — NDL HYPO STD REG BVL 30G 0.5IN

## (undated) DEVICE — SUT PROLENE 6-0 BV-1 30IN

## (undated) DEVICE — CATH STERLING MR 7X20X135

## (undated) DEVICE — SUT ETHILON 3-0 PS2 18 BLK

## (undated) DEVICE — SYR 1CC TB SG 27GX1/2

## (undated) DEVICE — CATH ANGIO OMNI W/10SH 5F .035

## (undated) DEVICE — SPONGE DERMACEA 4X4IN 12PLY

## (undated) DEVICE — SOL 9P NACL IRR PIC IL

## (undated) DEVICE — GUIDEWIRE STF .035X180CM ANG

## (undated) DEVICE — DRAPE U SPLIT SHEET 54X76IN

## (undated) DEVICE — DRESSING TELFA STRL 4X3 LF

## (undated) DEVICE — BANDAGE ROLL COTTN 4.5INX4.1YD

## (undated) DEVICE — COVERS PROBE NR-48 STERILE

## (undated) DEVICE — SPONGE DERMACEA GAUZE 4X4

## (undated) DEVICE — CLIPPER BLADE MOD 4406 (CAREF)

## (undated) DEVICE — ELECTRODE REM PLYHSV RETURN 9

## (undated) DEVICE — CATH STERLING MR 6X20X135

## (undated) DEVICE — GUIDEWIRE ADVNTG 018X180CM ANG

## (undated) DEVICE — SYR SLIP TIP 1CC

## (undated) DEVICE — TIP YANKAUERS BULB NO VENT

## (undated) DEVICE — DRAPE PED LAP SURG 108X77IN

## (undated) DEVICE — FLOWSWITCH HP 1-W W/O LL

## (undated) DEVICE — LOOP VESSEL BLUE MAXI

## (undated) DEVICE — SUT MONOCRYL 3-0 SH U/D

## (undated) DEVICE — SUT 3-0 12-18IN SILK

## (undated) DEVICE — HEMOSTAT SURGICEL 4X8IN

## (undated) DEVICE — CLOSURE SKIN STERI STRIP 1/4X3

## (undated) DEVICE — CATH ULTRAVERSE  018 6X4X130

## (undated) DEVICE — CATH STERLING MR 5X40X135

## (undated) DEVICE — SUT VICRYL CTD 2-0 GI 27 SH

## (undated) DEVICE — KIT MICROINTRO 4F .018X40X7CM

## (undated) DEVICE — CATH STERLING MR 6X60X135

## (undated) DEVICE — SUT PROLENE 6-0 24 BV-1

## (undated) DEVICE — SWABSTICK BENZOIN 4 IN

## (undated) DEVICE — INSERTS STEALTH FIBRA SIZE 2

## (undated) DEVICE — OMNIPAQUE 350 200ML

## (undated) DEVICE — VISE RADIFOCUS MULTI TORQUE

## (undated) DEVICE — SPONGE GAUZE 16PLY 4X4

## (undated) DEVICE — SPONGE LAP 18X18 PREWASHED

## (undated) DEVICE — CATH DORADO 9X4

## (undated) DEVICE — SUT SILK 3-0 STRANDS 30IN

## (undated) DEVICE — TRAY CATH LAB OMC

## (undated) DEVICE — SUT 7/0 24IN PROLENE BL MO

## (undated) DEVICE — NDL MAXCORE BIOPSY 18G 25CM

## (undated) DEVICE — TOWEL OR DISP STRL BLUE 4/PK

## (undated) DEVICE — ELECTRODE REM POLYHESIVE II

## (undated) DEVICE — COVER PROBE NL STRL 3.6X96IN

## (undated) DEVICE — BANDAGE ACE ELASTIC 6"

## (undated) DEVICE — SHEATH 5FR 6CM

## (undated) DEVICE — DRESSING TEGADERM 2 3/8 X 2.75

## (undated) DEVICE — CATH GLIDE ANGLED 5FR 65CM